# Patient Record
Sex: MALE | Race: BLACK OR AFRICAN AMERICAN | NOT HISPANIC OR LATINO | Employment: OTHER | URBAN - METROPOLITAN AREA
[De-identification: names, ages, dates, MRNs, and addresses within clinical notes are randomized per-mention and may not be internally consistent; named-entity substitution may affect disease eponyms.]

---

## 2017-01-31 ENCOUNTER — OFFICE VISIT (OUTPATIENT)
Dept: PODIATRY | Facility: CLINIC | Age: 82
End: 2017-01-31
Payer: COMMERCIAL

## 2017-01-31 VITALS — SYSTOLIC BLOOD PRESSURE: 138 MMHG | HEART RATE: 56 BPM | DIASTOLIC BLOOD PRESSURE: 62 MMHG | HEIGHT: 73 IN

## 2017-01-31 DIAGNOSIS — B35.3 TINEA PEDIS OF BOTH FEET: ICD-10-CM

## 2017-01-31 DIAGNOSIS — L84 CORN OR CALLUS: ICD-10-CM

## 2017-01-31 DIAGNOSIS — N18.6 TYPE 2 DIABETES MELLITUS WITH END-STAGE RENAL DISEASE: Primary | ICD-10-CM

## 2017-01-31 DIAGNOSIS — I87.1 VEIN STENOSIS: ICD-10-CM

## 2017-01-31 DIAGNOSIS — E11.22 TYPE 2 DIABETES MELLITUS WITH END-STAGE RENAL DISEASE: Primary | ICD-10-CM

## 2017-01-31 DIAGNOSIS — B35.1 DERMATOPHYTOSIS, NAIL: ICD-10-CM

## 2017-01-31 PROCEDURE — 99499 UNLISTED E&M SERVICE: CPT | Mod: S$GLB,,, | Performed by: PODIATRIST

## 2017-01-31 PROCEDURE — 99999 PR PBB SHADOW E&M-EST. PATIENT-LVL IV: CPT | Mod: PBBFAC,,, | Performed by: PODIATRIST

## 2017-01-31 PROCEDURE — 99214 OFFICE O/P EST MOD 30 MIN: CPT | Mod: S$GLB,,, | Performed by: PODIATRIST

## 2017-01-31 RX ORDER — KETOCONAZOLE 20 MG/G
CREAM TOPICAL DAILY
Qty: 30 G | Refills: 3 | Status: ON HOLD | OUTPATIENT
Start: 2017-01-31 | End: 2019-01-01

## 2017-01-31 NOTE — PROGRESS NOTES
Subjective:      Patient ID: Ronald Campbell is a 87 y.o. male.    Chief Complaint: Diabetes Mellitus (PCP Dr. Sanches); Diabetic Foot Exam; and Routine Foot Care    Ronald is a 87 y.o. male who presents to the clinic for evaluation and treatment of high risk feet. Ronald has a past medical history of Anemia; Blindness of right eye; CHF (congestive heart failure); Chronic kidney disease; Diabetes mellitus type II; General anesthetics causing adverse effect in therapeutic use; Glaucoma (increased eye pressure); Hypertension; and Seizures. The patient's chief complaint is long, thick toenails. He had an infected left hallux ingrown nail that improved with local care a few weeks ago. His daughter is providing excellent foot care BID.  This patient has documented high risk feet requiring routine maintenance secondary to diabetes mellitis and those secondary complications of diabetes, as mentioned.     PCP: Angelika     Date Last Seen by PCP:   Chief Complaint   Patient presents with    Diabetes Mellitus     PCP Dr. Sanches    Diabetic Foot Exam    Routine Foot Care       Current shoe gear: DM shoes.    Hemoglobin A1C   Date Value Ref Range Status   04/30/2014 7.7 (H) 4.5 - 6.2 % Final   09/02/2013 7.9 (H) 4.5 - 6.2 % Final   05/07/2013 7.4 (H) 4.0 - 6.2 % Final       Review of Systems   Constitution: Negative for chills, decreased appetite and fever.   Eyes: Positive for vision loss in left eye and vision loss in right eye.   Cardiovascular: Negative for leg swelling.   Skin: Positive for dry skin and nail changes.   Musculoskeletal: Positive for back pain and falls. Negative for arthritis, joint pain, joint swelling and myalgias.   Gastrointestinal: Negative for nausea and vomiting.   Neurological: Negative for loss of balance, numbness and paresthesias.           Objective:      Physical Exam   Constitutional: He is oriented to person, place, and time. He appears well-developed and well-nourished.   Cardiovascular:    Dorsalis pedis and posterior tibial pulses are diminished Bilaterally. Toes are cool to touch. Feet are warm proximally.There is decreased digital hair. Skin is atrophic, slightly hyperpigmented, and mildly edematous       Musculoskeletal: Normal range of motion. He exhibits no edema or tenderness.   Adequate joint range of motion without pain, limitation, nor crepitation Bilateral feet and ankle joints. Muscle strength is 5/5 in all groups bilaterally.    Bilateral hallux abducto valgus deformities R>L with right hallux abutting the second toe and forcing it into a dorsiflexed position.       Feet:   Right Foot:   Protective Sensation: 10 sites tested. 8 sites sensed.   Left Foot:   Protective Sensation: 3 sites tested. 10 sites sensed.   Neurological: He is alert and oriented to person, place, and time.   [place]-Kevin 5.07 monofilamant testing is diminished Shiv feet. Sharp/dull sensation diminished Bilaterally. Light touch absent Bilaterally.       Skin: Skin is warm and dry. No ecchymosis and no lesion noted. No erythema.   Nails x10 are elongated by  2-4mm's, thickened by 2-9 mm's, dystrophic, and are darkened in  coloration . Xerosis Bilaterally. No open lesions noted.    Hyperkeratotic tissue noted to sub 5th MPJ b/l      Psychiatric: He has a normal mood and affect. His behavior is normal.   Nursing note and vitals reviewed.            Assessment:       Encounter Diagnoses   Name Primary?    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 Yes    Dermatophytosis, nail     Corn or callus     Tinea pedis of both feet     Vein stenosis          Plan:       Ronald was seen today for diabetes mellitus, diabetic foot exam and routine foot care.    Diagnoses and all orders for this visit:    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    Dermatophytosis, nail    Corn or callus    Tinea pedis of both feet  -     ketoconazole (NIZORAL) 2 % cream; Apply topically once daily. feet    Vein  stenosis      I counseled the patient on his conditions, their implications and medical management.    - Shoe inspection. Diabetic Foot Education. Patient reminded of the importance of good nutrition and blood sugar control to help prevent podiatric complications of diabetes. Patient instructed on proper foot hygeine. We discussed wearing proper shoe gear, daily foot inspections, never walking without protective shoe gear, never putting sharp instruments to feet, routine podiatric nail visits every 2-3 months.      - With patient's permission, nails were aggressively reduced and debrided x 10 to their soft tissue attachment mechanically and with electric , removing all offending nail and debris. Patient relates relief following the procedure. He will continue to monitor the areas daily, inspect his feet, wear protective shoe gear when ambulatory, moisturizer to maintain skin integrity and follow in this office in approximately 2-3 months, sooner p.r.n.    - After cleansing the  area w/ alcohol prep pad the above mentioned hyperkeratosis was trimmed utilizing No 15 scapel, to a smooth base with out incident. Patient tolerated this  well and reported comfort to the area of plantar 5th MPJ b/l and the second toe on the right.    - Econazole 1% topical cream prescribed for treatment of aforementioned tinea pedis. Patient will use this medication as directed in addition to thourougly drying between toes daily, and applying powder as needed

## 2017-01-31 NOTE — LETTER
January 31, 2017      Js Sanches MD  [address] [facility] [address]           [facility] - Podiatry  [address]  [place] [address]  Phone: [phone]          Patient: Ronald Campbell   MR Number: [id number]   YOB: 1929   Date of Visit: 1/31/2017       Dear Dr. Js Sanches:    Thank you for referring Ronald Campbell to me for evaluation. Attached you will find relevant portions of my assessment and plan of care.    If you have questions, please do not hesitate to call me. I look forward to following Ronald Campbell along with you.    Sincerely,    Joaquin Juarez, DPSUMEET    Enclosure  CC:  No Recipients    If you would like to receive this communication electronically, please contact [email or web address] or [phone] to request more information on [facility] access.    For providers and/or their staff who would like to refer a patient to [facility], please contact us through our one-stop-shop provider referral line, [facility], at [phone].    If you feel you have received this communication in error or would no longer like to receive these types of communications, please e-mail [email or web address]

## 2017-01-31 NOTE — PATIENT INSTRUCTIONS
Gel toe sleeve ([employer].com or [facility])    How to Check Your Feet    Below are tips to help you look for foot problems. Try to check your feet at the same time each day, such as when you get out of bed in the morning.    · Check the top of each foot. The tops of toes, back of the heel, and outer edge of the foot can get a lot of rubbing from poor-fitting shoes.    · Check the bottom of each foot. Daily wear and tear often leads to problems at pressure spots.    · Check the toes and nails. Fungal infections often occur between toes. Toenail problems can also be a sign of fungal infections or lead to breaks in the skin.    · Check your shoes, too. Loose objects inside a shoe can injure the foot. Use your hand to feel inside your shoes for things like bruna, loose stitching, or rough areas that could irritate your skin.        Diabetic Foot Care    Diabetes can lead to a number of different foot complications. Fortunately, most of these complications can be prevented with a little extra foot care. If diabetes is not well controlled, the high blood sugar can cause damage to blood vessels and result in poor circulation to the foot. When the skin does not get enough blood flow, it becomes prone to pressure sores and ulcers, which heal slowly.  High blood sugar can also damage nerves, interfering with the ability to feel pain and pressure. When you cant feel your foot normally, it is easy to injure your skin, bones and joints without knowing it. For these reasons diabetes increases the risk of fungal infections, bunions and ulcers. Deep ulcers can lead to bone infection. Gangrene is the most serious foot complication of diabetes. It usually occurs on the tips of the toes as blacked areas of skin. The black area is dead tissue. In severe cases, gangrene spreads to involve the entire toe, other toes and the entire foot. Foot or toe amputation may be required. Good foot care and blood sugar control can  prevent this.    Home Care  1. Wear comfortable, proper fitting shoes.  2. Wash your feet daily with warm water and mild soap.  3. After drying, apply a moisturizing cream or lotion.  4. Check your feet daily for skin breaks, blisters, swelling, or redness. Look between your toes also.  5. Wear cotton socks and change them every day.  6. Trim toe nails carefully and do not cut your cuticles.  7. Strive to keep your blood sugar under control with a combination of medicines, diet and activity.  8. If you smoke and have diabetes, it is very important that you stop. Smoking reduces blood flow to your foot.  9. Avoid activities that increase your risk of foot injury:  · Do not walk barefoot.  · Do not use heating pads or hot water bottles on your feet.  · Do not put your foot in a hot tub without first checking the temperature with your hand.  10) Schedule yearly foot exams.    Follow Up  with your doctor or as advised by our staff. Report any cut, puncture, scrape, other injury, blister, ingrown toenail or ulcer on your foot.    Get Prompt Medical Attention  if any of the following occur:  -- Open ulcer with pus draining from the wound  -- Increasing foot or leg pain  -- New areas of redness or swelling or tender areas of the foot    © 9963-3932 [employer]. [address] [place] [address], [place] [address]. All rights reserved. This information is not intended as a substitute for professional medical care. Always follow your healthcare professional's instructions.

## 2017-02-09 ENCOUNTER — TELEPHONE (OUTPATIENT)
Dept: NEPHROLOGY | Facility: CLINIC | Age: 82
End: 2017-02-09

## 2017-02-09 NOTE — TELEPHONE ENCOUNTER
----- Message from Manuela Ogden MA sent at 2/8/2017  4:56 PM CST -----  Contact: pt 's daughter      ----- Message -----     From: Elenita Adler     Sent: 2/8/2017   4:41 PM       To: Taj Shelley Staff    Gabriela   Daughter calling    ANANDA Bowser pt  -      Please call  Gabriela ph [phone]     Concerned   He seems to be returning home from  Dialysis  More and more tired   -   Weak  -   Voice is weak         Doesn't really get his strength back till the next day     What like to discuss with you     Thanks

## 2017-04-25 ENCOUNTER — OFFICE VISIT (OUTPATIENT)
Dept: UROLOGY | Facility: CLINIC | Age: 82
End: 2017-04-25
Payer: COMMERCIAL

## 2017-04-25 VITALS — HEART RATE: 60 BPM | DIASTOLIC BLOOD PRESSURE: 50 MMHG | SYSTOLIC BLOOD PRESSURE: 134 MMHG

## 2017-04-25 DIAGNOSIS — N43.3 HYDROCELE, BILATERAL: Primary | ICD-10-CM

## 2017-04-25 DIAGNOSIS — R30.0 DYSURIA: ICD-10-CM

## 2017-04-25 DIAGNOSIS — N13.8 BPH (BENIGN PROSTATIC HYPERTROPHY) WITH URINARY OBSTRUCTION: ICD-10-CM

## 2017-04-25 DIAGNOSIS — N48.89 PRESENCE OF SMEGMA IN MALE PATIENT: ICD-10-CM

## 2017-04-25 DIAGNOSIS — N40.1 BPH (BENIGN PROSTATIC HYPERTROPHY) WITH URINARY OBSTRUCTION: ICD-10-CM

## 2017-04-25 PROCEDURE — 99999 PR PBB SHADOW E&M-EST. PATIENT-LVL IV: CPT | Mod: PBBFAC,,, | Performed by: PHYSICIAN ASSISTANT

## 2017-04-25 PROCEDURE — 81002 URINALYSIS NONAUTO W/O SCOPE: CPT | Mod: S$GLB,,, | Performed by: PHYSICIAN ASSISTANT

## 2017-04-25 PROCEDURE — 99213 OFFICE O/P EST LOW 20 MIN: CPT | Mod: 25,S$GLB,, | Performed by: PHYSICIAN ASSISTANT

## 2017-04-25 PROCEDURE — 87086 URINE CULTURE/COLONY COUNT: CPT

## 2017-04-25 NOTE — PROGRESS NOTES
"CHIEF COMPLAINT:    Mr. Campbell is a 88 y.o. male presenting for left testicular swelling.    PRESENTING ILLNESS:    Ronald Campbell is a 88 y.o. male who presents for left testicular swelling x couple of years.  Its gotten larger over the last few months.  It is not painful.  "It gets in the way."  It causes discomfort with walking and sitting.  He denies trauma to his testicles.   He denies frequency, urgency, nocturia, incontinence.  He has hesitancy.  Also reports dysuria at the beginning of his stream x 1-2 months.  Sometimes he has nocturnal enuresis according to his daughter.   He has been on hemodialysis x 3 years.  He does hemodialysis 3 times a week.  He voids 1-2 times a day.    Last seen by Dr. Nagel in 2014.  Has history of UTI.     REVIEW OF SYSTEMS:    Constitutional: Negative for fever and chills.   HENT: Negative for hearing loss.   Eyes: Positive for visual distrubance.  (he's blind)   Respiratory: Negative for shortness of breath.   Cardiovascular: Negative for chest pain.   Gastrointestinal: Negative for nausea, vomiting, and constipation.   Genitourinary: Positive for hesitancy and dysuria. Negative for urgency, frequency, nocturia, incontinence, hematuria, intermittency, difficulty urinating, incomplete bladder emptying, and decreased urine volume.   Neurological: Negative for dizziness.   Hematological: Does not bruise/bleed easily.   Psychiatric/Behavioral: Negative for confusion.       PATIENT HISTORY:    Past Medical History:   Diagnosis Date    Anemia     Blindness of right eye     CHF (congestive heart failure)     Chronic kidney disease     Diabetes mellitus type II     General anesthetics causing adverse effect in therapeutic use     april / may 2014     Glaucoma (increased eye pressure)     Hypertension     Seizures        Past Surgical History:   Procedure Laterality Date    AV FISTULA PLACEMENT Left 4/2014    AV fistula to Left upper arm    EYE SURGERY         Family History "   Problem Relation Age of Onset    Heart disease Sister      mi    Heart disease Brother      mi    Cancer Daughter      BREAST X 2       Social History     Social History    Marital status:      Spouse name: N/A    Number of children: N/A    Years of education: N/A     Occupational History    Not on file.     Social History Main Topics    Smoking status: Never Smoker    Smokeless tobacco: Never Used    Alcohol use No    Drug use: No    Sexual activity: No     Other Topics Concern    Not on file     Social History Narrative       Allergies:  Lisinopril    Medications:    Current Outpatient Prescriptions:     amlodipine (NORVASC) 10 MG tablet, Take 1 tablet (10 mg total) by mouth once daily. Hold on Monday, Wednesday and Friday before dialysis. Hold for SBP < 110 (Patient taking differently: Take 10 mg by mouth every morning. ), Disp: , Rfl:     aspirin 81 MG Chew, Take 1 tablet (81 mg total) by mouth once daily. (Patient taking differently: Take 81 mg by mouth every morning. ), Disp: 30 tablet, Rfl: 3    atorvastatin (LIPITOR) 40 MG tablet, Take 1 tablet (40 mg total) by mouth once daily. (Patient taking differently: Take 80 mg by mouth every evening. ), Disp: 30 tablet, Rfl: 9    B complex-vitamin C-folic acid (NEPHRO-JESSICA) 0.8 mg Tab, Take 1 tablet by mouth every morning. , Disp: , Rfl:     blood sugar diagnostic (BLOOD GLUCOSE TEST) Strp, 1 strip by Misc.(Non-Drug; Combo Route) route 2 (two) times daily., Disp: , Rfl:     BRIMONIDINE TARTRATE/TIMOLOL (COMBIGAN OPHT), Place 1 drop into the left eye 2 (two) times daily. Left eye, Disp: , Rfl:     brinzolamide (AZOPT) 1 % ophthalmic suspension, Place 1 drop into the left eye 3 (three) times daily. , Disp: , Rfl:     ergocalciferol (ERGOCALCIFEROL) 50,000 unit Cap, Take 1 capsule (50,000 Units total) by mouth every 7 days., Disp: 10 capsule, Rfl: 1    HEPARIN SODIUM,PORCINE (HEPARIN, PORCINE,) 1,000 unit/mL injection, 1 mL (1,000 Units  total) by Intracatheter route as needed (Heparin 1000 units/ml instilled to fill volume of each lumen of dialysis catheter after each dialysis.)., Disp: , Rfl:     insulin aspart (NOVOLOG FLEXPEN) 100 unit/mL InPn,  Insulin Pen Subcutaneous As directed.  -200 take 1 unitBS 201-250 take 2 unitsBS 251-300 take 3 unitsBS 301-350 take 4 unitsBS > 351 take 5 units and call MD, Disp: , Rfl:     ketoconazole (NIZORAL) 2 % cream, Apply topically once daily. feet, Disp: 30 g, Rfl: 3    levetiracetam (KEPPRA) 500 MG Tab, Take 1 tablet (500 mg total) by mouth 2 (two) times daily., Disp: 60 tablet, Rfl: 11    TRAVOPROST (TRAVATAN Z OPHT), Place 1 drop into the left eye every evening. 1 Drops Left eye Every evening, Disp: , Rfl:     calcium acetate (PHOSLO) 667 mg capsule, Take 1 capsule (667 mg total) by mouth 3 (three) times daily with meals., Disp: 90 capsule, Rfl: 11    carvedilol (COREG) 25 MG tablet, Take 1 tablet (25 mg total) by mouth 2 (two) times daily. Hold on Monday, Wednesday and Friday before dialysis. Hold for SBP < 110 or HR < 55 (Patient taking differently: Take 25 mg by mouth 2 (two) times daily. Hold for SBP < 110 or HR < 55), Disp: 60 tablet, Rfl: 11    doxazosin (CARDURA) 4 MG tablet, Take 1 tablet (4 mg total) by mouth once daily. (Patient taking differently: Take 4 mg by mouth every morning. ), Disp: 30 tablet, Rfl: 1    finasteride (PROSCAR) 5 mg tablet, Take 1 tablet (5 mg total) by mouth once daily. (Patient taking differently: Take 5 mg by mouth every morning. ), Disp: 30 tablet, Rfl: 1    PHYSICAL EXAMINATION:    Constitutional: He appears well-developed and well-nourished.  He is in no apparent distress.    Eyes: No scleral icterus noted bilaterally.  No discharge noted bilaterally.      Cardiovascular: Normal rate.  No pitting edema noted in lower extremities bilaterally    Pulmonary/Chest: Effort normal. No respiratory distress.     Abdominal:  He exhibits no distension.  There is no  CVA tenderness.     Lymphadenopathy:        Right: No supraclavicular adenopathy present.        Left: No supraclavicular adenopathy present.     Neurological: He is alert and oriented to person, place, and time.     Skin: Skin is warm and dry.     Psych: Cooperative with normal affect.    Genitourinary: The penis is uncircumcised with no evidence of phimosis and paraphimosis.  Smegma noted.   The urethral meatus is normal.  Left scrotal swelling noted.  There is a large left hydrocele noted (larger than a soft ball).  It transilluminates.  There is also a smaller right hydrocele noted that also transilluminated.  No tenderness noted on exam.  No testicular masses palpated.    Normal anal sphincter tone. No rectal mass. The prostate is enlarged.  Prostate is smooth, non tender with no nodules noted.    Physical Exam      LABS:    U/a: 1.015, pH 5, + leuks, nitrite negative   Lab Results   Component Value Date    PSA 28.3 (H) 05/01/2014    PSA 9.4 (H) 10/18/2010    PSA 8.9 (H) 04/27/2004       IMPRESSION:    Encounter Diagnoses   Name Primary?    Hydrocele, bilateral Yes    Dysuria     Presence of smegma in male patient     BPH (benign prostatic hypertrophy) with urinary obstruction          PLAN:    Urine culture  Discussed treatment options conservative vs surgical treatment.  He will like to manage conservatively.    Encouraged patient to wear good scrotal supportive underwear.  Counseled patient and daughter on proper hygiene.  Showed daughter how to properly clean behind foreskin. Instructed daughter to thoroughly dry glans and pull foreskin back over head of penis.   Follow up in 1 year for DONAL.      aDra Germain PA-C

## 2017-04-26 LAB
BACTERIA UR CULT: NORMAL
BACTERIA UR CULT: NORMAL

## 2017-07-03 ENCOUNTER — TELEPHONE (OUTPATIENT)
Dept: VASCULAR SURGERY | Facility: CLINIC | Age: 82
End: 2017-07-03

## 2017-08-15 ENCOUNTER — TELEPHONE (OUTPATIENT)
Dept: ENDOCRINOLOGY | Facility: CLINIC | Age: 82
End: 2017-08-15

## 2017-08-15 ENCOUNTER — OFFICE VISIT (OUTPATIENT)
Dept: ENDOCRINOLOGY | Facility: CLINIC | Age: 82
End: 2017-08-15
Payer: COMMERCIAL

## 2017-08-15 VITALS
BODY MASS INDEX: 25.3 KG/M2 | HEART RATE: 62 BPM | RESPIRATION RATE: 18 BRPM | HEIGHT: 73 IN | SYSTOLIC BLOOD PRESSURE: 118 MMHG | WEIGHT: 190.88 LBS | DIASTOLIC BLOOD PRESSURE: 60 MMHG

## 2017-08-15 DIAGNOSIS — D63.1 ANEMIA IN CHRONIC KIDNEY DISEASE(285.21): ICD-10-CM

## 2017-08-15 DIAGNOSIS — E78.5 DYSLIPIDEMIA: ICD-10-CM

## 2017-08-15 DIAGNOSIS — N18.6 ESRD (END STAGE RENAL DISEASE) ON DIALYSIS: ICD-10-CM

## 2017-08-15 DIAGNOSIS — I10 ESSENTIAL HYPERTENSION: ICD-10-CM

## 2017-08-15 DIAGNOSIS — I50.42 CHRONIC COMBINED SYSTOLIC AND DIASTOLIC HEART FAILURE: Chronic | ICD-10-CM

## 2017-08-15 DIAGNOSIS — Z99.2 ESRD (END STAGE RENAL DISEASE) ON DIALYSIS: ICD-10-CM

## 2017-08-15 DIAGNOSIS — N18.6 TYPE 2 DIABETES MELLITUS WITH END-STAGE RENAL DISEASE: Primary | ICD-10-CM

## 2017-08-15 DIAGNOSIS — N18.9 ANEMIA IN CHRONIC KIDNEY DISEASE(285.21): ICD-10-CM

## 2017-08-15 DIAGNOSIS — E11.22 TYPE 2 DIABETES MELLITUS WITH END-STAGE RENAL DISEASE: Primary | ICD-10-CM

## 2017-08-15 DIAGNOSIS — H40.9 GLAUCOMA, UNSPECIFIED GLAUCOMA TYPE, UNSPECIFIED LATERALITY: ICD-10-CM

## 2017-08-15 PROCEDURE — 99999 PR PBB SHADOW E&M-EST. PATIENT-LVL III: CPT | Mod: PBBFAC,,, | Performed by: NURSE PRACTITIONER

## 2017-08-15 PROCEDURE — 1126F AMNT PAIN NOTED NONE PRSNT: CPT | Mod: S$GLB,,, | Performed by: NURSE PRACTITIONER

## 2017-08-15 PROCEDURE — 1159F MED LIST DOCD IN RCRD: CPT | Mod: S$GLB,,, | Performed by: NURSE PRACTITIONER

## 2017-08-15 PROCEDURE — 99214 OFFICE O/P EST MOD 30 MIN: CPT | Mod: S$GLB,,, | Performed by: NURSE PRACTITIONER

## 2017-08-15 PROCEDURE — 3008F BODY MASS INDEX DOCD: CPT | Mod: S$GLB,,, | Performed by: NURSE PRACTITIONER

## 2017-08-15 PROCEDURE — 99499 UNLISTED E&M SERVICE: CPT | Mod: S$GLB,,, | Performed by: NURSE PRACTITIONER

## 2017-08-15 NOTE — TELEPHONE ENCOUNTER
Called [employer] [facility] [phone], no answer- left specific message as per Tonya Dunham Np.  Left my call back number that they can return call if specific message left on recorder is not understood.  I left patient contact number and needs per Tonya Dunham NP.

## 2017-08-15 NOTE — TELEPHONE ENCOUNTER
Please call [facility] and notify them that patient was seen in DM clinic today.   He needs labs now for review. Unable to schedule appointment for labs. No labs found in media tab.   Need A1c, Fructosamine, Lipid, CMP, CBC, and TSH now.   Follow up in 6 months with A1c and fructosamine prior.   He needs to schedule and eye exam with Dr. Bolanos- 6 month follow up.     - the number to the [facility] program is [phone]    Thanks

## 2017-08-15 NOTE — PROGRESS NOTES
CC: Ronald Campbell arrives today for management of Type 2 DM and review of chronic medical conditions.      HPI: Mr. Ronald Campbell is a 88 y.o. Black or  male who was diagnosed with Type 2 DM ~ 26-27 years ago after attending a health fair. He was initially started on insulin therapy.     He is a patient of Dr. Fagn. Seen last in clinic in 8/2016. He is being seen by me for the first time today. He presents with his daughter in a wheelchair. His daughter does most of the talking during the visit.     He is part of [facility]- they manage his overall care. His daughter explained that providers can orders labs when needed. They provide transportation and sitters. We are not able to schedule appointments for follow up or labs.   He has caretaker daily during the week until 5 pm on weekdays. Lives with his daughter who cares for him the rest of the time. .     Chronic conditions include: HTN, seizures, anemia, glaucoma- blindness, CHF- EF ~ 30%, ESRD on dialysis     ESRD- on dialysis MWF- followed by Dr. Vang- last visit was in 2/2017.     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 Followed by Dr. Bolanos. Last seen in October 2016. Suppose to follow up q6 months. Needs appointment. No DR.      DIABETES COMPLICATIONS: nephropathy and cardiovascular disease- blind- r/t glaucoma, on dialysis.      HOSPITALIZATIONS FOR DIABETES OR RELATED COMPLICATIONS:  No    CURRENT A1C:    Hemoglobin A1C   Date Value Ref Range Status   04/30/2014 7.7 (H) 4.5 - 6.2 % Final   09/02/2013 7.9 (H) 4.5 - 6.2 % Final   05/07/2013 7.4 (H) 4.0 - 6.2 % Final       GOAL A1C: 7.5%-8% - CKD stage 4 and age.     DM MEDICATIONS USED IN THE PAST: Novolog     CURRENT DIABETIC MEDS: Novolog sliding scale only-  Not giving everyday. Checks BG 2x/day and will give as needed.   150-200- 1 unit  201-250- 2 units.   251-300- 3 units  301-350- 4 units.   >351- 5 units.     All insulin is administered by patient's daughter or sisters as patient is  "blind.     Uses Vials or Pens: Pens       STANDARDS of CARE:  Statin:  Yes - Lipitor 40 mg   ACEI or ARB:  No- allergy to Lisinopril. Defer to nephrology.   ASA:  Yes - 81 mg daily   Last eye exam  10/2016  Last Podiatry Exam: 01/2017    BG readings are checked ~ 2x/day. He did not bring his meter or logs today for review.   Reports BG readings are 160-185- per daughter.     Hypoglycemia: No    Skipped/missed glycemic medications: N/A    Prandial injections taken with meals: N/a    Physical Activity: No    Skipping meals and/or snacking: Eats 3 meals per day. + snacking between meals on nuts, cheese and crackers, fruit.       MEDICATIONS, ALLERGIES, LABS, VS's, MEDICAL, SURGICAL, SOCIAL, AND FAMILY HISTORY reviewed and updated in the appropriate sections during this encounter    ROS  Constitutional: Negative for weight change.  Endocrine:  Denies polyuria, polydipsia, nocturia.  HENT: Denies neck swelling, lumps, hoarseness or trouble swallowing. Denies any personal or family history of thyroid cancer.    Eyes: + blindness.   Respiratory: Negative for cough or shortness of breath.  Cardiovascular: Negative for chest pain or claudication.   Gastrointestinal: Negative for nausea, diarrhea, vomiting, bloating, abdominal pain.  No history of pancreatitis or gastroparesis.  Genitourinary: + BPH- frequent urination. + testicular swelling- + hydrocele- seen by urology.   Skin: Denies prolonged wound healing.  Neurological: Negative for syncope, numbness/burning of extremities. + forgetfulness and memory problems- daughter is concerned- sometimes patient "will get lost in the house"   Psychiatric/Behavioral: Negative for depression or anxiety      Vital Signs  /60   Pulse 62   Resp 18   Ht 6' 1" (1.854 m)   Wt 86.6 kg (190 lb 14.4 oz)   BMI 25.19 kg/m²         Chemistry        Component Value Date/Time     11/17/2016 1254    K 4.1 11/17/2016 1254     11/17/2016 1254    CO2 24 11/17/2016 1254    BUN 54 " (H) 11/17/2016 1254    CREATININE 6.0 (H) 11/17/2016 1254     (H) 11/17/2016 1254        Component Value Date/Time    CALCIUM 8.2 (L) 11/17/2016 1254    ALKPHOS 96 12/09/2015 1827    AST 15 12/09/2015 1827    ALT 25 12/09/2015 1827    BILITOT 0.6 12/09/2015 1827    ESTGFRAFRICA 8.9 (A) 11/17/2016 1254    EGFRNONAA 7.7 (A) 11/17/2016 1254          Lab Results   Component Value Date    CHOL 95 (L) 05/26/2016    CHOL 102 (L) 05/07/2013    CHOL 86 (L) 05/20/2011     Lab Results   Component Value Date    HDL 30 (L) 05/26/2016    HDL 34 (L) 05/07/2013    HDL 28 (L) 05/20/2011     Lab Results   Component Value Date    LDLCALC 52.8 (L) 05/26/2016    LDLCALC 50.0 (L) 05/07/2013    LDLCALC 42.6 (L) 05/20/2011     Lab Results   Component Value Date    TRIG 61 05/26/2016    TRIG 88 05/07/2013    TRIG 77 05/20/2011     Lab Results   Component Value Date    CHOLHDL 31.6 05/26/2016    CHOLHDL 33.3 05/07/2013    CHOLHDL 32.6 05/20/2011       Lab Results   Component Value Date    TSH 1.759 05/08/2014       No results found for: MICALBCREAT    Vit D, 25-Hydroxy   Date Value Ref Range Status   10/09/2013 23 (L) 30 - 96 ng/mL Final     Comment:     Vitamin D deficiency.........<10 ng/mL                              Vitamin D insufficiency......10-29 ng/mL       Vitamin D sufficiency........> or equal to 30 ng/mL  Vitamin D toxicity............>100 ng/mL         PHYSICAL EXAMINATION  Constitutional: Well developed, well nourished, NAD.   Psych: AAOx3,  well groomed. Flat affect- withdrawn.   Eyes: Conjunctiva and corneas clear.  Neck: Supple, trachea midline, FROM, no thyromegaly or nodules, carotid pulses strong.   Respiratory: Resp even and unlabored, CTA bilateral.  Cardiac: RRR, radial pulses +2 bilaterally.   Abdomen: Soft, non-tender, non-distended; +BSs x  4.  Vascular: Same temperature peripherally to centrally,  trace  BLE edema. + strong thrill and bruit to left upper arm.   Neuro: unsteady gait. Walks with assistance.  Wheelchair in clinic.   Skin: Normal skin turgor. Skin warm and dry. No acanthosis nigracans observed. Injection sites with no complications.   Feet: . Footwear appropriate.     Assessment/Plan    1. 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   Need A1c- no labs found in media tab.   A1c goal ~7.5- 8%- due to age and CKD   Order A1c, Fructosamine, CBC, CMP, TSH, lipids now to assess DM management. NO logs or meter for review.   Reviewed POCT glucoses in EPIC from November at goal.   Discussed diagnosis of DM, progression of disease, long term complications   Reviewed A1c and BG goals.      For now, continue with current regimen sliding scale Novolog. If prandial needs are identified, may consider DPP4- renal dose.      Bg monitoring at least BID for SS Novolog administration. Bring logs or meter to all clinic visits for review.     Notify clinic of any hypoglycemia episodes with BG readings < 70, if BG readings consistently run <100 or > 200, or for any BG concerns.         2. ESRD (end stage renal disease) on dialysis - Avoid hypoglycemia and insulin stacking. Continue to follow with nephrology. Dialysis MWF.        3. Essential hypertension - BP goal < 140/90. BP at goal, continue medications.          4. Dyslipidemia - continue statins. Lipids now. LDL goal < 100.        5. Chronic combined systolic and diastolic heart failure - EF 30%. Avoid hypoglycemia.          6. Anemia in chronic kidney disease(285.21) - May skew A1c results. CBC and fructosamine now.   Fructosamine and A1c prior to RTC in 6 months.        7. Glaucoma, unspecified glaucoma type, unspecified laterality - needs follow up eye appointment with Dr. Bolanos.               His daughter is concerned about his memory problems. Will have staff call [facility] to discuss and set up appointments for labs       FOLLOW UP  Return in about 6 months (around 2/15/2018).     Orders Placed This Encounter   Procedures    Hemoglobin A1c     Standing Status:    Future     Standing Expiration Date:   10/14/2018    Fructosamine     Standing Status:   Future     Standing Expiration Date:   10/14/2018    Comprehensive metabolic panel     Standing Status:   Future     Standing Expiration Date:   10/14/2018    Lipid panel     Standing Status:   Future     Standing Expiration Date:   10/14/2018    TSH     Standing Status:   Future     Standing Expiration Date:   10/14/2018    CBC auto differential     Standing Status:   Future     Standing Expiration Date:   10/14/2018    Hemoglobin A1c     Standing Status:   Future     Standing Expiration Date:   10/14/2018    Fructosamine     Standing Status:   Future     Standing Expiration Date:   10/14/2018

## 2017-08-16 ENCOUNTER — TELEPHONE (OUTPATIENT)
Dept: ENDOCRINOLOGY | Facility: CLINIC | Age: 82
End: 2017-08-16

## 2017-08-16 NOTE — TELEPHONE ENCOUNTER
Patient seen in clinic yesterday and needs follow up with Tonya Dunham NP in 6 months with lab work to be scheduled before.  Placed information in recall section.

## 2017-11-07 ENCOUNTER — HOSPITAL ENCOUNTER (OUTPATIENT)
Dept: RADIOLOGY | Facility: OTHER | Age: 82
Discharge: HOME | End: 2017-11-07
Attending: UROLOGY
Payer: COMMERCIAL

## 2017-11-07 ENCOUNTER — TELEPHONE (OUTPATIENT)
Dept: UROLOGY | Facility: CLINIC | Age: 82
End: 2017-11-07

## 2017-11-07 ENCOUNTER — OFFICE VISIT (OUTPATIENT)
Dept: UROLOGY | Facility: CLINIC | Age: 82
End: 2017-11-07
Payer: COMMERCIAL

## 2017-11-07 ENCOUNTER — HOSPITAL ENCOUNTER (OUTPATIENT)
Dept: CARDIOLOGY | Facility: CLINIC | Age: 82
Discharge: HOME | End: 2017-11-07
Payer: COMMERCIAL

## 2017-11-07 VITALS — RESPIRATION RATE: 18 BRPM | BODY MASS INDEX: 25.18 KG/M2 | HEIGHT: 73 IN | WEIGHT: 190 LBS

## 2017-11-07 DIAGNOSIS — N43.3 HYDROCELE, BILATERAL: ICD-10-CM

## 2017-11-07 DIAGNOSIS — N43.3 HYDROCELE, BILATERAL: Primary | ICD-10-CM

## 2017-11-07 DIAGNOSIS — Z99.2 ESRD (END STAGE RENAL DISEASE) ON DIALYSIS: Primary | ICD-10-CM

## 2017-11-07 DIAGNOSIS — N50.89 SCROTAL MASS: ICD-10-CM

## 2017-11-07 DIAGNOSIS — Z79.4 INSULIN LONG-TERM USE: ICD-10-CM

## 2017-11-07 DIAGNOSIS — N18.6 ESRD (END STAGE RENAL DISEASE) ON DIALYSIS: ICD-10-CM

## 2017-11-07 DIAGNOSIS — N18.6 ESRD (END STAGE RENAL DISEASE) ON DIALYSIS: Primary | ICD-10-CM

## 2017-11-07 DIAGNOSIS — Z99.2 HEMODIALYSIS PATIENT: ICD-10-CM

## 2017-11-07 DIAGNOSIS — K40.90 LEFT INGUINAL HERNIA: Primary | ICD-10-CM

## 2017-11-07 DIAGNOSIS — H54.40 BLINDNESS OF RIGHT EYE: ICD-10-CM

## 2017-11-07 DIAGNOSIS — Z99.2 ESRD (END STAGE RENAL DISEASE) ON DIALYSIS: ICD-10-CM

## 2017-11-07 PROCEDURE — 76870 US EXAM SCROTUM: CPT | Mod: TC

## 2017-11-07 PROCEDURE — 99214 OFFICE O/P EST MOD 30 MIN: CPT | Mod: S$GLB,,, | Performed by: UROLOGY

## 2017-11-07 PROCEDURE — 93000 ELECTROCARDIOGRAM COMPLETE: CPT | Mod: S$GLB,,, | Performed by: INTERNAL MEDICINE

## 2017-11-07 PROCEDURE — 99999 PR PBB SHADOW E&M-EST. PATIENT-LVL III: CPT | Mod: PBBFAC,,, | Performed by: UROLOGY

## 2017-11-07 PROCEDURE — 76870 US EXAM SCROTUM: CPT | Mod: 26,,, | Performed by: RADIOLOGY

## 2017-11-07 NOTE — PROGRESS NOTES
"CC: left scrotal swelling, pain    Ovalee Shannan is a 88 y.o. man who is here for the evaluation of hydrocele (left testicular swelling -worse than it was in april 2017)  a new pt to me.  He is blind and is a patient of [facility]. He is here with his daughter today.  presents for left testicular swelling x couple of years.  Its gotten larger over the last few months.  It is not painful.  "It gets in the way."  It causes discomfort with walking and sitting.  He denies trauma to his testicles.   He denies frequency, urgency, nocturia, incontinence.  He has hesitancy.  Also reports dysuria at the beginning of his stream x 1-2 months.  Sometimes he has nocturnal enuresis according to his daughter.   He has been on hemodialysis x 3 years.  He does hemodialysis 3 times a week (M_W_F).  He voids 1-2 times a day.    Last seen by Dr. Nagel in 2014.  Has history of UTI.     Denies flank pain, dysuira, hematuria .      Past Medical History:   Diagnosis Date    Anemia     Blindness of right eye     CHF (congestive heart failure)     Chronic kidney disease     Dementia 01/2017    Diabetes mellitus type II     General anesthetics causing adverse effect in therapeutic use     april / may 2014     Glaucoma (increased eye pressure)     Hypertension     Seizures      Past Surgical History:   Procedure Laterality Date    AV FISTULA PLACEMENT Left 4/2014    AV fistula to Left upper arm    EYE SURGERY       Social History   Substance Use Topics    Smoking status: Never Smoker    Smokeless tobacco: Never Used    Alcohol use No     Family History   Problem Relation Age of Onset    Heart disease Sister      mi    Heart disease Brother      mi    Cancer Daughter      BREAST X 2     Allergy:  Review of patient's allergies indicates:   Allergen Reactions    Lisinopril Swelling     Outpatient Encounter Prescriptions as of 11/7/2017   Medication Sig Dispense Refill    amlodipine (NORVASC) 10 MG tablet Take 1 tablet (10 mg total) " by mouth once daily. Hold on Monday, Wednesday and Friday before dialysis. Hold for SBP < 110 (Patient taking differently: Take 10 mg by mouth every morning. )      aspirin 81 MG Chew Take 1 tablet (81 mg total) by mouth once daily. (Patient taking differently: Take 81 mg by mouth every morning. ) 30 tablet 3    atorvastatin (LIPITOR) 40 MG tablet Take 1 tablet (40 mg total) by mouth once daily. (Patient taking differently: Take 80 mg by mouth every evening. ) 30 tablet 9    B complex-vitamin C-folic acid (NEPHRO-JESSICA) 0.8 mg Tab Take 1 tablet by mouth every morning.       blood sugar diagnostic (BLOOD GLUCOSE TEST) Strp 1 strip by Misc.(Non-Drug; Combo Route) route 2 (two) times daily.      brinzolamide (AZOPT) 1 % ophthalmic suspension Place 1 drop into the left eye 3 (three) times daily.       ergocalciferol (ERGOCALCIFEROL) 50,000 unit Cap Take 1 capsule (50,000 Units total) by mouth every 7 days. 10 capsule 1    HEPARIN SODIUM,PORCINE (HEPARIN, PORCINE,) 1,000 unit/mL injection 1 mL (1,000 Units total) by Intracatheter route as needed (Heparin 1000 units/ml instilled to fill volume of each lumen of dialysis catheter after each dialysis.).      insulin aspart (NOVOLOG FLEXPEN) 100 unit/mL InPn  Insulin Pen Subcutaneous As directed.  -200 take 1 unitBS 201-250 take 2 unitsBS 251-300 take 3 unitsBS 301-350 take 4 unitsBS > 351 take 5 units and call MD      ketoconazole (NIZORAL) 2 % cream Apply topically once daily. feet 30 g 3    levetiracetam (KEPPRA) 500 MG Tab Take 1 tablet (500 mg total) by mouth 2 (two) times daily. 60 tablet 11    TRAVOPROST (TRAVATAN Z OPHT) Place 1 drop into the left eye every evening. 1 Drops Left eye Every evening      calcium acetate (PHOSLO) 667 mg capsule Take 1 capsule (667 mg total) by mouth 3 (three) times daily with meals. 90 capsule 11    carvedilol (COREG) 25 MG tablet Take 1 tablet (25 mg total) by mouth 2 (two) times daily. Hold on Monday, Wednesday and  Friday before dialysis. Hold for SBP < 110 or HR < 55 (Patient taking differently: Take 25 mg by mouth 2 (two) times daily. Hold for SBP < 110 or HR < 55) 60 tablet 11    doxazosin (CARDURA) 4 MG tablet Take 1 tablet (4 mg total) by mouth once daily. (Patient taking differently: Take 4 mg by mouth every morning. ) 30 tablet 1    finasteride (PROSCAR) 5 mg tablet Take 1 tablet (5 mg total) by mouth once daily. (Patient taking differently: Take 5 mg by mouth every morning. ) 30 tablet 1     No facility-administered encounter medications on file as of 11/7/2017.      Review of Systems   General ROS: GENERAL:  No weight gain or loss  SKIN:  No rashes or lacerations  HEAD:  No headaches  EYES:  No exophthalmos, jaundice or blindness  EARS:  No dizziness, tinnitus or hearing loss  NOSE:  No changes in smell  MOUTH & THROAT:  No dyskinetic movements or obvious goiter  CHEST:  No shortness of breath, hyperventilation or cough  CARDIOVASCULAR:  No tachycardia or chest pain  ABDOMEN:  No nausea, vomiting, pain, constipation or diarrhea  URINARY:  No frequency, dysuria or sexual dysfunction  ENDOCRINE:  No polydipsia, polyuria  MUSCULOSKELETAL:  No pain or stiffness of the joints  NEUROLOGIC:  No weakness, sensory changes, seizures, confusion, memory loss, tremor or other abnormal movements  Physical Exam     Vitals:    11/07/17 1056   Resp: 18     Physical Exam   Constitutional: He is oriented to person, place, and time. He appears well-developed and well-nourished.   On a wheal chair   HENT:   Head: Normocephalic and atraumatic.   Left Ear: External ear normal.   Nose: Nose normal.        Eyes:   Blind bilateral   Neck: Neck supple.   Cardiovascular: Normal rate, regular rhythm and normal heart sounds.    Pulmonary/Chest: Effort normal and breath sounds normal.   Abdominal: Soft. Bowel sounds are normal.   Genitourinary: Penis normal.   Musculoskeletal: Normal range of motion.   Neurological: He is alert and oriented to  person, place, and time. He has normal reflexes.   Skin: Skin is warm and dry.     Psychiatric: He has a normal mood and affect.     Genitalia:  Scrotum: left side is HUGE; 20 x 30 x 15 cm in size.  Testicle not palpable.  Right side: mild hydrocele.  Normal symmetric epididymis without masses  Normal vas palpated  Normal size, right testicle with no masses   Normal urethral meatus with no discharge  Normal circumcised penis with no lesion   Rectal:  Normal perineum and anus upon inspection.  Normal tone, no masses or tenderness;     LABS:  Lab Results   Component Value Date    PSA 28.3 (H) 05/01/2014    PSA 9.4 (H) 10/18/2010    PSA 8.9 (H) 04/27/2004     No results found for this or any previous visit.  Lab Results   Component Value Date    CREATININE 6.0 (H) 11/17/2016    CREATININE 5.9 (H) 04/14/2016    CREATININE 5.7 (H) 01/29/2016     No results found for this or any previous visit.  Urine Culture, Routine   Date Value Ref Range Status   04/25/2017   Final    Multiple organisms isolated. None in predominance.  Repeat if   04/25/2017 clinically necessary.  Final     Assessment and Plan:  Ronald was seen today for hydrocele.    Diagnoses and all orders for this visit:    ESRD (end stage renal disease) on dialysis    Hydrocele, bilateral  -     US Scrotum And Testicles; Future  -     CBC auto differential; Future  -     Basic metabolic panel; Future  -     EKG 12-lead; Future    Insulin long-term use    Blindness of right eye    Hemodialysis patient    nature and risks of operation explained to pt and his daughter today.  Will do scrotal US to confirm bilateral hydrocele left side >>> the right side.  Stop ASA 1 wk before.  Antibiotic soap shower.  Anesthesia and medical clearance.  He get hemodialysis M, W and F  He may be able to get his dialysis after his surgery on Weds before the Thanksgiving day.  Will contact our dialysis unit to arrange in-house dialysis after his surgery. ( ext [phone])  In order to make  this happen, he has to be an inpatient status.  Will plan to admit him to Medicine team so that he can get his hemodialysis following his surgery.  Pt is a [facility] pt.    Follow-up:  Return in about 2 weeks (around 11/22/2017) for bilateral hydrocelectomy.

## 2017-11-07 NOTE — TELEPHONE ENCOUNTER
Diagnoses and all orders for this visit:    Hydrocele, bilateral  -     Case Request Operating Room: HYDROCELECTOMY    ESRD (end stage renal disease) on dialysis  -     Case Request Operating Room: HYDROCELECTOMY

## 2017-11-07 NOTE — TELEPHONE ENCOUNTER
Scrotal US on 11/7/19    Large complex cystic lesion with internal solid components protruding into the scrotum. This is of uncertain origin and may represent a large inguinal hernia although complex mass must also be considered. Further evaluation with cross-sectional imaging likely indicated due to large size an incomplete evaluation.    2 normal appearing testicles are identified, but incompletely evaluated due to the large collection.      Diagnoses and all orders for this visit:    Left inguinal hernia  -     CT Pelvis Without Contrast; Future    Scrotal mass  -     CT Pelvis Without Contrast; Future    please call Eliza at [facility] [phone] for approval of the CT above.  Do it Tue or thurs ( he gets dialysis M, W, F)

## 2017-11-13 ENCOUNTER — ANESTHESIA EVENT (OUTPATIENT)
Dept: SURGERY | Facility: HOSPITAL | Age: 82
End: 2017-11-13
Payer: COMMERCIAL

## 2017-11-13 RX ORDER — AMLODIPINE BESYLATE 5 MG/1
10 TABLET ORAL DAILY
COMMUNITY
End: 2018-01-01

## 2017-11-13 NOTE — PRE-PROCEDURE INSTRUCTIONS
PreOp anesthetic phone evaluation done.  Instructions given:  - Medications ( What to take and what to hold)  - NPO directions  - Use an antibacterial soap ( Dial or Hibiclens- as instructed by surgeon) the night before surgery and AM of surgery  - Wear comfortable clothes  - No perfume/cologne/ powder or lotions  - Do not wear jewelry or bring any valuables  - Directions given on where to report on AM of surgery; time to be given the day before surgery by the surgeon's office    Pt.'s daughter (Gabriela) verbalized understanding.

## 2017-11-13 NOTE — ANESTHESIA PREPROCEDURE EVALUATION
[facility]  Anesthesia Pre-Operative Evaluation         Patient Name: Ronald Campbell  YOB: 1929  MRN: [id number]    SUBJECTIVE:     Pre-operative evaluation for Procedure(s) (LRB):  HYDROCELECTOMY (Bilateral)     11/21/2017    Ronald Campbell is a 88 y.o. 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     LDA: Peripheral IV.      Prev airway: None documented.    Drips: Normal Saline.       Patient Active Problem List   Diagnosis    Seizure disorder    Chronic combined systolic and diastolic heart failure    Cerebrovascular disease    Anemia in chronic kidney disease(285.21)    Thrombocytopenia    Blindness of right eye    BPH (benign prostatic hypertrophy) with urinary retention    ESRD (end stage renal disease) on dialysis    Shortness of breath    Hyperkalemia    Hypernatremia    HTN (hypertension)    Dyslipidemia    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    Edema of both legs    Effusion of left knee    History of prostatitis    Glaucoma    Syncope and collapse    Renovascular hypertension    AV fistula stenosis    Vein stenosis    Insulin long-term use    Other complications due to renal dialysis device, implant, and graft    Tinea pedis of both feet    Dermatophytosis, nail    Hydrocele, bilateral    Hemodialysis patient    Scrotal mass       Review of patient's allergies indicates:   Allergen Reactions    Lisinopril Swelling       Current Inpatient Medications:      No current facility-administered medications on file prior to encounter.      Current Outpatient Prescriptions on File Prior to Encounter   Medication Sig Dispense Refill    aspirin 81 MG Chew Take 1 tablet (81 mg  total) by mouth once daily. (Patient taking differently: Take 81 mg by mouth every morning. ) 30 tablet 3    atorvastatin (LIPITOR) 40 MG tablet Take 1 tablet (40 mg total) by mouth once daily. (Patient taking differently: Take 80 mg by mouth every evening. ) 30 tablet 9    B complex-vitamin C-folic acid (NEPHRO-JESSICA) 0.8 mg Tab Take 1 tablet by mouth every morning.       blood sugar diagnostic (BLOOD GLUCOSE TEST) Strp 1 strip by Misc.(Non-Drug; Combo Route) route 2 (two) times daily.      brinzolamide (AZOPT) 1 % ophthalmic suspension Place 1 drop into the left eye 3 (three) times daily.       calcium acetate (PHOSLO) 667 mg capsule Take 1 capsule (667 mg total) by mouth 3 (three) times daily with meals. 90 capsule 11    carvedilol (COREG) 25 MG tablet Take 1 tablet (25 mg total) by mouth 2 (two) times daily. Hold on Monday, Wednesday and Friday before dialysis. Hold for SBP < 110 or HR < 55 (Patient taking differently: Take 25 mg by mouth 2 (two) times daily. Hold for SBP < 110 or HR < 55) 60 tablet 11    doxazosin (CARDURA) 4 MG tablet Take 1 tablet (4 mg total) by mouth once daily. (Patient taking differently: Take 4 mg by mouth every morning. ) 30 tablet 1    ergocalciferol (ERGOCALCIFEROL) 50,000 unit Cap Take 1 capsule (50,000 Units total) by mouth every 7 days. 10 capsule 1    finasteride (PROSCAR) 5 mg tablet Take 1 tablet (5 mg total) by mouth once daily. (Patient taking differently: Take 5 mg by mouth every morning. ) 30 tablet 1    HEPARIN SODIUM,PORCINE (HEPARIN, PORCINE,) 1,000 unit/mL injection 1 mL (1,000 Units total) by Intracatheter route as needed (Heparin 1000 units/ml instilled to fill volume of each lumen of dialysis catheter after each dialysis.).      insulin aspart (NOVOLOG FLEXPEN) 100 unit/mL InPn  Insulin Pen Subcutaneous As directed.  -200 take 1 unitBS 201-250 take 2 unitsBS 251-300 take 3 unitsBS 301-350 take 4 unitsBS > 351 take 5 units and call MD      ketoconazole  (NIZORAL) 2 % cream Apply topically once daily. feet 30 g 3    levetiracetam (KEPPRA) 500 MG Tab Take 1 tablet (500 mg total) by mouth 2 (two) times daily. 60 tablet 11    TRAVOPROST (TRAVATAN Z OPHT) Place 1 drop into the left eye every evening. 1 Drops Left eye Every evening         Past Surgical History:   Procedure Laterality Date    AV FISTULA PLACEMENT Left 2014    AV fistula to Left upper arm    EYE SURGERY         Social History     Social History    Marital status:      Spouse name: N/A    Number of children: N/A    Years of education: N/A     Occupational History    Not on file.     Social History Main Topics    Smoking status: Never Smoker    Smokeless tobacco: Never Used    Alcohol use No    Drug use: No    Sexual activity: No     Other Topics Concern    Not on file     Social History Narrative    No narrative on file       OBJECTIVE:     Vital Signs Range (Last 24H):         CBC:   No results for input(s): WBC, RBC, HGB, HCT, PLT, MCV, MCH, MCHC in the last 72 hours.    CMP: No results for input(s): NA, K, CL, CO2, BUN, CREATININE, GLU, MG, PHOS, CALCIUM, ALBUMIN, PROT, ALKPHOS, ALT, AST, BILITOT in the last 72 hours.    INR:  No results for input(s): INR, PROTIME, APTT in the last 72 hours.    Invalid input(s): PT    Diagnostic Studies:    EK/7/17    Test Reason : N43.3  Vent. Rate : 063 BPM     Atrial Rate : 063 BPM     P-R Int : 184 ms          QRS Dur : 094 ms      QT Int : 476 ms       P-R-T Axes : 059 -11 037 degrees     QTc Int : 487 ms    Normal sinus rhythm  Prolonged QT  Abnormal ECG  When compared with ECG of 09-DEC-2015 17:04,  Limb lead placement has been corrected  Confirmed by Priscilla Hinkle MD ([id number]) on 2017 3:27:39 PM    2D ECHO:  Results for orders placed or performed during the hospital encounter of 06/04/15   2D echo with color flow doppler   Result Value Ref Range    EF 40 (A) 55 - 65    Mitral Valve Regurgitation TRIVIAL     Diastolic Dysfunction  "Yes (A)     Est. PA Systolic Pressure 23.04     Mitral Valve Mobility NORMAL     Tricuspid Valve Regurgitation TRIVIAL          ASSESSMENT/PLAN:       Anesthesia Evaluation         Review of Systems  Anesthesia Hx:  History of prior surgery of interest to airway management or planning: Previous anesthesia: MAC 11/2016: Fistulagram with MAC.  Denies Family Hx of Anesthesia complications.  Personal Hx of Anesthesia complications (Versed: confusion)   Social:  Denies Tobacco Use. Denies Alcohol Use.   Hematology/Oncology:     Oncology Normal    -- Anemia:  -- Thrombocytopenia (plt. count 11/7/17: 116):   -- Transfusion: -- Transfusion Hx (no complications) (before 2013):  Hematology Comments: thrombocytopenia   Anemia 2/2 renal disease    EENT/Dental:  EENT/Dental Normal Eyes: Visual Impairment , Is Legally Blind Eye Disease: Glaucoma:    Denies Throat Symptoms  Denies Jaw Problems   Cardiovascular:   Hypertension CHF  Functional Capacity PT twice weekly: + SOB/denies CP  Congestive Heart Failure (CHF)  Denies Deep Venous Thrombosis (DVT)  Hypertension    Pulmonary:  Denies Asthma.  Denies Chronic Obstructive Pulmonary Disease (COPD).  Possible Obstructive Sleep Apnea , (STOP/BANG) Symptoms P - Pressure being treated for high BP, A - Age > 50 and G - Gender (Male > Female)    Renal/:   Chronic Renal Disease, ESRD  Kidney Function/Disease, Chronic Kidney Disease (CKD) , CKD Stage V (ESRD) (GFR <15 or on Dialysis)  Dialysis Dependent, M-W-F, chronic hemodialysis , dry weight of 175 lb. [facility]   Hepatic/GI:  Denies Esophageal / Stomach Disorders  Denies Liver Disease    Musculoskeletal:  Joint Disease:  Arthritis bilateral knee   Neurological:   Seizures  Neuro Symptoms of numbness Bilateral handDementia  Seizure Disorder , Most recent seizure occurred >1 year ago Has not had a seizure since starting on Keppra- "years" Denies CVA - Cerebrovasular Accident  TIA - Transient Ischemic Attack , " transient deficits are no residual deficit.   Endocrine:   Diabetes, using insulin  Diabetes, Type 2 Diabetes for 20 years , controlled by insulin. Typical AM glucose range: 180-185  Denies Thyroid Disease  Metabolic Disorders  Psych:   Psychiatric History          Physical Exam  General:  Well nourished    Airway/Jaw/Neck:  Airway Findings: Mouth Opening: Normal Tongue: Normal  General Airway Assessment: Adult  Mallampati: II  Improves to II with phonation.  TM Distance: Normal, at least 6 cm      Dental:  Dental Findings: Lower Dentures, Upper Dentures   Chest/Lungs:  Chest/Lungs Findings: Clear to auscultation     Heart/Vascular:  Heart Findings: Rate: Normal  Rhythm: Regular Rhythm  Sounds: Normal        Mental Status:  Mental Status Findings:  Cooperative, Alert and Oriented         Anesthesia Plan  Type of Anesthesia, risks & benefits discussed:  Anesthesia Type:  MAC, general  Patient's Preference:   Intra-op Monitoring Plan: standard ASA monitors  Intra-op Monitoring Plan Comments:   Post Op Pain Control Plan: multimodal analgesia and per primary service following discharge from PACU  Post Op Pain Control Plan Comments:   Induction:   IV  Beta Blocker:  Patient is on a Beta-Blocker and has received one dose within the past 24 hours (No further documentation required).       Informed Consent: Patient representative understands risks and agrees with Anesthesia plan.  Questions answered. Anesthesia consent signed with patient representative.  ASA Score: 4     Day of Surgery Review of History & Physical: I have interviewed and examined the patient. I have reviewed the patient's H&P dated:  There are no significant changes.  H&P update referred to the surgeon.         Ready For Surgery From Anesthesia Perspective.

## 2017-11-13 NOTE — PRE ADMISSION SCREENING
Anesthesia Assessment: Preoperative EQUATION    Planned Procedure: Procedure(s) (LRB):  HYDROCELECTOMY (Bilateral)  Requested Anesthesia Type:General  Surgeon: Temo Banerjee MD  Service: Urology  Known or anticipated Date of Surgery:11/22/2017    Surgeon notes: reviewed; Banerjee 1/7/17      Triage considerations:     The patient has no apparent active cardiac condition (No unstable coronary Syndrome such as severe unstable angina or recent [<1 month] myocardial infarction, decompensated CHF, severe valvular   disease or significant arrhythmia)    Previous anesthesia records:MAC 11/18/16    Other important co-morbidities: CHF/ESRD- Dialysis dependent/Thrombocytopenia/legally blind/HTN/DM2/hydrocele     Tests already available:  Available tests,  within 1 month , within [facility] .            Instructions given. (See in Nurse's note)    Optimization:    Medical Opinion Indicated         Plan:    Testing:  None      Patient's daughter (Gabriela)   will schedule P[facility].      No POC visit needed per Dr. Fulton

## 2017-11-14 ENCOUNTER — HOSPITAL ENCOUNTER (OUTPATIENT)
Dept: RADIOLOGY | Facility: HOSPITAL | Age: 82
Discharge: HOME | End: 2017-11-14
Attending: UROLOGY
Payer: COMMERCIAL

## 2017-11-14 DIAGNOSIS — K40.90 LEFT INGUINAL HERNIA: ICD-10-CM

## 2017-11-14 DIAGNOSIS — N50.89 SCROTAL MASS: ICD-10-CM

## 2017-11-14 PROCEDURE — 72192 CT PELVIS W/O DYE: CPT | Mod: TC

## 2017-11-14 PROCEDURE — 72192 CT PELVIS W/O DYE: CPT | Mod: 26,,, | Performed by: RADIOLOGY

## 2017-11-21 ENCOUNTER — TELEPHONE (OUTPATIENT)
Dept: UROLOGY | Facility: CLINIC | Age: 82
End: 2017-11-21

## 2017-11-21 NOTE — TELEPHONE ENCOUNTER
Called pt's daughter to confirm 8am arrival time for procedure. Gave pt's daughter NPO instructions and gave pt's daughter opportunity to ask questions. Pt's daughter verbalized understanding.

## 2017-11-22 ENCOUNTER — ANESTHESIA (OUTPATIENT)
Dept: SURGERY | Facility: HOSPITAL | Age: 82
End: 2017-11-22
Payer: COMMERCIAL

## 2017-11-22 ENCOUNTER — HOSPITAL ENCOUNTER (OUTPATIENT)
Facility: HOSPITAL | Age: 82
Discharge: HOME | End: 2017-11-23
Attending: UROLOGY | Admitting: UROLOGY
Payer: COMMERCIAL

## 2017-11-22 ENCOUNTER — SURGERY (OUTPATIENT)
Age: 82
End: 2017-11-22

## 2017-11-22 DIAGNOSIS — Z99.2 ESRD (END STAGE RENAL DISEASE) ON DIALYSIS: ICD-10-CM

## 2017-11-22 DIAGNOSIS — N18.6 ESRD (END STAGE RENAL DISEASE) ON DIALYSIS: ICD-10-CM

## 2017-11-22 DIAGNOSIS — N43.3 HYDROCELE: ICD-10-CM

## 2017-11-22 LAB
ANION GAP SERPL CALC-SCNC: 11 MMOL/L
ANISOCYTOSIS BLD QL SMEAR: ABNORMAL
BASOPHILS # BLD AUTO: 0.03 K/UL
BASOPHILS NFR BLD: 0.6 %
BUN SERPL-MCNC: 58 MG/DL
CALCIUM SERPL-MCNC: 8.4 MG/DL
CHLORIDE SERPL-SCNC: 107 MMOL/L
CO2 SERPL-SCNC: 15 MMOL/L
CREAT SERPL-MCNC: 8.4 MG/DL
DIFFERENTIAL METHOD: ABNORMAL
EOSINOPHIL # BLD AUTO: 0.1 K/UL
EOSINOPHIL NFR BLD: 1 %
ERYTHROCYTE [DISTWIDTH] IN BLOOD BY AUTOMATED COUNT: 21.2 %
EST. GFR  (AFRICAN AMERICAN): 5.9 ML/MIN/1.73 M^2
EST. GFR  (NON AFRICAN AMERICAN): 5.1 ML/MIN/1.73 M^2
ESTIMATED AVG GLUCOSE: 108 MG/DL
GLUCOSE SERPL-MCNC: 124 MG/DL
HBA1C MFR BLD HPLC: 5.4 %
HCT VFR BLD AUTO: 28.8 %
HGB BLD-MCNC: 8.7 G/DL
IMM GRANULOCYTES # BLD AUTO: 0.03 K/UL
IMM GRANULOCYTES NFR BLD AUTO: 0.6 %
LYMPHOCYTES # BLD AUTO: 1.5 K/UL
LYMPHOCYTES NFR BLD: 29.3 %
MAGNESIUM SERPL-MCNC: 2.5 MG/DL
MCH RBC QN AUTO: 24.5 PG
MCHC RBC AUTO-ENTMCNC: 30.2 G/DL
MCV RBC AUTO: 81 FL
MONOCYTES # BLD AUTO: 0.4 K/UL
MONOCYTES NFR BLD: 7 %
NEUTROPHILS # BLD AUTO: 3.1 K/UL
NEUTROPHILS NFR BLD: 61.5 %
NRBC BLD-RTO: 0 /100 WBC
PHOSPHATE SERPL-MCNC: 2.3 MG/DL
PLATELET # BLD AUTO: 141 K/UL
PLATELET BLD QL SMEAR: ABNORMAL
PMV BLD AUTO: ABNORMAL FL
POCT GLUCOSE: 109 MG/DL (ref 70–110)
POCT GLUCOSE: 112 MG/DL (ref 70–110)
POCT GLUCOSE: 116 MG/DL (ref 70–110)
POCT GLUCOSE: 129 MG/DL (ref 70–110)
POIKILOCYTOSIS BLD QL SMEAR: SLIGHT
POLYCHROMASIA BLD QL SMEAR: ABNORMAL
POTASSIUM SERPL-SCNC: 5.6 MMOL/L
RBC # BLD AUTO: 3.55 M/UL
SCHISTOCYTES BLD QL SMEAR: PRESENT
SODIUM SERPL-SCNC: 133 MMOL/L
TARGETS BLD QL SMEAR: ABNORMAL
WBC # BLD AUTO: 5.01 K/UL

## 2017-11-22 PROCEDURE — 80100016 HC MAINTENANCE HEMODIALYSIS

## 2017-11-22 PROCEDURE — 63600175 PHARM REV CODE 636 W HCPCS: Performed by: STUDENT IN AN ORGANIZED HEALTH CARE EDUCATION/TRAINING PROGRAM

## 2017-11-22 PROCEDURE — 82962 GLUCOSE BLOOD TEST: CPT | Performed by: UROLOGY

## 2017-11-22 PROCEDURE — G0257 UNSCHED DIALYSIS ESRD PT HOS: HCPCS

## 2017-11-22 PROCEDURE — 37000009 HC ANESTHESIA EA ADD 15 MINS: Performed by: UROLOGY

## 2017-11-22 PROCEDURE — 36000706: Performed by: UROLOGY

## 2017-11-22 PROCEDURE — D9220A PRA ANESTHESIA: Mod: ,,, | Performed by: ANESTHESIOLOGY

## 2017-11-22 PROCEDURE — 80048 BASIC METABOLIC PNL TOTAL CA: CPT

## 2017-11-22 PROCEDURE — 54520 REMOVAL OF TESTIS: CPT | Mod: 51,LT,, | Performed by: UROLOGY

## 2017-11-22 PROCEDURE — 88305 TISSUE EXAM BY PATHOLOGIST: CPT | Performed by: PATHOLOGY

## 2017-11-22 PROCEDURE — 88305 TISSUE EXAM BY PATHOLOGIST: CPT | Mod: 26,,, | Performed by: PATHOLOGY

## 2017-11-22 PROCEDURE — 83036 HEMOGLOBIN GLYCOSYLATED A1C: CPT

## 2017-11-22 PROCEDURE — 88302 TISSUE EXAM BY PATHOLOGIST: CPT | Mod: 26,,, | Performed by: PATHOLOGY

## 2017-11-22 PROCEDURE — 84100 ASSAY OF PHOSPHORUS: CPT

## 2017-11-22 PROCEDURE — 71000033 HC RECOVERY, INTIAL HOUR: Performed by: UROLOGY

## 2017-11-22 PROCEDURE — 36000707: Performed by: UROLOGY

## 2017-11-22 PROCEDURE — 55060 REPAIR OF HYDROCELE: CPT | Mod: RT,,, | Performed by: UROLOGY

## 2017-11-22 PROCEDURE — 85025 COMPLETE CBC W/AUTO DIFF WBC: CPT

## 2017-11-22 PROCEDURE — 83735 ASSAY OF MAGNESIUM: CPT

## 2017-11-22 PROCEDURE — 37000008 HC ANESTHESIA 1ST 15 MINUTES: Performed by: UROLOGY

## 2017-11-22 PROCEDURE — 27000221 HC OXYGEN, UP TO 24 HOURS

## 2017-11-22 PROCEDURE — 25000003 PHARM REV CODE 250: Performed by: STUDENT IN AN ORGANIZED HEALTH CARE EDUCATION/TRAINING PROGRAM

## 2017-11-22 PROCEDURE — 25000003 PHARM REV CODE 250: Performed by: NURSE PRACTITIONER

## 2017-11-22 PROCEDURE — 25000003 PHARM REV CODE 250: Performed by: UROLOGY

## 2017-11-22 PROCEDURE — 71000039 HC RECOVERY, EACH ADD'L HOUR: Performed by: UROLOGY

## 2017-11-22 RX ORDER — CEFAZOLIN SODIUM 1 G/3ML
INJECTION, POWDER, FOR SOLUTION INTRAMUSCULAR; INTRAVENOUS
Status: DISCONTINUED | OUTPATIENT
Start: 2017-11-22 | End: 2017-11-22

## 2017-11-22 RX ORDER — ONDANSETRON 2 MG/ML
4 INJECTION INTRAMUSCULAR; INTRAVENOUS DAILY PRN
Status: DISCONTINUED | OUTPATIENT
Start: 2017-11-22 | End: 2017-11-22 | Stop reason: HOSPADM

## 2017-11-22 RX ORDER — AMLODIPINE BESYLATE 5 MG/1
5 TABLET ORAL DAILY
Status: DISCONTINUED | OUTPATIENT
Start: 2017-11-23 | End: 2017-11-23

## 2017-11-22 RX ORDER — SODIUM CHLORIDE 0.9 % (FLUSH) 0.9 %
3 SYRINGE (ML) INJECTION
Status: DISCONTINUED | OUTPATIENT
Start: 2017-11-22 | End: 2017-11-22 | Stop reason: HOSPADM

## 2017-11-22 RX ORDER — ONDANSETRON 2 MG/ML
4 INJECTION INTRAMUSCULAR; INTRAVENOUS EVERY 8 HOURS PRN
Status: DISCONTINUED | OUTPATIENT
Start: 2017-11-22 | End: 2017-11-23 | Stop reason: HOSPADM

## 2017-11-22 RX ORDER — GLUCAGON 1 MG
1 KIT INJECTION
Status: DISCONTINUED | OUTPATIENT
Start: 2017-11-22 | End: 2017-11-23 | Stop reason: HOSPADM

## 2017-11-22 RX ORDER — IBUPROFEN 200 MG
16 TABLET ORAL
Status: DISCONTINUED | OUTPATIENT
Start: 2017-11-22 | End: 2017-11-23 | Stop reason: HOSPADM

## 2017-11-22 RX ORDER — SODIUM CHLORIDE 9 MG/ML
INJECTION, SOLUTION INTRAVENOUS ONCE
Status: COMPLETED | OUTPATIENT
Start: 2017-11-22 | End: 2017-11-22

## 2017-11-22 RX ORDER — FINASTERIDE 5 MG/1
5 TABLET, FILM COATED ORAL EVERY MORNING
Status: DISCONTINUED | OUTPATIENT
Start: 2017-11-23 | End: 2017-11-23 | Stop reason: HOSPADM

## 2017-11-22 RX ORDER — ONDANSETRON 2 MG/ML
INJECTION INTRAMUSCULAR; INTRAVENOUS
Status: DISCONTINUED | OUTPATIENT
Start: 2017-11-22 | End: 2017-11-22

## 2017-11-22 RX ORDER — ACETAMINOPHEN 10 MG/ML
INJECTION, SOLUTION INTRAVENOUS
Status: DISCONTINUED | OUTPATIENT
Start: 2017-11-22 | End: 2017-11-22

## 2017-11-22 RX ORDER — LIDOCAINE HYDROCHLORIDE 10 MG/ML
1 INJECTION, SOLUTION EPIDURAL; INFILTRATION; INTRACAUDAL; PERINEURAL ONCE
Status: DISCONTINUED | OUTPATIENT
Start: 2017-11-22 | End: 2017-11-22

## 2017-11-22 RX ORDER — IBUPROFEN 200 MG
24 TABLET ORAL
Status: DISCONTINUED | OUTPATIENT
Start: 2017-11-22 | End: 2017-11-23 | Stop reason: HOSPADM

## 2017-11-22 RX ORDER — FENTANYL CITRATE 50 UG/ML
INJECTION, SOLUTION INTRAMUSCULAR; INTRAVENOUS
Status: DISCONTINUED | OUTPATIENT
Start: 2017-11-22 | End: 2017-11-22

## 2017-11-22 RX ORDER — FENTANYL CITRATE 50 UG/ML
25 INJECTION, SOLUTION INTRAMUSCULAR; INTRAVENOUS EVERY 5 MIN PRN
Status: DISCONTINUED | OUTPATIENT
Start: 2017-11-22 | End: 2017-11-22 | Stop reason: HOSPADM

## 2017-11-22 RX ORDER — INSULIN ASPART 100 [IU]/ML
0-5 INJECTION, SOLUTION INTRAVENOUS; SUBCUTANEOUS
Status: DISCONTINUED | OUTPATIENT
Start: 2017-11-22 | End: 2017-11-23 | Stop reason: HOSPADM

## 2017-11-22 RX ORDER — OXYCODONE AND ACETAMINOPHEN 5; 325 MG/1; MG/1
1 TABLET ORAL EVERY 4 HOURS PRN
Status: DISCONTINUED | OUTPATIENT
Start: 2017-11-22 | End: 2017-11-23 | Stop reason: HOSPADM

## 2017-11-22 RX ORDER — SUCCINYLCHOLINE CHLORIDE 20 MG/ML
INJECTION INTRAMUSCULAR; INTRAVENOUS
Status: DISCONTINUED | OUTPATIENT
Start: 2017-11-22 | End: 2017-11-22

## 2017-11-22 RX ORDER — OXYCODONE AND ACETAMINOPHEN 5; 325 MG/1; MG/1
2 TABLET ORAL EVERY 4 HOURS PRN
Status: DISCONTINUED | OUTPATIENT
Start: 2017-11-22 | End: 2017-11-23

## 2017-11-22 RX ORDER — PROPOFOL 10 MG/ML
VIAL (ML) INTRAVENOUS CONTINUOUS PRN
Status: DISCONTINUED | OUTPATIENT
Start: 2017-11-22 | End: 2017-11-22

## 2017-11-22 RX ORDER — LIDOCAINE HCL/PF 100 MG/5ML
SYRINGE (ML) INTRAVENOUS
Status: DISCONTINUED | OUTPATIENT
Start: 2017-11-22 | End: 2017-11-22

## 2017-11-22 RX ORDER — DOXAZOSIN 1 MG/1
4 TABLET ORAL EVERY MORNING
Status: DISCONTINUED | OUTPATIENT
Start: 2017-11-23 | End: 2017-11-23

## 2017-11-22 RX ORDER — ACETAMINOPHEN 325 MG/1
650 TABLET ORAL EVERY 6 HOURS PRN
Status: DISCONTINUED | OUTPATIENT
Start: 2017-11-22 | End: 2017-11-23 | Stop reason: HOSPADM

## 2017-11-22 RX ORDER — CALCIUM ACETATE 667 MG/1
667 CAPSULE ORAL
Status: DISCONTINUED | OUTPATIENT
Start: 2017-11-22 | End: 2017-11-22

## 2017-11-22 RX ORDER — ROCURONIUM BROMIDE 10 MG/ML
INJECTION, SOLUTION INTRAVENOUS
Status: DISCONTINUED | OUTPATIENT
Start: 2017-11-22 | End: 2017-11-22

## 2017-11-22 RX ORDER — TRAVOPROST OPHTHALMIC SOLUTION 0.04 MG/ML
1 SOLUTION OPHTHALMIC NIGHTLY
Status: DISCONTINUED | OUTPATIENT
Start: 2017-11-22 | End: 2017-11-23 | Stop reason: HOSPADM

## 2017-11-22 RX ORDER — CARVEDILOL 25 MG/1
25 TABLET ORAL 2 TIMES DAILY
Status: DISCONTINUED | OUTPATIENT
Start: 2017-11-22 | End: 2017-11-23 | Stop reason: HOSPADM

## 2017-11-22 RX ORDER — PROPOFOL 10 MG/ML
VIAL (ML) INTRAVENOUS
Status: DISCONTINUED | OUTPATIENT
Start: 2017-11-22 | End: 2017-11-22

## 2017-11-22 RX ORDER — ATORVASTATIN CALCIUM 20 MG/1
80 TABLET, FILM COATED ORAL NIGHTLY
Status: DISCONTINUED | OUTPATIENT
Start: 2017-11-22 | End: 2017-11-23 | Stop reason: HOSPADM

## 2017-11-22 RX ORDER — SODIUM CHLORIDE 9 MG/ML
INJECTION, SOLUTION INTRAVENOUS CONTINUOUS PRN
Status: DISCONTINUED | OUTPATIENT
Start: 2017-11-22 | End: 2017-11-22

## 2017-11-22 RX ORDER — LEVETIRACETAM 500 MG/1
500 TABLET ORAL 2 TIMES DAILY
Status: DISCONTINUED | OUTPATIENT
Start: 2017-11-22 | End: 2017-11-23 | Stop reason: HOSPADM

## 2017-11-22 RX ADMIN — CARVEDILOL 25 MG: 25 TABLET, FILM COATED ORAL at 09:11

## 2017-11-22 RX ADMIN — TRAVOPROST 1 DROP: 0.04 SOLUTION/ DROPS OPHTHALMIC at 11:11

## 2017-11-22 RX ADMIN — ONDANSETRON 4 MG: 2 INJECTION INTRAMUSCULAR; INTRAVENOUS at 10:11

## 2017-11-22 RX ADMIN — FENTANYL CITRATE 50 MCG: 50 INJECTION, SOLUTION INTRAMUSCULAR; INTRAVENOUS at 09:11

## 2017-11-22 RX ADMIN — SODIUM CHLORIDE 300 ML: 0.9 INJECTION, SOLUTION INTRAVENOUS at 05:11

## 2017-11-22 RX ADMIN — LEVETIRACETAM 500 MG: 500 TABLET, FILM COATED ORAL at 09:11

## 2017-11-22 RX ADMIN — ATORVASTATIN CALCIUM 80 MG: 20 TABLET, FILM COATED ORAL at 09:11

## 2017-11-22 RX ADMIN — SODIUM CHLORIDE: 0.9 INJECTION, SOLUTION INTRAVENOUS at 09:11

## 2017-11-22 RX ADMIN — CEFAZOLIN 2 G: 1 INJECTION, POWDER, FOR SOLUTION INTRAVENOUS at 10:11

## 2017-11-22 RX ADMIN — ACETAMINOPHEN 1000 MG: 10 INJECTION, SOLUTION INTRAVENOUS at 10:11

## 2017-11-22 RX ADMIN — PROPOFOL 140 MG: 10 INJECTION, EMULSION INTRAVENOUS at 09:11

## 2017-11-22 RX ADMIN — LIDOCAINE HYDROCHLORIDE 80 MG: 20 INJECTION, SOLUTION INTRAVENOUS at 09:11

## 2017-11-22 RX ADMIN — PROPOFOL 125 MCG/KG/MIN: 10 INJECTION, EMULSION INTRAVENOUS at 09:11

## 2017-11-22 RX ADMIN — SUCCINYLCHOLINE CHLORIDE 80 MG: 20 INJECTION, SOLUTION INTRAMUSCULAR; INTRAVENOUS at 09:11

## 2017-11-22 RX ADMIN — ROCURONIUM BROMIDE 5 MG: 10 INJECTION, SOLUTION INTRAVENOUS at 09:11

## 2017-11-22 NOTE — CONSULTS
[facility]  Nephrology  Consult Note    Patient Name: Ronald Campbell  MRN: [id number]  Admission Date: 11/22/2017  Hospital Length of Stay: 0 days  Attending Provider: Temo Banerjee MD   Primary Care Physician: Js Sanches MD (Inactive)  Principal Problem:Hydrocele    Inpatient consult to Nephrology  Consult performed by: ONOFRE CASANOVA  Consult ordered by: HOLLY DESHPANDE  Reason for consult: ESRD on HD        Subjective:     HPI: Mr. Campbell is a 89 yo AAM with PMHx of HTN, DMII, seizures, anemia, glaucoma, CHF (EF 30%), blindness, and ESRD on HD who presented to the hospital today for bilateral hydrocelectomy for bilateral hydrocele.  He tolerated procedure well and plan by urology is to keep overnight and discharge in the morning if no acute events.  Nephrology was consulted for backup dialysis management.  His last treatment was on the 19th.    He dialyzes at [facility] under the care of Dr. Vang on MWF schedule using [facility] AVF.  Consent obtained from daughter via phone.    Past Medical History:   Diagnosis Date    Anemia     Blindness of right eye     CHF (congestive heart failure)     Chronic kidney disease     Dementia 01/2017    Diabetes mellitus type II     General anesthetics causing adverse effect in therapeutic use     april / may 2014     Glaucoma (increased eye pressure)     Hypertension     Seizures        Past Surgical History:   Procedure Laterality Date    AV FISTULA PLACEMENT Left 4/2014    AV fistula to Left upper arm    EYE SURGERY         Review of patient's allergies indicates:   Allergen Reactions    Lisinopril Swelling     Current Facility-Administered Medications   Medication Frequency    0.9%  NaCl infusion Once    acetaminophen tablet 650 mg Q6H PRN    [START ON 11/23/2017] amLODIPine tablet 5 mg Daily    atorvastatin tablet 80 mg QHS    calcium acetate capsule 667 mg TID WM    carvedilol tablet 25 mg BID    dextrose 50% injection 12.5 g  PRN    dextrose 50% injection 25 g PRN    [START ON 11/23/2017] doxazosin tablet 4 mg QAM    fentaNYL injection 25 mcg Q5 Min PRN    [START ON 11/23/2017] finasteride tablet 5 mg QAM    glucagon (human recombinant) injection 1 mg PRN    glucose chewable tablet 16 g PRN    glucose chewable tablet 24 g PRN    insulin aspart pen 0-5 Units QID (AC + HS) PRN    levETIRAcetam tablet 500 mg BID    ondansetron injection 4 mg Daily PRN    ondansetron injection 4 mg Q8H PRN    oxyCODONE-acetaminophen 5-325 mg per tablet 1 tablet Q4H PRN    oxyCODONE-acetaminophen 5-325 mg per tablet 2 tablet Q4H PRN    promethazine (PHENERGAN) 6.25 mg in dextrose 5 % 50 mL IVPB Q10 Min PRN    sodium chloride 0.9% flush 3 mL PRN    travoprost 0.004 % ophthalmic solution 1 drop QHS     Family History     Problem Relation (Age of Onset)    Cancer Daughter    Heart disease Sister, Brother        Social History Main Topics    Smoking status: Never Smoker    Smokeless tobacco: Never Used    Alcohol use No    Drug use: No    Sexual activity: No     Review of Systems   Unable to perform ROS: Other (anesthesia)     Objective:     Vital Signs (Most Recent):  Temp: 97.9 °F (36.6 °C) (11/22/17 1550)  Pulse: 64 (11/22/17 1600)  Resp: 16 (11/22/17 1600)  BP: (!) 179/84 (11/22/17 1600)  SpO2: 100 % (11/22/17 1550)  O2 Device (Oxygen Therapy): room air (11/22/17 1550) Vital Signs (24h Range):  Temp:  [97.3 °F (36.3 °C)-98.2 °F (36.8 °C)] 97.9 °F (36.6 °C)  Pulse:  [47-64] 64  Resp:  [13-24] 16  SpO2:  [96 %-100 %] 100 %  BP: (136-180)/(63-84) 179/84     Weight: 79.4 kg (175 lb) (11/22/17 0917)  Body mass index is 23.09 kg/m².  Body surface area is 2.02 meters squared.    No intake/output data recorded.    Physical Exam   Constitutional: He appears well-developed and well-nourished. No distress.   HENT:   Head: Normocephalic and atraumatic.   Right Ear: External ear normal.   Left Ear: External ear normal.   Eyes: Conjunctivae are normal.  Right eye exhibits no discharge. Left eye exhibits no discharge.   Neck: Normal range of motion. Neck supple.   Cardiovascular: Normal rate and regular rhythm.  Exam reveals no gallop and no friction rub.    No murmur heard.  Pulmonary/Chest: Effort normal and breath sounds normal. No respiratory distress. He has no wheezes. He has no rales.   Abdominal: Soft. Bowel sounds are normal. He exhibits no distension. There is no tenderness.   Musculoskeletal: He exhibits no edema or deformity.   Neurological: He is alert.   Able to follow commands.  Disoriented to place and time.   Skin: Skin is warm and dry. No rash noted. He is not diaphoretic.   Psychiatric: He has a normal mood and affect. His behavior is normal.       Significant Labs:  CBC:   Recent Labs  Lab 11/22/17  1154   WBC 5.01   RBC 3.55*   HGB 8.7*   HCT 28.8*   *   MCV 81*   MCH 24.5*   MCHC 30.2*     CMP:   Recent Labs  Lab 11/22/17  1154   *   CALCIUM 8.4*   *   K 5.6*   CO2 15*      BUN 58*   CREATININE 8.4*         Assessment/Plan:     ESRD (end stage renal disease) on dialysis    ESRD on HD MWF  -[facility]:  [facility]  -Dr. Vang  -AVF to [facility].    Hemodialysis today for metabolic clearance/volume management.  UF goal of 2L.  Dialysate adjusted to current labs.    -Phosphorus low, so will discontinue phos binders.    -low potassium diet            Rashawn Call NP  Nephrology  [facility]  Pager:  [phone]

## 2017-11-22 NOTE — NURSING TRANSFER
Nursing Transfer Note      11/22/2017     Transfer To: dialysis-->529B    Transfer via stretcher    Transfer with none    Transported by pct    Medicines sent: none    Chart send with patient: Yes    Notified: daughter    Patient reassessed at: 11/22/17 see flowsheets

## 2017-11-22 NOTE — ANESTHESIA RELEASE NOTE
"Anesthesia Release from PACU Note    Patient: Ovalsamuel Shannan    Procedure(s) Performed: Procedure(s) (LRB):  HYDROCELECTOMY (Bilateral)    Anesthesia type: general    Post pain: Adequate analgesia    Post assessment: no apparent anesthetic complications, tolerated procedure well and no evidence of recall    Last Vitals:   Visit Vitals  BP (!) 159/74   Pulse (!) 57   Temp 36.8 °C (98.2 °F) (Oral)   Resp (!) 24   Ht 6' 1" (1.854 m)   Wt 79.4 kg (175 lb)   SpO2 100%   BMI 23.09 kg/m²       Post vital signs: stable    Level of consciousness: awake, alert  and oriented    Nausea/Vomiting: no nausea/no vomiting    Complications: none    Airway Patency: patent    Respiratory: unassisted    Cardiovascular: stable and blood pressure at baseline    Hydration: euvolemic  "

## 2017-11-22 NOTE — HPI
Mr. Campbell is a 89 yo AAM with PMHx of HTN, DMII, seizures, anemia, glaucoma, CHF (EF 30%), blindness, and ESRD on HD who presented to the hospital today for bilateral hydrocelectomy for bilateral hydrocele.  He tolerated procedure well and plan by urology is to keep overnight and discharge in the morning if no acute events.  Nephrology was consulted for backup dialysis management.  His last treatment was on the 19th.    He dialyzes at [facility] under the care of Dr. Vang on MWF schedule using ANGLE AVF.  Consent obtained from daughter via phone.

## 2017-11-22 NOTE — PROGRESS NOTES
Dialysis nurse Dacia notified that patient will be brought to dialysis for treatment then sent to [facility]. pts daughter notified as well.

## 2017-11-22 NOTE — PROGRESS NOTES
Patient received from recovery with report from OLIVIA Gao.  Maintenance dialysis began per orders via 15 gauge fistula needles to left upper arm fistula.

## 2017-11-22 NOTE — INTERVAL H&P NOTE
The patient has been examined and the H&P has been reviewed:    I concur with the findings and no changes have occurred since H&P was written.    Anesthesia/Surgery risks, benefits and alternative options discussed and understood by patient/family.          Active Hospital Problems    Diagnosis  POA    Hydrocele [N43.3]  Yes      Resolved Hospital Problems    Diagnosis Date Resolved POA   No resolved problems to display.

## 2017-11-22 NOTE — OP NOTE
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    Syncope and collapse 12/13/2015    Renovascular hypertension 12/13/2015    Shortness of breath     Hyperkalemia     Hypernatremia     HTN (hypertension)     Dyslipidemia     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     Edema of both legs     Effusion of left knee     History of prostatitis     Glaucoma     ESRD (end stage renal disease) on dialysis     BPH (benign prostatic hypertrophy) with urinary retention 05/06/2014    Thrombocytopenia 04/30/2014    Blindness of right eye     Anemia in chronic kidney disease(285.21) 11/28/2012    Seizure disorder 11/04/2012    Chronic combined systolic and diastolic heart failure 11/04/2012    Cerebrovascular disease 11/04/2012         Post-Op Diagnosis: same    Procedure(s) Performed:   Left orchiectomy   Right hydrocelectomy    Specimen(s):    Left testicle and hydrocele sac   Right hydrocele sac    Staff Surgeon: Temo Banerjee MD    Assistant Surgeon: Jose Reeves MD    Anesthesia: General endotracheal anesthesia    Indications: Ronald Campbell is a 88 y.o. male with a bilateral hydrocele.  He desires surgical correction.      Findings:     - 1000 cc of brown old blood fluid with old blood and clot drained from left hydrocele sac  - left testis atrophic, scrotal orchectomy performed   - small right hydrocele, ~70 cc drained, sac excised, Jaboulay performed     Estimated Blood Loss: min    Drains:  none    Procedure in detail:  After risks benefits and possible complications of hydrocelectomy were explained, the patient elected to undergo the procedure and consent was obtained. All questions were answered in the pre-operative area. The patient was transferred to the operative suite and placed in supine position on the operative table.  SCDs were applied and working.  Anesthesia was administered and the patient was prepped and draped in the usual sterile fashion. Time out was performed, ta-procedural antibiotics were confirmed.     The patient's left hydrocele was palpated and brought to the anterior scrotal skin. A marking pen was used to naren a 7 cm incision along the midline raphe.  This was sharply incised with a 15 blade.  The underlying dartos and spermatic fascia was dissected with electrocautery until the hydrocele sac could be delivered through the scrotal incision. The tunica vaginalis was bluntly dissected free with a moist ray juan and opened sharply with Metzenbaum scissors. Care was taken to preserve the spermatic cord and testicle. 1000 mL of brown fluid was suctioned from the hydrocele sac. There was old blood clot in the sac as well.  The testis was very atrophic, so the decision was made to perform orchiectomy.  The spermatic cord was then cross-clamped with a Roberta.  The part of the cord closer to the testis was  into three packets, and these were all suture ligated with 3-0 vicryl.  The whole cord was ligated above the roberta clamp with 2-0 silk.  The cord was then transected on the testicular side of the ties, and the edges cauterized.  The testis and sac was passed off the field for pathologic analysis.        The patient's right hydrocele was palpated and brought to the anterior scrotal skin. The dartos and spermatic fascia was dissected with electrocautery until the hydrocele sac could be delivered through the scrotal incision. The tunica vaginalis was bluntly dissected free  with a moist ray juan and opened sharply with Metzenbaum scissors. Care was taken to preserve the spermatic cord and testicle. 70 mL of fluid was suctioned from the hydrocele sac.  It was then opened and excised with electrocautery leaving a 1 cm remnant circumferentially around the testis. The hydrocele sac was passed off the field for pathologic analysis.  The edges of the sac were then sewn behind the cord in a Jaboulay manner using 3-0 vicryl, leaving the edges open at the inferior pole.  The cord structures were inspected and found to be in tact.     The scrotum was irrigated with NS. Hemostasis was obtained with electrocautery. The right testicle was returned to its orthotopic position.  A There was no evidence of bleeding from the left spermatic cord stump.  A penrose drain was placed through the right dependent hemiscrotum, through the scrotal septum, then out the left dependent hemiscrotum.  The dartos fascia was closed with a 3-0 chromic in a running fashion. The skin was re approximated with a 3-0 chromic suture in a horizontal matress fashion. Dermabond, fluffs and scrotal support was applied.     The patient tolerated the procedure well and was transferred to the PACU in stable condition    Disposition:  The patient will be admitted overnight and will be taken to dialysis today per nephrology.  He will be discharged tomorrow after removal of the scrotal penrose drain, and he will follow up with Dr. Banerjee in 4-6 weeks .  He will be instructed to avoid heavy lifting x 2 weeks, ice his scrotum x 48 hours and wear scrotal support x 2 weeks.      Jose Reeves MD

## 2017-11-22 NOTE — H&P (VIEW-ONLY)
"CC: left scrotal swelling, pain    Ovalee Shannan is a 88 y.o. man who is here for the evaluation of hydrocele (left testicular swelling -worse than it was in april 2017)  a new pt to me.  He is blind and is a patient of [facility]. He is here with his daughter today.  presents for left testicular swelling x couple of years.  Its gotten larger over the last few months.  It is not painful.  "It gets in the way."  It causes discomfort with walking and sitting.  He denies trauma to his testicles.   He denies frequency, urgency, nocturia, incontinence.  He has hesitancy.  Also reports dysuria at the beginning of his stream x 1-2 months.  Sometimes he has nocturnal enuresis according to his daughter.   He has been on hemodialysis x 3 years.  He does hemodialysis 3 times a week (M_W_F).  He voids 1-2 times a day.    Last seen by Dr. Nagel in 2014.  Has history of UTI.     Denies flank pain, dysuira, hematuria .      Past Medical History:   Diagnosis Date    Anemia     Blindness of right eye     CHF (congestive heart failure)     Chronic kidney disease     Dementia 01/2017    Diabetes mellitus type II     General anesthetics causing adverse effect in therapeutic use     april / may 2014     Glaucoma (increased eye pressure)     Hypertension     Seizures      Past Surgical History:   Procedure Laterality Date    AV FISTULA PLACEMENT Left 4/2014    AV fistula to Left upper arm    EYE SURGERY       Social History   Substance Use Topics    Smoking status: Never Smoker    Smokeless tobacco: Never Used    Alcohol use No     Family History   Problem Relation Age of Onset    Heart disease Sister      mi    Heart disease Brother      mi    Cancer Daughter      BREAST X 2     Allergy:  Review of patient's allergies indicates:   Allergen Reactions    Lisinopril Swelling     Outpatient Encounter Prescriptions as of 11/7/2017   Medication Sig Dispense Refill    amlodipine (NORVASC) 10 MG tablet Take 1 tablet (10 mg total) " by mouth once daily. Hold on Monday, Wednesday and Friday before dialysis. Hold for SBP < 110 (Patient taking differently: Take 10 mg by mouth every morning. )      aspirin 81 MG Chew Take 1 tablet (81 mg total) by mouth once daily. (Patient taking differently: Take 81 mg by mouth every morning. ) 30 tablet 3    atorvastatin (LIPITOR) 40 MG tablet Take 1 tablet (40 mg total) by mouth once daily. (Patient taking differently: Take 80 mg by mouth every evening. ) 30 tablet 9    B complex-vitamin C-folic acid (NEPHRO-JESSICA) 0.8 mg Tab Take 1 tablet by mouth every morning.       blood sugar diagnostic (BLOOD GLUCOSE TEST) Strp 1 strip by Misc.(Non-Drug; Combo Route) route 2 (two) times daily.      brinzolamide (AZOPT) 1 % ophthalmic suspension Place 1 drop into the left eye 3 (three) times daily.       ergocalciferol (ERGOCALCIFEROL) 50,000 unit Cap Take 1 capsule (50,000 Units total) by mouth every 7 days. 10 capsule 1    HEPARIN SODIUM,PORCINE (HEPARIN, PORCINE,) 1,000 unit/mL injection 1 mL (1,000 Units total) by Intracatheter route as needed (Heparin 1000 units/ml instilled to fill volume of each lumen of dialysis catheter after each dialysis.).      insulin aspart (NOVOLOG FLEXPEN) 100 unit/mL InPn  Insulin Pen Subcutaneous As directed.  -200 take 1 unitBS 201-250 take 2 unitsBS 251-300 take 3 unitsBS 301-350 take 4 unitsBS > 351 take 5 units and call MD      ketoconazole (NIZORAL) 2 % cream Apply topically once daily. feet 30 g 3    levetiracetam (KEPPRA) 500 MG Tab Take 1 tablet (500 mg total) by mouth 2 (two) times daily. 60 tablet 11    TRAVOPROST (TRAVATAN Z OPHT) Place 1 drop into the left eye every evening. 1 Drops Left eye Every evening      calcium acetate (PHOSLO) 667 mg capsule Take 1 capsule (667 mg total) by mouth 3 (three) times daily with meals. 90 capsule 11    carvedilol (COREG) 25 MG tablet Take 1 tablet (25 mg total) by mouth 2 (two) times daily. Hold on Monday, Wednesday and  Friday before dialysis. Hold for SBP < 110 or HR < 55 (Patient taking differently: Take 25 mg by mouth 2 (two) times daily. Hold for SBP < 110 or HR < 55) 60 tablet 11    doxazosin (CARDURA) 4 MG tablet Take 1 tablet (4 mg total) by mouth once daily. (Patient taking differently: Take 4 mg by mouth every morning. ) 30 tablet 1    finasteride (PROSCAR) 5 mg tablet Take 1 tablet (5 mg total) by mouth once daily. (Patient taking differently: Take 5 mg by mouth every morning. ) 30 tablet 1     No facility-administered encounter medications on file as of 11/7/2017.      Review of Systems   General ROS: GENERAL:  No weight gain or loss  SKIN:  No rashes or lacerations  HEAD:  No headaches  EYES:  No exophthalmos, jaundice or blindness  EARS:  No dizziness, tinnitus or hearing loss  NOSE:  No changes in smell  MOUTH & THROAT:  No dyskinetic movements or obvious goiter  CHEST:  No shortness of breath, hyperventilation or cough  CARDIOVASCULAR:  No tachycardia or chest pain  ABDOMEN:  No nausea, vomiting, pain, constipation or diarrhea  URINARY:  No frequency, dysuria or sexual dysfunction  ENDOCRINE:  No polydipsia, polyuria  MUSCULOSKELETAL:  No pain or stiffness of the joints  NEUROLOGIC:  No weakness, sensory changes, seizures, confusion, memory loss, tremor or other abnormal movements  Physical Exam     Vitals:    11/07/17 1056   Resp: 18     Physical Exam   Constitutional: He is oriented to person, place, and time. He appears well-developed and well-nourished.   On a wheal chair   HENT:   Head: Normocephalic and atraumatic.   Left Ear: External ear normal.   Nose: Nose normal.        Eyes:   Blind bilateral   Neck: Neck supple.   Cardiovascular: Normal rate, regular rhythm and normal heart sounds.    Pulmonary/Chest: Effort normal and breath sounds normal.   Abdominal: Soft. Bowel sounds are normal.   Genitourinary: Penis normal.   Musculoskeletal: Normal range of motion.   Neurological: He is alert and oriented to  person, place, and time. He has normal reflexes.   Skin: Skin is warm and dry.     Psychiatric: He has a normal mood and affect.     Genitalia:  Scrotum: left side is HUGE; 20 x 30 x 15 cm in size.  Testicle not palpable.  Right side: mild hydrocele.  Normal symmetric epididymis without masses  Normal vas palpated  Normal size, right testicle with no masses   Normal urethral meatus with no discharge  Normal circumcised penis with no lesion   Rectal:  Normal perineum and anus upon inspection.  Normal tone, no masses or tenderness;     LABS:  Lab Results   Component Value Date    PSA 28.3 (H) 05/01/2014    PSA 9.4 (H) 10/18/2010    PSA 8.9 (H) 04/27/2004     No results found for this or any previous visit.  Lab Results   Component Value Date    CREATININE 6.0 (H) 11/17/2016    CREATININE 5.9 (H) 04/14/2016    CREATININE 5.7 (H) 01/29/2016     No results found for this or any previous visit.  Urine Culture, Routine   Date Value Ref Range Status   04/25/2017   Final    Multiple organisms isolated. None in predominance.  Repeat if   04/25/2017 clinically necessary.  Final     Assessment and Plan:  Ronald was seen today for hydrocele.    Diagnoses and all orders for this visit:    ESRD (end stage renal disease) on dialysis    Hydrocele, bilateral  -     US Scrotum And Testicles; Future  -     CBC auto differential; Future  -     Basic metabolic panel; Future  -     EKG 12-lead; Future    Insulin long-term use    Blindness of right eye    Hemodialysis patient    nature and risks of operation explained to pt and his daughter today.  Will do scrotal US to confirm bilateral hydrocele left side >>> the right side.  Stop ASA 1 wk before.  Antibiotic soap shower.  Anesthesia and medical clearance.  He get hemodialysis M, W and F  He may be able to get his dialysis after his surgery on Weds before the Thanksgiving day.  Will contact our dialysis unit to arrange in-house dialysis after his surgery. ( ext [phone])  In order to make  this happen, he has to be an inpatient status.  Will plan to admit him to Medicine team so that he can get his hemodialysis following his surgery.  Pt is a [facility] pt.    Follow-up:  Return in about 2 weeks (around 11/22/2017) for bilateral hydrocelectomy.

## 2017-11-22 NOTE — ASSESSMENT & PLAN NOTE
ESRD on HD F  -C:  ZORAN Bowser  -Dr. Vang  -AVF to [facility].    Hemodialysis today for metabolic clearance/volume management.  UF goal of 2L.  Dialysate adjusted to current labs.    -Phosphorus low, so will discontinue phos binders.    -low potassium diet

## 2017-11-22 NOTE — SUBJECTIVE & OBJECTIVE
Past Medical History:   Diagnosis Date    Anemia     Blindness of right eye     CHF (congestive heart failure)     Chronic kidney disease     Dementia 01/2017    Diabetes mellitus type II     General anesthetics causing adverse effect in therapeutic use     april / may 2014     Glaucoma (increased eye pressure)     Hypertension     Seizures        Past Surgical History:   Procedure Laterality Date    AV FISTULA PLACEMENT Left 4/2014    AV fistula to Left upper arm    EYE SURGERY         Review of patient's allergies indicates:   Allergen Reactions    Lisinopril Swelling     Current Facility-Administered Medications   Medication Frequency    0.9%  NaCl infusion Once    acetaminophen tablet 650 mg Q6H PRN    [START ON 11/23/2017] amLODIPine tablet 5 mg Daily    atorvastatin tablet 80 mg QHS    calcium acetate capsule 667 mg TID WM    carvedilol tablet 25 mg BID    dextrose 50% injection 12.5 g PRN    dextrose 50% injection 25 g PRN    [START ON 11/23/2017] doxazosin tablet 4 mg QAM    fentaNYL injection 25 mcg Q5 Min PRN    [START ON 11/23/2017] finasteride tablet 5 mg QAM    glucagon (human recombinant) injection 1 mg PRN    glucose chewable tablet 16 g PRN    glucose chewable tablet 24 g PRN    insulin aspart pen 0-5 Units QID (AC + HS) PRN    levETIRAcetam tablet 500 mg BID    ondansetron injection 4 mg Daily PRN    ondansetron injection 4 mg Q8H PRN    oxyCODONE-acetaminophen 5-325 mg per tablet 1 tablet Q4H PRN    oxyCODONE-acetaminophen 5-325 mg per tablet 2 tablet Q4H PRN    promethazine (PHENERGAN) 6.25 mg in dextrose 5 % 50 mL IVPB Q10 Min PRN    sodium chloride 0.9% flush 3 mL PRN    travoprost 0.004 % ophthalmic solution 1 drop QHS     Family History     Problem Relation (Age of Onset)    Cancer Daughter    Heart disease Sister, Brother        Social History Main Topics    Smoking status: Never Smoker    Smokeless tobacco: Never Used    Alcohol use No    Drug use: No     Sexual activity: No     Review of Systems   Unable to perform ROS: Other (anesthesia)     Objective:     Vital Signs (Most Recent):  Temp: 97.9 °F (36.6 °C) (11/22/17 1550)  Pulse: 64 (11/22/17 1600)  Resp: 16 (11/22/17 1600)  BP: (!) 179/84 (11/22/17 1600)  SpO2: 100 % (11/22/17 1550)  O2 Device (Oxygen Therapy): room air (11/22/17 1550) Vital Signs (24h Range):  Temp:  [97.3 °F (36.3 °C)-98.2 °F (36.8 °C)] 97.9 °F (36.6 °C)  Pulse:  [47-64] 64  Resp:  [13-24] 16  SpO2:  [96 %-100 %] 100 %  BP: (136-180)/(63-84) 179/84     Weight: 79.4 kg (175 lb) (11/22/17 0917)  Body mass index is 23.09 kg/m².  Body surface area is 2.02 meters squared.    No intake/output data recorded.    Physical Exam   Constitutional: He appears well-developed and well-nourished. No distress.   HENT:   Head: Normocephalic and atraumatic.   Right Ear: External ear normal.   Left Ear: External ear normal.   Eyes: Conjunctivae are normal. Right eye exhibits no discharge. Left eye exhibits no discharge.   Neck: Normal range of motion. Neck supple.   Cardiovascular: Normal rate and regular rhythm.  Exam reveals no gallop and no friction rub.    No murmur heard.  Pulmonary/Chest: Effort normal and breath sounds normal. No respiratory distress. He has no wheezes. He has no rales.   Abdominal: Soft. Bowel sounds are normal. He exhibits no distension. There is no tenderness.   Musculoskeletal: He exhibits no edema or deformity.   Neurological: He is alert.   Able to follow commands.  Disoriented to place and time.   Skin: Skin is warm and dry. No rash noted. He is not diaphoretic.   Psychiatric: He has a normal mood and affect. His behavior is normal.       Significant Labs:  CBC:   Recent Labs  Lab 11/22/17  1154   WBC 5.01   RBC 3.55*   HGB 8.7*   HCT 28.8*   *   MCV 81*   MCH 24.5*   MCHC 30.2*     CMP:   Recent Labs  Lab 11/22/17  1154   *   CALCIUM 8.4*   *   K 5.6*   CO2 15*      BUN 58*   CREATININE 8.4*

## 2017-11-22 NOTE — ANESTHESIA POSTPROCEDURE EVALUATION
"Anesthesia Post Evaluation    Patient: Ronald Campbell    Procedure(s) Performed: Procedure(s) (LRB):  HYDROCELECTOMY (Bilateral)    Final Anesthesia Type: general  Patient location during evaluation: PACU  Patient participation: Yes- Able to Participate  Level of consciousness: awake and alert and oriented  Post-procedure vital signs: reviewed and stable  Pain management: adequate  Airway patency: patent  PONV status at discharge: No PONV  Anesthetic complications: no      Cardiovascular status: blood pressure returned to baseline  Respiratory status: unassisted  Hydration status: euvolemic  Follow-up not needed.        Visit Vitals  BP (!) 159/74   Pulse (!) 57   Temp 36.8 °C (98.2 °F) (Oral)   Resp (!) 24   Ht 6' 1" (1.854 m)   Wt 79.4 kg (175 lb)   SpO2 100%   BMI 23.09 kg/m²       Pain/Annabel Score: Pain Assessment Performed: Yes (11/22/2017  3:00 PM)  Presence of Pain: denies (11/22/2017  3:00 PM)  Pain Rating Prior to Med Admin: 0 (11/22/2017 12:15 PM)  Annabel Score: 9 (11/22/2017  3:00 PM)      "

## 2017-11-22 NOTE — TRANSFER OF CARE
"Anesthesia Transfer of Care Note    Patient: Ovalsamuel Munozant    Procedure(s) Performed: Procedure(s) (LRB):  HYDROCELECTOMY (Bilateral)    Patient location: PACU    Anesthesia Type: general    Transport from OR: Transported from OR on 6-10 L/min O2 by face mask with adequate spontaneous ventilation    Post pain: adequate analgesia    Post assessment: no apparent anesthetic complications    Post vital signs: stable    Level of consciousness: responds to stimulation    Nausea/Vomiting: no nausea/vomiting    Complications: none    Transfer of care protocol was followed      Last vitals:   Visit Vitals  BP (!) 170/76   Pulse (!) 50   Temp 36.6 °C (97.8 °F) (Oral)   Resp 18   Ht 6' 1" (1.854 m)   Wt 79.4 kg (175 lb)   SpO2 100%   BMI 23.09 kg/m²     "

## 2017-11-23 VITALS
SYSTOLIC BLOOD PRESSURE: 174 MMHG | DIASTOLIC BLOOD PRESSURE: 80 MMHG | WEIGHT: 195 LBS | HEIGHT: 73 IN | HEART RATE: 65 BPM | RESPIRATION RATE: 16 BRPM | TEMPERATURE: 96 F | BODY MASS INDEX: 25.84 KG/M2 | OXYGEN SATURATION: 98 %

## 2017-11-23 LAB — POCT GLUCOSE: 140 MG/DL (ref 70–110)

## 2017-11-23 PROCEDURE — 25000003 PHARM REV CODE 250: Performed by: UROLOGY

## 2017-11-23 PROCEDURE — 25000003 PHARM REV CODE 250: Performed by: HOSPITALIST

## 2017-11-23 RX ORDER — LABETALOL HYDROCHLORIDE 5 MG/ML
20 INJECTION, SOLUTION INTRAVENOUS EVERY 4 HOURS PRN
Status: DISCONTINUED | OUTPATIENT
Start: 2017-11-23 | End: 2017-11-23 | Stop reason: HOSPADM

## 2017-11-23 RX ORDER — POLYETHYLENE GLYCOL 3350 17 G/17G
17 POWDER, FOR SOLUTION ORAL DAILY
Qty: 30 PACKET | Refills: 0 | Status: ON HOLD | OUTPATIENT
Start: 2017-11-23 | End: 2018-01-01

## 2017-11-23 RX ORDER — BRINZOLAMIDE 10 MG/ML
1 SUSPENSION/ DROPS OPHTHALMIC 3 TIMES DAILY
Status: DISCONTINUED | OUTPATIENT
Start: 2017-11-23 | End: 2017-11-23 | Stop reason: HOSPADM

## 2017-11-23 RX ORDER — HYDROCODONE BITARTRATE AND ACETAMINOPHEN 5; 325 MG/1; MG/1
1 TABLET ORAL EVERY 6 HOURS PRN
Qty: 21 TABLET | Refills: 0 | Status: SHIPPED | OUTPATIENT
Start: 2017-11-23 | End: 2017-12-03

## 2017-11-23 RX ORDER — DOXAZOSIN 1 MG/1
4 TABLET ORAL EVERY MORNING
Status: DISCONTINUED | OUTPATIENT
Start: 2017-11-24 | End: 2017-11-23 | Stop reason: HOSPADM

## 2017-11-23 RX ORDER — AMLODIPINE BESYLATE 5 MG/1
5 TABLET ORAL DAILY
Status: DISCONTINUED | OUTPATIENT
Start: 2017-11-24 | End: 2017-11-23 | Stop reason: HOSPADM

## 2017-11-23 RX ORDER — AMLODIPINE BESYLATE 5 MG/1
5 TABLET ORAL ONCE
Status: COMPLETED | OUTPATIENT
Start: 2017-11-23 | End: 2017-11-23

## 2017-11-23 RX ORDER — ERGOCALCIFEROL 1.25 MG/1
50000 CAPSULE ORAL
Status: DISCONTINUED | OUTPATIENT
Start: 2017-11-23 | End: 2017-11-23 | Stop reason: HOSPADM

## 2017-11-23 RX ORDER — DOXAZOSIN 1 MG/1
4 TABLET ORAL ONCE
Status: COMPLETED | OUTPATIENT
Start: 2017-11-23 | End: 2017-11-23

## 2017-11-23 RX ADMIN — LEVETIRACETAM 500 MG: 500 TABLET, FILM COATED ORAL at 08:11

## 2017-11-23 RX ADMIN — AMLODIPINE BESYLATE 5 MG: 5 TABLET ORAL at 01:11

## 2017-11-23 RX ADMIN — DOXAZOSIN 4 MG: 1 TABLET ORAL at 01:11

## 2017-11-23 RX ADMIN — LABETALOL HYDROCHLORIDE 20 MG: 5 INJECTION, SOLUTION INTRAVENOUS at 12:11

## 2017-11-23 RX ADMIN — FINASTERIDE 5 MG: 5 TABLET, FILM COATED ORAL at 06:11

## 2017-11-23 RX ADMIN — CARVEDILOL 25 MG: 25 TABLET, FILM COATED ORAL at 08:11

## 2017-11-23 NOTE — DISCHARGE SUMMARY
[facility]  Urology  Discharge Summary      Patient Name: Ronald Campbell  MRN: [id number]  Admission Date: 11/22/2017  Hospital Length of Stay: 0 days  Discharge Date and Time:  11/23/2017 1:25 PM  Attending Physician: No att. providers found   Discharging Provider: Jose Reeves MD  Primary Care Physician: Js Sanches MD (Inactive)    HPI: 88 YOM POD 1 s/p left orchiectomy and right hydrocelectomy     Procedure(s) (LRB):  HYDROCELECTOMY (Bilateral)     Indwelling Lines/Drains at time of discharge:   Lines/Drains/Airways     Drain                 Hemodialysis AV Fistula -- days         Hemodialysis AV Fistula Left upper arm -- days         Hemodialysis AV Fistula 08/08/14 0800 Left forearm 1203 days                Hospital Course (synopsis of major diagnoses, care, treatment, and services provided during the course of the hospital stay):     Patient was admitted on 11/22/2017 for above procedure.  Patient tolerated the procedure well, hospital course was uncomplicated, patient receive hemodialysis POD 0, and patient was discharged home in good condition with pain well controlled on 11/22/2017 after tejeda catheter was removed, and patient ambulated, voided, and tolerated regular diet.      Consults:   Consults         Status Ordering Provider     Inpatient consult to Nephrology  Once     Provider:  (Not yet assigned)    Completed JOSE REEVES          Significant Diagnostic Studies:     Pending Diagnostic Studies:     Procedure Component Value Units Date/Time    Basic metabolic panel [[id number]] Collected:  11/22/17 0923    Order Status:  Sent Lab Status:  In process Updated:  11/22/17 0923    Specimen:  Blood from Blood     CBC auto differential [[id number]] Collected:  11/22/17 0923    Order Status:  Sent Lab Status:  In process Updated:  11/22/17 0923    Specimen:  Blood from Blood           Final Active Diagnoses:    Diagnosis Date Noted POA    PRINCIPAL PROBLEM:  Hydrocele [N43.3]  11/22/2017 Yes    Hemodialysis patient [Z99.2] 11/07/2017 Not Applicable    Dermatophytosis, nail [B35.1] 01/31/2017 Yes    Insulin long-term use [Z79.4] 08/24/2016 Not Applicable    Renovascular hypertension [I15.0] 12/13/2015 Yes    HTN (hypertension) [I10]  Yes    Dyslipidemia [E78.5]  Yes    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 [E11.22, N18.6]  Yes    Glaucoma [H40.9]  Yes    ESRD (end stage renal disease) on dialysis [N18.6, Z99.2]  Not Applicable    Benign prostatic hyperplasia with urinary retention [N40.1, R33.8] 05/06/2014 Yes    Thrombocytopenia [D69.6] 04/30/2014 Yes    Blindness of right eye [H54.40]  Yes    Anemia in chronic kidney disease [N18.9, D63.1] 11/28/2012 Yes    Seizure disorder [G40.909] 11/04/2012 Yes     Chronic    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    Specialty:  Urology  Why:  post op left orchiectomy, right hydrocelectomy   Contact information:  [address] [address]  [phone]                 Patient Instructions:     Diet general     Call MD for:  persistent nausea and vomiting or diarrhea     Call MD for:  severe uncontrolled pain     Call MD for:  redness, tenderness, or signs of infection (pain, swelling, redness, odor or green/yellow discharge around incision site)     Call MD for:  difficulty breathing or increased cough     Call MD for:  severe persistent headache     Call MD for:  worsening rash     Call MD for:  persistent dizziness, light-headedness, or visual disturbances     Call MD for:  increased confusion or weakness     Other restrictions (specify):   Order Comments: No strenuous activity x 2  weeks.  Do not submerge incisions.  Okay to shower/ sponge bath.  Keep scrotum supported while in bed.     Change dressing (specify)   Order Comments: Dressing change:   As needed       Medications:  Reconciled Home Medications:   Discharge Medication List as of 11/23/2017 11:36 AM      START taking these medications    Details   hydrocodone-acetaminophen 5-325mg (NORCO) 5-325 mg per tablet Take 1 tablet by mouth every 6 (six) hours as needed for Pain., Starting Thu 11/23/2017, Until Sun 12/3/2017, Print      polyethylene glycol (GLYCOLAX) 17 gram PwPk Take 17 g by mouth once daily., Starting Thu 11/23/2017, Print         CONTINUE these medications which have NOT CHANGED    Details   amLODIPine (NORVASC) 5 MG tablet Take 5 mg by mouth once daily., Historical Med      aspirin 81 MG Chew Take 1 tablet (81 mg total) by mouth once daily., Starting 11/4/2012, Until Discontinued, Normal      atorvastatin (LIPITOR) 40 MG tablet Take 1 tablet (40 mg total) by mouth once daily., Starting 8/14/2014, Until Discontinued, Normal      B complex-vitamin C-folic acid (NEPHRO-JESSICA) 0.8 mg Tab Take 1 tablet by mouth every morning. , Until Discontinued, Historical Med      blood sugar diagnostic (BLOOD GLUCOSE TEST) Strp 1 strip by Misc.(Non-Drug; Combo Route) route 2 (two) times daily., Until Discontinued, Historical Med      brinzolamide (AZOPT) 1 % ophthalmic suspension Place 1 drop into the left eye 3 (three) times daily. , Until Discontinued, Historical Med      calcium acetate (PHOSLO) 667 mg capsule Take 1 capsule (667 mg total) by mouth 3 (three) times daily with meals., Starting Fri 7/18/2014, Until Wed 11/22/2017, No Print      carvedilol (COREG) 25 MG tablet Take 1 tablet (25 mg total) by mouth 2 (two) times daily. Hold on Monday, Wednesday and Friday before dialysis. Hold for SBP < 110 or HR < 55, Starting Thu 12/10/2015, Until Wed 11/22/2017, No Print      doxazosin (CARDURA) 4 MG tablet Take 1 tablet (4 mg total) by  mouth once daily., Starting Tue 8/5/2014, Until Wed 11/22/2017, Normal      ergocalciferol (ERGOCALCIFEROL) 50,000 unit Cap Take 1 capsule (50,000 Units total) by mouth every 7 days., Starting 8/5/2014, Until Discontinued, Normal      finasteride (PROSCAR) 5 mg tablet Take 1 tablet (5 mg total) by mouth once daily., Starting Tue 8/5/2014, Until Wed 11/22/2017, Normal      HEPARIN SODIUM,PORCINE (HEPARIN, PORCINE,) 1,000 unit/mL injection 1 mL (1,000 Units total) by Intracatheter route as needed (Heparin 1000 units/ml instilled to fill volume of each lumen of dialysis catheter after each dialysis.)., Starting 5/21/2014, Until Discontinued, No Print      insulin aspart (NOVOLOG FLEXPEN) 100 unit/mL InPn  Insulin Pen Subcutaneous As directed.  -200 take 1 unitBS 201-250 take 2 unitsBS 251-300 take 3 unitsBS 301-350 take 4 unitsBS > 351 take 5 units and call MD, Until Discontinued, Historical Med      ketoconazole (NIZORAL) 2 % cream Apply topically once daily. feet, Starting 1/31/2017, Until Discontinued, Print      levetiracetam (KEPPRA) 500 MG Tab Take 1 tablet (500 mg total) by mouth 2 (two) times daily., Starting 5/21/2014, Until Tue 6/30/20, No Print      TRAVOPROST (TRAVATAN Z OPHT) Place 1 drop into the left eye every evening. 1 Drops Left eye Every evening, Until Discontinued, Historical Med             Time spent on the discharge of patient: 30 minutes    Jose Reeves MD  Urology  [facility]

## 2017-11-23 NOTE — ASSESSMENT & PLAN NOTE
- renal diet diet   - hliv  - ambulated  - pain control  - TEDs/SCDs    Dispo:  Home today after ambulating

## 2017-11-23 NOTE — SUBJECTIVE & OBJECTIVE
Interval History:     BRIAN  Received hemodialysis yesterday   Pain controlled  Tolerating diet   Has not ambulated     Review of Systems  Objective:     Temp:  [96.7 °F (35.9 °C)-98.2 °F (36.8 °C)] 96.7 °F (35.9 °C)  Pulse:  [47-83] 70  Resp:  [13-24] 18  SpO2:  [95 %-100 %] 99 %  BP: (136-205)/(63-99) 179/81     Body mass index is 25.73 kg/m².            Drains     Drain                 Hemodialysis AV Fistula -- days         Hemodialysis AV Fistula Left upper arm -- days         Hemodialysis AV Fistula 08/08/14 0800 Left forearm 1203 days                Physical Exam   Constitutional: He appears well-developed and well-nourished. No distress.   HENT:   Head: Normocephalic and atraumatic.   Cardiovascular: Normal rate.    Pulmonary/Chest: Effort normal. No respiratory distress.   Abdominal: Soft. He exhibits no distension. There is no tenderness. There is no rebound.   Genitourinary: Penis normal.   Genitourinary Comments: Scrotal incision c/d/i, no swelling, erythema, or hematoma, penrose drain in place with minimal drainage-- drain removed    Skin: He is not diaphoretic.         Significant Labs:    BMP:    Recent Labs  Lab 11/22/17  1154   *   K 5.6*      CO2 15*   BUN 58*   CREATININE 8.4*   CALCIUM 8.4*       CBC:     Recent Labs  Lab 11/22/17  1154   WBC 5.01   HGB 8.7*   HCT 28.8*   *       All pertinent labs results from the past 24 hours have been reviewed.    Significant Imaging:  All pertinent imaging results/findings from the past 24 hours have been reviewed.

## 2017-11-23 NOTE — PROGRESS NOTES
Dialysis completed. Needles removed from left upper arm fistula with pressure held to sites for 10 minutes each with hemostasis achieved. Gauze and tape to sites. Patient dialyzed for 3 hours with fluid removal of 2 liters. Tolerated well with stable vital signs. Report called to floor to Deonna Oshea.

## 2017-11-23 NOTE — PROGRESS NOTES
Discharge note: Pt discharged home alert and oriented x4, skin warm to touch, lower testicles with incision, intact edges well approximated.Pt and daughter verbalized understanding of discharge teaching.

## 2017-11-23 NOTE — NURSING
1 hr post labetalol admin, pt's HR is 60 and BP is 186/88. Nephrology notified, will administer home BP meds now and monitor.

## 2017-11-23 NOTE — PROGRESS NOTES
[facility]  Urology  Progress Note    Patient Name: Ronald Campbell  MRN: [id number]  Admission Date: 11/22/2017  Hospital Length of Stay: 0 days  Code Status: Prior   Attending Provider: Temo Banerjee MD   Primary Care Physician: Js Sanches MD (Inactive)    Subjective:     HPI:  88 YOM POD 1 s/p left orchiectomy and right hydrocelectomy for bilateral hydrocele     Interval History:     BRIAN  Received hemodialysis yesterday   Pain controlled  Tolerating diet   Has not ambulated     Review of Systems  Objective:     Temp:  [96.7 °F (35.9 °C)-98.2 °F (36.8 °C)] 96.7 °F (35.9 °C)  Pulse:  [47-83] 70  Resp:  [13-24] 18  SpO2:  [95 %-100 %] 99 %  BP: (136-205)/(63-99) 179/81     Body mass index is 25.73 kg/m².            Drains     Drain                 Hemodialysis AV Fistula -- days         Hemodialysis AV Fistula Left upper arm -- days         Hemodialysis AV Fistula 08/08/14 0800 Left forearm 1203 days                Physical Exam   Constitutional: He appears well-developed and well-nourished. No distress.   HENT:   Head: Normocephalic and atraumatic.   Cardiovascular: Normal rate.    Pulmonary/Chest: Effort normal. No respiratory distress.   Abdominal: Soft. He exhibits no distension. There is no tenderness. There is no rebound.   Genitourinary: Penis normal.   Genitourinary Comments: Scrotal incision c/d/i, no swelling, erythema, or hematoma, penrose drain in place with minimal drainage-- drain removed    Skin: He is not diaphoretic.         Significant Labs:    BMP:    Recent Labs  Lab 11/22/17  1154   *   K 5.6*      CO2 15*   BUN 58*   CREATININE 8.4*   CALCIUM 8.4*       CBC:     Recent Labs  Lab 11/22/17  1154   WBC 5.01   HGB 8.7*   HCT 28.8*   *       All pertinent labs results from the past 24 hours have been reviewed.    Significant Imaging:  All pertinent imaging results/findings from the past 24 hours have been reviewed.                  Assessment/Plan:     *  Hydrocele    - renal diet diet   - penrose drain removed   - replace dressing as needed  - hliv  - ambulated  - pain control  - TEDs/SCDs    Dispo:  Home today after ambulating             VTE Risk Mitigation         Ordered     Medium Risk of VTE  Once      11/23/17 0730          Jose Reeves MD  Urology  [facility]

## 2017-11-23 NOTE — PLAN OF CARE
Problem: Patient Care Overview  Goal: Plan of Care Review  Outcome: Ongoing (interventions implemented as appropriate)  Pt AAOX4, VSS. Surgical site CDI, ice applied. Pt is resting comfortably. Bed alarm set. No s/s infection, skin breakdown/pressure ulcers - pt moves independently well with assistance. Denies pain. Blood glucose monitoring ordered and carried out. Deep breathing encouraged. SCD's on and functioning. Fall precautions maintained. Will monitor.

## 2017-11-23 NOTE — NURSING
Urology paged to notify of pt's /92. Per request, notified nephrology. Will administer labetalol now and monitor.

## 2017-11-23 NOTE — PROGRESS NOTES
Call per nursing staff regarding uncontrol BP. Will start labetalo PRN for SBP > 180 or DBP > 105. We will also restart his home PO BP meds. Cardura and Oxana.   Lg Saravia MD  Nephrology Fellow  [phone]

## 2017-11-23 NOTE — NURSING
Pt ambulated in saucedo x1 with assist x2  He was only able to go short distance s/t fatigue  He is back in bed now, ice reapplied

## 2017-11-24 LAB
GLUCOSE SERPL-MCNC: 120 MG/DL (ref 70–110)
HCO3 UR-SCNC: 19.3 MMOL/L (ref 24–28)
HCT VFR BLD CALC: 31 %PCV (ref 36–54)
PCO2 BLDA: 30.8 MMHG (ref 35–45)
PH SMN: 7.41 [PH] (ref 7.35–7.45)
PO2 BLDA: 83 MMHG (ref 40–60)
POC BE: -5 MMOL/L
POC IONIZED CALCIUM: 1.22 MMOL/L (ref 1.06–1.42)
POC SATURATED O2: 96 % (ref 95–100)
POC TCO2: 20 MMOL/L (ref 24–29)
POTASSIUM BLD-SCNC: 5.2 MMOL/L (ref 3.5–5.1)
SAMPLE: ABNORMAL
SODIUM BLD-SCNC: 138 MMOL/L (ref 136–145)

## 2017-11-27 ENCOUNTER — TELEPHONE (OUTPATIENT)
Dept: UROLOGY | Facility: CLINIC | Age: 82
End: 2017-11-27

## 2017-11-27 NOTE — TELEPHONE ENCOUNTER
----- Message from Katie Grace sent at 11/27/2017  2:20 PM CST -----  Contact: Eliza with [facility]#[phone]  Pt needs a two post op appt. Please call

## 2017-12-04 ENCOUNTER — HOSPITAL ENCOUNTER (OUTPATIENT)
Facility: HOSPITAL | Age: 82
Discharge: NURSING, REHAB OR LONG-TERM CARE | End: 2017-12-12
Attending: EMERGENCY MEDICINE | Admitting: EMERGENCY MEDICINE
Payer: COMMERCIAL

## 2017-12-04 DIAGNOSIS — N30.01 ACUTE CYSTITIS WITH HEMATURIA: Primary | ICD-10-CM

## 2017-12-04 DIAGNOSIS — T82.898A OTHER COMPLICATIONS DUE TO RENAL DIALYSIS DEVICE, IMPLANT, AND GRAFT: ICD-10-CM

## 2017-12-04 DIAGNOSIS — N18.6 ESRD (END STAGE RENAL DISEASE) ON DIALYSIS: ICD-10-CM

## 2017-12-04 DIAGNOSIS — R07.89 CHEST DISCOMFORT: ICD-10-CM

## 2017-12-04 DIAGNOSIS — Z99.2 ESRD (END STAGE RENAL DISEASE) ON DIALYSIS: ICD-10-CM

## 2017-12-04 DIAGNOSIS — R41.82 ALTERED MENTAL STATUS: ICD-10-CM

## 2017-12-04 DIAGNOSIS — T68.XXXA HYPOTHERMIA: ICD-10-CM

## 2017-12-04 DIAGNOSIS — N39.0 UTI (URINARY TRACT INFECTION): ICD-10-CM

## 2017-12-04 LAB
ALBUMIN SERPL BCP-MCNC: 3.1 G/DL
ALP SERPL-CCNC: 83 U/L
ALT SERPL W/O P-5'-P-CCNC: 11 U/L
ANION GAP SERPL CALC-SCNC: 9 MMOL/L
AST SERPL-CCNC: 26 U/L
BACTERIA #/AREA URNS AUTO: ABNORMAL /HPF
BASOPHILS # BLD AUTO: 0.01 K/UL
BASOPHILS NFR BLD: 0.1 %
BILIRUB SERPL-MCNC: 0.4 MG/DL
BILIRUB UR QL STRIP: NEGATIVE
BUN SERPL-MCNC: 26 MG/DL
CALCIUM SERPL-MCNC: 8.7 MG/DL
CHLORIDE SERPL-SCNC: 106 MMOL/L
CLARITY UR REFRACT.AUTO: ABNORMAL
CO2 SERPL-SCNC: 20 MMOL/L
COLOR UR AUTO: YELLOW
CREAT SERPL-MCNC: 4.4 MG/DL
DIFFERENTIAL METHOD: ABNORMAL
EOSINOPHIL # BLD AUTO: 0.1 K/UL
EOSINOPHIL NFR BLD: 0.9 %
ERYTHROCYTE [DISTWIDTH] IN BLOOD BY AUTOMATED COUNT: 19.5 %
EST. GFR  (AFRICAN AMERICAN): 12.9 ML/MIN/1.73 M^2
EST. GFR  (NON AFRICAN AMERICAN): 11.2 ML/MIN/1.73 M^2
GLUCOSE SERPL-MCNC: 150 MG/DL
GLUCOSE UR QL STRIP: NEGATIVE
HCT VFR BLD AUTO: 27.1 %
HGB BLD-MCNC: 9 G/DL
HGB UR QL STRIP: ABNORMAL
HYALINE CASTS UR QL AUTO: 0 /LPF
IMM GRANULOCYTES # BLD AUTO: 0.04 K/UL
IMM GRANULOCYTES NFR BLD AUTO: 0.6 %
KETONES UR QL STRIP: NEGATIVE
LEUKOCYTE ESTERASE UR QL STRIP: ABNORMAL
LYMPHOCYTES # BLD AUTO: 1 K/UL
LYMPHOCYTES NFR BLD: 15.3 %
MCH RBC QN AUTO: 24.3 PG
MCHC RBC AUTO-ENTMCNC: 33.2 G/DL
MCV RBC AUTO: 73 FL
MICROSCOPIC COMMENT: ABNORMAL
MONOCYTES # BLD AUTO: 0.4 K/UL
MONOCYTES NFR BLD: 6.5 %
NEUTROPHILS # BLD AUTO: 5.2 K/UL
NEUTROPHILS NFR BLD: 76.6 %
NITRITE UR QL STRIP: NEGATIVE
NRBC BLD-RTO: 0 /100 WBC
PH UR STRIP: 5 [PH] (ref 5–8)
PLATELET # BLD AUTO: 161 K/UL
PMV BLD AUTO: ABNORMAL FL
POCT GLUCOSE: 146 MG/DL (ref 70–110)
POTASSIUM SERPL-SCNC: 5.1 MMOL/L
PROT SERPL-MCNC: 8.5 G/DL
PROT UR QL STRIP: ABNORMAL
RBC # BLD AUTO: 3.71 M/UL
RBC #/AREA URNS AUTO: >100 /HPF (ref 0–4)
SODIUM SERPL-SCNC: 135 MMOL/L
SP GR UR STRIP: 1.02 (ref 1–1.03)
SQUAMOUS #/AREA URNS AUTO: 13 /HPF
URN SPEC COLLECT METH UR: ABNORMAL
UROBILINOGEN UR STRIP-ACNC: NEGATIVE EU/DL
WBC # BLD AUTO: 6.81 K/UL
WBC #/AREA URNS AUTO: >100 /HPF (ref 0–5)
WBC CLUMPS UR QL AUTO: ABNORMAL
YEAST UR QL AUTO: ABNORMAL

## 2017-12-04 PROCEDURE — G0378 HOSPITAL OBSERVATION PER HR: HCPCS

## 2017-12-04 PROCEDURE — 93005 ELECTROCARDIOGRAM TRACING: CPT

## 2017-12-04 PROCEDURE — 87106 FUNGI IDENTIFICATION YEAST: CPT

## 2017-12-04 PROCEDURE — 82962 GLUCOSE BLOOD TEST: CPT

## 2017-12-04 PROCEDURE — 87088 URINE BACTERIA CULTURE: CPT

## 2017-12-04 PROCEDURE — 93010 ELECTROCARDIOGRAM REPORT: CPT | Mod: ,,, | Performed by: INTERNAL MEDICINE

## 2017-12-04 PROCEDURE — 80053 COMPREHEN METABOLIC PANEL: CPT

## 2017-12-04 PROCEDURE — 99220 PR INITIAL OBSERVATION CARE,LEVL III: CPT | Mod: ,,, | Performed by: NURSE PRACTITIONER

## 2017-12-04 PROCEDURE — 96365 THER/PROPH/DIAG IV INF INIT: CPT

## 2017-12-04 PROCEDURE — 99285 EMERGENCY DEPT VISIT HI MDM: CPT | Mod: ,,, | Performed by: EMERGENCY MEDICINE

## 2017-12-04 PROCEDURE — 85025 COMPLETE CBC W/AUTO DIFF WBC: CPT

## 2017-12-04 PROCEDURE — 99285 EMERGENCY DEPT VISIT HI MDM: CPT | Mod: 25

## 2017-12-04 PROCEDURE — 63600175 PHARM REV CODE 636 W HCPCS: Performed by: STUDENT IN AN ORGANIZED HEALTH CARE EDUCATION/TRAINING PROGRAM

## 2017-12-04 PROCEDURE — 81001 URINALYSIS AUTO W/SCOPE: CPT

## 2017-12-04 PROCEDURE — 87086 URINE CULTURE/COLONY COUNT: CPT

## 2017-12-04 PROCEDURE — 25000003 PHARM REV CODE 250: Performed by: STUDENT IN AN ORGANIZED HEALTH CARE EDUCATION/TRAINING PROGRAM

## 2017-12-04 RX ORDER — BRINZOLAMIDE 10 MG/ML
1 SUSPENSION/ DROPS OPHTHALMIC 3 TIMES DAILY
Status: DISCONTINUED | OUTPATIENT
Start: 2017-12-05 | End: 2017-12-12 | Stop reason: HOSPADM

## 2017-12-04 RX ORDER — ONDANSETRON 2 MG/ML
4 INJECTION INTRAMUSCULAR; INTRAVENOUS EVERY 8 HOURS PRN
Status: DISCONTINUED | OUTPATIENT
Start: 2017-12-05 | End: 2017-12-12 | Stop reason: HOSPADM

## 2017-12-04 RX ORDER — AMOXICILLIN 250 MG
1 CAPSULE ORAL 2 TIMES DAILY PRN
Status: DISCONTINUED | OUTPATIENT
Start: 2017-12-05 | End: 2017-12-12 | Stop reason: HOSPADM

## 2017-12-04 RX ORDER — NAPROXEN SODIUM 220 MG/1
81 TABLET, FILM COATED ORAL DAILY
Status: DISCONTINUED | OUTPATIENT
Start: 2017-12-05 | End: 2017-12-12 | Stop reason: HOSPADM

## 2017-12-04 RX ORDER — CALCIUM ACETATE 667 MG/1
667 CAPSULE ORAL
Status: DISCONTINUED | OUTPATIENT
Start: 2017-12-05 | End: 2017-12-07

## 2017-12-04 RX ORDER — IBUPROFEN 200 MG
16 TABLET ORAL
Status: DISCONTINUED | OUTPATIENT
Start: 2017-12-05 | End: 2017-12-12 | Stop reason: HOSPADM

## 2017-12-04 RX ORDER — CARVEDILOL 25 MG/1
25 TABLET ORAL 2 TIMES DAILY
Status: DISCONTINUED | OUTPATIENT
Start: 2017-12-05 | End: 2017-12-12 | Stop reason: HOSPADM

## 2017-12-04 RX ORDER — HYDRALAZINE HYDROCHLORIDE 25 MG/1
25 TABLET, FILM COATED ORAL EVERY 8 HOURS PRN
Status: DISCONTINUED | OUTPATIENT
Start: 2017-12-05 | End: 2017-12-12 | Stop reason: HOSPADM

## 2017-12-04 RX ORDER — TRAVOPROST OPHTHALMIC SOLUTION 0.04 MG/ML
1 SOLUTION OPHTHALMIC NIGHTLY
Status: DISCONTINUED | OUTPATIENT
Start: 2017-12-05 | End: 2017-12-12 | Stop reason: HOSPADM

## 2017-12-04 RX ORDER — CARVEDILOL 12.5 MG/1
25 TABLET ORAL 2 TIMES DAILY
Status: DISCONTINUED | OUTPATIENT
Start: 2017-12-05 | End: 2017-12-04

## 2017-12-04 RX ORDER — LEVETIRACETAM 500 MG/1
500 TABLET ORAL 2 TIMES DAILY
Status: DISCONTINUED | OUTPATIENT
Start: 2017-12-05 | End: 2017-12-12 | Stop reason: HOSPADM

## 2017-12-04 RX ORDER — GLUCAGON 1 MG
1 KIT INJECTION
Status: DISCONTINUED | OUTPATIENT
Start: 2017-12-05 | End: 2017-12-12 | Stop reason: HOSPADM

## 2017-12-04 RX ORDER — SODIUM CHLORIDE 0.9 % (FLUSH) 0.9 %
5 SYRINGE (ML) INJECTION
Status: DISCONTINUED | OUTPATIENT
Start: 2017-12-05 | End: 2017-12-12 | Stop reason: HOSPADM

## 2017-12-04 RX ORDER — IBUPROFEN 200 MG
24 TABLET ORAL
Status: DISCONTINUED | OUTPATIENT
Start: 2017-12-05 | End: 2017-12-12 | Stop reason: HOSPADM

## 2017-12-04 RX ORDER — ACETAMINOPHEN 325 MG/1
650 TABLET ORAL EVERY 6 HOURS PRN
Status: DISCONTINUED | OUTPATIENT
Start: 2017-12-05 | End: 2017-12-12 | Stop reason: HOSPADM

## 2017-12-04 RX ORDER — FINASTERIDE 5 MG/1
5 TABLET, FILM COATED ORAL DAILY
Status: DISCONTINUED | OUTPATIENT
Start: 2017-12-05 | End: 2017-12-12 | Stop reason: HOSPADM

## 2017-12-04 RX ORDER — ATORVASTATIN CALCIUM 20 MG/1
40 TABLET, FILM COATED ORAL DAILY
Status: DISCONTINUED | OUTPATIENT
Start: 2017-12-05 | End: 2017-12-05

## 2017-12-04 RX ORDER — DOXAZOSIN 1 MG/1
4 TABLET ORAL DAILY
Status: DISCONTINUED | OUTPATIENT
Start: 2017-12-05 | End: 2017-12-12 | Stop reason: HOSPADM

## 2017-12-04 RX ORDER — AMLODIPINE BESYLATE 10 MG/1
10 TABLET ORAL DAILY
Status: DISCONTINUED | OUTPATIENT
Start: 2017-12-05 | End: 2017-12-12 | Stop reason: HOSPADM

## 2017-12-04 RX ORDER — INSULIN ASPART 100 [IU]/ML
0-5 INJECTION, SOLUTION INTRAVENOUS; SUBCUTANEOUS
Status: DISCONTINUED | OUTPATIENT
Start: 2017-12-05 | End: 2017-12-12 | Stop reason: HOSPADM

## 2017-12-04 RX ORDER — HEPARIN SODIUM 5000 [USP'U]/ML
5000 INJECTION, SOLUTION INTRAVENOUS; SUBCUTANEOUS EVERY 8 HOURS
Status: DISCONTINUED | OUTPATIENT
Start: 2017-12-05 | End: 2017-12-12 | Stop reason: HOSPADM

## 2017-12-04 RX ORDER — IPRATROPIUM BROMIDE AND ALBUTEROL SULFATE 2.5; .5 MG/3ML; MG/3ML
3 SOLUTION RESPIRATORY (INHALATION) EVERY 4 HOURS PRN
Status: DISCONTINUED | OUTPATIENT
Start: 2017-12-05 | End: 2017-12-12 | Stop reason: HOSPADM

## 2017-12-04 RX ORDER — ONDANSETRON 8 MG/1
8 TABLET, ORALLY DISINTEGRATING ORAL EVERY 8 HOURS PRN
Status: DISCONTINUED | OUTPATIENT
Start: 2017-12-05 | End: 2017-12-12 | Stop reason: HOSPADM

## 2017-12-04 RX ADMIN — CEFTRIAXONE SODIUM 2 G: 2 INJECTION, POWDER, FOR SOLUTION INTRAMUSCULAR; INTRAVENOUS at 10:12

## 2017-12-04 NOTE — PROVIDER PROGRESS NOTES - EMERGENCY DEPT.
Encounter Date: 12/4/2017    ED Physician Progress Notes       SCRIBE NOTE: I, Pat German, am scribing for, and in the presence of, .  Physician Statement: I, personally performed the services described in this documentation as scribed by Pat German in my presence, and it is both accurate and complete.      EKG - STEMI Decision  Initial Reading: No STEMI present.    I, Pat German, am scribing for, and in the presence of, Dr. Valle. I performed the above scribed service and the documentation accurately describes the services I performed. I attest to the accuracy of the note.

## 2017-12-05 PROBLEM — F03.918 DEMENTIA WITH BEHAVIORAL DISTURBANCE: Status: ACTIVE | Noted: 2017-12-05

## 2017-12-05 PROBLEM — R53.81 DEBILITY: Status: ACTIVE | Noted: 2017-12-05

## 2017-12-05 PROBLEM — H54.3 BLIND IN BOTH EYES: Status: ACTIVE | Noted: 2017-12-05

## 2017-12-05 LAB
ALBUMIN SERPL BCP-MCNC: 2.7 G/DL
ALP SERPL-CCNC: 79 U/L
ALT SERPL W/O P-5'-P-CCNC: 10 U/L
ANION GAP SERPL CALC-SCNC: 7 MMOL/L
AST SERPL-CCNC: 15 U/L
BASOPHILS # BLD AUTO: 0.02 K/UL
BASOPHILS NFR BLD: 0.3 %
BILIRUB SERPL-MCNC: 0.3 MG/DL
BUN SERPL-MCNC: 34 MG/DL
CALCIUM SERPL-MCNC: 8.5 MG/DL
CHLORIDE SERPL-SCNC: 108 MMOL/L
CO2 SERPL-SCNC: 22 MMOL/L
CREAT SERPL-MCNC: 5.3 MG/DL
DIFFERENTIAL METHOD: ABNORMAL
EOSINOPHIL # BLD AUTO: 0.1 K/UL
EOSINOPHIL NFR BLD: 0.8 %
ERYTHROCYTE [DISTWIDTH] IN BLOOD BY AUTOMATED COUNT: 19.1 %
EST. GFR  (AFRICAN AMERICAN): 10.3 ML/MIN/1.73 M^2
EST. GFR  (NON AFRICAN AMERICAN): 8.9 ML/MIN/1.73 M^2
ESTIMATED AVG GLUCOSE: 117 MG/DL
GLUCOSE SERPL-MCNC: 132 MG/DL
HBA1C MFR BLD HPLC: 5.7 %
HCT VFR BLD AUTO: 26 %
HGB BLD-MCNC: 8.5 G/DL
IMM GRANULOCYTES # BLD AUTO: 0.03 K/UL
IMM GRANULOCYTES NFR BLD AUTO: 0.4 %
LYMPHOCYTES # BLD AUTO: 1.5 K/UL
LYMPHOCYTES NFR BLD: 20.9 %
MAGNESIUM SERPL-MCNC: 2 MG/DL
MCH RBC QN AUTO: 23.7 PG
MCHC RBC AUTO-ENTMCNC: 32.7 G/DL
MCV RBC AUTO: 73 FL
MONOCYTES # BLD AUTO: 0.6 K/UL
MONOCYTES NFR BLD: 8.4 %
NEUTROPHILS # BLD AUTO: 4.9 K/UL
NEUTROPHILS NFR BLD: 69.2 %
NRBC BLD-RTO: 0 /100 WBC
PHOSPHATE SERPL-MCNC: 2.5 MG/DL
PLATELET # BLD AUTO: 152 K/UL
PMV BLD AUTO: ABNORMAL FL
POCT GLUCOSE: 109 MG/DL (ref 70–110)
POCT GLUCOSE: 131 MG/DL (ref 70–110)
POCT GLUCOSE: 152 MG/DL (ref 70–110)
POCT GLUCOSE: 168 MG/DL (ref 70–110)
POCT GLUCOSE: 170 MG/DL (ref 70–110)
POCT GLUCOSE: 208 MG/DL (ref 70–110)
POTASSIUM SERPL-SCNC: 4.6 MMOL/L
PROT SERPL-MCNC: 7.7 G/DL
RBC # BLD AUTO: 3.58 M/UL
SODIUM SERPL-SCNC: 137 MMOL/L
TSH SERPL DL<=0.005 MIU/L-ACNC: 2.36 UIU/ML
WBC # BLD AUTO: 7.14 K/UL

## 2017-12-05 PROCEDURE — 99226 PR SUBSEQUENT OBSERVATION CARE,LEVEL III: CPT | Mod: ,,, | Performed by: PHYSICIAN ASSISTANT

## 2017-12-05 PROCEDURE — 25000003 PHARM REV CODE 250: Performed by: STUDENT IN AN ORGANIZED HEALTH CARE EDUCATION/TRAINING PROGRAM

## 2017-12-05 PROCEDURE — 83735 ASSAY OF MAGNESIUM: CPT

## 2017-12-05 PROCEDURE — 63600175 PHARM REV CODE 636 W HCPCS: Performed by: HOSPITALIST

## 2017-12-05 PROCEDURE — G8979 MOBILITY GOAL STATUS: HCPCS | Mod: CK

## 2017-12-05 PROCEDURE — 84443 ASSAY THYROID STIM HORMONE: CPT

## 2017-12-05 PROCEDURE — 84100 ASSAY OF PHOSPHORUS: CPT

## 2017-12-05 PROCEDURE — G0378 HOSPITAL OBSERVATION PER HR: HCPCS

## 2017-12-05 PROCEDURE — 25000003 PHARM REV CODE 250: Performed by: PHYSICIAN ASSISTANT

## 2017-12-05 PROCEDURE — 99214 OFFICE O/P EST MOD 30 MIN: CPT | Mod: ,,, | Performed by: INTERNAL MEDICINE

## 2017-12-05 PROCEDURE — G8978 MOBILITY CURRENT STATUS: HCPCS | Mod: CL

## 2017-12-05 PROCEDURE — 63600175 PHARM REV CODE 636 W HCPCS: Performed by: NURSE PRACTITIONER

## 2017-12-05 PROCEDURE — 85025 COMPLETE CBC W/AUTO DIFF WBC: CPT

## 2017-12-05 PROCEDURE — 86580 TB INTRADERMAL TEST: CPT | Performed by: HOSPITALIST

## 2017-12-05 PROCEDURE — A4216 STERILE WATER/SALINE, 10 ML: HCPCS | Performed by: NURSE PRACTITIONER

## 2017-12-05 PROCEDURE — 36415 COLL VENOUS BLD VENIPUNCTURE: CPT

## 2017-12-05 PROCEDURE — 97162 PT EVAL MOD COMPLEX 30 MIN: CPT

## 2017-12-05 PROCEDURE — 25000003 PHARM REV CODE 250: Performed by: NURSE PRACTITIONER

## 2017-12-05 PROCEDURE — 80053 COMPREHEN METABOLIC PANEL: CPT

## 2017-12-05 PROCEDURE — 97530 THERAPEUTIC ACTIVITIES: CPT

## 2017-12-05 PROCEDURE — 83036 HEMOGLOBIN GLYCOSYLATED A1C: CPT

## 2017-12-05 RX ORDER — ATORVASTATIN CALCIUM 20 MG/1
40 TABLET, FILM COATED ORAL NIGHTLY
Status: DISCONTINUED | OUTPATIENT
Start: 2017-12-05 | End: 2017-12-12 | Stop reason: HOSPADM

## 2017-12-05 RX ORDER — FLUCONAZOLE 100 MG/1
100 TABLET ORAL DAILY
Status: DISCONTINUED | OUTPATIENT
Start: 2017-12-05 | End: 2017-12-06

## 2017-12-05 RX ORDER — SODIUM CHLORIDE 9 MG/ML
INJECTION, SOLUTION INTRAVENOUS
Status: DISCONTINUED | OUTPATIENT
Start: 2017-12-05 | End: 2017-12-12 | Stop reason: HOSPADM

## 2017-12-05 RX ORDER — SODIUM CHLORIDE 9 MG/ML
INJECTION, SOLUTION INTRAVENOUS ONCE
Status: DISCONTINUED | OUTPATIENT
Start: 2017-12-05 | End: 2017-12-12 | Stop reason: HOSPADM

## 2017-12-05 RX ADMIN — CARVEDILOL 25 MG: 25 TABLET, FILM COATED ORAL at 08:12

## 2017-12-05 RX ADMIN — ASPIRIN 81 MG CHEWABLE TABLET 81 MG: 81 TABLET CHEWABLE at 08:12

## 2017-12-05 RX ADMIN — HYDRALAZINE HYDROCHLORIDE 25 MG: 25 TABLET, FILM COATED ORAL at 09:12

## 2017-12-05 RX ADMIN — SODIUM CHLORIDE, PRESERVATIVE FREE 5 ML: 5 INJECTION INTRAVENOUS at 10:12

## 2017-12-05 RX ADMIN — CALCIUM ACETATE 667 MG: 667 CAPSULE ORAL at 08:12

## 2017-12-05 RX ADMIN — HEPARIN SODIUM 5000 UNITS: 5000 INJECTION, SOLUTION INTRAVENOUS; SUBCUTANEOUS at 06:12

## 2017-12-05 RX ADMIN — SODIUM CHLORIDE, PRESERVATIVE FREE 5 ML: 5 INJECTION INTRAVENOUS at 02:12

## 2017-12-05 RX ADMIN — CARVEDILOL 25 MG: 25 TABLET, FILM COATED ORAL at 09:12

## 2017-12-05 RX ADMIN — HYDRALAZINE HYDROCHLORIDE 25 MG: 25 TABLET, FILM COATED ORAL at 02:12

## 2017-12-05 RX ADMIN — LEVETIRACETAM 500 MG: 500 TABLET ORAL at 12:12

## 2017-12-05 RX ADMIN — ATORVASTATIN CALCIUM 40 MG: 20 TABLET, FILM COATED ORAL at 02:12

## 2017-12-05 RX ADMIN — FLUCONAZOLE 100 MG: 100 TABLET ORAL at 02:12

## 2017-12-05 RX ADMIN — TRAVOPROST 1 DROP: 0.04 SOLUTION/ DROPS OPHTHALMIC at 09:12

## 2017-12-05 RX ADMIN — HEPARIN SODIUM 5000 UNITS: 5000 INJECTION, SOLUTION INTRAVENOUS; SUBCUTANEOUS at 09:12

## 2017-12-05 RX ADMIN — LEVETIRACETAM 500 MG: 500 TABLET ORAL at 08:12

## 2017-12-05 RX ADMIN — FINASTERIDE 5 MG: 5 TABLET, FILM COATED ORAL at 08:12

## 2017-12-05 RX ADMIN — BRINZOLAMIDE 1 DROP: 10 SUSPENSION/ DROPS OPHTHALMIC at 09:12

## 2017-12-05 RX ADMIN — HEPARIN SODIUM 5000 UNITS: 5000 INJECTION, SOLUTION INTRAVENOUS; SUBCUTANEOUS at 02:12

## 2017-12-05 RX ADMIN — CEFTRIAXONE 1 G: 1 INJECTION, SOLUTION INTRAVENOUS at 09:12

## 2017-12-05 RX ADMIN — AMLODIPINE BESYLATE 10 MG: 10 TABLET ORAL at 08:12

## 2017-12-05 RX ADMIN — DOXAZOSIN MESYLATE 4 MG: 2 TABLET ORAL at 08:12

## 2017-12-05 RX ADMIN — INSULIN ASPART 1 UNITS: 100 INJECTION, SOLUTION INTRAVENOUS; SUBCUTANEOUS at 09:12

## 2017-12-05 RX ADMIN — ATORVASTATIN CALCIUM 40 MG: 20 TABLET, FILM COATED ORAL at 09:12

## 2017-12-05 RX ADMIN — LEVETIRACETAM 500 MG: 500 TABLET ORAL at 09:12

## 2017-12-05 RX ADMIN — CARVEDILOL 25 MG: 25 TABLET, FILM COATED ORAL at 12:12

## 2017-12-05 RX ADMIN — Medication 5 UNITS: at 03:12

## 2017-12-05 NOTE — PLAN OF CARE
CM informed the patient's daughter, Gabriela Campbell ([phone]), of PT/OT recommendations for SNF placement following discharge. Gabriela stated that she would like the patient to be admitted to [facility] SNF or [facility] SNF. Voicemail message left for , Graciela ([phone]), at [facility] informing of above. CM informed MAIRA Correa ([id number]) of above. PPD ordered. Will continue to follow.

## 2017-12-05 NOTE — ASSESSMENT & PLAN NOTE
Hydrocele bilateral   -  s/p L hydrocelectomy and R orchiectomy on 11/22/17   - urology consulted while in ED - low probability of wound infection on examination  - continue to monitor

## 2017-12-05 NOTE — ED NOTES
Pt identifiers checked and correct.    LOC: The patient is awake, alert and aware of environment with an appropriate affect, the patient is oriented x 3   APPEARANCE: Patient resting comfortably and in no acute distress, patient is clean and well groomed, patient's clothing is properly fastened.   SKIN: The skin is warm and dry, color consistent with ethnicity, patient has normal skin turgor and moist mucus membranes, skin intact, no breakdown or bruising noted.   MUSCULOSKELETAL: Patient moving all extremities spontaneously, no obvious swelling or deformities noted.   RESPIRATORY: Airway is open and patent, respirations are spontaneous, patient has a normal effort and rate, no accessory muscle use noted.   CARDIAC: Patient has a normal rate and regular rhythm, no periphreal edema noted, capillary refill < 3 seconds.   ABDOMEN: Soft and non tender to palpation, no distention noted, active bowel sounds present in all four quadrants.   EYES: blind bilat.    VASCULAR:  Shunt LUE +thrill.

## 2017-12-05 NOTE — HPI
87yo M with history of DM, HTN, ESRD on HD (m, w, f) who was brought to the ED today by his daughter because of confusion during his HD session. He was taken to the OR by urology for L hydrocelectomy and R orchiectomy on 11/22/17. He was afebrile with a WBC of 6 on arrival to the ED. There was concern for wound infection and urology was consulted. UA shows >100RBCs, >100WBCs and many bacteria concerning for UTI.

## 2017-12-05 NOTE — NURSING
"3/4 of patient's medicines have come up but the patient is leery of taking them. Pt's daughter called at home to try and get daughter to talk to father to get him to take him medicines. This was accomplished but pt had complaints that "the nurses were waking him up every hour and he wasn't getting any sleep."  "

## 2017-12-05 NOTE — SUBJECTIVE & OBJECTIVE
Past Medical History:   Diagnosis Date    Anemia     Blindness of right eye     CHF (congestive heart failure)     Chronic kidney disease     Dementia 01/2017    Diabetes mellitus type II     General anesthetics causing adverse effect in therapeutic use     april / may 2014     Glaucoma (increased eye pressure)     Hypertension     Seizures        Past Surgical History:   Procedure Laterality Date    AV FISTULA PLACEMENT Left 4/2014    AV fistula to Left upper arm    EYE SURGERY         Review of patient's allergies indicates:   Allergen Reactions    Lisinopril Swelling     Current Facility-Administered Medications   Medication Frequency    acetaminophen tablet 650 mg Q6H PRN    albuterol-ipratropium 2.5mg-0.5mg/3mL nebulizer solution 3 mL Q4H PRN    amLODIPine tablet 10 mg Daily    aspirin chewable tablet 81 mg Daily    atorvastatin tablet 40 mg QHS    brinzolamide 1 % ophthalmic suspension 1 drop TID    calcium acetate capsule 667 mg TID WM    carvedilol tablet 25 mg BID    cefTRIAXone (ROCEPHIN) 1 g in dextrose 5 % 50 mL IVPB Q24H    dextrose 50% injection 12.5 g PRN    dextrose 50% injection 25 g PRN    doxazosin tablet 4 mg Daily    finasteride tablet 5 mg Daily    fluconazole tablet 100 mg Daily    glucagon (human recombinant) injection 1 mg PRN    glucose chewable tablet 16 g PRN    glucose chewable tablet 24 g PRN    heparin (porcine) injection 5,000 Units Q8H    hydrALAZINE tablet 25 mg Q8H PRN    insulin aspart pen 0-5 Units QID (AC + HS) PRN    levETIRAcetam tablet 500 mg BID    ondansetron disintegrating tablet 8 mg Q8H PRN    ondansetron injection 4 mg Q8H PRN    senna-docusate 8.6-50 mg per tablet 1 tablet BID PRN    sodium chloride 0.9% flush 5 mL PRN    travoprost 0.004 % ophthalmic solution 1 drop QHS     Family History     Problem Relation (Age of Onset)    Cancer Daughter    Heart disease Sister, Brother        Social History Main Topics    Smoking status:  Never Smoker    Smokeless tobacco: Never Used    Alcohol use No    Drug use: No    Sexual activity: No     Review of Systems   Constitutional: Negative for fever and unexpected weight change.   Respiratory: Negative for cough, chest tightness, shortness of breath and wheezing.    Cardiovascular: Negative for chest pain and leg swelling.   Gastrointestinal: Negative for abdominal distention, abdominal pain, diarrhea and nausea.   Endocrine: Negative for polydipsia.   Genitourinary: Negative for difficulty urinating, dysuria, flank pain, frequency and hematuria.   Musculoskeletal: Negative for arthralgias and myalgias.   Skin: Negative for rash.   Allergic/Immunologic: Negative for immunocompromised state.   Neurological: Negative for dizziness and light-headedness.   Hematological: Negative for adenopathy.   Psychiatric/Behavioral: Negative for confusion.     Objective:     Vital Signs (Most Recent):  Temp: 96 °F (35.6 °C) (12/05/17 0749)  Pulse: 65 (12/05/17 0749)  Resp: 17 (12/05/17 0749)  BP: (!) 187/86 (12/05/17 0749)  SpO2: 100 % (12/05/17 0749)  O2 Device (Oxygen Therapy): room air (12/05/17 0049) Vital Signs (24h Range):  Temp:  [95.9 °F (35.5 °C)-98.8 °F (37.1 °C)] 96 °F (35.6 °C)  Pulse:  [65-76] 65  Resp:  [17-20] 17  SpO2:  [98 %-100 %] 100 %  BP: (151-197)/(81-89) 187/86     Weight: 81.2 kg (179 lb 0.2 oz) (12/05/17 0049)  Body mass index is 23.62 kg/m².  Body surface area is 2.05 meters squared.    I/O last 3 completed shifts:  In: 50 [IV Piggyback:50]  Out: -     Physical Exam   Constitutional: He is oriented to person, place, and time. He appears well-developed.   HENT:   Head: Normocephalic and atraumatic.   Neck: No JVD present.   Cardiovascular: Normal rate and regular rhythm.  Exam reveals no friction rub.    Pulmonary/Chest: Effort normal and breath sounds normal.   Abdominal: Soft. He exhibits no distension.   Musculoskeletal: He exhibits edema.   Neurological: He is alert and oriented to  person, place, and time.   Skin: Skin is warm.   Psychiatric: He has a normal mood and affect.

## 2017-12-05 NOTE — ED PROVIDER NOTES
Encounter Date: 12/4/2017       History     Chief Complaint   Patient presents with    Wound Check     had  fluid drained from scrotum 11/22.  now pt  is confused x several days,  tried to get up alone ( Pt is blind and requires assistance to ambulate). Pt denies pain , pt has dementia and is blind bilaterally. Completed dialysis today.      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 Per the patient's daughter, he was reportedly confused and tried to leave during his dialysis sessions multiple times, which has never happened before. She states he does have some baseline dementia, but has never had this kind of confusion or behavior before. He denies recent illnesses, chest pain, shortness of breath, cough, abdominal pain, changes in bowel movements, dysuria, arthralgias, or myalgias. He has not had any complications since his surgery on 11/22.          Review of patient's allergies indicates:   Allergen Reactions    Lisinopril Swelling     Past Medical History:   Diagnosis Date    Anemia     Blindness of right eye     CHF (congestive heart failure)     Chronic kidney disease     Dementia 01/2017    Diabetes mellitus type II     General anesthetics causing adverse effect in therapeutic use     april / may 2014     Glaucoma (increased eye pressure)     Hypertension     Hypothermia 12/6/2017    Seizures      Past Surgical History:   Procedure Laterality Date    AV FISTULA PLACEMENT Left 4/2014    AV fistula to Left upper arm    EYE SURGERY       Family History   Problem Relation Age of Onset    Heart disease Sister      mi    Heart disease Brother      mi    Cancer Daughter      BREAST X 2     Social History   Substance Use  Topics    Smoking status: Never Smoker    Smokeless tobacco: Never Used    Alcohol use No     Review of Systems   Constitutional: Negative for chills, fatigue and fever.   HENT: Negative for sore throat and trouble swallowing.    Eyes: Positive for visual disturbance (blindness 2/2 glaucoma). Negative for pain.   Respiratory: Negative for cough, shortness of breath and wheezing.    Cardiovascular: Negative for chest pain and palpitations.   Gastrointestinal: Negative for abdominal pain, constipation, diarrhea, nausea and vomiting.   Endocrine: Negative for cold intolerance and heat intolerance.   Genitourinary: Negative for dysuria and hematuria.   Musculoskeletal: Negative for joint swelling and myalgias.   Skin: Positive for wound (scrotum with surgical scar). Negative for rash.   Neurological: Negative for weakness and headaches.   Psychiatric/Behavioral: Positive for confusion. Negative for hallucinations.       Physical Exam     Initial Vitals [12/04/17 1627]   BP Pulse Resp Temp SpO2   (!) 176/82 75 18 98.8 °F (37.1 °C) 99 %      MAP       113.33         Physical Exam    Constitutional: He appears well-developed and well-nourished. No distress.   HENT:   Head: Normocephalic and atraumatic.   Eyes:   Bilateral blindness 2/2 glaucoma   Neck: Normal range of motion. Neck supple. No JVD present.   Cardiovascular: Normal rate and regular rhythm.   Pulmonary/Chest: Breath sounds normal. No respiratory distress. He has no wheezes. He has no rales.   Abdominal: Soft. Bowel sounds are normal. There is no tenderness.   Musculoskeletal: Normal range of motion. He exhibits no edema.   Neurological: He is alert. He has normal strength. No cranial nerve deficit.   Oriented to person, place, time (year only)   Skin: Skin is warm and dry.   Scrotum surgical site clean, dry, intact, no erythema, not tender to palpation         ED Course   Procedures  Labs Reviewed   CBC W/ AUTO DIFFERENTIAL - Abnormal; Notable for the  following:        Result Value    RBC 3.71 (*)     Hemoglobin 9.0 (*)     Hematocrit 27.1 (*)     MCV 73 (*)     MCH 24.3 (*)     RDW 19.5 (*)     Immature Granulocytes 0.6 (*)     Gran% 76.6 (*)     Lymph% 15.3 (*)     All other components within normal limits   COMPREHENSIVE METABOLIC PANEL - Abnormal; Notable for the following:     Sodium 135 (*)     CO2 20 (*)     Glucose 150 (*)     BUN, Bld 26 (*)     Creatinine 4.4 (*)     Total Protein 8.5 (*)     Albumin 3.1 (*)     eGFR if  12.9 (*)     eGFR if non  11.2 (*)     All other components within normal limits   URINALYSIS, REFLEX TO URINE CULTURE - Abnormal; Notable for the following:     Appearance, UA Cloudy (*)     Protein, UA 2+ (*)     Occult Blood UA 3+ (*)     Leukocytes, UA 3+ (*)     All other components within normal limits    Narrative:     Preferred Collection Type->Urine, Clean Catch   URINALYSIS MICROSCOPIC - Abnormal; Notable for the following:     RBC, UA >100 (*)     WBC, UA >100 (*)     WBC Clumps, UA Many (*)     Yeast, UA Many (*)     All other components within normal limits    Narrative:     Preferred Collection Type->Urine, Clean Catch   POCT GLUCOSE - Abnormal; Notable for the following:     POCT Glucose 146 (*)     All other components within normal limits   POCT GLUCOSE MONITORING CONTINUOUS             Medical Decision Making:   History:   I obtained history from: someone other than patient.  Old Medical Records: I decided to obtain old medical records.  Old Records Summarized: records from clinic visits and records from previous admission(s).  Initial Assessment:   88 year old male with HFrEF, ESRD on Hd, HTN, seizure d/o, blindness, recent urological surgery presents after an episode of AMS at dialysis today.  Differential Diagnosis:   UTI, pneumonia, surgical site infection, electrolyte abnormality  Clinical Tests:   Lab Tests: Ordered  Radiological Study: Ordered  ED Management:  UA shows 3+  leukocytes. Admit to hospital medicine.       APC / Resident Notes:   12/04/2017 7:11 PM   CBC, CMP, U/A, CXR, POCT glucose ordered.    12/04/2017 7:26 PM   CBC wnl    12/04/2017 9:51 PM   UA showing 3+ leukocytes. Will admit to hospital medicine.         Attending Attestation:   Physician Attestation Statement for Resident:  As the supervising MD   Physician Attestation Statement: I have personally seen and examined this patient.   I agree with the above history. -:   As the supervising MD I agree with the above PE.    As the supervising MD I agree with the above treatment, course, plan, and disposition.   -: UTI present on labs, likely the source of AMS.  Will admit for IV abx.                    ED Course      Clinical Impression:   UTI                         Jayson Valle MD  12/06/17 2047

## 2017-12-05 NOTE — HPI
89 y/o gentleman who presents to the ED with his daughter d/t AMS and confusion.  He has a PMH of dementia, DMII, CHF, glaucoma, seizures, HTN, ESRD on HD (MWF) He recently underwent L hydrocelectomy and R orchiectomy on 11/22/17 and daughter states that he has been slightly off since.  She reports that his less active and his concentration is decreased.  Reportedly during dialysis today he attempted to leave the clinic three times while undergoing his treatment.  His daughter states that this is very unusual for him.  He was afebrile with no leukocytosis with initial workup. While in the ED urology was consulted d/t recent surgical procedure and concern for possible wound infection. He was evaluated by urology in the ED revealing no evidence of wound infection.  Urinalysis was performed and displays>100RBCs, >100WBCs, 3+ leuks, many yeast, and occasional bacteria.  He denies CP, SOB, N/V/D, fever, chills, or discomfort.

## 2017-12-05 NOTE — SUBJECTIVE & OBJECTIVE
Past Medical History:   Diagnosis Date    Anemia     Blindness of right eye     CHF (congestive heart failure)     Chronic kidney disease     Dementia 01/2017    Diabetes mellitus type II     General anesthetics causing adverse effect in therapeutic use     april / may 2014     Glaucoma (increased eye pressure)     Hypertension     Seizures        Past Surgical History:   Procedure Laterality Date    AV FISTULA PLACEMENT Left 4/2014    AV fistula to Left upper arm    EYE SURGERY         Review of patient's allergies indicates:   Allergen Reactions    Lisinopril Swelling       Family History     Problem Relation (Age of Onset)    Cancer Daughter    Heart disease Sister, Brother          Social History Main Topics    Smoking status: Never Smoker    Smokeless tobacco: Never Used    Alcohol use No    Drug use: No    Sexual activity: No       Review of Systems   Constitutional: Negative for activity change, appetite change, chills, fever and unexpected weight change.   HENT: Negative.    Eyes: Negative.    Respiratory: Negative for chest tightness and shortness of breath.    Cardiovascular: Negative for chest pain.   Gastrointestinal: Negative for abdominal distention, abdominal pain, nausea and vomiting.   Musculoskeletal: Negative.    Skin: Negative.    Neurological: Negative.    Psychiatric/Behavioral: Negative.        Objective:     Temp:  [98.8 °F (37.1 °C)] 98.8 °F (37.1 °C)  Pulse:  [71-75] 71  Resp:  [18] 18  SpO2:  [99 %] 99 %  BP: (172-176)/(81-84) 172/84     Body mass index is 23.09 kg/m².            Drains     Drain                 Hemodialysis AV Fistula -- days         Hemodialysis AV Fistula Left upper arm -- days         Hemodialysis AV Fistula 08/08/14 0800 Left forearm 1214 days                Physical Exam   Constitutional: No distress.   HENT:   Head: Normocephalic and atraumatic.   Eyes:   blind   Cardiovascular: Normal rate and regular rhythm.    Pulmonary/Chest: Effort normal. No  respiratory distress.   Abdominal: Soft. He exhibits no distension. There is no tenderness.   Genitourinary:   Genitourinary Comments: Uncircumcised phallus with blood tinged urine at meatus.   Scrotal incision clean, dry, and intact. No evidence of necrosis, wound dehiscence or drainage. Scrotum non erythematous, non edematous.    Neurological: He is alert.   Skin: Skin is warm and dry.         Significant Labs:    BMP:    Recent Labs  Lab 12/04/17  1852   *   K 5.1      CO2 20*   BUN 26*   CREATININE 4.4*   CALCIUM 8.7       CBC:    Recent Labs  Lab 12/04/17 1852   WBC 6.81   HGB 9.0*   HCT 27.1*          Urine Studies:   Recent Labs  Lab 12/04/17 2112   COLORU Yellow   APPEARANCEUA Cloudy*   PHUR 5.0   SPECGRAV 1.020   PROTEINUA 2+*   GLUCUA Negative   KETONESU Negative   BILIRUBINUA Negative   OCCULTUA 3+*   NITRITE Negative   UROBILINOGEN Negative   LEUKOCYTESUR 3+*   RBCUA >100*   WBCUA >100*   BACTERIA Occasional   SQUAMEPITHEL 13   HYALINECASTS 0       Significant Imaging:  All pertinent imaging results/findings from the past 24 hours have been reviewed.

## 2017-12-05 NOTE — H&P
[facility] Hospital Medicine  History & Physical    Patient Name: Ronald Campbell  MRN: [id number]  Admission Date: 12/4/2017  Attending Physician: Aura Giron MD   Primary Care Provider: Js Sanches MD (Inactive)    [facility] Medicine Team: [facility]OSP MED F Murphy Cloud NP     Patient information was obtained from patient, relative(s) and ER records.     Subjective:     Principal Problem:Acute cystitis with hematuria    Chief Complaint:   Chief Complaint   Patient presents with    Wound Check     had  fluid drained from scrotum 11/22.  now pt  is confused x several days,  tried to get up alone ( Pt is blind and requires assistance to ambulate). Pt denies pain , pt has dementia and is blind bilaterally. Completed dialysis today.         HPI: 89 y/o gentleman who presents to the ED with his daughter d/t AMS and confusion.  He has a PMH of dementia, DMII, CHF, glaucoma, seizures, HTN, ESRD on HD (MWF) He recently underwent L hydrocelectomy and R orchiectomy on 11/22/17 and daughter states that he has been slightly off since.  She reports that his less active and his concentration is decreased.  Reportedly during dialysis today he attempted to leave the clinic three times while undergoing his treatment.  His daughter states that this is very unusual for him.  He was afebrile with no leukocytosis with initial workup. While in the ED urology was consulted d/t recent surgical procedure and concern for possible wound infection. He was evaluated by urology in the ED revealing no evidence of wound infection.  Urinalysis was performed and displays>100RBCs, >100WBCs, 3+ leuks, many yeast, and occasional bacteria.  He denies CP, SOB, N/V/D, fever, chills, or discomfort.     Past Medical History:   Diagnosis Date    Anemia     Blindness of right eye     CHF (congestive heart failure)     Chronic kidney disease     Dementia 01/2017    Diabetes mellitus type II     General anesthetics causing adverse  effect in therapeutic use     april / may 2014     Glaucoma (increased eye pressure)     Hypertension     Seizures        Past Surgical History:   Procedure Laterality Date    AV FISTULA PLACEMENT Left 4/2014    AV fistula to Left upper arm    EYE SURGERY         Review of patient's allergies indicates:   Allergen Reactions    Lisinopril Swelling       No current facility-administered medications on file prior to encounter.      Current Outpatient Prescriptions on File Prior to Encounter   Medication Sig    amLODIPine (NORVASC) 5 MG tablet Take 10 mg by mouth once daily.     aspirin 81 MG Chew Take 1 tablet (81 mg total) by mouth once daily. (Patient taking differently: Take 81 mg by mouth every morning. )    atorvastatin (LIPITOR) 40 MG tablet Take 1 tablet (40 mg total) by mouth once daily. (Patient taking differently: Take 80 mg by mouth every evening. )    ergocalciferol (ERGOCALCIFEROL) 50,000 unit Cap Take 1 capsule (50,000 Units total) by mouth every 7 days.    levetiracetam (KEPPRA) 500 MG Tab Take 1 tablet (500 mg total) by mouth 2 (two) times daily.    polyethylene glycol (GLYCOLAX) 17 gram PwPk Take 17 g by mouth once daily.    TRAVOPROST (TRAVATAN Z OPHT) Place 1 drop into the left eye every evening. 1 Drops Left eye Every evening    B complex-vitamin C-folic acid (NEPHRO-JESSICA) 0.8 mg Tab Take 1 tablet by mouth every morning.     blood sugar diagnostic (BLOOD GLUCOSE TEST) Strp 1 strip by Misc.(Non-Drug; Combo Route) route 2 (two) times daily.    brinzolamide (AZOPT) 1 % ophthalmic suspension Place 1 drop into the left eye 3 (three) times daily.     calcium acetate (PHOSLO) 667 mg capsule Take 1 capsule (667 mg total) by mouth 3 (three) times daily with meals.    carvedilol (COREG) 25 MG tablet Take 1 tablet (25 mg total) by mouth 2 (two) times daily. Hold on Monday, Wednesday and Friday before dialysis. Hold for SBP < 110 or HR < 55 (Patient taking differently: Take 25 mg by mouth 2  (two) times daily. Hold for SBP < 110 or HR < 55)    doxazosin (CARDURA) 4 MG tablet Take 1 tablet (4 mg total) by mouth once daily. (Patient taking differently: Take 4 mg by mouth every morning. )    finasteride (PROSCAR) 5 mg tablet Take 1 tablet (5 mg total) by mouth once daily. (Patient taking differently: Take 5 mg by mouth every morning. )    HEPARIN SODIUM,PORCINE (HEPARIN, PORCINE,) 1,000 unit/mL injection 1 mL (1,000 Units total) by Intracatheter route as needed (Heparin 1000 units/ml instilled to fill volume of each lumen of dialysis catheter after each dialysis.).    insulin aspart (NOVOLOG FLEXPEN) 100 unit/mL InPn  Insulin Pen Subcutaneous As directed.  -200 take 1 unitBS 201-250 take 2 unitsBS 251-300 take 3 unitsBS 301-350 take 4 unitsBS > 351 take 5 units and call MD    ketoconazole (NIZORAL) 2 % cream Apply topically once daily. feet     Family History     Problem Relation (Age of Onset)    Cancer Daughter    Heart disease Sister, Brother        Social History Main Topics    Smoking status: Never Smoker    Smokeless tobacco: Never Used    Alcohol use No    Drug use: No    Sexual activity: No     Review of Systems   Constitutional: Negative for chills and fever.   HENT: Negative for congestion.    Eyes: Positive for visual disturbance.        Blind   Respiratory: Negative for cough, chest tightness and shortness of breath.    Cardiovascular: Negative for chest pain, palpitations and leg swelling.   Gastrointestinal: Negative for abdominal distention, abdominal pain, diarrhea, nausea and vomiting.   Genitourinary: Positive for decreased urine volume.        MWF HD patient - s/p R hydrocelectomy and L orchiectomy on 11/22/17   Musculoskeletal: Negative for arthralgias, back pain and myalgias.   Skin: Positive for wound. Negative for rash.        s/p R hydrocelectomy and L orchiectomy on 11/22/17   Neurological: Positive for seizures. Negative for dizziness, syncope and light-headedness.    Psychiatric/Behavioral: Positive for confusion.     Objective:     Vital Signs (Most Recent):  Temp: 98.8 °F (37.1 °C) (12/04/17 1627)  Pulse: 72 (12/04/17 2300)  Resp: 18 (12/04/17 2300)  BP: (!) 174/85 (12/04/17 2300)  SpO2: 98 % (12/04/17 2300) Vital Signs (24h Range):  Temp:  [98.8 °F (37.1 °C)] 98.8 °F (37.1 °C)  Pulse:  [71-75] 72  Resp:  [18] 18  SpO2:  [98 %-99 %] 98 %  BP: (172-176)/(81-85) 174/85     Weight: 79.4 kg (175 lb)  Body mass index is 23.09 kg/m².    Physical Exam   Constitutional: He is oriented to person, place, and time. He appears well-developed and well-nourished. No distress.   HENT:   Head: Normocephalic and atraumatic.   Eyes:   Blind    Neck: Normal range of motion. Neck supple.   Cardiovascular: Normal rate, regular rhythm, normal heart sounds and intact distal pulses.  Exam reveals no gallop and no friction rub.    No murmur heard.  Pulmonary/Chest: Effort normal and breath sounds normal. No respiratory distress. He has no wheezes. He has no rales.   Abdominal: Soft. Bowel sounds are normal. He exhibits no distension. There is no tenderness.   Genitourinary:   Genitourinary Comments: s/p R hydrocelectomy and L orchiectomy on 11/22/17   Musculoskeletal: Normal range of motion. He exhibits edema.   LUE AV fistula with + thrill and bruit    Neurological: He is alert and oriented to person, place, and time.   Skin: Skin is warm and dry. He is not diaphoretic.   Psychiatric: He has a normal mood and affect. His behavior is normal.   Nursing note and vitals reviewed.        Significant Labs:   CBC:   Recent Labs  Lab 12/04/17 1852   WBC 6.81   HGB 9.0*   HCT 27.1*        CMP:   Recent Labs  Lab 12/04/17 1852   *   K 5.1      CO2 20*   *   BUN 26*   CREATININE 4.4*   CALCIUM 8.7   PROT 8.5*   ALBUMIN 3.1*   BILITOT 0.4   ALKPHOS 83   AST 26   ALT 11   ANIONGAP 9   EGFRNONAA 11.2*     Urine Studies:   Recent Labs  Lab 12/04/17 2112   COLORU Yellow   APPEARANCEUA  Cloudy*   PHUR 5.0   SPECGRAV 1.020   PROTEINUA 2+*   GLUCUA Negative   KETONESU Negative   BILIRUBINUA Negative   OCCULTUA 3+*   NITRITE Negative   UROBILINOGEN Negative   LEUKOCYTESUR 3+*   RBCUA >100*   WBCUA >100*   BACTERIA Occasional   SQUAMEPITHEL 13   HYALINECASTS 0       Significant Imaging: I have reviewed all pertinent imaging results/findings within the past 24 hours.    Assessment/Plan:     * Acute cystitis with hematuria    - UA with >100 RBC, >100 WBC, occasional bacteria, 3+ leuks, and many yeast  - afebrile with no leukocytosis - nontoxic appearing - presents with increased confusion   - started on ceftriaxone in ED - continue for now  - start diflucan now  - Urine culture in process - f/u on results and adjust therapy as indicated        ESRD (end stage renal disease) on dialysis    - LUE AV fistula  - MWF HD schedule  - nephrology consulted  - K 5.1 on arrival  - repeat CMP in AM        HTN (hypertension)    - SBP ranging between 172-197  - reports missing PM medications  - restart home medication regimen  - add PRN hydralazine as need for SBP > 180 or SBP >110        Scrotal mass    Hydrocele bilateral   -  s/p  L hydrocelectomy and R orchiectomy on 11/22/17   - urology consulted while in ED - low probability of wound infection on examination  - continue to monitor         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    - HgA1C pending  - ADA diet  - ISS AC/HS        Dementia with behavioral disturbance    - presents with worsening confusion    - utilize all safety precautions  - if family unable to stay may want to consider a sitter        Debility    - PT/OT consulted  - ambulate with assistance only        Seizure disorder    - continue home dose of levetiracetam as directed   - seizure precautions        Blind in both eyes    Glaucoma  - continue ocular drops as directed  - high risk of fall / injury - utilize all safety measures and fall precautions        Anemia in chronic kidney  disease    - Hgb 9.0 (baseline 8.7-11.4)  - trend daily        Dyslipidemia    - continue atorvastatin as directed           VTE Risk Mitigation         Ordered     heparin (porcine) injection 5,000 Units  Every 8 hours     Route:  Subcutaneous        12/04/17 2358     Medium Risk of VTE  Once      12/04/17 2358     Medium Risk of VTE  Once      12/04/17 2358     Place MORGAN hose  Until discontinued      12/04/17 2358     Place sequential compression device  Until discontinued      12/04/17 2358             Murphy Cloud NP  Department of Hospital Medicine   [facility]

## 2017-12-05 NOTE — SUBJECTIVE & OBJECTIVE
Past Medical History:   Diagnosis Date    Anemia     Blindness of right eye     CHF (congestive heart failure)     Chronic kidney disease     Dementia 01/2017    Diabetes mellitus type II     General anesthetics causing adverse effect in therapeutic use     april / may 2014     Glaucoma (increased eye pressure)     Hypertension     Seizures        Past Surgical History:   Procedure Laterality Date    AV FISTULA PLACEMENT Left 4/2014    AV fistula to Left upper arm    EYE SURGERY         Review of patient's allergies indicates:   Allergen Reactions    Lisinopril Swelling       No current facility-administered medications on file prior to encounter.      Current Outpatient Prescriptions on File Prior to Encounter   Medication Sig    amLODIPine (NORVASC) 5 MG tablet Take 10 mg by mouth once daily.     aspirin 81 MG Chew Take 1 tablet (81 mg total) by mouth once daily. (Patient taking differently: Take 81 mg by mouth every morning. )    atorvastatin (LIPITOR) 40 MG tablet Take 1 tablet (40 mg total) by mouth once daily. (Patient taking differently: Take 80 mg by mouth every evening. )    ergocalciferol (ERGOCALCIFEROL) 50,000 unit Cap Take 1 capsule (50,000 Units total) by mouth every 7 days.    levetiracetam (KEPPRA) 500 MG Tab Take 1 tablet (500 mg total) by mouth 2 (two) times daily.    polyethylene glycol (GLYCOLAX) 17 gram PwPk Take 17 g by mouth once daily.    TRAVOPROST (TRAVATAN Z OPHT) Place 1 drop into the left eye every evening. 1 Drops Left eye Every evening    B complex-vitamin C-folic acid (NEPHRO-JESSICA) 0.8 mg Tab Take 1 tablet by mouth every morning.     blood sugar diagnostic (BLOOD GLUCOSE TEST) Strp 1 strip by Misc.(Non-Drug; Combo Route) route 2 (two) times daily.    brinzolamide (AZOPT) 1 % ophthalmic suspension Place 1 drop into the left eye 3 (three) times daily.     calcium acetate (PHOSLO) 667 mg capsule Take 1 capsule (667 mg total) by mouth 3 (three) times daily with  meals.    carvedilol (COREG) 25 MG tablet Take 1 tablet (25 mg total) by mouth 2 (two) times daily. Hold on Monday, Wednesday and Friday before dialysis. Hold for SBP < 110 or HR < 55 (Patient taking differently: Take 25 mg by mouth 2 (two) times daily. Hold for SBP < 110 or HR < 55)    doxazosin (CARDURA) 4 MG tablet Take 1 tablet (4 mg total) by mouth once daily. (Patient taking differently: Take 4 mg by mouth every morning. )    finasteride (PROSCAR) 5 mg tablet Take 1 tablet (5 mg total) by mouth once daily. (Patient taking differently: Take 5 mg by mouth every morning. )    HEPARIN SODIUM,PORCINE (HEPARIN, PORCINE,) 1,000 unit/mL injection 1 mL (1,000 Units total) by Intracatheter route as needed (Heparin 1000 units/ml instilled to fill volume of each lumen of dialysis catheter after each dialysis.).    insulin aspart (NOVOLOG FLEXPEN) 100 unit/mL InPn  Insulin Pen Subcutaneous As directed.  -200 take 1 unitBS 201-250 take 2 unitsBS 251-300 take 3 unitsBS 301-350 take 4 unitsBS > 351 take 5 units and call MD    ketoconazole (NIZORAL) 2 % cream Apply topically once daily. feet     Family History     Problem Relation (Age of Onset)    Cancer Daughter    Heart disease Sister, Brother        Social History Main Topics    Smoking status: Never Smoker    Smokeless tobacco: Never Used    Alcohol use No    Drug use: No    Sexual activity: No     Review of Systems   Constitutional: Negative for chills and fever.   HENT: Negative for congestion.    Eyes: Positive for visual disturbance.        Blind   Respiratory: Negative for cough, chest tightness and shortness of breath.    Cardiovascular: Negative for chest pain, palpitations and leg swelling.   Gastrointestinal: Negative for abdominal distention, abdominal pain, diarrhea, nausea and vomiting.   Genitourinary: Positive for decreased urine volume.        MWF HD patient - s/p R hydrocelectomy and L orchiectomy on 11/22/17   Musculoskeletal: Negative  for arthralgias, back pain and myalgias.   Skin: Positive for wound. Negative for rash.        s/p R hydrocelectomy and L orchiectomy on 11/22/17   Neurological: Positive for seizures. Negative for dizziness, syncope and light-headedness.   Psychiatric/Behavioral: Positive for confusion.     Objective:     Vital Signs (Most Recent):  Temp: 98.8 °F (37.1 °C) (12/04/17 1627)  Pulse: 72 (12/04/17 2300)  Resp: 18 (12/04/17 2300)  BP: (!) 174/85 (12/04/17 2300)  SpO2: 98 % (12/04/17 2300) Vital Signs (24h Range):  Temp:  [98.8 °F (37.1 °C)] 98.8 °F (37.1 °C)  Pulse:  [71-75] 72  Resp:  [18] 18  SpO2:  [98 %-99 %] 98 %  BP: (172-176)/(81-85) 174/85     Weight: 79.4 kg (175 lb)  Body mass index is 23.09 kg/m².    Physical Exam   Constitutional: He is oriented to person, place, and time. He appears well-developed and well-nourished. No distress.   HENT:   Head: Normocephalic and atraumatic.   Eyes:   Blind    Neck: Normal range of motion. Neck supple.   Cardiovascular: Normal rate, regular rhythm, normal heart sounds and intact distal pulses.  Exam reveals no gallop and no friction rub.    No murmur heard.  Pulmonary/Chest: Effort normal and breath sounds normal. No respiratory distress. He has no wheezes. He has no rales.   Abdominal: Soft. Bowel sounds are normal. He exhibits no distension. There is no tenderness.   Genitourinary:   Genitourinary Comments: s/p R hydrocelectomy and L orchiectomy on 11/22/17   Musculoskeletal: Normal range of motion. He exhibits edema.   LUE AV fistula with + thrill and bruit    Neurological: He is alert and oriented to person, place, and time.   Skin: Skin is warm and dry. He is not diaphoretic.   Psychiatric: He has a normal mood and affect. His behavior is normal.   Nursing note and vitals reviewed.        Significant Labs:   CBC:   Recent Labs  Lab 12/04/17  1852   WBC 6.81   HGB 9.0*   HCT 27.1*        CMP:   Recent Labs  Lab 12/04/17 1852   *   K 5.1      CO2 20*    *   BUN 26*   CREATININE 4.4*   CALCIUM 8.7   PROT 8.5*   ALBUMIN 3.1*   BILITOT 0.4   ALKPHOS 83   AST 26   ALT 11   ANIONGAP 9   EGFRNONAA 11.2*     Urine Studies:   Recent Labs  Lab 12/04/17 2112   COLORU Yellow   APPEARANCEUA Cloudy*   PHUR 5.0   SPECGRAV 1.020   PROTEINUA 2+*   GLUCUA Negative   KETONESU Negative   BILIRUBINUA Negative   OCCULTUA 3+*   NITRITE Negative   UROBILINOGEN Negative   LEUKOCYTESUR 3+*   RBCUA >100*   WBCUA >100*   BACTERIA Occasional   SQUAMEPITHEL 13   HYALINECASTS 0       Significant Imaging: I have reviewed all pertinent imaging results/findings within the past 24 hours.

## 2017-12-05 NOTE — ASSESSMENT & PLAN NOTE
- SBP ranging between 172-197  - reports missing PM medications  - restart home medication regimen  - add PRN hydralazine as need for SBP > 180 or SBP >110

## 2017-12-05 NOTE — ED TRIAGE NOTES
Daughter states his is confused and was trying to climb out chair during dialysis 3 times today.  States this is very abnormal for him.  H/o dementia but she feels he has increase in confusion.  Complete dialysis.  AAOx3.  Denies co at present

## 2017-12-05 NOTE — CONSULTS
[facility]  Nephrology  Consult Note    Patient Name: Ronald Campbell  MRN: [id number]  Admission Date: 12/4/2017  Hospital Length of Stay: 0 days  Attending Provider: Saroj Hernández MD   Primary Care Physician: Js Sanches MD (Inactive)  Principal Problem:Acute cystitis with hematuria    Inpatient consult to Nephrology  Consult performed by: ELGIN STEINER  Consult ordered by: ELGIN DELVALLE  Reason for consult: esrd        Subjective:     HPI: Patient is a 88 year old man with HFrEF (EF 40%, last echo 2015), ESRD on HD (MWF), HTN, T2DM (a1c 5.4%, on SSI), HTN, seizure disorder, 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 Per the patient's daughter, he was reportedly confused and tried to leave during his dialysis sessions multiple times, which has never happened before. She states he does have some baseline dementia, but has never had this kind of confusion or behavior before. He denies recent illnesses, chest pain, shortness of breath, cough, abdominal pain, changes in bowel movements, dysuria, arthralgias, or myalgias. He has not had any complications since his surgery on 11/22.    Nephrology is consulted to manage her ESRD    Past Medical History:   Diagnosis Date    Anemia     Blindness of right eye     CHF (congestive heart failure)     Chronic kidney disease     Dementia 01/2017    Diabetes mellitus type II     General anesthetics causing adverse effect in therapeutic use     april / may 2014     Glaucoma (increased eye pressure)     Hypertension     Seizures        Past Surgical History:   Procedure Laterality Date    AV FISTULA PLACEMENT Left 4/2014    AV fistula to Left upper arm    EYE SURGERY         Review of patient's allergies indicates:   Allergen Reactions    Lisinopril Swelling     Current Facility-Administered Medications    Medication Frequency    acetaminophen tablet 650 mg Q6H PRN    albuterol-ipratropium 2.5mg-0.5mg/3mL nebulizer solution 3 mL Q4H PRN    amLODIPine tablet 10 mg Daily    aspirin chewable tablet 81 mg Daily    atorvastatin tablet 40 mg QHS    brinzolamide 1 % ophthalmic suspension 1 drop TID    calcium acetate capsule 667 mg TID WM    carvedilol tablet 25 mg BID    cefTRIAXone (ROCEPHIN) 1 g in dextrose 5 % 50 mL IVPB Q24H    dextrose 50% injection 12.5 g PRN    dextrose 50% injection 25 g PRN    doxazosin tablet 4 mg Daily    finasteride tablet 5 mg Daily    fluconazole tablet 100 mg Daily    glucagon (human recombinant) injection 1 mg PRN    glucose chewable tablet 16 g PRN    glucose chewable tablet 24 g PRN    heparin (porcine) injection 5,000 Units Q8H    hydrALAZINE tablet 25 mg Q8H PRN    insulin aspart pen 0-5 Units QID (AC + HS) PRN    levETIRAcetam tablet 500 mg BID    ondansetron disintegrating tablet 8 mg Q8H PRN    ondansetron injection 4 mg Q8H PRN    senna-docusate 8.6-50 mg per tablet 1 tablet BID PRN    sodium chloride 0.9% flush 5 mL PRN    travoprost 0.004 % ophthalmic solution 1 drop QHS     Family History     Problem Relation (Age of Onset)    Cancer Daughter    Heart disease Sister, Brother        Social History Main Topics    Smoking status: Never Smoker    Smokeless tobacco: Never Used    Alcohol use No    Drug use: No    Sexual activity: No     Review of Systems   Constitutional: Negative for fever and unexpected weight change.   Respiratory: Negative for cough, chest tightness, shortness of breath and wheezing.    Cardiovascular: Negative for chest pain and leg swelling.   Gastrointestinal: Negative for abdominal distention, abdominal pain, diarrhea and nausea.   Endocrine: Negative for polydipsia.   Genitourinary: Negative for difficulty urinating, dysuria, flank pain, frequency and hematuria.   Musculoskeletal: Negative for arthralgias and myalgias.   Skin:  Negative for rash.   Allergic/Immunologic: Negative for immunocompromised state.   Neurological: Negative for dizziness and light-headedness.   Hematological: Negative for adenopathy.   Psychiatric/Behavioral: Negative for confusion.     Objective:     Vital Signs (Most Recent):  Temp: 96 °F (35.6 °C) (12/05/17 0749)  Pulse: 65 (12/05/17 0749)  Resp: 17 (12/05/17 0749)  BP: (!) 187/86 (12/05/17 0749)  SpO2: 100 % (12/05/17 0749)  O2 Device (Oxygen Therapy): room air (12/05/17 0049) Vital Signs (24h Range):  Temp:  [95.9 °F (35.5 °C)-98.8 °F (37.1 °C)] 96 °F (35.6 °C)  Pulse:  [65-76] 65  Resp:  [17-20] 17  SpO2:  [98 %-100 %] 100 %  BP: (151-197)/(81-89) 187/86     Weight: 81.2 kg (179 lb 0.2 oz) (12/05/17 0049)  Body mass index is 23.62 kg/m².  Body surface area is 2.05 meters squared.    I/O last 3 completed shifts:  In: 50 [IV Piggyback:50]  Out: -     Physical Exam   Constitutional: He is oriented to person, place, and time. He appears well-developed.   HENT:   Head: Normocephalic and atraumatic.   Neck: No JVD present.   Cardiovascular: Normal rate and regular rhythm.  Exam reveals no friction rub.    Pulmonary/Chest: Effort normal and breath sounds normal.   Abdominal: Soft. He exhibits no distension.   Musculoskeletal: He exhibits edema.   Neurological: He is alert and oriented to person, place, and time.   Skin: Skin is warm.   Psychiatric: He has a normal mood and affect.       Assessment/Plan:     ESRD (end stage renal disease) on dialysis    Etiology: diabetes    Modality:iHD x 3 years  Days: MWF(last received yesterday and completed the full session)  Access: L arm fistula (good thrill and pulsation, no reported issues)  Unit: [facility]  Nephrologist: Dr Taj Lara Duration: 3.5 hrs  EDW:178-179lbs  UF : ?2L  Residual fxn: 1-2 times per day, exact amount uncertain     HTN:  elevated this morning, but hadn't received his meds yet  Metabolic acidosis:  ok, bicarb 20  Electrolytes: ok, K 5.1  Anemia:   Hgb 8.4, no recent iron studies will check, not reported to be on epo  Metabolic Bone disease: on ero and phoslo, don't have recent labs, will check PTH, vitamin D and phosphorus  Nuitrition:  albumin 2.7, add nepro    Plan for Dialysis: no emergent need today, would anticipate tomorrow (wednesday) per his usual schedule              Thank you for your consult. I will follow-up with patient. Please contact us if you have any additional questions.    Murphy Gusman MD  Nephrology  [facility] [address]      I have reviewed and concur with the fellow's history, physical, assessment, and plan. I have personally interviewed and examined the patient at bedside.

## 2017-12-05 NOTE — PT/OT/SLP EVAL
Physical Therapy Evaluation    Patient Name:  Ronald Campbell   MRN:  [id number]    Recommendations:     Discharge Recommendations:  nursing facility, skilled (SS)   Discharge Equipment Recommendations: none   Barriers to discharge: Decreased caregiver support; home alone during the day with pt having 1 flight of steps to enter room    Assessment:     Ronald Campbell is a 88 y.o. male admitted with a medical diagnosis of Acute cystitis with hematuria.  He presents with the following impairments/functional limitations:  weakness, impaired endurance, impaired functional mobilty, impaired balance, decreased lower extremity function, decreased safety awareness, impaired joint extensibility, impaired cardiopulmonary response to activity . Prior to admit, pt living with dgt in one story home c/ flight of stairs to enter; attends dialysis M, W, F and has aides come to home on Tues and Thurs. Left home at times by self with pt having falls recently due to instability and recent blindness. During today's session, pt able to follow commands appropriately and req incr assistance c/ transfers and limited gait. Appropriate for discharge to short stay SNF to address current deficits and improve (I) with mobility in home environment. Pt remains appropriate for continued skilled services and discharge to postacute location to address the below deficits and improve pt return to PLOF.        Rehab Prognosis:  Fair; patient would benefit from acute skilled PT services to address these deficits and reach maximum level of function.      Recent Surgery: * No surgery found *      Plan:     During this hospitalization, patient to be seen 4 x/week to address the above listed problems via gait training, therapeutic activities, therapeutic exercises  · Plan of Care Expires:  01/04/18   Plan of Care Reviewed with: patient, daughter    Subjective     Communicated with nsg prior to session.  Patient found supine in bed c/ dgt at bedside upon PT entry  to room, agreeable to evaluation.      Chief Complaint: no complaints during session  Patient comments/goals: to return to home environment  Pain/Comfort:  · Pain Rating 1: 0/10    Patients cultural, spiritual, [employer]ian conflicts given the current situation:      Patient History:  · Home environment: lives with dgt in one story home c/ 1 flight of stairs to enter; dgt reports PACE plans to place lift chair to enter home in the future  · Assistance provided:  Aides T, Th for bathing. Dgt at night and on weekends  · Bathroom set up:  Tub/shower    Previous Level of Mobilty  · Transfers: No AD but utilized assistance from additional person  · Gait: No AD but required assistance from additional person for safety    Additional Roles of Patient  · Working: no  · Driving: no  · Hobbies:did not state    DME: no  Pt does attend dialysis M, W, F      Objective:     Patient found with: telemetry     General Precautions: Standard, fall   Orthopedic Precautions:N/A   Braces: N/A     Exams:  · Cognitive Exam:  Patient is oriented to Person and Place and follows 75% of verbal commands   · Postural Exam:  Patient presented with the following abnormalities:    · -       Rounded shoulders  · -       Forward head  · -       Posterior pelvic tilt  · Sensation:    · -       Intact  light/touch (B) LE  · Skin Integrity/Edema:      · -       Skin integrity: Visible skin intact, Thin and Dry  · -       Edema: None noted in (B) LE  · RLE ROM: WFL  · RLE Strength: 4/5 hip flexion, knee ext/flex, and ankle DF  · LLE ROM: WFL  · LLE Strength: 4/5 hip flexion, knee ext/flex, and ankle DF    Functional Mobility:  · Bed Mobility:     · Supine to Sit: minimum assistance  · Sit to Supine: minimum assistance  · Transfers:     · Sit to Stand:  minimum assistance with no AD  · Gait: 5 ft fwd/bwd, slowed mike with shortened step and stride length. Req MIN A with pt having incr posterior trunk lean at times.     Balance  - Static Sitting: CGA with  (B) UE support  - Dynamic Sitting: MIN A with (B) UE support; demonstrated incr posterior trunk lean  - Static Standing: MIN A with HHA   - Dynamic Standing: MIN A with HHA       AM-PAC 6 CLICK MOBILITY  Total Score:13       Therapeutic Activities and Exercises:   PT educated pt and pt dgt on role of PT, PT POC including frequency and discharge recommendations, as well as current concerns to return to home environment. Educated to continue mobility in hospital and appropriate for transfer to bedside chair with nsg assistance. Pt and pt dgt verbalized agreement and understanding.     Patient left supine with all lines intact, call button in reach and dgt present.    GOALS:    Physical Therapy Goals        Problem: Physical Therapy Goal    Goal Priority Disciplines Outcome Goal Variances Interventions   Physical Therapy Goal     PT/OT, PT Ongoing (interventions implemented as appropriate)     Description:  Goals to be met by: 7 days ()    Patient will increase functional independence with mobility by performin. Supine to sit with Stand-by Assistance  2. Sit to supine with Stand-by Assistance  3. Sit to stand transfer with Contact Guard Assistance  4. Gait  x 50 feet with Contact Guard Assistance using Rolling Walker.   5. Lower extremity exercise program x30 reps per handout, with independence to improve muscular strength and endurance.  6. Pt will ambulate 1 flight of stairs with MIN A and unilateral HR placement.                         History:     Past Medical History:   Diagnosis Date    Anemia     Blindness of right eye     CHF (congestive heart failure)     Chronic kidney disease     Dementia 2017    Diabetes mellitus type II     General anesthetics causing adverse effect in therapeutic use     april / may 2014     Glaucoma (increased eye pressure)     Hypertension     Seizures        Past Surgical History:   Procedure Laterality Date    AV FISTULA PLACEMENT Left 2014    AV fistula  to Left upper arm    EYE SURGERY         Clinical Decision Making:     History  Co-morbidities and personal factors that may impact the plan of care Examination  Body Structures and Functions, activity limitations and participation restrictions that may impact the plan of care Clinical Presentation   Decision Making/ Complexity Score   Co-morbidities:   [] Time since onset of injury / illness / exacerbation  [x] Status of current condition  [x]Patient's cognitive status and safety concerns    [x] Multiple Medical Problems (see med hx)  Personal Factors:   [x] Patient's age  [x] Prior Level of function   [] Patient's home situation (environment and family support)  [] Patient's level of motivation  [x] Expected progression of patient      HISTORY:(criteria)    [] 21086 - no personal factors/history    [] 41014 - has 1-2 personal factor/comorbidity     [x] 99608 - has >3 personal factor/comorbidity     Body Regions:  [] Objective examination findings  [] Head     []  Neck  [x] Trunk   [x] Upper Extremity  [x] Lower Extremity    Body Systems:  [x] For communication ability, affect, cognition, language, and learning style: the assessment of the ability to make needs known, consciousness, orientation (person, place, and time), expected emotional /behavioral responses, and learning preferences (eg, learning barriers, education  needs)  [x] For the neuromuscular system: a general assessment of gross coordinated movement (eg, balance, gait, locomotion, transfers, and transitions) and motor function  (motor control and motor learning)  [x] For the musculoskeletal system: the assessment of gross symmetry, gross range of motion, gross strength, height, and weight  [] For the integumentary system: the assessment of pliability(texture), presence of scar formation, skin color, and skin integrity  [x] For cardiovascular/pulmonary system: the assessment of heart rate, respiratory rate, blood pressure, and edema     Activity  limitations:    [x] Patient's cognitive status and saf ety concerns          [x] Status of current condition      [] Weight bearing restriction  [] Cardiopulmunary Restriction    Participation Restrictions:   [x] Goals and goal agreement with the patient     [x] Rehab potential (prognosis) and probable outcome      Examination of Body System: (criteria)    [] 31460 - addressing 1-2 elements    [] 60908 - addressing a total of 3 or more elements     [x] 41752 -  Addressing a total of 4 or more elements         Clinical Presentation: (criteria)  Evolving - 77180     On examination of body system using standardized tests and measures patient presents with (CHOOSE ONE) elements from any of the following: body structures and functions, activity limitations, and/or participation restrictions.  Leading to a clinical presentation that is considered evolving with changing characteristics                              Clinical Decision Making  (Eval Complexity):  Moderate - 41927     Time Tracking:     PT Received On: 12/05/17  PT Start Time: 0845     PT Stop Time: 0913  PT Total Time (min): 28 min     Billable Minutes: Evaluation 15 and Therapeutic Activity 13      Beckie Verdugo, PT , DPT  12/05/2017

## 2017-12-05 NOTE — ASSESSMENT & PLAN NOTE
87yo M with history of DM, HTN, ESRD on HD (m, w, f) who was brought to the ED today by his daughter because of confusion during his HD session.    - No evidence of wound infection on examination.   - Would follow up urine culture and treat empirically for UTI.   - No need for admission from urologic perspective. Would consider admission to medicine service for treatment of confusion and UTI.

## 2017-12-05 NOTE — ASSESSMENT & PLAN NOTE
- presents with worsening confusion    - utilize all safety precautions  - if family unable to stay may want to consider a sitter

## 2017-12-05 NOTE — NURSING
Pt admitted from the Ed. Patient is blind with extensive history, here for cystitis. Pt goes to dialysis MWF and apparently acted up in dialysis though patient has diagnosis of dementia. Daughter takes care of the patient and he lives with her. Fistula to left upper arm with good thrill/bruit. Right forearm 22 jelco intact. Incontinent and has a diaper on. Pt's b/p elevated and this was called in to  on call. Will give pt extra prn dose of b/p medicine to attempt to bring it down some more.

## 2017-12-05 NOTE — PLAN OF CARE
Problem: Physical Therapy Goal  Goal: Physical Therapy Goal  Goals to be met by: 7 days ()    Patient will increase functional independence with mobility by performin. Supine to sit with Stand-by Assistance  2. Sit to supine with Stand-by Assistance  3. Sit to stand transfer with Contact Guard Assistance  4. Gait  x 50 feet with Contact Guard Assistance using Rolling Walker.   5. Lower extremity exercise program x30 reps per handout, with independence to improve muscular strength and endurance.  6. Pt will ambulate 1 flight of stairs with MIN A and unilateral HR placement.       Outcome: Ongoing (interventions implemented as appropriate)  PT evaluation completed. POC initiated.    Beckie Verdugo, PT, DPT  2017

## 2017-12-05 NOTE — PLAN OF CARE
12/05/17 0950   Discharge Assessment   Assessment Type Discharge Planning Assessment   Confirmed/corrected address and phone number on facesheet? Yes   Assessment information obtained from? Caregiver  (daughter, Gabriela Campbell ([phone]))   Expected Length of Stay (days) 2   Communicated expected length of stay with patient/caregiver yes   Prior to hospitilization cognitive status: Not Oriented to Time;Not Oriented to Place   Prior to hospitalization functional status: Partially Dependent   Current cognitive status: Not Oriented to Time;Not Oriented to Place   Current Functional Status: Partially Dependent   Lives With child(erin), adult  (daughter, Gabriela Campbell ([phone], [phone]))   Able to Return to Prior Arrangements unable to determine at this time (comments)   Is patient able to care for self after discharge? No   Patient's perception of discharge disposition home health   Readmission Within The Last 30 Days current reason for admission unrelated to previous admission   If yes, [place] [facility]   Patient currently being followed by outpatient case management? No   Patient currently receives any other outside agency services? Yes   Name and contact number of agency or person providing outside services aide provided thru [facility]   Is it the patient/care giver preference to resume care with the current outside agency? Yes   Equipment Currently Used at Home cane, straight;walker, rolling;raised toilet;shower chair;other (see comments)  (transport chair)   Do you have any problems affording any of your prescribed medications? No   Is the patient taking medications as prescribed? yes   Does the patient have transportation home? No  (daughter cannot get patient into house with 10-12 steps & 2 landings)   Does the patient receive services at the [facility]? No   Discharge Plan A Home Health   Discharge Plan B Skilled Nursing Facility   Patient/Family In Agreement With Plan yes    Readmission Questionnaire   At the time of your discharge, did someone talk to you about what your health problems were? Yes   At the time of discharge, did someone talk to you about what to watch out for regarding worsening of your health problem? Yes   At the time of discharge, did someone talk to you about what to do if you experienced worsening of your health problem? Yes   At the time of discharge, did someone talk to you about which medication to take when you left the hospital and which ones to stop taking? Yes   At the time of discharge, did someone talk to you about when and where to follow up with a doctor after you left the hospital? Yes   What do you believe caused you to be sick enough to be re-admitted? AMS   How often do you need to have someone help you when you read instructions, pamphlets, or other written material from your doctor or pharmacy? Always   Do you have problems taking your medications as prescribed? No   Do you have any problems affording any of  your prescribed medications? No   Do you have problems obtaining/receiving your medications? No   Does the patient have transportation to healthcare appointments? Yes   Living Arrangements house   Does the patient have family/friends to help with healtcare needs after discharge? yes   Does your caregiver provide all the help you need? Yes     Patient being fed breakfast in bed by the patient's daughter, Gabriela Campbell ([phone]), when CM rounded. All discharge planning assessment informations was obtained from Gabriela due to the patient's history of dementia. Patient was admitted with AMS& actue cystitis with hematuria. Patient had a left hydrocelectomy & right orchiectomy done 11/22/17. Orders noted for IV ceftriaxone & consults for PT/OT,  urol, & neph. Patient lives with Gabriela, has equipment to assist with ambulation, receives HD treatments via a LUE fistula at [facility] ([facility]), & has a aide provided from JANENE GUERRIER. Plan to  discharge patient home with home health or SNF placement when medically stable. Gabriela stated that the patient will need ambulance transport at time of discharge because he has 10-12 steps & 2 landings to get into the house. CM informed MAIRA Correa ([phone]) of above. Will continue to follow.

## 2017-12-05 NOTE — ASSESSMENT & PLAN NOTE
Glaucoma  - continue ocular drops as directed  - high risk of fall / injury - utilize all safety measures and fall precautions

## 2017-12-05 NOTE — ASSESSMENT & PLAN NOTE
Etiology: diabetes    Modality:iHD x 3 years  Days: MWF(last received yesterday and completed the full session)  Access: L arm fistula (good thrill and pulsation, no reported issues)  Unit: [facility]  Nephrologist: Dr Taj Lara Duration: 3.5 hrs  EDW:178-179lbs  UF : ?2L  Residual fxn: 1-2 times per day, exact amount uncertain     HTN: elevated this morning, but hadn't received his meds yet  Metabolic acidosis: ok, bicarb 20  Electrolytes: ok, K 5.1  Anemia: Hgb 8.4, no recent iron studies will check, not reported to be on epo  Metabolic Bone disease: on ero and phoslo, don't have recent labs, will check PTH, vitamin D and phosphorus  Nuitrition: albumin 2.7, add nepro    Plan for Dialysis: no emergent need today, would anticipate tomorrow (wednesday) per his usual schedule

## 2017-12-05 NOTE — ASSESSMENT & PLAN NOTE
- UA with >100 RBC, >100 WBC, occasional bacteria, 3+ leuks, and many yeast  - afebrile with no leukocytosis - nontoxic appearing - presents with increased confusion   - started on ceftriaxone in ED - continue for now  - start diflucan now  - Urine culture in process - f/u on results and adjust therapy as indicated

## 2017-12-05 NOTE — CONSULTS
[facility]  Urology  Consult Note    Patient Name: Ronald Campbell  MRN: [id number]  Admission Date: 12/4/2017  Hospital Length of Stay: 0   Code Status: Prior   Attending Provider: Jayson Valle MD   Consulting Provider: Brandon Oswald MD  Primary Care Physician: Js Sanches MD (Inactive)  Principal Problem:<principal problem not specified>    Inpatient consult to Urology  Consult performed by: BRANDON OSWALD  Consult ordered by: DENISE SALAS          Subjective:     HPI:  87yo M with history of DM, HTN, ESRD on HD (m, w, f) who was brought to the ED today by his daughter because of confusion during his HD session. He was taken to the OR by urology for L hydrocelectomy and R orchiectomy on 11/22/17. He was afebrile with a WBC of 6 on arrival to the ED. There was concern for wound infection and urology was consulted. UA shows >100RBCs, >100WBCs and many bacteria concerning for UTI.     Past Medical History:   Diagnosis Date    Anemia     Blindness of right eye     CHF (congestive heart failure)     Chronic kidney disease     Dementia 01/2017    Diabetes mellitus type II     General anesthetics causing adverse effect in therapeutic use     april / may 2014     Glaucoma (increased eye pressure)     Hypertension     Seizures      Past Surgical History:   Procedure Laterality Date    AV FISTULA PLACEMENT Left 4/2014    AV fistula to Left upper arm    EYE SURGERY       Review of patient's allergies indicates:   Allergen Reactions    Lisinopril Swelling     Family History     Problem Relation (Age of Onset)    Cancer Daughter    Heart disease Sister, Brother        Social History Main Topics    Smoking status: Never Smoker    Smokeless tobacco: Never Used    Alcohol use No    Drug use: No    Sexual activity: No     Review of Systems   Constitutional: Negative for activity change, appetite change, chills, fever and unexpected weight change.   HENT: Negative.    Eyes: Negative.     Respiratory: Negative for chest tightness and shortness of breath.    Cardiovascular: Negative for chest pain.   Gastrointestinal: Negative for abdominal distention, abdominal pain, nausea and vomiting.   Musculoskeletal: Negative.    Skin: Negative.    Neurological: Negative.    Psychiatric/Behavioral: Negative.        Objective:     Temp:  [98.8 °F (37.1 °C)] 98.8 °F (37.1 °C)  Pulse:  [71-75] 71  Resp:  [18] 18  SpO2:  [99 %] 99 %  BP: (172-176)/(81-84) 172/84     Body mass index is 23.09 kg/m².    Drains     Drain                 Hemodialysis AV Fistula -- days         Hemodialysis AV Fistula Left upper arm -- days         Hemodialysis AV Fistula 08/08/14 0800 Left forearm 1214 days                Physical Exam   Constitutional: No distress.   HENT:   Head: Normocephalic and atraumatic.   Eyes:   blind   Cardiovascular: Normal rate and regular rhythm.    Pulmonary/Chest: Effort normal. No respiratory distress.   Abdominal: Soft. He exhibits no distension. There is no tenderness.   Genitourinary:   Genitourinary Comments: Uncircumcised phallus with blood tinged urine at meatus.   Scrotal incision clean, dry, and intact. No evidence of necrosis, wound dehiscence or drainage. Scrotum non erythematous, non edematous.    Neurological: He is alert.   Skin: Skin is warm and dry.     Recent Labs  Lab 12/04/17  1852   *   K 5.1      CO2 20*   BUN 26*   CREATININE 4.4*   CALCIUM 8.7     Recent Labs  Lab 12/04/17  1852   WBC 6.81   HGB 9.0*   HCT 27.1*          Urine Studies:   Recent Labs  Lab 12/04/17  2112   COLORU Yellow   APPEARANCEUA Cloudy*   PHUR 5.0   SPECGRAV 1.020   PROTEINUA 2+*   GLUCUA Negative   KETONESU Negative   BILIRUBINUA Negative   OCCULTUA 3+*   NITRITE Negative   UROBILINOGEN Negative   LEUKOCYTESUR 3+*   RBCUA >100*   WBCUA >100*   BACTERIA Occasional   SQUAMEPITHEL 13   HYALINECASTS 0       Significant Imaging:  All pertinent imaging results/findings from the past 24 hours have  been reviewed.    Assessment and Plan:     Acute cystitis with hematuria    89yo M with history of DM, HTN, ESRD on HD (m, w, f) who was brought to the ED today by his daughter because of confusion during his HD session.    - No evidence of wound infection on examination.   - Would follow up urine culture and treat empirically for UTI.   - No need for admission from urologic perspective. Would consider admission to medicine service for treatment of confusion and UTI.             VTE Risk Mitigation     None          Thank you for your consult. I will sign off. Please contact us if you have any additional questions.    Jae Oswald MD  Urology  [facility]    Agree with the above.

## 2017-12-05 NOTE — ASSESSMENT & PLAN NOTE
- LUE AV fistula  - MWF HD schedule  - nephrology consulted  - K 5.1 on arrival  - repeat CMP in AM

## 2017-12-05 NOTE — ED NOTES
Spoke to hospital medicine regarding changing Atorvastatin dosage for tonight. Hospital medicine stated that they will change time.

## 2017-12-06 PROBLEM — T68.XXXA HYPOTHERMIA: Status: ACTIVE | Noted: 2017-12-06

## 2017-12-06 LAB
ALBUMIN SERPL BCP-MCNC: 2.7 G/DL
ALBUMIN SERPL BCP-MCNC: 2.7 G/DL
ALP SERPL-CCNC: 86 U/L
ALT SERPL W/O P-5'-P-CCNC: 11 U/L
ANION GAP SERPL CALC-SCNC: 11 MMOL/L
ANION GAP SERPL CALC-SCNC: 7 MMOL/L
ANISOCYTOSIS BLD QL SMEAR: SLIGHT
AST SERPL-CCNC: 15 U/L
BACTERIA UR CULT: NORMAL
BASOPHILS # BLD AUTO: 0.03 K/UL
BASOPHILS NFR BLD: 0.5 %
BILIRUB SERPL-MCNC: 0.3 MG/DL
BUN SERPL-MCNC: 49 MG/DL
BUN SERPL-MCNC: 53 MG/DL
CALCIUM SERPL-MCNC: 8.2 MG/DL
CALCIUM SERPL-MCNC: 8.5 MG/DL
CHLORIDE SERPL-SCNC: 105 MMOL/L
CHLORIDE SERPL-SCNC: 106 MMOL/L
CK SERPL-CCNC: 104 U/L
CO2 SERPL-SCNC: 17 MMOL/L
CO2 SERPL-SCNC: 22 MMOL/L
CORTIS SERPL-MCNC: 9.1 UG/DL
CREAT SERPL-MCNC: 6.9 MG/DL
CREAT SERPL-MCNC: 7.4 MG/DL
DIFFERENTIAL METHOD: ABNORMAL
EOSINOPHIL # BLD AUTO: 0.1 K/UL
EOSINOPHIL NFR BLD: 1.4 %
ERYTHROCYTE [DISTWIDTH] IN BLOOD BY AUTOMATED COUNT: 19.3 %
EST. GFR  (AFRICAN AMERICAN): 6.9 ML/MIN/1.73 M^2
EST. GFR  (AFRICAN AMERICAN): 7.5 ML/MIN/1.73 M^2
EST. GFR  (NON AFRICAN AMERICAN): 6 ML/MIN/1.73 M^2
EST. GFR  (NON AFRICAN AMERICAN): 6.5 ML/MIN/1.73 M^2
FIBRINOGEN PPP-MCNC: 431 MG/DL
GLUCOSE SERPL-MCNC: 126 MG/DL
GLUCOSE SERPL-MCNC: 225 MG/DL
HCT VFR BLD AUTO: 26.2 %
HGB BLD-MCNC: 8.5 G/DL
IMM GRANULOCYTES # BLD AUTO: 0.02 K/UL
IMM GRANULOCYTES NFR BLD AUTO: 0.4 %
LACTATE SERPL-SCNC: 0.8 MMOL/L
LYMPHOCYTES # BLD AUTO: 1.3 K/UL
LYMPHOCYTES NFR BLD: 23.7 %
MAGNESIUM SERPL-MCNC: 2.2 MG/DL
MCH RBC QN AUTO: 23.9 PG
MCHC RBC AUTO-ENTMCNC: 32.4 G/DL
MCV RBC AUTO: 74 FL
MONOCYTES # BLD AUTO: 0.5 K/UL
MONOCYTES NFR BLD: 8.5 %
NEUTROPHILS # BLD AUTO: 3.7 K/UL
NEUTROPHILS NFR BLD: 65.5 %
NRBC BLD-RTO: 0 /100 WBC
PHOSPHATE SERPL-MCNC: 3.4 MG/DL
PHOSPHATE SERPL-MCNC: 3.8 MG/DL
PLATELET # BLD AUTO: 157 K/UL
PLATELET BLD QL SMEAR: ABNORMAL
PMV BLD AUTO: ABNORMAL FL
POCT GLUCOSE: 123 MG/DL (ref 70–110)
POCT GLUCOSE: 157 MG/DL (ref 70–110)
POCT GLUCOSE: 175 MG/DL (ref 70–110)
POCT GLUCOSE: 196 MG/DL (ref 70–110)
POIKILOCYTOSIS BLD QL SMEAR: SLIGHT
POTASSIUM SERPL-SCNC: 4.9 MMOL/L
POTASSIUM SERPL-SCNC: 5.1 MMOL/L
PROCALCITONIN SERPL IA-MCNC: 0.23 NG/ML
PROT SERPL-MCNC: 7.6 G/DL
RBC # BLD AUTO: 3.56 M/UL
SCHISTOCYTES BLD QL SMEAR: ABNORMAL
SCHISTOCYTES BLD QL SMEAR: PRESENT
SODIUM SERPL-SCNC: 133 MMOL/L
SODIUM SERPL-SCNC: 135 MMOL/L
TARGETS BLD QL SMEAR: ABNORMAL
WBC # BLD AUTO: 5.66 K/UL

## 2017-12-06 PROCEDURE — 36415 COLL VENOUS BLD VENIPUNCTURE: CPT

## 2017-12-06 PROCEDURE — 93005 ELECTROCARDIOGRAM TRACING: CPT

## 2017-12-06 PROCEDURE — G0378 HOSPITAL OBSERVATION PER HR: HCPCS

## 2017-12-06 PROCEDURE — 25000003 PHARM REV CODE 250: Performed by: PHYSICIAN ASSISTANT

## 2017-12-06 PROCEDURE — 85384 FIBRINOGEN ACTIVITY: CPT

## 2017-12-06 PROCEDURE — 99214 OFFICE O/P EST MOD 30 MIN: CPT | Mod: ,,, | Performed by: INTERNAL MEDICINE

## 2017-12-06 PROCEDURE — 87040 BLOOD CULTURE FOR BACTERIA: CPT | Mod: 59

## 2017-12-06 PROCEDURE — 82550 ASSAY OF CK (CPK): CPT

## 2017-12-06 PROCEDURE — 80053 COMPREHEN METABOLIC PANEL: CPT

## 2017-12-06 PROCEDURE — 63600175 PHARM REV CODE 636 W HCPCS: Performed by: PHYSICIAN ASSISTANT

## 2017-12-06 PROCEDURE — 85025 COMPLETE CBC W/AUTO DIFF WBC: CPT

## 2017-12-06 PROCEDURE — 25000003 PHARM REV CODE 250: Performed by: NURSE PRACTITIONER

## 2017-12-06 PROCEDURE — 63600175 PHARM REV CODE 636 W HCPCS: Performed by: NURSE PRACTITIONER

## 2017-12-06 PROCEDURE — 82533 TOTAL CORTISOL: CPT

## 2017-12-06 PROCEDURE — 99226 PR SUBSEQUENT OBSERVATION CARE,LEVEL III: CPT | Mod: ,,, | Performed by: PHYSICIAN ASSISTANT

## 2017-12-06 PROCEDURE — 84100 ASSAY OF PHOSPHORUS: CPT | Mod: 91

## 2017-12-06 PROCEDURE — 80100014 HC HEMODIALYSIS 1:1

## 2017-12-06 PROCEDURE — 84145 PROCALCITONIN (PCT): CPT

## 2017-12-06 PROCEDURE — 25000003 PHARM REV CODE 250: Performed by: STUDENT IN AN ORGANIZED HEALTH CARE EDUCATION/TRAINING PROGRAM

## 2017-12-06 PROCEDURE — 93010 ELECTROCARDIOGRAM REPORT: CPT | Mod: ,,, | Performed by: INTERNAL MEDICINE

## 2017-12-06 PROCEDURE — 83735 ASSAY OF MAGNESIUM: CPT

## 2017-12-06 PROCEDURE — 27201247 HC HEMODIALYSIS, SET-UP & CANCEL

## 2017-12-06 PROCEDURE — 83605 ASSAY OF LACTIC ACID: CPT

## 2017-12-06 PROCEDURE — G0257 UNSCHED DIALYSIS ESRD PT HOS: HCPCS

## 2017-12-06 PROCEDURE — 84100 ASSAY OF PHOSPHORUS: CPT

## 2017-12-06 RX ORDER — FLUCONAZOLE 100 MG/1
300 TABLET ORAL ONCE
Status: DISCONTINUED | OUTPATIENT
Start: 2017-12-06 | End: 2017-12-06

## 2017-12-06 RX ORDER — FLUCONAZOLE 2 MG/ML
200 INJECTION, SOLUTION INTRAVENOUS ONCE
Status: DISCONTINUED | OUTPATIENT
Start: 2017-12-06 | End: 2017-12-07

## 2017-12-06 RX ORDER — FLUCONAZOLE 100 MG/1
400 TABLET ORAL DAILY
Status: DISCONTINUED | OUTPATIENT
Start: 2017-12-07 | End: 2017-12-06

## 2017-12-06 RX ADMIN — BRINZOLAMIDE 1 DROP: 10 SUSPENSION/ DROPS OPHTHALMIC at 05:12

## 2017-12-06 RX ADMIN — HYDRALAZINE HYDROCHLORIDE 25 MG: 25 TABLET, FILM COATED ORAL at 01:12

## 2017-12-06 RX ADMIN — LEVETIRACETAM 500 MG: 500 TABLET ORAL at 10:12

## 2017-12-06 RX ADMIN — ASPIRIN 81 MG CHEWABLE TABLET 81 MG: 81 TABLET CHEWABLE at 10:12

## 2017-12-06 RX ADMIN — DOXAZOSIN MESYLATE 4 MG: 2 TABLET ORAL at 10:12

## 2017-12-06 RX ADMIN — HEPARIN SODIUM 5000 UNITS: 5000 INJECTION, SOLUTION INTRAVENOUS; SUBCUTANEOUS at 01:12

## 2017-12-06 RX ADMIN — BRINZOLAMIDE 1 DROP: 10 SUSPENSION/ DROPS OPHTHALMIC at 09:12

## 2017-12-06 RX ADMIN — HEPARIN SODIUM 5000 UNITS: 5000 INJECTION, SOLUTION INTRAVENOUS; SUBCUTANEOUS at 09:12

## 2017-12-06 RX ADMIN — HEPARIN SODIUM 5000 UNITS: 5000 INJECTION, SOLUTION INTRAVENOUS; SUBCUTANEOUS at 05:12

## 2017-12-06 RX ADMIN — FINASTERIDE 5 MG: 5 TABLET, FILM COATED ORAL at 10:12

## 2017-12-06 RX ADMIN — ATORVASTATIN CALCIUM 40 MG: 20 TABLET, FILM COATED ORAL at 09:12

## 2017-12-06 RX ADMIN — CARVEDILOL 25 MG: 25 TABLET, FILM COATED ORAL at 10:12

## 2017-12-06 RX ADMIN — FLUCONAZOLE 100 MG: 100 TABLET ORAL at 10:12

## 2017-12-06 RX ADMIN — CALCIUM ACETATE 667 MG: 667 CAPSULE ORAL at 12:12

## 2017-12-06 RX ADMIN — CALCIUM ACETATE 667 MG: 667 CAPSULE ORAL at 10:12

## 2017-12-06 RX ADMIN — AMLODIPINE BESYLATE 10 MG: 10 TABLET ORAL at 10:12

## 2017-12-06 RX ADMIN — TRAVOPROST 1 DROP: 0.04 SOLUTION/ DROPS OPHTHALMIC at 09:12

## 2017-12-06 RX ADMIN — CARVEDILOL 25 MG: 25 TABLET, FILM COATED ORAL at 09:12

## 2017-12-06 RX ADMIN — CEFTRIAXONE SODIUM 1 G: 1 INJECTION, POWDER, FOR SOLUTION INTRAMUSCULAR; INTRAVENOUS at 09:12

## 2017-12-06 RX ADMIN — BRINZOLAMIDE 1 DROP: 10 SUSPENSION/ DROPS OPHTHALMIC at 01:12

## 2017-12-06 RX ADMIN — LEVETIRACETAM 500 MG: 500 TABLET ORAL at 09:12

## 2017-12-06 NOTE — PLAN OF CARE
MAIRA sent a referral request to the [facility] for [facility] and [facility].  The CM has a call into [facility] to determine if [facility] would cover these SNFs.  MAIRA called in the LOCET and faxed the PASRR. MAIRA is waiting on the 142.    Maedline Carrillo, [facility] x [phone]

## 2017-12-06 NOTE — PLAN OF CARE
Additional voicemail message left for patient's , Graciela ([phone]), at [facility] informing of PT/OT recommendations for SNF placement following discharge & the daughter's request to have the patient admitted to [facility] SNF or [facility] SNF. CM questioned if above SNFs are in network with [facility]. Awaiting return call. Will continue to follow.

## 2017-12-06 NOTE — SUBJECTIVE & OBJECTIVE
Interval History: no acute events overnight, no new complaints    Review of Systems   Constitutional: Negative for chills and fever.   HENT: Negative for congestion.    Eyes: Positive for visual disturbance.        Blind   Respiratory: Negative for cough, chest tightness and shortness of breath.    Cardiovascular: Negative for chest pain, palpitations and leg swelling.   Gastrointestinal: Negative for abdominal distention, abdominal pain, diarrhea, nausea and vomiting.   Genitourinary: Positive for decreased urine volume.        MWF HD patient - s/p R hydrocelectomy and L orchiectomy on 11/22/17   Musculoskeletal: Negative for arthralgias, back pain and myalgias.   Skin: Positive for wound. Negative for rash.        s/p R hydrocelectomy and L orchiectomy on 11/22/17   Neurological: Positive for seizures. Negative for dizziness, syncope and light-headedness.   Psychiatric/Behavioral: Positive for confusion.     Objective:     Vital Signs (Most Recent):  Temp: 96 °F (35.6 °C) (12/05/17 0749)  Pulse: (!) 54 (12/05/17 1218)  Resp: 18 (12/05/17 1218)  BP: (!) 155/83 (12/05/17 1218)  SpO2: 98 % (12/05/17 1218) Vital Signs (24h Range):  Temp:  [95.9 °F (35.5 °C)-97.9 °F (36.6 °C)] 96 °F (35.6 °C)  Pulse:  [54-76] 54  Resp:  [17-20] 18  SpO2:  [98 %-100 %] 98 %  BP: (151-197)/(81-89) 155/83     Weight: 81.2 kg (179 lb 0.2 oz)  Body mass index is 23.62 kg/m².    Intake/Output Summary (Last 24 hours) at 12/05/17 1814  Last data filed at 12/05/17 1113   Gross per 24 hour   Intake               50 ml   Output               50 ml   Net                0 ml      Physical Exam   Constitutional: He is oriented to person, place, and time. He appears well-developed and well-nourished. No distress.   HENT:   Head: Normocephalic and atraumatic.   Eyes:   Blind    Neck: Normal range of motion. Neck supple.   Cardiovascular: Normal rate, regular rhythm, normal heart sounds and intact distal pulses.    Pulmonary/Chest: Effort normal and  breath sounds normal.   Abdominal: Soft. Bowel sounds are normal.   Genitourinary:   Genitourinary Comments: s/p R hydrocelectomy and L orchiectomy on 11/22/17   Musculoskeletal: Normal range of motion. He exhibits edema.   LUE AV fistula with + thrill and bruit    Neurological: He is alert and oriented to person, place, and time.   Skin: Skin is warm and dry. He is not diaphoretic.   Psychiatric: He has a normal mood and affect. His behavior is normal.   Nursing note and vitals reviewed.      Significant Labs: All pertinent labs within the past 24 hours have been reviewed.    Significant Imaging: I have reviewed all pertinent imaging results/findings within the past 24 hours.

## 2017-12-06 NOTE — ASSESSMENT & PLAN NOTE
- LUE AV fistula  - MWF HD schedule  - nephrology consulted.  12/6 pt refused HD in the morning, nephrology arranging for HD in room this afternoon  - K 5.1 on arrival, repeat 4.6

## 2017-12-06 NOTE — CARE UPDATE
Patient not cooperating in the unit this morning, he has a baseline dementia and I am guessing this is related to his agitation and inability to cooperate, would recommend primary team to evaluate.    From a renal perspective, his BP is on the high side, some of this likely from agitation, he is in no respiratory distress, mild crackles at bases, he was laying flat and in no distress, there is LE edema, but much of this is baseline    His acid/base and electrolyte status is fine.    His confusion is not related to uremia.    In short, dialysis can be skipped today and we will reassess tomorrow if still in hospital, again would recommend further evaluation by primary team regarding his confusion.

## 2017-12-06 NOTE — PLAN OF CARE
Problem: Patient Care Overview  Goal: Plan of Care Review  Outcome: Ongoing (interventions implemented as appropriate)  Pt free from any injury or falls. Able to turn self every to prevent any skin breakdown. Warming blanket placed on pt for low temp. IV antibiotics started for UTI. PT restless and combative this morning, however pt progressively became more calm and cooperative throughout the day. This morning disoriented X4, currently oriented to self and place. Complains of no pain or discomfort. Will continue delirium precautions and continue to monitor.

## 2017-12-06 NOTE — ASSESSMENT & PLAN NOTE
Etiology: diabetes    Modality:iHD x 3 years  Days: MWF(last received yesterday and completed the full session)  Access: L arm fistula (good thrill and pulsation, no reported issues)  Unit: [facility]  Nephrologist: Dr Taj Lara Duration: 3.5 hrs  EDW:178-179lbs  UF : ?2L  Residual fxn: 1-2 times per day, exact amount uncertain     HTN: elevated this morning, but hadn't received his meds yet  Metabolic acidosis: ok, bicarb 20  Electrolytes: ok, K 5.1  Anemia: Hgb 8.4, no recent iron studies will check, not reported to be on epo  Metabolic Bone disease: on ero and phoslo, phos 3.4, PTH still pending  Nuitrition: albumin 2.7, add nepro    Plan for Dialysis: initially was going to hold off today secondary to patient not being cooperative in the PA as there is no emergent need, however tomorrow will be a very busy day to get him done, so will discuss with primary team and get him done this afternoon in a private room, which is likely where he would need to be dialyzed from now on regardless

## 2017-12-06 NOTE — SUBJECTIVE & OBJECTIVE
Interval History:  no acute events overnight.  This morning pt taken down to HD and refused.  Pt more agitated and lethargic today.  When pt back in room with daughter at bedside, pt able to state his name, his wife's name.  Pt following commands and no neuro deficits.    Review of Systems   Constitutional: Negative for chills, diaphoresis, fever and unexpected weight change.   Eyes: Positive for visual disturbance.        Blind   Respiratory: Negative for cough, chest tightness, shortness of breath and wheezing.    Cardiovascular: Negative for chest pain, palpitations and leg swelling.   Gastrointestinal: Negative for abdominal distention, abdominal pain, diarrhea, nausea and vomiting.   Genitourinary: Positive for decreased urine volume. Negative for discharge, flank pain and testicular pain.        [facility] HD patient - s/p R hydrocelectomy and L orchiectomy on 11/22/17   Musculoskeletal: Negative for arthralgias, back pain, myalgias and neck pain.   Skin: Positive for wound. Negative for color change, pallor and rash.        s/p R hydrocelectomy and L orchiectomy on 11/22/17   Neurological: Positive for seizures. Negative for dizziness, syncope and light-headedness.   Psychiatric/Behavioral: Positive for agitation and confusion. Negative for decreased concentration and hallucinations.     Objective:     Vital Signs (Most Recent):  Temp: (!) 93.6 °F (34.2 °C) (12/06/17 1303)  Pulse: 66 (12/06/17 1214)  Resp: 18 (12/06/17 1214)  BP: (!) 183/86 (12/06/17 1214)  SpO2: 95 % (12/06/17 1214) Vital Signs (24h Range):  Temp:  [93.6 °F (34.2 °C)-98.6 °F (37 °C)] 93.6 °F (34.2 °C)  Pulse:  [58-69] 66  Resp:  [15-20] 18  SpO2:  [95 %-100 %] 95 %  BP: (162-184)/(80-91) 183/86     Weight: 81.2 kg (179 lb 0.2 oz)  Body mass index is 23.62 kg/m².    Intake/Output Summary (Last 24 hours) at 12/06/17 1347  Last data filed at 12/05/17 2152   Gross per 24 hour   Intake               50 ml   Output                0 ml   Net                50 ml      Physical Exam   Constitutional: He appears well-developed and well-nourished. No distress.   HENT:   Head: Normocephalic and atraumatic.   Eyes:   Blind    Neck: Normal range of motion. Neck supple.   Cardiovascular: Normal rate, regular rhythm, normal heart sounds and intact distal pulses.    Pulmonary/Chest: Effort normal and breath sounds normal.   Abdominal: Soft. Bowel sounds are normal.   Genitourinary:   Genitourinary Comments: s/p R hydrocelectomy and L orchiectomy on 11/22/17   Musculoskeletal: Normal range of motion. He exhibits edema.   LUE AV fistula with + thrill and bruit    Neurological: He is alert. No cranial nerve deficit. GCS eye subscore is 3. GCS verbal subscore is 4. GCS motor subscore is 6.   Pt more lethargic today.  Pt following commands.  Pt stating his name and his wife's name.   Skin: Skin is warm and dry. He is not diaphoretic.   Psychiatric: He has a normal mood and affect. His behavior is normal.   Nursing note and vitals reviewed.      Significant Labs: All pertinent labs within the past 24 hours have been reviewed.    Significant Imaging: I have reviewed all pertinent imaging results/findings within the past 24 hours.

## 2017-12-06 NOTE — PROGRESS NOTES
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 Per the patient's daughter, he was reportedly confused and tried to leave during his dialysis sessions multiple times, which has never happened before. She states he does have some baseline dementia, but has never had this kind of confusion or behavior before. He denies recent illnesses, chest pain, shortness of breath, cough, abdominal pain, changes in bowel movements, dysuria, arthralgias, or myalgias. He has not had any complications since his surgery on 11/22.    Nephrology is consulted to manage her ESRD    Interval History: seen in PA this morning, patient was no cooperative and dialysis could not be done    Review of patient's allergies indicates:   Allergen Reactions    Lisinopril Swelling     Current Facility-Administered Medications   Medication Frequency    0.9%  NaCl infusion PRN    0.9%  NaCl infusion Once    acetaminophen tablet 650 mg Q6H PRN    albuterol-ipratropium 2.5mg-0.5mg/3mL nebulizer solution 3 mL Q4H PRN    amLODIPine tablet 10 mg Daily    aspirin chewable tablet 81 mg Daily    atorvastatin tablet 40 mg QHS    brinzolamide 1 % ophthalmic suspension 1 drop TID    calcium acetate capsule 667 mg TID WM    carvedilol tablet 25 mg BID    dextrose 50% injection 12.5 g PRN    dextrose 50% injection 25 g PRN    doxazosin tablet 4 mg Daily     finasteride tablet 5 mg Daily    fluconazole tablet 100 mg Daily    glucagon (human recombinant) injection 1 mg PRN    glucose chewable tablet 16 g PRN    glucose chewable tablet 24 g PRN    heparin (porcine) injection 5,000 Units Q8H    hydrALAZINE tablet 25 mg Q8H PRN    insulin aspart pen 0-5 Units QID (AC + HS) PRN    levETIRAcetam tablet 500 mg BID    ondansetron disintegrating tablet 8 mg Q8H PRN    ondansetron injection 4 mg Q8H PRN    senna-docusate 8.6-50 mg per tablet 1 tablet BID PRN    sodium chloride 0.9% flush 5 mL PRN    travoprost 0.004 % ophthalmic solution 1 drop QHS       Objective:     Vital Signs (Most Recent):  Temp: 98.6 °F (37 °C) (12/06/17 0759)  Pulse: 69 (12/06/17 1100)  Resp: 15 (12/06/17 0759)  BP: (!) 184/91 (12/06/17 0845)  SpO2: 97 % (12/06/17 0759)  O2 Device (Oxygen Therapy): room air (12/06/17 0759) Vital Signs (24h Range):  Temp:  [97.3 °F (36.3 °C)-98.6 °F (37 °C)] 98.6 °F (37 °C)  Pulse:  [54-69] 69  Resp:  [15-20] 15  SpO2:  [97 %-100 %] 97 %  BP: (155-184)/(80-91) 184/91     Weight: 81.2 kg (179 lb 0.2 oz) (12/05/17 0049)  Body mass index is 23.62 kg/m².  Body surface area is 2.05 meters squared.    I/O last 3 completed shifts:  In: 100 [IV Piggyback:100]  Out: 50 [Urine:50]    Physical Exam   Constitutional: He appears well-developed.   HENT:   Head: Normocephalic and atraumatic.   Eyes: EOM are normal.   Neck: No JVD present.   Cardiovascular: Normal rate and regular rhythm.  Exam reveals no friction rub.    Pulmonary/Chest: Effort normal and breath sounds normal.   Abdominal: Soft. He exhibits no distension.   Musculoskeletal: He exhibits no edema.   Neurological: He is alert.   Skin: Skin is warm.           Assessment/Plan:     ESRD (end stage renal disease) on dialysis    Etiology: diabetes    Modality:iHD x 3 years  Days: MWF(last received yesterday and completed the full session)  Access: L arm fistula (good thrill and pulsation, no reported  issues)  Unit: [facility]  Nephrologist: Dr Vang  Tx Duration: 3.5 hrs  EDW:178-179lbs  UF : ?2L  Residual fxn: 1-2 times per day, exact amount uncertain     HTN: elevated this morning, but hadn't received his meds yet  Metabolic acidosis: ok, bicarb 20  Electrolytes: ok, K 5.1  Anemia: Hgb 8.4, no recent iron studies will check, not reported to be on epo  Metabolic Bone disease: on ero and phoslo, phos 3.4, PTH still pending  Nuitrition: albumin 2.7, add nepro    Plan for Dialysis: initially was going to hold off today secondary to patient not being cooperative in the PA as there is no emergent need, however tomorrow will be a very busy day to get him done, so will discuss with primary team and get him done this afternoon in a private room, which is likely where he would need to be dialyzed from now on regardless              Thank you for your consult.    Murphy Gusman MD  Nephrology  [facility]        I have reviewed and concur with the fellow's history, physical, assessment, and plan. I have personally interviewed and examined the patient at bedside.

## 2017-12-06 NOTE — PT/OT/SLP PROGRESS
Occupational Therapy      Patient Name:  Ronald Campbell   MRN:  [id number]    Attempted to see pt twice this date.  On first attempt in morning pt off floor.  On second attempt in afternoon pt about to bed fed with assistance from tech  . Will follow-up tomorrow.    SHIRA Juarez  12/6/2017

## 2017-12-06 NOTE — PROGRESS NOTES
[facility] Hospital Medicine  Progress Note    Patient Name: Ronald Campbell  MRN: [id number]  Patient Class: OP- Observation   Admission Date: 12/4/2017  Length of Stay: 0 days  Attending Physician: Saroj Hernández MD  Primary Care Provider: Js Sanches MD (Inactive)    [facility] Medicine Team: [facility]OSP MED F Chaparrita Dumont PA-C    Subjective:     Principal Problem:Acute cystitis with hematuria    HPI:  89 y/o gentleman who presents to the ED with his daughter d/t AMS and confusion.  He has a PMH of dementia, DMII, CHF, glaucoma, seizures, HTN, ESRD on HD (MWF) He recently underwent L hydrocelectomy and R orchiectomy on 11/22/17 and daughter states that he has been slightly off since.  She reports that his less active and his concentration is decreased.  Reportedly during dialysis today he attempted to leave the clinic three times while undergoing his treatment.  His daughter states that this is very unusual for him.  He was afebrile with no leukocytosis with initial workup. While in the ED urology was consulted d/t recent surgical procedure and concern for possible wound infection. He was evaluated by urology in the ED revealing no evidence of wound infection.  Urinalysis was performed and displays>100RBCs, >100WBCs, 3+ leuks, many yeast, and occasional bacteria.  He denies CP, SOB, N/V/D, fever, chills, or discomfort.     Hospital Course:  Pt admitted to observation for complicated UTI.  Rocephin and fluconazole initiated, UC in process.  UC revealed candida albicans.  12/6 changed fluconazole from po to IV, starting with 200 mg IV x 1 followed by 100 mg IV daily.  Will continue rocephin (give after HD).    PT/OT consulted and recommending SNF.    Interval History:  no acute events overnight.  This morning pt taken down to HD and refused.  Pt more agitated and lethargic today.  When pt back in room with daughter at bedside, pt able to state his name, his wife's name.  Pt following commands  and no neuro deficits.    Review of Systems   Constitutional: Negative for chills, diaphoresis, fever and unexpected weight change.   Eyes: Positive for visual disturbance.        Blind   Respiratory: Negative for cough, chest tightness, shortness of breath and wheezing.    Cardiovascular: Negative for chest pain, palpitations and leg swelling.   Gastrointestinal: Negative for abdominal distention, abdominal pain, diarrhea, nausea and vomiting.   Genitourinary: Positive for decreased urine volume. Negative for discharge, flank pain and testicular pain.        MWF HD patient - s/p R hydrocelectomy and L orchiectomy on 11/22/17   Musculoskeletal: Negative for arthralgias, back pain, myalgias and neck pain.   Skin: Positive for wound. Negative for color change, pallor and rash.        s/p R hydrocelectomy and L orchiectomy on 11/22/17   Neurological: Positive for seizures. Negative for dizziness, syncope and light-headedness.   Psychiatric/Behavioral: Positive for agitation and confusion. Negative for decreased concentration and hallucinations.     Objective:     Vital Signs (Most Recent):  Temp: (!) 93.6 °F (34.2 °C) (12/06/17 1303)  Pulse: 66 (12/06/17 1214)  Resp: 18 (12/06/17 1214)  BP: (!) 183/86 (12/06/17 1214)  SpO2: 95 % (12/06/17 1214) Vital Signs (24h Range):  Temp:  [93.6 °F (34.2 °C)-98.6 °F (37 °C)] 93.6 °F (34.2 °C)  Pulse:  [58-69] 66  Resp:  [15-20] 18  SpO2:  [95 %-100 %] 95 %  BP: (162-184)/(80-91) 183/86     Weight: 81.2 kg (179 lb 0.2 oz)  Body mass index is 23.62 kg/m².    Intake/Output Summary (Last 24 hours) at 12/06/17 1347  Last data filed at 12/05/17 2152   Gross per 24 hour   Intake               50 ml   Output                0 ml   Net               50 ml      Physical Exam   Constitutional: He appears well-developed and well-nourished. No distress.   HENT:   Head: Normocephalic and atraumatic.   Eyes:   Blind    Neck: Normal range of motion. Neck supple.   Cardiovascular: Normal rate,  regular rhythm, normal heart sounds and intact distal pulses.    Pulmonary/Chest: Effort normal and breath sounds normal.   Abdominal: Soft. Bowel sounds are normal.   Genitourinary:   Genitourinary Comments: s/p R hydrocelectomy and L orchiectomy on 11/22/17   Musculoskeletal: Normal range of motion. He exhibits edema.   LUE AV fistula with + thrill and bruit    Neurological: He is alert. No cranial nerve deficit. GCS eye subscore is 3. GCS verbal subscore is 4. GCS motor subscore is 6.   Pt more lethargic today.  Pt following commands.  Pt stating his name and his wife's name.   Skin: Skin is warm and dry. He is not diaphoretic.   Psychiatric: He has a normal mood and affect. His behavior is normal.   Nursing note and vitals reviewed.      Significant Labs: All pertinent labs within the past 24 hours have been reviewed.    Significant Imaging: I have reviewed all pertinent imaging results/findings within the past 24 hours.    Assessment/Plan:      * Acute cystitis with hematuria    - UA with >100 RBC, >100 WBC, occasional bacteria, 3+ leuks, and many yeast  - afebrile with no leukocytosis - nontoxic appearing on admission- presents with increased confusion   - started on ceftriaxone in ED - continue for now; pt started on oral fluconazole 100 mg  - 12/6 UC candida albicans.  In setting of ESRD, T2DM, recent surgery will continue treatment with ceftriaxone (after HD) 1 gm q 24 hrs and fluconazole 200 mg IV x 1 today followed by 100 mg IV daily.  Lactic acid, cortisol, repeat renal function panel, fibrinogen, CK, EKG ordered as rectal temp 93.6.        Hypothermia    12/6 warming blanket applied, sepsis workup in progress as above          HTN (hypertension)    - SBP ranging between 172-197  - reports missing PM medications  - restart home medication regimen  - add PRN hydralazine as need for SBP > 180 or SBP >110        ESRD (end stage renal disease) on dialysis    - LUE AV fistula  - MWF HD schedule  - nephrology  consulted.  12/6 pt refused HD in the morning, nephrology arranging for HD in room this afternoon  - K 5.1 on arrival, repeat 4.6          Scrotal mass    Hydrocele bilateral   -  s/p L hydrocelectomy and R orchiectomy on 11/22/17   - urology consulted while in ED - low probability of wound infection on examination  - continue to monitor         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    - HgA1C 5.7%  - ADA diet  - ISS AC/HS        Dementia with behavioral disturbance    DELIRIUM BEHAVIOR MANAGEMENT  - Minimize use of restraints; if physical restraints necessary, please utilize medical/chemical prns for agitation.  - Keep shades open and room lit during day and room dim at night in order to promote healthy circadian rhythms.  - Encourage family at bedside  - Keep whiteboard in patient's room current with the date and name of the members of patient's team for easy patient self re-orientation.  - Avoid benzodiazepines, antihistamines, anticholinergics, hypnotics, and minimize opiates while controlling for pain as these medications may exacerbate delirium.        Debility    - PT/OT consulted: SNF  - ambulate with assistance only        Seizure disorder    - continue home dose of levetiracetam as directed   - seizure precautions        Blind in both eyes    Glaucoma  - continue ocular drops as directed  - high risk of fall / injury - utilize all safety measures and fall precautions        Anemia in chronic kidney disease    - Hgb 9.0 (baseline 8.7-11.4)  - trend daily        Dyslipidemia    - continue atorvastatin as directed           VTE Risk Mitigation         Ordered     heparin (porcine) injection 5,000 Units  Every 8 hours     Route:  Subcutaneous        12/04/17 2358     Medium Risk of VTE  Once      12/04/17 2358     Medium Risk of VTE  Once      12/04/17 2358     Place MORGAN hose  Until discontinued      12/04/17 2358     Place sequential compression device  Until discontinued      12/04/17 2358               Chaparrita Dumont PA-C  Department of Hospital Medicine   [facility]

## 2017-12-06 NOTE — PLAN OF CARE
Problem: Patient Care Overview  Goal: Plan of Care Review  Outcome: Ongoing (interventions implemented as appropriate)  Pt improving from yesterday. Iv was maintained to his right forearm. Pt showing NSR on tele. Accuchecks were covered at 1 unit Sq. B/P was elevated at beginning of shift but came down below 180. Pt was able to get better rest today. He took his medicines with the minimum of complaints after explaining what each one was for. Due to his dementia, he needs to be reminded of things he was already explained earlier. Repetition important for this patient.

## 2017-12-06 NOTE — PROGRESS NOTES
[facility] Hospital Medicine  Progress Note    Patient Name: Ronald Campbell  MRN: [id number]  Patient Class: OP- Observation   Admission Date: 12/4/2017  Length of Stay: 0 days  Attending Physician: Saroj Hernández MD  Primary Care Provider: Js Sanches MD (Inactive)    [facility] Medicine Team: [facility] MED F Chaparrita Dumont PA-C    Subjective:     Principal Problem:Acute cystitis with hematuria    HPI:  89 y/o gentleman who presents to the ED with his daughter d/t AMS and confusion.  He has a PMH of dementia, DMII, CHF, glaucoma, seizures, HTN, ESRD on HD (MWF) He recently underwent L hydrocelectomy and R orchiectomy on 11/22/17 and daughter states that he has been slightly off since.  She reports that his less active and his concentration is decreased.  Reportedly during dialysis today he attempted to leave the clinic three times while undergoing his treatment.  His daughter states that this is very unusual for him.  He was afebrile with no leukocytosis with initial workup. While in the ED urology was consulted d/t recent surgical procedure and concern for possible wound infection. He was evaluated by urology in the ED revealing no evidence of wound infection.  Urinalysis was performed and displays>100RBCs, >100WBCs, 3+ leuks, many yeast, and occasional bacteria.  He denies CP, SOB, N/V/D, fever, chills, or discomfort.     Hospital Course:  Pt admitted to observation for complicated UTI.  Rocephin initiated, UC in process.    PT/OT consulted and recommending SNF.    Interval History: no acute events overnight, no new complaints    Review of Systems   Constitutional: Negative for chills and fever.   HENT: Negative for congestion.    Eyes: Positive for visual disturbance.        Blind   Respiratory: Negative for cough, chest tightness and shortness of breath.    Cardiovascular: Negative for chest pain, palpitations and leg swelling.   Gastrointestinal: Negative for abdominal distention,  abdominal pain, diarrhea, nausea and vomiting.   Genitourinary: Positive for decreased urine volume.        MWF HD patient - s/p R hydrocelectomy and L orchiectomy on 11/22/17   Musculoskeletal: Negative for arthralgias, back pain and myalgias.   Skin: Positive for wound. Negative for rash.        s/p R hydrocelectomy and L orchiectomy on 11/22/17   Neurological: Positive for seizures. Negative for dizziness, syncope and light-headedness.   Psychiatric/Behavioral: Positive for confusion.     Objective:     Vital Signs (Most Recent):  Temp: 96 °F (35.6 °C) (12/05/17 0749)  Pulse: (!) 54 (12/05/17 1218)  Resp: 18 (12/05/17 1218)  BP: (!) 155/83 (12/05/17 1218)  SpO2: 98 % (12/05/17 1218) Vital Signs (24h Range):  Temp:  [95.9 °F (35.5 °C)-97.9 °F (36.6 °C)] 96 °F (35.6 °C)  Pulse:  [54-76] 54  Resp:  [17-20] 18  SpO2:  [98 %-100 %] 98 %  BP: (151-197)/(81-89) 155/83     Weight: 81.2 kg (179 lb 0.2 oz)  Body mass index is 23.62 kg/m².    Intake/Output Summary (Last 24 hours) at 12/05/17 1814  Last data filed at 12/05/17 1113   Gross per 24 hour   Intake               50 ml   Output               50 ml   Net                0 ml      Physical Exam   Constitutional: He is oriented to person, place, and time. He appears well-developed and well-nourished. No distress.   HENT:   Head: Normocephalic and atraumatic.   Eyes:   Blind    Neck: Normal range of motion. Neck supple.   Cardiovascular: Normal rate, regular rhythm, normal heart sounds and intact distal pulses.    Pulmonary/Chest: Effort normal and breath sounds normal.   Abdominal: Soft. Bowel sounds are normal.   Genitourinary:   Genitourinary Comments: s/p R hydrocelectomy and L orchiectomy on 11/22/17   Musculoskeletal: Normal range of motion. He exhibits edema.   LUE AV fistula with + thrill and bruit    Neurological: He is alert and oriented to person, place, and time.   Skin: Skin is warm and dry. He is not diaphoretic.   Psychiatric: He has a normal mood and  affect. His behavior is normal.   Nursing note and vitals reviewed.      Significant Labs: All pertinent labs within the past 24 hours have been reviewed.    Significant Imaging: I have reviewed all pertinent imaging results/findings within the past 24 hours.    Assessment/Plan:      * Acute cystitis with hematuria    - UA with >100 RBC, >100 WBC, occasional bacteria, 3+ leuks, and many yeast  - afebrile with no leukocytosis - nontoxic appearing - presents with increased confusion   - started on ceftriaxone in ED - continue for now  - start diflucan now  - Urine culture in process - f/u on results and adjust therapy as indicated        ESRD (end stage renal disease) on dialysis    - LUE AV fistula  - MWF HD schedule  - nephrology consulted  - K 5.1 on arrival, repeat 4.6          HTN (hypertension)    - SBP ranging between 172-197  - reports missing PM medications  - restart home medication regimen  - add PRN hydralazine as need for SBP > 180 or SBP >110        Scrotal mass    Hydrocele bilateral   -  s/p L hydrocelectomy and R orchiectomy on 11/22/17   - urology consulted while in ED - low probability of wound infection on examination  - continue to monitor         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    - HgA1C 5.7%  - ADA diet  - ISS AC/HS        Dementia with behavioral disturbance    - presents with worsening confusion    - utilize all safety precautions  - if family unable to stay may want to consider a sitter        Debility    - PT/OT consulted: SNF  - ambulate with assistance only        Seizure disorder    - continue home dose of levetiracetam as directed   - seizure precautions        Blind in both eyes    Glaucoma  - continue ocular drops as directed  - high risk of fall / injury - utilize all safety measures and fall precautions        Anemia in chronic kidney disease    - Hgb 9.0 (baseline 8.7-11.4)  - trend daily        Dyslipidemia    - continue atorvastatin as directed           VTE  Risk Mitigation         Ordered     heparin (porcine) injection 5,000 Units  Every 8 hours     Route:  Subcutaneous        12/04/17 2358     Medium Risk of VTE  Once      12/04/17 2358     Medium Risk of VTE  Once      12/04/17 2358     Place MORGAN hose  Until discontinued      12/04/17 2358     Place sequential compression device  Until discontinued      12/04/17 2358              Chaparrita Dumont PA-C  Department of Hospital Medicine   [facility]

## 2017-12-06 NOTE — SUBJECTIVE & OBJECTIVE
Interval History: seen in PA this morning, patient was no cooperative and dialysis could not be done    Review of patient's allergies indicates:   Allergen Reactions    Lisinopril Swelling     Current Facility-Administered Medications   Medication Frequency    0.9%  NaCl infusion PRN    0.9%  NaCl infusion Once    acetaminophen tablet 650 mg Q6H PRN    albuterol-ipratropium 2.5mg-0.5mg/3mL nebulizer solution 3 mL Q4H PRN    amLODIPine tablet 10 mg Daily    aspirin chewable tablet 81 mg Daily    atorvastatin tablet 40 mg QHS    brinzolamide 1 % ophthalmic suspension 1 drop TID    calcium acetate capsule 667 mg TID WM    carvedilol tablet 25 mg BID    dextrose 50% injection 12.5 g PRN    dextrose 50% injection 25 g PRN    doxazosin tablet 4 mg Daily    finasteride tablet 5 mg Daily    fluconazole tablet 100 mg Daily    glucagon (human recombinant) injection 1 mg PRN    glucose chewable tablet 16 g PRN    glucose chewable tablet 24 g PRN    heparin (porcine) injection 5,000 Units Q8H    hydrALAZINE tablet 25 mg Q8H PRN    insulin aspart pen 0-5 Units QID (AC + HS) PRN    levETIRAcetam tablet 500 mg BID    ondansetron disintegrating tablet 8 mg Q8H PRN    ondansetron injection 4 mg Q8H PRN    senna-docusate 8.6-50 mg per tablet 1 tablet BID PRN    sodium chloride 0.9% flush 5 mL PRN    travoprost 0.004 % ophthalmic solution 1 drop QHS       Objective:     Vital Signs (Most Recent):  Temp: 98.6 °F (37 °C) (12/06/17 0759)  Pulse: 69 (12/06/17 1100)  Resp: 15 (12/06/17 0759)  BP: (!) 184/91 (12/06/17 0845)  SpO2: 97 % (12/06/17 0759)  O2 Device (Oxygen Therapy): room air (12/06/17 0759) Vital Signs (24h Range):  Temp:  [97.3 °F (36.3 °C)-98.6 °F (37 °C)] 98.6 °F (37 °C)  Pulse:  [54-69] 69  Resp:  [15-20] 15  SpO2:  [97 %-100 %] 97 %  BP: (155-184)/(80-91) 184/91     Weight: 81.2 kg (179 lb 0.2 oz) (12/05/17 0049)  Body mass index is 23.62 kg/m².  Body surface area is 2.05 meters  squared.    I/O last 3 completed shifts:  In: 100 [IV Piggyback:100]  Out: 50 [Urine:50]    Physical Exam   Constitutional: He appears well-developed.   HENT:   Head: Normocephalic and atraumatic.   Eyes: EOM are normal.   Neck: No JVD present.   Cardiovascular: Normal rate and regular rhythm.  Exam reveals no friction rub.    Pulmonary/Chest: Effort normal and breath sounds normal.   Abdominal: Soft. He exhibits no distension.   Musculoskeletal: He exhibits no edema.   Neurological: He is alert.   Skin: Skin is warm.

## 2017-12-06 NOTE — ASSESSMENT & PLAN NOTE
DELIRIUM BEHAVIOR MANAGEMENT  - Minimize use of restraints; if physical restraints necessary, please utilize medical/chemical prns for agitation.  - Keep shades open and room lit during day and room dim at night in order to promote healthy circadian rhythms.  - Encourage family at bedside  - Keep whiteboard in patient's room current with the date and name of the members of patient's team for easy patient self re-orientation.  - Avoid benzodiazepines, antihistamines, anticholinergics, hypnotics, and minimize opiates while controlling for pain as these medications may exacerbate delirium.

## 2017-12-06 NOTE — PLAN OF CARE
AMISHA received a return call from the patient's , Graciela ([phone]), at [facility] stating that [facility] & [facility] are both in network with [facility]. AMISHA informed Graciela that referrals have been sent to both facilities. Graciela stated that SNF acceptance will be handled by MAIRA Kendrick ([phone]) at [facility]. Graciela also stated that the patient's hospital follow up appointment with his PCP, Dr. Bart Shaw, will be scheduled by [facility] following his discharge. AMISHA informed MAIRA Correa ([phone]) of above. Awaiting acceptance. Will continue to follow.

## 2017-12-06 NOTE — PLAN OF CARE
Pt was denied from [facility] because they dont take [facility].  [facility] is reviewing it.  SW called  MAIRA Mireille ([phone]) at [facility].  She stated that they are meeting as a team tomorrow from 8:30am to about 10am.  She wanted the SW to call her after this. They have not approved SNF yet.  SW asked if she could give the SW additional SNF choices to send information to.  She stated that because they have not approved it yet she can not give me additional choices.  SW will f/u with her tomorrow.    Madeline Carrillo, [facility] x [phone]

## 2017-12-06 NOTE — ASSESSMENT & PLAN NOTE
- UA with >100 RBC, >100 WBC, occasional bacteria, 3+ leuks, and many yeast  - afebrile with no leukocytosis - nontoxic appearing on admission- presents with increased confusion   - started on ceftriaxone in ED - continue for now; pt started on oral fluconazole 100 mg  - 12/6 UC candida albicans.  In setting of ESRD, T2DM, recent surgery will continue treatment with ceftriaxone (after HD) 1 gm q 24 hrs and fluconazole 200 mg IV x 1 today followed by 100 mg IV daily.  Lactic acid, cortisol, repeat renal function panel, fibrinogen, CK, EKG ordered as rectal temp 93.6.

## 2017-12-06 NOTE — PROGRESS NOTES
Due to agitation and confusion, refusing dialysis.  Daughter, Gabriela, at the bedside and unable to change pt's mind.  Dr. Gusman at the bedside.  Dialysis canceled for today.  LALITA Charles notified.  Transported from dialysis unit to room 1160 B via stretcher by transporter accompanied by daughter.

## 2017-12-06 NOTE — HOSPITAL COURSE
Pt admitted to observation for complicated UTI. Rocephin (received 3 doses) and fluconazole initiated. UC revealed candida albicans. 12/6 changed fluconazole from po to IV, starting with 200 mg IV x 1 followed by 100 mg po daily for 14 days. 12/6 pt with hypothermia, septic workup negative and resolved same day with warming blanket.  HD complete on 12/6. 12/8 pt with increased scrotal swelling. Discussed with nursing scrotal elevation. 12/9-12/12 scrotal swelling improved with elevation. Confusion/agitation waxes and wanes but generally improves throughout the day and with reorientation. Discharged to [facility] to complete 14 day course of fluconazole.

## 2017-12-07 LAB
ALBUMIN SERPL BCP-MCNC: 2.6 G/DL
ALP SERPL-CCNC: 73 U/L
ALT SERPL W/O P-5'-P-CCNC: 11 U/L
ANION GAP SERPL CALC-SCNC: 6 MMOL/L
AST SERPL-CCNC: 14 U/L
BASOPHILS # BLD AUTO: 0.01 K/UL
BASOPHILS NFR BLD: 0.2 %
BILIRUB SERPL-MCNC: 0.3 MG/DL
BUN SERPL-MCNC: 35 MG/DL
CALCIUM SERPL-MCNC: 8.2 MG/DL
CHLORIDE SERPL-SCNC: 105 MMOL/L
CO2 SERPL-SCNC: 24 MMOL/L
CREAT SERPL-MCNC: 5.4 MG/DL
DIFFERENTIAL METHOD: ABNORMAL
EOSINOPHIL # BLD AUTO: 0.1 K/UL
EOSINOPHIL NFR BLD: 0.9 %
ERYTHROCYTE [DISTWIDTH] IN BLOOD BY AUTOMATED COUNT: 19.2 %
EST. GFR  (AFRICAN AMERICAN): 10.1 ML/MIN/1.73 M^2
EST. GFR  (NON AFRICAN AMERICAN): 8.7 ML/MIN/1.73 M^2
GLUCOSE SERPL-MCNC: 77 MG/DL
HCT VFR BLD AUTO: 23.6 %
HGB BLD-MCNC: 7.9 G/DL
IMM GRANULOCYTES # BLD AUTO: 0.02 K/UL
IMM GRANULOCYTES NFR BLD AUTO: 0.3 %
LYMPHOCYTES # BLD AUTO: 1.4 K/UL
LYMPHOCYTES NFR BLD: 24.7 %
MAGNESIUM SERPL-MCNC: 2 MG/DL
MCH RBC QN AUTO: 23.9 PG
MCHC RBC AUTO-ENTMCNC: 33.5 G/DL
MCV RBC AUTO: 72 FL
MONOCYTES # BLD AUTO: 0.5 K/UL
MONOCYTES NFR BLD: 9.3 %
NEUTROPHILS # BLD AUTO: 3.7 K/UL
NEUTROPHILS NFR BLD: 64.6 %
NRBC BLD-RTO: 0 /100 WBC
PHOSPHATE SERPL-MCNC: 3.5 MG/DL
PLATELET # BLD AUTO: 133 K/UL
PMV BLD AUTO: ABNORMAL FL
POCT GLUCOSE: 132 MG/DL (ref 70–110)
POCT GLUCOSE: 136 MG/DL (ref 70–110)
POCT GLUCOSE: 150 MG/DL (ref 70–110)
POTASSIUM SERPL-SCNC: 4.4 MMOL/L
PROT SERPL-MCNC: 7 G/DL
RBC # BLD AUTO: 3.3 M/UL
SODIUM SERPL-SCNC: 135 MMOL/L
WBC # BLD AUTO: 5.72 K/UL

## 2017-12-07 PROCEDURE — 85025 COMPLETE CBC W/AUTO DIFF WBC: CPT

## 2017-12-07 PROCEDURE — G8987 SELF CARE CURRENT STATUS: HCPCS | Mod: CK

## 2017-12-07 PROCEDURE — G0378 HOSPITAL OBSERVATION PER HR: HCPCS

## 2017-12-07 PROCEDURE — 25000003 PHARM REV CODE 250: Performed by: PHYSICIAN ASSISTANT

## 2017-12-07 PROCEDURE — A4216 STERILE WATER/SALINE, 10 ML: HCPCS | Performed by: NURSE PRACTITIONER

## 2017-12-07 PROCEDURE — 25000003 PHARM REV CODE 250: Performed by: STUDENT IN AN ORGANIZED HEALTH CARE EDUCATION/TRAINING PROGRAM

## 2017-12-07 PROCEDURE — 99226 PR SUBSEQUENT OBSERVATION CARE,LEVEL III: CPT | Mod: ,,, | Performed by: PHYSICIAN ASSISTANT

## 2017-12-07 PROCEDURE — 25000003 PHARM REV CODE 250: Performed by: NURSE PRACTITIONER

## 2017-12-07 PROCEDURE — 80053 COMPREHEN METABOLIC PANEL: CPT

## 2017-12-07 PROCEDURE — 84100 ASSAY OF PHOSPHORUS: CPT

## 2017-12-07 PROCEDURE — 97530 THERAPEUTIC ACTIVITIES: CPT

## 2017-12-07 PROCEDURE — 63600175 PHARM REV CODE 636 W HCPCS: Performed by: NURSE PRACTITIONER

## 2017-12-07 PROCEDURE — G8988 SELF CARE GOAL STATUS: HCPCS | Mod: CJ

## 2017-12-07 PROCEDURE — 36415 COLL VENOUS BLD VENIPUNCTURE: CPT

## 2017-12-07 PROCEDURE — 63600175 PHARM REV CODE 636 W HCPCS: Performed by: PHYSICIAN ASSISTANT

## 2017-12-07 PROCEDURE — 83735 ASSAY OF MAGNESIUM: CPT

## 2017-12-07 RX ORDER — SODIUM CHLORIDE 9 MG/ML
INJECTION, SOLUTION INTRAVENOUS ONCE
Status: COMPLETED | OUTPATIENT
Start: 2017-12-07 | End: 2017-12-08

## 2017-12-07 RX ORDER — FLUCONAZOLE 2 MG/ML
200 INJECTION, SOLUTION INTRAVENOUS ONCE
Status: COMPLETED | OUTPATIENT
Start: 2017-12-07 | End: 2017-12-07

## 2017-12-07 RX ORDER — SODIUM CHLORIDE 9 MG/ML
INJECTION, SOLUTION INTRAVENOUS
Status: DISCONTINUED | OUTPATIENT
Start: 2017-12-07 | End: 2017-12-12 | Stop reason: HOSPADM

## 2017-12-07 RX ORDER — CALCIUM ACETATE 667 MG/1
1334 CAPSULE ORAL
Status: DISCONTINUED | OUTPATIENT
Start: 2017-12-07 | End: 2017-12-12 | Stop reason: HOSPADM

## 2017-12-07 RX ADMIN — LEVETIRACETAM 500 MG: 500 TABLET ORAL at 09:12

## 2017-12-07 RX ADMIN — CEFTRIAXONE SODIUM 1 G: 1 INJECTION, POWDER, FOR SOLUTION INTRAMUSCULAR; INTRAVENOUS at 09:12

## 2017-12-07 RX ADMIN — CARVEDILOL 25 MG: 25 TABLET, FILM COATED ORAL at 09:12

## 2017-12-07 RX ADMIN — SODIUM CHLORIDE, PRESERVATIVE FREE 5 ML: 5 INJECTION INTRAVENOUS at 04:12

## 2017-12-07 RX ADMIN — SODIUM CHLORIDE, PRESERVATIVE FREE 5 ML: 5 INJECTION INTRAVENOUS at 05:12

## 2017-12-07 RX ADMIN — CALCIUM ACETATE 667 MG: 667 CAPSULE ORAL at 08:12

## 2017-12-07 RX ADMIN — TRAVOPROST 1 DROP: 0.04 SOLUTION/ DROPS OPHTHALMIC at 09:12

## 2017-12-07 RX ADMIN — BRINZOLAMIDE 1 DROP: 10 SUSPENSION/ DROPS OPHTHALMIC at 03:12

## 2017-12-07 RX ADMIN — HEPARIN SODIUM 5000 UNITS: 5000 INJECTION, SOLUTION INTRAVENOUS; SUBCUTANEOUS at 09:12

## 2017-12-07 RX ADMIN — HEPARIN SODIUM 5000 UNITS: 5000 INJECTION, SOLUTION INTRAVENOUS; SUBCUTANEOUS at 06:12

## 2017-12-07 RX ADMIN — BRINZOLAMIDE 1 DROP: 10 SUSPENSION/ DROPS OPHTHALMIC at 09:12

## 2017-12-07 RX ADMIN — CALCIUM ACETATE 667 MG: 667 CAPSULE ORAL at 01:12

## 2017-12-07 RX ADMIN — ASPIRIN 81 MG CHEWABLE TABLET 81 MG: 81 TABLET CHEWABLE at 09:12

## 2017-12-07 RX ADMIN — HEPARIN SODIUM 5000 UNITS: 5000 INJECTION, SOLUTION INTRAVENOUS; SUBCUTANEOUS at 01:12

## 2017-12-07 RX ADMIN — DOXAZOSIN MESYLATE 4 MG: 2 TABLET ORAL at 09:12

## 2017-12-07 RX ADMIN — CALCIUM ACETATE 667 MG: 667 CAPSULE ORAL at 04:12

## 2017-12-07 RX ADMIN — BRINZOLAMIDE 1 DROP: 10 SUSPENSION/ DROPS OPHTHALMIC at 06:12

## 2017-12-07 RX ADMIN — AMLODIPINE BESYLATE 10 MG: 10 TABLET ORAL at 09:12

## 2017-12-07 RX ADMIN — ATORVASTATIN CALCIUM 40 MG: 20 TABLET, FILM COATED ORAL at 09:12

## 2017-12-07 RX ADMIN — FLUCONAZOLE 200 MG: 2 INJECTION INTRAVENOUS at 01:12

## 2017-12-07 RX ADMIN — CALCIUM ACETATE 1334 MG: 667 CAPSULE ORAL at 05:12

## 2017-12-07 RX ADMIN — FINASTERIDE 5 MG: 5 TABLET, FILM COATED ORAL at 09:12

## 2017-12-07 NOTE — PROGRESS NOTES
[facility] Hospital Medicine  Progress Note    Patient Name: Ronald Campbell  MRN: [id number]  Patient Class: OP- Observation   Admission Date: 12/4/2017  Length of Stay: 0 days  Attending Physician: Saroj Hernández MD  Primary Care Provider: Js Sanches MD (Inactive)    [facility] Medicine Team: [facility]OSP MED F Chaparrita Dumont PA-C    Subjective:     Principal Problem:Acute cystitis with hematuria    HPI:  89 y/o gentleman who presents to the ED with his daughter d/t AMS and confusion.  He has a PMH of dementia, DMII, CHF, glaucoma, seizures, HTN, ESRD on HD (MWF) He recently underwent L hydrocelectomy and R orchiectomy on 11/22/17 and daughter states that he has been slightly off since.  She reports that his less active and his concentration is decreased.  Reportedly during dialysis today he attempted to leave the clinic three times while undergoing his treatment.  His daughter states that this is very unusual for him.  He was afebrile with no leukocytosis with initial workup. While in the ED urology was consulted d/t recent surgical procedure and concern for possible wound infection. He was evaluated by urology in the ED revealing no evidence of wound infection.  Urinalysis was performed and displays>100RBCs, >100WBCs, 3+ leuks, many yeast, and occasional bacteria.  He denies CP, SOB, N/V/D, fever, chills, or discomfort.     Hospital Course:  Pt admitted to observation for complicated UTI.  Rocephin and fluconazole initiated, UC in process.  UC revealed candida albicans.  12/6 changed fluconazole from po to IV, starting with 200 mg IV x 1 followed by 100 mg IV daily.  Will continue rocephin (give after HD).  HD complete on 12/6.    PT/OT consulted and recommending SNF.    Interval History: no acute events overnight.  Pt at baseline this morning.  Rewarming blanket removed.    Review of Systems   Constitutional: Negative for chills, diaphoresis, fever and unexpected weight change.   Eyes:  Positive for visual disturbance.        Blind   Respiratory: Negative for cough, chest tightness, shortness of breath and wheezing.    Cardiovascular: Negative for chest pain, palpitations and leg swelling.   Gastrointestinal: Negative for abdominal distention, abdominal pain, diarrhea, nausea and vomiting.   Genitourinary: Positive for decreased urine volume. Negative for discharge, flank pain and testicular pain.        MWF HD patient - s/p R hydrocelectomy and L orchiectomy on 11/22/17   Musculoskeletal: Negative for arthralgias, back pain, myalgias and neck pain.   Skin: Positive for wound. Negative for color change, pallor and rash.        s/p R hydrocelectomy and L orchiectomy on 11/22/17   Neurological: Positive for seizures. Negative for dizziness, syncope and light-headedness.   Psychiatric/Behavioral: Positive for confusion. Negative for agitation, decreased concentration and hallucinations.     Objective:     Vital Signs (Most Recent):  Temp: 97.9 °F (36.6 °C) (12/07/17 0522)  Pulse: 65 (12/07/17 0700)  Resp: 20 (12/07/17 0522)  BP: (!) 144/89 (12/07/17 0933)  SpO2: 99 % (12/07/17 0522) Vital Signs (24h Range):  Temp:  [93.9 °F (34.4 °C)-98.6 °F (37 °C)] 97.9 °F (36.6 °C)  Pulse:  [56-80] 65  Resp:  [16-20] 20  SpO2:  [98 %-100 %] 99 %  BP: (133-180)/(54-89) 144/89     Weight: 81.2 kg (179 lb 0.2 oz)  Body mass index is 23.62 kg/m².    Intake/Output Summary (Last 24 hours) at 12/07/17 1309  Last data filed at 12/06/17 1952   Gross per 24 hour   Intake              500 ml   Output             3500 ml   Net            -3000 ml      Physical Exam   Constitutional: He appears well-developed and well-nourished. No distress.   HENT:   Head: Normocephalic and atraumatic.   Eyes:   Blind    Neck: Normal range of motion. Neck supple.   Cardiovascular: Normal rate, regular rhythm, normal heart sounds and intact distal pulses.    Pulmonary/Chest: Effort normal and breath sounds normal.   Abdominal: Soft. Bowel  sounds are normal.   Genitourinary:   Genitourinary Comments: s/p R hydrocelectomy and L orchiectomy on 11/22/17   Musculoskeletal: Normal range of motion. He exhibits edema.   LUE AV fistula with + thrill and bruit    Neurological: He is alert. No cranial nerve deficit. GCS eye subscore is 3. GCS verbal subscore is 4. GCS motor subscore is 6.   Pt back to baseline.  Pt following commands.  Pt stating his name and his wife's name.   Skin: Skin is warm and dry. He is not diaphoretic.   Psychiatric: He has a normal mood and affect. His behavior is normal.   Nursing note and vitals reviewed.      Significant Labs: All pertinent labs within the past 24 hours have been reviewed.    Significant Imaging: I have reviewed all pertinent imaging results/findings within the past 24 hours.    Assessment/Plan:      * Acute cystitis with hematuria    - UA with >100 RBC, >100 WBC, occasional bacteria, 3+ leuks, and many yeast  - afebrile with no leukocytosis - nontoxic appearing on admission- presents with increased confusion   - started on ceftriaxone in ED - continue for now; pt started on oral fluconazole 100 mg  - 12/6 UC candida albicans.  In setting of ESRD, T2DM, recent surgery will continue treatment with ceftriaxone (after HD) 1 gm q 24 hrs (end 12/7, 3 doses) and fluconazole 200 mg IV x 1 today followed by 100 mg IV daily.  Lactic acid, cortisol, CK (all WNL), EKG NSR ordered as rectal temp 93.6.  - 12/7 pt back to baseline.  Unclear why fluconazole was not given yesterday after HD.  Called RN today to give 200 mg x 1.        Hypothermia    12/6 warming blanket applied, sepsis workup in progress as above and negative  12/7 warming blanket removed. T 97.9 F.          HTN (hypertension)    - SBP ranging between 172-197  - reports missing PM medications  - restart home medication regimen  - add PRN hydralazine as need for SBP > 180 or SBP >110        ESRD (end stage renal disease) on dialysis    - LUE AV fistula  - MWF HD  schedule  - Nephrology consulted.  12/6 pt refused HD in the morning, nephrology arranging for HD in room this afternoon, completed successfully.  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  - Keep shades open and room lit during day and room dim at night in order to promote healthy circadian rhythms.  - Encourage family at bedside  - Keep whiteboard in patient's room current with the date and name of the members of patient's team for easy patient self re-orientation.  - Avoid benzodiazepines, antihistamines, anticholinergics, hypnotics, and minimize opiates while controlling for pain as these medications may exacerbate delirium.  12/7 at baseline        Debility    - PT/OT consulted: SNF  - ambulate with assistance only        Seizure disorder    - continue home dose of levetiracetam as directed   - seizure precautions        Blind in both eyes    Glaucoma  - continue ocular drops as directed  - high risk of fall / injury - utilize all safety measures and fall precautions        Anemia in chronic kidney disease    - Hgb 9.0 (baseline 8.7-11.4)  - trend daily        Dyslipidemia    - continue atorvastatin as directed           VTE Risk Mitigation         Ordered     heparin (porcine) injection 5,000 Units  Every 8 hours     Route:  Subcutaneous        12/04/17 2358     Medium Risk of VTE  Once      12/04/17 2358     Medium Risk of VTE  Once      12/04/17 2358     Place MORGAN hose  Until discontinued      12/04/17 2358     Place  sequential compression device  Until discontinued      12/04/17 6587              Chaparrita Dumont PA-C  Department of Hospital Medicine   [facility]

## 2017-12-07 NOTE — PLAN OF CARE
MAIRA spoke with the CM.  SHe was informed by the [facility] CM that the other SNF options are [facility], [facility], [facility], and [facility].  MAIRA spoke with the pt's dtr, Gabriela ([phone]).  MAIRA informed her that the pt was denied from [facility] and [facility]. SHe stated that she lives in the [facility].  She will not place him in [facility] or the [facility].  She was agreeable to the SW contacting [facility].  MAIRA sent a referral request to the [facility].  MAIRA will f/u this afternoon.    Madeline Carrillo, [facility] [phone]

## 2017-12-07 NOTE — ASSESSMENT & PLAN NOTE
- LUE AV fistula  - MWF HD schedule  - Nephrology consulted.  12/6 pt refused HD in the morning, nephrology arranging for HD in room this afternoon, completed successfully.  - K 5.1 on arrival, repeat 4.6

## 2017-12-07 NOTE — PLAN OF CARE
SW was informed by [facility] that they can not accept the pt.  They can not accept any HD pts at this time.  MAIRA left another message for the MAIRA Kendrick at [facility].    Madeline Carrillo, [facility] x [phone]

## 2017-12-07 NOTE — PROGRESS NOTES
Hemodialysis x 3 hours complete. 3 liter net fluid removal. Blood returned without difficulty. Needles removed and pressure held x 10 minutes until hemostasis achieved. Gauze pressure dressing applied and secured with tape. Positive bruit/thrill noted.

## 2017-12-07 NOTE — ASSESSMENT & PLAN NOTE
- UA with >100 RBC, >100 WBC, occasional bacteria, 3+ leuks, and many yeast  - afebrile with no leukocytosis - nontoxic appearing on admission- presents with increased confusion   - started on ceftriaxone in ED - continue for now; pt started on oral fluconazole 100 mg  - 12/6 UC candida albicans.  In setting of ESRD, T2DM, recent surgery will continue treatment with ceftriaxone (after HD) 1 gm q 24 hrs (end 12/7, 3 doses) and fluconazole 200 mg IV x 1 today followed by 100 mg IV daily.  Lactic acid, cortisol, CK (all WNL), EKG NSR ordered as rectal temp 93.6.  - 12/7 pt back to baseline.  Unclear why fluconazole was not given yesterday after HD.  Called RN today to give 200 mg x 1.

## 2017-12-07 NOTE — SUBJECTIVE & OBJECTIVE
Interval History: no acute events overnight.  Pt at baseline this morning.  Rewarming blanket removed.    Review of Systems   Constitutional: Negative for chills, diaphoresis, fever and unexpected weight change.   Eyes: Positive for visual disturbance.        Blind   Respiratory: Negative for cough, chest tightness, shortness of breath and wheezing.    Cardiovascular: Negative for chest pain, palpitations and leg swelling.   Gastrointestinal: Negative for abdominal distention, abdominal pain, diarrhea, nausea and vomiting.   Genitourinary: Positive for decreased urine volume. Negative for discharge, flank pain and testicular pain.        MWF HD patient - s/p R hydrocelectomy and L orchiectomy on 11/22/17   Musculoskeletal: Negative for arthralgias, back pain, myalgias and neck pain.   Skin: Positive for wound. Negative for color change, pallor and rash.        s/p R hydrocelectomy and L orchiectomy on 11/22/17   Neurological: Positive for seizures. Negative for dizziness, syncope and light-headedness.   Psychiatric/Behavioral: Positive for confusion. Negative for agitation, decreased concentration and hallucinations.     Objective:     Vital Signs (Most Recent):  Temp: 97.9 °F (36.6 °C) (12/07/17 0522)  Pulse: 65 (12/07/17 0700)  Resp: 20 (12/07/17 0522)  BP: (!) 144/89 (12/07/17 0933)  SpO2: 99 % (12/07/17 0522) Vital Signs (24h Range):  Temp:  [93.9 °F (34.4 °C)-98.6 °F (37 °C)] 97.9 °F (36.6 °C)  Pulse:  [56-80] 65  Resp:  [16-20] 20  SpO2:  [98 %-100 %] 99 %  BP: (133-180)/(54-89) 144/89     Weight: 81.2 kg (179 lb 0.2 oz)  Body mass index is 23.62 kg/m².    Intake/Output Summary (Last 24 hours) at 12/07/17 1309  Last data filed at 12/06/17 1952   Gross per 24 hour   Intake              500 ml   Output             3500 ml   Net            -3000 ml      Physical Exam   Constitutional: He appears well-developed and well-nourished. No distress.   HENT:   Head: Normocephalic and atraumatic.   Eyes:   Blind    Neck:  Normal range of motion. Neck supple.   Cardiovascular: Normal rate, regular rhythm, normal heart sounds and intact distal pulses.    Pulmonary/Chest: Effort normal and breath sounds normal.   Abdominal: Soft. Bowel sounds are normal.   Genitourinary:   Genitourinary Comments: s/p R hydrocelectomy and L orchiectomy on 11/22/17   Musculoskeletal: Normal range of motion. He exhibits edema.   LUE AV fistula with + thrill and bruit    Neurological: He is alert. No cranial nerve deficit. GCS eye subscore is 3. GCS verbal subscore is 4. GCS motor subscore is 6.   Pt back to baseline.  Pt following commands.  Pt stating his name and his wife's name.   Skin: Skin is warm and dry. He is not diaphoretic.   Psychiatric: He has a normal mood and affect. His behavior is normal.   Nursing note and vitals reviewed.      Significant Labs: All pertinent labs within the past 24 hours have been reviewed.    Significant Imaging: I have reviewed all pertinent imaging results/findings within the past 24 hours.

## 2017-12-07 NOTE — PLAN OF CARE
MAIRA left a message for MAIRA Mireille ([phone]) at [facility] to find out what their decision is about SNF.  MAIRA called Moraima at [facility] ([phone]).  SHe stated that no one has looked at it yet but she will have someone review it.  She also stated that their  wants all [facility] pts to go to [facility], [facility] facility.  However, she will talk with her .  She will call the SW back.  SW will f/u as needed.    Madeline Carrillo, [facility] x [phone]

## 2017-12-07 NOTE — PLAN OF CARE
Problem: Patient Care Overview  Goal: Plan of Care Review  Outcome: Ongoing (interventions implemented as appropriate)  SR up x2, call bell in reach, bed in low locked position, skid proof socks on.  Patient is calm and cooperative.  Patient remained free of fall and injuries during the shift.  Care plan explained to patient. No concerns, will continue to monitor.

## 2017-12-07 NOTE — PT/OT/SLP PROGRESS
Physical Therapy Treatment    Patient Name:  Ronald Campbell   MRN:  [id number]    Recommendations:     Discharge Recommendations:  nursing facility, skilled   Discharge Equipment Recommendations: none   Barriers to discharge: Inaccessible home and Decreased caregiver support    Assessment:     oRnald Campbell is a 88 y.o. male admitted with a medical diagnosis of Acute cystitis with hematuria.  He presents with the following impairments/functional limitations:  visual deficits, impaired functional mobilty, gait instability, impaired balance. Pt required increased assistance from previous PT session. He required Max A for Sit<>Stand from EOB with bed elevated. Pt was unable to sit in room chair due to low height of chair rendering pt unable to rise when needed to transfer back to bed. He would benefit from continued skilled physical therapy to address impairments. He would benefit from therapy in a skilled nursing setting upon discharge.    Rehab Prognosis:  Fair; patient would benefit from acute skilled PT services to address these deficits and reach maximum level of function.      Recent Surgery: * No surgery found *      Plan:     During this hospitalization, patient to be seen 4 x/week to address the above listed problems via gait training, therapeutic activities, therapeutic exercises  · Plan of Care Expires:  01/04/18   Plan of Care Reviewed with: patient, spouse    Subjective     Communicated with RN prior to session.  Patient found with HOB elevated and wife present upon PT entry to room, agreeable to treatment.      Note pt is blind.    Chief Complaint: Pt has no complaints today  Patient comments/goals: Pt reports no new goals today  Pain/Comfort:  · Pain Rating 1: 0/10  · Pain Rating Post-Intervention 1: 0/10    Patients cultural, spiritual, Adventist conflicts given the current situation:      Objective:     Patient found with: peripheral IV bed warmer    General Precautions: Standard, fall   Orthopedic  Precautions:N/A   Braces: N/A     Functional Mobility:  · Bed Mobility:     · Supine to Sit: minimum assistance with assist at legs  · Sit to Supine: moderate assistance with trunk assist and leg lift  · Transfers:     · Sit to Stand:  maximal assistance with no AD from EOB with bed elevated  · Gait: took 4 side steps toward HOB with Min A and no AD      AM-PAC 6 CLICK MOBILITY  Turning over in bed (including adjusting bedclothes, sheets and blankets)?: 3  Sitting down on and standing up from a chair with arms (e.g., wheelchair, bedside commode, etc.): 2  Moving from lying on back to sitting on the side of the bed?: 3  Moving to and from a bed to a chair (including a wheelchair)?: 2  Need to walk in hospital room?: 1  Climbing 3-5 steps with a railing?: 1  Total Score: 12       Therapeutic Activities and Exercises:   In addition to above:  Pt sat EOB ~5 min with Frances for scooting to get feet on floor and TC for hand placement for balance  Pt stood 1 min x 2 trials with Frances to maintain balance    Pt and wife educated on:  Role of PT  POC  Use of 'bulb' call button      Patient left with bed in chair position with all lines intact, call button in reach, RN notified and wife present..    GOALS:    Physical Therapy Goals        Problem: Physical Therapy Goal    Goal Priority Disciplines Outcome Goal Variances Interventions   Physical Therapy Goal     PT/OT, PT Ongoing (interventions implemented as appropriate)     Description:  Goals to be met by: 7 days ()    Patient will increase functional independence with mobility by performin. Supine to sit with Stand-by Assistance  2. Sit to supine with Stand-by Assistance  3. Sit to stand transfer with Contact Guard Assistance  4. Gait  x 50 feet with Contact Guard Assistance using Rolling Walker.   5. Lower extremity exercise program x30 reps per handout, with independence to improve muscular strength and endurance.  6. Pt will ambulate 1 flight of stairs with MIN  A and unilateral HR placement.                         Time Tracking:     PT Received On: 12/07/17  PT Start Time: 1100     PT Stop Time: 1125  PT Total Time (min): 25 min     Billable Minutes: Therapeutic Activity 25    Treatment Type: Treatment  PT/PTA: PT     PTA Visit Number: 0     Tacos Ayers, [facility]  12/07/2017

## 2017-12-07 NOTE — PT/OT/SLP EVAL
Occupational Therapy   Evaluation    Name: Ronald Campbell  MRN: [id number]  Admitting Diagnosis:  Acute cystitis with hematuria      Recommendations:     Discharge Recommendations: nursing facility, skilled  Discharge Equipment Recommendations:  none  Barriers to discharge:  None    History:     Occupational Profile:  Living Environment: Pt lives with daughter in raised home; 12 [address] with 2 landings.  HR present in middle, until last two stairs where HR is present on both sides.  Bathroom contains walk-in tub.  Toilet contains BSC over it.  Previous level of function: Daughter states pt required assist for ADLs and was utilizing HHA to ambulate around home.  Family owns RW; however, pt is not able to use it without assist.  Roles and Routines: Pt states he does not have activities or hobbies that he enjoys.  Equipment Owned:  cane, straight, walker, rolling, raised toilet, shower chair  Assistance upon Discharge: Daughter able to provide assist.  CNA present everyday for part of the day; assist with ADLs.     Past Medical History:   Diagnosis Date    Anemia     Blindness of right eye     CHF (congestive heart failure)     Chronic kidney disease     Dementia 01/2017    Diabetes mellitus type II     General anesthetics causing adverse effect in therapeutic use     april / may 2014     Glaucoma (increased eye pressure)     Hypertension     Hypothermia 12/6/2017    Seizures        Past Surgical History:   Procedure Laterality Date    AV FISTULA PLACEMENT Left 4/2014    AV fistula to Left upper arm    EYE SURGERY         Subjective     Chief Complaint: None stated  Patient/Family stated goals: Return home  Communicated with: RN prior to session.  Pain/Comfort:  · Pain Rating 1: 0/10  · Pain Rating Post-Intervention 1: 0/10    Objective:     Patient found with: peripheral IV    General Precautions: Standard, fall   Orthopedic Precautions:N/A   Braces: N/A     Occupational Performance:    Bed Mobility:    · Patient  completed Rolling/Turning to Right with moderate assistance  · Patient completed Scooting/Bridging with moderate assistance  · Patient completed Supine to Sit with moderate assistance for LE management.  OT moved LE to side of bed, then assisted pt with hand placement onto bed rails so pt could help push himself up  · Patient completed Sit to Supine with maximal assistance for trunk and LE management    Functional Mobility/Transfers:  · Patient completed Sit <> Stand Transfer with minimum assistance  with  no assistive device (HHA) x 1 trial from EOB; cues given to extend hips and elevate head to achieve full upright position  · Pt took 2 steps forward and 2 steps back with HHA and Min A; mild postural instability noted.     Activities of Daily Living:  · Grooming: contact guard assistance for simulating washing face while seated EOB    Cognitive/Visual Perceptual:  Cognitive/Psychosocial Skills:     -       Oriented to: Person and Place   -       Follows Commands/attention:Follows one-step commands  -       Communication: clear/fluent  -       Memory: Possible deficits  -       Safety awareness/insight to disability: impaired   -       Mood/Affect/Coping skills/emotional control: Appropriate to situation  Visual/Perceptual:      -Pt blind    Physical Exam:  Balance: -       Pt able to maintain upright seated position with SBA-CGA; standing balance required Min A  Postural examination/scapula alignment:    -       Rounded shoulders  Skin integrity: Visible skin intact  Edema:  None noted  Sensation:    -       Intact  Motor Planning:  Deficits noted  Dominant hand: -       Right  Upper Extremity Range of Motion:  -       Right Upper Extremity: WFL  -       Left Upper Extremity: WFL  Upper Extremity Strength: -       Right Upper Extremity: WFL  -       Left Upper Extremity: WFL   Strength:5/5 both hands  -       Left Upper Extremity: WFL  Fine Motor Coordination: -       Intact    Patient left supine with call  "button in reach and daughter present    [facility] 6 Click:  [facility] Total Score: 14    Treatment & Education:  *Pt and daughter educated on role of OT  *POC discussed  Education:    Assessment:     Ronald Campbell is a 88 y.o. male with a medical diagnosis of Acute cystitis with hematuria.  He presents with good participation in evaluation.  Performance deficits affecting function are weakness, impaired endurance, impaired functional mobilty, gait instability, impaired self care skills, impaired balance, decreased upper extremity function, decreased lower extremity function, decreased coordination, decreased safety awareness, visual deficits.  Pt required increased assist for LE during bed mobility, but once seated was able to maintain upright position with SBA.  Pt demonstrates strength and ROM in (B) UE needed for ADLs that is WFL.  Min A required to perform sit to stand transfer with mild gait instability noted when taking steps.  PTA daughter states pt required assist with all ADLs and was ambulating around home with HHA.  Pt would benefit from skilled OT services to address problems listed below and increase independence with ADLs.  SS SNF is recommended upon d/c from acute care to further address deficits and help pt improve overall functional independence.       Rehab Prognosis:  Good; patient would benefit from acute skilled OT services to address these deficits and reach maximum level of function.         Clinical Decision Makin.  OT Low:  "Pt evaluation falls under low complexity for evaluation coding due to performance deficits noted in 1-3 areas as stated above and 0 co-morbities affecting current functional status. Data obtained from problem focused assessments. No modifications or assistance was required for completion of evaluation. Only brief occupational profile and history review completed."     Plan:     Patient to be seen 4 x/week to address the above listed problems via self-care/home " management, therapeutic exercises, therapeutic activities  · Plan of Care Expires:  1/7/2017  · Plan of Care Reviewed with: patient, spouse    This Plan of care has been discussed with the patient who was involved in its development and understands and is in agreement with the identified goals and treatment plan    GOALS:    Occupational Therapy Goals        Problem: Occupational Therapy Goal    Goal Priority Disciplines Outcome Interventions   Occupational Therapy Goal     OT, PT/OT     Description:  Goals to be met by: 12/14/2017     Patient will increase functional independence with ADLs by performing:    UE Dressing with Minimal Assistance.  LE Dressing with Moderate Assistance.  Grooming while seated with Contact Guard Assistance.  Toileting from [facility] with Moderate Assistance for hygiene and clothing management.   Toilet transfer to toilet with Minimal Assistance.                      Time Tracking:     OT Date of Treatment: 12/07/17  OT Start Time: 1355  OT Stop Time: 1413  OT Total Time (min): 18 min    Billable Minutes:Evaluation 18    SHIRA Juarez  12/7/2017

## 2017-12-07 NOTE — ASSESSMENT & PLAN NOTE
12/6 warming blanket applied, sepsis workup in progress as above and negative  12/7 warming blanket removed. T 97.9 F.

## 2017-12-07 NOTE — PLAN OF CARE
Problem: Occupational Therapy Goal  Goal: Occupational Therapy Goal  Goals to be met by: 12/14/2017     Patient will increase functional independence with ADLs by performing:    UE Dressing with Minimal Assistance.  LE Dressing with Moderate Assistance.  Grooming while seated with Contact Guard Assistance.  Toileting from BSC with Moderate Assistance for hygiene and clothing management.   Toilet transfer to toilet with Minimal Assistance.      OT evaluation complete and POC established.  SS SNF is recommended upon d/c from acute care to further address deficits and help pt improve overall functional independence.     SHIRA Juarez  12/7/2017

## 2017-12-07 NOTE — PLAN OF CARE
Problem: Physical Therapy Goal  Goal: Physical Therapy Goal  Goals to be met by: 7 days ()    Patient will increase functional independence with mobility by performin. Supine to sit with Stand-by Assistance  2. Sit to supine with Stand-by Assistance  3. Sit to stand transfer with Contact Guard Assistance  4. Gait  x 50 feet with Contact Guard Assistance using Rolling Walker.   5. Lower extremity exercise program x30 reps per handout, with independence to improve muscular strength and endurance.  6. Pt will ambulate 1 flight of stairs with MIN A and unilateral HR placement.        Outcome: Ongoing (interventions implemented as appropriate)  PT goals remain appropriate at this time.  Tacos Ayers, SPT  2017

## 2017-12-07 NOTE — ASSESSMENT & PLAN NOTE
DELIRIUM BEHAVIOR MANAGEMENT  - Minimize use of restraints; if physical restraints necessary, please utilize medical/chemical prns for agitation.  - Keep shades open and room lit during day and room dim at night in order to promote healthy circadian rhythms.  - Encourage family at bedside  - Keep whiteboard in patient's room current with the date and name of the members of patient's team for easy patient self re-orientation.  - Avoid benzodiazepines, antihistamines, anticholinergics, hypnotics, and minimize opiates while controlling for pain as these medications may exacerbate delirium.  12/7 at baseline

## 2017-12-07 NOTE — PLAN OF CARE
AMISHA informed  Graciela ([phone]) with [facility] that the patient has been denied acceptance to [facility] and [facility]. Graciela stated that [facility], [facility], & [facility] are also contracted with [place]. AMISHA informed MAIRA Correa ([phone]) of above. Signed & completed PASRR obtained from Dr. Hernández & given to Madeline. Will continue to follow.

## 2017-12-08 PROBLEM — T68.XXXA HYPOTHERMIA: Status: RESOLVED | Noted: 2017-12-06 | Resolved: 2017-12-08

## 2017-12-08 LAB
ALBUMIN SERPL BCP-MCNC: 2.7 G/DL
ANION GAP SERPL CALC-SCNC: 8 MMOL/L
BASOPHILS # BLD AUTO: 0.03 K/UL
BASOPHILS NFR BLD: 0.6 %
BUN SERPL-MCNC: 50 MG/DL
CALCIUM SERPL-MCNC: 8.3 MG/DL
CHLORIDE SERPL-SCNC: 103 MMOL/L
CO2 SERPL-SCNC: 22 MMOL/L
CREAT SERPL-MCNC: 7 MG/DL
DIFFERENTIAL METHOD: ABNORMAL
EOSINOPHIL # BLD AUTO: 0.1 K/UL
EOSINOPHIL NFR BLD: 1.1 %
ERYTHROCYTE [DISTWIDTH] IN BLOOD BY AUTOMATED COUNT: 19.5 %
EST. GFR  (AFRICAN AMERICAN): 7.4 ML/MIN/1.73 M^2
EST. GFR  (NON AFRICAN AMERICAN): 6.4 ML/MIN/1.73 M^2
GLUCOSE SERPL-MCNC: 95 MG/DL
HCT VFR BLD AUTO: 25 %
HGB BLD-MCNC: 8.2 G/DL
IMM GRANULOCYTES # BLD AUTO: 0.02 K/UL
IMM GRANULOCYTES NFR BLD AUTO: 0.4 %
LYMPHOCYTES # BLD AUTO: 1.9 K/UL
LYMPHOCYTES NFR BLD: 36.7 %
MAGNESIUM SERPL-MCNC: 2.3 MG/DL
MCH RBC QN AUTO: 23.7 PG
MCHC RBC AUTO-ENTMCNC: 32.8 G/DL
MCV RBC AUTO: 72 FL
MONOCYTES # BLD AUTO: 0.5 K/UL
MONOCYTES NFR BLD: 9.7 %
NEUTROPHILS # BLD AUTO: 2.7 K/UL
NEUTROPHILS NFR BLD: 51.5 %
NRBC BLD-RTO: 0 /100 WBC
PHOSPHATE SERPL-MCNC: 4.4 MG/DL
PLATELET # BLD AUTO: 124 K/UL
PMV BLD AUTO: ABNORMAL FL
POCT GLUCOSE: 103 MG/DL (ref 70–110)
POCT GLUCOSE: 106 MG/DL (ref 70–110)
POCT GLUCOSE: 135 MG/DL (ref 70–110)
POTASSIUM SERPL-SCNC: 4.9 MMOL/L
RBC # BLD AUTO: 3.46 M/UL
SODIUM SERPL-SCNC: 133 MMOL/L
WBC # BLD AUTO: 5.28 K/UL

## 2017-12-08 PROCEDURE — 80069 RENAL FUNCTION PANEL: CPT

## 2017-12-08 PROCEDURE — 25000003 PHARM REV CODE 250: Performed by: PHYSICIAN ASSISTANT

## 2017-12-08 PROCEDURE — G0378 HOSPITAL OBSERVATION PER HR: HCPCS

## 2017-12-08 PROCEDURE — 83735 ASSAY OF MAGNESIUM: CPT

## 2017-12-08 PROCEDURE — 63600175 PHARM REV CODE 636 W HCPCS: Performed by: PHYSICIAN ASSISTANT

## 2017-12-08 PROCEDURE — 85025 COMPLETE CBC W/AUTO DIFF WBC: CPT

## 2017-12-08 PROCEDURE — 99226 PR SUBSEQUENT OBSERVATION CARE,LEVEL III: CPT | Mod: ,,, | Performed by: PHYSICIAN ASSISTANT

## 2017-12-08 PROCEDURE — 25000003 PHARM REV CODE 250: Performed by: NURSE PRACTITIONER

## 2017-12-08 PROCEDURE — 25000003 PHARM REV CODE 250: Performed by: INTERNAL MEDICINE

## 2017-12-08 PROCEDURE — G0257 UNSCHED DIALYSIS ESRD PT HOS: HCPCS

## 2017-12-08 PROCEDURE — 36415 COLL VENOUS BLD VENIPUNCTURE: CPT

## 2017-12-08 PROCEDURE — 25000003 PHARM REV CODE 250: Performed by: STUDENT IN AN ORGANIZED HEALTH CARE EDUCATION/TRAINING PROGRAM

## 2017-12-08 PROCEDURE — 63600175 PHARM REV CODE 636 W HCPCS: Performed by: NURSE PRACTITIONER

## 2017-12-08 RX ORDER — FLUCONAZOLE 100 MG/1
100 TABLET ORAL DAILY
Status: DISCONTINUED | OUTPATIENT
Start: 2017-12-08 | End: 2017-12-12 | Stop reason: HOSPADM

## 2017-12-08 RX ADMIN — ATORVASTATIN CALCIUM 40 MG: 20 TABLET, FILM COATED ORAL at 11:12

## 2017-12-08 RX ADMIN — TRAVOPROST 1 DROP: 0.04 SOLUTION/ DROPS OPHTHALMIC at 06:12

## 2017-12-08 RX ADMIN — FINASTERIDE 5 MG: 5 TABLET, FILM COATED ORAL at 08:12

## 2017-12-08 RX ADMIN — DOXAZOSIN MESYLATE 4 MG: 2 TABLET ORAL at 08:12

## 2017-12-08 RX ADMIN — ASPIRIN 81 MG CHEWABLE TABLET 81 MG: 81 TABLET CHEWABLE at 08:12

## 2017-12-08 RX ADMIN — CEFTRIAXONE SODIUM 1 G: 1 INJECTION, POWDER, FOR SOLUTION INTRAMUSCULAR; INTRAVENOUS at 11:12

## 2017-12-08 RX ADMIN — HEPARIN SODIUM 5000 UNITS: 5000 INJECTION, SOLUTION INTRAVENOUS; SUBCUTANEOUS at 06:12

## 2017-12-08 RX ADMIN — CARVEDILOL 25 MG: 25 TABLET, FILM COATED ORAL at 08:12

## 2017-12-08 RX ADMIN — ACETAMINOPHEN 650 MG: 325 TABLET ORAL at 04:12

## 2017-12-08 RX ADMIN — CALCIUM ACETATE 1334 MG: 667 CAPSULE ORAL at 06:12

## 2017-12-08 RX ADMIN — CALCIUM ACETATE 1334 MG: 667 CAPSULE ORAL at 07:12

## 2017-12-08 RX ADMIN — FLUCONAZOLE 100 MG: 100 TABLET ORAL at 06:12

## 2017-12-08 RX ADMIN — AMLODIPINE BESYLATE 10 MG: 10 TABLET ORAL at 08:12

## 2017-12-08 RX ADMIN — SODIUM CHLORIDE 300 ML: 0.9 INJECTION, SOLUTION INTRAVENOUS at 03:12

## 2017-12-08 RX ADMIN — LEVETIRACETAM 500 MG: 500 TABLET ORAL at 08:12

## 2017-12-08 RX ADMIN — FLUCONAZOLE 100 MG: 100 TABLET ORAL at 11:12

## 2017-12-08 RX ADMIN — BRINZOLAMIDE 1 DROP: 10 SUSPENSION/ DROPS OPHTHALMIC at 06:12

## 2017-12-08 NOTE — PLAN OF CARE
Problem: Patient Care Overview  Goal: Plan of Care Review  Outcome: Ongoing (interventions implemented as appropriate)  3 hr hd completed, net fluid [id number], target goal achieved, pt tolerated well.  AVF deaccessed, , pressure held to the site 12 mins and 8  mins, A/V respectively, hemostasis achieved, bruit/thrill present post tx.  Report given to Moriah BAKER.

## 2017-12-08 NOTE — PLAN OF CARE
12/08/17 1001   Discharge Reassessment   Assessment Type Discharge Planning Reassessment   Provided patient/caregiver education on the expected discharge date and the discharge plan Yes   Do you have any problems affording any of your prescribed medications? No   Discharge Plan A Skilled Nursing Facility   Discharge Plan B Home Health   Can the patient answer the patient profile reliably? No, cognitively impaired   How does the patient rate their overall health at the present time? Fair   Describe the patient's ability to walk at the present time. Major restrictions/daily assistance from another person   How often would a person be available to care for the patient? Often   Number of comorbid conditions (as recorded on the chart) Five or more   During the past month, has the patient often been bothered by feeling down, depressed or hopeless? No   During the past month, has the patient often been bothered by little interest or pleasure in doing things? No     Awaiting acceptance to SNF. Will continue to follow.

## 2017-12-08 NOTE — ASSESSMENT & PLAN NOTE
DELIRIUM BEHAVIOR MANAGEMENT  - Minimize use of restraints; if physical restraints necessary, please utilize medical/chemical prns for agitation.  - Keep shades open and room lit during day and room dim at night in order to promote healthy circadian rhythms.  - Encourage family at bedside  - Keep whiteboard in patient's room current with the date and name of the members of patient's team for easy patient self re-orientation.  - Avoid benzodiazepines, antihistamines, anticholinergics, hypnotics, and minimize opiates while controlling for pain as these medications may exacerbate delirium.  12/7-12/8 at baseline

## 2017-12-08 NOTE — PLAN OF CARE
Patient resting quietly in bed with daughter, Gabriela Campbell ([phone]), at the bedside when CM rounded. CM informed Gabriela that the patient has been denied admission to [facility] & requested additional choices. Gabriela stated that she would like the patient to be admitted to [facility] or [facility]. CM informed Gabriela that [facility] does not have a contract with [facility]. Gabriela stated that she is aware of some [facility] patients that have been admitted to [facility]. CM informed MAIRA Correa ([id number]) of above & requested that she verify with [facility] if [facility] is an option. SNF consult ordered. Will continue to follow.

## 2017-12-08 NOTE — ASSESSMENT & PLAN NOTE
- LUE AV fistula  - MWF HD schedule  - Nephrology consulted.  12/6 pt refused HD in the morning, nephrology arranging for HD in room this afternoon, completed successfully.  - 12/8 HD to be completed.

## 2017-12-08 NOTE — ASSESSMENT & PLAN NOTE
- SBP ranging between 172-197  - reports missing PM medications  - restarted home medication regimen on admission  - added PRN hydralazine as need for SBP > 180 or SBP >110

## 2017-12-08 NOTE — ASSESSMENT & PLAN NOTE
Etiology: diabetes    Modality:iHD x 3 years  Days: MWF(last received yesterday and completed the full session)  Access: L arm fistula (good thrill and pulsation, no reported issues)  Unit: [facility]  Nephrologist: Dr Taj Lara Duration: 3.5 hrs  EDW:178-179lbs  UF : ?2L  Residual fxn: 1-2 times per day, exact amount uncertain     HTN: elevated this morning, but hadn't received his meds yet  Metabolic acidosis: ok, bicarb 20  Electrolytes: ok, K 5.1  Anemia: Hgb 8.4, no recent iron studies will check, not reported to be on epo  Metabolic Bone disease: on ero and phoslo, phos 3.4, PTH still pending  Nuitrition: albumin 2.7, nepro added    Plan for dialysis: will be providing HD in the room today, behavioral/confusion issues would seem to make it less likely we could successfully complete a session in the [place]

## 2017-12-08 NOTE — SUBJECTIVE & OBJECTIVE
Interval History: seen in the room this morning, he has been moved to a private room, he is calm, composed, answers basic questions and follows commands    Review of patient's allergies indicates:   Allergen Reactions    Lisinopril Swelling     Current Facility-Administered Medications   Medication Frequency    0.9%  NaCl infusion PRN    0.9%  NaCl infusion Once    0.9%  NaCl infusion PRN    0.9%  NaCl infusion Once    acetaminophen tablet 650 mg Q6H PRN    albuterol-ipratropium 2.5mg-0.5mg/3mL nebulizer solution 3 mL Q4H PRN    amLODIPine tablet 10 mg Daily    aspirin chewable tablet 81 mg Daily    atorvastatin tablet 40 mg QHS    brinzolamide 1 % ophthalmic suspension 1 drop TID    calcium acetate capsule 1,334 mg TID WM    carvedilol tablet 25 mg BID    cefTRIAXone (ROCEPHIN) 1 g in dextrose 5 % 50 mL IVPB Q24H    dextrose 50% injection 12.5 g PRN    dextrose 50% injection 25 g PRN    doxazosin tablet 4 mg Daily    finasteride tablet 5 mg Daily    glucagon (human recombinant) injection 1 mg PRN    glucose chewable tablet 16 g PRN    glucose chewable tablet 24 g PRN    heparin (porcine) injection 5,000 Units Q8H    hydrALAZINE tablet 25 mg Q8H PRN    insulin aspart pen 0-5 Units QID (AC + HS) PRN    levETIRAcetam tablet 500 mg BID    ondansetron disintegrating tablet 8 mg Q8H PRN    ondansetron injection 4 mg Q8H PRN    senna-docusate 8.6-50 mg per tablet 1 tablet BID PRN    sodium chloride 0.9% flush 5 mL PRN    travoprost 0.004 % ophthalmic solution 1 drop QHS       Objective:     Vital Signs (Most Recent):  Temp: 97.6 °F (36.4 °C) (12/08/17 0857)  Pulse: 61 (12/08/17 0857)  Resp: 16 (12/08/17 0857)  BP: (!) 163/74 (12/08/17 0857)  SpO2: 98 % (12/08/17 0857)  O2 Device (Oxygen Therapy): room air (12/08/17 0857) Vital Signs (24h Range):  Temp:  [96.2 °F (35.7 °C)-98 °F (36.7 °C)] 97.6 °F (36.4 °C)  Pulse:  [56-65] 61  Resp:  [16-20] 16  SpO2:  [95 %-100 %] 98 %  BP: (142-163)/(68-74)  163/74     Weight: 81.2 kg (179 lb 0.2 oz) (12/05/17 0049)  Body mass index is 23.62 kg/m².  Body surface area is 2.05 meters squared.    I/O last 3 completed shifts:  In: 500 [Other:500]  Out: 3500 [Other:3500]    Physical Exam   HENT:   Head: Atraumatic.   Cardiovascular: Normal rate and regular rhythm.  Exam reveals no friction rub.    Pulmonary/Chest: Effort normal and breath sounds normal.   Abdominal: Soft. He exhibits no distension.   Musculoskeletal: He exhibits edema.   Neurological: He is alert.   Skin: Skin is warm.

## 2017-12-08 NOTE — PLAN OF CARE
CM requested that the patient's nurse, De ([phone]), document results from PPD placed to the patient's right arm on 12/5/17. De verbalized understanding. Will continue to follow.

## 2017-12-08 NOTE — PLAN OF CARE
Problem: Fall Risk (Adult)  Goal: Absence of Falls  Patient will demonstrate the desired outcomes by discharge/transition of care.   Outcome: Ongoing (interventions implemented as appropriate)  Pt remained free from falls throughout shift, SR up x 2, bed in low and locked position, call bell within reach, No complaints or evidence of distress at this time.

## 2017-12-08 NOTE — PROGRESS NOTES
Bladder scan performed at bedside; reading 51 observed on monitor. Pt's daughter  Reports pt was incontinent of urine 1 hour prior to bladder scan. Pt denies abdominal discomfort. Staff nurse informed of results.

## 2017-12-08 NOTE — PLAN OF CARE
Sw was just informed that the pt was denied by [facility]. The pt is stable to dc today. SW informed CM of the denial.  She will talk to the pt's dtr.  MAIRA will f/u as needed.    Madeline Carrillo, \[facility] [place] [facility]\""[facility] x [phone]

## 2017-12-08 NOTE — PLAN OF CARE
MAIRA was informed by the CM that the pt's dtr was agreeable to a referral being sent to [facility].  However, she really wants the pt now to go to [facility].  However, this was not one that was given as one that [facility] covers.  MAIRA sent a referral request to the [facility] for [facility] and [facility].  MAIRA left a message for Graciela ([phone]) at [facility]. MAIRA called the [facility] MAIRA Kendrick ([phone]) and informed her that the pt has been denied by [facility],  [facility], and [facility].  SHe stated that a referral could be sent to [facility].  She will discuss it with her team Monday morning.    Madeline Carrillo, [facility] x [phone]

## 2017-12-08 NOTE — ASSESSMENT & PLAN NOTE
Hydrocele bilateral   -  s/p L hydrocelectomy and R orchiectomy on 11/22/17   - urology consulted while in ED - low probability of wound infection on examination  - continue to monitor (see above 12/8 exam and intervention)

## 2017-12-08 NOTE — SUBJECTIVE & OBJECTIVE
"Interval History:  no acute events overnight, no new complaints.  Pt difficult historian stating today "I'm not doing good today doc."  Pt denies pain.  Daughter Thomas at the bedside and pt is at his baseline.    Review of Systems   Constitutional: Negative for chills, diaphoresis, fever and unexpected weight change.   Eyes: Positive for visual disturbance.        Blind   Respiratory: Negative for cough, chest tightness, shortness of breath and wheezing.    Cardiovascular: Negative for chest pain, palpitations and leg swelling.   Gastrointestinal: Negative for abdominal distention, abdominal pain, diarrhea, nausea and vomiting.   Genitourinary: Positive for decreased urine volume. Negative for discharge, flank pain and testicular pain.        MWF HD patient - s/p R hydrocelectomy and L orchiectomy on 11/22/17   Musculoskeletal: Negative for arthralgias, back pain, myalgias and neck pain.   Skin: Positive for wound. Negative for color change, pallor and rash.        s/p R hydrocelectomy and L orchiectomy on 11/22/17   Neurological: Positive for seizures. Negative for dizziness, syncope and light-headedness.   Psychiatric/Behavioral: Positive for confusion. Negative for agitation, decreased concentration and hallucinations.     Objective:     Vital Signs (Most Recent):  Temp: 97.6 °F (36.4 °C) (12/08/17 0857)  Pulse: 61 (12/08/17 0857)  Resp: 16 (12/08/17 0857)  BP: (!) 163/74 (12/08/17 0857)  SpO2: 98 % (12/08/17 0857) Vital Signs (24h Range):  Temp:  [96.2 °F (35.7 °C)-98 °F (36.7 °C)] 97.6 °F (36.4 °C)  Pulse:  [56-65] 61  Resp:  [16-20] 16  SpO2:  [95 %-100 %] 98 %  BP: (142-163)/(68-74) 163/74     Weight: 81.2 kg (179 lb 0.2 oz)  Body mass index is 23.62 kg/m².  No intake or output data in the 24 hours ending 12/08/17 1124   Physical Exam   Constitutional: He appears well-developed and well-nourished. No distress.   HENT:   Head: Normocephalic and atraumatic.   Eyes:   Blind    Neck: Normal range of motion. Neck " supple.   Cardiovascular: Normal rate, regular rhythm, normal heart sounds and intact distal pulses.    Pulmonary/Chest: Effort normal and breath sounds normal.   Abdominal: Soft. Bowel sounds are normal.   Genitourinary: No penile tenderness.   Genitourinary Comments: s/p R hydrocelectomy and L orchiectomy on 11/22/17  +scrotal swelling   Musculoskeletal: Normal range of motion. He exhibits edema.   LUE AV fistula with + thrill and bruit    Neurological: He is alert. No cranial nerve deficit. GCS eye subscore is 3. GCS verbal subscore is 4. GCS motor subscore is 6.   Pt back to baseline.  Pt following commands.  Pt stating his name and his wife's name.   Skin: Skin is warm and dry. He is not diaphoretic.   Psychiatric: He has a normal mood and affect. His behavior is normal.   Nursing note and vitals reviewed.      Significant Labs: All pertinent labs within the past 24 hours have been reviewed.    Significant Imaging: I have reviewed all pertinent imaging results/findings within the past 24 hours.

## 2017-12-08 NOTE — PLAN OF CARE
Problem: Patient Care Overview  Goal: Plan of Care Review  Outcome: Ongoing (interventions implemented as appropriate)  SR up x2, call bell in reach, bed in low and locked position, skid proof socks on. Care plan explained to patient, no additional complaints at this time.

## 2017-12-08 NOTE — PLAN OF CARE
MAIRA called Mesha at [facility] ([phone], [phone]). She stated that she has not had a chance to review the referral.  SHe will review it and call the SW back later.  SW will f/u in a few hours.    Madeline Carrillo, [facility] x [phone]

## 2017-12-08 NOTE — PROGRESS NOTES
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 Per the patient's daughter, he was reportedly confused and tried to leave during his dialysis sessions multiple times, which has never happened before. She states he does have some baseline dementia, but has never had this kind of confusion or behavior before. He denies recent illnesses, chest pain, shortness of breath, cough, abdominal pain, changes in bowel movements, dysuria, arthralgias, or myalgias. He has not had any complications since his surgery on 11/22.    Nephrology is consulted to manage her ESRD    Interval History: seen in the room this morning, he has been moved to a private room, he is calm, composed, answers basic questions and follows commands    Review of patient's allergies indicates:   Allergen Reactions    Lisinopril Swelling     Current Facility-Administered Medications   Medication Frequency    0.9%  NaCl infusion PRN    0.9%  NaCl infusion Once    0.9%  NaCl infusion PRN    0.9%  NaCl infusion Once    acetaminophen tablet 650 mg Q6H PRN    albuterol-ipratropium 2.5mg-0.5mg/3mL nebulizer solution 3 mL Q4H PRN    amLODIPine tablet 10 mg Daily    aspirin chewable tablet 81 mg Daily    atorvastatin tablet 40 mg QHS    brinzolamide 1 % ophthalmic suspension 1 drop TID    calcium acetate capsule 1,334 mg TID WM    carvedilol tablet 25 mg BID     cefTRIAXone (ROCEPHIN) 1 g in dextrose 5 % 50 mL IVPB Q24H    dextrose 50% injection 12.5 g PRN    dextrose 50% injection 25 g PRN    doxazosin tablet 4 mg Daily    finasteride tablet 5 mg Daily    glucagon (human recombinant) injection 1 mg PRN    glucose chewable tablet 16 g PRN    glucose chewable tablet 24 g PRN    heparin (porcine) injection 5,000 Units Q8H    hydrALAZINE tablet 25 mg Q8H PRN    insulin aspart pen 0-5 Units QID (AC + HS) PRN    levETIRAcetam tablet 500 mg BID    ondansetron disintegrating tablet 8 mg Q8H PRN    ondansetron injection 4 mg Q8H PRN    senna-docusate 8.6-50 mg per tablet 1 tablet BID PRN    sodium chloride 0.9% flush 5 mL PRN    travoprost 0.004 % ophthalmic solution 1 drop QHS       Objective:     Vital Signs (Most Recent):  Temp: 97.6 °F (36.4 °C) (12/08/17 0857)  Pulse: 61 (12/08/17 0857)  Resp: 16 (12/08/17 0857)  BP: (!) 163/74 (12/08/17 0857)  SpO2: 98 % (12/08/17 0857)  O2 Device (Oxygen Therapy): room air (12/08/17 0857) Vital Signs (24h Range):  Temp:  [96.2 °F (35.7 °C)-98 °F (36.7 °C)] 97.6 °F (36.4 °C)  Pulse:  [56-65] 61  Resp:  [16-20] 16  SpO2:  [95 %-100 %] 98 %  BP: (142-163)/(68-74) 163/74     Weight: 81.2 kg (179 lb 0.2 oz) (12/05/17 0049)  Body mass index is 23.62 kg/m².  Body surface area is 2.05 meters squared.    I/O last 3 completed shifts:  In: 500 [Other:500]  Out: 3500 [Other:3500]    Physical Exam   HENT:   Head: Atraumatic.   Cardiovascular: Normal rate and regular rhythm.  Exam reveals no friction rub.    Pulmonary/Chest: Effort normal and breath sounds normal.   Abdominal: Soft. He exhibits no distension.   Musculoskeletal: He exhibits edema.   Neurological: He is alert.   Skin: Skin is warm.         Assessment/Plan:     ESRD (end stage renal disease) on dialysis    Etiology: diabetes    Modality:iHD x 3 years  Days: MWF(last received yesterday and completed the full session)  Access: L arm fistula (good thrill and pulsation, no  reported issues)  Unit: [facility]  Nephrologist: Dr Vang  Tx Duration: 3.5 hrs  EDW:178-179lbs  UF : ?2L  Residual fxn: 1-2 times per day, exact amount uncertain     HTN: elevated this morning, but hadn't received his meds yet  Metabolic acidosis: ok, bicarb 20  Electrolytes: ok, K 5.1  Anemia: Hgb 8.4, no recent iron studies will check, not reported to be on epo  Metabolic Bone disease: on ero and phoslo, phos 3.4, PTH still pending  Nuitrition: albumin 2.7, nepro added    Plan for dialysis: will be providing HD in the room today, behavioral/confusion issues would seem to make it less likely we could successfully complete a session in the [place]              Thank you for your consult. I will follow-up with patient. Please contact us if you have any additional questions.    Murphy Gusman MD  Nephrology  [facility]        I have reviewed and concur with the fellow's history, physical, assessment, and plan. I have personally interviewed and examined the patient at bedside.

## 2017-12-08 NOTE — NURSING
Received Pt from dialysis via stretcher. Pt transferred from stretcher to bed. Pt tolerated well. Family member at bedside.Will continue to monitor.

## 2017-12-08 NOTE — NURSING
Pt transported via stretcher with transport and family member to dialysis. Pt tolerated transfer from bed to stretcher without difficulty with assist.

## 2017-12-08 NOTE — ASSESSMENT & PLAN NOTE
- continue home dose of levetiracetam as directed   - no episodes 12/4-12/8  - seizure precautions

## 2017-12-08 NOTE — ASSESSMENT & PLAN NOTE
- UA with >100 RBC, >100 WBC, occasional bacteria, 3+ leuks, and many yeast  - afebrile with no leukocytosis - nontoxic appearing on admission- presents with increased confusion   - started on ceftriaxone in ED - continue for now; pt started on oral fluconazole 100 mg  - 12/6 UC candida albicans.  In setting of ESRD, T2DM, recent surgery will continue treatment with ceftriaxone (after HD) 1 gm q 24 hrs (end 12/7, 3 doses) and fluconazole 200 mg IV x 1 today followed by 100 mg IV daily.  Lactic acid, cortisol, CK (all WNL), EKG NSR ordered as rectal temp 93.6.  - 12/7 pt back to baseline.  Unclear why fluconazole was not given yesterday after HD.  Called RN today to give 200 mg x 1.  - 12/8 Dr. Hernández assessed pt.  Discussed with RN scrotal elevation.  Bladder scan 51 cc.  Fluconazole changed to po 100 mg daily x 14 days.  BC NGTD.

## 2017-12-08 NOTE — PROGRESS NOTES
[facility] Hospital Medicine  Progress Note    Patient Name: Ronald Campbell  MRN: [id number]  Patient Class: OP- Observation   Admission Date: 12/4/2017  Length of Stay: 0 days  Attending Physician: Saroj Hernández MD  Primary Care Provider: Bart Shaw MD    [facility] Medicine Team: [facility] MED F Chaparrita Dumont PA-C    Subjective:     Principal Problem:Acute cystitis with hematuria    HPI:  87 y/o gentleman who presents to the ED with his daughter d/t AMS and confusion.  He has a PMH of dementia, DMII, CHF, glaucoma, seizures, HTN, ESRD on HD (MWF) He recently underwent L hydrocelectomy and R orchiectomy on 11/22/17 and daughter states that he has been slightly off since.  She reports that his less active and his concentration is decreased.  Reportedly during dialysis today he attempted to leave the clinic three times while undergoing his treatment.  His daughter states that this is very unusual for him.  He was afebrile with no leukocytosis with initial workup. While in the ED urology was consulted d/t recent surgical procedure and concern for possible wound infection. He was evaluated by urology in the ED revealing no evidence of wound infection.  Urinalysis was performed and displays>100RBCs, >100WBCs, 3+ leuks, many yeast, and occasional bacteria.  He denies CP, SOB, N/V/D, fever, chills, or discomfort.     Hospital Course:  Pt admitted to observation for complicated UTI.  Rocephin (received 3 doses) and fluconazole initiated, UC in process.  UC revealed candida albicans.  12/6 changed fluconazole from po to IV, starting with 200 mg IV x 1 followed by 100 mg po daily for 14 days.  12/6 pt with hypothermia, septic workup negative and resolved same day with warming blanket.  HD complete on 12/6.  12/8 pt with increased scrotal swelling.  Bladder scan 51 cc.  Discussed with nursing scrotal elevation.    PT/OT consulted and recommending SNF.    Interval History:  no acute events overnight, no  "new complaints.  Pt difficult historian stating today "I'm not doing good today doc."  Pt denies pain.  Daughter Thomas at the bedside and pt is at his baseline.    Review of Systems   Constitutional: Negative for chills, diaphoresis, fever and unexpected weight change.   Eyes: Positive for visual disturbance.        Blind   Respiratory: Negative for cough, chest tightness, shortness of breath and wheezing.    Cardiovascular: Negative for chest pain, palpitations and leg swelling.   Gastrointestinal: Negative for abdominal distention, abdominal pain, diarrhea, nausea and vomiting.   Genitourinary: Positive for decreased urine volume. Negative for discharge, flank pain and testicular pain.        MWF HD patient - s/p R hydrocelectomy and L orchiectomy on 11/22/17   Musculoskeletal: Negative for arthralgias, back pain, myalgias and neck pain.   Skin: Positive for wound. Negative for color change, pallor and rash.        s/p R hydrocelectomy and L orchiectomy on 11/22/17   Neurological: Positive for seizures. Negative for dizziness, syncope and light-headedness.   Psychiatric/Behavioral: Positive for confusion. Negative for agitation, decreased concentration and hallucinations.     Objective:     Vital Signs (Most Recent):  Temp: 97.6 °F (36.4 °C) (12/08/17 0857)  Pulse: 61 (12/08/17 0857)  Resp: 16 (12/08/17 0857)  BP: (!) 163/74 (12/08/17 0857)  SpO2: 98 % (12/08/17 0857) Vital Signs (24h Range):  Temp:  [96.2 °F (35.7 °C)-98 °F (36.7 °C)] 97.6 °F (36.4 °C)  Pulse:  [56-65] 61  Resp:  [16-20] 16  SpO2:  [95 %-100 %] 98 %  BP: (142-163)/(68-74) 163/74     Weight: 81.2 kg (179 lb 0.2 oz)  Body mass index is 23.62 kg/m².  No intake or output data in the 24 hours ending 12/08/17 1124   Physical Exam   Constitutional: He appears well-developed and well-nourished. No distress.   HENT:   Head: Normocephalic and atraumatic.   Eyes:   Blind    Neck: Normal range of motion. Neck supple.   Cardiovascular: Normal rate, regular " rhythm, normal heart sounds and intact distal pulses.    Pulmonary/Chest: Effort normal and breath sounds normal.   Abdominal: Soft. Bowel sounds are normal.   Genitourinary: No penile tenderness.   Genitourinary Comments: s/p R hydrocelectomy and L orchiectomy on 11/22/17  +scrotal swelling   Musculoskeletal: Normal range of motion. He exhibits edema.   LUE AV fistula with + thrill and bruit    Neurological: He is alert. No cranial nerve deficit. GCS eye subscore is 3. GCS verbal subscore is 4. GCS motor subscore is 6.   Pt back to baseline.  Pt following commands.  Pt stating his name and his wife's name.   Skin: Skin is warm and dry. He is not diaphoretic.   Psychiatric: He has a normal mood and affect. His behavior is normal.   Nursing note and vitals reviewed.      Significant Labs: All pertinent labs within the past 24 hours have been reviewed.    Significant Imaging: I have reviewed all pertinent imaging results/findings within the past 24 hours.    Assessment/Plan:      * Acute cystitis with hematuria    - UA with >100 RBC, >100 WBC, occasional bacteria, 3+ leuks, and many yeast  - afebrile with no leukocytosis - nontoxic appearing on admission- presents with increased confusion   - started on ceftriaxone in ED - continue for now; pt started on oral fluconazole 100 mg  - 12/6 UC candida albicans.  In setting of ESRD, T2DM, recent surgery will continue treatment with ceftriaxone (after HD) 1 gm q 24 hrs (end 12/7, 3 doses) and fluconazole 200 mg IV x 1 today followed by 100 mg IV daily.  Lactic acid, cortisol, CK (all WNL), EKG NSR ordered as rectal temp 93.6.  - 12/7 pt back to baseline.  Unclear why fluconazole was not given yesterday after HD.  Called RN today to give 200 mg x 1.  - 12/8 Dr. Hernández assessed pt.  Discussed with RN scrotal elevation.  Bladder scan 51 cc.  Fluconazole changed to po 100 mg daily x 14 days.  BC NGTD.        HTN (hypertension)    - SBP ranging between 172-197  - reports missing  PM medications  - restarted home medication regimen on admission  - added PRN hydralazine as need for SBP > 180 or SBP >110        ESRD (end stage renal disease) on dialysis    - LUE AV fistula  - MWF HD schedule  - Nephrology consulted.  12/6 pt refused HD in the morning, nephrology arranging for HD in room this afternoon, completed successfully.  - 12/8 HD to be completed.          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  - Keep shades open and room lit during day and room dim at night in order to promote healthy circadian rhythms.  - Encourage family at bedside  - Keep whiteboard in patient's room current with the date and name of the members of patient's team for easy patient self re-orientation.  - Avoid benzodiazepines, antihistamines, anticholinergics, hypnotics, and minimize opiates while controlling for pain as these medications may exacerbate delirium.  12/7-12/8 at baseline        Debility    - PT/OT consulted: SNF  - ambulate with assistance only        Seizure disorder    - continue home dose of levetiracetam as directed   - no episodes 12/4-12/8  - seizure precautions        Blind in both eyes    Glaucoma  - continue ocular drops as directed  - high risk of fall / injury - utilize all safety measures and fall precautions        Anemia in chronic kidney disease    - Hgb 9.0 (baseline 8.7-11.4)  - trend daily        Dyslipidemia    - continue atorvastatin as directed           VTE Risk Mitigation         Ordered      heparin (porcine) injection 5,000 Units  Every 8 hours     Route:  Subcutaneous        12/04/17 2358     Medium Risk of VTE  Once      12/04/17 2358     Medium Risk of VTE  Once      12/04/17 2358     Place MORGAN hose  Until discontinued      12/04/17 2358     Place sequential compression device  Until discontinued      12/04/17 2358              Chaparrita Dumont PA-C  Department of Hospital Medicine   [facility]

## 2017-12-09 LAB
ALBUMIN SERPL BCP-MCNC: 2.7 G/DL
ANION GAP SERPL CALC-SCNC: 8 MMOL/L
BASOPHILS # BLD AUTO: 0.02 K/UL
BASOPHILS NFR BLD: 0.4 %
BUN SERPL-MCNC: 32 MG/DL
CALCIUM SERPL-MCNC: 8.3 MG/DL
CHLORIDE SERPL-SCNC: 102 MMOL/L
CO2 SERPL-SCNC: 22 MMOL/L
CREAT SERPL-MCNC: 5.3 MG/DL
DIFFERENTIAL METHOD: ABNORMAL
EOSINOPHIL # BLD AUTO: 0 K/UL
EOSINOPHIL NFR BLD: 0.8 %
ERYTHROCYTE [DISTWIDTH] IN BLOOD BY AUTOMATED COUNT: 19.7 %
EST. GFR  (AFRICAN AMERICAN): 10.3 ML/MIN/1.73 M^2
EST. GFR  (NON AFRICAN AMERICAN): 8.9 ML/MIN/1.73 M^2
GLUCOSE SERPL-MCNC: 114 MG/DL
HCT VFR BLD AUTO: 25.5 %
HGB BLD-MCNC: 8.2 G/DL
IMM GRANULOCYTES # BLD AUTO: 0.01 K/UL
IMM GRANULOCYTES NFR BLD AUTO: 0.2 %
LYMPHOCYTES # BLD AUTO: 1.2 K/UL
LYMPHOCYTES NFR BLD: 24 %
MAGNESIUM SERPL-MCNC: 1.9 MG/DL
MCH RBC QN AUTO: 23.4 PG
MCHC RBC AUTO-ENTMCNC: 32.2 G/DL
MCV RBC AUTO: 73 FL
MONOCYTES # BLD AUTO: 0.5 K/UL
MONOCYTES NFR BLD: 10.4 %
NEUTROPHILS # BLD AUTO: 3.2 K/UL
NEUTROPHILS NFR BLD: 64.2 %
NRBC BLD-RTO: 0 /100 WBC
PHOSPHATE SERPL-MCNC: 3.6 MG/DL
PLATELET # BLD AUTO: 122 K/UL
PMV BLD AUTO: ABNORMAL FL
POCT GLUCOSE: 103 MG/DL (ref 70–110)
POCT GLUCOSE: 149 MG/DL (ref 70–110)
POCT GLUCOSE: 151 MG/DL (ref 70–110)
POCT GLUCOSE: 167 MG/DL (ref 70–110)
POTASSIUM SERPL-SCNC: 4.3 MMOL/L
RBC # BLD AUTO: 3.5 M/UL
SODIUM SERPL-SCNC: 132 MMOL/L
WBC # BLD AUTO: 5.01 K/UL

## 2017-12-09 PROCEDURE — G0378 HOSPITAL OBSERVATION PER HR: HCPCS

## 2017-12-09 PROCEDURE — 25000003 PHARM REV CODE 250: Performed by: NURSE PRACTITIONER

## 2017-12-09 PROCEDURE — 63600175 PHARM REV CODE 636 W HCPCS: Performed by: PHYSICIAN ASSISTANT

## 2017-12-09 PROCEDURE — 80069 RENAL FUNCTION PANEL: CPT

## 2017-12-09 PROCEDURE — 25000003 PHARM REV CODE 250: Performed by: PHYSICIAN ASSISTANT

## 2017-12-09 PROCEDURE — 83735 ASSAY OF MAGNESIUM: CPT

## 2017-12-09 PROCEDURE — 99226 PR SUBSEQUENT OBSERVATION CARE,LEVEL III: CPT | Mod: ,,, | Performed by: PHYSICIAN ASSISTANT

## 2017-12-09 PROCEDURE — 25000003 PHARM REV CODE 250: Performed by: STUDENT IN AN ORGANIZED HEALTH CARE EDUCATION/TRAINING PROGRAM

## 2017-12-09 PROCEDURE — 85025 COMPLETE CBC W/AUTO DIFF WBC: CPT

## 2017-12-09 PROCEDURE — 63600175 PHARM REV CODE 636 W HCPCS: Performed by: NURSE PRACTITIONER

## 2017-12-09 PROCEDURE — 36415 COLL VENOUS BLD VENIPUNCTURE: CPT

## 2017-12-09 RX ADMIN — CARVEDILOL 25 MG: 25 TABLET, FILM COATED ORAL at 09:12

## 2017-12-09 RX ADMIN — HEPARIN SODIUM 5000 UNITS: 5000 INJECTION, SOLUTION INTRAVENOUS; SUBCUTANEOUS at 12:12

## 2017-12-09 RX ADMIN — AMLODIPINE BESYLATE 10 MG: 10 TABLET ORAL at 08:12

## 2017-12-09 RX ADMIN — ASPIRIN 81 MG CHEWABLE TABLET 81 MG: 81 TABLET CHEWABLE at 08:12

## 2017-12-09 RX ADMIN — LEVETIRACETAM 500 MG: 500 TABLET ORAL at 09:12

## 2017-12-09 RX ADMIN — CARVEDILOL 25 MG: 25 TABLET, FILM COATED ORAL at 08:12

## 2017-12-09 RX ADMIN — CEFTRIAXONE SODIUM 1 G: 1 INJECTION, POWDER, FOR SOLUTION INTRAMUSCULAR; INTRAVENOUS at 09:12

## 2017-12-09 RX ADMIN — CALCIUM ACETATE 1334 MG: 667 CAPSULE ORAL at 08:12

## 2017-12-09 RX ADMIN — FLUCONAZOLE 100 MG: 100 TABLET ORAL at 08:12

## 2017-12-09 RX ADMIN — DOXAZOSIN MESYLATE 4 MG: 2 TABLET ORAL at 08:12

## 2017-12-09 RX ADMIN — HEPARIN SODIUM 5000 UNITS: 5000 INJECTION, SOLUTION INTRAVENOUS; SUBCUTANEOUS at 09:12

## 2017-12-09 RX ADMIN — HYDRALAZINE HYDROCHLORIDE 25 MG: 25 TABLET, FILM COATED ORAL at 11:12

## 2017-12-09 RX ADMIN — CALCIUM ACETATE 1334 MG: 667 CAPSULE ORAL at 06:12

## 2017-12-09 RX ADMIN — LEVETIRACETAM 500 MG: 500 TABLET ORAL at 12:12

## 2017-12-09 RX ADMIN — BRINZOLAMIDE 1 DROP: 10 SUSPENSION/ DROPS OPHTHALMIC at 12:12

## 2017-12-09 RX ADMIN — TRAVOPROST 1 DROP: 0.04 SOLUTION/ DROPS OPHTHALMIC at 09:12

## 2017-12-09 RX ADMIN — TRAVOPROST 1 DROP: 0.04 SOLUTION/ DROPS OPHTHALMIC at 12:12

## 2017-12-09 RX ADMIN — BRINZOLAMIDE 1 DROP: 10 SUSPENSION/ DROPS OPHTHALMIC at 09:12

## 2017-12-09 RX ADMIN — ATORVASTATIN CALCIUM 40 MG: 20 TABLET, FILM COATED ORAL at 09:12

## 2017-12-09 RX ADMIN — BRINZOLAMIDE 1 DROP: 10 SUSPENSION/ DROPS OPHTHALMIC at 02:12

## 2017-12-09 RX ADMIN — HEPARIN SODIUM 5000 UNITS: 5000 INJECTION, SOLUTION INTRAVENOUS; SUBCUTANEOUS at 02:12

## 2017-12-09 RX ADMIN — CALCIUM ACETATE 1334 MG: 667 CAPSULE ORAL at 12:12

## 2017-12-09 RX ADMIN — FINASTERIDE 5 MG: 5 TABLET, FILM COATED ORAL at 08:12

## 2017-12-09 RX ADMIN — CARVEDILOL 25 MG: 25 TABLET, FILM COATED ORAL at 12:12

## 2017-12-09 NOTE — ASSESSMENT & PLAN NOTE
- LUE AV fistula  - MWF HD schedule  - Nephrology consulted.  12/6 pt refused HD in the morning, nephrology arranging for HD in room this afternoon, completed successfully.  - 12/8 HD completed.

## 2017-12-09 NOTE — PLAN OF CARE
Problem: Thought Process Alteration (Adult)  Goal: Improved Thought Process  Patient will demonstrate the desired outcomes by discharge/transition of care.   Outcome: Ongoing (interventions implemented as appropriate)  Used verbal communication for redirection techniques with pt during times of confusion and disorientation to time and place. Successful reorientation occurred x 4  Throughout shift. WCTM

## 2017-12-09 NOTE — ASSESSMENT & PLAN NOTE
- continue home dose of levetiracetam as directed   - no episodes 12/4-12/9  - seizure precautions

## 2017-12-09 NOTE — SUBJECTIVE & OBJECTIVE
Interval History: not documented overnight but pt's daughter states she was notified pt trying to take his clothes off last night.  Pt slightly more agitated this morning but oriented.  Pt's daughter Thomas is concerned about the frequency of agitation/confusion.    Review of Systems   Constitutional: Negative for chills, diaphoresis, fever and unexpected weight change.   Eyes: Positive for visual disturbance.        Blind   Respiratory: Negative for cough, chest tightness, shortness of breath and wheezing.    Cardiovascular: Negative for chest pain, palpitations and leg swelling.   Gastrointestinal: Negative for abdominal distention, abdominal pain, diarrhea, nausea and vomiting.   Genitourinary: Positive for decreased urine volume. Negative for discharge, flank pain and testicular pain.        MWF HD patient - s/p R hydrocelectomy and L orchiectomy on 11/22/17   Musculoskeletal: Negative for arthralgias, back pain, myalgias and neck pain.   Skin: Positive for wound. Negative for color change, pallor and rash.        s/p R hydrocelectomy and L orchiectomy on 11/22/17   Neurological: Positive for seizures. Negative for dizziness, syncope and light-headedness.   Psychiatric/Behavioral: Positive for confusion. Negative for agitation, decreased concentration and hallucinations.     Objective:     Vital Signs (Most Recent):  Temp: 98.1 °F (36.7 °C) (12/09/17 0320)  Pulse: (!) 58 (12/09/17 1157)  Resp: 18 (12/09/17 1157)  BP: (!) 156/74 (12/09/17 1157)  SpO2: 100 % (12/09/17 1157) Vital Signs (24h Range):  Temp:  [97.6 °F (36.4 °C)-98.1 °F (36.7 °C)] 98.1 °F (36.7 °C)  Pulse:  [53-66] 58  Resp:  [16-18] 18  SpO2:  [97 %-100 %] 100 %  BP: (117-172)/(56-86) 156/74     Weight: 81.2 kg (179 lb 0.2 oz)  Body mass index is 23.62 kg/m².    Intake/Output Summary (Last 24 hours) at 12/09/17 1359  Last data filed at 12/08/17 1622   Gross per 24 hour   Intake              600 ml   Output             2600 ml   Net            -2000 ml       Physical Exam   Constitutional: He appears well-developed and well-nourished. No distress.   HENT:   Head: Normocephalic and atraumatic.   Eyes:   Blind    Neck: Normal range of motion. Neck supple.   Cardiovascular: Normal rate, regular rhythm, normal heart sounds and intact distal pulses.    Pulmonary/Chest: Effort normal and breath sounds normal.   Abdominal: Soft. Bowel sounds are normal.   Genitourinary: No penile tenderness.   Genitourinary Comments: s/p R hydrocelectomy and L orchiectomy on 11/22/17  +scrotal swelling, improved from 12/8 with elevation, no tenderness.   Musculoskeletal: Normal range of motion. He exhibits no edema.   LUE AV fistula with + thrill and bruit    Neurological: He is alert. No cranial nerve deficit. GCS eye subscore is 3. GCS verbal subscore is 4. GCS motor subscore is 6.   Pt following commands. Oriented to person and place.  More agitated this morning.   Skin: Skin is warm and dry. He is not diaphoretic.   Psychiatric: He has a normal mood and affect. His behavior is normal.   Nursing note and vitals reviewed.      Significant Labs: All pertinent labs within the past 24 hours have been reviewed.    Significant Imaging: I have reviewed all pertinent imaging results/findings within the past 24 hours.

## 2017-12-09 NOTE — ASSESSMENT & PLAN NOTE
DELIRIUM BEHAVIOR MANAGEMENT  - Minimize use of restraints; if physical restraints necessary, please utilize medical/chemical prns for agitation.  - Keep shades open and room lit during day and room dim at night in order to promote healthy circadian rhythms.  - Encourage family at bedside  - Keep whiteboard in patient's room current with the date and name of the members of patient's team for easy patient self re-orientation.  - Avoid benzodiazepines, antihistamines, anticholinergics, hypnotics, and minimize opiates while controlling for pain as these medications may exacerbate delirium.  12/7-12/8 at baseline; 12/9 agitated in the morning.  Discussed reorientation with pt's daughter.

## 2017-12-09 NOTE — PROGRESS NOTES
[facility] Hospital Medicine  Progress Note    Patient Name: Ronald Campbell  MRN: [id number]  Patient Class: OP- Observation   Admission Date: 12/4/2017  Length of Stay: 0 days  Attending Physician: Saroj Hernández MD  Primary Care Provider: Bart Shaw MD    [facility] Medicine Team: [facility] MED F Chaparrita Dumont PA-C    Subjective:     Principal Problem:Acute cystitis with hematuria    HPI:  87 y/o gentleman who presents to the ED with his daughter d/t AMS and confusion.  He has a PMH of dementia, DMII, CHF, glaucoma, seizures, HTN, ESRD on HD (MWF) He recently underwent L hydrocelectomy and R orchiectomy on 11/22/17 and daughter states that he has been slightly off since.  She reports that his less active and his concentration is decreased.  Reportedly during dialysis today he attempted to leave the clinic three times while undergoing his treatment.  His daughter states that this is very unusual for him.  He was afebrile with no leukocytosis with initial workup. While in the ED urology was consulted d/t recent surgical procedure and concern for possible wound infection. He was evaluated by urology in the ED revealing no evidence of wound infection.  Urinalysis was performed and displays>100RBCs, >100WBCs, 3+ leuks, many yeast, and occasional bacteria.  He denies CP, SOB, N/V/D, fever, chills, or discomfort.     Hospital Course:  Pt admitted to observation for complicated UTI.  Rocephin (received 3 doses) and fluconazole initiated, UC in process.  UC revealed candida albicans.  12/6 changed fluconazole from po to IV, starting with 200 mg IV x 1 followed by 100 mg po daily for 14 days.  12/6 pt with hypothermia, septic workup negative and resolved same day with warming blanket.  HD complete on 12/6.  12/8 pt with increased scrotal swelling.  Bladder scan 51 cc.  Discussed with nursing scrotal elevation.  Overnight 12/9 scrotal swelling improved with elevation.  Fluconazole continued.   Confusion/agitation waxes and wanes but generally improves throughout the day and with reorientation.    PT/OT consulted and recommending SNF.    Interval History: not documented overnight but pt's daughter states she was notified pt trying to take his clothes off last night.  Pt slightly more agitated this morning but oriented.  Pt's daughter Thomas is concerned about the frequency of agitation/confusion.    Review of Systems   Constitutional: Negative for chills, diaphoresis, fever and unexpected weight change.   Eyes: Positive for visual disturbance.        Blind   Respiratory: Negative for cough, chest tightness, shortness of breath and wheezing.    Cardiovascular: Negative for chest pain, palpitations and leg swelling.   Gastrointestinal: Negative for abdominal distention, abdominal pain, diarrhea, nausea and vomiting.   Genitourinary: Positive for decreased urine volume. Negative for discharge, flank pain and testicular pain.        MWF HD patient - s/p R hydrocelectomy and L orchiectomy on 11/22/17   Musculoskeletal: Negative for arthralgias, back pain, myalgias and neck pain.   Skin: Positive for wound. Negative for color change, pallor and rash.        s/p R hydrocelectomy and L orchiectomy on 11/22/17   Neurological: Positive for seizures. Negative for dizziness, syncope and light-headedness.   Psychiatric/Behavioral: Positive for confusion. Negative for agitation, decreased concentration and hallucinations.     Objective:     Vital Signs (Most Recent):  Temp: 98.1 °F (36.7 °C) (12/09/17 0320)  Pulse: (!) 58 (12/09/17 1157)  Resp: 18 (12/09/17 1157)  BP: (!) 156/74 (12/09/17 1157)  SpO2: 100 % (12/09/17 1157) Vital Signs (24h Range):  Temp:  [97.6 °F (36.4 °C)-98.1 °F (36.7 °C)] 98.1 °F (36.7 °C)  Pulse:  [53-66] 58  Resp:  [16-18] 18  SpO2:  [97 %-100 %] 100 %  BP: (117-172)/(56-86) 156/74     Weight: 81.2 kg (179 lb 0.2 oz)  Body mass index is 23.62 kg/m².    Intake/Output Summary (Last 24 hours) at  12/09/17 1359  Last data filed at 12/08/17 1622   Gross per 24 hour   Intake              600 ml   Output             2600 ml   Net            -2000 ml      Physical Exam   Constitutional: He appears well-developed and well-nourished. No distress.   HENT:   Head: Normocephalic and atraumatic.   Eyes:   Blind    Neck: Normal range of motion. Neck supple.   Cardiovascular: Normal rate, regular rhythm, normal heart sounds and intact distal pulses.    Pulmonary/Chest: Effort normal and breath sounds normal.   Abdominal: Soft. Bowel sounds are normal.   Genitourinary: No penile tenderness.   Genitourinary Comments: s/p R hydrocelectomy and L orchiectomy on 11/22/17  +scrotal swelling, improved from 12/8 with elevation, no tenderness.   Musculoskeletal: Normal range of motion. He exhibits no edema.   LUE AV fistula with + thrill and bruit    Neurological: He is alert. No cranial nerve deficit. GCS eye subscore is 3. GCS verbal subscore is 4. GCS motor subscore is 6.   Pt following commands. Oriented to person and place.  More agitated this morning.   Skin: Skin is warm and dry. He is not diaphoretic.   Psychiatric: He has a normal mood and affect. His behavior is normal.   Nursing note and vitals reviewed.      Significant Labs: All pertinent labs within the past 24 hours have been reviewed.    Significant Imaging: I have reviewed all pertinent imaging results/findings within the past 24 hours.    Assessment/Plan:      * Acute cystitis with hematuria    - UA with >100 RBC, >100 WBC, occasional bacteria, 3+ leuks, and many yeast  - afebrile with no leukocytosis - nontoxic appearing on admission- presents with increased confusion   - started on ceftriaxone in ED - continue for now; pt started on oral fluconazole 100 mg  - 12/6 UC candida albicans.  In setting of ESRD, T2DM, recent surgery will continue treatment with ceftriaxone (after HD) 1 gm q 24 hrs (end 12/7, 3 doses) and fluconazole 200 mg IV x 1 today followed by 100  mg IV daily.  Lactic acid, cortisol, CK (all WNL), EKG NSR ordered as rectal temp 93.6.  - 12/7 pt back to baseline.  Unclear why fluconazole was not given yesterday after HD.  Called RN today to give 200 mg x 1.  - 12/8 Dr. Hernández assessed pt.  Discussed with RN scrotal elevation.  Bladder scan 51 cc.  Fluconazole changed to po 100 mg daily x 14 days.  BC NGTD.  - 12/9 continue fluconazole and scrotal elevation.  Discussed with RN.        HTN (hypertension)    - SBP ranging between 172-197  - reports missing PM medications  - restarted home medication regimen on admission  - added PRN hydralazine as need for SBP > 180 or SBP >110        ESRD (end stage renal disease) on dialysis    - LUE AV fistula  - MWF HD schedule  - Nephrology consulted.  12/6 pt refused HD in the morning, nephrology arranging for HD in room this afternoon, completed successfully.  - 12/8 HD completed.          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  - Keep shades open and room lit during day and room dim at night in order to promote healthy circadian rhythms.  - Encourage family at bedside  - Keep whiteboard in patient's room current with the date and name of the members of patient's team for easy patient self re-orientation.  - Avoid benzodiazepines, antihistamines, anticholinergics, hypnotics, and minimize opiates while controlling for pain as these medications may exacerbate delirium.  12/7-12/8 at baseline; 12/9 agitated in the morning.  Discussed  reorientation with pt's daughter.        Debility    - PT/OT consulted: SNF  - ambulate with assistance only        Seizure disorder    - continue home dose of levetiracetam as directed   - no episodes 12/4-12/9  - seizure precautions        Blind in both eyes    Glaucoma  - continue ocular drops as directed  - high risk of fall / injury - utilize all safety measures and fall precautions        Anemia in chronic kidney disease    - Hgb 9.0 (baseline 8.7-11.4)  - trend daily        Dyslipidemia    - continue atorvastatin as directed           VTE Risk Mitigation         Ordered     heparin (porcine) injection 5,000 Units  Every 8 hours     Route:  Subcutaneous        12/04/17 2358     Medium Risk of VTE  Once      12/04/17 2358     Medium Risk of VTE  Once      12/04/17 2358     Place MORGAN hose  Until discontinued      12/04/17 2358     Place sequential compression device  Until discontinued      12/04/17 2358              Chaparrita Dumont PA-C  Department of Hospital Medicine   [facility]

## 2017-12-09 NOTE — PT/OT/SLP PROGRESS
Occupational Therapy      Patient Name:  Ronald Campbell   MRN:  [id number]    Patient not seen today secondary to Other (Comment) (caregiver refused therapy stating she needed to speak to RN before therapy). Writing therapist attempted pt in AM, but caregiver was feeding pt and refused OT stating she needed to speak w/ RN first. Caregiver asked OT to attempt later, but writing therapist unable to return.  Will follow-up as scheduled.    Klaus Schilling, OT  12/9/2017

## 2017-12-09 NOTE — ASSESSMENT & PLAN NOTE
- UA with >100 RBC, >100 WBC, occasional bacteria, 3+ leuks, and many yeast  - afebrile with no leukocytosis - nontoxic appearing on admission- presents with increased confusion   - started on ceftriaxone in ED - continue for now; pt started on oral fluconazole 100 mg  - 12/6 UC candida albicans.  In setting of ESRD, T2DM, recent surgery will continue treatment with ceftriaxone (after HD) 1 gm q 24 hrs (end 12/7, 3 doses) and fluconazole 200 mg IV x 1 today followed by 100 mg IV daily.  Lactic acid, cortisol, CK (all WNL), EKG NSR ordered as rectal temp 93.6.  - 12/7 pt back to baseline.  Unclear why fluconazole was not given yesterday after HD.  Called RN today to give 200 mg x 1.  - 12/8 Dr. Hernández assessed pt.  Discussed with RN scrotal elevation.  Bladder scan 51 cc.  Fluconazole changed to po 100 mg daily x 14 days.  BC NGTD.  - 12/9 continue fluconazole and scrotal elevation.  Discussed with RN.

## 2017-12-10 LAB
ALBUMIN SERPL BCP-MCNC: 2.7 G/DL
ANION GAP SERPL CALC-SCNC: 9 MMOL/L
BASOPHILS # BLD AUTO: 0.02 K/UL
BASOPHILS NFR BLD: 0.4 %
BUN SERPL-MCNC: 50 MG/DL
CALCIUM SERPL-MCNC: 8.2 MG/DL
CHLORIDE SERPL-SCNC: 100 MMOL/L
CO2 SERPL-SCNC: 23 MMOL/L
CREAT SERPL-MCNC: 6.7 MG/DL
DIFFERENTIAL METHOD: ABNORMAL
EOSINOPHIL # BLD AUTO: 0.1 K/UL
EOSINOPHIL NFR BLD: 1.5 %
ERYTHROCYTE [DISTWIDTH] IN BLOOD BY AUTOMATED COUNT: 19.2 %
EST. GFR  (AFRICAN AMERICAN): 7.8 ML/MIN/1.73 M^2
EST. GFR  (NON AFRICAN AMERICAN): 6.7 ML/MIN/1.73 M^2
GLUCOSE SERPL-MCNC: 135 MG/DL
HCT VFR BLD AUTO: 24.9 %
HGB BLD-MCNC: 8.1 G/DL
IMM GRANULOCYTES # BLD AUTO: 0.02 K/UL
IMM GRANULOCYTES NFR BLD AUTO: 0.4 %
LYMPHOCYTES # BLD AUTO: 1.5 K/UL
LYMPHOCYTES NFR BLD: 31.5 %
MAGNESIUM SERPL-MCNC: 2.3 MG/DL
MCH RBC QN AUTO: 23.8 PG
MCHC RBC AUTO-ENTMCNC: 32.5 G/DL
MCV RBC AUTO: 73 FL
MONOCYTES # BLD AUTO: 0.5 K/UL
MONOCYTES NFR BLD: 10.6 %
NEUTROPHILS # BLD AUTO: 2.6 K/UL
NEUTROPHILS NFR BLD: 55.6 %
NRBC BLD-RTO: 0 /100 WBC
PHOSPHATE SERPL-MCNC: 4.7 MG/DL
PLATELET # BLD AUTO: 123 K/UL
PMV BLD AUTO: ABNORMAL FL
POCT GLUCOSE: 133 MG/DL (ref 70–110)
POCT GLUCOSE: 144 MG/DL (ref 70–110)
POCT GLUCOSE: 154 MG/DL (ref 70–110)
POTASSIUM SERPL-SCNC: 4.6 MMOL/L
RBC # BLD AUTO: 3.4 M/UL
SODIUM SERPL-SCNC: 132 MMOL/L
TB INDURATION 48 - 72 HR READ: 0 MM
WBC # BLD AUTO: 4.61 K/UL

## 2017-12-10 PROCEDURE — 99225 PR SUBSEQUENT OBSERVATION CARE,LEVEL II: CPT | Mod: ,,, | Performed by: PHYSICIAN ASSISTANT

## 2017-12-10 PROCEDURE — 63600175 PHARM REV CODE 636 W HCPCS: Performed by: NURSE PRACTITIONER

## 2017-12-10 PROCEDURE — G8979 MOBILITY GOAL STATUS: HCPCS | Mod: CK | Performed by: PHYSICAL THERAPIST

## 2017-12-10 PROCEDURE — 25000003 PHARM REV CODE 250: Performed by: PHYSICIAN ASSISTANT

## 2017-12-10 PROCEDURE — 97530 THERAPEUTIC ACTIVITIES: CPT

## 2017-12-10 PROCEDURE — 97110 THERAPEUTIC EXERCISES: CPT

## 2017-12-10 PROCEDURE — 25000003 PHARM REV CODE 250: Performed by: NURSE PRACTITIONER

## 2017-12-10 PROCEDURE — 97110 THERAPEUTIC EXERCISES: CPT | Performed by: PHYSICAL THERAPIST

## 2017-12-10 PROCEDURE — 85025 COMPLETE CBC W/AUTO DIFF WBC: CPT

## 2017-12-10 PROCEDURE — 97116 GAIT TRAINING THERAPY: CPT | Mod: 59 | Performed by: PHYSICAL THERAPIST

## 2017-12-10 PROCEDURE — 83735 ASSAY OF MAGNESIUM: CPT

## 2017-12-10 PROCEDURE — 25000003 PHARM REV CODE 250: Performed by: STUDENT IN AN ORGANIZED HEALTH CARE EDUCATION/TRAINING PROGRAM

## 2017-12-10 PROCEDURE — G0378 HOSPITAL OBSERVATION PER HR: HCPCS

## 2017-12-10 PROCEDURE — 80069 RENAL FUNCTION PANEL: CPT

## 2017-12-10 PROCEDURE — G8978 MOBILITY CURRENT STATUS: HCPCS | Mod: CK | Performed by: PHYSICAL THERAPIST

## 2017-12-10 PROCEDURE — G8980 MOBILITY D/C STATUS: HCPCS | Mod: CK | Performed by: PHYSICAL THERAPIST

## 2017-12-10 PROCEDURE — 36415 COLL VENOUS BLD VENIPUNCTURE: CPT

## 2017-12-10 RX ADMIN — LEVETIRACETAM 500 MG: 500 TABLET ORAL at 09:12

## 2017-12-10 RX ADMIN — CALCIUM ACETATE 1334 MG: 667 CAPSULE ORAL at 12:12

## 2017-12-10 RX ADMIN — FINASTERIDE 5 MG: 5 TABLET, FILM COATED ORAL at 08:12

## 2017-12-10 RX ADMIN — DOXAZOSIN MESYLATE 4 MG: 2 TABLET ORAL at 08:12

## 2017-12-10 RX ADMIN — BRINZOLAMIDE 1 DROP: 10 SUSPENSION/ DROPS OPHTHALMIC at 01:12

## 2017-12-10 RX ADMIN — CARVEDILOL 25 MG: 25 TABLET, FILM COATED ORAL at 09:12

## 2017-12-10 RX ADMIN — HEPARIN SODIUM 5000 UNITS: 5000 INJECTION, SOLUTION INTRAVENOUS; SUBCUTANEOUS at 01:12

## 2017-12-10 RX ADMIN — TRAVOPROST 1 DROP: 0.04 SOLUTION/ DROPS OPHTHALMIC at 09:12

## 2017-12-10 RX ADMIN — CALCIUM ACETATE 1334 MG: 667 CAPSULE ORAL at 05:12

## 2017-12-10 RX ADMIN — HEPARIN SODIUM 5000 UNITS: 5000 INJECTION, SOLUTION INTRAVENOUS; SUBCUTANEOUS at 09:12

## 2017-12-10 RX ADMIN — CALCIUM ACETATE 1334 MG: 667 CAPSULE ORAL at 08:12

## 2017-12-10 RX ADMIN — ASPIRIN 81 MG CHEWABLE TABLET 81 MG: 81 TABLET CHEWABLE at 08:12

## 2017-12-10 RX ADMIN — HEPARIN SODIUM 5000 UNITS: 5000 INJECTION, SOLUTION INTRAVENOUS; SUBCUTANEOUS at 06:12

## 2017-12-10 RX ADMIN — BRINZOLAMIDE 1 DROP: 10 SUSPENSION/ DROPS OPHTHALMIC at 09:12

## 2017-12-10 RX ADMIN — CARVEDILOL 25 MG: 25 TABLET, FILM COATED ORAL at 08:12

## 2017-12-10 RX ADMIN — LEVETIRACETAM 500 MG: 500 TABLET ORAL at 08:12

## 2017-12-10 RX ADMIN — FLUCONAZOLE 100 MG: 100 TABLET ORAL at 08:12

## 2017-12-10 RX ADMIN — ATORVASTATIN CALCIUM 40 MG: 20 TABLET, FILM COATED ORAL at 09:12

## 2017-12-10 RX ADMIN — BRINZOLAMIDE 1 DROP: 10 SUSPENSION/ DROPS OPHTHALMIC at 06:12

## 2017-12-10 RX ADMIN — AMLODIPINE BESYLATE 10 MG: 10 TABLET ORAL at 08:12

## 2017-12-10 NOTE — ASSESSMENT & PLAN NOTE
Hydrocele bilateral   - s/p L hydrocelectomy and R orchiectomy on 11/22/17   - urology consulted while in ED - low probability of wound infection on examination  - continue to monitor and scrotal elevation (discussed with RN daily)

## 2017-12-10 NOTE — PLAN OF CARE
Problem: Physical Therapy Goal  Goal: Physical Therapy Goal  Goals to be met by: 7 days ()    Patient will increase functional independence with mobility by performin. Supine to sit with Stand-by Assistance  2. Sit to supine with Stand-by Assistance  3. Sit to stand transfer with Contact Guard Assistance- met  4. Gait  x 50 feet with Contact Guard Assistance using Rolling Walker.   5. Lower extremity exercise program x30 reps per handout, with independence to improve muscular strength and endurance.  6. Pt will ambulate 1 flight of stairs with MIN A and unilateral HR placement.        Outcome: Ongoing (interventions implemented as appropriate)  Progressing towards goals.  Needs continued PT to maximize recovery    Sean Mckeon, PT  12/10/2017

## 2017-12-10 NOTE — PLAN OF CARE
Problem: Patient Care Overview  Goal: Plan of Care Review  Outcome: Ongoing (interventions implemented as appropriate)  Went over plan of care with patient after introducing self. Plan was to prepare him as to expectation for shift, frequent v/s, frequent  checks, IV meds, po meds, cbg check plus snacks, tele monitoring and quiet and rest. Pt also reminded to stay in bed and to use his call bell for assistance.  Bed in low position, locked with side rails up X3. Will continue to monitor.

## 2017-12-10 NOTE — PLAN OF CARE
Problem: Occupational Therapy Goal  Goal: Occupational Therapy Goal  Goals to be met by: 12/14/2017     Patient will increase functional independence with ADLs by performing:    UE Dressing with Minimal Assistance.  LE Dressing with Moderate Assistance.  Grooming while seated with Contact Guard Assistance.  Toileting from [facility] with Moderate Assistance for hygiene and clothing management.   Toilet transfer to toilet with Minimal Assistance.     Outcome: Ongoing (interventions implemented as appropriate)  Goals remain appropriate. Cont POC.    SHIRA Rodrigues  12/10/2017  Pager: [phone]

## 2017-12-10 NOTE — PT/OT/SLP PROGRESS
Occupational Therapy   Treatment    Name: Ronald Campbell  MRN: [id number]  Admitting Diagnosis:  Acute cystitis with hematuria    Recommendations:     Discharge Recommendations: nursing facility, skilled  Discharge Equipment Recommendations:  none  Barriers to discharge:  None    Subjective     Communicated with: RN prior to session.  Pain/Comfort:  · Pain Rating 1: 0/10  · Pain Rating Post-Intervention 1: 0/10    Objective:     Patient found with:  (none)    General Precautions: Standard, blind, fall     Occupational Performance:    Bed Mobility:    · Patient completed Rolling/Turning to Right with stand by assistance, with side rail and and verbal cues for log roll technique  · Patient completed Supine to Sit with stand by assistance , with verbal cues to complete log roll/ supine to sit technique    Functional Mobility/Transfers:  · Patient completed Sit <> Stand Transfer with minimum assistance  with  no assistive device   · Patient completed Bed <> Chair Transfer using Stand Pivot technique with contact guard assistance with no assistive device    Activities of Daily Living:  · Feeding:  maximal assistance depending on the type of food; pt can eat simple finger foods like a sandwich, but requires A with more complex foods that require use of utensils and additional preparation once the food is plated    Patient left up in chair with daughter present    [facility] 6 Click:  [facility] Total Score: 16    Treatment & Education:  Pt ed re OT role and POC. Pt performed R roll and supine to sit with SBA, side rail, and verbal cues for technique. Pt performed sit to stand t/f with Min A and no AD and stood in place for 5 minutes with CGA for 2 minutes, then range of SBA/CGA for last 3 minutes. While in stance, pt performed scaption arm raises x20 reps with CGA for balance, and occasional assist for ROM. Pt sat EOB to rest. Pt stood again with Min A and no AD, and ambulated 2 feet to bedside chair with CGA and verbal cues for  position in space. Pt sat with CGA. Pt rested. Pt performed sit to stand t/f as exercise, x5 reps with improved skills using arm rests of chair (CGA throughout). Pt sat and performed leg extensions x10 reps bilaterally. Pt performed seated hamstring stretch with verbal cues for technique, x 15 seconds each leg to tolerance.  See above for feeding.  Education:    Assessment:     Ronald Campbell is a 88 y.o. male with a medical diagnosis of Acute cystitis with hematuria.  He presents with improved transfer skills and is motivated to regain PLOF.  Performance deficits affecting function are weakness, impaired endurance, impaired self care skills, impaired functional mobilty, gait instability, impaired balance, visual deficits, decreased lower extremity function, impaired joint extensibility, impaired muscle length.  Pt demo increased tightness of knee flexors, but is able to stand with good posture (v/c's) and CGA. Pt requiring increased assistance for self care tasks 2/2 blindness, but takes direction well and is able to perform tasks with less physical assistance if detailed instructions are provided re task, location of person, and location of items. Pt tolerated therex well today.    Rehab Prognosis:  Good; patient would benefit from acute skilled OT services to address these deficits and reach maximum level of function.       Plan:     Patient to be seen 4 x/week to address the above listed problems via self-care/home management, therapeutic activities, therapeutic exercises  · Plan of Care Expires:    · Plan of Care Reviewed with: patient, caregiver, daughter    This Plan of care has been discussed with the patient who was involved in its development and understands and is in agreement with the identified goals and treatment plan    GOALS:    Occupational Therapy Goals        Problem: Occupational Therapy Goal    Goal Priority Disciplines Outcome Interventions   Occupational Therapy Goal     OT, PT/OT Ongoing  (interventions implemented as appropriate)    Description:  Goals to be met by: 12/14/2017     Patient will increase functional independence with ADLs by performing:    UE Dressing with Minimal Assistance.  LE Dressing with Moderate Assistance.  Grooming while seated with Contact Guard Assistance.  [place] [facility] [place] Moderate Assistance for hygiene and clothing management.   Toilet transfer to toilet with Minimal Assistance.                      Time Tracking:     OT Date of Treatment: 12/10/17  OT Start Time: 1007  OT Stop Time: 1032  OT Total Time (min): 25 min    Billable Minutes:Therapeutic Activity 15 minutes  Therapeutic Exercise 10 minutes    SHIRA Rodrigues  12/10/2017  Pager: [phone]

## 2017-12-10 NOTE — PROGRESS NOTES
[facility] Hospital Medicine  Progress Note    Patient Name: Ronald Campbell  MRN: [id number]  Patient Class: OP- Observation   Admission Date: 12/4/2017  Length of Stay: 0 days  Attending Physician: Saroj Hernández MD  Primary Care Provider: Bart Shaw MD    [facility]: [facility] Mesha Joyce PA-C    Subjective:     Principal Problem:Acute cystitis with hematuria    HPI:  89 y/o gentleman who presents to the ED with his daughter d/t AMS and confusion.  He has a PMH of dementia, DMII, CHF, glaucoma, seizures, HTN, ESRD on HD (MWF) He recently underwent L hydrocelectomy and R orchiectomy on 11/22/17 and daughter states that he has been slightly off since.  She reports that his less active and his concentration is decreased.  Reportedly during dialysis today he attempted to leave the clinic three times while undergoing his treatment.  His daughter states that this is very unusual for him.  He was afebrile with no leukocytosis with initial workup. While in the ED urology was consulted d/t recent surgical procedure and concern for possible wound infection. He was evaluated by urology in the ED revealing no evidence of wound infection.  Urinalysis was performed and displays>100RBCs, >100WBCs, 3+ leuks, many yeast, and occasional bacteria.  He denies CP, SOB, N/V/D, fever, chills, or discomfort.     Hospital Course:  Pt admitted to observation for complicated UTI. Rocephin (received 3 doses) and fluconazole initiated, UC in process.  UC revealed candida albicans. 12/6 changed fluconazole from po to IV, starting with 200 mg IV x 1 followed by 100 mg po daily for 14 days. 12/6 pt with hypothermia, septic workup negative and resolved same day with warming blanket.  HD complete on 12/6. 12/8 pt with increased scrotal swelling.  Bladder scan 51 cc. Discussed with nursing scrotal elevation. 12/9-12/10 scrotal swelling improved with elevation. Fluconazole continued. Confusion/agitation  waxes and wanes but generally improves throughout the day and with reorientation. PT/OT consulted and recommending SNF. Discharge pending SNF placement.     Interval History: No acute events overnight. This AM, pt lying in bed with daughter at bedside. Discussed scrotal elevation with RN. Discussed plan of care with patient and daughter.    Review of Systems   Constitutional: Negative for chills, diaphoresis, fever and unexpected weight change.   Eyes: Positive for visual disturbance.        Blind   Respiratory: Negative for cough, chest tightness, shortness of breath and wheezing.    Cardiovascular: Negative for chest pain, palpitations and leg swelling.   Gastrointestinal: Negative for abdominal distention, abdominal pain, diarrhea, nausea and vomiting.   Genitourinary: Positive for decreased urine volume. Negative for discharge, flank pain and testicular pain.        MWF HD patient - s/p R hydrocelectomy and L orchiectomy on 11/22/17   Musculoskeletal: Negative for arthralgias, back pain, myalgias and neck pain.   Skin: Positive for wound. Negative for color change, pallor and rash.        s/p R hydrocelectomy and L orchiectomy on 11/22/17   Neurological: Positive for seizures. Negative for dizziness, syncope and light-headedness.   Psychiatric/Behavioral: Positive for confusion. Negative for agitation, decreased concentration and hallucinations.     Objective:     Vital Signs (Most Recent):  Temp: 97.4 °F (36.3 °C) (12/10/17 0832)  Pulse: (!) 57 (12/10/17 1207)  Resp: 18 (12/10/17 1207)  BP: (!) 141/67 (12/10/17 1207)  SpO2: 99 % (12/10/17 1207) Vital Signs (24h Range):  Temp:  [97 °F (36.1 °C)-97.4 °F (36.3 °C)] 97.4 °F (36.3 °C)  Pulse:  [54-63] 57  Resp:  [18-20] 18  SpO2:  [97 %-100 %] 99 %  BP: (141-181)/(67-81) 141/67     Weight: 81.2 kg (179 lb 0.2 oz)  Body mass index is 23.62 kg/m².  No intake or output data in the 24 hours ending 12/10/17 1307   Physical Exam   Constitutional: He appears well-developed  and well-nourished. No distress.   Eyes:   Blind    Cardiovascular: Normal rate, regular rhythm, normal heart sounds and intact distal pulses.    Pulmonary/Chest: Effort normal and breath sounds normal. No respiratory distress. He has no wheezes.   Abdominal: Soft. Bowel sounds are normal. He exhibits no distension. There is no tenderness.   Genitourinary: No penile tenderness.   Genitourinary Comments: s/p R hydrocelectomy and L orchiectomy on 11/22/17  +scrotal swelling, improved from 12/8 with elevation, no tenderness.   Musculoskeletal: Normal range of motion. He exhibits no edema.   LUE AV fistula with + thrill and bruit    Neurological: He is alert. No cranial nerve deficit. GCS eye subscore is 3. GCS verbal subscore is 4. GCS motor subscore is 6.   Pt following commands. Oriented to person and place. No agitation   Skin: Skin is warm and dry. He is not diaphoretic.   Psychiatric: He has a normal mood and affect. His behavior is normal.   Nursing note and vitals reviewed.      Significant Labs: All pertinent labs within the past 24 hours have been reviewed.    Significant Imaging: I have reviewed all pertinent imaging results/findings within the past 24 hours.    Assessment/Plan:      * Acute cystitis with hematuria    - UA with >100 RBC, >100 WBC, occasional bacteria, 3+ leuks, and many yeast  - afebrile with no leukocytosis - nontoxic appearing on admission- presents with increased confusion   - started on ceftriaxone in ED - continue for now; pt started on oral fluconazole 100 mg  12/6: UC candida albicans. In setting of ESRD, T2DM, recent surgery will continue treatment with ceftriaxone (after HD) 1 gm q 24 hrs (end 12/7, 3 doses) and fluconazole 200 mg IV x 1 today followed by 100 mg IV daily.  Lactic acid, cortisol, CK (all WNL), EKG NSR ordered as rectal temp 93.6.  12/7: pt back to baseline. Unclear why fluconazole was not given yesterday after HD.  Called RN today to give 200 mg x 1.  12/8: Dr. Hernández  assessed pt.  Discussed with RN scrotal elevation.  Bladder scan 51 cc.  Fluconazole changed to po 100 mg daily x 14 days.  BC NGTD.  12/9-12/10: continue fluconazole and scrotal elevation.  Discussed with RN. Afebrile without leukocytosis. Blood cx NGTD.        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 Will continue to monitor. PRN hydralazine         Dyslipidemia    - Continue atorvastatin as directed         ESRD (end stage renal disease) on dialysis    - LUE AV fistula  - MWF HD schedule  - Nephrology consulted. 12/6 pt refused HD in the morning, nephrology arranging for HD in room this afternoon, completed successfully.  - 12/8 HD completed  - Renal diet; renal function panel daily        Anemia in chronic kidney disease    - Hgb 9.0 (baseline 8.7-11.4)  - trend daily  - No s/s overt bleeding        Debility    - PT/OT consulted and rec SNF  - ambulate with assistance only  - Discharge pending SNF placement        Scrotal mass    Hydrocele bilateral   - s/p L hydrocelectomy and R orchiectomy on 11/22/17   - urology consulted while in ED - low probability of wound infection on examination  - continue to monitor and scrotal elevation (discussed with RN daily)        Dementia with behavioral disturbance    DELIRIUM BEHAVIOR MANAGEMENT  - Minimize use of restraints; if physical restraints necessary, please utilize medical/chemical prns for agitation.  - Keep shades open and room lit during day and room dim at night in order to promote healthy circadian rhythms.  - Encourage family at bedside  - Keep whiteboard in patient's room current with the date and name of the members of patient's team for easy patient self re-orientation.  - Avoid benzodiazepines, antihistamines, anticholinergics,  hypnotics, and minimize opiates while controlling for pain as these medications may exacerbate delirium.  12/7-12/8 at baseline; 12/9 agitated in the morning.  Discussed reorientation with pt's daughter.  12/10: No s/s agitation        Seizure disorder    - continue home dose of levetiracetam as directed   - no episodes 12/4-12/10  - seizure precautions        Blind in both eyes    Glaucoma  - continue ocular drops as directed  - high risk of fall / injury - utilize all safety measures and fall precautions          VTE Risk Mitigation         Ordered     heparin (porcine) injection 5,000 Units  Every 8 hours     Route:  Subcutaneous        12/04/17 2358     Medium Risk of VTE  Once      12/04/17 2358     Place MORGAN hose  Until discontinued      12/04/17 2358     Place sequential compression device  Until discontinued      12/04/17 2358              Mesha Joyce PA-C  Department of Hospital Medicine   [facility]  Discussed with staff: Dr. Hernández

## 2017-12-10 NOTE — PT/OT/SLP PROGRESS
Physical Therapy Treatment    Patient Name:  Ronald Campbell   MRN:  [id number]    Recommendations:     Discharge Recommendations:  nursing facility, skilled   Discharge Equipment Recommendations: none   Barriers to discharge: Inaccessible home and Decreased caregiver support    Assessment:     Ronald Campbell is a 88 y.o. male admitted with a medical diagnosis of Acute cystitis with hematuria.  He presents with the following impairments/functional limitations:  weakness, impaired balance, impaired endurance, impaired functional mobilty, gait instability, decreased lower extremity function, decreased safety awareness .  He demonstrated improved balance during gait and improved strength for transfers.  He is still a little unsteady at times and still needs continued therapy to strengthen LE and improve balance and function in order to prepare for d/c.  Gait was much improved when using RW as compared to when ambulating with no device, but with RW, he has more difficulty making turns due to his vision impairement.    Rehab Prognosis:  good; patient would benefit from acute skilled PT services to address these deficits and reach maximum level of function.      Recent Surgery: * No surgery found *      Plan:     During this hospitalization, patient to be seen 4 x/week to address the above listed problems via gait training, therapeutic activities, therapeutic exercises  · Plan of Care Expires:  01/04/18   Plan of Care Reviewed with: patient, daughter    Subjective     Communicated with RN prior to session.  Patient found up in chair upon PT entry to room, agreeable to treatment.      Chief Complaint: fatigue   Patient comments/goals: desire to improve mobiltiy.  Pt's family member present and encouraging participation.  reportign she still feels patient is weak and having more difficulty than at baseline  Pain/Comfort:  · Pain Rating 1: 0/10  · Pain Rating Post-Intervention 1: 0/10    Patients cultural, spiritual, Methodist  conflicts given the current situation:      Objective:     Patient found with:       General Precautions: Standard, blind, fall   Orthopedic Precautions:N/A   Braces: N/A     Functional Mobility:  · Transfers:     · Sit to Stand:  contact guard assistance and minimum assistance with no AD and rolling walker  · Gait: 2 trials.  first trial with no device requiring mod assist.  required constant cues and at times hand held cues for direction but also required min A for balance.  2nd trial with RW was improved as patient only required cues for direction and CGA for balance.  First trial about 15ft within the room.  Second trial also approximately 15ft.    · Balance: static standign with CGA with no device.        AM-PAC 6 CLICK MOBILITY  Turning over in bed (including adjusting bedclothes, sheets and blankets)?: 3  Sitting down on and standing up from a chair with arms (e.g., wheelchair, bedside commode, etc.): 3  Moving from lying on back to sitting on the side of the bed?: 3  Moving to and from a bed to a chair (including a wheelchair)?: 3  Need to walk in hospital room?: 3  Climbing 3-5 steps with a railing?: 2  Total Score: 17       Therapeutic Activities and Exercises:   seated LAQ, hip flexion, AP, and abd/add x20 reps each.  Educated family and patient on need for continued PT and safety with mobility    Patient left up in chair with all lines intact and call button in reach..    GOALS:    Physical Therapy Goals        Problem: Physical Therapy Goal    Goal Priority Disciplines Outcome Goal Variances Interventions   Physical Therapy Goal     PT/OT, PT Ongoing (interventions implemented as appropriate)     Description:  Goals to be met by: 7 days ()    Patient will increase functional independence with mobility by performin. Supine to sit with Stand-by Assistance  2. Sit to supine with Stand-by Assistance  3. Sit to stand transfer with Contact Guard Assistance- met  4. Gait  x 50 feet with Contact  Guard Assistance using Rolling Walker.   5. Lower extremity exercise program x30 reps per handout, with independence to improve muscular strength and endurance.  6. Pt will ambulate 1 flight of stairs with MIN A and unilateral HR placement.                          Time Tracking:     PT Received On: 12/10/17  PT Start Time: 1153     PT Stop Time: 1217  PT Total Time (min): 24 min     Billable Minutes: Gait Training 12 and Therapeutic Exercise 12    Treatment Type: Treatment  PT/PTA: PT     PTA Visit Number: 0     Sean Mckeon PT  12/10/2017

## 2017-12-10 NOTE — NURSING
Pt transferred from chair back to bed with minimum assist.Bed alarm turned on. Side rails up X3. Will continue to monitor.

## 2017-12-10 NOTE — ASSESSMENT & PLAN NOTE
- UA with >100 RBC, >100 WBC, occasional bacteria, 3+ leuks, and many yeast  - afebrile with no leukocytosis - nontoxic appearing on admission- presents with increased confusion   - started on ceftriaxone in ED - continue for now; pt started on oral fluconazole 100 mg  12/6: UC candida albicans. In setting of ESRD, T2DM, recent surgery will continue treatment with ceftriaxone (after HD) 1 gm q 24 hrs (end 12/7, 3 doses) and fluconazole 200 mg IV x 1 today followed by 100 mg IV daily.  Lactic acid, cortisol, CK (all WNL), EKG NSR ordered as rectal temp 93.6.  12/7: pt back to baseline. Unclear why fluconazole was not given yesterday after HD.  Called RN today to give 200 mg x 1.  12/8: Dr. Hernández assessed pt.  Discussed with RN scrotal elevation.  Bladder scan 51 cc.  Fluconazole changed to po 100 mg daily x 14 days.  BC NGTD.  12/9-12/10: continue fluconazole and scrotal elevation.  Discussed with RN. Afebrile without leukocytosis. Blood cx NGTD.

## 2017-12-10 NOTE — ASSESSMENT & PLAN NOTE
- continue home dose of levetiracetam as directed   - no episodes 12/4-12/10  - seizure precautions

## 2017-12-10 NOTE — ASSESSMENT & PLAN NOTE
DELIRIUM BEHAVIOR MANAGEMENT  - Minimize use of restraints; if physical restraints necessary, please utilize medical/chemical prns for agitation.  - Keep shades open and room lit during day and room dim at night in order to promote healthy circadian rhythms.  - Encourage family at bedside  - Keep whiteboard in patient's room current with the date and name of the members of patient's team for easy patient self re-orientation.  - Avoid benzodiazepines, antihistamines, anticholinergics, hypnotics, and minimize opiates while controlling for pain as these medications may exacerbate delirium.  12/7-12/8 at baseline; 12/9 agitated in the morning.  Discussed reorientation with pt's daughter.  12/10: No s/s agitation

## 2017-12-10 NOTE — ASSESSMENT & PLAN NOTE
- LUE AV fistula  - MWF HD schedule  - Nephrology consulted. 12/6 pt refused HD in the morning, nephrology arranging for HD in room this afternoon, completed successfully.  - 12/8 HD completed  - Renal diet; renal function panel daily

## 2017-12-10 NOTE — ASSESSMENT & PLAN NOTE
- SBP ranging between 172-197; reports missing PM medications  - restarted home medication regimen on admission  - BP stable. Will continue to monitor. PRN hydralazine

## 2017-12-10 NOTE — SUBJECTIVE & OBJECTIVE
Interval History: No acute events overnight. This AM, pt lying in bed with daughter at bedside. Discussed scrotal elevation with RN. Discussed plan of care with patient and daughter.    Review of Systems   Constitutional: Negative for chills, diaphoresis, fever and unexpected weight change.   Eyes: Positive for visual disturbance.        Blind   Respiratory: Negative for cough, chest tightness, shortness of breath and wheezing.    Cardiovascular: Negative for chest pain, palpitations and leg swelling.   Gastrointestinal: Negative for abdominal distention, abdominal pain, diarrhea, nausea and vomiting.   Genitourinary: Positive for decreased urine volume. Negative for discharge, flank pain and testicular pain.        MWF HD patient - s/p R hydrocelectomy and L orchiectomy on 11/22/17   Musculoskeletal: Negative for arthralgias, back pain, myalgias and neck pain.   Skin: Positive for wound. Negative for color change, pallor and rash.        s/p R hydrocelectomy and L orchiectomy on 11/22/17   Neurological: Positive for seizures. Negative for dizziness, syncope and light-headedness.   Psychiatric/Behavioral: Positive for confusion. Negative for agitation, decreased concentration and hallucinations.     Objective:     Vital Signs (Most Recent):  Temp: 97.4 °F (36.3 °C) (12/10/17 0832)  Pulse: (!) 57 (12/10/17 1207)  Resp: 18 (12/10/17 1207)  BP: (!) 141/67 (12/10/17 1207)  SpO2: 99 % (12/10/17 1207) Vital Signs (24h Range):  Temp:  [97 °F (36.1 °C)-97.4 °F (36.3 °C)] 97.4 °F (36.3 °C)  Pulse:  [54-63] 57  Resp:  [18-20] 18  SpO2:  [97 %-100 %] 99 %  BP: (141-181)/(67-81) 141/67     Weight: 81.2 kg (179 lb 0.2 oz)  Body mass index is 23.62 kg/m².  No intake or output data in the 24 hours ending 12/10/17 1307   Physical Exam   Constitutional: He appears well-developed and well-nourished. No distress.   Eyes:   Blind    Cardiovascular: Normal rate, regular rhythm, normal heart sounds and intact distal pulses.     Pulmonary/Chest: Effort normal and breath sounds normal. No respiratory distress. He has no wheezes.   Abdominal: Soft. Bowel sounds are normal. He exhibits no distension. There is no tenderness.   Genitourinary: No penile tenderness.   Genitourinary Comments: s/p R hydrocelectomy and L orchiectomy on 11/22/17  +scrotal swelling, improved from 12/8 with elevation, no tenderness.   Musculoskeletal: Normal range of motion. He exhibits no edema.   LUE AV fistula with + thrill and bruit    Neurological: He is alert. No cranial nerve deficit. GCS eye subscore is 3. GCS verbal subscore is 4. GCS motor subscore is 6.   Pt following commands. Oriented to person and place. No agitation   Skin: Skin is warm and dry. He is not diaphoretic.   Psychiatric: He has a normal mood and affect. His behavior is normal.   Nursing note and vitals reviewed.      Significant Labs: All pertinent labs within the past 24 hours have been reviewed.    Significant Imaging: I have reviewed all pertinent imaging results/findings within the past 24 hours.

## 2017-12-11 LAB
ALBUMIN SERPL BCP-MCNC: 2.8 G/DL
ANION GAP SERPL CALC-SCNC: 11 MMOL/L
BACTERIA BLD CULT: NORMAL
BACTERIA BLD CULT: NORMAL
BASOPHILS # BLD AUTO: 0.01 K/UL
BASOPHILS NFR BLD: 0.2 %
BUN SERPL-MCNC: 66 MG/DL
CALCIUM SERPL-MCNC: 8.2 MG/DL
CHLORIDE SERPL-SCNC: 100 MMOL/L
CO2 SERPL-SCNC: 20 MMOL/L
CREAT SERPL-MCNC: 8 MG/DL
DIFFERENTIAL METHOD: ABNORMAL
EOSINOPHIL # BLD AUTO: 0.1 K/UL
EOSINOPHIL NFR BLD: 1.3 %
ERYTHROCYTE [DISTWIDTH] IN BLOOD BY AUTOMATED COUNT: 19.1 %
EST. GFR  (AFRICAN AMERICAN): 6.3 ML/MIN/1.73 M^2
EST. GFR  (NON AFRICAN AMERICAN): 5.4 ML/MIN/1.73 M^2
FERRITIN SERPL-MCNC: 1873 NG/ML
GLUCOSE SERPL-MCNC: 118 MG/DL
HCT VFR BLD AUTO: 24 %
HGB BLD-MCNC: 8 G/DL
IMM GRANULOCYTES # BLD AUTO: 0.01 K/UL
IMM GRANULOCYTES NFR BLD AUTO: 0.2 %
IRON SERPL-MCNC: 138 UG/DL
LYMPHOCYTES # BLD AUTO: 1.3 K/UL
LYMPHOCYTES NFR BLD: 28.8 %
MAGNESIUM SERPL-MCNC: 2.3 MG/DL
MCH RBC QN AUTO: 24 PG
MCHC RBC AUTO-ENTMCNC: 33.3 G/DL
MCV RBC AUTO: 72 FL
MONOCYTES # BLD AUTO: 0.5 K/UL
MONOCYTES NFR BLD: 9.7 %
NEUTROPHILS # BLD AUTO: 2.8 K/UL
NEUTROPHILS NFR BLD: 59.8 %
NRBC BLD-RTO: 0 /100 WBC
PHOSPHATE SERPL-MCNC: 5.8 MG/DL
PLATELET # BLD AUTO: 121 K/UL
PMV BLD AUTO: ABNORMAL FL
POCT GLUCOSE: 110 MG/DL (ref 70–110)
POCT GLUCOSE: 126 MG/DL (ref 70–110)
POCT GLUCOSE: 177 MG/DL (ref 70–110)
POTASSIUM SERPL-SCNC: 4.9 MMOL/L
RBC # BLD AUTO: 3.33 M/UL
SATURATED IRON: 73 %
SODIUM SERPL-SCNC: 131 MMOL/L
TOTAL IRON BINDING CAPACITY: 189 UG/DL
TRANSFERRIN SERPL-MCNC: 128 MG/DL
WBC # BLD AUTO: 4.62 K/UL

## 2017-12-11 PROCEDURE — G0378 HOSPITAL OBSERVATION PER HR: HCPCS

## 2017-12-11 PROCEDURE — 83540 ASSAY OF IRON: CPT

## 2017-12-11 PROCEDURE — 36415 COLL VENOUS BLD VENIPUNCTURE: CPT

## 2017-12-11 PROCEDURE — 80069 RENAL FUNCTION PANEL: CPT

## 2017-12-11 PROCEDURE — 25000003 PHARM REV CODE 250: Performed by: PHYSICIAN ASSISTANT

## 2017-12-11 PROCEDURE — 25000003 PHARM REV CODE 250: Performed by: STUDENT IN AN ORGANIZED HEALTH CARE EDUCATION/TRAINING PROGRAM

## 2017-12-11 PROCEDURE — 83735 ASSAY OF MAGNESIUM: CPT

## 2017-12-11 PROCEDURE — 82728 ASSAY OF FERRITIN: CPT

## 2017-12-11 PROCEDURE — 85025 COMPLETE CBC W/AUTO DIFF WBC: CPT

## 2017-12-11 PROCEDURE — 25000003 PHARM REV CODE 250: Performed by: INTERNAL MEDICINE

## 2017-12-11 PROCEDURE — 63600175 PHARM REV CODE 636 W HCPCS: Performed by: NURSE PRACTITIONER

## 2017-12-11 PROCEDURE — 99225 PR SUBSEQUENT OBSERVATION CARE,LEVEL II: CPT | Mod: ,,, | Performed by: PHYSICIAN ASSISTANT

## 2017-12-11 PROCEDURE — 25000003 PHARM REV CODE 250: Performed by: NURSE PRACTITIONER

## 2017-12-11 PROCEDURE — G0257 UNSCHED DIALYSIS ESRD PT HOS: HCPCS

## 2017-12-11 RX ORDER — SODIUM CHLORIDE 9 MG/ML
INJECTION, SOLUTION INTRAVENOUS
Status: DISCONTINUED | OUTPATIENT
Start: 2017-12-11 | End: 2017-12-12 | Stop reason: HOSPADM

## 2017-12-11 RX ORDER — SODIUM CHLORIDE 9 MG/ML
INJECTION, SOLUTION INTRAVENOUS ONCE
Status: COMPLETED | OUTPATIENT
Start: 2017-12-11 | End: 2017-12-11

## 2017-12-11 RX ADMIN — BRINZOLAMIDE 1 DROP: 10 SUSPENSION/ DROPS OPHTHALMIC at 05:12

## 2017-12-11 RX ADMIN — AMLODIPINE BESYLATE 10 MG: 10 TABLET ORAL at 06:12

## 2017-12-11 RX ADMIN — LEVETIRACETAM 500 MG: 500 TABLET ORAL at 09:12

## 2017-12-11 RX ADMIN — TRAVOPROST 1 DROP: 0.04 SOLUTION/ DROPS OPHTHALMIC at 11:12

## 2017-12-11 RX ADMIN — HEPARIN SODIUM 5000 UNITS: 5000 INJECTION, SOLUTION INTRAVENOUS; SUBCUTANEOUS at 11:12

## 2017-12-11 RX ADMIN — CARVEDILOL 25 MG: 25 TABLET, FILM COATED ORAL at 11:12

## 2017-12-11 RX ADMIN — CALCIUM ACETATE 1334 MG: 667 CAPSULE ORAL at 05:12

## 2017-12-11 RX ADMIN — ATORVASTATIN CALCIUM 40 MG: 20 TABLET, FILM COATED ORAL at 11:12

## 2017-12-11 RX ADMIN — CALCIUM ACETATE 1334 MG: 667 CAPSULE ORAL at 09:12

## 2017-12-11 RX ADMIN — BRINZOLAMIDE 1 DROP: 10 SUSPENSION/ DROPS OPHTHALMIC at 11:12

## 2017-12-11 RX ADMIN — HEPARIN SODIUM 5000 UNITS: 5000 INJECTION, SOLUTION INTRAVENOUS; SUBCUTANEOUS at 05:12

## 2017-12-11 RX ADMIN — LEVETIRACETAM 500 MG: 500 TABLET ORAL at 11:12

## 2017-12-11 RX ADMIN — SODIUM CHLORIDE: 0.9 INJECTION, SOLUTION INTRAVENOUS at 12:12

## 2017-12-11 NOTE — PLAN OF CARE
MAIRA spoke with rachel at [facility]. They are still reviewing the pt.  MAIRA received a message from [facility] that they contacted the pt's family ans was told they are not the 1st choice.  MAIRA will ask rachel at [facility] to review in the morning and decide on acceptance vs denial.  MAIRA will f/u tomorrow.    Madeline Carrillo, [facility] x [phone]

## 2017-12-11 NOTE — PLAN OF CARE
Problem: Patient Care Overview  Goal: Plan of Care Review  Outcome: Ongoing (interventions implemented as appropriate)  Went over plan of care with patient and daughter: frequent v/s, frequent checks, keeping call bell close by, meds, tele monitoring, cbg checks and snacks, maintaining IV line and rest and sleep.    Patient was able to accomplish all of this except maintaining IV line. IV line was found to be 1/2 out this am at 0600. The jelco was still in the arm but the saline lock tubing was no where to be found. Will attempt to start new IV this am.

## 2017-12-11 NOTE — PT/OT/SLP PROGRESS
Physical Therapy      Patient Name:  Ronald Campbell   MRN:  [id number]    Patient not seen today secondary to  patient away at dialysis. Will follow-up next scheduled visit.    Abdirahman Bonilla II, PTA

## 2017-12-11 NOTE — PROGRESS NOTES
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 Per the patient's daughter, he was reportedly confused and tried to leave during his dialysis sessions multiple times, which has never happened before. She states he does have some baseline dementia, but has never had this kind of confusion or behavior before. He denies recent illnesses, chest pain, shortness of breath, cough, abdominal pain, changes in bowel movements, dysuria, arthralgias, or myalgias. He has not had any complications since his surgery on 11/22.    Nephrology is consulted to manage her ESRD    Interval History: seen in [place] on dialysis, no issues, tolerating BFR of 400    Review of patient's allergies indicates:   Allergen Reactions    Lisinopril Swelling     Current Facility-Administered Medications   Medication Frequency    0.9%  NaCl infusion PRN    0.9%  NaCl infusion Once    0.9%  NaCl infusion PRN    0.9%  NaCl infusion PRN    0.9%  NaCl infusion Once    acetaminophen tablet 650 mg Q6H PRN    albuterol-ipratropium 2.5mg-0.5mg/3mL nebulizer solution 3 mL Q4H PRN    amLODIPine tablet 10 mg Daily    aspirin chewable tablet 81 mg Daily    atorvastatin tablet 40 mg QHS    brinzolamide 1 % ophthalmic suspension 1 drop TID    calcium acetate capsule 1,334 mg TID WM    carvedilol tablet 25 mg BID    dextrose 50% injection 12.5 g PRN    dextrose 50%  injection 25 g PRN    doxazosin tablet 4 mg Daily    finasteride tablet 5 mg Daily    fluconazole tablet 100 mg Daily    glucagon (human recombinant) injection 1 mg PRN    glucose chewable tablet 16 g PRN    glucose chewable tablet 24 g PRN    heparin (porcine) injection 5,000 Units Q8H    hydrALAZINE tablet 25 mg Q8H PRN    insulin aspart pen 0-5 Units QID (AC + HS) PRN    levETIRAcetam tablet 500 mg BID    ondansetron disintegrating tablet 8 mg Q8H PRN    ondansetron injection 4 mg Q8H PRN    senna-docusate 8.6-50 mg per tablet 1 tablet BID PRN    sodium chloride 0.9% flush 5 mL PRN    travoprost 0.004 % ophthalmic solution 1 drop QHS       Objective:     Vital Signs (Most Recent):  Temp: 97 °F (36.1 °C) (12/11/17 0331)  Pulse: (!) 58 (12/11/17 1330)  Resp: 16 (12/11/17 1234)  BP: 139/73 (12/11/17 1330)  SpO2: 98 % (12/11/17 0819)  O2 Device (Oxygen Therapy): room air (12/11/17 1234) Vital Signs (24h Range):  Temp:  [97 °F (36.1 °C)-97.2 °F (36.2 °C)] 97 °F (36.1 °C)  Pulse:  [54-64] 58  Resp:  [16-20] 16  SpO2:  [98 %-100 %] 98 %  BP: (124-168)/(65-81) 139/73     Weight: 81.2 kg (179 lb 0.2 oz) (12/05/17 0049)  Body mass index is 23.62 kg/m².  Body surface area is 2.05 meters squared.    No intake/output data recorded.    Physical Exam   Constitutional: He appears well-developed.   HENT:   Head: Normocephalic and atraumatic.   Eyes: EOM are normal.   Neck: No JVD present.   Cardiovascular: Normal rate and regular rhythm.  Exam reveals no friction rub.    Pulmonary/Chest: Effort normal and breath sounds normal.   Abdominal: Soft. He exhibits no distension.   Musculoskeletal: He exhibits edema.   Neurological: He is alert.   Calm and cooperative   Skin: Skin is warm.       Assessment/Plan:     ESRD (end stage renal disease) on dialysis    Etiology: diabetes    Modality:iHD x 3 years  Days: MWF  Access: L arm fistula (good thrill and pulsation, no reported issues)  Unit: [facility]  Nephrologist:   Taj  Tx Duration: 3.5 hrs  EDW:178-179lbs  UF : ?2L  Residual fxn: 1-2 times per day, exact amount uncertain     HTN: elevated this morning, but hadn't received his meds yet  Metabolic acidosis: ok  Electrolytes: ok  Anemia: Hgb 8.0, check iron studies    Plan for dialysis: will be providing HD in the room today, behavioral/confusion issues would seem to make it less likely we could successfully complete a session in the [place]              Thank you for your consult. I will follow-up with patient. Please contact us if you have any additional questions.    Murphy Gusman MD  Nephrology  [facility]        I have reviewed and concur with the fellow's history, physical, assessment, and plan. I have personally interviewed and examined the patient at bedside.

## 2017-12-11 NOTE — SUBJECTIVE & OBJECTIVE
Interval History: seen in [place] on dialysis, no issues, tolerating BFR of 400    Review of patient's allergies indicates:   Allergen Reactions    Lisinopril Swelling     Current Facility-Administered Medications   Medication Frequency    0.9%  NaCl infusion PRN    0.9%  NaCl infusion Once    0.9%  NaCl infusion PRN    0.9%  NaCl infusion PRN    0.9%  NaCl infusion Once    acetaminophen tablet 650 mg Q6H PRN    albuterol-ipratropium 2.5mg-0.5mg/3mL nebulizer solution 3 mL Q4H PRN    amLODIPine tablet 10 mg Daily    aspirin chewable tablet 81 mg Daily    atorvastatin tablet 40 mg QHS    brinzolamide 1 % ophthalmic suspension 1 drop TID    calcium acetate capsule 1,334 mg TID WM    carvedilol tablet 25 mg BID    dextrose 50% injection 12.5 g PRN    dextrose 50% injection 25 g PRN    doxazosin tablet 4 mg Daily    finasteride tablet 5 mg Daily    fluconazole tablet 100 mg Daily    glucagon (human recombinant) injection 1 mg PRN    glucose chewable tablet 16 g PRN    glucose chewable tablet 24 g PRN    heparin (porcine) injection 5,000 Units Q8H    hydrALAZINE tablet 25 mg Q8H PRN    insulin aspart pen 0-5 Units QID (AC + HS) PRN    levETIRAcetam tablet 500 mg BID    ondansetron disintegrating tablet 8 mg Q8H PRN    ondansetron injection 4 mg Q8H PRN    senna-docusate 8.6-50 mg per tablet 1 tablet BID PRN    sodium chloride 0.9% flush 5 mL PRN    travoprost 0.004 % ophthalmic solution 1 drop QHS       Objective:     Vital Signs (Most Recent):  Temp: 97 °F (36.1 °C) (12/11/17 0331)  Pulse: (!) 58 (12/11/17 1330)  Resp: 16 (12/11/17 1234)  BP: 139/73 (12/11/17 1330)  SpO2: 98 % (12/11/17 0819)  O2 Device (Oxygen Therapy): room air (12/11/17 1234) Vital Signs (24h Range):  Temp:  [97 °F (36.1 °C)-97.2 °F (36.2 °C)] 97 °F (36.1 °C)  Pulse:  [54-64] 58  Resp:  [16-20] 16  SpO2:  [98 %-100 %] 98 %  BP: (124-168)/(65-81) 139/73     Weight: 81.2 kg (179 lb 0.2 oz) (12/05/17 0049)  Body mass index is  23.62 kg/m².  Body surface area is 2.05 meters squared.    No intake/output data recorded.    Physical Exam   Constitutional: He appears well-developed.   HENT:   Head: Normocephalic and atraumatic.   Eyes: EOM are normal.   Neck: No JVD present.   Cardiovascular: Normal rate and regular rhythm.  Exam reveals no friction rub.    Pulmonary/Chest: Effort normal and breath sounds normal.   Abdominal: Soft. He exhibits no distension.   Musculoskeletal: He exhibits edema.   Neurological: He is alert.   Calm and cooperative   Skin: Skin is warm.

## 2017-12-11 NOTE — ASSESSMENT & PLAN NOTE
Etiology: diabetes    Modality:iHD x 3 years  Days: MWF  Access: L arm fistula (good thrill and pulsation, no reported issues)  Unit: [facility]  Nephrologist: Dr Vang  Tx Duration: 3.5 hrs  EDW:178-179lbs  UF : ?2L  Residual fxn: 1-2 times per day, exact amount uncertain     HTN: elevated this morning, but hadn't received his meds yet  Metabolic acidosis: ok  Electrolytes: ok  Anemia: Hgb 8.0, check iron studies    Plan for dialysis: will be providing HD in the room today, behavioral/confusion issues would seem to make it less likely we could successfully complete a session in the [place]

## 2017-12-11 NOTE — PLAN OF CARE
MAIRA was contacted by Noreen RAO ([phone]).  She asked if the pt has been accepted to [facility].  MAIRA informed her that she will have to look into it and call her back.  MAIRA spoke with Shannan at [facility].  She stated that she wants to see a therapy note from today but they will likely recommend [facility] SNF.  MAIRA reviewed the note and it did not have the distance walked.  PT will try to add this.  MAIRA updated Shannan and will f/u in a few hours.  MAIRA called [facility].  They are still reviewing his record.  MAIRA left a message for Mireille with [facility] to update her.    Madeline Carrillo, HAMILTON  [phone]

## 2017-12-12 ENCOUNTER — HOSPITAL ENCOUNTER (INPATIENT)
Facility: HOSPITAL | Age: 82
LOS: 7 days | Discharge: HOME HEALTH | DRG: 689 | End: 2017-12-19
Attending: HOSPITALIST | Admitting: HOSPITALIST
Payer: COMMERCIAL

## 2017-12-12 VITALS
WEIGHT: 179 LBS | HEART RATE: 61 BPM | BODY MASS INDEX: 23.72 KG/M2 | DIASTOLIC BLOOD PRESSURE: 64 MMHG | RESPIRATION RATE: 18 BRPM | SYSTOLIC BLOOD PRESSURE: 143 MMHG | HEIGHT: 73 IN | TEMPERATURE: 98 F | OXYGEN SATURATION: 97 %

## 2017-12-12 DIAGNOSIS — Z99.2 ANEMIA IN CHRONIC KIDNEY DISEASE, ON CHRONIC DIALYSIS: ICD-10-CM

## 2017-12-12 DIAGNOSIS — I15.0 RENOVASCULAR HYPERTENSION: ICD-10-CM

## 2017-12-12 DIAGNOSIS — N18.6 ANEMIA IN CHRONIC KIDNEY DISEASE, ON CHRONIC DIALYSIS: ICD-10-CM

## 2017-12-12 DIAGNOSIS — E78.5 DYSLIPIDEMIA: ICD-10-CM

## 2017-12-12 DIAGNOSIS — H40.9 GLAUCOMA, UNSPECIFIED GLAUCOMA TYPE, UNSPECIFIED LATERALITY: ICD-10-CM

## 2017-12-12 DIAGNOSIS — N18.6 TYPE 2 DIABETES MELLITUS WITH END-STAGE RENAL DISEASE: ICD-10-CM

## 2017-12-12 DIAGNOSIS — D69.6 THROMBOCYTOPENIA: ICD-10-CM

## 2017-12-12 DIAGNOSIS — Z99.2 HEMODIALYSIS PATIENT: ICD-10-CM

## 2017-12-12 DIAGNOSIS — N18.6 ESRD (END STAGE RENAL DISEASE) ON DIALYSIS: ICD-10-CM

## 2017-12-12 DIAGNOSIS — R53.81 DEBILITY: ICD-10-CM

## 2017-12-12 DIAGNOSIS — R33.8 BENIGN PROSTATIC HYPERPLASIA WITH URINARY RETENTION: ICD-10-CM

## 2017-12-12 DIAGNOSIS — E11.22 TYPE 2 DIABETES MELLITUS WITH END-STAGE RENAL DISEASE: ICD-10-CM

## 2017-12-12 DIAGNOSIS — N40.1 BENIGN PROSTATIC HYPERPLASIA WITH URINARY RETENTION: ICD-10-CM

## 2017-12-12 DIAGNOSIS — G40.909 SEIZURE DISORDER: Primary | Chronic | ICD-10-CM

## 2017-12-12 DIAGNOSIS — D63.1 ANEMIA IN CHRONIC KIDNEY DISEASE, ON CHRONIC DIALYSIS: ICD-10-CM

## 2017-12-12 DIAGNOSIS — F03.91 DEMENTIA WITH BEHAVIORAL DISTURBANCE, UNSPECIFIED DEMENTIA TYPE: ICD-10-CM

## 2017-12-12 DIAGNOSIS — N30.01 ACUTE CYSTITIS WITH HEMATURIA: ICD-10-CM

## 2017-12-12 DIAGNOSIS — Z99.2 ESRD (END STAGE RENAL DISEASE) ON DIALYSIS: ICD-10-CM

## 2017-12-12 LAB
ALBUMIN SERPL BCP-MCNC: 2.6 G/DL
ANION GAP SERPL CALC-SCNC: 9 MMOL/L
BASOPHILS # BLD AUTO: 0.02 K/UL
BASOPHILS NFR BLD: 0.4 %
BUN SERPL-MCNC: 46 MG/DL
CALCIUM SERPL-MCNC: 8.1 MG/DL
CHLORIDE SERPL-SCNC: 99 MMOL/L
CO2 SERPL-SCNC: 23 MMOL/L
CREAT SERPL-MCNC: 5.8 MG/DL
DIFFERENTIAL METHOD: ABNORMAL
EOSINOPHIL # BLD AUTO: 0.1 K/UL
EOSINOPHIL NFR BLD: 1.4 %
ERYTHROCYTE [DISTWIDTH] IN BLOOD BY AUTOMATED COUNT: 19.1 %
EST. GFR  (AFRICAN AMERICAN): 9.2 ML/MIN/1.73 M^2
EST. GFR  (NON AFRICAN AMERICAN): 8 ML/MIN/1.73 M^2
GLUCOSE SERPL-MCNC: 75 MG/DL
HCT VFR BLD AUTO: 24 %
HGB BLD-MCNC: 7.9 G/DL
IMM GRANULOCYTES # BLD AUTO: 0.03 K/UL
IMM GRANULOCYTES NFR BLD AUTO: 0.6 %
LYMPHOCYTES # BLD AUTO: 1.5 K/UL
LYMPHOCYTES NFR BLD: 30.3 %
MAGNESIUM SERPL-MCNC: 2.2 MG/DL
MCH RBC QN AUTO: 23.7 PG
MCHC RBC AUTO-ENTMCNC: 32.9 G/DL
MCV RBC AUTO: 72 FL
MONOCYTES # BLD AUTO: 0.6 K/UL
MONOCYTES NFR BLD: 10.8 %
NEUTROPHILS # BLD AUTO: 2.9 K/UL
NEUTROPHILS NFR BLD: 56.5 %
NRBC BLD-RTO: 0 /100 WBC
PHOSPHATE SERPL-MCNC: 4 MG/DL
PLATELET # BLD AUTO: 117 K/UL
PMV BLD AUTO: ABNORMAL FL
POCT GLUCOSE: 185 MG/DL (ref 70–110)
POCT GLUCOSE: 82 MG/DL (ref 70–110)
POTASSIUM SERPL-SCNC: 4.7 MMOL/L
RBC # BLD AUTO: 3.34 M/UL
SODIUM SERPL-SCNC: 131 MMOL/L
WBC # BLD AUTO: 5.09 K/UL

## 2017-12-12 PROCEDURE — 25000003 PHARM REV CODE 250: Performed by: PHYSICIAN ASSISTANT

## 2017-12-12 PROCEDURE — G8978 MOBILITY CURRENT STATUS: HCPCS | Mod: CK

## 2017-12-12 PROCEDURE — G0378 HOSPITAL OBSERVATION PER HR: HCPCS

## 2017-12-12 PROCEDURE — 97535 SELF CARE MNGMENT TRAINING: CPT

## 2017-12-12 PROCEDURE — 99217 PR OBSERVATION CARE DISCHARGE: CPT | Mod: ,,, | Performed by: PHYSICIAN ASSISTANT

## 2017-12-12 PROCEDURE — 97112 NEUROMUSCULAR REEDUCATION: CPT

## 2017-12-12 PROCEDURE — G8979 MOBILITY GOAL STATUS: HCPCS | Mod: CK

## 2017-12-12 PROCEDURE — 36415 COLL VENOUS BLD VENIPUNCTURE: CPT

## 2017-12-12 PROCEDURE — 63600175 PHARM REV CODE 636 W HCPCS: Performed by: PHYSICIAN ASSISTANT

## 2017-12-12 PROCEDURE — G8980 MOBILITY D/C STATUS: HCPCS | Mod: CK

## 2017-12-12 PROCEDURE — 80069 RENAL FUNCTION PANEL: CPT

## 2017-12-12 PROCEDURE — 25000003 PHARM REV CODE 250: Performed by: NURSE PRACTITIONER

## 2017-12-12 PROCEDURE — 25000003 PHARM REV CODE 250: Performed by: STUDENT IN AN ORGANIZED HEALTH CARE EDUCATION/TRAINING PROGRAM

## 2017-12-12 PROCEDURE — 97530 THERAPEUTIC ACTIVITIES: CPT

## 2017-12-12 PROCEDURE — 85025 COMPLETE CBC W/AUTO DIFF WBC: CPT

## 2017-12-12 PROCEDURE — 63600175 PHARM REV CODE 636 W HCPCS: Performed by: NURSE PRACTITIONER

## 2017-12-12 PROCEDURE — 12000000 HC SNF SEMI-PRIVATE ROOM

## 2017-12-12 PROCEDURE — 83735 ASSAY OF MAGNESIUM: CPT

## 2017-12-12 RX ORDER — FINASTERIDE 5 MG/1
5 TABLET, FILM COATED ORAL DAILY
Status: DISCONTINUED | OUTPATIENT
Start: 2017-12-13 | End: 2017-12-19 | Stop reason: HOSPADM

## 2017-12-12 RX ORDER — CARVEDILOL 25 MG/1
25 TABLET ORAL 2 TIMES DAILY
Status: CANCELLED | OUTPATIENT
Start: 2017-12-12

## 2017-12-12 RX ORDER — SODIUM CHLORIDE 9 MG/ML
INJECTION, SOLUTION INTRAVENOUS ONCE
Status: DISCONTINUED | OUTPATIENT
Start: 2017-12-12 | End: 2017-12-12 | Stop reason: HOSPADM

## 2017-12-12 RX ORDER — SODIUM CHLORIDE 9 MG/ML
INJECTION, SOLUTION INTRAVENOUS
Status: DISCONTINUED | OUTPATIENT
Start: 2017-12-12 | End: 2017-12-13

## 2017-12-12 RX ORDER — IBUPROFEN 200 MG
24 TABLET ORAL
Status: DISCONTINUED | OUTPATIENT
Start: 2017-12-12 | End: 2017-12-19 | Stop reason: HOSPADM

## 2017-12-12 RX ORDER — TRAVOPROST OPHTHALMIC SOLUTION 0.04 MG/ML
1 SOLUTION OPHTHALMIC NIGHTLY
Status: DISCONTINUED | OUTPATIENT
Start: 2017-12-12 | End: 2017-12-19 | Stop reason: HOSPADM

## 2017-12-12 RX ORDER — ACETAMINOPHEN 325 MG/1
650 TABLET ORAL EVERY 6 HOURS PRN
Status: CANCELLED | OUTPATIENT
Start: 2017-12-12

## 2017-12-12 RX ORDER — HYDRALAZINE HYDROCHLORIDE 25 MG/1
25 TABLET, FILM COATED ORAL EVERY 8 HOURS PRN
Status: CANCELLED | OUTPATIENT
Start: 2017-12-12

## 2017-12-12 RX ORDER — FLUCONAZOLE 100 MG/1
100 TABLET ORAL DAILY
Status: DISCONTINUED | OUTPATIENT
Start: 2017-12-13 | End: 2017-12-19 | Stop reason: HOSPADM

## 2017-12-12 RX ORDER — AMOXICILLIN 250 MG
1 CAPSULE ORAL 2 TIMES DAILY PRN
Status: DISCONTINUED | OUTPATIENT
Start: 2017-12-12 | End: 2017-12-13

## 2017-12-12 RX ORDER — BRINZOLAMIDE 10 MG/ML
1 SUSPENSION/ DROPS OPHTHALMIC 3 TIMES DAILY
Status: CANCELLED | OUTPATIENT
Start: 2017-12-12

## 2017-12-12 RX ORDER — TRAVOPROST OPHTHALMIC SOLUTION 0.04 MG/ML
1 SOLUTION OPHTHALMIC NIGHTLY
Status: CANCELLED | OUTPATIENT
Start: 2017-12-12

## 2017-12-12 RX ORDER — CALCIUM ACETATE 667 MG/1
1334 CAPSULE ORAL
Status: CANCELLED | OUTPATIENT
Start: 2017-12-12

## 2017-12-12 RX ORDER — RAMELTEON 8 MG/1
8 TABLET ORAL NIGHTLY PRN
Status: CANCELLED | OUTPATIENT
Start: 2017-12-12

## 2017-12-12 RX ORDER — HEPARIN SODIUM 5000 [USP'U]/ML
5000 INJECTION, SOLUTION INTRAVENOUS; SUBCUTANEOUS EVERY 8 HOURS
Status: DISCONTINUED | OUTPATIENT
Start: 2017-12-12 | End: 2017-12-19 | Stop reason: HOSPADM

## 2017-12-12 RX ORDER — GLUCAGON 1 MG
1 KIT INJECTION
Status: DISCONTINUED | OUTPATIENT
Start: 2017-12-12 | End: 2017-12-19 | Stop reason: HOSPADM

## 2017-12-12 RX ORDER — AMLODIPINE BESYLATE 10 MG/1
10 TABLET ORAL DAILY
Status: DISCONTINUED | OUTPATIENT
Start: 2017-12-13 | End: 2017-12-19 | Stop reason: HOSPADM

## 2017-12-12 RX ORDER — SODIUM CHLORIDE 0.9 % (FLUSH) 0.9 %
5 SYRINGE (ML) INJECTION
Status: CANCELLED | OUTPATIENT
Start: 2017-12-12

## 2017-12-12 RX ORDER — ACETAMINOPHEN 325 MG/1
650 TABLET ORAL EVERY 6 HOURS PRN
Status: DISCONTINUED | OUTPATIENT
Start: 2017-12-12 | End: 2017-12-19 | Stop reason: HOSPADM

## 2017-12-12 RX ORDER — IPRATROPIUM BROMIDE AND ALBUTEROL SULFATE 2.5; .5 MG/3ML; MG/3ML
3 SOLUTION RESPIRATORY (INHALATION) EVERY 4 HOURS PRN
Status: CANCELLED | OUTPATIENT
Start: 2017-12-12

## 2017-12-12 RX ORDER — BRINZOLAMIDE 10 MG/ML
1 SUSPENSION/ DROPS OPHTHALMIC 3 TIMES DAILY
Status: DISCONTINUED | OUTPATIENT
Start: 2017-12-12 | End: 2017-12-19 | Stop reason: HOSPADM

## 2017-12-12 RX ORDER — INSULIN ASPART 100 [IU]/ML
0-5 INJECTION, SOLUTION INTRAVENOUS; SUBCUTANEOUS
Status: DISCONTINUED | OUTPATIENT
Start: 2017-12-12 | End: 2017-12-19 | Stop reason: HOSPADM

## 2017-12-12 RX ORDER — DOXAZOSIN 4 MG/1
4 TABLET ORAL DAILY
Status: DISCONTINUED | OUTPATIENT
Start: 2017-12-13 | End: 2017-12-19 | Stop reason: HOSPADM

## 2017-12-12 RX ORDER — ONDANSETRON 2 MG/ML
4 INJECTION INTRAMUSCULAR; INTRAVENOUS EVERY 8 HOURS PRN
Status: CANCELLED | OUTPATIENT
Start: 2017-12-12

## 2017-12-12 RX ORDER — ATORVASTATIN CALCIUM 20 MG/1
40 TABLET, FILM COATED ORAL NIGHTLY
Status: DISCONTINUED | OUTPATIENT
Start: 2017-12-12 | End: 2017-12-19 | Stop reason: HOSPADM

## 2017-12-12 RX ORDER — AMOXICILLIN 250 MG
1 CAPSULE ORAL 2 TIMES DAILY
Status: CANCELLED | OUTPATIENT
Start: 2017-12-12

## 2017-12-12 RX ORDER — AMOXICILLIN 250 MG
1 CAPSULE ORAL 2 TIMES DAILY
Status: DISCONTINUED | OUTPATIENT
Start: 2017-12-12 | End: 2017-12-19 | Stop reason: HOSPADM

## 2017-12-12 RX ORDER — FINASTERIDE 5 MG/1
5 TABLET, FILM COATED ORAL DAILY
Status: CANCELLED | OUTPATIENT
Start: 2017-12-13

## 2017-12-12 RX ORDER — SODIUM CHLORIDE 0.9 % (FLUSH) 0.9 %
5 SYRINGE (ML) INJECTION
Status: DISCONTINUED | OUTPATIENT
Start: 2017-12-12 | End: 2017-12-13

## 2017-12-12 RX ORDER — GLUCAGON 1 MG
1 KIT INJECTION
Status: CANCELLED | OUTPATIENT
Start: 2017-12-12

## 2017-12-12 RX ORDER — DOXAZOSIN 1 MG/1
4 TABLET ORAL DAILY
Status: CANCELLED | OUTPATIENT
Start: 2017-12-13

## 2017-12-12 RX ORDER — ONDANSETRON 8 MG/1
8 TABLET, ORALLY DISINTEGRATING ORAL EVERY 8 HOURS PRN
Status: CANCELLED | OUTPATIENT
Start: 2017-12-12

## 2017-12-12 RX ORDER — IPRATROPIUM BROMIDE AND ALBUTEROL SULFATE 2.5; .5 MG/3ML; MG/3ML
3 SOLUTION RESPIRATORY (INHALATION) EVERY 4 HOURS PRN
Status: DISCONTINUED | OUTPATIENT
Start: 2017-12-12 | End: 2017-12-19 | Stop reason: HOSPADM

## 2017-12-12 RX ORDER — HEPARIN SODIUM 5000 [USP'U]/ML
5000 INJECTION, SOLUTION INTRAVENOUS; SUBCUTANEOUS EVERY 8 HOURS
Status: CANCELLED | OUTPATIENT
Start: 2017-12-12

## 2017-12-12 RX ORDER — CARVEDILOL 25 MG/1
25 TABLET ORAL 2 TIMES DAILY
Status: DISCONTINUED | OUTPATIENT
Start: 2017-12-12 | End: 2017-12-19 | Stop reason: HOSPADM

## 2017-12-12 RX ORDER — IBUPROFEN 200 MG
16 TABLET ORAL
Status: DISCONTINUED | OUTPATIENT
Start: 2017-12-12 | End: 2017-12-19 | Stop reason: HOSPADM

## 2017-12-12 RX ORDER — SODIUM CHLORIDE 9 MG/ML
INJECTION, SOLUTION INTRAVENOUS
Status: CANCELLED | OUTPATIENT
Start: 2017-12-12

## 2017-12-12 RX ORDER — CALCIUM CARBONATE 200(500)MG
500 TABLET,CHEWABLE ORAL 2 TIMES DAILY PRN
Status: CANCELLED | OUTPATIENT
Start: 2017-12-12

## 2017-12-12 RX ORDER — FLUCONAZOLE 100 MG/1
100 TABLET ORAL DAILY
Status: CANCELLED | OUTPATIENT
Start: 2017-12-13 | End: 2017-12-22

## 2017-12-12 RX ORDER — IBUPROFEN 200 MG
16 TABLET ORAL
Status: CANCELLED | OUTPATIENT
Start: 2017-12-12

## 2017-12-12 RX ORDER — SODIUM CHLORIDE 9 MG/ML
INJECTION, SOLUTION INTRAVENOUS
Status: DISCONTINUED | OUTPATIENT
Start: 2017-12-12 | End: 2017-12-12 | Stop reason: HOSPADM

## 2017-12-12 RX ORDER — LEVETIRACETAM 500 MG/1
500 TABLET ORAL 2 TIMES DAILY
Status: CANCELLED | OUTPATIENT
Start: 2017-12-12

## 2017-12-12 RX ORDER — ONDANSETRON 8 MG/1
8 TABLET, ORALLY DISINTEGRATING ORAL EVERY 8 HOURS PRN
Status: DISCONTINUED | OUTPATIENT
Start: 2017-12-12 | End: 2017-12-19 | Stop reason: HOSPADM

## 2017-12-12 RX ORDER — IBUPROFEN 200 MG
24 TABLET ORAL
Status: CANCELLED | OUTPATIENT
Start: 2017-12-12

## 2017-12-12 RX ORDER — AMLODIPINE BESYLATE 10 MG/1
10 TABLET ORAL DAILY
Status: CANCELLED | OUTPATIENT
Start: 2017-12-13

## 2017-12-12 RX ORDER — HYDRALAZINE HYDROCHLORIDE 25 MG/1
25 TABLET, FILM COATED ORAL EVERY 8 HOURS PRN
Status: DISCONTINUED | OUTPATIENT
Start: 2017-12-12 | End: 2017-12-19 | Stop reason: HOSPADM

## 2017-12-12 RX ORDER — RAMELTEON 8 MG/1
8 TABLET ORAL NIGHTLY PRN
Status: DISCONTINUED | OUTPATIENT
Start: 2017-12-12 | End: 2017-12-19 | Stop reason: HOSPADM

## 2017-12-12 RX ORDER — ATORVASTATIN CALCIUM 20 MG/1
40 TABLET, FILM COATED ORAL NIGHTLY
Status: CANCELLED | OUTPATIENT
Start: 2017-12-12

## 2017-12-12 RX ORDER — CALCIUM CARBONATE 200(500)MG
500 TABLET,CHEWABLE ORAL 2 TIMES DAILY PRN
Status: DISCONTINUED | OUTPATIENT
Start: 2017-12-12 | End: 2017-12-19 | Stop reason: HOSPADM

## 2017-12-12 RX ORDER — ONDANSETRON 2 MG/ML
4 INJECTION INTRAMUSCULAR; INTRAVENOUS EVERY 8 HOURS PRN
Status: DISCONTINUED | OUTPATIENT
Start: 2017-12-12 | End: 2017-12-13

## 2017-12-12 RX ORDER — LEVETIRACETAM 500 MG/1
500 TABLET ORAL 2 TIMES DAILY
Status: DISCONTINUED | OUTPATIENT
Start: 2017-12-12 | End: 2017-12-19 | Stop reason: HOSPADM

## 2017-12-12 RX ORDER — NAPROXEN SODIUM 220 MG/1
81 TABLET, FILM COATED ORAL DAILY
Status: DISCONTINUED | OUTPATIENT
Start: 2017-12-13 | End: 2017-12-19 | Stop reason: HOSPADM

## 2017-12-12 RX ORDER — NAPROXEN SODIUM 220 MG/1
81 TABLET, FILM COATED ORAL DAILY
Status: CANCELLED | OUTPATIENT
Start: 2017-12-13

## 2017-12-12 RX ORDER — INSULIN ASPART 100 [IU]/ML
0-5 INJECTION, SOLUTION INTRAVENOUS; SUBCUTANEOUS
Status: CANCELLED | OUTPATIENT
Start: 2017-12-12

## 2017-12-12 RX ORDER — AMOXICILLIN 250 MG
1 CAPSULE ORAL 2 TIMES DAILY PRN
Status: CANCELLED | OUTPATIENT
Start: 2017-12-12

## 2017-12-12 RX ORDER — CALCIUM ACETATE 667 MG/1
1334 CAPSULE ORAL
Status: DISCONTINUED | OUTPATIENT
Start: 2017-12-13 | End: 2017-12-19 | Stop reason: HOSPADM

## 2017-12-12 RX ADMIN — CALCIUM ACETATE 1334 MG: 667 CAPSULE ORAL at 08:12

## 2017-12-12 RX ADMIN — FLUCONAZOLE 100 MG: 100 TABLET ORAL at 09:12

## 2017-12-12 RX ADMIN — HEPARIN SODIUM 5000 UNITS: 5000 INJECTION, SOLUTION INTRAVENOUS; SUBCUTANEOUS at 02:12

## 2017-12-12 RX ADMIN — ASPIRIN 81 MG CHEWABLE TABLET 81 MG: 81 TABLET CHEWABLE at 09:12

## 2017-12-12 RX ADMIN — BRINZOLAMIDE 1 DROP: 10 SUSPENSION/ DROPS OPHTHALMIC at 05:12

## 2017-12-12 RX ADMIN — LEVETIRACETAM 500 MG: 500 TABLET ORAL at 09:12

## 2017-12-12 RX ADMIN — FINASTERIDE 5 MG: 5 TABLET, FILM COATED ORAL at 09:12

## 2017-12-12 RX ADMIN — LEVETIRACETAM 500 MG: 500 TABLET, FILM COATED ORAL at 10:12

## 2017-12-12 RX ADMIN — STANDARDIZED SENNA CONCENTRATE AND DOCUSATE SODIUM 1 TABLET: 8.6; 5 TABLET, FILM COATED ORAL at 10:12

## 2017-12-12 RX ADMIN — HEPARIN SODIUM 5000 UNITS: 5000 INJECTION, SOLUTION INTRAVENOUS; SUBCUTANEOUS at 10:12

## 2017-12-12 RX ADMIN — DOXAZOSIN MESYLATE 4 MG: 2 TABLET ORAL at 09:12

## 2017-12-12 RX ADMIN — ATORVASTATIN CALCIUM 40 MG: 20 TABLET, FILM COATED ORAL at 10:12

## 2017-12-12 RX ADMIN — CARVEDILOL 25 MG: 25 TABLET, FILM COATED ORAL at 10:12

## 2017-12-12 RX ADMIN — AMLODIPINE BESYLATE 10 MG: 10 TABLET ORAL at 09:12

## 2017-12-12 RX ADMIN — BRINZOLAMIDE 1 DROP: 10 SUSPENSION/ DROPS OPHTHALMIC at 02:12

## 2017-12-12 RX ADMIN — HEPARIN SODIUM 5000 UNITS: 5000 INJECTION, SOLUTION INTRAVENOUS; SUBCUTANEOUS at 05:12

## 2017-12-12 RX ADMIN — CALCIUM ACETATE 1334 MG: 667 CAPSULE ORAL at 12:12

## 2017-12-12 RX ADMIN — CARVEDILOL 25 MG: 25 TABLET, FILM COATED ORAL at 09:12

## 2017-12-12 NOTE — PLAN OF CARE
Problem: Occupational Therapy Goal  Goal: Occupational Therapy Goal  Goals to be met by: 12/14/2017     Patient will increase functional independence with ADLs by performing:    UE Dressing with Minimal Assistance.  LE Dressing with Moderate Assistance. MET 12/12  Revised: LE dressing with minimum assistance  Grooming while seated with Contact Guard Assistance. MET 12/12  Revised: Grooming while seated with SBA with set up  Toileting from BSC with Moderate Assistance for hygiene and clothing management.   Toilet transfer to toilet with Minimal Assistance.     Outcome: Ongoing (interventions implemented as appropriate)  Continue with POC.   Heather mtathews OT  12/12/2017

## 2017-12-12 NOTE — ASSESSMENT & PLAN NOTE
- PT/OT consulted and rec SNF  - Ambulate with assistance only  Discharged to [facility] SNF 12/12

## 2017-12-12 NOTE — SUBJECTIVE & OBJECTIVE
Interval History: No acute events overnight. This AM, pt sitting upright in bed eating breakfast with daughter at bedside. Discussed scrotal elevation with RN. Discussed plan of care with patient and daughter.    Review of Systems   Constitutional: Negative for chills, diaphoresis, fever and unexpected weight change.   Eyes: Positive for visual disturbance.        Blind   Respiratory: Negative for cough, chest tightness, shortness of breath and wheezing.    Cardiovascular: Negative for chest pain, palpitations and leg swelling.   Gastrointestinal: Negative for abdominal distention, abdominal pain, diarrhea, nausea and vomiting.   Genitourinary: Positive for decreased urine volume. Negative for discharge, flank pain and testicular pain.        MWF HD patient - s/p R hydrocelectomy and L orchiectomy on 11/22/17   Musculoskeletal: Negative for arthralgias, back pain, myalgias and neck pain.   Skin: Positive for wound. Negative for color change, pallor and rash.        s/p R hydrocelectomy and L orchiectomy on 11/22/17   Neurological: Positive for seizures. Negative for dizziness, syncope and light-headedness.   Psychiatric/Behavioral: Positive for confusion. Negative for agitation, decreased concentration and hallucinations.     Objective:     Vital Signs (Most Recent):  Temp: 96.4 °F (35.8 °C) (12/11/17 1545)  Pulse: 62 (12/11/17 1545)  Resp: 16 (12/11/17 1234)  BP: (!) 154/76 (12/11/17 1545)  SpO2: 98 % (12/11/17 0819) Vital Signs (24h Range):  Temp:  [96.4 °F (35.8 °C)-97.2 °F (36.2 °C)] 96.4 °F (35.8 °C)  Pulse:  [54-64] 62  Resp:  [16-20] 16  SpO2:  [98 %-100 %] 98 %  BP: (120-168)/(64-81) 154/76     Weight: 81.2 kg (179 lb 0.2 oz)  Body mass index is 23.62 kg/m².    Intake/Output Summary (Last 24 hours) at 12/11/17 1818  Last data filed at 12/11/17 1545   Gross per 24 hour   Intake              840 ml   Output             2600 ml   Net            -1760 ml      Physical Exam   Constitutional: He appears  well-developed and well-nourished. No distress.   Eyes:   Blind    Cardiovascular: Normal rate, regular rhythm, normal heart sounds and intact distal pulses.    Pulmonary/Chest: Effort normal and breath sounds normal. No respiratory distress. He has no wheezes.   Abdominal: Soft. Bowel sounds are normal. He exhibits no distension. There is no tenderness.   Genitourinary: No penile tenderness.   Genitourinary Comments: s/p R hydrocelectomy and L orchiectomy on 11/22/17  +scrotal swelling, improved from 12/8 with elevation, no tenderness.   Musculoskeletal: Normal range of motion. He exhibits no edema.   LUE AV fistula with + thrill and bruit    Neurological: He is alert. No cranial nerve deficit. GCS eye subscore is 3. GCS verbal subscore is 4. GCS motor subscore is 6.   Pt following commands. Oriented to person and place. No agitation   Skin: Skin is warm and dry. He is not diaphoretic.   Psychiatric: He has a normal mood and affect. His behavior is normal.   Nursing note and vitals reviewed.      Significant Labs: All pertinent labs within the past 24 hours have been reviewed.    Significant Imaging: I have reviewed all pertinent imaging results/findings within the past 24 hours.

## 2017-12-12 NOTE — PT/OT/SLP PROGRESS
Physical Therapy Treatment    Patient Name:  Ronald Campbell   MRN:  [id number]    Recommendations:     Discharge Recommendations:   (SS- SNF)   Discharge Equipment Recommendations:  (TBD at next level of care)   Barriers to discharge: None    Assessment:     Ronald Campbell is a 88 y.o. male admitted with a medical diagnosis of Acute cystitis with hematuria.  He presents with the following impairments/functional limitations:  weakness, impaired functional mobilty, gait instability, impaired balance, visual deficits, impaired cognition, decreased lower extremity function, decreased safety awareness. Patient most limited by blindness requiring consistent verbal cues to orient patient to surroundings. CGA for sit<>stand transfer. Gait 50ftx2 with Mod Assist using rolling walker. Good tolerance to stair gait training. PTA patient with varying levels of assist per daughter. Secondary to functional decline, blindness, and 24-hr care not currently available; patient to benefit from continued skilled acute intervention to address deficits prior to transition to SS-SNF to improve safety and overall functional mobility to decrease fall risk.    Rehab Prognosis:  Good; patient would benefit from acute skilled PT services to address these deficits and reach maximum level of function.      Recent Surgery: * No surgery found *      Plan:     During this hospitalization, patient to be seen 4 x/week to address the above listed problems via gait training, therapeutic activities, therapeutic exercises, neuromuscular re-education  · Plan of Care Expires:  01/04/18   Plan of Care Reviewed with: patient, daughter    Subjective     Communicated with RN prior to session.  Patient found seated on sofa with family present upon PT entry to room, agreeable to treatment.      Chief Complaint: neck discomfort after ambulation  Patient comments/goals: none stated  Pain/Comfort:  · Pain Rating 1: 0/10  · Pain Rating Post-Intervention 1:  0/10    Patients cultural, spiritual, Sabianism conflicts given the current situation: none stated    Objective:     Patient found with: telemetry     General Precautions: Standard, blind, fall   Orthopedic Precautions:N/A   Braces: N/A     Functional Mobility:  · Transfers:     · Sit to Stand:  contact guard assistance with no AD  · Bed to Chair: minimum assistance with  bed rail  using  Stand Pivot  · Gait: 50ftx2 Mod Assist using rolling walker; unable to maintain straight path secondary to blindness. Forward lean, short, shuffling steps, and flat foot contact noted bilaterally  · Balance: Poor; requiring Mod Assist for ambulation using rolling walker to maintain straight path  · Stairs:  Pt ascended/descended 10 stair(s) with No Assistive Device with bilateral handrails with Minimal Assistance.  for hand placement on handrails      AM-PAC 6 CLICK MOBILITY  Turning over in bed (including adjusting bedclothes, sheets and blankets)?: 3  Sitting down on and standing up from a chair with arms (e.g., wheelchair, bedside commode, etc.): 3  Moving from lying on back to sitting on the side of the bed?: 3  Moving to and from a bed to a chair (including a wheelchair)?: 3  Need to walk in hospital room?: 2  Climbing 3-5 steps with a railing?: 3  Total Score: 17       Therapeutic Activities and Exercises:   Patient educated on:   - role of PT/POC    - safety with all functional mobility   - bed mobility training   - transfer training   - gait training with rolling walker   - stair gait training with bilateral handrails with step-to pattern   - importance of participation with therapy services   - safes to ambulate with Mod Assist using rolling walker and 1-2 persons assist at this time.    Verbalized understanding of all education provided.      Patient left up in chair with all lines intact, call button in reach, RN notified and family present..    GOALS:    Physical Therapy Goals        Problem: Physical Therapy Goal     Goal Priority Disciplines Outcome Goal Variances Interventions   Physical Therapy Goal     PT/OT, PT Ongoing (interventions implemented as appropriate)     Description:  Goals to be met by:  ()    Patient will increase functional independence with mobility by performin. Supine to sit with Stand-by Assistance  2. Sit to supine with Stand-by Assistance  3. Sit to stand transfer with Contact Guard Assistance- met  4. Gait  x 50 feet with Mod Assistance using Rolling Walker. - Met   Updated: Gait x 50 feet with Min Assist using rolling walker  5. Lower extremity exercise program x30 reps per handout, with independence to improve muscular strength and endurance.  6. Pt will ambulate 1 flight of stairs with MIN A and bilateral HR placement. - Met                          Time Tracking:     PT Received On: 17  PT Start Time: 1050     PT Stop Time: 1113  PT Total Time (min): 23 min     Billable Minutes: Therapeutic Activity 23    Treatment Type: Treatment  PT/PTA: PT     PTA Visit Number: 0     Jac Morillo III, PT  2017

## 2017-12-12 NOTE — ASSESSMENT & PLAN NOTE
- SBP ranging between 172-197; reports missing PM medications  - restarted home medication regimen on admission  - BP stable during admission

## 2017-12-12 NOTE — PROGRESS NOTES
[facility] Hospital Medicine  Progress Note    Patient Name: Ronald Campbell  MRN: [id number]  Patient Class: OP- Observation   Admission Date: 12/4/2017  Length of Stay: 0 days  Attending Physician: Saroj Hernández MD  Primary Care Provider: Bart Shaw MD    [facility] Medicine Team: [facility] MED F Mesha Joyce PA-C    Subjective:     Principal Problem:Acute cystitis with hematuria    HPI:  87 y/o gentleman who presents to the ED with his daughter d/t AMS and confusion.  He has a PMH of dementia, DMII, CHF, glaucoma, seizures, HTN, ESRD on HD (MWF) He recently underwent L hydrocelectomy and R orchiectomy on 11/22/17 and daughter states that he has been slightly off since.  She reports that his less active and his concentration is decreased.  Reportedly during dialysis today he attempted to leave the clinic three times while undergoing his treatment.  His daughter states that this is very unusual for him.  He was afebrile with no leukocytosis with initial workup. While in the ED urology was consulted d/t recent surgical procedure and concern for possible wound infection. He was evaluated by urology in the ED revealing no evidence of wound infection.  Urinalysis was performed and displays>100RBCs, >100WBCs, 3+ leuks, many yeast, and occasional bacteria.  He denies CP, SOB, N/V/D, fever, chills, or discomfort.     Hospital Course:  Pt admitted to observation for complicated UTI. Rocephin (received 3 doses) and fluconazole initiated. UC revealed candida albicans. 12/6 changed fluconazole from po to IV, starting with 200 mg IV x 1 followed by 100 mg po daily for 14 days. 12/6 pt with hypothermia, septic workup negative and resolved same day with warming blanket.  HD complete on 12/6. 12/8 pt with increased scrotal swelling.  Bladder scan 51 cc. Discussed with nursing scrotal elevation. 12/9-12/11 scrotal swelling improved with elevation. Fluconazole continued. Confusion/agitation waxes and wanes but  generally improves throughout the day and with reorientation. PT/OT consulted and recommending SNF. Discharge pending SNF placement.     Interval History: No acute events overnight. This AM, pt sitting upright in bed eating breakfast with daughter at bedside. Discussed scrotal elevation with RN. Discussed plan of care with patient and daughter.    Review of Systems   Constitutional: Negative for chills, diaphoresis, fever and unexpected weight change.   Eyes: Positive for visual disturbance.        Blind   Respiratory: Negative for cough, chest tightness, shortness of breath and wheezing.    Cardiovascular: Negative for chest pain, palpitations and leg swelling.   Gastrointestinal: Negative for abdominal distention, abdominal pain, diarrhea, nausea and vomiting.   Genitourinary: Positive for decreased urine volume. Negative for discharge, flank pain and testicular pain.        MWF HD patient - s/p R hydrocelectomy and L orchiectomy on 11/22/17   Musculoskeletal: Negative for arthralgias, back pain, myalgias and neck pain.   Skin: Positive for wound. Negative for color change, pallor and rash.        s/p R hydrocelectomy and L orchiectomy on 11/22/17   Neurological: Positive for seizures. Negative for dizziness, syncope and light-headedness.   Psychiatric/Behavioral: Positive for confusion. Negative for agitation, decreased concentration and hallucinations.     Objective:     Vital Signs (Most Recent):  Temp: 96.4 °F (35.8 °C) (12/11/17 1545)  Pulse: 62 (12/11/17 1545)  Resp: 16 (12/11/17 1234)  BP: (!) 154/76 (12/11/17 1545)  SpO2: 98 % (12/11/17 0819) Vital Signs (24h Range):  Temp:  [96.4 °F (35.8 °C)-97.2 °F (36.2 °C)] 96.4 °F (35.8 °C)  Pulse:  [54-64] 62  Resp:  [16-20] 16  SpO2:  [98 %-100 %] 98 %  BP: (120-168)/(64-81) 154/76     Weight: 81.2 kg (179 lb 0.2 oz)  Body mass index is 23.62 kg/m².    Intake/Output Summary (Last 24 hours) at 12/11/17 1818  Last data filed at 12/11/17 1545   Gross per 24 hour    Intake              840 ml   Output             2600 ml   Net            -1760 ml      Physical Exam   Constitutional: He appears well-developed and well-nourished. No distress.   Eyes:   Blind    Cardiovascular: Normal rate, regular rhythm, normal heart sounds and intact distal pulses.    Pulmonary/Chest: Effort normal and breath sounds normal. No respiratory distress. He has no wheezes.   Abdominal: Soft. Bowel sounds are normal. He exhibits no distension. There is no tenderness.   Genitourinary: No penile tenderness.   Genitourinary Comments: s/p R hydrocelectomy and L orchiectomy on 11/22/17  +scrotal swelling, improved from 12/8 with elevation, no tenderness.   Musculoskeletal: Normal range of motion. He exhibits no edema.   LUE AV fistula with + thrill and bruit    Neurological: He is alert. No cranial nerve deficit. GCS eye subscore is 3. GCS verbal subscore is 4. GCS motor subscore is 6.   Pt following commands. Oriented to person and place. No agitation   Skin: Skin is warm and dry. He is not diaphoretic.   Psychiatric: He has a normal mood and affect. His behavior is normal.   Nursing note and vitals reviewed.      Significant Labs: All pertinent labs within the past 24 hours have been reviewed.    Significant Imaging: I have reviewed all pertinent imaging results/findings within the past 24 hours.    Assessment/Plan:      * Acute cystitis with hematuria    - UA with >100 RBC, >100 WBC, occasional bacteria, 3+ leuks, and many yeast  - afebrile with no leukocytosis - nontoxic appearing on admission- presents with increased confusion   - started on ceftriaxone in ED - continue for now; pt started on oral fluconazole 100 mg  12/6: UC candida albicans. In setting of ESRD, T2DM, recent surgery will continue treatment with ceftriaxone (after HD) 1 gm q 24 hrs (end 12/7, 3 doses) and fluconazole 200 mg IV x 1 today followed by 100 mg IV daily.  Lactic acid, cortisol, CK (all WNL), EKG NSR ordered as rectal temp  93.6.  12/7: pt back to baseline. Unclear why fluconazole was not given yesterday after HD.  Called RN today to give 200 mg x 1.  12/8: Dr. Hernández assessed pt.  Discussed with RN scrotal elevation.  Bladder scan 51 cc.  Fluconazole changed to po 100 mg daily x 14 days.  BC NGTD.  12/9-12/11: Continue fluconazole and scrotal elevation.  Discussed with RN. Afebrile without leukocytosis. Blood cx NGTD.        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 Will continue to monitor. PRN hydralazine         Dyslipidemia    - Continue atorvastatin as directed         ESRD (end stage renal disease) on dialysis    - LUE AV fistula  - MWF HD schedule  - Nephrology consulted. 12/6 pt refused HD in the morning, nephrology arranging for HD in room this afternoon, completed successfully.  - 12/8 and 12/11 HD completed  - Renal diet; renal function panel daily        Anemia in chronic kidney disease    - Hgb 9.0 (baseline 8.7-11.4)  - Trend daily  - No s/s overt bleeding  - Epo per HD        Debility    - PT/OT consulted and rec SNF  - Ambulate with assistance only  - Discharge pending SNF placement        Scrotal mass    Hydrocele bilateral   - s/p L hydrocelectomy and R orchiectomy on 11/22/17   - urology consulted while in ED - low probability of wound infection on examination  - continue to monitor and scrotal elevation (discussed with RN daily)        Dementia with behavioral disturbance    DELIRIUM BEHAVIOR MANAGEMENT  - Minimize use of restraints; if physical restraints necessary, please utilize medical/chemical prns for agitation.  - Keep shades open and room lit during day and room dim at night in order to promote healthy circadian rhythms.  - Encourage family at bedside  - Keep whiteboard in patient's  room current with the date and name of the members of patient's team for easy patient self re-orientation.  - Avoid benzodiazepines, antihistamines, anticholinergics, hypnotics, and minimize opiates while controlling for pain as these medications may exacerbate delirium.  12/7-12/8 at baseline; 12/9 agitated in the morning.  Discussed reorientation with pt's daughter.  12/10-12/11: No s/s agitation        Seizure disorder    - continue home dose of levetiracetam as directed   - no episodes 12/4-12/11  - seizure precautions        Blind in both eyes    Glaucoma  - continue ocular drops as directed  - high risk of fall / injury - utilize all safety measures and fall precautions          VTE Risk Mitigation         Ordered     heparin (porcine) injection 5,000 Units  Every 8 hours     Route:  Subcutaneous        12/04/17 2358     Medium Risk of VTE  Once      12/04/17 2358     Place MORGAN hose  Until discontinued      12/04/17 2358     Place sequential compression device  Until discontinued      12/04/17 2358              Mesha Joyce PA-C  Department of Hospital Medicine   [facility]  Discussed with staff: Dr. Hernández

## 2017-12-12 NOTE — ASSESSMENT & PLAN NOTE
- UA with >100 RBC, >100 WBC, occasional bacteria, 3+ leuks, and many yeast  - afebrile with no leukocytosis - nontoxic appearing on admission- presents with increased confusion   - started on ceftriaxone in ED - continue for now; pt started on oral fluconazole 100 mg  12/6: UC candida albicans. In setting of ESRD, T2DM, recent surgery will continue treatment with ceftriaxone (after HD) 1 gm q 24 hrs (end 12/7, 3 doses) and fluconazole 200 mg IV x 1 today followed by 100 mg IV daily.  Lactic acid, cortisol, CK (all WNL), EKG NSR ordered as rectal temp 93.6.  12/7: pt back to baseline. Unclear why fluconazole was not given yesterday after HD.  Called RN today to give 200 mg x 1.  12/8: Dr. Hernández assessed pt.  Discussed with RN scrotal elevation.  Bladder scan 51 cc.  Fluconazole changed to po 100 mg daily x 14 days.  BC NGTD.  12/9-12/12: Continue fluconazole and scrotal elevation.  Discussed with RN. Afebrile without leukocytosis. Blood cx NGTD.  12/12: Discharged to [facility] to complete 14 day course of fluconazole.

## 2017-12-12 NOTE — PLAN OF CARE
MAIRA updated the pt's dtr.  MAIRA spoke with the nurse and gave her the number to call report.  MAIRA called [facility]'s transport and set up a wc van for 3pm.      Madeline Carrillo, [facility] x [phone]

## 2017-12-12 NOTE — ASSESSMENT & PLAN NOTE
- UA with >100 RBC, >100 WBC, occasional bacteria, 3+ leuks, and many yeast  - afebrile with no leukocytosis - nontoxic appearing on admission- presents with increased confusion   - started on ceftriaxone in ED - continue for now; pt started on oral fluconazole 100 mg  12/6: UC candida albicans. In setting of ESRD, T2DM, recent surgery will continue treatment with ceftriaxone (after HD) 1 gm q 24 hrs (end 12/7, 3 doses) and fluconazole 200 mg IV x 1 today followed by 100 mg IV daily.  Lactic acid, cortisol, CK (all WNL), EKG NSR ordered as rectal temp 93.6.  12/7: pt back to baseline. Unclear why fluconazole was not given yesterday after HD.  Called RN today to give 200 mg x 1.  12/8: Dr. Hernández assessed pt.  Discussed with RN scrotal elevation.  Bladder scan 51 cc.  Fluconazole changed to po 100 mg daily x 14 days.  BC NGTD.  12/9-12/11: Continue fluconazole and scrotal elevation.  Discussed with RN. Afebrile without leukocytosis. Blood cx NGTD.

## 2017-12-12 NOTE — ASSESSMENT & PLAN NOTE
- LUE AV fistula  - MWF HD schedule  - Nephrology consulted. 12/6 pt refused HD in the morning, nephrology arranging for HD in room this afternoon, completed successfully.  - 12/8 and 12/11 HD completed  - Renal diet; renal function panel daily

## 2017-12-12 NOTE — ASSESSMENT & PLAN NOTE
DELIRIUM BEHAVIOR MANAGEMENT  - Minimize use of restraints; if physical restraints necessary, please utilize medical/chemical prns for agitation.  - Keep shades open and room lit during day and room dim at night in order to promote healthy circadian rhythms.  - Encourage family at bedside  - Keep whiteboard in patient's room current with the date and name of the members of patient's team for easy patient self re-orientation.  - Avoid benzodiazepines, antihistamines, anticholinergics, hypnotics, and minimize opiates while controlling for pain as these medications may exacerbate delirium.  12/7-12/8 at baseline; 12/9 agitated in the morning.  Discussed reorientation with pt's daughter.  12/10-12/11: No s/s agitation

## 2017-12-12 NOTE — PLAN OF CARE
Problem: Patient Care Overview  Goal: Plan of Care Review  Outcome: Ongoing (interventions implemented as appropriate)  POC reviewed with pt and his daughter, both verbalized understanding. Pt VSS, disoriented to time and situation. No complaints of pain. BG monitored overnight. Pt remains free from falls and injuries. Resting with call light within reach, will continue to monitor.

## 2017-12-12 NOTE — ASSESSMENT & PLAN NOTE
- continue home dose of levetiracetam as directed   - no episodes 12/4-12/11  - seizure precautions

## 2017-12-12 NOTE — ASSESSMENT & PLAN NOTE
Hydrocele bilateral   - s/p L hydrocelectomy and R orchiectomy on 11/22/17   - urology consulted while in ED - low probability of wound infection on examination  - continue to monitor and scrotal elevation (discussed with RN daily)  - Improved with scrotal elevation

## 2017-12-12 NOTE — ASSESSMENT & PLAN NOTE
- continue home dose of levetiracetam as directed   - no episodes 12/4-12/12  - seizure precautions

## 2017-12-12 NOTE — PT/OT/SLP PROGRESS
Occupational Therapy   Treatment    Name: Ronald Campbell  MRN: [id number]  Admitting Diagnosis:  Acute cystitis with hematuria       Recommendations:     Discharge Recommendations: nursing facility, skilled  Discharge Equipment Recommendations:  none  Barriers to discharge:  None    Subjective     Communicated with: RN prior to session.  Pt agreeable to participate with therapy. Family present throughout session.   Pain/Comfort:  · Pain Rating 1: 0/10  · Pain Rating Post-Intervention 1: 0/10    Objective:     Patient found with: telemetry; supine with HOB raised   General Precautions: Standard, blind, fall   Orthopedic Precautions:N/A   Braces: N/A     Occupational Performance:    Bed Mobility:    · Patient completed Rolling/Turning to Right with stand by assistance and with side rail  · Patient completed Scooting forward while seated EOB with contact guard assistance  · Patient completed Supine to Sit with contact guard assistance   * Pt left seated on couch with wife present     Functional Mobility/Transfers:  · Patient completed Sit <> Stand Transfer with minimum assistance  with  no assistive device and HHA   · Patient completed Bed <> Chair Transfer using Stand Pivot technique with minimum assistance and moderate assistance with no assistive device and HHA    Activities of Daily Living:  · Grooming: SBA to wash face while seated in on couch  · UB Dressing: moderate assistance to rufina gown like jacket while seated EOB (required assistance for tie completion)   · LB Dressing: minimum assistance to rufina/doff B sock while seated on couch       Patient left sitting on couch with all lines intact, call button in reach and family  present    AMPA 6 Click:  [facility] Total Score: 17    Treatment & Education:  -Pt edu on OT role/POC  -Importance of OOB activity with staff assistance  -Safety during functional t/f and mobility  - Communication board updated  - Discussed d/c recommendations with family and agreed upon [facility] SNF to  improve overall safety and level of assistance required during functional tasks and mobility.     - Multiple self care tasks performed in sitting-- assistance level noted above   - Pt completed x2 trials sit <> stand from EOB and couch ( varying heights) requiring min-mod A.   - He stood ~3 minutes while weight shifting to L/R BLE; requiring skilled facilitation of upright posture  Education:    Assessment:     Ronald Campbell is a 88 y.o. male with a medical diagnosis of Acute cystitis with hematuria.  He is progressing well with goals and motivated to participate with therapy.  Performance deficits affecting function are weakness, impaired endurance, impaired self care skills, impaired sensation, impaired functional mobilty, visual deficits, decreased safety awareness.     Rehab Prognosis:  Good; patient would benefit from acute skilled OT services to address these deficits and reach maximum level of function. Per family, he has care from family and nursing aid upon return home, however is home for 2hr of the day alone. Pt would benefit from  SNF to improve overall level of independence and safety with functional tasks and functional mobility.     Plan:     Patient to be seen 4 x/week to address the above listed problems via self-care/home management, therapeutic activities, therapeutic exercises, neuromuscular re-education  · Plan of Care Expires:    · Plan of Care Reviewed with: patient    This Plan of care has been discussed with the patient who was involved in its development and understands and is in agreement with the identified goals and treatment plan    GOALS:    Occupational Therapy Goals        Problem: Occupational Therapy Goal    Goal Priority Disciplines Outcome Interventions   Occupational Therapy Goal     OT, PT/OT Ongoing (interventions implemented as appropriate)    Description:  Goals to be met by: 12/14/2017     Patient will increase functional independence with ADLs by performing:    UE  Dressing with Minimal Assistance.  LE Dressing with Moderate Assistance. MET 12/12  Revised: LE dressing with minimum assistance  Grooming while seated with Contact Guard Assistance. MET 12/12  Revised: Grooming while seated with SBA with set up  Toileting from [facility] with Moderate Assistance for hygiene and clothing management.   Toilet transfer to toilet with Minimal Assistance.                       Time Tracking:     OT Date of Treatment: 12/12/17  OT Start Time: 1006  OT Stop Time: 1035  OT Total Time (min): 29 min    Billable Minutes:Self Care/Home Management 15  Neuromuscular Re-education 14    Heather matthews, OT  12/12/2017

## 2017-12-12 NOTE — PROGRESS NOTES
Dialysis tx completed. 3 hours. 2 liters removed. Needles pulled from left arm fistula. Manual pressure held. Hemostasis achieved. Gauze and tape to sites. Tolerated tx well.

## 2017-12-12 NOTE — PLAN OF CARE
MAIRA received a voicemail from Mireille WATTERS for an update.  MAIRA called Shannan and she stated that she will look at today's therapy notes and make a final decision.  Pt was seen 2 days ago and they recommended [facility] SN[facility].  They did not see him yesterday due to the pt being at HD.  SW paged therapy.  MAIRA spoke with the OT and requested that they see him and document this morning so that Shannan can review at [facility].  MAIRA updated the pt's dtr.  She was upset that the pt might not be able to go to [facility].  MAIRA explained the referrals that were sent out and why they were denied (insurance and HD pt). MAIRA explained that [facility] is short term and therapy needs to assess today to decide if they are recommending [facility] SNF still or short term SNF.  SW will f/u in a few hours.    Madeline Carrillo, [facility] x [phone]

## 2017-12-12 NOTE — NURSING
Attempted to call report to the nurse at [facility].  I was informed that the nurse will call me back.

## 2017-12-12 NOTE — PLAN OF CARE
Problem: Physical Therapy Goal  Goal: Physical Therapy Goal  Goals to be met by:  ()    Patient will increase functional independence with mobility by performin. Supine to sit with Stand-by Assistance  2. Sit to supine with Stand-by Assistance  3. Sit to stand transfer with Contact Guard Assistance- met  4. Gait  x 50 feet with Mod Assistance using Rolling Walker. - Met   Updated: Gait x 50 feet with Min Assist using rolling walker  5. Lower extremity exercise program x30 reps per handout, with independence to improve muscular strength and endurance.  6. Pt will ambulate 1 flight of stairs with MIN A and bilateral HR placement. - Met        Outcome: Ongoing (interventions implemented as appropriate)  Goals assessed and updated to reflect progress.    Jac Morillo III, DPT, PT  2017

## 2017-12-12 NOTE — DISCHARGE SUMMARY
[facility] Hospital Medicine  Discharge Summary      Patient Name: Ronald Campbell  MRN: [id number]  Admission Date: 12/4/2017  Hospital Length of Stay: 0 days  Discharge Date and Time: No discharge date for patient encounter.  Attending Physician: Aura Giron MD   Discharging Provider: Mesha Joyce PA-C  Primary Care Provider: Bart Shaw MD  [facility] Medicine Team: [facility] Mesha Joyce PA-C    HPI:   87 y/o gentleman who presents to the ED with his daughter d/t AMS and confusion.  He has a PMH of dementia, DMII, CHF, glaucoma, seizures, HTN, ESRD on HD (MWF) He recently underwent L hydrocelectomy and R orchiectomy on 11/22/17 and daughter states that he has been slightly off since.  She reports that his less active and his concentration is decreased.  Reportedly during dialysis today he attempted to leave the clinic three times while undergoing his treatment.  His daughter states that this is very unusual for him.  He was afebrile with no leukocytosis with initial workup. While in the ED urology was consulted d/t recent surgical procedure and concern for possible wound infection. He was evaluated by urology in the ED revealing no evidence of wound infection.  Urinalysis was performed and displays>100RBCs, >100WBCs, 3+ leuks, many yeast, and occasional bacteria.  He denies CP, SOB, N/V/D, fever, chills, or discomfort.     * No surgery found *      Hospital Course:   Pt admitted to observation for complicated UTI. Rocephin (received 3 doses) and fluconazole initiated. UC revealed candida albicans. 12/6 changed fluconazole from po to IV, starting with 200 mg IV x 1 followed by 100 mg po daily for 14 days. 12/6 pt with hypothermia, septic workup negative and resolved same day with warming blanket.  HD complete on 12/6. 12/8 pt with increased scrotal swelling. Discussed with nursing scrotal elevation. 12/9-12/12 scrotal swelling improved with elevation. Confusion/agitation waxes and  wanes but generally improves throughout the day and with reorientation. Discharged to [facility] to complete 14 day course of fluconazole.     Consults:   Consults         Status Ordering Provider     Case Management/  Once     Provider:  (Not yet assigned)    Completed ELGIN DELVALLE     Inpatient consult to Nephrology  Once     Provider:  (Not yet assigned)    Completed ELGIN DELVALLE     Inpatient consult to [facility]  Once     Provider:  (Not yet assigned)    VY Caal     Inpatient consult to Urology  Once     Provider:  (Not yet assigned)    Completed DENISE SALAS          * Acute cystitis with hematuria    - UA with >100 RBC, >100 WBC, occasional bacteria, 3+ leuks, and many yeast  - afebrile with no leukocytosis - nontoxic appearing on admission- presents with increased confusion   - started on ceftriaxone in ED - continue for now; pt started on oral fluconazole 100 mg  12/6: UC candida albicans. In setting of ESRD, T2DM, recent surgery will continue treatment with ceftriaxone (after HD) 1 gm q 24 hrs (end 12/7, 3 doses) and fluconazole 200 mg IV x 1 today followed by 100 mg IV daily.  Lactic acid, cortisol, CK (all WNL), EKG NSR ordered as rectal temp 93.6.  12/7: pt back to baseline. Unclear why fluconazole was not given yesterday after HD.  Called RN today to give 200 mg x 1.  12/8: Dr. Heránndez assessed pt.  Discussed with RN scrotal elevation.  Bladder scan 51 cc.  Fluconazole changed to po 100 mg daily x 14 days.  BC NGTD.  12/9-12/12: Continue fluconazole and scrotal elevation.  Discussed with RN. Afebrile without leukocytosis. Blood cx NGTD.  12/12: Discharged to [facility] to complete 14 day course of fluconazole.        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 12/6 pt refused HD in the morning, nephrology arranging for HD in room this afternoon, completed successfully.  - 12/8 and 12/11 HD completed  - Renal diet; renal function panel daily        Anemia in chronic kidney disease    - Hgb 9.0 (baseline 8.7-11.4)  - Trend daily  - No s/s overt bleeding  - Epo per HD        Debility    - PT/OT consulted and rec SNF  - Ambulate with assistance only  Discharged to [facility] 12/12        Scrotal mass    Hydrocele bilateral   - s/p L hydrocelectomy and R orchiectomy on 11/22/17   - urology consulted while in ED - low probability of wound infection on examination  - continue to monitor and scrotal elevation (discussed with RN daily)  - Improved with scrotal elevation        Dementia with behavioral disturbance    DEL[facility]IUM [facility]NAGEMENT  - Minimize use of restraints; if physical restraints necessary, please utilize medical/chemical prns for agitation.  - Keep shades open and room lit during day and room dim at night in order to promote healthy circadian rhythms.  - Encourage family at bedside  - Keep whiteboard in patient's room current with the date and name of the members of patient's team for easy patient self re-orientation.  - Avoid benzodiazepines, antihistamines, anticholinergics, hypnotics, and minimize opiates while controlling for pain as these medications may exacerbate delirium.  12/7-12/8 at baseline; 12/9 agitated in the morning.  Discussed reorientation with pt's daughter.  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 [E11.22, N18.6]  Yes    HTN (hypertension) [I10]  Yes    Dyslipidemia [E78.5]  Yes    ESRD (end stage renal disease) on dialysis [N18.6, Z99.2]  Not Applicable    Anemia in chronic kidney disease [N18.9, D63.1] 11/28/2012 Yes    Debility [R53.81] 12/05/2017 Yes    Scrotal mass [N50.9] 11/07/2017 Yes    Dementia with behavioral disturbance [F03.91] 12/05/2017 Yes    Seizure disorder [G40.909] 11/04/2012 Yes     Chronic    Blind in both eyes [H54.3] 12/05/2017 Yes    Hydrocele, bilateral [N43.3] 11/07/2017 Yes    Glaucoma [H40.9]  Yes      Problems Resolved During this Admission:    Diagnosis Date Noted Date Resolved POA       Discharged Condition: stable    Disposition: Skilled Nursing Facility    Follow Up:  Follow-up Information     Bart Shaw MD On 12/11/2017.    Specialties:  Internal Medicine, Geriatric Medicine  Why:  Patient's daughter, Gabriela Campbell ([phone]), to be informed of hospital follow up appointment to be scheduled by [facility].   Contact information:  [address] [address]  [phone]             [facility].    Specialty:  SNF Agency  Why:  SNF  Contact information:  [address], [address]LO[address]  [place] [address]  [phone]                 Patient Instructions:   No discharge procedures on file.    Significant Diagnostic Studies: Microbiology:   Blood Culture   Lab Results   Component Value Date    LABBLOO No growth after 5 days. 12/06/2017    and Urine Culture    Lab Results   Component Value Date    LABURIN  12/04/2017     PALMER ALBICANS  > 100,000 cfu/ml  Treatment of asymptomatic candiduria is not recommended (except for   specific populations). Candida isolated in the urine typically   represents  colonization. If an indwelling urinary catheter is present  it should be removed or replaced.       Radiology: X-Ray: CXR: X-Ray Chest 1 View (CXR):   Results for orders placed or performed during the hospital encounter of 12/04/17   X-Ray Chest 1 View    Narrative    CLINICAL INFORMATION: Altered metal status, unspecified.    COMPARISON: 04/22/2015.    FINDINGS: One view of the chest was obtained.  Stents noted in the left subclavian and left axillary region. Relatively low lung volumes. Cardiac silhouette is not significantly enlarged and appears stable. No focal consolidation. No detrimental change in lung aeration when compared with 04/22/2015.    Impression    No acute finding or detrimental change.      Electronically signed by: Zack Sethi  Date:     12/04/17  Time:    19:25        Pending Diagnostic Studies:     None         Medications:  Reconciled Home Medications:   Current Discharge Medication List      CONTINUE these medications which have NOT CHANGED    Details   amLODIPine (NORVASC) 5 MG tablet Take 10 mg by mouth once daily.       aspirin 81 MG Chew Take 1 tablet (81 mg total) by mouth once daily.  Qty: 30 tablet, Refills: 3      atorvastatin (LIPITOR) 40 MG tablet Take 1 tablet (40 mg total) by mouth once daily.  Qty: 30 tablet, Refills: 9      ergocalciferol (ERGOCALCIFEROL) 50,000 unit Cap Take 1 capsule (50,000 Units total) by mouth every 7 days.  Qty: 10 capsule, Refills: 1      levetiracetam (KEPPRA) 500 MG Tab Take 1 tablet (500 mg total) by mouth 2 (two) times daily.  Qty: 60 tablet, Refills: 11      polyethylene glycol (GLYCOLAX) 17 gram PwPk Take 17 g by mouth once daily.  Qty: 30 packet, Refills: 0      TRAVOPROST (TRAVATAN Z OPHT) Place 1 drop into the left eye every evening. 1 Drops Left eye Every evening      B complex-vitamin C-folic acid (NEPHRO-JESSICA) 0.8 mg Tab Take 1 tablet by mouth every morning.       blood sugar diagnostic (BLOOD GLUCOSE TEST) Strp 1 strip by Misc.(Non-Drug;  Combo Route) route 2 (two) times daily.      brinzolamide (AZOPT) 1 % ophthalmic suspension Place 1 drop into the left eye 3 (three) times daily.       calcium acetate (PHOSLO) 667 mg capsule Take 1 capsule (667 mg total) by mouth 3 (three) times daily with meals.  Qty: 90 capsule, Refills: 11      carvedilol (COREG) 25 MG tablet Take 1 tablet (25 mg total) by mouth 2 (two) times daily. Hold on Monday, Wednesday and Friday before dialysis. Hold for SBP < 110 or HR < 55  Qty: 60 tablet, Refills: 11      doxazosin (CARDURA) 4 MG tablet Take 1 tablet (4 mg total) by mouth once daily.  Qty: 30 tablet, Refills: 1      finasteride (PROSCAR) 5 mg tablet Take 1 tablet (5 mg total) by mouth once daily.  Qty: 30 tablet, Refills: 1      HEPARIN SODIUM,PORCINE (HEPARIN, PORCINE,) 1,000 unit/mL injection 1 mL (1,000 Units total) by Intracatheter route as needed (Heparin 1000 units/ml instilled to fill volume of each lumen of dialysis catheter after each dialysis.).      insulin aspart (NOVOLOG FLEXPEN) 100 unit/mL InPn  Insulin Pen Subcutaneous As directed.  -200 take 1 unitBS 201-250 take 2 unitsBS 251-300 take 3 unitsBS 301-350 take 4 unitsBS > 351 take 5 units and call MD      ketoconazole (NIZORAL) 2 % cream Apply topically once daily. feet  Qty: 30 g, Refills: 3    Comments: Any alternate antifungal cream OK  Associated Diagnoses: Tinea pedis of both feet         STOP taking these medications       hydrocodone-acetaminophen 5-325mg (NORCO) 5-325 mg per tablet Comments:   Reason for Stopping:               Indwelling Lines/Drains at time of discharge:   Lines/Drains/Airways     Drain                 Hemodialysis AV Fistula -- days         Hemodialysis AV Fistula Left upper arm -- days         Hemodialysis AV Fistula 08/08/14 0800 Left upper arm 1222 days                Time spent on the discharge of patient: 30 minutes  Patient was seen and examined on the date of discharge and determined to be suitable for  discharge.         Mesha Joyce PA-C  Department of Hospital Medicine  [facility]  Discussed with staff: Dr. Giron

## 2017-12-12 NOTE — ASSESSMENT & PLAN NOTE
DEL[facility]IUM [facility]HAV[facility] MANAGEMENT  - Minimize use of restraints; if physical restraints necessary, please utilize medical/chemical prns for agitation.  - Keep shades open and room lit during day and room dim at night in order to promote healthy circadian rhythms.  - Encourage family at bedside  - Keep whiteboard in patient's room current with the date and name of the members of patient's team for easy patient self re-orientation.  - Avoid benzodiazepines, antihistamines, anticholinergics, hypnotics, and minimize opiates while controlling for pain as these medications may exacerbate delirium.  12/7-12/8 at baseline; 12/9 agitated in the morning.  Discussed reorientation with pt's daughter.  12/10-12/12: No s/s agitation

## 2017-12-12 NOTE — PLAN OF CARE
MAIRA was informed by Shannan at [facility] that the pt will be accepted to transfer today.  MAIRA informed Mireille at [place].  She obtain the auth and is trying to get it to Shannan.  MAIRA informed the PA.  SHe wrote orders.  MAIRA will set up transport when Shannan informs the SW that the pt can transfer.    Madeline Carrillo, [place]  [phone]

## 2017-12-13 ENCOUNTER — PATIENT OUTREACH (OUTPATIENT)
Dept: ADMINISTRATIVE | Facility: CLINIC | Age: 82
End: 2017-12-13

## 2017-12-13 LAB
BASOPHILS # BLD AUTO: 0.01 K/UL
BASOPHILS NFR BLD: 0.2 %
DIFFERENTIAL METHOD: ABNORMAL
EOSINOPHIL # BLD AUTO: 0.1 K/UL
EOSINOPHIL NFR BLD: 1.2 %
ERYTHROCYTE [DISTWIDTH] IN BLOOD BY AUTOMATED COUNT: 18.9 %
HCT VFR BLD AUTO: 24.6 %
HGB BLD-MCNC: 8 G/DL
LYMPHOCYTES # BLD AUTO: 1.4 K/UL
LYMPHOCYTES NFR BLD: 35.1 %
MAGNESIUM SERPL-MCNC: 2.6 MG/DL
MCH RBC QN AUTO: 23.5 PG
MCHC RBC AUTO-ENTMCNC: 32.5 G/DL
MCV RBC AUTO: 72 FL
MONOCYTES # BLD AUTO: 0.5 K/UL
MONOCYTES NFR BLD: 13.2 %
NEUTROPHILS # BLD AUTO: 2.1 K/UL
NEUTROPHILS NFR BLD: 50.3 %
PLATELET # BLD AUTO: 113 K/UL
PMV BLD AUTO: ABNORMAL FL
POCT GLUCOSE: 103 MG/DL (ref 70–110)
POCT GLUCOSE: 155 MG/DL (ref 70–110)
POCT GLUCOSE: 200 MG/DL (ref 70–110)
RBC # BLD AUTO: 3.41 M/UL
WBC # BLD AUTO: 4.1 K/UL

## 2017-12-13 PROCEDURE — 85025 COMPLETE CBC W/AUTO DIFF WBC: CPT

## 2017-12-13 PROCEDURE — 36415 COLL VENOUS BLD VENIPUNCTURE: CPT

## 2017-12-13 PROCEDURE — 97161 PT EVAL LOW COMPLEX 20 MIN: CPT

## 2017-12-13 PROCEDURE — 97530 THERAPEUTIC ACTIVITIES: CPT

## 2017-12-13 PROCEDURE — 83735 ASSAY OF MAGNESIUM: CPT

## 2017-12-13 PROCEDURE — 63600175 PHARM REV CODE 636 W HCPCS: Performed by: PHYSICIAN ASSISTANT

## 2017-12-13 PROCEDURE — 99223 1ST HOSP IP/OBS HIGH 75: CPT | Mod: ,,, | Performed by: HOSPITALIST

## 2017-12-13 PROCEDURE — 97165 OT EVAL LOW COMPLEX 30 MIN: CPT

## 2017-12-13 PROCEDURE — 25000003 PHARM REV CODE 250: Performed by: PHYSICIAN ASSISTANT

## 2017-12-13 PROCEDURE — 97535 SELF CARE MNGMENT TRAINING: CPT

## 2017-12-13 PROCEDURE — 12000000 HC SNF SEMI-PRIVATE ROOM

## 2017-12-13 RX ADMIN — CALCIUM ACETATE 1334 MG: 667 CAPSULE ORAL at 08:12

## 2017-12-13 RX ADMIN — LEVETIRACETAM 500 MG: 500 TABLET, FILM COATED ORAL at 08:12

## 2017-12-13 RX ADMIN — FLUCONAZOLE 100 MG: 100 TABLET ORAL at 08:12

## 2017-12-13 RX ADMIN — CARVEDILOL 25 MG: 25 TABLET, FILM COATED ORAL at 08:12

## 2017-12-13 RX ADMIN — AMLODIPINE BESYLATE 10 MG: 10 TABLET ORAL at 08:12

## 2017-12-13 RX ADMIN — HEPARIN SODIUM 5000 UNITS: 5000 INJECTION, SOLUTION INTRAVENOUS; SUBCUTANEOUS at 09:12

## 2017-12-13 RX ADMIN — CALCIUM ACETATE 1334 MG: 667 CAPSULE ORAL at 05:12

## 2017-12-13 RX ADMIN — FINASTERIDE 5 MG: 5 TABLET, FILM COATED ORAL at 08:12

## 2017-12-13 RX ADMIN — HEPARIN SODIUM 5000 UNITS: 5000 INJECTION, SOLUTION INTRAVENOUS; SUBCUTANEOUS at 05:12

## 2017-12-13 RX ADMIN — ASPIRIN 81 MG CHEWABLE TABLET 81 MG: 81 TABLET CHEWABLE at 08:12

## 2017-12-13 RX ADMIN — ATORVASTATIN CALCIUM 40 MG: 20 TABLET, FILM COATED ORAL at 08:12

## 2017-12-13 RX ADMIN — DOXAZOSIN 4 MG: 4 TABLET ORAL at 08:12

## 2017-12-13 RX ADMIN — TRAVOPROST 1 DROP: 0.04 SOLUTION/ DROPS OPHTHALMIC at 09:12

## 2017-12-13 RX ADMIN — BRINZOLAMIDE 1 DROP: 10 SUSPENSION/ DROPS OPHTHALMIC at 09:12

## 2017-12-13 RX ADMIN — STANDARDIZED SENNA CONCENTRATE AND DOCUSATE SODIUM 1 TABLET: 8.6; 5 TABLET, FILM COATED ORAL at 08:12

## 2017-12-13 NOTE — PLAN OF CARE
Problem: Physical Therapy Goal  Goal: Physical Therapy Goal  Goals to be met by: 2 weeks     Patient will increase functional independence with mobility by performin. Supine to sit with supervision  2. Sit to supine with supervision  3. Sit to stand transfer with Contact Guard Assistance with/ without RW, as appropriate and cues as needed secondary to blindness  4. Bed to chair transfer with Contact Guard Assistance using Rolling Walker or least restrictive device or without AD and with cues as needed secondary to blindness  5. Gait  x 150 feet with minimal assistance using Rolling Walker or appropriate assistive device with cues as needed secondary to blindness  6. Ascend/descend 12 stair with bilateral Handrails Minimal Assistance using cues as needed secondary to blindness.   7. Ascend/Descend 4 inch curb step with Minimal Assistance using Rolling Walker or appropriate assistive device and cues as needed secondary to blindness  8. Stand for 3 minutes with UE support on walker or table and perform an activity w/ CGA and cues for blindness   9. Lower extremity exercise program x20 reps per handout, with assistance as needed and gym therex  10. Gait x75 feet w/ Hand held / minimal assistance and without an AD, with cues secondary to blindness    PT jazmín performed. Patient left for hemo dialysis prior to complete gait assessment. Shayy Gonzalez, PT 2017

## 2017-12-13 NOTE — PROGRESS NOTES
Pt. transported to dialysis per jah's transportation via w/c.pt. awake and alert prior to transfer.

## 2017-12-13 NOTE — HPI
"Chief Complaint/Reason for Admission: Debility    History of Present Illness:  Patient is a 88 y.o. male who has a past medical history of Anemia; Blindness of right eye; CHF (congestive heart failure);  ESRD on HD; Dementia; Diabetes mellitus type II; Glaucoma; Hypertension; and Seizures presented with AMS and confusion due to UTI. Pt started on IV Rocephin and urine culture revealed gayle albicans. IV antibiotics were discontinued and started don oral fluconazole for 14 days. Per acute care notes "throughout hospital stay confusion/agitation waxes and wanes but generally improves throughout the day and with reorientation." He recently underwent L hydrocelectomy and R orchiectomy on 11/22/17 and was evaluated by Urology revealing no evidence of wound infection. Patient has been working with PT/OT who recommend SNF for further balance/mobility training.   "

## 2017-12-13 NOTE — CLINICAL REVIEW
Clinical Pharmacy Chart Review Note      Admit Date: 12/12/2017   LOS: 1 day       Ronald Campbell is a 88 y.o. male admitted to SNF for PT/OT after hospitalization for acute cystitis with hematuria.    Active Hospital Problems    Diagnosis  POA    *Acute cystitis with hematuria [N30.01]  Yes    Debility [R53.81]  Yes    Dementia with behavioral disturbance [F03.91]  Yes    Hydrocele [N43.3]  Yes    HTN (hypertension) [I10]  Yes    Dyslipidemia [E78.5]  Yes    Glaucoma [H40.9]  Yes    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 [E11.22, N18.6]  Yes    ESRD (end stage renal disease) on dialysis [N18.6, Z99.2]  Not Applicable    Benign prostatic hyperplasia with urinary retention [N40.1, R33.8]  Yes    Thrombocytopenia [D69.6]  Yes    Anemia in chronic kidney disease [N18.9, D63.1]  Yes    Seizure disorder [G40.909]  Yes     Chronic      Resolved Hospital Problems    Diagnosis Date Resolved POA   No resolved problems to display.     Review of patient's allergies indicates:   Allergen Reactions    Lisinopril Swelling     Patient Active Problem List    Diagnosis Date Noted    Debility 12/05/2017    Blind in both eyes 12/05/2017    Dementia with behavioral disturbance 12/05/2017    Acute cystitis with hematuria 12/04/2017    Hydrocele 11/22/2017    Hydrocele, bilateral 11/07/2017    Hemodialysis patient 11/07/2017    Scrotal mass 11/07/2017    Tinea pedis of both feet 01/31/2017    Dermatophytosis, nail 01/31/2017    Other complications due to renal dialysis device, implant, and graft     Insulin long-term use 08/24/2016    Vein stenosis 04/15/2016    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    Syncope and collapse 12/13/2015    Renovascular hypertension 12/13/2015    Shortness of breath     Hyperkalemia     Hypernatremia     HTN (hypertension)     Dyslipidemia     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     Edema of both legs     Effusion of left knee     History of  prostatitis     Glaucoma     ESRD (end stage renal disease) on dialysis     Benign prostatic hyperplasia with urinary retention 05/06/2014    Thrombocytopenia 04/30/2014    Blindness of right eye     Anemia in chronic kidney disease 11/28/2012    Seizure disorder 11/04/2012    Chronic combined systolic and diastolic heart failure 11/04/2012    Cerebrovascular disease 11/04/2012       Scheduled Meds:    amLODIPine  10 mg Oral Daily    aspirin  81 mg Oral Daily    atorvastatin  40 mg Oral QHS    brinzolamide  1 drop Left Eye TID    calcium acetate  1,334 mg Oral TID WM    carvedilol  25 mg Oral BID    doxazosin  4 mg Oral Daily    finasteride  5 mg Oral Daily    fluconazole  100 mg Oral Daily    heparin (porcine)  5,000 Units Subcutaneous Q8H    levETIRAcetam  500 mg Oral BID    senna-docusate 8.6-50 mg  1 tablet Oral BID    travoprost  1 drop Left Eye QHS     Continuous Infusions:    PRN Meds: acetaminophen, acetaminophen, albuterol-ipratropium 2.5mg-0.5mg/3mL, calcium carbonate, dextrose 50%, dextrose 50%, glucagon (human recombinant), glucose, glucose, INV hydrALAZINE, insulin aspart, ondansetron, ramelteon    OBJECTIVE:     Vital Signs (Last 24H)  Temp:  [97 °F (36.1 °C)-97.4 °F (36.3 °C)]   Pulse:  [55-57]   Resp:  [16-18]   BP: (143-189)/(68-79)   SpO2:  [100 %]     Laboratory:  CBC:   Recent Labs  Lab 12/11/17  0515 12/12/17  0354 12/13/17  0508   WBC 4.62 5.09 4.10   RBC 3.33* 3.34* 3.41*   HGB 8.0* 7.9* 8.0*   HCT 24.0* 24.0* 24.6*   * 117* 113*   MCV 72* 72* 72*   MCH 24.0* 23.7* 23.5*   MCHC 33.3 32.9 32.5     BMP:   Recent Labs  Lab 12/10/17  0437 12/11/17  0515 12/12/17  0354 12/13/17  0508   * 118* 75  --    * 131* 131*  --    K 4.6 4.9 4.7  --     100 99  --    CO2 23 20* 23  --    BUN 50* 66* 46*  --    CREATININE 6.7* 8.0* 5.8*  --    CALCIUM 8.2* 8.2* 8.1*  --    MG 2.3 2.3 2.2 2.6     CMP:   Recent Labs  Lab 12/07/17  0457  12/10/17  0437  12/11/17  0515 12/12/17  0354   GLU 77  < > 135* 118* 75   CALCIUM 8.2*  < > 8.2* 8.2* 8.1*   ALBUMIN 2.6*  < > 2.7* 2.8* 2.6*   PROT 7.0  --   --   --   --    *  < > 132* 131* 131*   K 4.4  < > 4.6 4.9 4.7   CO2 24  < > 23 20* 23     < > 100 100 99   BUN 35*  < > 50* 66* 46*   CREATININE 5.4*  < > 6.7* 8.0* 5.8*   ALKPHOS 73  --   --   --   --    ALT 11  --   --   --   --    AST 14  --   --   --   --    BILITOT 0.3  --   --   --   --    < > = values in this interval not displayed.  LFTs:   Recent Labs  Lab 12/07/17  0457  12/10/17  0437 12/11/17  0515 12/12/17  0354   ALT 11  --   --   --   --    AST 14  --   --   --   --    ALKPHOS 73  --   --   --   --    BILITOT 0.3  --   --   --   --    PROT 7.0  --   --   --   --    ALBUMIN 2.6*  < > 2.7* 2.8* 2.6*   < > = values in this interval not displayed.  Coagulation: No results for input(s): PT, INR, APTT in the last 168 hours.  Cardiac markers: No results for input(s): CKMB, TROPONINT, MYOGLOBIN in the last 168 hours.  ABGs: No results for input(s): PH, PCO2, PO2, HCO3, POCSATURATED, BE in the last 168 hours.  Microbiology Results (last 7 days)     ** No results found for the last 168 hours. **        Specimen (12h ago through future)    None        No results for input(s): COLORU, CLARITYU, SPECGRAV, PHUR, PROTEINUA, GLUCOSEU, BILIRUBINCON, BLOODU, WBCU, RBCU, BACTERIA, MUCUS, NITRITE, LEUKOCYTESUR, UROBILINOGEN, HYALINECASTS in the last 168 hours.  Others: No results for input(s): [id number], TSH, T4FREE, LABLIPI in the last 168 hours.    Invalid input(s): A1C      ASSESSMENT/PLAN:     Active Hospital Problems    Diagnosis    *Acute cystitis with hematuria   --Fluconazole 100 mg daily for 9 more doses    Debility   --PT/OT: APAP 650 mg q6h prn mild pain;   --bowel regimen for constipation; hold for loose or frequent stools: Senna-docusate twice daily  --DVT prophylaxis: Heparin 5000u q8hrs      Dementia with behavioral disturbance   --No medications  ordered at this time  Delirium precautions    Hydrocele    HTN (hypertension)   **Monitor BP and pulse  --Amlodipine 10 mg daily  --Carvedilol 25 mg twice daily  --Hydralazine 25mg q8h prn SBP>180, DBP>110  BP Readings from Last 3 Encounters:   12/13/17 (!) 156/67   12/12/17 (!) 143/64   11/23/17 (!) 174/80     Pulse Readings from Last 3 Encounters:   12/13/17 60   12/12/17 61   11/23/17 65         Dyslipidemia   --Atorvastatin 40 mg nighty  Lab Results   Component Value Date    CHOL 95 (L) 05/26/2016    CHOL 102 (L) 05/07/2013    CHOL 86 (L) 05/20/2011     Lab Results   Component Value Date    HDL 30 (L) 05/26/2016    HDL 34 (L) 05/07/2013    HDL 28 (L) 05/20/2011     Lab Results   Component Value Date    LDLCALC 52.8 (L) 05/26/2016    LDLCALC 50.0 (L) 05/07/2013    LDLCALC 42.6 (L) 05/20/2011     Lab Results   Component Value Date    TRIG 61 05/26/2016    TRIG 88 05/07/2013    TRIG 77 05/20/2011     Lab Results   Component Value Date    CHOLHDL 31.6 05/26/2016    CHOLHDL 33.3 05/07/2013    CHOLHDL 32.6 05/20/2011     Lab Results   Component Value Date    ALT 11 12/07/2017    AST 14 12/07/2017    ALKPHOS 73 12/07/2017    BILITOT 0.3 12/07/2017         Glaucoma   --Brinzolamide 1% 1 gtt oS three times daily  --Travoprost 0.004% 1 gtt OS nightly    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   **Monitor blood glucose  --SSI    ESRD (end stage renal disease) on dialysis   **Monitor renal function and adjust medications accordingly  --Calcium Acetate  1334 mg three times daily with meals  Lab Results   Component Value Date    CALCIUM 8.1 (L) 12/12/2017    PHOS 4.0 12/12/2017         Benign prostatic hyperplasia with urinary retention   --Doxazosin 4 mg daily  --Finasteride 5 mg daily    Cerebrovascular disease  --ASA 81 mg daily    Anemia in chronic kidney disease/Thrombocytopenia  **Monitor CBC  Lab Results   Component Value Date    WBC 4.10 12/13/2017    HGB 8.0 (L) 12/13/2017    HCT 24.6 (L)  12/13/2017    MCV 72 (L) 12/13/2017     (L) 12/13/2017         Seizure disorder   --Levetiracetam 500 mg twice daily           I have reviewed the medications in compliance with CMS Regulation F329 of the DRISS Appendix PP.      Danuta Tejeda, Pharm. D.  Clinical Pharmacist  [facility]

## 2017-12-13 NOTE — PLAN OF CARE
Problem: Occupational Therapy Goal  Goal: Occupational Therapy Goal  Goals to be met by: 14 days     Patient will increase functional independence with ADLs by performing:    UE Dressing with Supervision.  LE Dressing with Minimal Assistance.  Grooming while seated with Supervision.  Toileting from bedside commode with Minimal Assistance for hygiene and clothing management.   Bathing from  shower chair/bench with Minimal Assistance.  Toilet transfer to bedside commode with Contact Guard Assistance.  Upper extremity exercise program x12 reps per handout, with supervision.    Outcome: Ongoing (interventions implemented as appropriate)    Pt was agreeable to OTevaluation.  Goals established to assist pt with returning to OF regarding ADLs and func mobility.  Pt will benefit from skilled OT services in order to increase his level of safety and independence with ADLs and mobility.      Mireille Hopper, OT  12/13/2017

## 2017-12-13 NOTE — ASSESSMENT & PLAN NOTE
Cont with PT/OT for gait training and strengthening and restoration of ADL's   Fall precautions   Cont DVT prophylaxis with heparin   Anticoagulants   Medication Route Frequency    heparin (porcine) injection 5,000 Units Subcutaneous Q8H

## 2017-12-13 NOTE — PLAN OF CARE
12/13/17 0808   Final Note   Assessment Type Final Discharge Note     Patient discharged to [facility] 12/12/17.

## 2017-12-13 NOTE — ASSESSMENT & PLAN NOTE
Poorly controlled   Cont Norvasc and coreg to treat  Cont to monitor   Volume removal with HD should help.   Cont PRN hydralazine

## 2017-12-13 NOTE — PT/OT/SLP EVAL
PhysicalTherapy   Evaluation    Ronald Campbell   MRN: [id number]     PT Received On: 12/13/17  PT Start Time: 0924     PT Stop Time: 0947    PT Total Time (min): 23 min       Billable Minutes:  Evaluation 15 and Therapeutic Activity 8    Diagnosis: Acute cystitis with hematuria  Past Medical History:   Diagnosis Date    Anemia     Blindness of right eye     CHF (congestive heart failure)     Chronic kidney disease     Dementia 01/2017    Diabetes mellitus type II     General anesthetics causing adverse effect in therapeutic use     april / may 2014     Glaucoma (increased eye pressure)     Hypertension     Hypothermia 12/6/2017    Seizures       Past Surgical History:   Procedure Laterality Date    AV FISTULA PLACEMENT Left 4/2014    AV fistula to Left upper arm    EYE SURGERY           General Precautions: Standard, blind, fall  Orthopedic Precautions: N/A   Braces: N/A         Patient History:  Lives With: child(erin), adult (per acute:Gabriela Campbell ([phone], [phone])))  Living Arrangements: house  Home Accessibility: stairs to enter home  Home Layout:  (chair lift in back, interior, unusable due to narrowness)  Number of Stairs to Enter Home: 10  Stair Railings at Home: outside, present on right side  Transportation Available: family or friend will provide ([facility] provides transportation to )  Living Environment Comment: dtr works parttime during the day. Has services from [employer] at home, T/ TH and transportation. [facility] to install chair lift on exterior(the one they installed interior is not usable due to space constraints)  Equipment Currently Used at Home: walker, rolling, cane, straight, shower chair, raised toilet (from acute notes, need to verify, w/c??)  DME owned (not currently used): single point cane Need to verify DME owned/ used/ needed    Previous Level of Function:  Ambulation Skills: needs assist  Transfer Skills: needs assist  Due to blindness  Subjective:  Communicated with  patient/daughter prior to session.  Patient leaving soon for hemo dialysis. Agreeable to PT prior to leaving. Dtr agreeable and leaves. She reports she came to assist patient with breakfast prior to going to HD  Chief Complaint: knee pain  Patient goals: go home soon    Pain/Comfort  Pain Rating 1: 0/10 (none presently, reports 'bone on bone' B knee,pain w/ walkin)  Pain Rating Post-Intervention 1: 0/10    Objective:  Patient found seated in chair w/ OT and daughter   with      Cognitive Exam:  Oriented to: Person, Place and Situation  Follows Commands/attention: Follows one-step commands  Communication: clear/fluent  Safety awareness/insight to disability: intact    Physical Exam:  Postural examination/scapula alignment:    -       Rounded shoulders  -       Posterior pelvic tilt    Skin integrity: Visible skin intact  Edema: Moderate BLEs    Sensation:      -       Intact BLE light touch intact    Upper Extremity Range of Motion:  Right Upper Extremity: WFL  Left Upper Extremity: WFL    Upper Extremity Strength:  Right Upper Extremity: WFL  Left Upper Extremity: WFL    Lower Extremity Range of Motion:  Right Lower Extremity: WFL except knee extension ~-15*  Left Lower Extremity: WFL except knee extension ~-20*    Lower Extremity Strength:  Right Lower Extremity: grossly 4-/5 within range limitations  Left Lower Extremity: grossly 4-/5 within range limitations       Gross motor coordination: WFL    Functional Status:  MDS G  Bed Mobility Functional Status:  (not tested)  Transfer Functional Status: CGA-Min (A)  Walk in Room Functional Status: CGA-Min (A)  Eval Only: Number of L/E limb <4/5 MMT: 2  Moving from seated to standing position: Not steady, only able to stabilize with staff assistance  Walking (with assistive device if used): Not steady, only able to stabilize with staff assistance  Turning around and facing the opposite direction while walking: Not steady, only able to stabilize with staff  assistance  Surface-to-surface transfer (transfer between bed and chair or wheelchair): Not steady, only able to stabilize with staff assistance         Bed Mobility:  Not tested. Patient up in chair and then assisted to w/c to leave w/ Latonya's transport to go to HD    Transfers:  Sit<>Stand: from chair w/ CGA  Stand Pivot Transfer: to w/c w/ min assist and cues for surroundings. Patient turned 3/4 turn to his left to sit in w/c rather than 1/4 turn to his right to sit in w/c to his right. Patient holds both w/c arms for support when initiating turning.  Cues/ assist for technique, orientation to w/c location    Gait:  Patient did not ambulate due to leaving for Hemodialysis. He was able to take steps to pivot to w/c. B flexed hips and knees in turning w/ UE support on armrests of w/c and min assist from PT       Advanced Gait:  Stairs: Not tested  Curb Step: not tested    Wheelchair Mobility:  Patient propels w/c not tested       Additional Treatment:  AM-PAC 6 CLICK MOBILITY  How much help from another person does this patient currently need?   1 = Unable, Total/Dependent Assistance  2 = A lot, Maximum/Moderate Assistance  3 = A little, Minimum/Contact Guard/Supervision  4 = None, Modified [place]/Independent     Turning over in bed (including adjusting bedclothes, sheets and blankets)?: 3  Sitting down on and standing up from a chair with arms (e.g., wheelchair, bedside commode, etc.): 3  Moving from lying on back to sitting on the side of the bed?: 3  Moving to and from a bed to a chair (including a wheelchair)?: 3  Need to walk in hospital room?: 3  Climbing 3-5 steps with a railing?: 2  Total Score: 17     AM-PAC Raw Score CMS G-Code Modifier Level of Impairment Assistance   6 % Total / Unable   7 - 9 CM 80 - 100% Maximal Assist   10 - 14 CL 60 - 80% Moderate Assist   15 - 19 CK 40 - 60% Moderate Assist   20 - 22 CJ 20 - 40% Minimal Assist   23 CI 1-20% SBA / CGA   24 CH 0% Independent/ Mod I          Patient assisted w/ donning jacket and  Cap and given bag to go to HD.     Patient left up in chair with transportation .    Assessment:  Ronald Campbell is a 88 y.o. male with a medical diagnosis of Acute cystitis with hematuria. Patient is blind for approximately one year and PTA required assist for mobility. Patient was leaving for hemo-dialysis and PT eval performed as able prior to patient leaving. His daughter was present for part of session and provided information.  Pt presents w/ previous pertinent co-morbidities and personal factors including blindness, reported 'bone on bone' B knees, dementia and HD 3x/week. He receives services from [employer] and per daughter, [employer] has installed an unusable chair lift in interior steps and plans to provide and exterior chair lift. Patient has 10 DAVID, PTA performed w/ assist. Daughter works part time and patient may need to be home alone part of some days. Pt currently presents w/ deficits noted including blindness. Pt's clinical presentation is stable. . According to the [facility] pt requires moderate assistance. These factors classify pt as a LOW complexity evaluation. Pt is appropriate for skilled PT and will begin PT POC.  .    Rehab identified problem list/impairments: weakness, impaired endurance, impaired functional mobilty, gait instability, impaired balance, decreased lower extremity function, pain, visual deficits, edema    Rehab potential is good.    Activity tolerance: Fair    Discharge recommendations: home with home health (and [facility]/ family assist as prior)     Barriers to discharge: Inaccessible home environment, Decreased caregiver support    Equipment recommendations:  (need to verify DME)     GOALS:    Physical Therapy Goals        Problem: Physical Therapy Goal    Goal Priority Disciplines Outcome Goal Variances Interventions   Physical Therapy Goal     PT/OT, PT      Description:  Goals to be met by: 2 weeks     Patient will increase functional  independence with mobility by performin. Supine to sit with supervision  2. Sit to supine with supervision  3. Sit to stand transfer with Contact Guard Assistance with/ without RW, as appropriate and cues as needed secondary to blindness  4. Bed to chair transfer with Contact Guard Assistance using Rolling Walker or least restrictive device or without AD and with cues as needed secondary to blindness  5. Gait  x 150 feet with minimal assistance using Rolling Walker or appropriate assistive device with cues as needed secondary to blindness  6. Ascend/descend 12 stair with bilateral Handrails Minimal Assistance using cues as needed secondary to blindness.   7. Ascend/Descend 4 inch curb step with Minimal Assistance using Rolling Walker or appropriate assistive device and cues as needed secondary to blindness  8. Stand for 3 minutes with UE support on walker or table and perform an activity w/ CGA and cues for blindness   9. Lower extremity exercise program x20 reps per handout, with assistance as needed and gym therex  10. Gait x75 feet w/ Hand held / minimal assistance and without an AD, with cues secondary to blindness                      PLAN:    Patient to be seen 5 x/week  to address the above listed problems via gait training, therapeutic activities, therapeutic exercises, wheelchair management/training  Plan of Care Expires: 18    Shayy Gonzalez, PT 2017

## 2017-12-13 NOTE — SUBJECTIVE & OBJECTIVE
Past Medical History:   Diagnosis Date    Anemia     Blindness of right eye     CHF (congestive heart failure)     Chronic kidney disease     Dementia 01/2017    Diabetes mellitus type II     General anesthetics causing adverse effect in therapeutic use     april / may 2014     Glaucoma (increased eye pressure)     Hypertension     Hypothermia 12/6/2017    Seizures        Past Surgical History:   Procedure Laterality Date    AV FISTULA PLACEMENT Left 4/2014    AV fistula to Left upper arm    EYE SURGERY         Review of patient's allergies indicates:   Allergen Reactions    Lisinopril Swelling       Current Facility-Administered Medications on File Prior to Encounter   Medication    [DISCONTINUED] 0.9%  NaCl infusion    [DISCONTINUED] 0.9%  NaCl infusion    [DISCONTINUED] 0.9%  NaCl infusion    [DISCONTINUED] 0.9%  NaCl infusion    [DISCONTINUED] 0.9%  NaCl infusion    [DISCONTINUED] 0.9%  NaCl infusion    [DISCONTINUED] acetaminophen tablet 650 mg    [DISCONTINUED] albuterol-ipratropium 2.5mg-0.5mg/3mL nebulizer solution 3 mL    [DISCONTINUED] amLODIPine tablet 10 mg    [DISCONTINUED] aspirin chewable tablet 81 mg    [DISCONTINUED] atorvastatin tablet 40 mg    [DISCONTINUED] brinzolamide 1 % ophthalmic suspension 1 drop    [DISCONTINUED] calcium acetate capsule 1,334 mg    [DISCONTINUED] carvedilol tablet 25 mg    [DISCONTINUED] dextrose 50% injection 12.5 g    [DISCONTINUED] dextrose 50% injection 25 g    [DISCONTINUED] doxazosin tablet 4 mg    [DISCONTINUED] finasteride tablet 5 mg    [DISCONTINUED] fluconazole tablet 100 mg    [DISCONTINUED] glucagon (human recombinant) injection 1 mg    [DISCONTINUED] glucose chewable tablet 16 g    [DISCONTINUED] glucose chewable tablet 24 g    [DISCONTINUED] heparin (porcine) injection 5,000 Units    [DISCONTINUED] hydrALAZINE tablet 25 mg    [DISCONTINUED] insulin aspart pen 0-5 Units    [DISCONTINUED] levETIRAcetam tablet 500 mg     [DISCONTINUED] ondansetron disintegrating tablet 8 mg    [DISCONTINUED] ondansetron injection 4 mg    [DISCONTINUED] senna-docusate 8.6-50 mg per tablet 1 tablet    [DISCONTINUED] sodium chloride 0.9% flush 5 mL    [DISCONTINUED] travoprost 0.004 % ophthalmic solution 1 drop     Current Outpatient Prescriptions on File Prior to Encounter   Medication Sig    amLODIPine (NORVASC) 5 MG tablet Take 10 mg by mouth once daily.     aspirin 81 MG Chew Take 1 tablet (81 mg total) by mouth once daily. (Patient taking differently: Take 81 mg by mouth every morning. )    atorvastatin (LIPITOR) 40 MG tablet Take 1 tablet (40 mg total) by mouth once daily. (Patient taking differently: Take 80 mg by mouth every evening. )    B complex-vitamin C-folic acid (NEPHRO-JESSICA) 0.8 mg Tab Take 1 tablet by mouth every morning.     blood sugar diagnostic (BLOOD GLUCOSE TEST) Strp 1 strip by Misc.(Non-Drug; Combo Route) route 2 (two) times daily.    brinzolamide (AZOPT) 1 % ophthalmic suspension Place 1 drop into the left eye 3 (three) times daily.     calcium acetate (PHOSLO) 667 mg capsule Take 1 capsule (667 mg total) by mouth 3 (three) times daily with meals.    carvedilol (COREG) 25 MG tablet Take 1 tablet (25 mg total) by mouth 2 (two) times daily. Hold on Monday, Wednesday and Friday before dialysis. Hold for SBP < 110 or HR < 55 (Patient taking differently: Take 25 mg by mouth 2 (two) times daily. Hold for SBP < 110 or HR < 55)    doxazosin (CARDURA) 4 MG tablet Take 1 tablet (4 mg total) by mouth once daily. (Patient taking differently: Take 4 mg by mouth every morning. )    ergocalciferol (ERGOCALCIFEROL) 50,000 unit Cap Take 1 capsule (50,000 Units total) by mouth every 7 days.    finasteride (PROSCAR) 5 mg tablet Take 1 tablet (5 mg total) by mouth once daily. (Patient taking differently: Take 5 mg by mouth every morning. )    HEPARIN SODIUM,PORCINE (HEPARIN, PORCINE,) 1,000 unit/mL injection 1 mL (1,000 Units  total) by Intracatheter route as needed (Heparin 1000 units/ml instilled to fill volume of each lumen of dialysis catheter after each dialysis.).    insulin aspart (NOVOLOG FLEXPEN) 100 unit/mL InPn  Insulin Pen Subcutaneous As directed.  -200 take 1 unitBS 201-250 take 2 unitsBS 251-300 take 3 unitsBS 301-350 take 4 unitsBS > 351 take 5 units and call MD    ketoconazole (NIZORAL) 2 % cream Apply topically once daily. feet    levetiracetam (KEPPRA) 500 MG Tab Take 1 tablet (500 mg total) by mouth 2 (two) times daily.    polyethylene glycol (GLYCOLAX) 17 gram PwPk Take 17 g by mouth once daily.    TRAVOPROST (TRAVATAN Z OPHT) Place 1 drop into the left eye every evening. 1 Drops Left eye Every evening     Family History     Problem Relation (Age of Onset)    Cancer Daughter    Heart disease Sister, Brother        Social History Main Topics    Smoking status: Never Smoker    Smokeless tobacco: Never Used    Alcohol use No    Drug use: No    Sexual activity: No     Review of Systems   Constitutional: Negative for chills, fatigue and fever.   HENT: Negative for congestion, facial swelling, hearing loss and trouble swallowing.    Eyes: Negative for photophobia and visual disturbance.   Respiratory: Negative for chest tightness, shortness of breath and wheezing.    Cardiovascular: Negative for chest pain, palpitations and leg swelling.   Gastrointestinal: Negative for abdominal pain, blood in stool, constipation, diarrhea, nausea and vomiting.   Endocrine: Negative.    Genitourinary: Negative.    Musculoskeletal: Negative for back pain, joint swelling and myalgias.   Skin: Negative.    Allergic/Immunologic: Negative.    Neurological: Negative for dizziness, facial asymmetry, speech difficulty, weakness and numbness.   Hematological: Negative.    Psychiatric/Behavioral: Negative for agitation, confusion and dysphoric mood. The patient is not nervous/anxious.      Objective:     Vital Signs (Most  Recent):  Temp: 97.4 °F (36.3 °C) (12/13/17 0612)  Pulse: (!) 55 (12/12/17 1945)  Resp: 16 (12/12/17 1945)  BP: (!) 189/79 (12/13/17 0849)  SpO2: 100 % (12/12/17 1945) Vital Signs (24h Range):  Temp:  [97 °F (36.1 °C)-97.5 °F (36.4 °C)] 97.4 °F (36.3 °C)  Pulse:  [55-76] 55  Resp:  [16-18] 16  SpO2:  [97 %-100 %] 100 %  BP: (111-189)/(58-79) 189/79     Weight: 84.8 kg (186 lb 15.2 oz)  Body mass index is 24.67 kg/m².    Physical Exam   Constitutional: He appears well-developed and well-nourished. No distress.   Eyes:   Blind    Cardiovascular: Normal rate, regular rhythm, normal heart sounds and intact distal pulses.    +1 LE edema B/L   Pulmonary/Chest: Effort normal and breath sounds normal. No respiratory distress. He has no wheezes.   Abdominal: Soft. Bowel sounds are normal. He exhibits no distension. There is no tenderness.   Genitourinary: No penile tenderness.   Genitourinary Comments: s/p R hydrocelectomy and L orchiectomy   + scrotal swelling   Musculoskeletal: Normal range of motion. He exhibits edema.   LUE AV fistula with + thrill and bruit    Neurological: He is alert. No cranial nerve deficit. GCS eye subscore is 3. GCS verbal subscore is 4. GCS motor subscore is 6.   Follows simple commands. Oriented to person and place.    Skin: Skin is warm and dry. He is not diaphoretic.   Psychiatric: He has a normal mood and affect. His behavior is normal.   Nursing note and vitals reviewed.    Significant Labs: All pertinent labs within the past 24 hours have been reviewed.    Significant Imaging: I have reviewed all pertinent imaging results/findings within the past 24 hours.

## 2017-12-13 NOTE — PT/OT/SLP EVAL
Occupational Therapy  Evaluation & Treat    Ronald Campbell   MRN: [id number]   Admitting Diagnosis: Acute cystitis with hematuria     OT Date of Treatment: 12/13/17   OT Start Time: 0858  OT Stop Time: 0928  OT Total Time (min): 30 min (shortened session as pt was waiting on transport to take him to dialysis)    Billable Minutes:  Evaluation 15  Self Care/Home Management 15    Diagnosis: Acute cystitis with hematuria    Past Medical History:   Diagnosis Date    Anemia     Blindness of right eye     CHF (congestive heart failure)     Chronic kidney disease     Dementia 01/2017    Diabetes mellitus type II     General anesthetics causing adverse effect in therapeutic use     april / may 2014     Glaucoma (increased eye pressure)     Hypertension     Hypothermia 12/6/2017    Seizures       Past Surgical History:   Procedure Laterality Date    AV FISTULA PLACEMENT Left 4/2014    AV fistula to Left upper arm    EYE SURGERY           General Precautions: Standard, aspiration, blind, fall  Orthopedic Precautions: N/A  Braces: N/A    Do you have any cultural, spiritual, Yazidi conflicts, given your current situation?: none stated     Patient History:  Lives With: child(erin), adult  Living Arrangements: house  Home Accessibility: stairs to enter home (step in tub)  Home Layout:  (raised home - step in tub - attempted stair lift but unsuccessful installation and unable to use)  Number of Stairs to Enter Home: 12  Stair Railings at Home:  (center rail up first 10 steps then B rails)  Transportation Available: family or friend will provide  Living Environment Comment:  (Pt lives with adult daughter in raised [facility] - PLOF pt needed assist with all mobility and self care skills - pt became completely blind ~1 year ago and has dementia - pt's daughter works during the day - he has a CNA come daily but there are periods of ~2 hours a day when he is alone at home - pt has hx of falls )  Equipment Currently Used at Home:  "raised toilet, shower chair, walker, rolling, wheelchair    Prior level of function:   Bed Mobility/Transfers: needs assist  Grooming: needs assist  Bathing: needs assist  Upper Body Dressing: needs assist  Lower Body Dressing: needs assist  Toileting: needs assist  Home Management Skills: needs assist  Homemaking Responsibilities: No  Driving License: No  Mode of Transportation: Family  Occupation: Retired  Type of Occupation:   Leisure and Hobbies: listening to court shows;     Dominant hand: right    Subjective:  Communicated with nurse prior to session.  "His knees are like bone on bone."  Pt's daughter explaining to OT his decrease in mobility  Chief Complaint: decreased mobility  Patient/Family stated goals: return to baseline    Pain/Comfort  Pain Rating 1: 0/10  Pain Rating Post-Intervention 1: 0/10    Objective:   Patient found with:  (sitting in bedside chair - no lines - daughter at his side)    Cognitive Exam:  Oriented to: Person, Place and Time  Follows Commands/attention: Follows one-step commands  Communication: clear/fluent and low voice  Memory:  Impaired LTM  Safety awareness/insight to disability: impaired - has dementia  Coping skills/emotional control: Appropriate to situation    Visual/perceptual:  Impaired - blind    Physical Exam:  Postural examination/scapula alignment:    -       Rounded shoulders  -       Forward head  Skin integrity: Visible skin intact  Edema: None noted     Sensation:    -       Impaired  light/touch in UEs and LEs - worse in UEs    Upper Extremity Range of Motion:  Right Upper Extremity: WFL   Left Upper Extremity: WFL   ** Pt's B shoulder flexion is just passed 90 degrees  - not full ROM but functional ROM for ADLs    Upper Extremity Strength:  Right Upper Extremity: WFL  Left Upper Extremity: WFL   Strength: good    Fine motor coordination:    -       Impaired  difficulty with FM task of fastening buttons on shirt - related to blindness and decreased " sensation in fingers    Gross motor coordination: WFL    Functional Status:  MDS G  Bed Mobility Functional Status:  (pt in chair upon OT arrival)    Transfer Functional Status: CGA-Min (A)  Transfer Level of (A):  (Min A for sit to stand)    Dressing Functional Status: 3:mod(A)-Max(A)  Dressing Level of (A) :  (Min A with UE dressing - pt needed increased time to doff shirt - pt needed assist orienting shirt correctly before pt donned shirt - needed assist with initiating donning shirt by assisting with inserting L UE into sleeve and pulling up to head; LE with mod A to rufina shoes and insert feet into pants - pt able to put on/take off belt with increased time and cues - pt needed CGA to Min A in stand when pullng pants up to waist)    Eating Functional Status: CGA-Min (A)  Eating Level of (A) :  (Min A for set up and with scooping due to difficulty locating food)    Toilet Use Functional Status: mod(A)-Max(A)  Toilet Use Level of (A) :  (Mod A for hygiene)    Personal Hygiene Functional Status: CGA-Min (A)  Personal Hygiene Level of (A) :  (Min A - Set up A to wash face and hands but needs A with hair and shaving)    Bathing Functional Status: mod(A)-Max(A)  Bathing Level of (A) :  (Not observed - per daughter report pt needs mod A to bathe thoroughly and safely)    Eval Only: Number of U/E limb <4/5 MMT: 0  Moving from seated to standing position: Not steady, only able to stabilize with staff assistance  Walking (with assistive device if used): Not steady, only able to stabilize with staff assistance  Turning around and facing the opposite direction while walking: Not steady, only able to stabilize with staff assistance  Moving on and off the toilet: Not steady, only able to stabilize with staff assistance  Surface-to-surface transfer (transfer between bed and chair or wheelchair): Not steady, only able to stabilize with staff assistance       Additional Treatment:  · Pt completed ADLs and func mobility  activities for tx session as noted above  · Educated pt and daughter on safety during ADLs  · Pt noted with declining standing balance during dynamic activity of donning pants/applying belt - pt began with CGA and progressed to needing Min A due to progressive posterior lean  · Pt educated on role of OT and POC      Patient left up in chair with call button in reach and daughter and PT present    Assessment:  Ronald Campbell is a 88 y.o. male with a medical diagnosis of Acute cystitis with hematuria and deficits as noted below.  Pt was agreeable to OT evaluation.  Goals established to assist pt with returning to OF regarding ADLs and func mobility.  Pt's main barriers are his lack of vision, decreased sensation in hands (difficult to use sense of touch to accommodate for loss of vision during ADLs), dementia, and decreased endurance.  Pt will benefit from skilled OT services in order to increase his level of safety and independence with ADLs and mobility.  At this time, OT recommends HHOT in order to continue to work towards improving pt's level of function once he is discharged from SNF.  There are not current DME recommendations at this time.     Pt evaluation falls under low complexity for evaluation coding due to performance deficits noted in 1-3 areas as stated above and 0 co-morbities affecting current functional status. Data obtained from problem focused assessments. No modifications or assistance was required for completion of evaluation. Only brief occupational profile and history review completed.       Rehab identified problem list/impairments: weakness, impaired endurance, impaired sensation, impaired self care skills, impaired functional mobilty, gait instability, impaired balance, visual deficits, impaired cognition, decreased coordination, decreased lower extremity function, decreased safety awareness, abnormal tone, decreased ROM, impaired coordination, impaired fine motor    Rehab potential is  fair    Activity tolerance: Fair    Discharge recommendations: home health OT     Barriers to discharge:       Equipment recommendations: none     GOALS:    Occupational Therapy Goals        Problem: Occupational Therapy Goal    Goal Priority Disciplines Outcome Interventions   Occupational Therapy Goal     OT, PT/OT Ongoing (interventions implemented as appropriate)    Description:  Goals to be met by: 14 days     Patient will increase functional independence with ADLs by performing:    UE Dressing with Supervision.  LE Dressing with Minimal Assistance.  Grooming while seated with Supervision.  Toileting from bedside commode with Minimal Assistance for hygiene and clothing management.   Bathing from  shower chair/bench with Minimal Assistance.  Toilet transfer to bedside commode with Contact Guard Assistance.  Upper extremity exercise program x12 reps per handout, with supervision.                      PLAN: Patient to be seen  (5-6x/week) to address the above listed problems via self-care/home management, therapeutic activities, therapeutic exercises  Plan of Care expires:    Plan of Care reviewed with: patient    Mireille FAY Ruchi, OT  12/13/2017

## 2017-12-14 ENCOUNTER — OFFICE VISIT (OUTPATIENT)
Dept: UROLOGY | Facility: CLINIC | Age: 82
End: 2017-12-14
Payer: COMMERCIAL

## 2017-12-14 VITALS — HEIGHT: 73 IN | BODY MASS INDEX: 24.65 KG/M2 | WEIGHT: 186 LBS

## 2017-12-14 DIAGNOSIS — Z90.79 HISTORY OF UNILATERAL ORCHIECTOMY: ICD-10-CM

## 2017-12-14 DIAGNOSIS — N47.1 PHIMOSIS: ICD-10-CM

## 2017-12-14 DIAGNOSIS — Z98.890 HISTORY OF HYDROCELECTOMY: ICD-10-CM

## 2017-12-14 DIAGNOSIS — N43.3 HYDROCELE, UNSPECIFIED HYDROCELE TYPE: Primary | ICD-10-CM

## 2017-12-14 LAB
POCT GLUCOSE: 136 MG/DL (ref 70–110)
POCT GLUCOSE: 153 MG/DL (ref 70–110)
POCT GLUCOSE: 156 MG/DL (ref 70–110)
POCT GLUCOSE: 96 MG/DL (ref 70–110)

## 2017-12-14 PROCEDURE — 97110 THERAPEUTIC EXERCISES: CPT

## 2017-12-14 PROCEDURE — 25000003 PHARM REV CODE 250: Performed by: PHYSICIAN ASSISTANT

## 2017-12-14 PROCEDURE — 97535 SELF CARE MNGMENT TRAINING: CPT

## 2017-12-14 PROCEDURE — 99024 POSTOP FOLLOW-UP VISIT: CPT | Mod: S$GLB,,, | Performed by: UROLOGY

## 2017-12-14 PROCEDURE — 63600175 PHARM REV CODE 636 W HCPCS: Performed by: PHYSICIAN ASSISTANT

## 2017-12-14 PROCEDURE — 97530 THERAPEUTIC ACTIVITIES: CPT

## 2017-12-14 PROCEDURE — 12000000 HC SNF SEMI-PRIVATE ROOM

## 2017-12-14 PROCEDURE — 97116 GAIT TRAINING THERAPY: CPT

## 2017-12-14 PROCEDURE — 99999 PR PBB SHADOW E&M-EST. PATIENT-LVL II: CPT | Mod: PBBFAC,,, | Performed by: UROLOGY

## 2017-12-14 PROCEDURE — 99499 UNLISTED E&M SERVICE: CPT | Mod: S$GLB,,, | Performed by: UROLOGY

## 2017-12-14 RX ADMIN — CALCIUM ACETATE 1334 MG: 667 CAPSULE ORAL at 08:12

## 2017-12-14 RX ADMIN — CALCIUM ACETATE 1334 MG: 667 CAPSULE ORAL at 12:12

## 2017-12-14 RX ADMIN — STANDARDIZED SENNA CONCENTRATE AND DOCUSATE SODIUM 1 TABLET: 8.6; 5 TABLET, FILM COATED ORAL at 08:12

## 2017-12-14 RX ADMIN — FINASTERIDE 5 MG: 5 TABLET, FILM COATED ORAL at 08:12

## 2017-12-14 RX ADMIN — LEVETIRACETAM 500 MG: 500 TABLET, FILM COATED ORAL at 08:12

## 2017-12-14 RX ADMIN — CARVEDILOL 25 MG: 25 TABLET, FILM COATED ORAL at 09:12

## 2017-12-14 RX ADMIN — CARVEDILOL 25 MG: 25 TABLET, FILM COATED ORAL at 08:12

## 2017-12-14 RX ADMIN — TRAVOPROST 1 DROP: 0.04 SOLUTION/ DROPS OPHTHALMIC at 09:12

## 2017-12-14 RX ADMIN — LEVETIRACETAM 500 MG: 500 TABLET, FILM COATED ORAL at 09:12

## 2017-12-14 RX ADMIN — HEPARIN SODIUM 5000 UNITS: 5000 INJECTION, SOLUTION INTRAVENOUS; SUBCUTANEOUS at 09:12

## 2017-12-14 RX ADMIN — ASPIRIN 81 MG CHEWABLE TABLET 81 MG: 81 TABLET CHEWABLE at 08:12

## 2017-12-14 RX ADMIN — BRINZOLAMIDE 1 DROP: 10 SUSPENSION/ DROPS OPHTHALMIC at 06:12

## 2017-12-14 RX ADMIN — BRINZOLAMIDE 1 DROP: 10 SUSPENSION/ DROPS OPHTHALMIC at 09:12

## 2017-12-14 RX ADMIN — ATORVASTATIN CALCIUM 40 MG: 20 TABLET, FILM COATED ORAL at 09:12

## 2017-12-14 RX ADMIN — DOXAZOSIN 4 MG: 4 TABLET ORAL at 08:12

## 2017-12-14 RX ADMIN — HEPARIN SODIUM 5000 UNITS: 5000 INJECTION, SOLUTION INTRAVENOUS; SUBCUTANEOUS at 06:12

## 2017-12-14 RX ADMIN — CALCIUM ACETATE 1334 MG: 667 CAPSULE ORAL at 06:12

## 2017-12-14 RX ADMIN — FLUCONAZOLE 100 MG: 100 TABLET ORAL at 08:12

## 2017-12-14 RX ADMIN — AMLODIPINE BESYLATE 10 MG: 10 TABLET ORAL at 08:12

## 2017-12-14 RX ADMIN — STANDARDIZED SENNA CONCENTRATE AND DOCUSATE SODIUM 1 TABLET: 8.6; 5 TABLET, FILM COATED ORAL at 09:12

## 2017-12-14 NOTE — PLAN OF CARE
Problem: Fall Risk (Adult)  Goal: Absence of Falls  Patient will demonstrate the desired outcomes by discharge/transition of care.   Outcome: Ongoing (interventions implemented as appropriate)   12/13/17 2000   Fall Risk (Adult)   Absence of Falls making progress toward outcome   Pt remain free from falls/injury, trauma, call light w/i reach. Will monitor

## 2017-12-14 NOTE — PT/OT/SLP DISCHARGE
Occupational Therapy Discharge Summary    Ronald Campbell  MRN: [id number]   Principal Problem: Acute cystitis with hematuria      Patient Discharged from acute Occupational Therapy on 12/12/17.  Please refer to prior OT note dated 12/12/17for functional status.    Assessment:      Patient appropriate for care in another setting.    Objective:     GOALS:    Occupational Therapy Goals        Problem: Occupational Therapy Goal    Goal Priority Disciplines Outcome Interventions   Occupational Therapy Goal     OT, PT/OT Ongoing (interventions implemented as appropriate)    Description:  Goals to be met by: 12/14/2017     Patient will increase functional independence with ADLs by performing:    UE Dressing with Minimal Assistance.  LE Dressing with Moderate Assistance. MET 12/12  Revised: LE dressing with minimum assistance  Grooming while seated with Contact Guard Assistance. MET 12/12  Revised: Grooming while seated with SBA with set up  Toileting from [facility] with Moderate Assistance for hygiene and clothing management.   Toilet transfer to toilet with Minimal Assistance.                       Reasons for Discontinuation of Therapy Services  Transfer to alternate level of care.      Plan:     Patient Discharged to: Skilled Nursing Facility    Heather matthews OT  12/14/2017

## 2017-12-14 NOTE — H&P
"[facility]  Department of Hospital Medicine  History & Physical    Patient Name: Ronald Campbell  MRN: [id number]  Code Status: Full Code  Admission Date: 12/12/2017  Attending Physician: Ellie Caceres MD   Primary Care Provider: Bart Shaw MD    Subjective:     Principal Problem:Acute cystitis with hematuria    No chief complaint on file.       HPI: Chief Complaint/Reason for Admission: Debility    History of Present Illness:  Patient is a 88 y.o. male who has a past medical history of Anemia; Blindness of right eye; CHF (congestive heart failure);  ESRD on HD; Dementia; Diabetes mellitus type II; Glaucoma; Hypertension; and Seizures presented with AMS and confusion due to UTI. Pt started on IV Rocephin and urine culture revealed gayle albicans. IV antibiotics were discontinued and started don oral fluconazole for 14 days. Per acute care notes "throughout hospital stay confusion/agitation waxes and wanes but generally improves throughout the day and with reorientation." He recently underwent L hydrocelectomy and R orchiectomy on 11/22/17 and was evaluated by Urology revealing no evidence of wound infection. Patient has been working with PT/OT who recommend SNF for further balance/mobility training.     Past Medical History:   Diagnosis Date    Anemia     Blindness of right eye     CHF (congestive heart failure)     Chronic kidney disease     Dementia 01/2017    Diabetes mellitus type II     General anesthetics causing adverse effect in therapeutic use     april / may 2014     Glaucoma (increased eye pressure)     Hypertension     Hypothermia 12/6/2017    Seizures        Past Surgical History:   Procedure Laterality Date    AV FISTULA PLACEMENT Left 4/2014    AV fistula to Left upper arm    EYE SURGERY         Review of patient's allergies indicates:   Allergen Reactions    Lisinopril Swelling       Current Facility-Administered Medications on File Prior to Encounter   Medication    " [DISCONTINUED] 0.9%  NaCl infusion    [DISCONTINUED] 0.9%  NaCl infusion    [DISCONTINUED] 0.9%  NaCl infusion    [DISCONTINUED] 0.9%  NaCl infusion    [DISCONTINUED] 0.9%  NaCl infusion    [DISCONTINUED] 0.9%  NaCl infusion    [DISCONTINUED] acetaminophen tablet 650 mg    [DISCONTINUED] albuterol-ipratropium 2.5mg-0.5mg/3mL nebulizer solution 3 mL    [DISCONTINUED] amLODIPine tablet 10 mg    [DISCONTINUED] aspirin chewable tablet 81 mg    [DISCONTINUED] atorvastatin tablet 40 mg    [DISCONTINUED] brinzolamide 1 % ophthalmic suspension 1 drop    [DISCONTINUED] calcium acetate capsule 1,334 mg    [DISCONTINUED] carvedilol tablet 25 mg    [DISCONTINUED] dextrose 50% injection 12.5 g    [DISCONTINUED] dextrose 50% injection 25 g    [DISCONTINUED] doxazosin tablet 4 mg    [DISCONTINUED] finasteride tablet 5 mg    [DISCONTINUED] fluconazole tablet 100 mg    [DISCONTINUED] glucagon (human recombinant) injection 1 mg    [DISCONTINUED] glucose chewable tablet 16 g    [DISCONTINUED] glucose chewable tablet 24 g    [DISCONTINUED] heparin (porcine) injection 5,000 Units    [DISCONTINUED] hydrALAZINE tablet 25 mg    [DISCONTINUED] insulin aspart pen 0-5 Units    [DISCONTINUED] levETIRAcetam tablet 500 mg    [DISCONTINUED] ondansetron disintegrating tablet 8 mg    [DISCONTINUED] ondansetron injection 4 mg    [DISCONTINUED] senna-docusate 8.6-50 mg per tablet 1 tablet    [DISCONTINUED] sodium chloride 0.9% flush 5 mL    [DISCONTINUED] travoprost 0.004 % ophthalmic solution 1 drop     Current Outpatient Prescriptions on File Prior to Encounter   Medication Sig    amLODIPine (NORVASC) 5 MG tablet Take 10 mg by mouth once daily.     aspirin 81 MG Chew Take 1 tablet (81 mg total) by mouth once daily. (Patient taking differently: Take 81 mg by mouth every morning. )    atorvastatin (LIPITOR) 40 MG tablet Take 1 tablet (40 mg total) by mouth once daily. (Patient taking differently: Take 80 mg by mouth  every evening. )    B complex-vitamin C-folic acid (NEPHRO-JESSICA) 0.8 mg Tab Take 1 tablet by mouth every morning.     blood sugar diagnostic (BLOOD GLUCOSE TEST) Strp 1 strip by Misc.(Non-Drug; Combo Route) route 2 (two) times daily.    brinzolamide (AZOPT) 1 % ophthalmic suspension Place 1 drop into the left eye 3 (three) times daily.     calcium acetate (PHOSLO) 667 mg capsule Take 1 capsule (667 mg total) by mouth 3 (three) times daily with meals.    carvedilol (COREG) 25 MG tablet Take 1 tablet (25 mg total) by mouth 2 (two) times daily. Hold on Monday, Wednesday and Friday before dialysis. Hold for SBP < 110 or HR < 55 (Patient taking differently: Take 25 mg by mouth 2 (two) times daily. Hold for SBP < 110 or HR < 55)    doxazosin (CARDURA) 4 MG tablet Take 1 tablet (4 mg total) by mouth once daily. (Patient taking differently: Take 4 mg by mouth every morning. )    ergocalciferol (ERGOCALCIFEROL) 50,000 unit Cap Take 1 capsule (50,000 Units total) by mouth every 7 days.    finasteride (PROSCAR) 5 mg tablet Take 1 tablet (5 mg total) by mouth once daily. (Patient taking differently: Take 5 mg by mouth every morning. )    HEPARIN SODIUM,PORCINE (HEPARIN, PORCINE,) 1,000 unit/mL injection 1 mL (1,000 Units total) by Intracatheter route as needed (Heparin 1000 units/ml instilled to fill volume of each lumen of dialysis catheter after each dialysis.).    insulin aspart (NOVOLOG FLEXPEN) 100 unit/mL InPn  Insulin Pen Subcutaneous As directed.  -200 take 1 unitBS 201-250 take 2 unitsBS 251-300 take 3 unitsBS 301-350 take 4 unitsBS > 351 take 5 units and call MD    ketoconazole (NIZORAL) 2 % cream Apply topically once daily. feet    levetiracetam (KEPPRA) 500 MG Tab Take 1 tablet (500 mg total) by mouth 2 (two) times daily.    polyethylene glycol (GLYCOLAX) 17 gram PwPk Take 17 g by mouth once daily.    TRAVOPROST (TRAVATAN Z OPHT) Place 1 drop into the left eye every evening. 1 Drops Left eye  Every evening     Family History     Problem Relation (Age of Onset)    Cancer Daughter    Heart disease Sister, Brother        Social History Main Topics    Smoking status: Never Smoker    Smokeless tobacco: Never Used    Alcohol use No    Drug use: No    Sexual activity: No     Review of Systems   Constitutional: Negative for chills, fatigue and fever.   HENT: Negative for congestion, facial swelling, hearing loss and trouble swallowing.    Eyes: Negative for photophobia and visual disturbance.   Respiratory: Negative for chest tightness, shortness of breath and wheezing.    Cardiovascular: Negative for chest pain, palpitations and leg swelling.   Gastrointestinal: Negative for abdominal pain, blood in stool, constipation, diarrhea, nausea and vomiting.   Endocrine: Negative.    Genitourinary: Negative.    Musculoskeletal: Negative for back pain, joint swelling and myalgias.   Skin: Negative.    Allergic/Immunologic: Negative.    Neurological: Negative for dizziness, facial asymmetry, speech difficulty, weakness and numbness.   Hematological: Negative.    Psychiatric/Behavioral: Negative for agitation, confusion and dysphoric mood. The patient is not nervous/anxious.      Objective:     Vital Signs (Most Recent):  Temp: 97.4 °F (36.3 °C) (12/13/17 0612)  Pulse: (!) 55 (12/12/17 1945)  Resp: 16 (12/12/17 1945)  BP: (!) 189/79 (12/13/17 0849)  SpO2: 100 % (12/12/17 1945) Vital Signs (24h Range):  Temp:  [97 °F (36.1 °C)-97.5 °F (36.4 °C)] 97.4 °F (36.3 °C)  Pulse:  [55-76] 55  Resp:  [16-18] 16  SpO2:  [97 %-100 %] 100 %  BP: (111-189)/(58-79) 189/79     Weight: 84.8 kg (186 lb 15.2 oz)  Body mass index is 24.67 kg/m².    Physical Exam   Constitutional: He appears well-developed and well-nourished. No distress.   Eyes:   Blind    Cardiovascular: Normal rate, regular rhythm, normal heart sounds and intact distal pulses.    +1 LE edema B/L   Pulmonary/Chest: Effort normal and breath sounds normal. No respiratory  distress. He has no wheezes.   Abdominal: Soft. Bowel sounds are normal. He exhibits no distension. There is no tenderness.   Genitourinary: No penile tenderness.   Genitourinary Comments: s/p R hydrocelectomy and L orchiectomy   + scrotal swelling   Musculoskeletal: Normal range of motion. He exhibits edema.   LUE AV fistula with + thrill and bruit    Neurological: He is alert. No cranial nerve deficit. GCS eye subscore is 3. GCS verbal subscore is 4. GCS motor subscore is 6.   Follows simple commands. Oriented to person and place.    Skin: Skin is warm and dry. He is not diaphoretic.   Psychiatric: He has a normal mood and affect. His behavior is normal.   Nursing note and vitals reviewed.    Significant Labs: All pertinent labs within the past 24 hours have been reviewed.    Significant Imaging: I have reviewed all pertinent imaging results/findings within the past 24 hours.    Assessment/Plan:     * Acute cystitis with hematuria    Cont fluconazole to treat x 14 days for complicated UTI         Dementia with behavioral disturbance    Delirium precautions         Debility    Cont with PT/OT for gait training and strengthening and restoration of ADL's   Fall precautions   Cont DVT prophylaxis with heparin   Anticoagulants   Medication Route Frequency    heparin (porcine) injection 5,000 Units Subcutaneous Q8H             Hydrocele    s/p L hydrocelectomy and R orchiectomy on 11/22  Follow up with urology as outpatient         Glaucoma    Cont home eye drops to treat         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    Cont ADA diet  Cont BG checks  SSI  Avoid insulin stacking         Dyslipidemia    Continue atorvastatin to treat        HTN (hypertension)    Poorly controlled   Cont Norvasc and coreg to treat  Cont to monitor   Volume removal with HD should help.   Cont PRN hydralazine         ESRD (end stage renal disease) on dialysis    Cont with HD MWF  Cont calcium acetate to treat            Benign prostatic hyperplasia with urinary retention    Cont doxazosin and finasteride to treat          Thrombocytopenia    Trending downward  Cont to monitor and discontinue heparin if worsens         Anemia in chronic kidney disease    H/H stable  Cont to monitor         Seizure disorder    continue home dose of levetiracetam to treat   seizure precautions            Ellie Clemons MD  Department of Hospital Medicine  [facility]

## 2017-12-14 NOTE — PLAN OF CARE
Problem: Physical Therapy Goal  Goal: Physical Therapy Goal  Goals to be met by: 2 weeks     Patient will increase functional independence with mobility by performin. Supine to sit with supervision  2. Sit to supine with supervision  3. Sit to stand transfer with Contact Guard Assistance with/ without RW, as appropriate and cues as needed secondary to blindness  4. Bed to chair transfer with Contact Guard Assistance using Rolling Walker or least restrictive device or without AD and with cues as needed secondary to blindness  5. Gait  x 150 feet with minimal assistance using Rolling Walker or appropriate assistive device with cues as needed secondary to blindness  6. Ascend/descend 12 stair with bilateral Handrails Minimal Assistance using cues as needed secondary to blindness.   7. Ascend/Descend 4 inch curb step with Minimal Assistance using Rolling Walker or appropriate assistive device and cues as needed secondary to blindness  8. Stand for 3 minutes with UE support on walker or table and perform an activity w/ CGA and cues for blindness   9. Lower extremity exercise program x20 reps per handout, with assistance as needed and gym therex  10. Gait x75 feet w/ Hand held / minimal assistance and without an AD, with cues secondary to blindness     Goals remain appropriate. Continue with Physical therapy Plan of Care. Shayy Gonzalez, PT 2017

## 2017-12-14 NOTE — PROGRESS NOTES
12/14/2017  3:20 PM    Patient not in room. Discharge date of 12/27/2017 written on dry erase board in room.  Elizabeth Liu RN, CM Skilled  [id number]

## 2017-12-14 NOTE — PT/OT/SLP PROGRESS
Physical Therapy  Treatment    Ronald Campbell   MRN: [id number]   Admitting Diagnosis: Acute cystitis with hematuria    PT Received On: 12/14/17          Billable Minutes:  Gait Training 15, Therapeutic Activity 25 and Therapeutic Exercise 15=55    Treatment Type: Treatment  PT/PTA: PT     PTA Visit Number: 0       General Precautions: Standard, blind, aspiration, fall  Orthopedic Precautions: N/A   Braces: N/A         Subjective:  Communicated with patient prior to session.  Patient asking to use toilet for BM. Agreeable to therapy.    Pain/Comfort  Pain Rating 1: 0/10  Pain Rating Post-Intervention 1: 0/10    Objective:  Patient found supine w/ HOB elevated, dressed   with         Functional Status:  MDS G  Bed Mobility Functional Status: S-SBA  Transfer Functional Status: CGA-Min (A)  Walk in Room Functional Status: mod(A)-Max(A)  Walk in Corridor Functional Status: mod(A)-Max(A)  Locomotion on Unit Functional Status: total(A)  Locomotion Off Unit Functional Status: total(A)  Toilet Use Functional Status: mod(A)-Max(A)  Moving from seated to standing position: Not steady, only able to stabilize with staff assistance  Walking (with assistive device if used): Not steady, only able to stabilize with staff assistance  Turning around and facing the opposite direction while walking: Not steady, only able to stabilize with staff assistance  Moving on and off the toilet: Not steady, only able to stabilize with staff assistance  Surface-to-surface transfer (transfer between bed and chair or wheelchair): Not steady, only able to stabilize with staff assistance          Bed Mobility:  Sit>Supine:Not performed  Supine>Sit: SBA to EOB w/ cues for direction. HOB elevated and use of side rails    Transfers:  Sit<>Stand: from EOB w min assist; to/from w/c w/ min assist   Stand Pivot Transfer: bed > w/c w/ min assist w/ use of bed side rail and siderail on BSC; max cues for direction and orientation to environment. (second person for  safety and to assist w/ pants management    Gait:  Amb 111 feet w/ RW and min to  Mod assist. And second person for safety and to follow w/ w/c. Mod assist for turns.Assist for RW management  Intermittently. Patient performed 2 turns. Initially w/ mild posterior lean. Forward flexed posture and flexed hips and knees.     Advanced Gait:  Stairs: up/down 4 steps w/ B HR up and left HR down w/ patient sidestepping. Mod assist (secondary blindness, increased assist )    Wheelchair Mobility:  Unable (blind)     Therex:  Patient pedaled LBE (lower body ergometer) x15 minutes to increase strength and endurance    Additional Treatment:  Patient uses BSC next to bed w/ assist. Transfer as noted above. Max assist to mange LB clothes. Patient performs posterior hygiene w/ SBA and set-up.    Patient left up in chair with OT present.    Assessment:  Ronald Campbell is a 88 y.o. male with a medical diagnosis of Acute cystitis with hematuria.  Patient participated well in PT. He is blind and requires max verbal and tactile cues for direction and orientation to environment. He amb w/ rW and min/mod assist and performed 4 steps w/ Mod assist. Patient will benefit from continued physical therapy to address deficits and improve safety and functional mobility. Continue with physical therapy plan of care. .    Rehab identified problem list/impairments: weakness, impaired endurance, impaired functional mobilty, gait instability, impaired balance, decreased lower extremity function, pain, visual deficits, edema    Rehab potential is good.    Activity tolerance: Good    Discharge recommendations: home with home health (and PACE/ family assist as prior)     Barriers to discharge: Inaccessible home environment, Decreased caregiver support    Equipment recommendations:  (need to verify DME)     GOALS:    Physical Therapy Goals        Problem: Physical Therapy Goal    Goal Priority Disciplines Outcome Goal Variances Interventions   Physical  Therapy Goal     PT/OT, PT      Description:  Goals to be met by: 2 weeks     Patient will increase functional independence with mobility by performin. Supine to sit with supervision  2. Sit to supine with supervision  3. Sit to stand transfer with Contact Guard Assistance with/ without RW, as appropriate and cues as needed secondary to blindness  4. Bed to chair transfer with Contact Guard Assistance using Rolling Walker or least restrictive device or without AD and with cues as needed secondary to blindness  5. Gait  x 150 feet with minimal assistance using Rolling Walker or appropriate assistive device with cues as needed secondary to blindness  6. Ascend/descend 12 stair with bilateral Handrails Minimal Assistance using cues as needed secondary to blindness.   7. Ascend/Descend 4 inch curb step with Minimal Assistance using Rolling Walker or appropriate assistive device and cues as needed secondary to blindness  8. Stand for 3 minutes with UE support on walker or table and perform an activity w/ CGA and cues for blindness   9. Lower extremity exercise program x20 reps per handout, with assistance as needed and gym therex  10. Gait x75 feet w/ Hand held / minimal assistance and without an AD, with cues secondary to blindness                      PLAN:    Patient to be seen 5 x/week  to address the above listed problems via gait training, therapeutic activities, therapeutic exercises, wheelchair management/training  Plan of Care expires: 18  Plan of Care reviewed with: patient    Shayy Gonzalez, PT  2017

## 2017-12-14 NOTE — PROGRESS NOTES
Pt. returned to facility from urology appt. per jah's transportation via w/c.pt. Voices no complaints at present time.will continue to monitor.

## 2017-12-14 NOTE — PLAN OF CARE
Problem: Occupational Therapy Goal  Goal: Occupational Therapy Goal  Goals to be met by: 14 days     Patient will increase functional independence with ADLs by performing:    UE Dressing with Supervision.  LE Dressing with Minimal Assistance.  Grooming while seated with Supervision.  Toileting from bedside commode with Minimal Assistance for hygiene and clothing management.   Bathing from  shower chair/bench with Minimal Assistance.  Toilet transfer to bedside commode with Contact Guard Assistance.  Upper extremity exercise program x12 reps per handout, with supervision.     Outcome: Ongoing (interventions implemented as appropriate)    Pt was agreeable to OT and was noted to make progress towards his goals in therapy.  Pt's goals remain appropriate at this time.  He will continue to benefit from skilled OT services in order to assist him with increasing his safety and level of independence with self care and mobility tasks.     Mireille Hopper, OT  12/14/2017

## 2017-12-14 NOTE — PROGRESS NOTES
CC: s/p left orchiectomy, phimosis    Ovalee Shannan is a 88 y.o. man who is here for the evaluation of Post-op Evaluation  s/p left orchiectomy for severe hydrocele, suspected infected, possibly rupture testicle on 11/22/17 for post -op check.  Hx of blindness, chronic hemodialysis for ESRD.  He is blind and is a patient of [facility]. He is here with his daughter today.  He has been on hemodialysis x 3 years.  He does hemodialysis 3 times a week (M_W_F).  He voids 1-2 times a day.    He was just discharged from hospital today after being treated for UTI.  I asked pt how his urine was collected.  He informed me that he just gave a voided urine sample without having his foreskin retracted.  Has history of UTI.     Denies flank pain, dysuira, hematuria .      Past Medical History:   Diagnosis Date    Anemia     Blind     bilaterally    Blindness of right eye     CHF (congestive heart failure)     Chronic kidney disease     Dementia 01/2017    Diabetes mellitus type II     General anesthetics causing adverse effect in therapeutic use     april / may 2014     Glaucoma (increased eye pressure)     Hypertension     Hypothermia 12/6/2017    Seizures      Past Surgical History:   Procedure Laterality Date    AV FISTULA PLACEMENT Left 4/2014    AV fistula to Left upper arm    EYE SURGERY       Social History   Substance Use Topics    Smoking status: Never Smoker    Smokeless tobacco: Never Used    Alcohol use No     Family History   Problem Relation Age of Onset    Heart disease Sister      mi    Heart disease Brother      mi    Cancer Daughter      BREAST X 2     Allergy:  Review of patient's allergies indicates:   Allergen Reactions    Lisinopril Swelling     Facility-Administered Encounter Medications as of 12/14/2017   Medication Dose Route Frequency Provider Last Rate Last Dose    acetaminophen tablet 650 mg  650 mg Oral Q6H PRN Mesha Joyce PA-C        acetaminophen tablet 650 mg  650 mg Oral Q6H PRN  Mesha Joyce, PA-C        albuterol-ipratropium 2.5mg-0.5mg/3mL nebulizer solution 3 mL  3 mL Nebulization Q4H PRN Mesha Joyce, PA-C        amLODIPine tablet 10 mg  10 mg Oral Daily Mesha Joyce, PA-C   10 mg at 12/14/17 0806    aspirin chewable tablet 81 mg  81 mg Oral Daily Mesha Joyce, PA-C   81 mg at 12/14/17 0806    atorvastatin tablet 40 mg  40 mg Oral QHS Mesha Joyce, PA-C   40 mg at 12/14/17 2139    brinzolamide 1 % ophthalmic suspension 1 drop  1 drop Left Eye TID Mesha Joyce, PA-C   1 drop at 12/14/17 2140    calcium acetate capsule 1,334 mg  1,334 mg Oral TID WM Mesha Joyce, PA-C   1,334 mg at 12/14/17 1820    calcium carbonate 200 mg calcium (500 mg) chewable tablet 500 mg  500 mg Oral BID PRN Mesha Joyce, PA-C        carvedilol tablet 25 mg  25 mg Oral BID Mesha Joyce, PA-C   25 mg at 12/14/17 2139    dextrose 50% injection 12.5 g  12.5 g Intravenous PRN Mesha Joyce, PA-C        dextrose 50% injection 25 g  25 g Intravenous PRN Mesha Joyce, PA-C        doxazosin tablet 4 mg  4 mg Oral Daily Mesha Joyce, PA-C   4 mg at 12/14/17 0806    finasteride tablet 5 mg  5 mg Oral Daily Mesha Joyce, PA-C   5 mg at 12/14/17 0805    fluconazole tablet 100 mg  100 mg Oral Daily Mesha Joyce, PA-C   100 mg at 12/14/17 0806    glucagon (human recombinant) injection 1 mg  1 mg Intramuscular PRN Mesha Joyce, PA-C        glucose chewable tablet 16 g  16 g Oral PRN Mesha Joyce, PA-C        glucose chewable tablet 24 g  24 g Oral PRN Mesha Joyce, PA-C        heparin (porcine) injection 5,000 Units  5,000 Units Subcutaneous Q8H Mesha Joyce, PA-C   5,000 Units at 12/14/17 2146    hydrALAZINE tablet 25 mg  25 mg Oral Q8H PRN Mesha Joyce, PA-C        insulin aspart pen 0-5 Units  0-5 Units Subcutaneous QID (AC + HS) PRN Mesha Joyce PA-C        levETIRAcetam tablet 500 mg  500 mg Oral BID Mesha Joyce PA-C   500 mg at 12/14/17 2140    ondansetron disintegrating tablet 8  mg  8 mg Oral Q8H PRN Meshajerry Joyce PA-C        ramelteon tablet 8 mg  8 mg Oral Nightly PRN Mesha BlayneAYO velasquez        senna-docusate 8.6-50 mg per tablet 1 tablet  1 tablet Oral BID Mesha BlayneAYO velasquez   1 tablet at 12/14/17 2140    travoprost 0.004 % ophthalmic solution 1 drop  1 drop Left Eye QHS Mesha BlayneAYO velasquez   1 drop at 12/14/17 2140     Outpatient Encounter Prescriptions as of 12/14/2017   Medication Sig Dispense Refill    amLODIPine (NORVASC) 5 MG tablet Take 10 mg by mouth once daily.       aspirin 81 MG Chew Take 1 tablet (81 mg total) by mouth once daily. (Patient taking differently: Take 81 mg by mouth every morning. ) 30 tablet 3    atorvastatin (LIPITOR) 40 MG tablet Take 1 tablet (40 mg total) by mouth once daily. (Patient taking differently: Take 80 mg by mouth every evening. ) 30 tablet 9    B complex-vitamin C-folic acid (NEPHRO-JESSICA) 0.8 mg Tab Take 1 tablet by mouth every morning.       blood sugar diagnostic (BLOOD GLUCOSE TEST) Strp 1 strip by Misc.(Non-Drug; Combo Route) route 2 (two) times daily.      brinzolamide (AZOPT) 1 % ophthalmic suspension Place 1 drop into the left eye 3 (three) times daily.       ergocalciferol (ERGOCALCIFEROL) 50,000 unit Cap Take 1 capsule (50,000 Units total) by mouth every 7 days. 10 capsule 1    HEPARIN SODIUM,PORCINE (HEPARIN, PORCINE,) 1,000 unit/mL injection 1 mL (1,000 Units total) by Intracatheter route as needed (Heparin 1000 units/ml instilled to fill volume of each lumen of dialysis catheter after each dialysis.).      insulin aspart (NOVOLOG FLEXPEN) 100 unit/mL InPn  Insulin Pen Subcutaneous As directed.  -200 take 1 unitBS 201-250 take 2 unitsBS 251-300 take 3 unitsBS 301-350 take 4 unitsBS > 351 take 5 units and call MD      ketoconazole (NIZORAL) 2 % cream Apply topically once daily. feet 30 g 3    levetiracetam (KEPPRA) 500 MG Tab Take 1 tablet (500 mg total) by mouth 2 (two) times daily. 60 tablet 11    polyethylene  glycol (GLYCOLAX) 17 gram PwPk Take 17 g by mouth once daily. 30 packet 0    TRAVOPROST (TRAVATAN Z OPHT) Place 1 drop into the left eye every evening. 1 Drops Left eye Every evening      calcium acetate (PHOSLO) 667 mg capsule Take 1 capsule (667 mg total) by mouth 3 (three) times daily with meals. 90 capsule 11    carvedilol (COREG) 25 MG tablet Take 1 tablet (25 mg total) by mouth 2 (two) times daily. Hold on Monday, Wednesday and Friday before dialysis. Hold for SBP < 110 or HR < 55 (Patient taking differently: Take 25 mg by mouth 2 (two) times daily. Hold for SBP < 110 or HR < 55) 60 tablet 11    doxazosin (CARDURA) 4 MG tablet Take 1 tablet (4 mg total) by mouth once daily. (Patient taking differently: Take 4 mg by mouth every morning. ) 30 tablet 1    finasteride (PROSCAR) 5 mg tablet Take 1 tablet (5 mg total) by mouth once daily. (Patient taking differently: Take 5 mg by mouth every morning. ) 30 tablet 1     Review of Systems   General ROS: GENERAL:  No weight gain or loss  SKIN:  No rashes or lacerations  HEAD:  No headaches  EYES:  No exophthalmos, jaundice or blindness  EARS:  No dizziness, tinnitus or hearing loss  NOSE:  No changes in smell  MOUTH & THROAT:  No dyskinetic movements or obvious goiter  CHEST:  No shortness of breath, hyperventilation or cough  CARDIOVASCULAR:  No tachycardia or chest pain  ABDOMEN:  No nausea, vomiting, pain, constipation or diarrhea  URINARY:  No frequency, dysuria or sexual dysfunction  ENDOCRINE:  No polydipsia, polyuria  MUSCULOSKELETAL:  No pain or stiffness of the joints  NEUROLOGIC:  No weakness, sensory changes, seizures, confusion, memory loss, tremor or other abnormal movements  Physical Exam     There were no vitals filed for this visit.  Physical Exam   Constitutional: He is oriented to person, place, and time. He appears well-developed and well-nourished.   On a wheal chair   HENT:   Head: Normocephalic and atraumatic.   Left Ear: External ear normal.    Nose: Nose normal.        Eyes:   Blind bilateral   Neck: Neck supple.   Cardiovascular: Normal rate, regular rhythm and normal heart sounds.    Pulmonary/Chest: Effort normal and breath sounds normal.   Abdominal: Soft. Bowel sounds are normal.   Genitourinary: Penis normal.   Musculoskeletal: Normal range of motion.   Neurological: He is alert and oriented to person, place, and time. He has normal reflexes.   Skin: Skin is warm and dry.     Psychiatric: He has a normal mood and affect.     Genitalia:  Scrotum: wound well healed following left orchiectomy  Right side: mild hydrocele.  Normal symmetric epididymis without masses  Normal vas palpated  Normal size, right testicle with no masses   Normal urethral meatus with no discharge  Normal uncircumcised penis with severely collected dirt and smegma  The foreskin was retracted and the gland of the penis cleaned with many wipes   Rectal:  Normal perineum and anus upon inspection.  Normal tone, no masses or tenderness;     LABS:  Lab Results   Component Value Date    PSA 28.3 (H) 05/01/2014    PSA 9.4 (H) 10/18/2010    PSA 8.9 (H) 04/27/2004     No results found for this or any previous visit.  Lab Results   Component Value Date    CREATININE 5.8 (H) 12/12/2017    CREATININE 8.0 (H) 12/11/2017    CREATININE 6.7 (H) 12/10/2017     No results found for this or any previous visit.  Urine Culture, Routine   Date Value Ref Range Status   12/04/2017   Final    PALMER ALBICANS  > 100,000 cfu/ml  Treatment of asymptomatic candiduria is not recommended (except for   specific populations). Candida isolated in the urine typically   represents colonization. If an indwelling urinary catheter is present  it should be removed or replaced.       Assessment and Plan:  Ronald was seen today for post-op evaluation.    Diagnoses and all orders for this visit:    Hydrocele, unspecified hydrocele type    Phimosis    History of hydrocelectomy    History of unilateral orchiectomy    pt  was recommended to retract the foreskin and clean himsel  Urine sample has to be collected after the foreskin is retracted and collect urine in the mid stream.  S/p orchiectomy  Doing well    Follow-up:  Return if symptoms worsen or fail to improve.

## 2017-12-14 NOTE — PLAN OF CARE
Problem: Fall Risk (Adult)  Goal: Absence of Falls  Patient will demonstrate the desired outcomes by discharge/transition of care.   Outcome: Ongoing (interventions implemented as appropriate)  Pt. Had no falls at this time. Will continue to monitor pt.

## 2017-12-14 NOTE — PT/OT/SLP PROGRESS
"Occupational Therapy  Treatment    Ronald Campbell   MRN: [id number]   Admitting Diagnosis: Acute cystitis with hematuria    OT Date of Treatment: 12/14/17  Total Time (min): 45 min    Billable Minutes:  Self Care/Home Management 10, Therapeutic Activity 15 and Therapeutic Exercise 20    General Precautions: Standard, aspiration, blind, fall  Orthopedic Precautions: N/A  Braces: N/A    Do you have any cultural, spiritual, Sikh conflicts, given your current situation?: none stated    Subjective:  Communicated with nurse prior to session.  "Do I have to stand up?"    Pain/Comfort  Pain Rating 1: 0/10  Pain Rating Post-Intervention 1: 0/10    Objective:  Patient found with:  (sitting in w/c - completed PT session)    Functional Status:  MDS G  Transfer Functional Status: CGA-Min (A)  Transfer Level of (A):  (Min A for sit to stand)    Locomotion on Unit Functional Status: total(A)  Locomotion on Unit Level of (A) :  (total assist with w/c)    Dressing Functional Status: 3:mod(A)-Max(A)  Dressing Level of (A) :  (Mod A with socks - able to doff with increased time but need)    Eating Functional Status: CGA-Min (A)  Eating Level of (A) :  (Min A to place cup in hand to drink - pt able to bring to mouth)    Toilet Use Functional Status: mod(A)-Max(A)  Toilet Use Level of (A) :  (CGA to stand and assist with hygiene)    Moving from seated to standing position: Not steady, only able to stabilize with staff assistance  Turning around and facing the opposite direction while walking: Not steady, only able to stabilize with staff assistance  Moving on and off the toilet: Not steady, only able to stabilize with staff assistance  Surface-to-surface transfer (transfer between bed and chair or wheelchair): Not steady, only able to stabilize with staff assistance          OT Exercises: Pt worked on UE strengthening and endurance exercises to improve his safety and independence during functional activities, such as, w/c mobility, " "transfers, walking with RW, bed mobility and ADLs  · AROM using 2# dowel, pt completed 2 sets of 10 reps of shoulder flex/ext, chest press, and biceps curls  · UE Ergometer pt completed 15 minutes on low resistance with 2 rest breaks during duration of time.    Additional Treatment:  · Pt completed ADLs and func mobility activities for tx session as noted above  · Pt worked on standing balance and endurance while working on bimanual tasks - pt stood for 2'45" and 2'16" - pt needed min to mod assist with standing due to progressive posterior lean when standing for prolonged time - pt assisted with locating table top and items he was working with with [place] assist for him to know what he was reaching for in order to compensate for his visual deficits  · Pt educated on role of OT and POC      Patient left up in chair with call button in reach    ASSESSMENT:  Ronald Campbell is a 88 y.o. male with a medical diagnosis of Acute cystitis with hematuria and deficits listed below.  Pt was agreeable to OT and was noted to make progress towards his goals in therapy.  Pt's goals remain appropriate at this time.  He will continue to benefit from skilled OT services in order to assist him with increasing his safety and level of independence with self care and mobility tasks.       Rehab identified problem list/impairments: weakness, impaired endurance, impaired sensation, impaired self care skills, impaired functional mobilty, gait instability, impaired balance, visual deficits, impaired cognition, decreased coordination, decreased lower extremity function, decreased safety awareness, abnormal tone, decreased ROM, impaired coordination, impaired fine motor    Rehab potential is fair    Activity tolerance: Fair    Discharge recommendations: home health OT     Barriers to discharge:       Equipment recommendations: none     GOALS:    Occupational Therapy Goals        Problem: Occupational Therapy Goal    Goal Priority Disciplines " Outcome Interventions   Occupational Therapy Goal     OT, PT/OT Ongoing (interventions implemented as appropriate)    Description:  Goals to be met by: 14 days     Patient will increase functional independence with ADLs by performing:    UE Dressing with Supervision.  LE Dressing with Minimal Assistance.  Grooming while seated with Supervision.  Toileting from bedside commode with Minimal Assistance for hygiene and clothing management.   Bathing from  shower chair/bench with Minimal Assistance.  Toilet transfer to bedside commode with Contact Guard Assistance.  Upper extremity exercise program x12 reps per handout, with supervision.                      Plan:  Patient to be seen  (5-6x/week) to address the above listed problems via self-care/home management, therapeutic activities, therapeutic exercises  Plan of Care expires:    Plan of Care reviewed with: patient    Mireille Hopper OT  12/14/2017

## 2017-12-14 NOTE — PLAN OF CARE
Problem: Patient Care Overview  Goal: Plan of Care Review  12/14/2017    Recommendations    Recommendation/Intervention: 1) Continue Renal, 1800 calorie Diabetic diet 2) Continue Novasource Renal TID 3) IF blood sugars elevate, recommend Boost Glucose control (strawberry or vanilla) TID 3) Continue with total feeding assistance 4) Monitor po intake/tolerance 5) Monitor weight  Goals: 1) Patient to meet >=90% of EEN & EPN for adequate nutrition on dialysis  Nutrition Goal Status: new  Communication of RD Recs: reviewed with OLIVIA Zamora, MPH, RD, LDN

## 2017-12-14 NOTE — PROGRESS NOTES
Pt. Transported to [facility] for urology appt.per jah's transportation via w/c.pt. awake and alert prior to transfer.

## 2017-12-14 NOTE — CONSULTS
[facility]  Adult Nutrition  Consult Note    SUMMARY     Recommendations    Recommendation/Intervention: 1) Continue Renal, 1800 calorie Diabetic diet 2) Continue Novasource Renal TID 3) IF blood sugars elevate, recommend Boost Glucose control (strawberry or vanilla) TID 3) Continue with total feeding assistance 4) Monitor po intake/tolerance 5) Monitor weight  Goals: 1) Patient to meet >=90% of EEN & EPN for adequate nutrition on dialysis  Nutrition Goal Status: new  Communication of RD Recs: reviewed with RN    Reason for Assessment    Reason for Assessment: nurse/nurse practitioner consult  Diagnosis: other (see comments) (Acute cystitis with hematuria)  Relevent Medical History: ESRD on HD, DM   Interdisciplinary Rounds: did not attend     General Information Comments: Patient out of room. + HD. RN reports patient is consuming 100% of meals and 100% of his oral nutritional supplements with total feeding assistance. Per records, patient weight is stable.     Nutrition Discharge Planning: Patient to discharge on a renal, 1800 calorie diabetic diet with ONS TID to meet nutritional needs on dialysis.    Nutrition Prescription Ordered    Current Diet Order: Renal, Diabetic 1800 calories    Oral Nutrition Supplement: Novasource renal TID     Evaluation of Received Nutrients/Fluid Intake    Energy Calories Required: meeting needs  Protein Required: meeting needs  I/O: output not recorded  Fluid Required: meeting needs  Comments: LBM: 12/12  Tolerance: tolerating  % Intake of Estimated Energy Needs: 75 - 100 %  % Meal Intake: 100%     Nutrition Risk Screen     Nutrition Risk Screen: no indicators present    Nutrition/Diet History    Food Preferences: No cultural, Yazdanism or ethnic food preferences noted.       Labs/Tests/Procedures/Meds    Diagnostic Test/Procedure Review: reviewed, pertinent  Pertinent Labs Reviewed: reviewed  BMP  Lab Results   Component Value Date     (L) 12/12/2017     "K 4.7 12/12/2017    CL 99 12/12/2017    CO2 23 12/12/2017    BUN 46 (H) 12/12/2017    CREATININE 5.8 (H) 12/12/2017    CALCIUM 8.1 (L) 12/12/2017    ANIONGAP 9 12/12/2017    ESTGFRAFRICA 9.2 (A) 12/12/2017    EGFRNONAA 8.0 (A) 12/12/2017     Lab Results   Component Value Date    CALCIUM 8.1 (L) 12/12/2017    PHOS 4.0 12/12/2017     Lab Results   Component Value Date    ALBUMIN 2.6 (L) 12/12/2017       Recent Labs  Lab 12/14/17  1121   POCTGLUCOSE 136*        Pertinent Medications Reviewed: reviewed  Scheduled Meds:   amLODIPine  10 mg Oral Daily    aspirin  81 mg Oral Daily    atorvastatin  40 mg Oral QHS    brinzolamide  1 drop Left Eye TID    calcium acetate  1,334 mg Oral TID WM    carvedilol  25 mg Oral BID    doxazosin  4 mg Oral Daily    finasteride  5 mg Oral Daily    fluconazole  100 mg Oral Daily    heparin (porcine)  5,000 Units Subcutaneous Q8H    levETIRAcetam  500 mg Oral BID    senna-docusate 8.6-50 mg  1 tablet Oral BID    travoprost  1 drop Left Eye QHS     Continuous Infusions:  PRN Meds:.acetaminophen, acetaminophen, albuterol-ipratropium 2.5mg-0.5mg/3mL, calcium carbonate, dextrose 50%, dextrose 50%, glucagon (human recombinant), glucose, glucose, INV hydrALAZINE, insulin aspart, ondansetron, ramelteon         Physical Findings    Overall Physical Appearance:  (vanessa)  Skin: intact (Refugio Score 18)    Anthropometrics    Temp: 96.2 °F (35.7 °C)     Height: 6' 1" (185.4 cm)  Weight Method: Standard Scale  Weight: 84.8 kg (186 lb 15.2 oz)  Ideal Body Weight (IBW), Male: 184 lb     % Ideal Body Weight, Male (lb): 101.6 lb     BMI (Calculated): 24.7  BMI Grade: 18.5-24.9 - normal    Estimated/Assessed Needs    Weight Used For Calorie Calculations: 84.8 kg (186 lb 15.2 oz)      Energy Need Method: Kcal/kg (8081 -6003 (ESRD on HD > 60 years old))  RMR (Stoddard-St. Jeor Equation): 1571.88  Weight Used For Protein Calculations: 84.8 kg (186 lb 15.2 oz)  Protein Requirements: 101 - 110 " g/day  Fluid Need Method: other (see comments) (per MD)      CHO Requirement: 50% of total calories     Assessment and Plan    Nutrition Diagnosis  Problem:  Increased protein needs  Etiology: ESRD  Signs/Symptoms:  Patient on dialysis  Status:  new    Monitor and Evaluation    Food and Nutrient Intake: energy intake, food and beverage intake  Food and Nutrient Adminstration: diet order, other (specify) (supplement order)     Physical Activity and Function: nutrition-related ADLs and IADLs  Anthropometric Measurements: weight, weight change, body mass index  Biochemical Data, Medical Tests and Procedures: electrolyte and renal panel, gastrointestinal profile, glucose/endocrine profile  Nutrition-Focused Physical Findings: overall appearance, skin    Nutrition Risk    Level of Risk: other (see comments) (F/U 1x weekly)    Nutrition Follow-Up    RD Follow-up?: Yes

## 2017-12-15 LAB
POCT GLUCOSE: 134 MG/DL (ref 70–110)
POCT GLUCOSE: 216 MG/DL (ref 70–110)
POCT GLUCOSE: 81 MG/DL (ref 70–110)

## 2017-12-15 PROCEDURE — 63600175 PHARM REV CODE 636 W HCPCS: Performed by: PHYSICIAN ASSISTANT

## 2017-12-15 PROCEDURE — 25000003 PHARM REV CODE 250: Performed by: PHYSICIAN ASSISTANT

## 2017-12-15 PROCEDURE — 99309 SBSQ NF CARE MODERATE MDM 30: CPT | Mod: ,,, | Performed by: NURSE PRACTITIONER

## 2017-12-15 PROCEDURE — 97530 THERAPEUTIC ACTIVITIES: CPT

## 2017-12-15 PROCEDURE — 12000000 HC SNF SEMI-PRIVATE ROOM

## 2017-12-15 PROCEDURE — 97535 SELF CARE MNGMENT TRAINING: CPT

## 2017-12-15 RX ADMIN — FINASTERIDE 5 MG: 5 TABLET, FILM COATED ORAL at 08:12

## 2017-12-15 RX ADMIN — BRINZOLAMIDE 1 DROP: 10 SUSPENSION/ DROPS OPHTHALMIC at 05:12

## 2017-12-15 RX ADMIN — CALCIUM ACETATE 1334 MG: 667 CAPSULE ORAL at 05:12

## 2017-12-15 RX ADMIN — HEPARIN SODIUM 5000 UNITS: 5000 INJECTION, SOLUTION INTRAVENOUS; SUBCUTANEOUS at 05:12

## 2017-12-15 RX ADMIN — FLUCONAZOLE 100 MG: 100 TABLET ORAL at 08:12

## 2017-12-15 RX ADMIN — CALCIUM ACETATE 1334 MG: 667 CAPSULE ORAL at 08:12

## 2017-12-15 RX ADMIN — AMLODIPINE BESYLATE 10 MG: 10 TABLET ORAL at 05:12

## 2017-12-15 RX ADMIN — LEVETIRACETAM 500 MG: 500 TABLET, FILM COATED ORAL at 08:12

## 2017-12-15 NOTE — ASSESSMENT & PLAN NOTE
Evaluated on 12/15/17  -Continue with PT/OT for gait training and strengthening and restoration of ADL's   -Fall precautions   -Continue DVT prophylaxis with heparin

## 2017-12-15 NOTE — PT/OT/SLP PROGRESS
Physical Therapy      Patient Name:  Ronald Campbell   MRN:  [id number]    Patient not seen today secondary to away at dialysis all day 8: inc fatigue  . Will follow-up next PT session    Namrata Valencia PTA

## 2017-12-15 NOTE — PLAN OF CARE
Problem: Patient Care Overview  Goal: Plan of Care Review  Outcome: Ongoing (interventions implemented as appropriate)   12/14/17 2346   Coping/Psychosocial   Plan Of Care Reviewed With patient       Problem: Diabetes, Type 2 (Adult)  Goal: Signs and Symptoms of Listed Potential Problems Will be Absent, Minimized or Managed (Diabetes, Type 2)  Signs and symptoms of listed potential problems will be absent, minimized or managed by discharge/transition of care (reference Diabetes, Type 2 (Adult) CPG).   Outcome: Ongoing (interventions implemented as appropriate)   12/14/17 2346   Diabetes, Type 2   Problems Assessed (Type 2 Diabetes) all   Problems Present (Type 2 Diabetes) none       Problem: Fall Risk (Adult)  Goal: Absence of Falls  Patient will demonstrate the desired outcomes by discharge/transition of care.   Outcome: Ongoing (interventions implemented as appropriate)   12/14/17 2346   Fall Risk (Adult)   Absence of Falls making progress toward outcome

## 2017-12-15 NOTE — ASSESSMENT & PLAN NOTE
Evaluated on 12/15/17  -Trending downward  -Continue to monitor and discontinue heparin if worsens

## 2017-12-15 NOTE — ASSESSMENT & PLAN NOTE
Evaluated on 12/15/17  -Poorly controlled   -Continue Norvasc and coreg to treat  -Continue to monitor   -Volume removal with HD should help.   -Continue PRN hydralazine, if frequently using may consider scheduling medication

## 2017-12-15 NOTE — PROGRESS NOTES
Patient back from dialysis,ndn, vitals taken,afebrile. Lt upper arm dialysis fistula with good bruitt and covered with 2x2 gauze, clean,dry and intact. Pt verbalize he wants to stay in the chair. Pt's daughter at bedside. Call light given to patient.

## 2017-12-15 NOTE — SUBJECTIVE & OBJECTIVE
Hospital Course:  12/12/17: Patient admitted to SNF for ongoing PT/OT following a hospitalization for AMS and UTI.  12/15: Patient seen at bedside, denies pain, doing well, no complaints.    Interval History: Patient seen at bedside, denies pains, no complaints, no acute events overnight.    Review of Systems   Constitutional: Negative for appetite change, chills, fatigue and fever.   HENT: Negative for trouble swallowing.    Eyes: Positive for visual disturbance.   Respiratory: Negative for cough, chest tightness, shortness of breath and wheezing.    Cardiovascular: Negative for chest pain, palpitations and leg swelling.   Gastrointestinal: Negative for abdominal pain, constipation, diarrhea and nausea.   Genitourinary: Negative for difficulty urinating, frequency and urgency.   Musculoskeletal: Negative for arthralgias and myalgias.   Skin: Negative for rash.   Neurological: Negative for dizziness, weakness, light-headedness and headaches.   Psychiatric/Behavioral: Negative for sleep disturbance.     Scheduled Meds:   amLODIPine  10 mg Oral Daily    aspirin  81 mg Oral Daily    atorvastatin  40 mg Oral QHS    brinzolamide  1 drop Left Eye TID    calcium acetate  1,334 mg Oral TID WM    carvedilol  25 mg Oral BID    doxazosin  4 mg Oral Daily    finasteride  5 mg Oral Daily    fluconazole  100 mg Oral Daily    heparin (porcine)  5,000 Units Subcutaneous Q8H    levETIRAcetam  500 mg Oral BID    senna-docusate 8.6-50 mg  1 tablet Oral BID    travoprost  1 drop Left Eye QHS     Continuous Infusions:  PRN Meds:.acetaminophen, acetaminophen, albuterol-ipratropium 2.5mg-0.5mg/3mL, calcium carbonate, dextrose 50%, dextrose 50%, glucagon (human recombinant), glucose, glucose, INV hydrALAZINE, insulin aspart, ondansetron, ramelteon    Objective:     Vital Signs (Most Recent):  Temp: 97.3 °F (36.3 °C) (12/15/17 0810)  Pulse: 70 (12/15/17 0810)  Resp: 18 (12/15/17 0810)  BP: (!) 162/74 (12/15/17 0810)  SpO2: 98 %  (12/15/17 0810) Vital Signs (24h Range):  Temp:  [97.3 °F (36.3 °C)-98.2 °F (36.8 °C)] 97.3 °F (36.3 °C)  Pulse:  [62-70] 70  Resp:  [18-20] 18  SpO2:  [98 %-100 %] 98 %  BP: (154-162)/(74) 162/74     Weight: 84.2 kg (185 lb 10 oz)  Body mass index is 24.49 kg/m².    Intake/Output Summary (Last 24 hours) at 12/15/17 1531  Last data filed at 12/15/17 0830   Gross per 24 hour   Intake              880 ml   Output                0 ml   Net              880 ml      Physical Exam   Constitutional: He is oriented to person, place, and time. He appears well-developed and well-nourished. No distress.   Eyes:   Blind   Cardiovascular: Normal rate, regular rhythm and normal heart sounds.  Exam reveals no gallop and no friction rub.    No murmur heard.  Pulmonary/Chest: Effort normal and breath sounds normal. No respiratory distress. He has no wheezes. He has no rales.   Abdominal: Soft. Bowel sounds are normal. He exhibits no distension. There is no tenderness.   Genitourinary:   Genitourinary Comments: s/p R hydrocelectomy and L orchiectomy   + scrotal swelling    Musculoskeletal: Normal range of motion. He exhibits edema. He exhibits no tenderness.   1+ edema noted to bilateral lower extremities, LUE AV fistula with + thrill and bruit     Neurological: He is alert and oriented to person, place, and time.   Skin: Skin is warm and dry. No rash noted. He is not diaphoretic. No cyanosis. Nails show no clubbing.   Psychiatric: He has a normal mood and affect. His behavior is normal.     Significant Labs:     Recent Labs  Lab 12/11/17  0515 12/12/17  0354 12/13/17  0508   WBC 4.62 5.09 4.10   HGB 8.0* 7.9* 8.0*   HCT 24.0* 24.0* 24.6*   * 117* 113*         Recent Labs  Lab 12/10/17  0437 12/11/17  0515 12/12/17  0354   * 131* 131*   K 4.6 4.9 4.7    100 99   CO2 23 20* 23   BUN 50* 66* 46*   CREATININE 6.7* 8.0* 5.8*   CALCIUM 8.2* 8.2* 8.1*     Lab Results   Component Value Date    LABPROT 10.7 04/21/2016     ALBUMIN 2.6 (L) 12/12/2017     Lab Results   Component Value Date    CALCIUM 8.1 (L) 12/12/2017    PHOS 4.0 12/12/2017     POCT Glucose   Date Value Ref Range Status   12/15/2017 81 70 - 110 mg/dL Final   12/14/2017 156 (H) 70 - 110 mg/dL Final   12/14/2017 153 (H) 70 - 110 mg/dL Final   12/14/2017 136 (H) 70 - 110 mg/dL Final   12/14/2017 96 70 - 110 mg/dL Final   12/13/2017 200 (H) 70 - 110 mg/dL Final   12/13/2017 155 (H) 70 - 110 mg/dL Final   12/13/2017 103 70 - 110 mg/dL Final   12/12/2017 185 (H) 70 - 110 mg/dL Final       Results for EDDI COOPER (MRN [id number]) as of 12/15/2017 15:42   Ref. Range 12/11/2017 05:15 12/12/2017 03:54 12/13/2017 05:08   Magnesium Latest Ref Range: 1.6 - 2.6 mg/dL 2.3 2.2 2.6     Significant Imaging: n/a

## 2017-12-15 NOTE — HOSPITAL COURSE
12/12/17: Patient admitted to SNF for ongoing PT/OT following a hospitalization for AMS and UTI.  12/15: Patient seen at bedside, denies pain, doing well, no complaints.  12/19: Patient doing well, discharge home today with home health services.

## 2017-12-15 NOTE — PT/OT/SLP PROGRESS
Occupational Therapy  Treatment    Ronald Campbell   MRN: [id number]   Admitting Diagnosis: Acute cystitis with hematuria    OT Date of Treatment: 12/15/17  Total Time (min): 45 min    Billable Minutes:  Self Care/Home Management 25 and Therapeutic Activity 20    General Precautions: Standard, blind, aspiration, fall, diabetic  Orthopedic Precautions: N/A  Braces: N/A    Do you have any cultural, spiritual, Tenriism conflicts, given your current situation?: none stated    Subjective:  Communicated with pt prior to session.  Pt with increased lethargy--difficult to arouse    Pain/Comfort  Pain Rating 1: 0/10  Pain Rating Post-Intervention 1: 0/10    Objective:  Patient found with:  (supine in bed--dialysis shunt intact)    Functional Status:  MDS G  Bed Mobility Functional Status:-Min (A)--sup to roll and bridge; min A supine to sit  Transfer Functional Status: -Min (A) sit to stand no AD and pivot to w/c--cues verbal and physical for direction due to vision loss  Dressing Functional Status: 3:mod(A)-)--min A to rufina shirts; total A for socks; mod A with depends and pants supine(pt pulled from thighs over hips bridging)  Eating Functional Status: -Min (A) seated in w/c  Personal Hygiene Functional Status: -Min (A)  Bathing Functional Status: -Max(A) supine/sit   Moving from seated to standing position: Not steady, only able to stabilize with staff assistance  Surface-to-surface transfer (transfer between bed and chair or wheelchair): Not steady, only able to stabilize with staff assistance            Additional Treatment:  Pt educated on dressing techniques, bed mobility and transfers  Pt AO to person, age, month and place    Patient left up in chair with call button in reach and nurse present    ASSESSMENT:  Pt tolerated tx and demonstrated increased indep with bed mobility, UBD and t/f's.  Pt  Difficult to arouse and initiate tx;however, once increased alertness pt participated. Pt has good potential to increase indep  toward goals and will cont to benefit from OT to address limitations and increase functional indep and safety to return to PLOF    Rehab identified problem list/impairments: weakness, impaired endurance, impaired sensation, impaired self care skills, impaired functional mobilty, gait instability, impaired balance, visual deficits, impaired cognition, decreased coordination, decreased lower extremity function, decreased safety awareness, abnormal tone, decreased ROM, impaired coordination, impaired fine motor    Rehab potential is good    Activity tolerance: Good    Discharge recommendations: home health OT     Barriers to discharge:   none    Equipment recommendations: none     GOALS:    Occupational Therapy Goals        Problem: Occupational Therapy Goal    Goal Priority Disciplines Outcome Interventions   Occupational Therapy Goal     OT, PT/OT Ongoing (interventions implemented as appropriate)    Description:  Goals to be met by: 14 days     Patient will increase functional independence with ADLs by performing:    UE Dressing with Supervision.  LE Dressing with Minimal Assistance.  Grooming while seated with Supervision.  Toileting from bedside commode with Minimal Assistance for hygiene and clothing management.   Bathing from  shower chair/bench with Minimal Assistance.  Toilet transfer to bedside commode with Contact Guard Assistance.  Upper extremity exercise program x12 reps per handout, with supervision.                      Plan:  Patient to be seen  (5-6x/week) to address the above listed problems via self-care/home management, therapeutic activities, therapeutic exercises  Plan of Care expires: 01/12/18  Plan of Care reviewed with: patient    SHIRA Jacob  12/15/2017

## 2017-12-15 NOTE — PROGRESS NOTES
"[facility]  Department of Hospital Medicine  Progress Note    Patient Name: Ronald Campbell  MRN: [id number]  Code Status: Full Code  Admission Date: 12/12/2017  Length of Stay: 3 days  Attending Physician: Ellie Caceres MD  Primary Care Provider: Bart Shaw MD    Subjective:     Principal Problem:Acute cystitis with hematuria    Chief Complaint/Reason for Admission: Debility    History of Present Illness:  Patient is a 88 y.o. male who has a past medical history of Anemia; Blindness of right eye; CHF (congestive heart failure);  ESRD on HD; Dementia; Diabetes mellitus type II; Glaucoma; Hypertension; and Seizures presented with AMS and confusion due to UTI. Pt started on IV Rocephin and urine culture revealed gayle albicans. IV antibiotics were discontinued and started don oral fluconazole for 14 days. Per acute care notes "throughout hospital stay confusion/agitation waxes and wanes but generally improves throughout the day and with reorientation." He recently underwent L hydrocelectomy and R orchiectomy on 11/22/17 and was evaluated by Urology revealing no evidence of wound infection. Patient has been working with PT/OT who recommend SNF for further balance/mobility training.     Hospital Course:  12/12/17: Patient admitted to SNF for ongoing PT/OT following a hospitalization for AMS and UTI.  12/15: Patient seen at bedside, denies pain, doing well, no complaints.    Interval History: Patient seen at bedside, denies pains, no complaints, no acute events overnight.    Review of Systems   Constitutional: Negative for appetite change, chills, fatigue and fever.   HENT: Negative for trouble swallowing.    Eyes: Positive for visual disturbance.   Respiratory: Negative for cough, chest tightness, shortness of breath and wheezing.    Cardiovascular: Negative for chest pain, palpitations and leg swelling.   Gastrointestinal: Negative for abdominal pain, constipation, diarrhea and nausea. "   Genitourinary: Negative for difficulty urinating, frequency and urgency.   Musculoskeletal: Negative for arthralgias and myalgias.   Skin: Negative for rash.   Neurological: Negative for dizziness, weakness, light-headedness and headaches.   Psychiatric/Behavioral: Negative for sleep disturbance.     Scheduled Meds:   amLODIPine  10 mg Oral Daily    aspirin  81 mg Oral Daily    atorvastatin  40 mg Oral QHS    brinzolamide  1 drop Left Eye TID    calcium acetate  1,334 mg Oral TID WM    carvedilol  25 mg Oral BID    doxazosin  4 mg Oral Daily    finasteride  5 mg Oral Daily    fluconazole  100 mg Oral Daily    heparin (porcine)  5,000 Units Subcutaneous Q8H    levETIRAcetam  500 mg Oral BID    senna-docusate 8.6-50 mg  1 tablet Oral BID    travoprost  1 drop Left Eye QHS     Continuous Infusions:  PRN Meds:.acetaminophen, acetaminophen, albuterol-ipratropium 2.5mg-0.5mg/3mL, calcium carbonate, dextrose 50%, dextrose 50%, glucagon (human recombinant), glucose, glucose, INV hydrALAZINE, insulin aspart, ondansetron, ramelteon    Objective:     Vital Signs (Most Recent):  Temp: 97.3 °F (36.3 °C) (12/15/17 0810)  Pulse: 70 (12/15/17 0810)  Resp: 18 (12/15/17 0810)  BP: (!) 162/74 (12/15/17 0810)  SpO2: 98 % (12/15/17 0810) Vital Signs (24h Range):  Temp:  [97.3 °F (36.3 °C)-98.2 °F (36.8 °C)] 97.3 °F (36.3 °C)  Pulse:  [62-70] 70  Resp:  [18-20] 18  SpO2:  [98 %-100 %] 98 %  BP: (154-162)/(74) 162/74     Weight: 84.2 kg (185 lb 10 oz)  Body mass index is 24.49 kg/m².    Intake/Output Summary (Last 24 hours) at 12/15/17 1531  Last data filed at 12/15/17 0830   Gross per 24 hour   Intake              880 ml   Output                0 ml   Net              880 ml      Physical Exam   Constitutional: He is oriented to person, place, and time. He appears well-developed and well-nourished. No distress.   Eyes:   Blind   Cardiovascular: Normal rate, regular rhythm and normal heart sounds.  Exam reveals no gallop  and no friction rub.    No murmur heard.  Pulmonary/Chest: Effort normal and breath sounds normal. No respiratory distress. He has no wheezes. He has no rales.   Abdominal: Soft. Bowel sounds are normal. He exhibits no distension. There is no tenderness.   Genitourinary:   Genitourinary Comments: s/p R hydrocelectomy and L orchiectomy   + scrotal swelling    Musculoskeletal: Normal range of motion. He exhibits edema. He exhibits no tenderness.   1+ edema noted to bilateral lower extremities, LUE AV fistula with + thrill and bruit     Neurological: He is alert and oriented to person, place, and time.   Skin: Skin is warm and dry. No rash noted. He is not diaphoretic. No cyanosis. Nails show no clubbing.   Psychiatric: He has a normal mood and affect. His behavior is normal.     Significant Labs:     Recent Labs  Lab 12/11/17  0515 12/12/17  0354 12/13/17  0508   WBC 4.62 5.09 4.10   HGB 8.0* 7.9* 8.0*   HCT 24.0* 24.0* 24.6*   * 117* 113*         Recent Labs  Lab 12/10/17  0437 12/11/17  0515 12/12/17  0354   * 131* 131*   K 4.6 4.9 4.7    100 99   CO2 23 20* 23   BUN 50* 66* 46*   CREATININE 6.7* 8.0* 5.8*   CALCIUM 8.2* 8.2* 8.1*     Lab Results   Component Value Date    LABPROT 10.7 04/21/2016    ALBUMIN 2.6 (L) 12/12/2017     Lab Results   Component Value Date    CALCIUM 8.1 (L) 12/12/2017    PHOS 4.0 12/12/2017     POCT Glucose   Date Value Ref Range Status   12/15/2017 81 70 - 110 mg/dL Final   12/14/2017 156 (H) 70 - 110 mg/dL Final   12/14/2017 153 (H) 70 - 110 mg/dL Final   12/14/2017 136 (H) 70 - 110 mg/dL Final   12/14/2017 96 70 - 110 mg/dL Final   12/13/2017 200 (H) 70 - 110 mg/dL Final   12/13/2017 155 (H) 70 - 110 mg/dL Final   12/13/2017 103 70 - 110 mg/dL Final   12/12/2017 185 (H) 70 - 110 mg/dL Final       Results for EDDI COOPER (MRN [id number]) as of 12/15/2017 15:42   Ref. Range 12/11/2017 05:15 12/12/2017 03:54 12/13/2017 05:08   Magnesium Latest Ref Range: 1.6 - 2.6 mg/dL  2.3 2.2 2.6     Significant Imaging: n/a    Assessment/Plan:      * Acute cystitis with hematuria    Evaluated on 12/15/17  -Continue fluconazole to treat x 14 days for complicated UTI         Dementia with behavioral disturbance    Evaluated on 12/15/17  -Delirium precautions         Debility    Evaluated on 12/15/17  -Continue with PT/OT for gait training and strengthening and restoration of ADL's   -Fall precautions   -Continue DVT prophylaxis with heparin         Glaucoma    Evaluated on 12/15/17  -Continue home eye drops to treat         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    Evaluated on 12/15/17  -Continue ADA diet  -Continue BG checks  -SSI  -Avoid insulin stacking         Dyslipidemia    Evaluated on 12/15/17  -Continue atorvastatin to treat        HTN (hypertension)    Evaluated on 12/15/17  -Poorly controlled   -Continue Norvasc and coreg to treat  -Continue to monitor   -Volume removal with HD should help.   -Continue PRN hydralazine, if frequently using may consider scheduling medication         ESRD (end stage renal disease) on dialysis    Evaluated on 12/15/17  -Continue with HD MWF  -Continue calcium acetate to treat         Benign prostatic hyperplasia with urinary retention    Evaluated on 12/15/17  -Continue doxazosin and finasteride to treat        Thrombocytopenia    Evaluated on 12/15/17  -Trending downward  -Continue to monitor and discontinue heparin if worsens         Anemia in chronic kidney disease    Evaluated on 12/15/17  -H/H stable  -Continue to monitor         Seizure disorder    Evaluated on 12/15/17  -continue home dose of levetiracetam to treat   -seizure precautions          Katie Jules NP  Department of Hospital Medicine  [facility]

## 2017-12-15 NOTE — PROGRESS NOTES
Patient taken to dialysis via w/c per [facility] transportation. ndn. Pt's daughter refused snack provided per this hospital this am, verbalize that patient likes the snack she brought from home.

## 2017-12-15 NOTE — PLAN OF CARE
Problem: Occupational Therapy Goal  Goal: Occupational Therapy Goal  Goals to be met by: 14 days     Patient will increase functional independence with ADLs by performing:    UE Dressing with Supervision.  LE Dressing with Minimal Assistance.  Grooming while seated with Supervision.  Toileting from bedside commode with Minimal Assistance for hygiene and clothing management.   Bathing from  shower chair/bench with Minimal Assistance.  Toilet transfer to bedside commode with Contact Guard Assistance.  Upper extremity exercise program x12 reps per handout, with supervision.     Outcome: Ongoing (interventions implemented as appropriate)  Increased lethargy. SHIRA Jacob  12/15/2017

## 2017-12-16 LAB
ANION GAP SERPL CALC-SCNC: 6 MMOL/L
ANISOCYTOSIS BLD QL SMEAR: SLIGHT
BASOPHILS # BLD AUTO: 0.02 K/UL
BASOPHILS NFR BLD: 0.4 %
BUN SERPL-MCNC: 34 MG/DL
CALCIUM SERPL-MCNC: 8.5 MG/DL
CHLORIDE SERPL-SCNC: 103 MMOL/L
CO2 SERPL-SCNC: 25 MMOL/L
CREAT SERPL-MCNC: 4.7 MG/DL
DIFFERENTIAL METHOD: ABNORMAL
EOSINOPHIL # BLD AUTO: 0 K/UL
EOSINOPHIL NFR BLD: 0.6 %
ERYTHROCYTE [DISTWIDTH] IN BLOOD BY AUTOMATED COUNT: 19.4 %
EST. GFR  (AFRICAN AMERICAN): 11.9 ML/MIN/1.73 M^2
EST. GFR  (NON AFRICAN AMERICAN): 10.3 ML/MIN/1.73 M^2
GLUCOSE SERPL-MCNC: 195 MG/DL
HCT VFR BLD AUTO: 25.1 %
HGB BLD-MCNC: 8.2 G/DL
HYPOCHROMIA BLD QL SMEAR: ABNORMAL
IMM GRANULOCYTES # BLD AUTO: 0.01 K/UL
IMM GRANULOCYTES NFR BLD AUTO: 0.2 %
LYMPHOCYTES # BLD AUTO: 1.2 K/UL
LYMPHOCYTES NFR BLD: 23.5 %
MAGNESIUM SERPL-MCNC: 2.2 MG/DL
MCH RBC QN AUTO: 23.8 PG
MCHC RBC AUTO-ENTMCNC: 32.7 G/DL
MCV RBC AUTO: 73 FL
MONOCYTES # BLD AUTO: 0.5 K/UL
MONOCYTES NFR BLD: 10.7 %
NEUTROPHILS # BLD AUTO: 3.3 K/UL
NEUTROPHILS NFR BLD: 64.6 %
NRBC BLD-RTO: 0 /100 WBC
OVALOCYTES BLD QL SMEAR: ABNORMAL
PHOSPHATE SERPL-MCNC: 2.5 MG/DL
PLATELET # BLD AUTO: 115 K/UL
PMV BLD AUTO: ABNORMAL FL
POCT GLUCOSE: 196 MG/DL (ref 70–110)
POCT GLUCOSE: 197 MG/DL (ref 70–110)
POCT GLUCOSE: 240 MG/DL (ref 70–110)
POCT GLUCOSE: 85 MG/DL (ref 70–110)
POIKILOCYTOSIS BLD QL SMEAR: SLIGHT
POLYCHROMASIA BLD QL SMEAR: ABNORMAL
POTASSIUM SERPL-SCNC: 4.9 MMOL/L
RBC # BLD AUTO: 3.45 M/UL
SODIUM SERPL-SCNC: 134 MMOL/L
TARGETS BLD QL SMEAR: ABNORMAL
WBC # BLD AUTO: 5.07 K/UL

## 2017-12-16 PROCEDURE — 25000003 PHARM REV CODE 250: Performed by: PHYSICIAN ASSISTANT

## 2017-12-16 PROCEDURE — 63600175 PHARM REV CODE 636 W HCPCS: Performed by: PHYSICIAN ASSISTANT

## 2017-12-16 PROCEDURE — 97530 THERAPEUTIC ACTIVITIES: CPT

## 2017-12-16 PROCEDURE — 83735 ASSAY OF MAGNESIUM: CPT

## 2017-12-16 PROCEDURE — 12000000 HC SNF SEMI-PRIVATE ROOM

## 2017-12-16 PROCEDURE — 97116 GAIT TRAINING THERAPY: CPT

## 2017-12-16 PROCEDURE — 97535 SELF CARE MNGMENT TRAINING: CPT

## 2017-12-16 PROCEDURE — 85025 COMPLETE CBC W/AUTO DIFF WBC: CPT

## 2017-12-16 PROCEDURE — 84100 ASSAY OF PHOSPHORUS: CPT

## 2017-12-16 PROCEDURE — 97110 THERAPEUTIC EXERCISES: CPT

## 2017-12-16 PROCEDURE — 80048 BASIC METABOLIC PNL TOTAL CA: CPT

## 2017-12-16 RX ADMIN — FLUCONAZOLE 100 MG: 100 TABLET ORAL at 08:12

## 2017-12-16 RX ADMIN — CARVEDILOL 25 MG: 25 TABLET, FILM COATED ORAL at 08:12

## 2017-12-16 RX ADMIN — HEPARIN SODIUM 5000 UNITS: 5000 INJECTION, SOLUTION INTRAVENOUS; SUBCUTANEOUS at 02:12

## 2017-12-16 RX ADMIN — CALCIUM ACETATE 1334 MG: 667 CAPSULE ORAL at 08:12

## 2017-12-16 RX ADMIN — HEPARIN SODIUM 5000 UNITS: 5000 INJECTION, SOLUTION INTRAVENOUS; SUBCUTANEOUS at 09:12

## 2017-12-16 RX ADMIN — BRINZOLAMIDE 1 DROP: 10 SUSPENSION/ DROPS OPHTHALMIC at 02:12

## 2017-12-16 RX ADMIN — DOXAZOSIN 4 MG: 4 TABLET ORAL at 08:12

## 2017-12-16 RX ADMIN — ATORVASTATIN CALCIUM 40 MG: 20 TABLET, FILM COATED ORAL at 08:12

## 2017-12-16 RX ADMIN — LEVETIRACETAM 500 MG: 500 TABLET, FILM COATED ORAL at 08:12

## 2017-12-16 RX ADMIN — CALCIUM ACETATE 1334 MG: 667 CAPSULE ORAL at 12:12

## 2017-12-16 RX ADMIN — ASPIRIN 81 MG CHEWABLE TABLET 81 MG: 81 TABLET CHEWABLE at 08:12

## 2017-12-16 RX ADMIN — FINASTERIDE 5 MG: 5 TABLET, FILM COATED ORAL at 08:12

## 2017-12-16 RX ADMIN — CALCIUM ACETATE 1334 MG: 667 CAPSULE ORAL at 05:12

## 2017-12-16 RX ADMIN — BRINZOLAMIDE 1 DROP: 10 SUSPENSION/ DROPS OPHTHALMIC at 09:12

## 2017-12-16 RX ADMIN — AMLODIPINE BESYLATE 10 MG: 10 TABLET ORAL at 08:12

## 2017-12-16 RX ADMIN — INSULIN ASPART 1 UNITS: 100 INJECTION, SOLUTION INTRAVENOUS; SUBCUTANEOUS at 08:12

## 2017-12-16 RX ADMIN — TRAVOPROST 1 DROP: 0.04 SOLUTION/ DROPS OPHTHALMIC at 08:12

## 2017-12-16 NOTE — PT/OT/SLP DISCHARGE
Physical Therapy Discharge Summary    Name: Ronald Campbell  MRN: [id number]   Principal Problem: Acute cystitis with hematuria     Patient Discharged from acute Physical Therapy on 17.  Please refer to prior PT noted date on 17 for functional status.     Assessment:     Patient has not met goals.    Objective:     GOALS:    Physical Therapy Goals        Problem: Physical Therapy Goal    Goal Priority Disciplines Outcome Goal Variances Interventions   Physical Therapy Goal     PT/OT, PT Ongoing (interventions implemented as appropriate)     Description:  Goals to be met by:  ()    Patient will increase functional independence with mobility by performin. Supine to sit with Stand-by Assistance  2. Sit to supine with Stand-by Assistance  3. Sit to stand transfer with Contact Guard Assistance- met  4. Gait  x 50 feet with Mod Assistance using Rolling Walker. - Met   Updated: Gait x 50 feet with Min Assist using rolling walker  5. Lower extremity exercise program x30 reps per handout, with independence to improve muscular strength and endurance.  6. Pt will ambulate 1 flight of stairs with MIN A and bilateral HR placement. - Met                          Reasons for Discontinuation of Therapy Services  Transfer to alternate level of care.      Plan:     Patient Discharged to: Skilled Nursing Facility.    Jac Morillo III, PT  2017

## 2017-12-16 NOTE — PLAN OF CARE
Problem: Occupational Therapy Goal  Goal: Occupational Therapy Goal  Goals to be met by: 14 days     Patient will increase functional independence with ADLs by performing:    UE Dressing with Supervision.  LE Dressing with Minimal Assistance.  Grooming while seated with Supervision.  Toileting from bedside commode with Minimal Assistance for hygiene and clothing management.   Bathing from  shower chair/bench with Minimal Assistance.  Toilet transfer to bedside commode with Contact Guard Assistance.  Upper extremity exercise program x12 reps per handout, with supervision.     Outcome: Ongoing (interventions implemented as appropriate)  Patient's goals are appropriate.   SHIRA Salas  12/16/2017

## 2017-12-16 NOTE — NURSING
"Summoned to room by daughter stating, "father has a bruise on right buttock and it wasn't there on admission." This nurse checked with assigned nurse on duty for this patient and nurse was not aware of bruise, nor was documentation found on admission. Informed daughter that physician would be notified.  Assessment of rt buttock was performed by this nurse and this nurse informed assigned nurse.  "

## 2017-12-16 NOTE — PT/OT/SLP PROGRESS
Physical Therapy  Treatment    Ronald Campbell   MRN: [id number]   Admitting Diagnosis: Acute cystitis with hematuria    PT Received On: 12/16/17          Billable Minutes:  Gait Training 25, Therapeutic Activity 15 and Therapeutic Exercise 18=58    Treatment Type: Treatment  PT/PTA: PT     PTA Visit Number: 0       General Precautions: Standard, blind, aspiration, fall  Orthopedic Precautions: N/A   Braces: N/A         Subjective:  Communicated with pateint prior to session.  Patient agreeable to therapy    Pain/Comfort  Pain Rating 1: 0/10  Pain Rating Post-Intervention 1: 0/10    Objective:  Patient found sitting EOB w/ PCT assisting   with         Functional Status:  MDS G  Transfer Functional Status: CGA-Min (A)  Walk in Room Functional Status: CGA-Min (A)  Walk in Corridor Functional Status: CGA-Min (A)  Locomotion on Unit Functional Status: CGA-Min (A)  Locomotion Off Unit Functional Status: total(A)  Moving from seated to standing position: Not steady, only able to stabilize with staff assistance  Walking (with assistive device if used): Not steady, only able to stabilize with staff assistance  Turning around and facing the opposite direction while walking: Not steady, only able to stabilize with staff assistance  Surface-to-surface transfer (transfer between bed and chair or wheelchair): Not steady, only able to stabilize with staff assistance        Transfers:  Sit<>Stand: from EOB w/ RW and CGA; to/from w/c w/ RW x2 trials w/ CGA; to/from w/c at steps. Reaching to stairrails, w/ min assist  Stand Pivot Transfer: bed>w/c w/ RW and min assist to manage RW in turn.  Mod to max cues for direction in pivot transfers, assist to manage RW and locate w/c.  W/c<>stand trfs w/ extra time to locate RW, CGA for safety. Cues for controlled sit.    Gait:  Amb w/ RW and CGA to min assist 150 feet. CGA for safety and w/c follow. Min assist for one posterior leaning during trial, and min assist to adjust RW for straight course  and verbal and manual assist for RW in turns/pivot.   Second trial, amb w/ RW and CGA/min assist 103 feet w/ three turns. W/c follow   Patient w/ B genu valgus and amb w/ flexed hips and knees.  Increased time and cues due to patient BLIND  Advanced Gait:  Stairs: up/down 8 steps (training steps twice w/ turn at the bottom of steps). Overall CGA /min assist w/ BHR. Increased assist for safety at top of bottom of steps for turning and w/ patient slow to follow verbal directional cues. Patient used B HR (at home uses right handrail, per notes; patient unable to say if he uses both hands on the HR or has HHA on one side).  Increased time due to BLIND    Wheelchair Mobility:  TA     Additional Treatment:  Patient pedaled LBE (lower body ergometer) x15 minutes to increase strength and endurance.    Patient left up in chair with call button in reach and nurse notified.    Assessment:  Ronald Campbell is a 88 y.o. male with a medical diagnosis of Acute cystitis with hematuria.  Patient is BLIND. He had dialysis yesterday and was not able to come to therapy. Patient performed well today. He was able to maintain his balance well and amb 15o feet w/ RW. He needs some assist for direction and RW management. Did not assess amb w/ handheld assist today. He performed 8 steps w/ BHR, but uses a single HR at home. Patient will benefit from continued physical therapy to address deficits and improve safety and functional mobility. Continue with physical therapy plan of care. .    Rehab identified problem list/impairments: weakness, impaired endurance, impaired functional mobilty, gait instability, impaired balance, decreased lower extremity function, pain, visual deficits, edema    Rehab potential is good.    Activity tolerance: Good    Discharge recommendations: home with home health (and PACE/ family assist as prior)     Barriers to discharge: Inaccessible home environment, Decreased caregiver support    Equipment recommendations:   (need to verify DME)     GOALS:    Physical Therapy Goals        Problem: Physical Therapy Goal    Goal Priority Disciplines Outcome Goal Variances Interventions   Physical Therapy Goal     PT/OT, PT      Description:  Goals to be met by: 2 weeks     Patient will increase functional independence with mobility by performin. Supine to sit with supervision  2. Sit to supine with supervision  3. Sit to stand transfer with Contact Guard Assistance with/ without RW, as appropriate and cues as needed secondary to blindness  4. Bed to chair transfer with Contact Guard Assistance using Rolling Walker or least restrictive device or without AD and with cues as needed secondary to blindness  5. Gait  x 150 feet with minimal assistance using Rolling Walker or appropriate assistive device with cues as needed secondary to blindness  6. Ascend/descend 12 stair with bilateral Handrails Minimal Assistance using cues as needed secondary to blindness.   7. Ascend/Descend 4 inch curb step with Minimal Assistance using Rolling Walker or appropriate assistive device and cues as needed secondary to blindness  8. Stand for 3 minutes with UE support on walker or table and perform an activity w/ CGA and cues for blindness   9. Lower extremity exercise program x20 reps per handout, with assistance as needed and gym therex  10. Gait x75 feet w/ Hand held / minimal assistance and without an AD, with cues secondary to blindness                      PLAN:    Patient to be seen 5 x/week  to address the above listed problems via gait training, therapeutic activities, therapeutic exercises, wheelchair management/training  Plan of Care expires: 18  Plan of Care reviewed with: patient    Shayy Gonzalez, PT  2017

## 2017-12-16 NOTE — PLAN OF CARE
Problem: Fall Risk (Adult)  Goal: Absence of Falls  Patient will demonstrate the desired outcomes by discharge/transition of care.   Outcome: Ongoing (interventions implemented as appropriate)  Pt  Lying in bed, ndn, bed alarm on. Call light in pt's hand. Pt is free of falls or injuries this shift.

## 2017-12-16 NOTE — PT/OT/SLP PROGRESS
Occupational Therapy  Treatment    Ronald Campbell   MRN: [id number]   Admitting Diagnosis: Acute cystitis with hematuria    OT Date of Treatment: 12/16/17  Total Time (min): 15 min    Billable Minutes:  Self Care/Home Management 15    General Precautions: Standard, blind, aspiration, fall, diabetic  Orthopedic Precautions: N/A  Braces: N/A    Do you have any cultural, spiritual, Holiness conflicts, given your current situation?: none stated    Subjective:  Communicated with patient prior to session.    Pain/Comfort  Pain Rating 1: 0/10  Pain Rating Post-Intervention 1: 0/10    Objective:       Functional Status:  MDS G  Transfer Functional Status: Min (A) Patient was found on toilet; toilet>bedside chair HHA  Walk in Room Functional Status: Min (A) HHA  Toilet Use Functional Status: Max(A)  Personal Hygiene Functional Status: Mod (A) to wash/dry hands standing at sink    Patient left up in chair with call button in reach, family present and all needs met.     ASSESSMENT:  Ronald Campbell is a 88 y.o. male with a medical diagnosis of Acute cystitis with hematuria and presents with the deficits listed below. Patient tolerated treatment session, but declined further activity after returning to bedside chair due to fatigue. Patient continues to benefit from skilled OT services to achieve maximal independence.    Rehab identified problem list/impairments: weakness, impaired endurance, impaired self care skills, impaired functional mobilty, gait instability, impaired balance, visual deficits, impaired cognition, decreased coordination, decreased lower extremity function, decreased safety awareness, abnormal tone, decreased ROM, impaired coordination, impaired fine motor    Rehab potential is good    Activity tolerance: Fair    Discharge recommendations: home health OT     Barriers to discharge: Inaccessible home environment, Decreased caregiver support     Equipment recommendations: none     GOALS:    Occupational Therapy  Goals        Problem: Occupational Therapy Goal    Goal Priority Disciplines Outcome Interventions   Occupational Therapy Goal     OT, PT/OT Ongoing (interventions implemented as appropriate)    Description:  Goals to be met by: 14 days     Patient will increase functional independence with ADLs by performing:    UE Dressing with Supervision.  LE Dressing with Minimal Assistance.  Grooming while seated with Supervision.  Toileting from bedside commode with Minimal Assistance for hygiene and clothing management.   Bathing from  shower chair/bench with Minimal Assistance.  Toilet transfer to bedside commode with Contact Guard Assistance.  Upper extremity exercise program x12 reps per handout, with supervision.                      Plan:  Patient to be seen 5 x/week to address the above listed problems via self-care/home management, therapeutic activities, therapeutic exercises  Plan of Care expires: 01/12/18  Plan of Care reviewed with: patient    SHIRA Salas  12/16/2017

## 2017-12-16 NOTE — NURSING
Dr. Caceres on floor and informed of rt buttock bruise and informed that skin is intact. Order received to obtain wound care consult. Daughter in room and aware of order.

## 2017-12-16 NOTE — NURSING
Pt refused all his scheduled night medications writer explained to him the importance of the said medications writer has to offered to him twice and Charge Nurse has to talked him and offered his medications but pt still refuses.

## 2017-12-16 NOTE — PLAN OF CARE
Problem: Patient Care Overview  Goal: Plan of Care Review  Outcome: Ongoing (interventions implemented as appropriate)   12/16/17 0100   Coping/Psychosocial   Plan Of Care Reviewed With patient       Problem: Fall Risk (Adult)  Goal: Absence of Falls  Patient will demonstrate the desired outcomes by discharge/transition of care.   Outcome: Ongoing (interventions implemented as appropriate)   12/16/17 0100   Fall Risk (Adult)   Absence of Falls making progress toward outcome

## 2017-12-17 LAB
POCT GLUCOSE: 199 MG/DL (ref 70–110)
POCT GLUCOSE: 242 MG/DL (ref 70–110)
POCT GLUCOSE: 249 MG/DL (ref 70–110)
POCT GLUCOSE: 91 MG/DL (ref 70–110)

## 2017-12-17 PROCEDURE — 94640 AIRWAY INHALATION TREATMENT: CPT

## 2017-12-17 PROCEDURE — 25000242 PHARM REV CODE 250 ALT 637 W/ HCPCS: Performed by: PHYSICIAN ASSISTANT

## 2017-12-17 PROCEDURE — 97530 THERAPEUTIC ACTIVITIES: CPT

## 2017-12-17 PROCEDURE — 25000003 PHARM REV CODE 250: Performed by: PHYSICIAN ASSISTANT

## 2017-12-17 PROCEDURE — 63600175 PHARM REV CODE 636 W HCPCS: Performed by: PHYSICIAN ASSISTANT

## 2017-12-17 PROCEDURE — 97110 THERAPEUTIC EXERCISES: CPT

## 2017-12-17 PROCEDURE — 12000000 HC SNF SEMI-PRIVATE ROOM

## 2017-12-17 PROCEDURE — 97535 SELF CARE MNGMENT TRAINING: CPT

## 2017-12-17 PROCEDURE — 97116 GAIT TRAINING THERAPY: CPT

## 2017-12-17 RX ADMIN — INSULIN ASPART 2 UNITS: 100 INJECTION, SOLUTION INTRAVENOUS; SUBCUTANEOUS at 05:12

## 2017-12-17 RX ADMIN — TRAVOPROST 1 DROP: 0.04 SOLUTION/ DROPS OPHTHALMIC at 09:12

## 2017-12-17 RX ADMIN — LEVETIRACETAM 500 MG: 500 TABLET, FILM COATED ORAL at 09:12

## 2017-12-17 RX ADMIN — STANDARDIZED SENNA CONCENTRATE AND DOCUSATE SODIUM 1 TABLET: 8.6; 5 TABLET, FILM COATED ORAL at 08:12

## 2017-12-17 RX ADMIN — HEPARIN SODIUM 5000 UNITS: 5000 INJECTION, SOLUTION INTRAVENOUS; SUBCUTANEOUS at 01:12

## 2017-12-17 RX ADMIN — IPRATROPIUM BROMIDE AND ALBUTEROL SULFATE 3 ML: .5; 3 SOLUTION RESPIRATORY (INHALATION) at 10:12

## 2017-12-17 RX ADMIN — BRINZOLAMIDE 1 DROP: 10 SUSPENSION/ DROPS OPHTHALMIC at 05:12

## 2017-12-17 RX ADMIN — FINASTERIDE 5 MG: 5 TABLET, FILM COATED ORAL at 08:12

## 2017-12-17 RX ADMIN — INSULIN ASPART 1 UNITS: 100 INJECTION, SOLUTION INTRAVENOUS; SUBCUTANEOUS at 09:12

## 2017-12-17 RX ADMIN — CALCIUM ACETATE 1334 MG: 667 CAPSULE ORAL at 08:12

## 2017-12-17 RX ADMIN — BRINZOLAMIDE 1 DROP: 10 SUSPENSION/ DROPS OPHTHALMIC at 09:12

## 2017-12-17 RX ADMIN — CARVEDILOL 25 MG: 25 TABLET, FILM COATED ORAL at 08:12

## 2017-12-17 RX ADMIN — ASPIRIN 81 MG CHEWABLE TABLET 81 MG: 81 TABLET CHEWABLE at 08:12

## 2017-12-17 RX ADMIN — CARVEDILOL 25 MG: 25 TABLET, FILM COATED ORAL at 09:12

## 2017-12-17 RX ADMIN — ATORVASTATIN CALCIUM 40 MG: 20 TABLET, FILM COATED ORAL at 09:12

## 2017-12-17 RX ADMIN — HEPARIN SODIUM 5000 UNITS: 5000 INJECTION, SOLUTION INTRAVENOUS; SUBCUTANEOUS at 09:12

## 2017-12-17 RX ADMIN — CALCIUM ACETATE 1334 MG: 667 CAPSULE ORAL at 05:12

## 2017-12-17 RX ADMIN — DOXAZOSIN 4 MG: 4 TABLET ORAL at 10:12

## 2017-12-17 RX ADMIN — HEPARIN SODIUM 5000 UNITS: 5000 INJECTION, SOLUTION INTRAVENOUS; SUBCUTANEOUS at 05:12

## 2017-12-17 RX ADMIN — CALCIUM ACETATE 1334 MG: 667 CAPSULE ORAL at 12:12

## 2017-12-17 RX ADMIN — AMLODIPINE BESYLATE 10 MG: 10 TABLET ORAL at 08:12

## 2017-12-17 RX ADMIN — FLUCONAZOLE 100 MG: 100 TABLET ORAL at 08:12

## 2017-12-17 RX ADMIN — LEVETIRACETAM 500 MG: 500 TABLET, FILM COATED ORAL at 08:12

## 2017-12-17 RX ADMIN — BRINZOLAMIDE 1 DROP: 10 SUSPENSION/ DROPS OPHTHALMIC at 01:12

## 2017-12-17 NOTE — PLAN OF CARE
Problem: Fall Risk (Adult)  Goal: Absence of Falls  Patient will demonstrate the desired outcomes by discharge/transition of care.   Pt lying in bed, ndn. Respirations unlabored, pt denies pain. Pt is free of falls or injuries this shift. Call light in reach.

## 2017-12-17 NOTE — PROGRESS NOTES
Pt's daughter verbalized that patient gets combigan eye drops twice a day at home and wants it ordered by this unit doctor. Charge nurse spoke with pt's daughter and then paged dr Caceres. Will monitor.

## 2017-12-17 NOTE — PLAN OF CARE
Problem: Patient Care Overview  Goal: Plan of Care Review  Outcome: Ongoing (interventions implemented as appropriate)   12/16/17 2157   Coping/Psychosocial   Plan Of Care Reviewed With patient       Problem: Diabetes, Type 2 (Adult)  Goal: Signs and Symptoms of Listed Potential Problems Will be Absent, Minimized or Managed (Diabetes, Type 2)  Signs and symptoms of listed potential problems will be absent, minimized or managed by discharge/transition of care (reference Diabetes, Type 2 (Adult) CPG).   Outcome: Ongoing (interventions implemented as appropriate)   12/16/17 2157   Diabetes, Type 2   Problems Assessed (Type 2 Diabetes) all   Problems Present (Type 2 Diabetes) hyperglycemia       Problem: Fall Risk (Adult)  Goal: Absence of Falls  Patient will demonstrate the desired outcomes by discharge/transition of care.   Outcome: Ongoing (interventions implemented as appropriate)   12/16/17 2157   Fall Risk (Adult)   Absence of Falls making progress toward outcome

## 2017-12-17 NOTE — PROGRESS NOTES
This nurse dropped bottle of brinzolamide eye drops in pt's room needle box by accident.. Patient's daughter who was at bedside  Witness it. Message sent to pharmacy requesting medication. Charge nurse informed per this nurse.

## 2017-12-17 NOTE — PLAN OF CARE
Problem: Occupational Therapy Goal  Goal: Occupational Therapy Goal  Goals to be met by: 14 days     Patient will increase functional independence with ADLs by performing:    UE Dressing with Supervision.  LE Dressing with Minimal Assistance.  Grooming while seated with Supervision.  Toileting from bedside commode with Minimal Assistance for hygiene and clothing management.   Bathing from  shower chair/bench with Minimal Assistance.  Toilet transfer to bedside commode with Contact Guard Assistance.  Upper extremity exercise program x12 reps per handout, with supervision.     Outcome: Ongoing (interventions implemented as appropriate)  Patient's goals are appropriate.   SHIRA Salas  12/17/2017

## 2017-12-17 NOTE — PT/OT/SLP PROGRESS
Physical Therapy  Treatment    Ronald Campbell   MRN: [id number]   Admitting Diagnosis: Acute cystitis with hematuria    PT Received On: 12/17/17          Billable Minutes:  Gait Training 15, Therapeutic Activity 8 and Therapeutic Exercise 23=48    Treatment Type: Treatment  PT/PTA: PT     PTA Visit Number: 0       General Precautions: Standard, blind, aspiration, fall  Orthopedic Precautions: N/A   Braces: N/A         Subjective:  Communicated with patient prior to session.  Patient agreeable to session    Pain/Comfort  Pain Rating 1: 0/10  Pain Rating Post-Intervention 1: 0/10    Objective:  Patient found seated in bedside chair   with         Functional Status:  MDS G  Transfer Functional Status: CGA-Min (A)  Walk in Room Functional Status: CGA-Min (A)  Walk in Corridor Functional Status: CGA-Min (A)  Locomotion on Unit Functional Status: CGA-Min (A)  Locomotion Off Unit Functional Status: total(A)      Transfers:  Sit<>Stand: from bedside chair w/ CGA; to/from w/c w/ RW and CGA for hip elevation x2 trials. Patient able to reach for and find RW w/ cues for technique. To/from w/c w/ CGA at steps, w/ patient reaching for rails on steps w/ cues and guidance.  Stand Pivot Transfer: w/ RW and min assist from chair>w/c w/ patient walking in room ~4 feet and then min assist and max cues verbal and manual to pivot safely to w/c.  At top of steps and bottom of steps,  Patient pivots w/ min assist for safety w/ max cues and assist w/ direction and moving hands to rails  Extra time and cues, manual and verbal, cue to patient is Blind.    Gait:  Amb w/ RW and CGA/min assist 150 feet w/ occ assist to correct RW w/ tendency to drift to the left; and min assist for turns to assist w/ turning RW. W/c follow. No LOB. Hips and knees flexed. Crepitus noted B knees. Sufficient foot clearance in swing, steps w/ foot flat. (pt wearing crocs today)     Advanced Gait:  Stairs: up/down 8 steps ( 4 training steps twice w/o rest break). B HR.  CGA /min assist. Max cues to turn, transition hands and for direction   Therex:  Quad sets,   Glute sets,   Ankle  Pumps,   x20 reps w/ cues    Additional Treatment:  Patient pedaled LBE (lower body ergometer) x15 minutes to increase strength and endurance.    Patient left up in chair with call button in reach.    Assessment:  Ronald Campbell is a 88 y.o. male with a medical diagnosis of Acute cystitis with hematuria.  Patient progressing well. Overall CGA for transfers but often requiring min assist for RW management, direction, turning. Amb w/ CGA to min assist and RW. Patient ascends/descends 8 steps w/ BHR w/ overall min assist. Patient will benefit from continued physical therapy to address deficits and improve safety and functional mobility. Continue with physical therapy plan of care. .    Rehab identified problem list/impairments: weakness, impaired endurance, impaired functional mobilty, gait instability, impaired balance, decreased lower extremity function, pain, visual deficits, edema    Rehab potential is good.    Activity tolerance: Good    Discharge recommendations: home with home health (and PACE/ family assist as prior)     Barriers to discharge: Inaccessible home environment, Decreased caregiver support    Equipment recommendations:  (need to verify DME)     GOALS:    Physical Therapy Goals        Problem: Physical Therapy Goal    Goal Priority Disciplines Outcome Goal Variances Interventions   Physical Therapy Goal     PT/OT, PT      Description:  Goals to be met by: 2 weeks     Patient will increase functional independence with mobility by performin. Supine to sit with supervision  2. Sit to supine with supervision  3. Sit to stand transfer with Contact Guard Assistance with/ without RW, as appropriate and cues as needed secondary to blindness  4. Bed to chair transfer with Contact Guard Assistance using Rolling Walker or least restrictive device or without AD and with cues as needed  secondary to blindness  5. Gait  x 150 feet with minimal assistance using Rolling Walker or appropriate assistive device with cues as needed secondary to blindness  6. Ascend/descend 12 stair with bilateral Handrails Minimal Assistance using cues as needed secondary to blindness.   7. Ascend/Descend 4 inch curb step with Minimal Assistance using Rolling Walker or appropriate assistive device and cues as needed secondary to blindness  8. Stand for 3 minutes with UE support on walker or table and perform an activity w/ CGA and cues for blindness   9. Lower extremity exercise program x20 reps per handout, with assistance as needed and gym therex  10. Gait x75 feet w/ Hand held / minimal assistance and without an AD, with cues secondary to blindness                      PLAN:    Patient to be seen 5 x/week  to address the above listed problems via gait training, therapeutic activities, therapeutic exercises, wheelchair management/training  Plan of Care expires: 01/12/18  Plan of Care reviewed with: patient    Shayy ANANDA Gonzalez, PT  12/17/2017

## 2017-12-17 NOTE — PT/OT/SLP PROGRESS
Occupational Therapy  Treatment    Ronald Campbell   MRN: [id number]   Admitting Diagnosis: Acute cystitis with hematuria    OT Date of Treatment: 12/17/17  Total Time (min): 50 min    Billable Minutes:  Therapeutic Exercise 50    General Precautions: Standard, blind, aspiration, fall, diabetic  Orthopedic Precautions: N/A  Braces: N/A    Do you have any cultural, spiritual, Catholic conflicts, given your current situation?: none stated    Subjective:  Communicated with patient prior to session.    Pain/Comfort  Pain Rating 1: 0/10  Pain Rating Post-Intervention 1: 0/10    Objective:       Functional Status:  MDS G  Transfer Functional Status: Min A w/c>bed using RW  Walk in Room Functional Status: Min (A)  Using RW  Dressing Functional Status: 3:max (A) Donning and doffing shoes.       OT Exercises: UE Ergometer 15 min for improving endurance to increase independence with ADLs.    Patient performed B UE ROM exercises using 2# dowel chele 3 x 10 in all planes and joints focusing to improve strength and endurance to increase independence with ADLs.     Patient left up in chair with call button in reach and all needs met.     ASSESSMENT:  Ronald Campbell is a 88 y.o. male with a medical diagnosis of Acute cystitis with hematuria and presents with the deficits listed below. Patient tolerated treatment session and was motivated to complete tasks. Patient continues to benefit from skilled OT services to achieve maximal independence.    Rehab identified problem list/impairments: weakness, impaired endurance, impaired self care skills, impaired functional mobilty, gait instability, impaired balance, visual deficits, impaired cognition, decreased coordination, decreased lower extremity function, decreased safety awareness, abnormal tone, decreased ROM, impaired coordination, impaired fine motor    Rehab potential is good    Activity tolerance: Good    Discharge recommendations: home health OT     Barriers to discharge: Inaccessible  home environment, Decreased caregiver support     Equipment recommendations: none     GOALS:    Occupational Therapy Goals        Problem: Occupational Therapy Goal    Goal Priority Disciplines Outcome Interventions   Occupational Therapy Goal     OT, PT/OT Ongoing (interventions implemented as appropriate)    Description:  Goals to be met by: 14 days     Patient will increase functional independence with ADLs by performing:    UE Dressing with Supervision.  LE Dressing with Minimal Assistance.  Grooming while seated with Supervision.  Toileting from bedside commode with Minimal Assistance for hygiene and clothing management.   Bathing from  shower chair/bench with Minimal Assistance.  Toilet transfer to bedside commode with Contact Guard Assistance.  Upper extremity exercise program x12 reps per handout, with supervision.                      Plan:  Patient to be seen 5 x/week to address the above listed problems via self-care/home management, therapeutic activities, therapeutic exercises  Plan of Care expires: 01/12/18  Plan of Care reviewed with: patient    SHIRA Salas  12/17/2017

## 2017-12-18 LAB
ANION GAP SERPL CALC-SCNC: 7 MMOL/L
BASOPHILS # BLD AUTO: 0.01 K/UL
BASOPHILS NFR BLD: 0.2 %
BUN SERPL-MCNC: 73 MG/DL
CALCIUM SERPL-MCNC: 8.3 MG/DL
CHLORIDE SERPL-SCNC: 103 MMOL/L
CO2 SERPL-SCNC: 22 MMOL/L
CREAT SERPL-MCNC: 7 MG/DL
DIFFERENTIAL METHOD: ABNORMAL
EOSINOPHIL # BLD AUTO: 0.1 K/UL
EOSINOPHIL NFR BLD: 1.6 %
ERYTHROCYTE [DISTWIDTH] IN BLOOD BY AUTOMATED COUNT: 19.5 %
EST. GFR  (AFRICAN AMERICAN): 7.4 ML/MIN/1.73 M^2
EST. GFR  (NON AFRICAN AMERICAN): 6.4 ML/MIN/1.73 M^2
GLUCOSE SERPL-MCNC: 104 MG/DL
HCT VFR BLD AUTO: 24.6 %
HGB BLD-MCNC: 7.9 G/DL
LYMPHOCYTES # BLD AUTO: 1.2 K/UL
LYMPHOCYTES NFR BLD: 28.7 %
MAGNESIUM SERPL-MCNC: 2.3 MG/DL
MCH RBC QN AUTO: 23.6 PG
MCHC RBC AUTO-ENTMCNC: 32.1 G/DL
MCV RBC AUTO: 73 FL
MONOCYTES # BLD AUTO: 0.5 K/UL
MONOCYTES NFR BLD: 12.6 %
NEUTROPHILS # BLD AUTO: 2.4 K/UL
NEUTROPHILS NFR BLD: 56.9 %
PHOSPHATE SERPL-MCNC: 2.3 MG/DL
PLATELET # BLD AUTO: 108 K/UL
PMV BLD AUTO: ABNORMAL FL
POCT GLUCOSE: 102 MG/DL (ref 70–110)
POCT GLUCOSE: 112 MG/DL (ref 70–110)
POCT GLUCOSE: 152 MG/DL (ref 70–110)
POTASSIUM SERPL-SCNC: 5.2 MMOL/L
RBC # BLD AUTO: 3.35 M/UL
SODIUM SERPL-SCNC: 132 MMOL/L
WBC # BLD AUTO: 4.29 K/UL

## 2017-12-18 PROCEDURE — 97530 THERAPEUTIC ACTIVITIES: CPT

## 2017-12-18 PROCEDURE — 12000000 HC SNF SEMI-PRIVATE ROOM

## 2017-12-18 PROCEDURE — 85025 COMPLETE CBC W/AUTO DIFF WBC: CPT

## 2017-12-18 PROCEDURE — 80048 BASIC METABOLIC PNL TOTAL CA: CPT

## 2017-12-18 PROCEDURE — 84100 ASSAY OF PHOSPHORUS: CPT

## 2017-12-18 PROCEDURE — 63600175 PHARM REV CODE 636 W HCPCS: Performed by: PHYSICIAN ASSISTANT

## 2017-12-18 PROCEDURE — 83735 ASSAY OF MAGNESIUM: CPT

## 2017-12-18 PROCEDURE — 97110 THERAPEUTIC EXERCISES: CPT

## 2017-12-18 PROCEDURE — 36415 COLL VENOUS BLD VENIPUNCTURE: CPT

## 2017-12-18 PROCEDURE — 25000003 PHARM REV CODE 250: Performed by: PHYSICIAN ASSISTANT

## 2017-12-18 RX ORDER — CALCIUM ACETATE 667 MG/1
667 CAPSULE ORAL
Qty: 90 CAPSULE | Refills: 1 | Status: ON HOLD
Start: 2017-12-18 | End: 2018-01-01

## 2017-12-18 RX ORDER — FINASTERIDE 5 MG/1
5 TABLET, FILM COATED ORAL DAILY
Qty: 30 TABLET | Refills: 1 | Status: SHIPPED | OUTPATIENT
Start: 2017-12-18 | End: 2018-01-01 | Stop reason: HOSPADM

## 2017-12-18 RX ORDER — FLUCONAZOLE 100 MG/1
100 TABLET ORAL DAILY
Qty: 4 TABLET | Refills: 0 | Status: SHIPPED | OUTPATIENT
Start: 2017-12-18 | End: 2017-12-22

## 2017-12-18 RX ORDER — CARVEDILOL 25 MG/1
25 TABLET ORAL 2 TIMES DAILY
Qty: 60 TABLET | Refills: 1 | Status: ON HOLD | OUTPATIENT
Start: 2017-12-18 | End: 2019-01-01

## 2017-12-18 RX ORDER — DOXAZOSIN 4 MG/1
4 TABLET ORAL DAILY
Qty: 30 TABLET | Refills: 1 | Status: ON HOLD | OUTPATIENT
Start: 2017-12-18 | End: 2019-01-01 | Stop reason: HOSPADM

## 2017-12-18 RX ADMIN — ASPIRIN 81 MG CHEWABLE TABLET 81 MG: 81 TABLET CHEWABLE at 03:12

## 2017-12-18 RX ADMIN — ATORVASTATIN CALCIUM 40 MG: 20 TABLET, FILM COATED ORAL at 09:12

## 2017-12-18 RX ADMIN — HEPARIN SODIUM 5000 UNITS: 5000 INJECTION, SOLUTION INTRAVENOUS; SUBCUTANEOUS at 03:12

## 2017-12-18 RX ADMIN — AMLODIPINE BESYLATE 10 MG: 10 TABLET ORAL at 03:12

## 2017-12-18 RX ADMIN — DOXAZOSIN 4 MG: 4 TABLET ORAL at 03:12

## 2017-12-18 RX ADMIN — HEPARIN SODIUM 5000 UNITS: 5000 INJECTION, SOLUTION INTRAVENOUS; SUBCUTANEOUS at 10:12

## 2017-12-18 RX ADMIN — BRINZOLAMIDE 1 DROP: 10 SUSPENSION/ DROPS OPHTHALMIC at 05:12

## 2017-12-18 RX ADMIN — CARVEDILOL 25 MG: 25 TABLET, FILM COATED ORAL at 09:12

## 2017-12-18 RX ADMIN — HEPARIN SODIUM 5000 UNITS: 5000 INJECTION, SOLUTION INTRAVENOUS; SUBCUTANEOUS at 05:12

## 2017-12-18 RX ADMIN — FLUCONAZOLE 100 MG: 100 TABLET ORAL at 03:12

## 2017-12-18 RX ADMIN — BRINZOLAMIDE 1 DROP: 10 SUSPENSION/ DROPS OPHTHALMIC at 10:12

## 2017-12-18 RX ADMIN — TRAVOPROST 1 DROP: 0.04 SOLUTION/ DROPS OPHTHALMIC at 09:12

## 2017-12-18 RX ADMIN — CALCIUM ACETATE 1334 MG: 667 CAPSULE ORAL at 03:12

## 2017-12-18 RX ADMIN — FINASTERIDE 5 MG: 5 TABLET, FILM COATED ORAL at 03:12

## 2017-12-18 RX ADMIN — LEVETIRACETAM 500 MG: 500 TABLET, FILM COATED ORAL at 09:12

## 2017-12-18 RX ADMIN — BRINZOLAMIDE 1 DROP: 10 SUSPENSION/ DROPS OPHTHALMIC at 03:12

## 2017-12-18 NOTE — PLAN OF CARE
Problem: Occupational Therapy Goal  Goal: Occupational Therapy Goal  Goals to be met by: 14 days     Patient will increase functional independence with ADLs by performing:    UE Dressing with Supervision.  LE Dressing with Minimal Assistance.  Grooming while seated with Supervision.  Toileting from bedside commode with Minimal Assistance for hygiene and clothing management.   Bathing from  shower chair/bench with Minimal Assistance.  Toilet transfer to bedside commode with Contact Guard Assistance.  Upper extremity exercise program x12 reps per handout, with supervision.     Outcome: Ongoing (interventions implemented as appropriate)  Progressing. SHIRA Jacob  12/18/2017

## 2017-12-18 NOTE — PT/OT/SLP PROGRESS
Occupational Therapy  Treatment    Ronald Campbell   MRN: [id number]   Admitting Diagnosis: Acute cystitis with hematuria    OT Date of Treatment: 12/18/17  Total Time (min): 50 min    Billable Minutes:  Therapeutic Activity 15 and Therapeutic Exercise 35    General Precautions: Standard, blind, aspiration, fall, diabetic  Orthopedic Precautions: N/A  Braces: N/A    Do you have any cultural, spiritual, Episcopalian conflicts, given your current situation?: none stated    Subjective:  Communicated with pt and PCT prior to session.  PCT reported night shift bathed and dressed him    Pain/Comfort  Pain Rating 1: 0/10  Pain Rating Post-Intervention 1: 0/10    Objective:  Patient found with:  (supine in bed with PCT present)    Functional Status:  MDS G  Bed Mobility Functional Status:-Min (A)--supine to sit--cues for inititation, hand placement and technique due to visual loss  Transfer Functional Status: -Min (A) stand pivot no AD from bed to w/c--cues for hand placement and direction  Dressing Functional Status: 3:mod(A)-to rufina crocs EOB  Eating Functional Status:-Min (A) seated in w/c--assist needed due to visual loss          OT Exercises: UE Ergometer 15 min to increase functional endurance for ADLs  Rickshaw 10# 25 x 4 to increase UE strength for mobility  Rowing 10# 15 x 4 to increase UE stength and posture for mobiity      Patient left up in chair with nurse notified and eating breakfast    ASSESSMENT:  Pt tolerated tx and demonstrated increased functional endurance and indep with bed mobiltiy and transfers. Pt cont to have difficulty with self care due to vision loss;however, cont to progress toward goals. Pt cont to benefit from OT to address limitations and increase functional indep and safety to return home with assist    Rehab identified problem list/impairments: weakness, impaired endurance, impaired self care skills, impaired functional mobilty, gait instability, impaired balance, visual deficits, impaired  cognition, decreased coordination, decreased lower extremity function, decreased safety awareness, abnormal tone, decreased ROM, impaired coordination, impaired fine motor    Rehab potential is good    Activity tolerance: Good    Discharge recommendations: home health OT     Barriers to discharge: Inaccessible home environment, Decreased caregiver support     Equipment recommendations: none     GOALS:    Occupational Therapy Goals        Problem: Occupational Therapy Goal    Goal Priority Disciplines Outcome Interventions   Occupational Therapy Goal     OT, PT/OT Ongoing (interventions implemented as appropriate)    Description:  Goals to be met by: 14 days     Patient will increase functional independence with ADLs by performing:    UE Dressing with Supervision.  LE Dressing with Minimal Assistance.  Grooming while seated with Supervision.  Toileting from bedside commode with Minimal Assistance for hygiene and clothing management.   Bathing from  shower chair/bench with Minimal Assistance.  Toilet transfer to bedside commode with Contact Guard Assistance.  Upper extremity exercise program x12 reps per handout, with supervision.                      Plan:  Patient to be seen 5 x/week to address the above listed problems via self-care/home management, therapeutic activities, therapeutic exercises  Plan of Care expires: 01/12/18  Plan of Care reviewed with: patient    SHIRA Jacob  12/18/2017

## 2017-12-18 NOTE — PLAN OF CARE
Problem: Patient Care Overview  Goal: Plan of Care Review  Outcome: Ongoing (interventions implemented as appropriate)   12/17/17 2337   Coping/Psychosocial   Plan Of Care Reviewed With patient       Problem: Diabetes, Type 2 (Adult)  Goal: Signs and Symptoms of Listed Potential Problems Will be Absent, Minimized or Managed (Diabetes, Type 2)  Signs and symptoms of listed potential problems will be absent, minimized or managed by discharge/transition of care (reference Diabetes, Type 2 (Adult) CPG).   Outcome: Ongoing (interventions implemented as appropriate)   12/17/17 2337   Diabetes, Type 2   Problems Assessed (Type 2 Diabetes) all   Problems Present (Type 2 Diabetes) hyperglycemia       Problem: Fall Risk (Adult)  Goal: Absence of Falls  Patient will demonstrate the desired outcomes by discharge/transition of care.   Outcome: Ongoing (interventions implemented as appropriate)   12/17/17 2337   Fall Risk (Adult)   Absence of Falls making progress toward outcome       Problem: Pressure Ulcer Risk (Refugio Scale) (Adult,Obstetrics,Pediatric)  Goal: Skin Integrity  Patient will demonstrate the desired outcomes by discharge/transition of care.   Outcome: Ongoing (interventions implemented as appropriate)   12/17/17 2337   Pressure Ulcer Risk (Refugio Scale) (Adult,Obstetrics,Pediatric)   Skin Integrity making progress toward outcome

## 2017-12-18 NOTE — PLAN OF CARE
Problem: Fall Risk (Adult)  Goal: Absence of Falls  Patient will demonstrate the desired outcomes by discharge/transition of care.   Outcome: Ongoing (interventions implemented as appropriate)  Patient sitting on recliner, ndn, pt verbalize he does not want to go back to bed at this time.pt was cleaned per pct and cleaned pajama on. Incoming nurse received report.pt is free of falls or injuries this shift. Call light in reach.

## 2017-12-18 NOTE — PROGRESS NOTES
12/18/2017  12:47 PM  Discharge Planning Assessment     Payor: [employer] [place] / Plan: [employer]ARMAJET [employer] / Product Type: Medicare Advantage /     Expected length of stay:  [] 7 days   [] 10 days  [x] 14 days   [] 21 days   [] > 30 days    Communicated expected length of stay with patient/caregiver:  [x] Yes   [] No    Anticipated discharge date:  12/27/2017    Assessment information obtained from:  [] Patient   [x] Caregiver     Arrived from:   [x] Home   [] Assisted Living    [] Nursing Home   [] SNF   [] Rehab  [] LTACH   [] Group Home   [] Foster Care   [] Psych   [] Shelter   [] Homeless   [] Transfer  [] Correctional Facility  [] Name of Facility:      Patient currently lives with:    [] Alone   [] Spouse   [x] Daughter   [] Son   [] Grandparents   []  Parents   [] Siblings   [] Friends   [] Domestic Partner   [] Facility Resident      [] Foster Home    [] Other:       Extended Emergency Contact Information  Primary Emergency Contact: Gabriela Campbell  Address: [address] [place] [address]  Home Phone: [phone]  Work Phone: [phone]  Mobile Phone: [phone]  Relation: Daughter  Secondary Emergency Contact: Grace Booth   [facility]  Home Phone: [phone]  Relation: Daughter     Prior to hospitalization cognitive status:   [x] Alert/Oriented  [] No Deficits [] Risk of Harm to Self/Others   [] Not Oriented to Person   [] Not Oriented to Place   [] Not Oriented to Time   [] Coma/Sedated/Intubated  [] Judgement Impaired    []  Unable to Assess   [] Inappropriate Behavior  [] Infant/Toddler    Prior to hospitalization functional status:   [] Independent   [x] Assistive Equipment   [x] Assistive Person    [] Completely Dependent  [] Infant/Toddler/Child Appropriate   [] Infant/Toddler/Child Delayed    []  Adolescent     Current cognitive status:   [x] Alert/Oriented  [] No Deficits [] Risk of Harm to Self/Others   [] Not Oriented  to Person   [] Not Oriented to Place   [] Not Oriented to Time   [] Coma/Sedated/Intubated  [] Judgement Impaired    []  Unable to Assess   [] Inappropriate Behavior  [] Infant/Toddler    Current functional status:     [] Independent   [x] Assistive Equipment   [x] Assistive Person     [] Completely Dependent   [] Infant/Toddler/Child Appropriate   [] Infant/Toddler/Child Delayed     [] Adolescent     Capacity to Care for Self:   Is patient able to return to prior living arrangements after discharge: [x] Yes  [] No     Is patient able to care for self after discharge?   [] Yes   [x] No     [] Pediatric     Does the patient have family/friends to assist after discharge?:  [x] Yes   [] No    [] N/A   Comments: daughter, JANENE       Patient/caregiver perception of discharge disposition:   [] Home   [x] Home with Family  [x] Home Health   [] SNF   [] Rehab   [] LTAC    []  New Nursing Home Placement  [] Return to Nursing Home    [] Shelter     [] Assisted Living  [] Foster Home   [] Other:      Readmit:   Has patient romana in the hospital in the last 30 days? [] Yes   [x] No     If YES, was patient admitted for the same reason?  [] Yes   [] No       Home Health:   Patient currently receives home health services?:   [x] Yes   [] No     Patient previously received home health services and would like to resume services if necessary   [x] Yes   [] No      If YES, name of home health provider: JANENE    DME:   Patient currently uses DME:   [x] Yes   [] No        List of equipment currently used:     [x] Wheelchair   [] Standard Walker  [x] Rolling Walker  []  Rollator    [] Oxygen    [] Portable oxygen   [] Nebulizer    [] Apnea Monitor    [] Crutches  [] Hospital Bed   []  Lift Device   [] Scooter [] Cane     [] Prosthesis   []  BSC   [] Tub Bench   [] Catheter Supplies    [] Ostomy Supplies   [] Trach Supplies     [] Suction Machine        [] Home Vent    [] Bipap   [x] Other: shower chair      Medications:    Can the patient  afford all prescribed medications?  [x] Yes   [] No     If NO, what medication:       Is the patient taking medications as prescribed?    [x] Yes   [] No    Financial Concerns:   Does the patient have any financial concerns?   [] Yes   [x] No      If YES, what are the concerns:      Transportation:   Does the patient have transportation to healthcare appointments?   [x] Yes   [] No     If YES, what means of transportation does the patient have?   [] Car   [] Family/Friend  [] Bicycle   [] Motorcycle   [] Public Transportation [] Ambulance[] Wheelchair van   [x] Name of Provider-[facility]    Dialysis:   Does the patient currently receive dialysis?   [x] Yes   [] No      If YES, what is the name of the provider: [facility] ANANDA Bowser MW 9:30am    Bart Shaw MD   [address]  [place]  [place] [address]  [phone]         APS/CPS involved in the case:  [] Yes   [x] No   If YES, name of :     If YES, phone number of :        Discharge Plan A:  [] Home   [x] Home with Family  [x] Home Health   [] SNF   [] Rehab   [] LTAC   []  New Nursing Home Placement  [] Return to Nursing Home    [] Assisted Living    [] Shelter     [] Private Duty Nursing   [] Foster Home    [] Psych    [] Early Steps  [] WIC       [] Home Hospice   [] Inpatient Hospice   [x] Other-[facility]ACE    Discharge Plan B:  [] Home   [] Home with Family  [] Home Health   [] SNF   [] Rehab   [] LTAC  []  New Nursing Home Placement   [] Return to  Nursing Home    [] Assisted Living   [] Shelter  [] Private Duty Nursing   [] Foster Home     [] Psych     [] Early Steps  [] WIC    [] Home Hospice     [] Inpatient Hospice   [] Other     [x] Patient and family in agreement with discharge plan.    Have not been able to meet with patient due to dialysis times and appts. Received a call from Mireille WATTERS stating patient was only approved for 7 days and will not be given more. Mireille stated notifying patient's daughter of such. Mireille stated JANENE would set up  equipment, home health, and follow up appts. Spoke with patient's daughter via phone at [phone], to discuss discharge plan and date. Daughter stated she was aware of discharge tomorrow 12/19/2017 and satisified with discharge date. Daughter stated she would contact [place] for transportation home. Will inform NP Katie and MAIRA Jade of new discharge date. CM and SW will continue to follow for any additional needs.    Elizabeth Liu RN, CM Skilled  [id number]

## 2017-12-18 NOTE — PLAN OF CARE
Problem: Physical Therapy Goal  Goal: Physical Therapy Goal  Goals to be met by: 2 weeks     Patient will increase functional independence with mobility by performin. Supine to sit with supervision  2. Sit to supine with supervision  3. Sit to stand transfer with Contact Guard Assistance with/ without RW, as appropriate and cues as needed secondary to blindness. Met (2017)  4. Bed to chair transfer with Contact Guard Assistance using Rolling Walker or least restrictive device or without AD and with cues as needed secondary to blindness  5. Gait  x 150 feet with minimal assistance using Rolling Walker or appropriate assistive device with cues as needed secondary to blindness  6. Ascend/descend 12 stair with bilateral Handrails Minimal Assistance using cues as needed secondary to blindness.   7. Ascend/Descend 4 inch curb step with Minimal Assistance using Rolling Walker or appropriate assistive device and cues as needed secondary to blindness  8. Stand for 3 minutes with UE support on walker or table and perform an activity w/ CGA and cues for blindness   9. Lower extremity exercise program x20 reps per handout, with assistance as needed and gym therex  10. Gait x75 feet w/ Hand held / minimal assistance and without an AD, with cues secondary to blindness     Outcome: Ongoing (interventions implemented as appropriate)  Met one goal today.

## 2017-12-18 NOTE — PLAN OF CARE
Problem: Patient Care Overview  Goal: Plan of Care Review  Outcome: Ongoing (interventions implemented as appropriate)   12/18/17 1657   Coping/Psychosocial   Plan Of Care Reviewed With patient       Problem: Diabetes, Type 2 (Adult)  Goal: Signs and Symptoms of Listed Potential Problems Will be Absent, Minimized or Managed (Diabetes, Type 2)  Signs and symptoms of listed potential problems will be absent, minimized or managed by discharge/transition of care (reference Diabetes, Type 2 (Adult) CPG).   Outcome: Ongoing (interventions implemented as appropriate)   12/18/17 1657   Diabetes, Type 2   Problems Assessed (Type 2 Diabetes) all   Problems Present (Type 2 Diabetes) none       Problem: Hemodialysis (Adult)  Goal: Signs and Symptoms of Listed Potential Problems Will be Absent, Minimized or Managed (Hemodialysis)  Signs and symptoms of listed potential problems will be absent, minimized or managed by discharge/transition of care (reference Hemodialysis (Adult) CPG).   Outcome: Ongoing (interventions implemented as appropriate)   12/18/17 1657   Hemodialysis   Problems Assessed (Hemodialysis) all   Problems Present (Hemodialysis) none       Problem: Fall Risk (Adult)  Goal: Absence of Falls  Patient will demonstrate the desired outcomes by discharge/transition of care.   Outcome: Ongoing (interventions implemented as appropriate)   12/18/17 1657   Fall Risk (Adult)   Absence of Falls making progress toward outcome       Problem: Pressure Ulcer Risk (Refugio Scale) (Adult,Obstetrics,Pediatric)  Goal: Skin Integrity  Patient will demonstrate the desired outcomes by discharge/transition of care.   Outcome: Ongoing (interventions implemented as appropriate)   12/18/17 1657   Pressure Ulcer Risk (Refugio Scale) (Adult,Obstetrics,Pediatric)   Skin Integrity making progress toward outcome       Comments: .Monitored for pain and safety q 1-2 hrs this shift. Safety maintained, no s/s of distress or trauma present.. Denies  pain. Instructed on safety to call for assist, call light and personal items within reach. AC/HS accu checks.

## 2017-12-18 NOTE — PROGRESS NOTES
Report received from Nurse and will be taking over care of patient.  Patient at dialysis at present.  .

## 2017-12-18 NOTE — PT/OT/SLP PROGRESS
Physical Therapy  Treatment    Ronald Campbell   MRN: [id number]   Admitting Diagnosis: Acute cystitis with hematuria    PT Received On: 12/18/17  Total Time (min): 30       Billable Minutes:  Therapeutic Activity 15 and Therapeutic Exercise 15    Treatment Type: Treatment  PT/PTA: PT     PTA Visit Number: 0       General Precautions: Standard, blind, aspiration, fall, diabetic  Orthopedic Precautions: N/A   Braces: N/A         Subjective:  Communicated with pt prior to session.  Pt was agreeable to PT services.   Reported he was tired from his Dialysis appointment.     Pain/Comfort  Pain Rating 1: 0/10    Objective:  Patient found seated in wheelchair coming from Dialysis.         Functional Status:  MDS G  Transfer Functional Status: CGA-Min (A)  Walk in Room Functional Status: CGA-Min (A)  Walk in Corridor Functional Status: CGA-Min (A)  Locomotion on Unit Functional Status: CGA-Min (A)  Moving from seated to standing position: Not steady, only able to stabilize with staff assistance  Walking (with assistive device if used): Not steady, only able to stabilize with staff assistance  Turning around and facing the opposite direction while walking: Not steady, only able to stabilize with staff assistance  Surface-to-surface transfer (transfer between bed and chair or wheelchair): Not steady, only able to stabilize with staff assistance    Transfers:  Sit<>Stand: CGA with instructions to lean forward, placing hands for pt on WC and then transitioning hands  WC<>RW as appropriate.    Gait:  Amb 35, 40 feet with a seated rest break in between; used a rolling walker, CGA- guiding pt due to blindness. Short step length, increased knee flexion noted bilaterally, foot flat upon initial contact.     Therex:  Standing in parallel bars: Hip flexion- 10 reps x 2 trials with SBA (noted B knee flexion while standing, increased reliance on parallel bars for balance)      Patient left up in chair with rehab technician  present.    Assessment:  Ronald Campbell is a 88 y.o. male with a medical diagnosis of Acute cystitis with hematuria.  Mr. Campbell was able to meet his transfer goal, transferring CGA sit<>stand with instructions and manual assistance as needed (noted above). He will benefit from further skilled PT services to ensure safety and decrease his fall risk.    Rehab identified problem list/impairments: weakness, impaired endurance, impaired functional mobilty, gait instability, impaired balance, decreased lower extremity function, pain, visual deficits, edema    Rehab potential is fair.    Activity tolerance: Fair    Discharge recommendations: home with home health (and PACE/ family assist as prior)     Barriers to discharge: Inaccessible home environment, Decreased caregiver support    Equipment recommendations:  (need to verify DME)     GOALS:    Physical Therapy Goals        Problem: Physical Therapy Goal    Goal Priority Disciplines Outcome Goal Variances Interventions   Physical Therapy Goal     PT/OT, PT Ongoing (interventions implemented as appropriate)     Description:  Goals to be met by: 2 weeks     Patient will increase functional independence with mobility by performin. Supine to sit with supervision  2. Sit to supine with supervision  3. Sit to stand transfer with Contact Guard Assistance with/ without RW, as appropriate and cues as needed secondary to blindness. Met (2017)  4. Bed to chair transfer with Contact Guard Assistance using Rolling Walker or least restrictive device or without AD and with cues as needed secondary to blindness  5. Gait  x 150 feet with minimal assistance using Rolling Walker or appropriate assistive device with cues as needed secondary to blindness  6. Ascend/descend 12 stair with bilateral Handrails Minimal Assistance using cues as needed secondary to blindness.   7. Ascend/Descend 4 inch curb step with Minimal Assistance using Rolling Walker or appropriate assistive  device and cues as needed secondary to blindness  8. Stand for 3 minutes with UE support on walker or table and perform an activity w/ CGA and cues for blindness   9. Lower extremity exercise program x20 reps per handout, with assistance as needed and gym therex  10. Gait x75 feet w/ Hand held / minimal assistance and without an AD, with cues secondary to blindness                       PLAN:    Patient to be seen 5 x/week  to address the above listed problems via gait training, therapeutic activities, therapeutic exercises, wheelchair management/training  Plan of Care expires: 01/12/18  Plan of Care reviewed with: patient    Gwen MORA Augusto, PT  12/18/2017

## 2017-12-19 ENCOUNTER — PATIENT OUTREACH (OUTPATIENT)
Dept: ADMINISTRATIVE | Facility: CLINIC | Age: 82
End: 2017-12-19

## 2017-12-19 VITALS
HEART RATE: 66 BPM | HEIGHT: 73 IN | DIASTOLIC BLOOD PRESSURE: 78 MMHG | RESPIRATION RATE: 17 BRPM | SYSTOLIC BLOOD PRESSURE: 153 MMHG | BODY MASS INDEX: 26.71 KG/M2 | WEIGHT: 201.5 LBS | OXYGEN SATURATION: 96 % | TEMPERATURE: 97 F

## 2017-12-19 LAB — POCT GLUCOSE: 88 MG/DL (ref 70–110)

## 2017-12-19 PROCEDURE — 99900058 HC 022 PAID UNDER SNF PPS

## 2017-12-19 PROCEDURE — 63600175 PHARM REV CODE 636 W HCPCS: Performed by: PHYSICIAN ASSISTANT

## 2017-12-19 PROCEDURE — 99316 NF DSCHRG MGMT 30 MIN+: CPT | Mod: ,,, | Performed by: HOSPITALIST

## 2017-12-19 PROCEDURE — 97110 THERAPEUTIC EXERCISES: CPT

## 2017-12-19 PROCEDURE — 25000003 PHARM REV CODE 250: Performed by: PHYSICIAN ASSISTANT

## 2017-12-19 PROCEDURE — 97530 THERAPEUTIC ACTIVITIES: CPT

## 2017-12-19 RX ADMIN — BRINZOLAMIDE 1 DROP: 10 SUSPENSION/ DROPS OPHTHALMIC at 05:12

## 2017-12-19 RX ADMIN — CARVEDILOL 25 MG: 25 TABLET, FILM COATED ORAL at 08:12

## 2017-12-19 RX ADMIN — FINASTERIDE 5 MG: 5 TABLET, FILM COATED ORAL at 08:12

## 2017-12-19 RX ADMIN — AMLODIPINE BESYLATE 10 MG: 10 TABLET ORAL at 08:12

## 2017-12-19 RX ADMIN — FLUCONAZOLE 100 MG: 100 TABLET ORAL at 08:12

## 2017-12-19 RX ADMIN — ASPIRIN 81 MG CHEWABLE TABLET 81 MG: 81 TABLET CHEWABLE at 08:12

## 2017-12-19 RX ADMIN — CALCIUM ACETATE 1334 MG: 667 CAPSULE ORAL at 08:12

## 2017-12-19 RX ADMIN — LEVETIRACETAM 500 MG: 500 TABLET, FILM COATED ORAL at 08:12

## 2017-12-19 RX ADMIN — CALCIUM ACETATE 1334 MG: 667 CAPSULE ORAL at 11:12

## 2017-12-19 RX ADMIN — HEPARIN SODIUM 5000 UNITS: 5000 INJECTION, SOLUTION INTRAVENOUS; SUBCUTANEOUS at 05:12

## 2017-12-19 RX ADMIN — STANDARDIZED SENNA CONCENTRATE AND DOCUSATE SODIUM 1 TABLET: 8.6; 5 TABLET, FILM COATED ORAL at 08:12

## 2017-12-19 RX ADMIN — DOXAZOSIN 4 MG: 4 TABLET ORAL at 08:12

## 2017-12-19 NOTE — PT/OT/SLP PROGRESS
"Occupational Therapy  Treatment/Discharge    Ronald Campbell   MRN: [id number]   Admitting Diagnosis: Acute cystitis with hematuria    OT Date of Treatment: 12/19/17  Total Time (min): 35 min    Billable Minutes:  Therapeutic Activity 20 and Therapeutic Exercise 15    General Precautions: Standard, blind, aspiration, fall, diabetic  Orthopedic Precautions: N/A  Braces: N/A    Do you have any cultural, spiritual, Denominational conflicts, given your current situation?: none stated    Subjective:  Communicated with pt prior to session.  "What do you want me to do?"  I am done with this. I just want to go home    Attempted to educate pt re: completing therapy sessions prior to d/c;however, pt unwilling to participate and refused to continue. Pt returned to room    Pain/Comfort  Pain Rating 1: 0/10    Objective:  Patient found with:  (supine in bed)    Functional Status:  MDS G  Bed Mobility Functional Status: S-SBA (supine to sit with cues and handrail)  Transfer Functional Status: CGA- bed to w/c no AD--cues for hand placement  Locomotion Off Unit Functional Status: total(A)--w/c mobility on unit  Dressing Functional Status:  (min A to rufina crocs seated EOB)          OT Exercises: UE Ergometer 15 min to increase functional endurance for ADLs--pt took several breaks and needed max encouragement to complete      Patient left up in chair with tech transport    ASSESSMENT:  Pt with decreased tolerance of therapy this date and refused to complete session. Pt did not meet OT goals as pt unmotivated to perform tasks. Pt will benefit from HHOT to assess functional indep and safety with cargivers in the home    Rehab identified problem list/impairments: weakness, impaired endurance, impaired self care skills, impaired functional mobilty, gait instability, impaired balance, visual deficits, impaired cognition, decreased coordination, decreased lower extremity function, decreased safety awareness, abnormal tone, decreased ROM, impaired " coordination, impaired fine motor    Rehab potential is fair    Activity tolerance: Fair    Discharge recommendations: home health OT     Barriers to discharge: none    Equipment recommendations: none     GOALS:    Occupational Therapy Goals        Problem: Occupational Therapy Goal    Goal Priority Disciplines Outcome Interventions   Occupational Therapy Goal     OT, PT/OT Unable to achieve outcome(s) by discharge    Description:  Goals to be met by: 14 days     Patient will increase functional independence with ADLs by performing:    UE Dressing with Supervision.--not met  LE Dressing with Minimal Assistance. Not met  Grooming while seated with Supervision.-not met  Toileting from bedside commode with Minimal Assistance for hygiene and clothing management. --not met  Bathing from  shower chair/bench with Minimal Assistance.-not met  Toilet transfer to bedside commode with Contact Guard Assistance.--Met  Upper extremity exercise program x12 reps per handout, with supervision.--not met                       Plan:  D/c pt from in SNF  Plan of Care reviewed with: patient    Cristobal SHIRA Mcclellan  12/19/2017

## 2017-12-19 NOTE — PROGRESS NOTES
Patient resting with eyes closed with daughter at side.  Vs retaken 154/76  pulse  59  resp  18  O 2 sat 98 %.  Temp 75.4.  In no apparent distress.  Daughter at side.  Call light in reach.

## 2017-12-19 NOTE — PROGRESS NOTES
Patient received a Percocet 7.5 mg ,  Dr. Caceres notified. No new orders given.  Patient stable.  No s/s of distress.

## 2017-12-19 NOTE — PLAN OF CARE
Problem: Occupational Therapy Goal  Goal: Occupational Therapy Goal  Goals to be met by: 14 days     Patient will increase functional independence with ADLs by performing:    UE Dressing with Supervision.--not met  LE Dressing with Minimal Assistance. Not met  Grooming while seated with Supervision.-not met  Toileting from bedside commode with Minimal Assistance for hygiene and clothing management. --not met  Bathing from  shower chair/bench with Minimal Assistance.-not met  Toilet transfer to bedside commode with Contact Guard Assistance.--Met  Upper extremity exercise program x12 reps per handout, with supervision.--not met     Outcome: Unable to achieve outcome(s) by discharge  Goals not met due to pt unwilling to perform. SHIRA Jacob  12/19/2017

## 2017-12-19 NOTE — PLAN OF CARE
Problem: Patient Care Overview  Goal: Plan of Care Review  Outcome: Ongoing (interventions implemented as appropriate)   12/18/17 2319   Coping/Psychosocial   Plan Of Care Reviewed With patient       Problem: Diabetes, Type 2 (Adult)  Goal: Signs and Symptoms of Listed Potential Problems Will be Absent, Minimized or Managed (Diabetes, Type 2)  Signs and symptoms of listed potential problems will be absent, minimized or managed by discharge/transition of care (reference Diabetes, Type 2 (Adult) CPG).   Outcome: Ongoing (interventions implemented as appropriate)   12/18/17 2319   Diabetes, Type 2   Problems Assessed (Type 2 Diabetes) all   Problems Present (Type 2 Diabetes) none       Problem: Fall Risk (Adult)  Goal: Absence of Falls  Patient will demonstrate the desired outcomes by discharge/transition of care.   Outcome: Ongoing (interventions implemented as appropriate)   12/18/17 2319   Fall Risk (Adult)   Absence of Falls making progress toward outcome

## 2017-12-19 NOTE — PROGRESS NOTES
Physical Therapy Discharge Summary    Name: Ronald Campbell  MRN: [id number]   Principal Problem: Acute cystitis with hematuria     Patient Discharged from acute Physical Therapy on 17.  Please refer to prior PT noted date on 17 for functional status.     Assessment:     Patient appropriate for care in another setting.    Objective:     GOALS:    Physical Therapy Goals     Not on file          Multidisciplinary Problems (Resolved)        Problem: Physical Therapy Goal    Goal Priority Disciplines Outcome Goal Variances Interventions   Physical Therapy Goal   (Resolved)     PT/OT, PT Outcome(s) achieved     Description:  Goals to be met by: 2 weeks     Patient will increase functional independence with mobility by performin. Supine to sit with supervision  2. Sit to supine with supervision  3. Sit to stand transfer with Contact Guard Assistance with/ without RW, as appropriate and cues as needed secondary to blindness. Met (2017)  4. Bed to chair transfer with Contact Guard Assistance using Rolling Walker or least restrictive device or without AD and with cues as needed secondary to blindness  5. Gait  x 150 feet with minimal assistance using Rolling Walker or appropriate assistive device with cues as needed secondary to blindness  6. Ascend/descend 12 stair with bilateral Handrails Minimal Assistance using cues as needed secondary to blindness.   7. Ascend/Descend 4 inch curb step with Minimal Assistance using Rolling Walker or appropriate assistive device and cues as needed secondary to blindness  8. Stand for 3 minutes with UE support on walker or table and perform an activity w/ CGA and cues for blindness   9. Lower extremity exercise program x20 reps per handout, with assistance as needed and gym therex  10. Gait x75 feet w/ Hand held / minimal assistance and without an AD, with cues secondary to blindness                       Reasons for Discontinuation of Therapy Services  Transfer to  alternate level of care.      Plan:     Patient Discharged to: Home with Home Health Service.    Gwen Casas, PT  12/19/2017

## 2017-12-20 ENCOUNTER — PATIENT OUTREACH (OUTPATIENT)
Dept: ADMINISTRATIVE | Facility: CLINIC | Age: 82
End: 2017-12-20

## 2017-12-20 RX ORDER — BRIMONIDINE TARTRATE AND TIMOLOL MALEATE 2; 5 MG/ML; MG/ML
1 SOLUTION OPHTHALMIC 2 TIMES DAILY
COMMUNITY
End: 2019-01-01 | Stop reason: HOSPADM

## 2017-12-20 NOTE — PATIENT INSTRUCTIONS
Bladder Infection, Male (Adult)    You have a bladder infection.  Urine is normally free of bacteria. But bacteria can get into the urinary tract from the skin around the rectum or it may travel in the blood from elsewhere in the body.  This is called a urinary tract infection (UTI). An infection can occur anywhere in the urinary tract. It could be in a kidney (pyelonephritis)or in the bladder (cystitis) and urethra (urethritis). The urethra is the tube that drains the urine from the bladder through the tip of the penis.  The most common place for a UTI is in the bladder. This is called a bladder infection. Most bladder infections are easily treated. They are not serious unless the infection spreads up to the kidney.  The terms bladder infection, UTI, and cystitis are often used to describe the same thing, but they arent always the same. Cystitis is an inflammation of the bladder. The most common cause of cystitis is an infection.   Keep in mind:  · Infections in the urine are called UTIs.  · Cystitis is usually caused by a UTI.  · Not all UTIs and cases of cystitis are bladder infections.  · Bladder infections are the most common type of cystitis.  Symptoms of a bladder infection  The infection causes inflammation in the urethra and bladder. This inflammation causes many of the symptoms. The most common symptoms of a bladder infection are:  · Pain or burning when urinating  · Having to go more often than usual  · Feeling like you need to go right away  · Only a small amount comes out  · Blood in urine  · Discomfort in your belly (abdomen), usually in the lower abdomen, above the pubic bone  · Cloudy, strong, or bad smelling urine  · Unable to urinate (retention)  · Urinary incontinence  · Fever  · Loss of appetite  Older adults may also feel confused.  Causes of a bladder infection  Bladder infections are not contagious. You can't get one from someone else, from a toilet seat, or from sharing a bath.  The most  common cause of bladder infections is bacteria from the bowels. The bacteria get onto the skin around the opening of the urethra. From there they can get into the urine and travel up to the bladder. This causes inflammation and an infection. This usually happens because of:  · An enlarged prostate  · Poor cleaning of the genitals  · Procedures that put a tube in your bladder, like a Barrett catheter  · Bowel incontinence  · Older age  · Not emptying your bladder (The urine stays there, giving the bacteria a chance to grow.)  · Dehydration (This allows urine to stay in the bladder longer.)  · Constipation (This can cause the bowels to push on the bladder or urethra and keep the bladder from emptying.)  Treatment  Bladder infections are treated with antibiotics. They usually clear up quickly without complications. Treatment helps prevent a more serious kidney infection.  Medicines  Medicines can help in the treatment of a bladder infection:  · You may have been given phenazopyridine to ease burning when you urinate. It will cause your urine to be bright orange. It can stain clothing.  · You may have been prescribed antibiotics. Take this medicine until you have finished it, even if you feel better. Taking all of the medicine will make sure the infection has cleared.  You can use acetaminophen or ibuprofen for pain, fever, or discomfort, unless another medicine was prescribed. You can also alternate them, or use both together. They work differently and are a different class of medicines, so taking them together is not an overdose. If you have chronic liver or kidney disease, talk with your healthcare provider before using these medicines. Also talk with your provider if youve had a stomach ulcer or GI bleeding or are taking blood thinner medicines.  Home care  Here are some guidelines to help you care for yourself at home:  · Drink plenty of fluids, unless your healthcare provider told you not to. Fluids will prevent  dehydration and flush out your bladder.  · Use good personal hygiene. Wipe from front to back after using the toilet, and clean your penis regularly. If you arent circumcised, retract the foreskin when cleaning.  · Urinate more frequently, and dont try to hold it in for long periods of time, if possible.  · Wear loose-fitting clothes and cotton underwear. Avoid tight-fitting pants. This helps keep you clean and dry.  · Change your diet to prevent constipation. This means eating more fresh foods and more fiber, and less junk and fatty foods.  · Avoid sex until your symptoms are gone.  · Avoid caffeine, alcohol, and spicy foods. These can irritate the bladder.  Follow-up care  Follow up with your healthcare provider, or as advised if all symptoms have not cleared up within 5 days. It is important to keep your follow-up appointment. You can talk with your provider to see if you need more tests of the urinary tract. This is especially important if you have infections that keep coming back.  If a culture was done, you will be told if your treatment needs to be changed. If directed, you can call to find out the results.  If X-rays were taken, you will be told of any findings that may affect your care.  Call 911  Call 911 if any of these occur:  · Trouble breathing  · Difficulty waking up  · Feeling confused  · Fainting or loss of consciousness  · Rapid heart rate  When to seek medical advice  Call your healthcare provider right away if any of these occur:  · Fever of 100.4ºF (38ºC) or higher, or as directed by your healthcare provider  · Your symptoms dont improve after 2 days of treatment  · Back or abdominal pain that gets worse  · Repeated vomiting, or you arent able to keep medicine down  · Weakness or dizziness  Date Last Reviewed: 10/1/2016  © 5144-2133 [employer]. [address] [place] [address], [place] [address]. All rights reserved. This information is not intended as a substitute for professional  medical care. Always follow your healthcare professional's instructions.

## 2017-12-20 NOTE — PROGRESS NOTES
Please forward this important TCC information to your provider in order to maximize the post discharge care delivery of this patient.    C3 nurse spoke with the daughter of Ronald Campbell  for a TCC post hospital discharge follow up call. The patient does not have a scheduled HOSFU appointment with Bart Shaw MD   schedule HOSFU appointment in [facility].      Respectfully,  Cecy Garcia RN  [facility]    [email or web address]       Please do not reply to this message, as this inbox is not routinely monitored.

## 2017-12-21 NOTE — ASSESSMENT & PLAN NOTE
-Continue with PT/OT for gait training and strengthening and restoration of ADL's   -Fall precautions   -Continue DVT prophylaxis with heparin

## 2017-12-21 NOTE — DISCHARGE SUMMARY
"[facility]  Department of Hospital Medicine  Discharge Summary      Patient Name: Ronald Campbell  MRN: [id number]  Admission Date: 12/12/2017  Hospital Length of Stay: 7 days  Discharge Date and Time: 12/19/2017 12:00 PM  Attending Physician: Ellie Caceres MD  Discharging Provider: Katie Jules NP  Primary Care Provider: Bart Shaw MD    Chief Complaint/Reason for Admission: Debility    History of Present Illness:  Patient is a 88 y.o. male who has a past medical history of Anemia; Blindness of right eye; CHF (congestive heart failure);  ESRD on HD; Dementia; Diabetes mellitus type II; Glaucoma; Hypertension; and Seizures presented with AMS and confusion due to UTI. Pt started on IV Rocephin and urine culture revealed gayle albicans. IV antibiotics were discontinued and started don oral fluconazole for 14 days. Per acute care notes "throughout hospital stay confusion/agitation waxes and wanes but generally improves throughout the day and with reorientation." He recently underwent L hydrocelectomy and R orchiectomy on 11/22/17 and was evaluated by Urology revealing no evidence of wound infection. Patient has been working with PT/OT who recommend SNF for further balance/mobility training.     Hospital Course:   12/12/17: Patient admitted to SNF for ongoing PT/OT following a hospitalization for AMS and UTI.  12/15: Patient seen at bedside, denies pain, doing well, no complaints.  12/19: Patient doing well, discharge home today with home health services.     Significant Diagnostic Studies:     Recent Labs  Lab 12/16/17  1207 12/18/17  0434   WBC 5.07 4.29   HGB 8.2* 7.9*   HCT 25.1* 24.6*   * 108*       Recent Labs  Lab 12/16/17  1207 12/18/17  0434   * 132*   K 4.9 5.2*    103   CO2 25 22*   BUN 34* 73*   CREATININE 4.7* 7.0*   CALCIUM 8.5* 8.3*     Lab Results   Component Value Date    LABPROT 10.7 04/21/2016    ALBUMIN 2.6 (L) 12/12/2017     Lab Results   Component Value " Date    CALCIUM 8.3 (L) 12/18/2017    PHOS 2.3 (L) 12/18/2017     POCT Glucose   Date Value Ref Range Status   12/19/2017 88 70 - 110 mg/dL Final   12/18/2017 152 (H) 70 - 110 mg/dL Final   Results for EDDI COOPER (MRN [id number]) as of 12/21/2017 17:17   Ref. Range 12/13/2017 05:08 12/16/2017 12:07 12/18/2017 04:34   Magnesium Latest Ref Range: 1.6 - 2.6 mg/dL 2.6 2.2 2.3         Final Active Diagnoses:    Diagnosis Date Noted POA    PRINCIPAL PROBLEM:  Acute cystitis with hematuria [N30.01] 12/04/2017 Yes    Debility [R53.81] 12/05/2017 Yes    Dementia with behavioral disturbance [F03.91] 12/05/2017 Yes    HTN (hypertension) [I10]  Yes    Dyslipidemia [E78.5]  Yes    Glaucoma [H40.9]  Yes    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 [E11.22, N18.6]  Yes    ESRD (end stage renal disease) on dialysis [N18.6, Z99.2]  Not Applicable    Benign prostatic hyperplasia with urinary retention [N40.1, R33.8] 05/06/2014 Yes    Thrombocytopenia [D69.6] 04/30/2014 Yes    Anemia in chronic kidney disease [N18.9, D63.1] 11/28/2012 Yes    Seizure disorder [G40.909] 11/04/2012 Yes     Chronic      Problems Resolved During this Admission:    Diagnosis Date Noted Date Resolved POA    Hydrocele [N43.3] 11/22/2017 12/14/2017 Yes      * Acute cystitis with hematuria    -Continue fluconazole to treat x 14 days for complicated UTI         Dementia with behavioral disturbance    -Delirium precautions         Debility    -Continue with PT/OT for gait training and strengthening and restoration of ADL's   -Fall precautions   -Continue DVT prophylaxis with heparin         Glaucoma    -Continue home eye drops to treat         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    -Continue ADA diet  -Continue BG checks  -SSI  -Avoid insulin stacking         Dyslipidemia    -Continue atorvastatin to treat        HTN (hypertension)    -Poorly controlled   -Continue Norvasc and coreg to treat  -Continue to monitor    -Volume removal with HD should help.   -Continue PRN hydralazine, if frequently using may consider scheduling medication         ESRD (end stage renal disease) on dialysis    -Continue with HD MWF  -Continue calcium acetate to treat         Benign prostatic hyperplasia with urinary retention    -Continue doxazosin and finasteride to treat        Thrombocytopenia    -Trending downward  -Continue to monitor and discontinue heparin if worsens         Anemia in chronic kidney disease    -H/H stable  -Continue to monitor         Seizure disorder    -continue home dose of levetiracetam to treat   -seizure precautions        PT note, RUDY Casas, Pt 12/18/17  General Precautions: Standard, blind, aspiration, fall, diabetic  Orthopedic Precautions: N/A   Braces: N/A  Functional Status:  MDS G  Transfer Functional Status: CGA-Min (A)  Walk in Room Functional Status: CGA-Min (A)  Walk in Corridor Functional Status: CGA-Min (A)  Locomotion on Unit Functional Status: CGA-Min (A)  Moving from seated to standing position: Not steady, only able to stabilize with staff assistance  Walking (with assistive device if used): Not steady, only able to stabilize with staff assistance  Turning around and facing the opposite direction while walking: Not steady, only able to stabilize with staff assistance  Surface-to-surface transfer (transfer between bed and chair or wheelchair): Not steady, only able to stabilize with staff assistance  Transfers:  Sit<>Stand: CGA with instructions to lean forward, placing hands for pt on WC and then transitioning hands  WC<>RW as appropriate.  Gait:  Amb 35, 40 feet with a seated rest break in between; used a rolling walker, CGA- guiding pt due to blindness. Short step length, increased knee flexion noted bilaterally, foot flat upon initial contact.   Discharge recommendations: home with home health (and PACE/ family assist as prior)     OT note, SHIRA Kimbrough 12/19/17  Functional Status:  MDS G  Bed  Mobility Functional Status: S-SBA (supine to sit with cues and handrail)  Transfer Functional Status: CGA- bed to w/c no AD--cues for hand placement  Locomotion Off Unit Functional Status: total(A)--w/c mobility on unit  Dressing Functional Status:  (min A to rufina crocs seated EOB)  Discharge recommendations: home health OT       Discharged Condition: stable    Disposition: Home or Self Care    Follow Up:    Patient Instructions:     Diet general   Order Specific Question Answer Comments   Additional restrictions: Diabetic 1800    Additional restrictions: Renal      Activity as tolerated     Call MD for:  temperature >100.4     Call MD for:  persistent nausea and vomiting or diarrhea     Call MD for:  severe uncontrolled pain     Call MD for:  difficulty breathing or increased cough     Call MD for:  severe persistent headache     Call MD for:  persistent dizziness, light-headedness, or visual disturbances     Call MD for:  increased confusion or weakness       Medications:  Reconciled Home Medications:   Discharge Medication List as of 12/19/2017 11:31 AM      START taking these medications    Details   fluconazole (DIFLUCAN) 100 MG tablet Take 1 tablet (100 mg total) by mouth once daily., Starting Mon 12/18/2017, Until Fri 12/22/2017, Normal         CONTINUE these medications which have CHANGED    Details   calcium acetate (PHOSLO) 667 mg capsule Take 1 capsule (667 mg total) by mouth 3 (three) times daily with meals., Starting Mon 12/18/2017, Until Tue 12/18/2018, No Print      carvedilol (COREG) 25 MG tablet Take 1 tablet (25 mg total) by mouth 2 (two) times daily. Do not take on dialysis days. Hold for SBP < 110 or HR < 55, Starting Mon 12/18/2017, Until Tue 12/18/2018, Normal      doxazosin (CARDURA) 4 MG tablet Take 1 tablet (4 mg total) by mouth once daily., Starting Mon 12/18/2017, Until Tue 12/18/2018, Normal      finasteride (PROSCAR) 5 mg tablet Take 1 tablet (5 mg total) by mouth once daily., Starting  Mon 12/18/2017, Until Tue 12/18/2018, Normal         CONTINUE these medications which have NOT CHANGED    Details   amLODIPine (NORVASC) 5 MG tablet Take 10 mg by mouth once daily. , Historical Med      aspirin 81 MG Chew Take 1 tablet (81 mg total) by mouth once daily., Starting 11/4/2012, Until Discontinued, Normal      atorvastatin (LIPITOR) 40 MG tablet Take 1 tablet (40 mg total) by mouth once daily., Starting 8/14/2014, Until Discontinued, Normal      B complex-vitamin C-folic acid (NEPHRO-JESSICA) 0.8 mg Tab Take 1 tablet by mouth every morning. , Until Discontinued, Historical Med      blood sugar diagnostic (BLOOD GLUCOSE TEST) Strp 1 strip by Misc.(Non-Drug; Combo Route) route 2 (two) times daily., Until Discontinued, Historical Med      brinzolamide (AZOPT) 1 % ophthalmic suspension Place 1 drop into the left eye 3 (three) times daily. , Until Discontinued, Historical Med      HEPARIN SODIUM,PORCINE (HEPARIN, PORCINE,) 1,000 unit/mL injection 1 mL (1,000 Units total) by Intracatheter route as needed (Heparin 1000 units/ml instilled to fill volume of each lumen of dialysis catheter after each dialysis.)., Starting 5/21/2014, Until Discontinued, No Print      insulin aspart (NOVOLOG FLEXPEN) 100 unit/mL InPn  Insulin Pen Subcutaneous As directed.  -200 take 1 unitBS 201-250 take 2 unitsBS 251-300 take 3 unitsBS 301-350 take 4 unitsBS > 351 take 5 units and call MD, Until Discontinued, Historical Med      ketoconazole (NIZORAL) 2 % cream Apply topically once daily. feet, Starting 1/31/2017, Until Discontinued, Print      levetiracetam (KEPPRA) 500 MG Tab Take 1 tablet (500 mg total) by mouth 2 (two) times daily., Starting 5/21/2014, Until Tue 6/30/20, No Print      polyethylene glycol (GLYCOLAX) 17 gram PwPk Take 17 g by mouth once daily., Starting u 11/23/2017, Print      TRAVOPROST (TRAVATAN Z OPHT) Place 1 drop into the left eye every evening. 1 Drops Left eye Every evening, Until Discontinued,  Historical Med         STOP taking these medications       ergocalciferol (ERGOCALCIFEROL) 50,000 unit Cap Comments:   Reason for Stopping:             Time spent on the discharge of patient: 30 minutes    Katie Jules NP  Department of Hospital Medicine  [facility]

## 2017-12-21 NOTE — ASSESSMENT & PLAN NOTE
-Poorly controlled   -Continue Norvasc and coreg to treat  -Continue to monitor   -Volume removal with HD should help.   -Continue PRN hydralazine, if frequently using may consider scheduling medication

## 2018-01-01 ENCOUNTER — PATIENT OUTREACH (OUTPATIENT)
Dept: ADMINISTRATIVE | Facility: CLINIC | Age: 83
End: 2018-01-01

## 2018-01-01 ENCOUNTER — HOSPITAL ENCOUNTER (OUTPATIENT)
Dept: RADIOLOGY | Facility: HOSPITAL | Age: 83
Discharge: HOME | End: 2018-11-15
Attending: SURGERY
Payer: COMMERCIAL

## 2018-01-01 ENCOUNTER — ANESTHESIA EVENT (OUTPATIENT)
Dept: SURGERY | Facility: HOSPITAL | Age: 83
End: 2018-01-01

## 2018-01-01 ENCOUNTER — HOSPITAL ENCOUNTER (INPATIENT)
Facility: HOSPITAL | Age: 83
LOS: 8 days | Discharge: HOME HEALTH | DRG: 725 | End: 2018-12-13
Attending: EMERGENCY MEDICINE | Admitting: EMERGENCY MEDICINE
Payer: COMMERCIAL

## 2018-01-01 ENCOUNTER — HOSPITAL ENCOUNTER (OUTPATIENT)
Dept: RADIOLOGY | Facility: HOSPITAL | Age: 83
Discharge: HOME | End: 2018-07-05
Attending: SURGERY
Payer: COMMERCIAL

## 2018-01-01 ENCOUNTER — ANESTHESIA (OUTPATIENT)
Dept: SURGERY | Facility: HOSPITAL | Age: 83
End: 2018-01-01
Payer: COMMERCIAL

## 2018-01-01 ENCOUNTER — HOSPITAL ENCOUNTER (OUTPATIENT)
Facility: HOSPITAL | Age: 83
Discharge: HOME | End: 2018-06-19
Attending: SURGERY | Admitting: SURGERY
Payer: COMMERCIAL

## 2018-01-01 ENCOUNTER — OFFICE VISIT (OUTPATIENT)
Dept: VASCULAR SURGERY | Facility: CLINIC | Age: 83
End: 2018-01-01
Payer: COMMERCIAL

## 2018-01-01 ENCOUNTER — NURSE TRIAGE (OUTPATIENT)
Dept: ADMINISTRATIVE | Facility: CLINIC | Age: 83
End: 2018-01-01

## 2018-01-01 ENCOUNTER — HOSPITAL ENCOUNTER (EMERGENCY)
Facility: HOSPITAL | Age: 83
Discharge: HOME | End: 2018-11-21
Attending: EMERGENCY MEDICINE
Payer: COMMERCIAL

## 2018-01-01 ENCOUNTER — TELEPHONE (OUTPATIENT)
Dept: NEPHROLOGY | Facility: CLINIC | Age: 83
End: 2018-01-01

## 2018-01-01 ENCOUNTER — ANESTHESIA EVENT (OUTPATIENT)
Dept: SURGERY | Facility: HOSPITAL | Age: 83
End: 2018-01-01
Payer: COMMERCIAL

## 2018-01-01 ENCOUNTER — OFFICE VISIT (OUTPATIENT)
Dept: UROLOGY | Facility: CLINIC | Age: 83
End: 2018-01-01
Payer: COMMERCIAL

## 2018-01-01 ENCOUNTER — HOSPITAL ENCOUNTER (OUTPATIENT)
Dept: VASCULAR SURGERY | Facility: CLINIC | Age: 83
Discharge: HOME | End: 2018-06-14
Attending: SURGERY
Payer: COMMERCIAL

## 2018-01-01 ENCOUNTER — TELEPHONE (OUTPATIENT)
Dept: HEPATOLOGY | Facility: HOSPITAL | Age: 83
End: 2018-01-01

## 2018-01-01 ENCOUNTER — OUTPATIENT CASE MANAGEMENT (OUTPATIENT)
Dept: ADMINISTRATIVE | Facility: OTHER | Age: 83
End: 2018-01-01

## 2018-01-01 ENCOUNTER — HOSPITAL ENCOUNTER (INPATIENT)
Facility: HOSPITAL | Age: 83
LOS: 3 days | Discharge: HOME HEALTH | DRG: 640 | End: 2018-08-04
Attending: EMERGENCY MEDICINE | Admitting: HOSPITALIST
Payer: COMMERCIAL

## 2018-01-01 ENCOUNTER — ANESTHESIA (OUTPATIENT)
Dept: SURGERY | Facility: HOSPITAL | Age: 83
End: 2018-01-01

## 2018-01-01 ENCOUNTER — HOSPITAL ENCOUNTER (OUTPATIENT)
Facility: HOSPITAL | Age: 83
Discharge: HOME HEALTH | End: 2018-11-30
Attending: EMERGENCY MEDICINE | Admitting: HOSPITALIST
Payer: COMMERCIAL

## 2018-01-01 ENCOUNTER — OFFICE VISIT (OUTPATIENT)
Dept: PODIATRY | Facility: CLINIC | Age: 83
End: 2018-01-01
Payer: COMMERCIAL

## 2018-01-01 VITALS
SYSTOLIC BLOOD PRESSURE: 143 MMHG | TEMPERATURE: 98 F | BODY MASS INDEX: 23.37 KG/M2 | HEART RATE: 62 BPM | HEIGHT: 73 IN | WEIGHT: 176.38 LBS | DIASTOLIC BLOOD PRESSURE: 69 MMHG

## 2018-01-01 VITALS
HEIGHT: 73 IN | DIASTOLIC BLOOD PRESSURE: 63 MMHG | TEMPERATURE: 97 F | SYSTOLIC BLOOD PRESSURE: 138 MMHG | WEIGHT: 178.13 LBS | HEART RATE: 58 BPM | BODY MASS INDEX: 23.61 KG/M2

## 2018-01-01 VITALS
SYSTOLIC BLOOD PRESSURE: 166 MMHG | BODY MASS INDEX: 23.23 KG/M2 | HEART RATE: 64 BPM | DIASTOLIC BLOOD PRESSURE: 70 MMHG | HEIGHT: 73 IN | TEMPERATURE: 99 F | WEIGHT: 175.25 LBS | RESPIRATION RATE: 18 BRPM | OXYGEN SATURATION: 98 %

## 2018-01-01 VITALS
TEMPERATURE: 98 F | DIASTOLIC BLOOD PRESSURE: 57 MMHG | WEIGHT: 182.56 LBS | RESPIRATION RATE: 16 BRPM | BODY MASS INDEX: 24.2 KG/M2 | HEIGHT: 73 IN | OXYGEN SATURATION: 96 % | SYSTOLIC BLOOD PRESSURE: 120 MMHG | HEART RATE: 80 BPM

## 2018-01-01 VITALS
WEIGHT: 176 LBS | OXYGEN SATURATION: 99 % | HEIGHT: 73 IN | DIASTOLIC BLOOD PRESSURE: 69 MMHG | BODY MASS INDEX: 23.33 KG/M2 | TEMPERATURE: 97 F | SYSTOLIC BLOOD PRESSURE: 151 MMHG | HEART RATE: 69 BPM | RESPIRATION RATE: 18 BRPM

## 2018-01-01 VITALS
WEIGHT: 178 LBS | TEMPERATURE: 98 F | SYSTOLIC BLOOD PRESSURE: 150 MMHG | BODY MASS INDEX: 23.59 KG/M2 | HEART RATE: 65 BPM | HEIGHT: 73 IN | OXYGEN SATURATION: 96 % | RESPIRATION RATE: 18 BRPM | DIASTOLIC BLOOD PRESSURE: 70 MMHG

## 2018-01-01 VITALS
SYSTOLIC BLOOD PRESSURE: 126 MMHG | HEART RATE: 60 BPM | BODY MASS INDEX: 23.48 KG/M2 | HEIGHT: 73 IN | HEART RATE: 63 BPM | TEMPERATURE: 98 F | SYSTOLIC BLOOD PRESSURE: 130 MMHG | DIASTOLIC BLOOD PRESSURE: 63 MMHG | DIASTOLIC BLOOD PRESSURE: 61 MMHG

## 2018-01-01 VITALS
OXYGEN SATURATION: 100 % | HEART RATE: 78 BPM | HEIGHT: 72 IN | WEIGHT: 165 LBS | SYSTOLIC BLOOD PRESSURE: 187 MMHG | DIASTOLIC BLOOD PRESSURE: 90 MMHG | TEMPERATURE: 98 F | BODY MASS INDEX: 22.35 KG/M2 | RESPIRATION RATE: 17 BRPM

## 2018-01-01 VITALS — HEART RATE: 62 BPM | SYSTOLIC BLOOD PRESSURE: 148 MMHG | DIASTOLIC BLOOD PRESSURE: 69 MMHG | HEIGHT: 73 IN

## 2018-01-01 DIAGNOSIS — G40.909 SEIZURE DISORDER: Chronic | ICD-10-CM

## 2018-01-01 DIAGNOSIS — I50.42 CHRONIC COMBINED SYSTOLIC AND DIASTOLIC HEART FAILURE: Chronic | ICD-10-CM

## 2018-01-01 DIAGNOSIS — N18.6 ESRD (END STAGE RENAL DISEASE) ON DIALYSIS: ICD-10-CM

## 2018-01-01 DIAGNOSIS — Z99.2 HEMODIALYSIS PATIENT: ICD-10-CM

## 2018-01-01 DIAGNOSIS — D62 ACUTE POST-HEMORRHAGIC ANEMIA: ICD-10-CM

## 2018-01-01 DIAGNOSIS — Z01.818 PREOP EXAMINATION: Primary | ICD-10-CM

## 2018-01-01 DIAGNOSIS — R33.8 BENIGN PROSTATIC HYPERPLASIA WITH URINARY RETENTION: ICD-10-CM

## 2018-01-01 DIAGNOSIS — R53.1 WEAKNESS: ICD-10-CM

## 2018-01-01 DIAGNOSIS — D63.1 ANEMIA IN CHRONIC KIDNEY DISEASE, ON CHRONIC DIALYSIS: ICD-10-CM

## 2018-01-01 DIAGNOSIS — Z99.2 ESRD (END STAGE RENAL DISEASE) ON DIALYSIS: ICD-10-CM

## 2018-01-01 DIAGNOSIS — Z99.2 ESRD (END STAGE RENAL DISEASE) ON DIALYSIS: Primary | ICD-10-CM

## 2018-01-01 DIAGNOSIS — F03.91 DEMENTIA WITH BEHAVIORAL DISTURBANCE, UNSPECIFIED DEMENTIA TYPE: ICD-10-CM

## 2018-01-01 DIAGNOSIS — N40.0 ENLARGED PROSTATE: ICD-10-CM

## 2018-01-01 DIAGNOSIS — I87.1 VEIN STENOSIS: ICD-10-CM

## 2018-01-01 DIAGNOSIS — N40.1 BENIGN PROSTATIC HYPERPLASIA WITH URINARY RETENTION: ICD-10-CM

## 2018-01-01 DIAGNOSIS — H54.7 BLINDNESS: ICD-10-CM

## 2018-01-01 DIAGNOSIS — R31.9 URINARY TRACT INFECTION WITH HEMATURIA, SITE UNSPECIFIED: ICD-10-CM

## 2018-01-01 DIAGNOSIS — R31.9 HEMATURIA, UNSPECIFIED TYPE: ICD-10-CM

## 2018-01-01 DIAGNOSIS — N18.6 ANEMIA IN CHRONIC KIDNEY DISEASE, ON CHRONIC DIALYSIS: ICD-10-CM

## 2018-01-01 DIAGNOSIS — E87.5 ACUTE HYPERKALEMIA: ICD-10-CM

## 2018-01-01 DIAGNOSIS — Z75.8 DISCHARGE PLANNING ISSUES: ICD-10-CM

## 2018-01-01 DIAGNOSIS — N18.6 ESRD (END STAGE RENAL DISEASE) ON DIALYSIS: Primary | ICD-10-CM

## 2018-01-01 DIAGNOSIS — T82.858D ARTERIOVENOUS FISTULA STENOSIS, SUBSEQUENT ENCOUNTER: Primary | ICD-10-CM

## 2018-01-01 DIAGNOSIS — N18.6 TYPE 2 DIABETES MELLITUS WITH END-STAGE RENAL DISEASE: ICD-10-CM

## 2018-01-01 DIAGNOSIS — I10 ESSENTIAL HYPERTENSION: ICD-10-CM

## 2018-01-01 DIAGNOSIS — E87.5 HYPERKALEMIA: ICD-10-CM

## 2018-01-01 DIAGNOSIS — E83.39 HYPERPHOSPHATEMIA: ICD-10-CM

## 2018-01-01 DIAGNOSIS — Z99.2 ANEMIA IN CHRONIC KIDNEY DISEASE, ON CHRONIC DIALYSIS: ICD-10-CM

## 2018-01-01 DIAGNOSIS — N30.01 ACUTE CYSTITIS WITH HEMATURIA: Primary | ICD-10-CM

## 2018-01-01 DIAGNOSIS — N18.6 TYPE 2 DIABETES MELLITUS WITH END-STAGE RENAL DISEASE: Primary | ICD-10-CM

## 2018-01-01 DIAGNOSIS — R31.9 HEMATURIA, UNSPECIFIED TYPE: Primary | ICD-10-CM

## 2018-01-01 DIAGNOSIS — E87.79 OTHER HYPERVOLEMIA: ICD-10-CM

## 2018-01-01 DIAGNOSIS — R31.0 GROSS HEMATURIA: Primary | ICD-10-CM

## 2018-01-01 DIAGNOSIS — E11.22 TYPE 2 DIABETES MELLITUS WITH END-STAGE RENAL DISEASE: Primary | ICD-10-CM

## 2018-01-01 DIAGNOSIS — E11.22 TYPE 2 DIABETES MELLITUS WITH END-STAGE RENAL DISEASE: ICD-10-CM

## 2018-01-01 DIAGNOSIS — E87.20 METABOLIC ACIDOSIS: ICD-10-CM

## 2018-01-01 DIAGNOSIS — J18.9 HCAP (HEALTHCARE-ASSOCIATED PNEUMONIA): ICD-10-CM

## 2018-01-01 DIAGNOSIS — D64.9 ANEMIA, UNSPECIFIED TYPE: ICD-10-CM

## 2018-01-01 DIAGNOSIS — E87.70 VOLUME OVERLOAD: Primary | ICD-10-CM

## 2018-01-01 DIAGNOSIS — N18.6 ESRD (END STAGE RENAL DISEASE): ICD-10-CM

## 2018-01-01 DIAGNOSIS — E87.79 OTHER HYPERVOLEMIA: Primary | ICD-10-CM

## 2018-01-01 DIAGNOSIS — B35.1 DERMATOPHYTOSIS, NAIL: ICD-10-CM

## 2018-01-01 DIAGNOSIS — J81.0 ACUTE PULMONARY EDEMA: ICD-10-CM

## 2018-01-01 DIAGNOSIS — I87.1 VEIN STENOSIS: Primary | ICD-10-CM

## 2018-01-01 DIAGNOSIS — N39.0 URINARY TRACT INFECTION WITH HEMATURIA, SITE UNSPECIFIED: ICD-10-CM

## 2018-01-01 DIAGNOSIS — D64.9 ANEMIA: ICD-10-CM

## 2018-01-01 DIAGNOSIS — R31.9 HEMATURIA: ICD-10-CM

## 2018-01-01 DIAGNOSIS — G93.41 METABOLIC ENCEPHALOPATHY: ICD-10-CM

## 2018-01-01 DIAGNOSIS — R94.31 LONG QT INTERVAL: ICD-10-CM

## 2018-01-01 DIAGNOSIS — N28.89 RIGHT KIDNEY MASS: ICD-10-CM

## 2018-01-01 DIAGNOSIS — R93.429 ABNORMAL ULTRASOUND OF KIDNEY: ICD-10-CM

## 2018-01-01 DIAGNOSIS — L84 CORN OR CALLUS: ICD-10-CM

## 2018-01-01 DIAGNOSIS — Z99.2 DEPENDENCE ON RENAL DIALYSIS: ICD-10-CM

## 2018-01-01 LAB
ABO + RH BLD: NORMAL
ALBUMIN SERPL BCP-MCNC: 2.3 G/DL
ALBUMIN SERPL BCP-MCNC: 2.4 G/DL
ALBUMIN SERPL BCP-MCNC: 2.5 G/DL
ALBUMIN SERPL BCP-MCNC: 2.5 G/DL
ALBUMIN SERPL BCP-MCNC: 2.6 G/DL
ALBUMIN SERPL BCP-MCNC: 2.7 G/DL
ALBUMIN SERPL BCP-MCNC: 2.7 G/DL
ALBUMIN SERPL BCP-MCNC: 2.8 G/DL
ALBUMIN SERPL BCP-MCNC: 2.8 G/DL
ALBUMIN SERPL BCP-MCNC: 2.9 G/DL
ALBUMIN SERPL BCP-MCNC: 2.9 G/DL
ALBUMIN SERPL BCP-MCNC: 3 G/DL
ALBUMIN SERPL BCP-MCNC: 3.1 G/DL
ALBUMIN SERPL BCP-MCNC: 3.3 G/DL
ALP SERPL-CCNC: 81 U/L
ALP SERPL-CCNC: 87 U/L
ALP SERPL-CCNC: 93 U/L
ALP SERPL-CCNC: 96 U/L
ALP SERPL-CCNC: 96 U/L
ALP SERPL-CCNC: 97 U/L
ALP SERPL-CCNC: 99 U/L
ALT SERPL W/O P-5'-P-CCNC: 18 U/L
ALT SERPL W/O P-5'-P-CCNC: 18 U/L
ALT SERPL W/O P-5'-P-CCNC: 19 U/L
ALT SERPL W/O P-5'-P-CCNC: 22 U/L
ALT SERPL W/O P-5'-P-CCNC: 26 U/L
ALT SERPL W/O P-5'-P-CCNC: 33 U/L
ALT SERPL W/O P-5'-P-CCNC: 42 U/L
ANION GAP SERPL CALC-SCNC: 10 MMOL/L
ANION GAP SERPL CALC-SCNC: 10 MMOL/L
ANION GAP SERPL CALC-SCNC: 11 MMOL/L
ANION GAP SERPL CALC-SCNC: 12 MMOL/L
ANION GAP SERPL CALC-SCNC: 13 MMOL/L
ANION GAP SERPL CALC-SCNC: 14 MMOL/L
ANION GAP SERPL CALC-SCNC: 6 MMOL/L
ANION GAP SERPL CALC-SCNC: 7 MMOL/L
ANION GAP SERPL CALC-SCNC: 7 MMOL/L
ANION GAP SERPL CALC-SCNC: 8 MMOL/L
ANION GAP SERPL CALC-SCNC: 9 MMOL/L
ANISOCYTOSIS BLD QL SMEAR: SLIGHT
ANISOCYTOSIS BLD QL SMEAR: SLIGHT
AST SERPL-CCNC: 10 U/L
AST SERPL-CCNC: 13 U/L
AST SERPL-CCNC: 14 U/L
AST SERPL-CCNC: 17 U/L
AST SERPL-CCNC: 27 U/L
AST SERPL-CCNC: 42 U/L
AST SERPL-CCNC: 46 U/L
BACTERIA #/AREA URNS AUTO: ABNORMAL /HPF
BACTERIA #/AREA URNS AUTO: ABNORMAL /HPF
BACTERIA BLD CULT: NORMAL
BACTERIA UR CULT: NORMAL
BACTERIA UR CULT: NORMAL
BASOPHILS # BLD AUTO: 0.01 K/UL
BASOPHILS # BLD AUTO: 0.02 K/UL
BASOPHILS # BLD AUTO: 0.03 K/UL
BASOPHILS NFR BLD: 0.1 %
BASOPHILS NFR BLD: 0.1 %
BASOPHILS NFR BLD: 0.2 %
BASOPHILS NFR BLD: 0.3 %
BASOPHILS NFR BLD: 0.4 %
BASOPHILS NFR BLD: 0.4 %
BASOPHILS NFR BLD: 0.5 %
BASOPHILS NFR BLD: 0.5 %
BILIRUB SERPL-MCNC: 0.4 MG/DL
BILIRUB SERPL-MCNC: 0.4 MG/DL
BILIRUB SERPL-MCNC: 0.5 MG/DL
BILIRUB SERPL-MCNC: 0.6 MG/DL
BILIRUB SERPL-MCNC: 0.6 MG/DL
BILIRUB UR QL STRIP: NEGATIVE
BILIRUB UR QL STRIP: NEGATIVE
BLD GP AB SCN CELLS X3 SERPL QL: NORMAL
BLD PROD TYP BPU: NORMAL
BLOOD UNIT EXPIRATION DATE: NORMAL
BLOOD UNIT TYPE CODE: 5100
BLOOD UNIT TYPE: NORMAL
BNP SERPL-MCNC: 1446 PG/ML
BNP SERPL-MCNC: 2379 PG/ML
BUN SERPL-MCNC: 18 MG/DL
BUN SERPL-MCNC: 21 MG/DL
BUN SERPL-MCNC: 22 MG/DL
BUN SERPL-MCNC: 27 MG/DL
BUN SERPL-MCNC: 27 MG/DL
BUN SERPL-MCNC: 29 MG/DL
BUN SERPL-MCNC: 33 MG/DL
BUN SERPL-MCNC: 38 MG/DL
BUN SERPL-MCNC: 39 MG/DL
BUN SERPL-MCNC: 40 MG/DL
BUN SERPL-MCNC: 43 MG/DL
BUN SERPL-MCNC: 47 MG/DL
BUN SERPL-MCNC: 48 MG/DL
BUN SERPL-MCNC: 53 MG/DL
BUN SERPL-MCNC: 56 MG/DL
BUN SERPL-MCNC: 59 MG/DL
BUN SERPL-MCNC: 78 MG/DL
BUN SERPL-MCNC: 85 MG/DL
BUN SERPL-MCNC: 88 MG/DL
CALCIUM SERPL-MCNC: 8 MG/DL
CALCIUM SERPL-MCNC: 8 MG/DL
CALCIUM SERPL-MCNC: 8.1 MG/DL
CALCIUM SERPL-MCNC: 8.2 MG/DL
CALCIUM SERPL-MCNC: 8.3 MG/DL
CALCIUM SERPL-MCNC: 8.3 MG/DL
CALCIUM SERPL-MCNC: 8.5 MG/DL
CALCIUM SERPL-MCNC: 8.7 MG/DL
CALCIUM SERPL-MCNC: 8.8 MG/DL
CALCIUM SERPL-MCNC: 9 MG/DL
CALCIUM SERPL-MCNC: 9 MG/DL
CALCIUM SERPL-MCNC: 9.4 MG/DL
CHLORIDE SERPL-SCNC: 100 MMOL/L
CHLORIDE SERPL-SCNC: 101 MMOL/L
CHLORIDE SERPL-SCNC: 102 MMOL/L
CHLORIDE SERPL-SCNC: 103 MMOL/L
CHLORIDE SERPL-SCNC: 104 MMOL/L
CHLORIDE SERPL-SCNC: 104 MMOL/L
CHLORIDE SERPL-SCNC: 108 MMOL/L
CHLORIDE SERPL-SCNC: 96 MMOL/L
CHLORIDE SERPL-SCNC: 97 MMOL/L
CHLORIDE SERPL-SCNC: 97 MMOL/L
CHLORIDE SERPL-SCNC: 99 MMOL/L
CLARITY UR REFRACT.AUTO: ABNORMAL
CLARITY UR REFRACT.AUTO: ABNORMAL
CO2 SERPL-SCNC: 18 MMOL/L
CO2 SERPL-SCNC: 19 MMOL/L
CO2 SERPL-SCNC: 20 MMOL/L
CO2 SERPL-SCNC: 21 MMOL/L
CO2 SERPL-SCNC: 21 MMOL/L
CO2 SERPL-SCNC: 22 MMOL/L
CO2 SERPL-SCNC: 22 MMOL/L
CO2 SERPL-SCNC: 23 MMOL/L
CO2 SERPL-SCNC: 23 MMOL/L
CO2 SERPL-SCNC: 24 MMOL/L
CO2 SERPL-SCNC: 25 MMOL/L
CO2 SERPL-SCNC: 26 MMOL/L
CO2 SERPL-SCNC: 27 MMOL/L
CODING SYSTEM: NORMAL
COLOR UR AUTO: ABNORMAL
COLOR UR AUTO: ABNORMAL
CREAT SERPL-MCNC: 10 MG/DL
CREAT SERPL-MCNC: 3.7 MG/DL
CREAT SERPL-MCNC: 4.1 MG/DL
CREAT SERPL-MCNC: 4.2 MG/DL
CREAT SERPL-MCNC: 4.7 MG/DL
CREAT SERPL-MCNC: 4.9 MG/DL
CREAT SERPL-MCNC: 5 MG/DL
CREAT SERPL-MCNC: 5.5 MG/DL
CREAT SERPL-MCNC: 5.5 MG/DL
CREAT SERPL-MCNC: 6 MG/DL
CREAT SERPL-MCNC: 6.1 MG/DL
CREAT SERPL-MCNC: 6.2 MG/DL
CREAT SERPL-MCNC: 6.3 MG/DL
CREAT SERPL-MCNC: 6.4 MG/DL
CREAT SERPL-MCNC: 7 MG/DL
CREAT SERPL-MCNC: 7.4 MG/DL
CREAT SERPL-MCNC: 7.5 MG/DL
CREAT SERPL-MCNC: 7.7 MG/DL
CREAT SERPL-MCNC: 8.5 MG/DL
CREAT SERPL-MCNC: 9.2 MG/DL
CREAT SERPL-MCNC: 9.6 MG/DL
DIFFERENTIAL METHOD: ABNORMAL
DISPENSE STATUS: NORMAL
EOSINOPHIL # BLD AUTO: 0 K/UL
EOSINOPHIL # BLD AUTO: 0.1 K/UL
EOSINOPHIL NFR BLD: 0.3 %
EOSINOPHIL NFR BLD: 0.5 %
EOSINOPHIL NFR BLD: 0.5 %
EOSINOPHIL NFR BLD: 0.7 %
EOSINOPHIL NFR BLD: 0.9 %
EOSINOPHIL NFR BLD: 1 %
EOSINOPHIL NFR BLD: 1.1 %
EOSINOPHIL NFR BLD: 1.2 %
EOSINOPHIL NFR BLD: 1.2 %
EOSINOPHIL NFR BLD: 1.3 %
EOSINOPHIL NFR BLD: 1.4 %
EOSINOPHIL NFR BLD: 1.9 %
ERYTHROCYTE [DISTWIDTH] IN BLOOD BY AUTOMATED COUNT: 19.4 %
ERYTHROCYTE [DISTWIDTH] IN BLOOD BY AUTOMATED COUNT: 19.9 %
ERYTHROCYTE [DISTWIDTH] IN BLOOD BY AUTOMATED COUNT: 20.2 %
ERYTHROCYTE [DISTWIDTH] IN BLOOD BY AUTOMATED COUNT: 20.7 %
ERYTHROCYTE [DISTWIDTH] IN BLOOD BY AUTOMATED COUNT: 21.1 %
ERYTHROCYTE [DISTWIDTH] IN BLOOD BY AUTOMATED COUNT: 21.2 %
ERYTHROCYTE [DISTWIDTH] IN BLOOD BY AUTOMATED COUNT: 21.2 %
ERYTHROCYTE [DISTWIDTH] IN BLOOD BY AUTOMATED COUNT: 21.4 %
ERYTHROCYTE [DISTWIDTH] IN BLOOD BY AUTOMATED COUNT: 21.5 %
ERYTHROCYTE [DISTWIDTH] IN BLOOD BY AUTOMATED COUNT: 21.6 %
ERYTHROCYTE [DISTWIDTH] IN BLOOD BY AUTOMATED COUNT: 21.6 %
ERYTHROCYTE [DISTWIDTH] IN BLOOD BY AUTOMATED COUNT: 21.8 %
ERYTHROCYTE [DISTWIDTH] IN BLOOD BY AUTOMATED COUNT: 22.1 %
ERYTHROCYTE [DISTWIDTH] IN BLOOD BY AUTOMATED COUNT: 22.2 %
ERYTHROCYTE [DISTWIDTH] IN BLOOD BY AUTOMATED COUNT: 22.4 %
ERYTHROCYTE [DISTWIDTH] IN BLOOD BY AUTOMATED COUNT: 22.5 %
ERYTHROCYTE [DISTWIDTH] IN BLOOD BY AUTOMATED COUNT: 22.7 %
EST. GFR  (AFRICAN AMERICAN): 11 ML/MIN/1.73 M^2
EST. GFR  (AFRICAN AMERICAN): 11.2 ML/MIN/1.73 M^2
EST. GFR  (AFRICAN AMERICAN): 11.8 ML/MIN/1.73 M^2
EST. GFR  (AFRICAN AMERICAN): 13.5 ML/MIN/1.73 M^2
EST. GFR  (AFRICAN AMERICAN): 13.9 ML/MIN/1.73 M^2
EST. GFR  (AFRICAN AMERICAN): 15.8 ML/MIN/1.73 M^2
EST. GFR  (AFRICAN AMERICAN): 4.7 ML/MIN/1.73 M^2
EST. GFR  (AFRICAN AMERICAN): 5 ML/MIN/1.73 M^2
EST. GFR  (AFRICAN AMERICAN): 5.2 ML/MIN/1.73 M^2
EST. GFR  (AFRICAN AMERICAN): 5.8 ML/MIN/1.73 M^2
EST. GFR  (AFRICAN AMERICAN): 6.5 ML/MIN/1.73 M^2
EST. GFR  (AFRICAN AMERICAN): 6.7 ML/MIN/1.73 M^2
EST. GFR  (AFRICAN AMERICAN): 6.8 ML/MIN/1.73 M^2
EST. GFR  (AFRICAN AMERICAN): 7.3 ML/MIN/1.73 M^2
EST. GFR  (AFRICAN AMERICAN): 8.1 ML/MIN/1.73 M^2
EST. GFR  (AFRICAN AMERICAN): 8.3 ML/MIN/1.73 M^2
EST. GFR  (AFRICAN AMERICAN): 8.5 ML/MIN/1.73 M^2
EST. GFR  (AFRICAN AMERICAN): 8.6 ML/MIN/1.73 M^2
EST. GFR  (AFRICAN AMERICAN): 8.8 ML/MIN/1.73 M^2
EST. GFR  (AFRICAN AMERICAN): 9.8 ML/MIN/1.73 M^2
EST. GFR  (AFRICAN AMERICAN): 9.8 ML/MIN/1.73 M^2
EST. GFR  (NON AFRICAN AMERICAN): 10.2 ML/MIN/1.73 M^2
EST. GFR  (NON AFRICAN AMERICAN): 11.7 ML/MIN/1.73 M^2
EST. GFR  (NON AFRICAN AMERICAN): 12.1 ML/MIN/1.73 M^2
EST. GFR  (NON AFRICAN AMERICAN): 13.7 ML/MIN/1.73 M^2
EST. GFR  (NON AFRICAN AMERICAN): 4.1 ML/MIN/1.73 M^2
EST. GFR  (NON AFRICAN AMERICAN): 4.3 ML/MIN/1.73 M^2
EST. GFR  (NON AFRICAN AMERICAN): 4.5 ML/MIN/1.73 M^2
EST. GFR  (NON AFRICAN AMERICAN): 5 ML/MIN/1.73 M^2
EST. GFR  (NON AFRICAN AMERICAN): 5.6 ML/MIN/1.73 M^2
EST. GFR  (NON AFRICAN AMERICAN): 5.8 ML/MIN/1.73 M^2
EST. GFR  (NON AFRICAN AMERICAN): 5.9 ML/MIN/1.73 M^2
EST. GFR  (NON AFRICAN AMERICAN): 6.3 ML/MIN/1.73 M^2
EST. GFR  (NON AFRICAN AMERICAN): 7 ML/MIN/1.73 M^2
EST. GFR  (NON AFRICAN AMERICAN): 7.2 ML/MIN/1.73 M^2
EST. GFR  (NON AFRICAN AMERICAN): 7.3 ML/MIN/1.73 M^2
EST. GFR  (NON AFRICAN AMERICAN): 7.5 ML/MIN/1.73 M^2
EST. GFR  (NON AFRICAN AMERICAN): 7.6 ML/MIN/1.73 M^2
EST. GFR  (NON AFRICAN AMERICAN): 8.5 ML/MIN/1.73 M^2
EST. GFR  (NON AFRICAN AMERICAN): 8.5 ML/MIN/1.73 M^2
EST. GFR  (NON AFRICAN AMERICAN): 9.5 ML/MIN/1.73 M^2
EST. GFR  (NON AFRICAN AMERICAN): 9.7 ML/MIN/1.73 M^2
ESTIMATED AVG GLUCOSE: 108 MG/DL
ESTIMATED AVG GLUCOSE: 134 MG/DL
FERRITIN SERPL-MCNC: 1817 NG/ML
GLUCOSE SERPL-MCNC: 107 MG/DL
GLUCOSE SERPL-MCNC: 112 MG/DL
GLUCOSE SERPL-MCNC: 113 MG/DL
GLUCOSE SERPL-MCNC: 113 MG/DL
GLUCOSE SERPL-MCNC: 121 MG/DL
GLUCOSE SERPL-MCNC: 129 MG/DL
GLUCOSE SERPL-MCNC: 130 MG/DL
GLUCOSE SERPL-MCNC: 133 MG/DL
GLUCOSE SERPL-MCNC: 141 MG/DL
GLUCOSE SERPL-MCNC: 150 MG/DL
GLUCOSE SERPL-MCNC: 150 MG/DL
GLUCOSE SERPL-MCNC: 151 MG/DL
GLUCOSE SERPL-MCNC: 153 MG/DL
GLUCOSE SERPL-MCNC: 187 MG/DL
GLUCOSE SERPL-MCNC: 191 MG/DL
GLUCOSE SERPL-MCNC: 215 MG/DL
GLUCOSE SERPL-MCNC: 224 MG/DL
GLUCOSE SERPL-MCNC: 90 MG/DL
GLUCOSE SERPL-MCNC: 91 MG/DL
GLUCOSE SERPL-MCNC: 92 MG/DL
GLUCOSE SERPL-MCNC: 93 MG/DL
GLUCOSE UR QL STRIP: NEGATIVE
GLUCOSE UR QL STRIP: NEGATIVE
HAV IGM SERPL QL IA: NEGATIVE
HBA1C MFR BLD HPLC: 5.4 %
HBA1C MFR BLD HPLC: 6.3 %
HBV CORE IGM SERPL QL IA: NEGATIVE
HBV E AG SERPL QL IA: NORMAL
HBV SURFACE AG SERPL QL IA: NEGATIVE
HCT VFR BLD AUTO: 18.6 %
HCT VFR BLD AUTO: 18.9 %
HCT VFR BLD AUTO: 22.1 %
HCT VFR BLD AUTO: 22.9 %
HCT VFR BLD AUTO: 22.9 %
HCT VFR BLD AUTO: 23.2 %
HCT VFR BLD AUTO: 23.7 %
HCT VFR BLD AUTO: 24.3 %
HCT VFR BLD AUTO: 24.6 %
HCT VFR BLD AUTO: 24.7 %
HCT VFR BLD AUTO: 25 %
HCT VFR BLD AUTO: 25.3 %
HCT VFR BLD AUTO: 25.5 %
HCT VFR BLD AUTO: 25.6 %
HCT VFR BLD AUTO: 25.9 %
HCT VFR BLD AUTO: 25.9 %
HCT VFR BLD AUTO: 26 %
HCT VFR BLD AUTO: 26.2 %
HCT VFR BLD AUTO: 26.2 %
HCT VFR BLD AUTO: 26.3 %
HCT VFR BLD AUTO: 26.8 %
HCT VFR BLD AUTO: 27.5 %
HCT VFR BLD AUTO: 31.5 %
HCT VFR BLD AUTO: 32.4 %
HCT VFR BLD AUTO: 35.6 %
HCT VFR BLD AUTO: 36.2 %
HCT VFR BLD AUTO: 37.4 %
HCT VFR BLD AUTO: 38.2 %
HCV AB SERPL QL IA: NEGATIVE
HGB BLD-MCNC: 10.2 G/DL
HGB BLD-MCNC: 10.2 G/DL
HGB BLD-MCNC: 11.2 G/DL
HGB BLD-MCNC: 11.6 G/DL
HGB BLD-MCNC: 12.2 G/DL
HGB BLD-MCNC: 12.4 G/DL
HGB BLD-MCNC: 5.9 G/DL
HGB BLD-MCNC: 6.1 G/DL
HGB BLD-MCNC: 7 G/DL
HGB BLD-MCNC: 7.4 G/DL
HGB BLD-MCNC: 7.5 G/DL
HGB BLD-MCNC: 7.5 G/DL
HGB BLD-MCNC: 7.7 G/DL
HGB BLD-MCNC: 7.8 G/DL
HGB BLD-MCNC: 7.8 G/DL
HGB BLD-MCNC: 8 G/DL
HGB BLD-MCNC: 8.2 G/DL
HGB BLD-MCNC: 8.2 G/DL
HGB BLD-MCNC: 8.3 G/DL
HGB BLD-MCNC: 8.4 G/DL
HGB BLD-MCNC: 8.6 G/DL
HGB BLD-MCNC: 8.6 G/DL
HGB BLD-MCNC: 8.7 G/DL
HGB BLD-MCNC: 8.8 G/DL
HGB UR QL STRIP: ABNORMAL
HGB UR QL STRIP: ABNORMAL
HYALINE CASTS UR QL AUTO: 0 /LPF
HYALINE CASTS UR QL AUTO: 2 /LPF
HYPOCHROMIA BLD QL SMEAR: ABNORMAL
HYPOCHROMIA BLD QL SMEAR: ABNORMAL
IMM GRANULOCYTES # BLD AUTO: 0.01 K/UL
IMM GRANULOCYTES # BLD AUTO: 0.02 K/UL
IMM GRANULOCYTES # BLD AUTO: 0.03 K/UL
IMM GRANULOCYTES # BLD AUTO: 0.04 K/UL
IMM GRANULOCYTES # BLD AUTO: 0.04 K/UL
IMM GRANULOCYTES # BLD AUTO: 0.05 K/UL
IMM GRANULOCYTES NFR BLD AUTO: 0.2 %
IMM GRANULOCYTES NFR BLD AUTO: 0.2 %
IMM GRANULOCYTES NFR BLD AUTO: 0.3 %
IMM GRANULOCYTES NFR BLD AUTO: 0.4 %
IMM GRANULOCYTES NFR BLD AUTO: 0.5 %
IMM GRANULOCYTES NFR BLD AUTO: 0.6 %
IMM GRANULOCYTES NFR BLD AUTO: 0.6 %
IMM GRANULOCYTES NFR BLD AUTO: 0.7 %
IMM GRANULOCYTES NFR BLD AUTO: 0.8 %
INR PPP: 1.1
IRON SERPL-MCNC: 62 UG/DL
KETONES UR QL STRIP: NEGATIVE
KETONES UR QL STRIP: NEGATIVE
LACTATE SERPL-SCNC: 0.6 MMOL/L
LEUKOCYTE ESTERASE UR QL STRIP: ABNORMAL
LEUKOCYTE ESTERASE UR QL STRIP: ABNORMAL
LYMPHOCYTES # BLD AUTO: 0.8 K/UL
LYMPHOCYTES # BLD AUTO: 0.8 K/UL
LYMPHOCYTES # BLD AUTO: 0.9 K/UL
LYMPHOCYTES # BLD AUTO: 1 K/UL
LYMPHOCYTES # BLD AUTO: 1.1 K/UL
LYMPHOCYTES # BLD AUTO: 1.2 K/UL
LYMPHOCYTES # BLD AUTO: 1.3 K/UL
LYMPHOCYTES # BLD AUTO: 1.4 K/UL
LYMPHOCYTES # BLD AUTO: 1.6 K/UL
LYMPHOCYTES NFR BLD: 10.8 %
LYMPHOCYTES NFR BLD: 11.5 %
LYMPHOCYTES NFR BLD: 12.5 %
LYMPHOCYTES NFR BLD: 13 %
LYMPHOCYTES NFR BLD: 13.7 %
LYMPHOCYTES NFR BLD: 13.8 %
LYMPHOCYTES NFR BLD: 13.8 %
LYMPHOCYTES NFR BLD: 14.7 %
LYMPHOCYTES NFR BLD: 15.2 %
LYMPHOCYTES NFR BLD: 16 %
LYMPHOCYTES NFR BLD: 16.6 %
LYMPHOCYTES NFR BLD: 16.7 %
LYMPHOCYTES NFR BLD: 17.2 %
LYMPHOCYTES NFR BLD: 17.8 %
LYMPHOCYTES NFR BLD: 18.2 %
LYMPHOCYTES NFR BLD: 19.1 %
LYMPHOCYTES NFR BLD: 19.4 %
LYMPHOCYTES NFR BLD: 20.2 %
LYMPHOCYTES NFR BLD: 20.6 %
LYMPHOCYTES NFR BLD: 21.2 %
LYMPHOCYTES NFR BLD: 23.1 %
LYMPHOCYTES NFR BLD: 23.7 %
MAGNESIUM SERPL-MCNC: 1.9 MG/DL
MAGNESIUM SERPL-MCNC: 2 MG/DL
MAGNESIUM SERPL-MCNC: 2.2 MG/DL
MAGNESIUM SERPL-MCNC: 2.3 MG/DL
MAGNESIUM SERPL-MCNC: 2.7 MG/DL
MCH RBC QN AUTO: 24.6 PG
MCH RBC QN AUTO: 24.8 PG
MCH RBC QN AUTO: 24.8 PG
MCH RBC QN AUTO: 25 PG
MCH RBC QN AUTO: 25 PG
MCH RBC QN AUTO: 25.1 PG
MCH RBC QN AUTO: 25.1 PG
MCH RBC QN AUTO: 25.2 PG
MCH RBC QN AUTO: 25.4 PG
MCH RBC QN AUTO: 25.5 PG
MCH RBC QN AUTO: 25.5 PG
MCH RBC QN AUTO: 25.6 PG
MCH RBC QN AUTO: 25.6 PG
MCH RBC QN AUTO: 25.7 PG
MCH RBC QN AUTO: 25.8 PG
MCH RBC QN AUTO: 26 PG
MCH RBC QN AUTO: 26 PG
MCH RBC QN AUTO: 26.1 PG
MCH RBC QN AUTO: 26.2 PG
MCH RBC QN AUTO: 26.3 PG
MCH RBC QN AUTO: 26.8 PG
MCH RBC QN AUTO: 26.8 PG
MCHC RBC AUTO-ENTMCNC: 30.8 G/DL
MCHC RBC AUTO-ENTMCNC: 31.2 G/DL
MCHC RBC AUTO-ENTMCNC: 31.5 G/DL
MCHC RBC AUTO-ENTMCNC: 31.5 G/DL
MCHC RBC AUTO-ENTMCNC: 31.7 G/DL
MCHC RBC AUTO-ENTMCNC: 31.9 G/DL
MCHC RBC AUTO-ENTMCNC: 31.9 G/DL
MCHC RBC AUTO-ENTMCNC: 32 G/DL
MCHC RBC AUTO-ENTMCNC: 32 G/DL
MCHC RBC AUTO-ENTMCNC: 32.1 G/DL
MCHC RBC AUTO-ENTMCNC: 32.2 G/DL
MCHC RBC AUTO-ENTMCNC: 32.4 G/DL
MCHC RBC AUTO-ENTMCNC: 32.4 G/DL
MCHC RBC AUTO-ENTMCNC: 32.5 G/DL
MCHC RBC AUTO-ENTMCNC: 32.8 G/DL
MCHC RBC AUTO-ENTMCNC: 32.8 G/DL
MCHC RBC AUTO-ENTMCNC: 33.2 G/DL
MCV RBC AUTO: 74 FL
MCV RBC AUTO: 76 FL
MCV RBC AUTO: 77 FL
MCV RBC AUTO: 78 FL
MCV RBC AUTO: 79 FL
MCV RBC AUTO: 80 FL
MCV RBC AUTO: 80 FL
MCV RBC AUTO: 81 FL
MCV RBC AUTO: 82 FL
MCV RBC AUTO: 82 FL
MCV RBC AUTO: 83 FL
MICROSCOPIC COMMENT: ABNORMAL
MICROSCOPIC COMMENT: ABNORMAL
MONOCYTES # BLD AUTO: 0.5 K/UL
MONOCYTES # BLD AUTO: 0.5 K/UL
MONOCYTES # BLD AUTO: 0.6 K/UL
MONOCYTES # BLD AUTO: 0.7 K/UL
MONOCYTES # BLD AUTO: 0.8 K/UL
MONOCYTES # BLD AUTO: 0.9 K/UL
MONOCYTES NFR BLD: 10 %
MONOCYTES NFR BLD: 10.2 %
MONOCYTES NFR BLD: 10.4 %
MONOCYTES NFR BLD: 10.5 %
MONOCYTES NFR BLD: 10.5 %
MONOCYTES NFR BLD: 10.8 %
MONOCYTES NFR BLD: 11.1 %
MONOCYTES NFR BLD: 11.5 %
MONOCYTES NFR BLD: 7.6 %
MONOCYTES NFR BLD: 7.7 %
MONOCYTES NFR BLD: 8.2 %
MONOCYTES NFR BLD: 8.4 %
MONOCYTES NFR BLD: 8.5 %
MONOCYTES NFR BLD: 8.6 %
MONOCYTES NFR BLD: 8.8 %
MONOCYTES NFR BLD: 9.1 %
MONOCYTES NFR BLD: 9.3 %
MONOCYTES NFR BLD: 9.4 %
MONOCYTES NFR BLD: 9.5 %
MONOCYTES NFR BLD: 9.6 %
MONOCYTES NFR BLD: 9.8 %
MONOCYTES NFR BLD: 9.9 %
NEUTROPHILS # BLD AUTO: 3.6 K/UL
NEUTROPHILS # BLD AUTO: 3.9 K/UL
NEUTROPHILS # BLD AUTO: 4.3 K/UL
NEUTROPHILS # BLD AUTO: 4.4 K/UL
NEUTROPHILS # BLD AUTO: 4.5 K/UL
NEUTROPHILS # BLD AUTO: 4.6 K/UL
NEUTROPHILS # BLD AUTO: 4.6 K/UL
NEUTROPHILS # BLD AUTO: 4.8 K/UL
NEUTROPHILS # BLD AUTO: 4.9 K/UL
NEUTROPHILS # BLD AUTO: 5 K/UL
NEUTROPHILS # BLD AUTO: 5.1 K/UL
NEUTROPHILS # BLD AUTO: 5.2 K/UL
NEUTROPHILS # BLD AUTO: 5.6 K/UL
NEUTROPHILS # BLD AUTO: 5.7 K/UL
NEUTROPHILS # BLD AUTO: 5.7 K/UL
NEUTROPHILS # BLD AUTO: 5.8 K/UL
NEUTROPHILS # BLD AUTO: 5.9 K/UL
NEUTROPHILS # BLD AUTO: 6 K/UL
NEUTROPHILS # BLD AUTO: 6.7 K/UL
NEUTROPHILS # BLD AUTO: 7.3 K/UL
NEUTROPHILS NFR BLD: 63.7 %
NEUTROPHILS NFR BLD: 63.8 %
NEUTROPHILS NFR BLD: 65.4 %
NEUTROPHILS NFR BLD: 67.1 %
NEUTROPHILS NFR BLD: 67.7 %
NEUTROPHILS NFR BLD: 68.1 %
NEUTROPHILS NFR BLD: 69.3 %
NEUTROPHILS NFR BLD: 69.6 %
NEUTROPHILS NFR BLD: 70.2 %
NEUTROPHILS NFR BLD: 70.8 %
NEUTROPHILS NFR BLD: 73.1 %
NEUTROPHILS NFR BLD: 73.7 %
NEUTROPHILS NFR BLD: 73.8 %
NEUTROPHILS NFR BLD: 73.9 %
NEUTROPHILS NFR BLD: 74.1 %
NEUTROPHILS NFR BLD: 74.8 %
NEUTROPHILS NFR BLD: 75.2 %
NEUTROPHILS NFR BLD: 76.3 %
NEUTROPHILS NFR BLD: 76.3 %
NEUTROPHILS NFR BLD: 77.2 %
NEUTROPHILS NFR BLD: 78.5 %
NEUTROPHILS NFR BLD: 78.8 %
NITRITE UR QL STRIP: NEGATIVE
NITRITE UR QL STRIP: NEGATIVE
NRBC BLD-RTO: 0 /100 WBC
NRBC BLD-RTO: 1 /100 WBC
NRBC BLD-RTO: 2 /100 WBC
NRBC BLD-RTO: 2 /100 WBC
OVALOCYTES BLD QL SMEAR: ABNORMAL
OVALOCYTES BLD QL SMEAR: ABNORMAL
PERIPHERAL PTA: YES
PH UR STRIP: 7 [PH] (ref 5–8)
PH UR STRIP: 7 [PH] (ref 5–8)
PHOSPHATE SERPL-MCNC: 2.7 MG/DL
PHOSPHATE SERPL-MCNC: 2.9 MG/DL
PHOSPHATE SERPL-MCNC: 3 MG/DL
PHOSPHATE SERPL-MCNC: 3.1 MG/DL
PHOSPHATE SERPL-MCNC: 3.2 MG/DL
PHOSPHATE SERPL-MCNC: 3.4 MG/DL
PHOSPHATE SERPL-MCNC: 3.5 MG/DL
PHOSPHATE SERPL-MCNC: 3.6 MG/DL
PHOSPHATE SERPL-MCNC: 3.8 MG/DL
PHOSPHATE SERPL-MCNC: 3.9 MG/DL
PHOSPHATE SERPL-MCNC: 4 MG/DL
PHOSPHATE SERPL-MCNC: 4 MG/DL
PHOSPHATE SERPL-MCNC: 4.9 MG/DL
PHOSPHATE SERPL-MCNC: 5.1 MG/DL
PHOSPHATE SERPL-MCNC: 5.8 MG/DL
PLATELET # BLD AUTO: 100 K/UL
PLATELET # BLD AUTO: 104 K/UL
PLATELET # BLD AUTO: 111 K/UL
PLATELET # BLD AUTO: 112 K/UL
PLATELET # BLD AUTO: 119 K/UL
PLATELET # BLD AUTO: 121 K/UL
PLATELET # BLD AUTO: 122 K/UL
PLATELET # BLD AUTO: 124 K/UL
PLATELET # BLD AUTO: 127 K/UL
PLATELET # BLD AUTO: 127 K/UL
PLATELET # BLD AUTO: 128 K/UL
PLATELET # BLD AUTO: 131 K/UL
PLATELET # BLD AUTO: 132 K/UL
PLATELET # BLD AUTO: 132 K/UL
PLATELET # BLD AUTO: 134 K/UL
PLATELET # BLD AUTO: 138 K/UL
PLATELET # BLD AUTO: 141 K/UL
PLATELET # BLD AUTO: 164 K/UL
PLATELET # BLD AUTO: 173 K/UL
PLATELET # BLD AUTO: 175 K/UL
PLATELET # BLD AUTO: 195 K/UL
PLATELET # BLD AUTO: 97 K/UL
PLATELET BLD QL SMEAR: ABNORMAL
PLATELET BLD QL SMEAR: ABNORMAL
PMV BLD AUTO: 11.5 FL
PMV BLD AUTO: 11.9 FL
PMV BLD AUTO: 12 FL
PMV BLD AUTO: 12.3 FL
PMV BLD AUTO: 12.4 FL
PMV BLD AUTO: 12.8 FL
PMV BLD AUTO: 12.8 FL
PMV BLD AUTO: 13.1 FL
PMV BLD AUTO: ABNORMAL FL
POCT GLUCOSE: 101 MG/DL (ref 70–110)
POCT GLUCOSE: 102 MG/DL (ref 70–110)
POCT GLUCOSE: 103 MG/DL (ref 70–110)
POCT GLUCOSE: 113 MG/DL (ref 70–110)
POCT GLUCOSE: 114 MG/DL (ref 70–110)
POCT GLUCOSE: 122 MG/DL (ref 70–110)
POCT GLUCOSE: 123 MG/DL (ref 70–110)
POCT GLUCOSE: 124 MG/DL (ref 70–110)
POCT GLUCOSE: 125 MG/DL (ref 70–110)
POCT GLUCOSE: 126 MG/DL (ref 70–110)
POCT GLUCOSE: 127 MG/DL (ref 70–110)
POCT GLUCOSE: 130 MG/DL (ref 70–110)
POCT GLUCOSE: 132 MG/DL (ref 70–110)
POCT GLUCOSE: 132 MG/DL (ref 70–110)
POCT GLUCOSE: 135 MG/DL (ref 70–110)
POCT GLUCOSE: 136 MG/DL (ref 70–110)
POCT GLUCOSE: 136 MG/DL (ref 70–110)
POCT GLUCOSE: 139 MG/DL (ref 70–110)
POCT GLUCOSE: 143 MG/DL (ref 70–110)
POCT GLUCOSE: 143 MG/DL (ref 70–110)
POCT GLUCOSE: 146 MG/DL (ref 70–110)
POCT GLUCOSE: 148 MG/DL (ref 70–110)
POCT GLUCOSE: 151 MG/DL (ref 70–110)
POCT GLUCOSE: 153 MG/DL (ref 70–110)
POCT GLUCOSE: 165 MG/DL (ref 70–110)
POCT GLUCOSE: 166 MG/DL (ref 70–110)
POCT GLUCOSE: 166 MG/DL (ref 70–110)
POCT GLUCOSE: 167 MG/DL (ref 70–110)
POCT GLUCOSE: 170 MG/DL (ref 70–110)
POCT GLUCOSE: 174 MG/DL (ref 70–110)
POCT GLUCOSE: 175 MG/DL (ref 70–110)
POCT GLUCOSE: 178 MG/DL (ref 70–110)
POCT GLUCOSE: 181 MG/DL (ref 70–110)
POCT GLUCOSE: 184 MG/DL (ref 70–110)
POCT GLUCOSE: 187 MG/DL (ref 70–110)
POCT GLUCOSE: 189 MG/DL (ref 70–110)
POCT GLUCOSE: 191 MG/DL (ref 70–110)
POCT GLUCOSE: 193 MG/DL (ref 70–110)
POCT GLUCOSE: 194 MG/DL (ref 70–110)
POCT GLUCOSE: 201 MG/DL (ref 70–110)
POCT GLUCOSE: 202 MG/DL (ref 70–110)
POCT GLUCOSE: 202 MG/DL (ref 70–110)
POCT GLUCOSE: 203 MG/DL (ref 70–110)
POCT GLUCOSE: 207 MG/DL (ref 70–110)
POCT GLUCOSE: 209 MG/DL (ref 70–110)
POCT GLUCOSE: 217 MG/DL (ref 70–110)
POCT GLUCOSE: 218 MG/DL (ref 70–110)
POCT GLUCOSE: 219 MG/DL (ref 70–110)
POCT GLUCOSE: 224 MG/DL (ref 70–110)
POCT GLUCOSE: 225 MG/DL (ref 70–110)
POCT GLUCOSE: 235 MG/DL (ref 70–110)
POCT GLUCOSE: 244 MG/DL (ref 70–110)
POCT GLUCOSE: 79 MG/DL (ref 70–110)
POCT GLUCOSE: 84 MG/DL (ref 70–110)
POCT GLUCOSE: 84 MG/DL (ref 70–110)
POCT GLUCOSE: 87 MG/DL (ref 70–110)
POCT GLUCOSE: 92 MG/DL (ref 70–110)
POCT GLUCOSE: 94 MG/DL (ref 70–110)
POCT GLUCOSE: 96 MG/DL (ref 70–110)
POCT GLUCOSE: 96 MG/DL (ref 70–110)
POCT GLUCOSE: 99 MG/DL (ref 70–110)
POIKILOCYTOSIS BLD QL SMEAR: SLIGHT
POIKILOCYTOSIS BLD QL SMEAR: SLIGHT
POLYCHROMASIA BLD QL SMEAR: ABNORMAL
POTASSIUM SERPL-SCNC: 3.9 MMOL/L
POTASSIUM SERPL-SCNC: 3.9 MMOL/L
POTASSIUM SERPL-SCNC: 4.1 MMOL/L
POTASSIUM SERPL-SCNC: 4.2 MMOL/L
POTASSIUM SERPL-SCNC: 4.3 MMOL/L
POTASSIUM SERPL-SCNC: 4.3 MMOL/L
POTASSIUM SERPL-SCNC: 4.4 MMOL/L
POTASSIUM SERPL-SCNC: 4.5 MMOL/L
POTASSIUM SERPL-SCNC: 4.6 MMOL/L
POTASSIUM SERPL-SCNC: 4.7 MMOL/L
POTASSIUM SERPL-SCNC: 4.9 MMOL/L
POTASSIUM SERPL-SCNC: 5.2 MMOL/L
POTASSIUM SERPL-SCNC: 5.4 MMOL/L
POTASSIUM SERPL-SCNC: 5.5 MMOL/L
PROCALCITONIN SERPL IA-MCNC: 0.4 NG/ML
PROCALCITONIN SERPL IA-MCNC: 0.43 NG/ML
PROCALCITONIN SERPL IA-MCNC: 0.53 NG/ML
PROT SERPL-MCNC: 7.2 G/DL
PROT SERPL-MCNC: 7.3 G/DL
PROT SERPL-MCNC: 7.5 G/DL
PROT SERPL-MCNC: 7.6 G/DL
PROT SERPL-MCNC: 7.9 G/DL
PROT SERPL-MCNC: 7.9 G/DL
PROT SERPL-MCNC: 8 G/DL
PROT UR QL STRIP: ABNORMAL
PROT UR QL STRIP: ABNORMAL
PROTHROMBIN TIME: 11.4 SEC
RBC # BLD AUTO: 2.36 M/UL
RBC # BLD AUTO: 2.76 M/UL
RBC # BLD AUTO: 2.92 M/UL
RBC # BLD AUTO: 2.93 M/UL
RBC # BLD AUTO: 3 M/UL
RBC # BLD AUTO: 3.05 M/UL
RBC # BLD AUTO: 3.06 M/UL
RBC # BLD AUTO: 3.13 M/UL
RBC # BLD AUTO: 3.18 M/UL
RBC # BLD AUTO: 3.2 M/UL
RBC # BLD AUTO: 3.25 M/UL
RBC # BLD AUTO: 3.35 M/UL
RBC # BLD AUTO: 3.37 M/UL
RBC # BLD AUTO: 3.39 M/UL
RBC # BLD AUTO: 3.48 M/UL
RBC # BLD AUTO: 3.49 M/UL
RBC # BLD AUTO: 4.06 M/UL
RBC # BLD AUTO: 4.11 M/UL
RBC # BLD AUTO: 4.39 M/UL
RBC # BLD AUTO: 4.43 M/UL
RBC # BLD AUTO: 4.73 M/UL
RBC # BLD AUTO: 4.77 M/UL
RBC #/AREA URNS AUTO: >100 /HPF (ref 0–4)
RBC #/AREA URNS AUTO: >100 /HPF (ref 0–4)
SATURATED IRON: 38 %
SODIUM SERPL-SCNC: 128 MMOL/L
SODIUM SERPL-SCNC: 131 MMOL/L
SODIUM SERPL-SCNC: 132 MMOL/L
SODIUM SERPL-SCNC: 133 MMOL/L
SODIUM SERPL-SCNC: 133 MMOL/L
SODIUM SERPL-SCNC: 134 MMOL/L
SODIUM SERPL-SCNC: 134 MMOL/L
SODIUM SERPL-SCNC: 135 MMOL/L
SODIUM SERPL-SCNC: 136 MMOL/L
SODIUM SERPL-SCNC: 136 MMOL/L
SODIUM SERPL-SCNC: 137 MMOL/L
SODIUM SERPL-SCNC: 137 MMOL/L
SODIUM SERPL-SCNC: 138 MMOL/L
SP GR UR STRIP: 1.02 (ref 1–1.03)
SP GR UR STRIP: >=1.03 (ref 1–1.03)
SQUAMOUS #/AREA URNS AUTO: 0 /HPF
SQUAMOUS #/AREA URNS AUTO: 1 /HPF
TARGETS BLD QL SMEAR: ABNORMAL
TARGETS BLD QL SMEAR: ABNORMAL
TOTAL IRON BINDING CAPACITY: 164 UG/DL
TRANS ERYTHROCYTES VOL PATIENT: NORMAL ML
TRANSFERRIN SERPL-MCNC: 111 MG/DL
TROPONIN I SERPL DL<=0.01 NG/ML-MCNC: 0.01 NG/ML
TSH SERPL DL<=0.005 MIU/L-ACNC: 1.26 UIU/ML
URN SPEC COLLECT METH UR: ABNORMAL
URN SPEC COLLECT METH UR: ABNORMAL
WBC # BLD AUTO: 5.66 K/UL
WBC # BLD AUTO: 5.8 K/UL
WBC # BLD AUTO: 6.18 K/UL
WBC # BLD AUTO: 6.18 K/UL
WBC # BLD AUTO: 6.34 K/UL
WBC # BLD AUTO: 6.45 K/UL
WBC # BLD AUTO: 6.58 K/UL
WBC # BLD AUTO: 6.59 K/UL
WBC # BLD AUTO: 6.71 K/UL
WBC # BLD AUTO: 6.91 K/UL
WBC # BLD AUTO: 7.15 K/UL
WBC # BLD AUTO: 7.22 K/UL
WBC # BLD AUTO: 7.3 K/UL
WBC # BLD AUTO: 7.47 K/UL
WBC # BLD AUTO: 7.49 K/UL
WBC # BLD AUTO: 7.58 K/UL
WBC # BLD AUTO: 7.58 K/UL
WBC # BLD AUTO: 7.77 K/UL
WBC # BLD AUTO: 7.79 K/UL
WBC # BLD AUTO: 8.18 K/UL
WBC # BLD AUTO: 8.84 K/UL
WBC # BLD AUTO: 9.2 K/UL
WBC #/AREA URNS AUTO: 12 /HPF (ref 0–5)
WBC #/AREA URNS AUTO: >100 /HPF (ref 0–5)

## 2018-01-01 PROCEDURE — 25000003 PHARM REV CODE 250: Performed by: UROLOGY

## 2018-01-01 PROCEDURE — 63600175 PHARM REV CODE 636 W HCPCS: Performed by: NURSE ANESTHETIST, CERTIFIED REGISTERED

## 2018-01-01 PROCEDURE — 25000003 PHARM REV CODE 250: Performed by: HOSPITALIST

## 2018-01-01 PROCEDURE — 82962 GLUCOSE BLOOD TEST: CPT

## 2018-01-01 PROCEDURE — 83735 ASSAY OF MAGNESIUM: CPT

## 2018-01-01 PROCEDURE — 99233 SBSQ HOSP IP/OBS HIGH 50: CPT | Mod: ,,, | Performed by: PHYSICIAN ASSISTANT

## 2018-01-01 PROCEDURE — 25000003 PHARM REV CODE 250: Performed by: STUDENT IN AN ORGANIZED HEALTH CARE EDUCATION/TRAINING PROGRAM

## 2018-01-01 PROCEDURE — G8979 MOBILITY GOAL STATUS: HCPCS | Mod: CK

## 2018-01-01 PROCEDURE — 97535 SELF CARE MNGMENT TRAINING: CPT

## 2018-01-01 PROCEDURE — P9047 ALBUMIN (HUMAN), 25%, 50ML: HCPCS | Mod: JG | Performed by: INTERNAL MEDICINE

## 2018-01-01 PROCEDURE — 93010 ELECTROCARDIOGRAM REPORT: CPT | Mod: ,,, | Performed by: INTERNAL MEDICINE

## 2018-01-01 PROCEDURE — 36415 COLL VENOUS BLD VENIPUNCTURE: CPT

## 2018-01-01 PROCEDURE — C1894 INTRO/SHEATH, NON-LASER: HCPCS

## 2018-01-01 PROCEDURE — 25000003 PHARM REV CODE 250: Performed by: PHYSICIAN ASSISTANT

## 2018-01-01 PROCEDURE — 87040 BLOOD CULTURE FOR BACTERIA: CPT

## 2018-01-01 PROCEDURE — C1769 GUIDE WIRE: HCPCS | Performed by: UROLOGY

## 2018-01-01 PROCEDURE — 85025 COMPLETE CBC W/AUTO DIFF WBC: CPT

## 2018-01-01 PROCEDURE — 99213 OFFICE O/P EST LOW 20 MIN: CPT | Mod: S$GLB,,, | Performed by: SURGERY

## 2018-01-01 PROCEDURE — 63600175 PHARM REV CODE 636 W HCPCS: Performed by: HOSPITALIST

## 2018-01-01 PROCEDURE — 80069 RENAL FUNCTION PANEL: CPT

## 2018-01-01 PROCEDURE — G0378 HOSPITAL OBSERVATION PER HR: HCPCS

## 2018-01-01 PROCEDURE — 80100016 HC MAINTENANCE HEMODIALYSIS

## 2018-01-01 PROCEDURE — 99999 PR PBB SHADOW E&M-EST. PATIENT-LVL V: CPT | Mod: PBBFAC,,, | Performed by: SURGERY

## 2018-01-01 PROCEDURE — 87350 HEPATITIS BE AG IA: CPT

## 2018-01-01 PROCEDURE — G8978 MOBILITY CURRENT STATUS: HCPCS | Mod: CK

## 2018-01-01 PROCEDURE — 90935 HEMODIALYSIS ONE EVALUATION: CPT | Mod: ,,, | Performed by: NURSE PRACTITIONER

## 2018-01-01 PROCEDURE — 25000003 PHARM REV CODE 250: Performed by: INTERNAL MEDICINE

## 2018-01-01 PROCEDURE — 83540 ASSAY OF IRON: CPT

## 2018-01-01 PROCEDURE — 11000001 HC ACUTE MED/SURG PRIVATE ROOM

## 2018-01-01 PROCEDURE — 99999 PR PBB SHADOW E&M-EST. PATIENT-LVL III: CPT | Mod: PBBFAC,,, | Performed by: SURGERY

## 2018-01-01 PROCEDURE — 85018 HEMOGLOBIN: CPT

## 2018-01-01 PROCEDURE — 83880 ASSAY OF NATRIURETIC PEPTIDE: CPT

## 2018-01-01 PROCEDURE — 80053 COMPREHEN METABOLIC PANEL: CPT

## 2018-01-01 PROCEDURE — 84100 ASSAY OF PHOSPHORUS: CPT

## 2018-01-01 PROCEDURE — 90935 HEMODIALYSIS ONE EVALUATION: CPT

## 2018-01-01 PROCEDURE — 97165 OT EVAL LOW COMPLEX 30 MIN: CPT

## 2018-01-01 PROCEDURE — 99284 EMERGENCY DEPT VISIT MOD MDM: CPT | Mod: ,,, | Performed by: PHYSICIAN ASSISTANT

## 2018-01-01 PROCEDURE — 84145 PROCALCITONIN (PCT): CPT

## 2018-01-01 PROCEDURE — 93005 ELECTROCARDIOGRAM TRACING: CPT

## 2018-01-01 PROCEDURE — 25000003 PHARM REV CODE 250: Performed by: NURSE ANESTHETIST, CERTIFIED REGISTERED

## 2018-01-01 PROCEDURE — 87040 BLOOD CULTURE FOR BACTERIA: CPT | Mod: 59

## 2018-01-01 PROCEDURE — 51798 US URINE CAPACITY MEASURE: CPT

## 2018-01-01 PROCEDURE — 87086 URINE CULTURE/COLONY COUNT: CPT

## 2018-01-01 PROCEDURE — 96366 THER/PROPH/DIAG IV INF ADDON: CPT

## 2018-01-01 PROCEDURE — 96365 THER/PROPH/DIAG IV INF INIT: CPT

## 2018-01-01 PROCEDURE — 99285 EMERGENCY DEPT VISIT HI MDM: CPT | Mod: 25

## 2018-01-01 PROCEDURE — 82962 GLUCOSE BLOOD TEST: CPT | Performed by: UROLOGY

## 2018-01-01 PROCEDURE — 99220 PR INITIAL OBSERVATION CARE,LEVL III: CPT | Mod: GC,,, | Performed by: HOSPITALIST

## 2018-01-01 PROCEDURE — G8988 SELF CARE GOAL STATUS: HCPCS | Mod: CK

## 2018-01-01 PROCEDURE — 84484 ASSAY OF TROPONIN QUANT: CPT

## 2018-01-01 PROCEDURE — 87088 URINE BACTERIA CULTURE: CPT

## 2018-01-01 PROCEDURE — 99999 PR PBB SHADOW E&M-EST. PATIENT-LVL III: CPT | Mod: PBBFAC,,, | Performed by: PHYSICIAN ASSISTANT

## 2018-01-01 PROCEDURE — 83605 ASSAY OF LACTIC ACID: CPT

## 2018-01-01 PROCEDURE — G0257 UNSCHED DIALYSIS ESRD PT HOS: HCPCS

## 2018-01-01 PROCEDURE — 97530 THERAPEUTIC ACTIVITIES: CPT

## 2018-01-01 PROCEDURE — 25000003 PHARM REV CODE 250: Performed by: NURSE PRACTITIONER

## 2018-01-01 PROCEDURE — 86901 BLOOD TYPING SEROLOGIC RH(D): CPT

## 2018-01-01 PROCEDURE — 90935 HEMODIALYSIS ONE EVALUATION: CPT | Mod: ,,, | Performed by: INTERNAL MEDICINE

## 2018-01-01 PROCEDURE — 86850 RBC ANTIBODY SCREEN: CPT

## 2018-01-01 PROCEDURE — G8987 SELF CARE CURRENT STATUS: HCPCS | Mod: CK

## 2018-01-01 PROCEDURE — 99214 OFFICE O/P EST MOD 30 MIN: CPT | Mod: ,,, | Performed by: NURSE PRACTITIONER

## 2018-01-01 PROCEDURE — G8980 MOBILITY D/C STATUS: HCPCS | Mod: CK

## 2018-01-01 PROCEDURE — 99232 SBSQ HOSP IP/OBS MODERATE 35: CPT | Mod: ,,, | Performed by: HOSPITALIST

## 2018-01-01 PROCEDURE — 99284 EMERGENCY DEPT VISIT MOD MDM: CPT | Mod: 25

## 2018-01-01 PROCEDURE — G8979 MOBILITY GOAL STATUS: HCPCS | Mod: CJ

## 2018-01-01 PROCEDURE — 63600175 PHARM REV CODE 636 W HCPCS: Performed by: STUDENT IN AN ORGANIZED HEALTH CARE EDUCATION/TRAINING PROGRAM

## 2018-01-01 PROCEDURE — 85018 HEMOGLOBIN: CPT | Mod: 91

## 2018-01-01 PROCEDURE — 99214 OFFICE O/P EST MOD 30 MIN: CPT | Mod: 25,S$GLB,, | Performed by: PODIATRIST

## 2018-01-01 PROCEDURE — 99225 PR SUBSEQUENT OBSERVATION CARE,LEVEL II: CPT | Mod: GC,,, | Performed by: HOSPITALIST

## 2018-01-01 PROCEDURE — 99225 PR SUBSEQUENT OBSERVATION CARE,LEVEL II: CPT | Mod: GC,,, | Performed by: INTERNAL MEDICINE

## 2018-01-01 PROCEDURE — 99285 EMERGENCY DEPT VISIT HI MDM: CPT | Mod: ,,, | Performed by: EMERGENCY MEDICINE

## 2018-01-01 PROCEDURE — 36000706: Performed by: UROLOGY

## 2018-01-01 PROCEDURE — 90935 HEMODIALYSIS ONE EVALUATION: CPT | Mod: GC,,, | Performed by: INTERNAL MEDICINE

## 2018-01-01 PROCEDURE — 80048 BASIC METABOLIC PNL TOTAL CA: CPT

## 2018-01-01 PROCEDURE — 87106 FUNGI IDENTIFICATION YEAST: CPT

## 2018-01-01 PROCEDURE — 81001 URINALYSIS AUTO W/SCOPE: CPT

## 2018-01-01 PROCEDURE — 99232 SBSQ HOSP IP/OBS MODERATE 35: CPT | Mod: ,,, | Performed by: INTERNAL MEDICINE

## 2018-01-01 PROCEDURE — 99284 EMERGENCY DEPT VISIT MOD MDM: CPT | Mod: ,,, | Performed by: EMERGENCY MEDICINE

## 2018-01-01 PROCEDURE — 25000003 PHARM REV CODE 250

## 2018-01-01 PROCEDURE — D9220A PRA ANESTHESIA: Mod: ANES,,, | Performed by: ANESTHESIOLOGY

## 2018-01-01 PROCEDURE — G8987 SELF CARE CURRENT STATUS: HCPCS | Mod: CL

## 2018-01-01 PROCEDURE — 97161 PT EVAL LOW COMPLEX 20 MIN: CPT

## 2018-01-01 PROCEDURE — 63600175 PHARM REV CODE 636 W HCPCS: Performed by: PHYSICIAN ASSISTANT

## 2018-01-01 PROCEDURE — 97535 SELF CARE MNGMENT TRAINING: CPT | Mod: 59

## 2018-01-01 PROCEDURE — 97116 GAIT TRAINING THERAPY: CPT

## 2018-01-01 PROCEDURE — 11721 DEBRIDE NAIL 6 OR MORE: CPT | Mod: 59,Q8,S$GLB, | Performed by: PODIATRIST

## 2018-01-01 PROCEDURE — 63600175 PHARM REV CODE 636 W HCPCS: Mod: JG | Performed by: INTERNAL MEDICINE

## 2018-01-01 PROCEDURE — 36000707: Performed by: UROLOGY

## 2018-01-01 PROCEDURE — 99239 HOSP IP/OBS DSCHRG MGMT >30: CPT | Mod: ,,, | Performed by: HOSPITALIST

## 2018-01-01 PROCEDURE — 25000003 PHARM REV CODE 250: Performed by: EMERGENCY MEDICINE

## 2018-01-01 PROCEDURE — 93990 DOPPLER FLOW TESTING: CPT | Mod: S$GLB,,, | Performed by: SURGERY

## 2018-01-01 PROCEDURE — 99152 MOD SED SAME PHYS/QHP 5/>YRS: CPT | Mod: ,,, | Performed by: SURGERY

## 2018-01-01 PROCEDURE — 93990 DOPPLER FLOW TESTING: CPT | Mod: 26,,, | Performed by: RADIOLOGY

## 2018-01-01 PROCEDURE — 63600175 PHARM REV CODE 636 W HCPCS: Performed by: EMERGENCY MEDICINE

## 2018-01-01 PROCEDURE — 36902 INTRO CATH DIALYSIS CIRCUIT: CPT | Mod: ,,, | Performed by: SURGERY

## 2018-01-01 PROCEDURE — 96361 HYDRATE IV INFUSION ADD-ON: CPT

## 2018-01-01 PROCEDURE — 99239 HOSP IP/OBS DSCHRG MGMT >30: CPT | Mod: ,,, | Performed by: PHYSICIAN ASSISTANT

## 2018-01-01 PROCEDURE — 85014 HEMATOCRIT: CPT | Mod: 91

## 2018-01-01 PROCEDURE — 85014 HEMATOCRIT: CPT

## 2018-01-01 PROCEDURE — 86920 COMPATIBILITY TEST SPIN: CPT

## 2018-01-01 PROCEDURE — P9021 RED BLOOD CELLS UNIT: HCPCS

## 2018-01-01 PROCEDURE — 99283 EMERGENCY DEPT VISIT LOW MDM: CPT | Mod: ,,, | Performed by: UROLOGY

## 2018-01-01 PROCEDURE — 99214 OFFICE O/P EST MOD 30 MIN: CPT | Mod: S$GLB,,, | Performed by: PHYSICIAN ASSISTANT

## 2018-01-01 PROCEDURE — 52001 CYSTO W/IRRG&EVAC MLT CLOTS: CPT | Mod: ,,, | Performed by: UROLOGY

## 2018-01-01 PROCEDURE — 27000014 CATH LAB PROCEDURE

## 2018-01-01 PROCEDURE — 82728 ASSAY OF FERRITIN: CPT

## 2018-01-01 PROCEDURE — 51702 INSERT TEMP BLADDER CATH: CPT

## 2018-01-01 PROCEDURE — 93990 DOPPLER FLOW TESTING: CPT | Mod: TC

## 2018-01-01 PROCEDURE — 94761 N-INVAS EAR/PLS OXIMETRY MLT: CPT

## 2018-01-01 PROCEDURE — 36430 TRANSFUSION BLD/BLD COMPNT: CPT

## 2018-01-01 PROCEDURE — 97162 PT EVAL MOD COMPLEX 30 MIN: CPT

## 2018-01-01 PROCEDURE — D9220A PRA ANESTHESIA: Mod: CRNA,,, | Performed by: NURSE ANESTHETIST, CERTIFIED REGISTERED

## 2018-01-01 PROCEDURE — 99223 1ST HOSP IP/OBS HIGH 75: CPT | Mod: 25,,, | Performed by: NURSE PRACTITIONER

## 2018-01-01 PROCEDURE — 96360 HYDRATION IV INFUSION INIT: CPT

## 2018-01-01 PROCEDURE — 83036 HEMOGLOBIN GLYCOSYLATED A1C: CPT

## 2018-01-01 PROCEDURE — 11056 PARNG/CUTG B9 HYPRKR LES 2-4: CPT | Mod: Q8,S$GLB,, | Performed by: PODIATRIST

## 2018-01-01 PROCEDURE — G8988 SELF CARE GOAL STATUS: HCPCS | Mod: CJ

## 2018-01-01 PROCEDURE — 99220 PR INITIAL OBSERVATION CARE,LEVL III: CPT | Mod: ,,, | Performed by: PHYSICIAN ASSISTANT

## 2018-01-01 PROCEDURE — 96367 TX/PROPH/DG ADDL SEQ IV INF: CPT

## 2018-01-01 PROCEDURE — 99226 PR SUBSEQUENT OBSERVATION CARE,LEVEL III: CPT | Mod: ,,, | Performed by: PHYSICIAN ASSISTANT

## 2018-01-01 PROCEDURE — 99226 PR SUBSEQUENT OBSERVATION CARE,LEVEL III: CPT | Mod: ,,, | Performed by: NURSE PRACTITIONER

## 2018-01-01 PROCEDURE — 63600175 PHARM REV CODE 636 W HCPCS

## 2018-01-01 PROCEDURE — 84443 ASSAY THYROID STIM HORMONE: CPT

## 2018-01-01 PROCEDURE — 99222 1ST HOSP IP/OBS MODERATE 55: CPT | Mod: ,,, | Performed by: INTERNAL MEDICINE

## 2018-01-01 PROCEDURE — 80074 ACUTE HEPATITIS PANEL: CPT

## 2018-01-01 PROCEDURE — 85610 PROTHROMBIN TIME: CPT

## 2018-01-01 PROCEDURE — 25000242 PHARM REV CODE 250 ALT 637 W/ HCPCS: Performed by: HOSPITALIST

## 2018-01-01 PROCEDURE — 37000009 HC ANESTHESIA EA ADD 15 MINS: Performed by: UROLOGY

## 2018-01-01 PROCEDURE — 99999 PR PBB SHADOW E&M-EST. PATIENT-LVL IV: CPT | Mod: PBBFAC,,, | Performed by: PODIATRIST

## 2018-01-01 PROCEDURE — 71000016 HC POSTOP RECOV ADDL HR: Performed by: UROLOGY

## 2018-01-01 PROCEDURE — 99222 1ST HOSP IP/OBS MODERATE 55: CPT | Mod: ,,, | Performed by: HOSPITALIST

## 2018-01-01 PROCEDURE — 99217 PR OBSERVATION CARE DISCHARGE: CPT | Mod: GC,,, | Performed by: INTERNAL MEDICINE

## 2018-01-01 PROCEDURE — 71000015 HC POSTOP RECOV 1ST HR: Performed by: UROLOGY

## 2018-01-01 PROCEDURE — 37000008 HC ANESTHESIA 1ST 15 MINUTES: Performed by: UROLOGY

## 2018-01-01 RX ORDER — ALBUMIN HUMAN 250 G/1000ML
25 SOLUTION INTRAVENOUS ONCE
Status: COMPLETED | OUTPATIENT
Start: 2018-01-01 | End: 2018-01-01

## 2018-01-01 RX ORDER — ONDANSETRON 8 MG/1
8 TABLET, ORALLY DISINTEGRATING ORAL EVERY 8 HOURS PRN
Status: DISCONTINUED | OUTPATIENT
Start: 2018-01-01 | End: 2018-01-01 | Stop reason: HOSPADM

## 2018-01-01 RX ORDER — BRIMONIDINE TARTRATE 1.5 MG/ML
1 SOLUTION/ DROPS OPHTHALMIC 2 TIMES DAILY
Status: DISCONTINUED | OUTPATIENT
Start: 2018-01-01 | End: 2018-01-01 | Stop reason: HOSPADM

## 2018-01-01 RX ORDER — MUPIROCIN 20 MG/G
OINTMENT TOPICAL
Status: CANCELLED | OUTPATIENT
Start: 2018-01-01

## 2018-01-01 RX ORDER — BRIMONIDINE TARTRATE AND TIMOLOL MALEATE 2; 5 MG/ML; MG/ML
1 SOLUTION OPHTHALMIC 2 TIMES DAILY
Status: DISCONTINUED | OUTPATIENT
Start: 2018-01-01 | End: 2018-01-01 | Stop reason: SDUPTHER

## 2018-01-01 RX ORDER — IBUPROFEN 200 MG
24 TABLET ORAL
Status: DISCONTINUED | OUTPATIENT
Start: 2018-01-01 | End: 2018-01-01

## 2018-01-01 RX ORDER — NAPROXEN SODIUM 220 MG/1
81 TABLET, FILM COATED ORAL DAILY
Status: DISCONTINUED | OUTPATIENT
Start: 2018-01-01 | End: 2018-01-01 | Stop reason: HOSPADM

## 2018-01-01 RX ORDER — AMLODIPINE BESYLATE 5 MG/1
5 TABLET ORAL DAILY
Status: DISCONTINUED | OUTPATIENT
Start: 2018-01-01 | End: 2018-01-01 | Stop reason: HOSPADM

## 2018-01-01 RX ORDER — TAMSULOSIN HYDROCHLORIDE 0.4 MG/1
0.4 CAPSULE ORAL DAILY
Qty: 30 CAPSULE | Refills: 11 | Status: SHIPPED | OUTPATIENT
Start: 2018-01-01 | End: 2019-01-01 | Stop reason: HOSPADM

## 2018-01-01 RX ORDER — BRIMONIDINE TARTRATE 2 MG/ML
1 SOLUTION/ DROPS OPHTHALMIC 2 TIMES DAILY
Status: DISCONTINUED | OUTPATIENT
Start: 2018-01-01 | End: 2018-01-01 | Stop reason: HOSPADM

## 2018-01-01 RX ORDER — SODIUM CHLORIDE 0.9 % (FLUSH) 0.9 %
5 SYRINGE (ML) INJECTION
Status: DISCONTINUED | OUTPATIENT
Start: 2018-01-01 | End: 2018-01-01 | Stop reason: HOSPADM

## 2018-01-01 RX ORDER — SODIUM CHLORIDE 9 MG/ML
INJECTION, SOLUTION INTRAVENOUS
Status: DISCONTINUED | OUTPATIENT
Start: 2018-01-01 | End: 2018-01-01 | Stop reason: HOSPADM

## 2018-01-01 RX ORDER — DUTASTERIDE 0.5 MG/1
0.5 CAPSULE, LIQUID FILLED ORAL DAILY
Qty: 30 CAPSULE | Refills: 11 | Status: SHIPPED | OUTPATIENT
Start: 2018-01-01 | End: 2018-01-01 | Stop reason: SDUPTHER

## 2018-01-01 RX ORDER — FLUCONAZOLE 100 MG/1
100 TABLET ORAL DAILY
Qty: 12 TABLET | Refills: 0 | Status: SHIPPED | OUTPATIENT
Start: 2018-01-01 | End: 2018-01-01

## 2018-01-01 RX ORDER — HEPARIN SODIUM 5000 [USP'U]/ML
5000 INJECTION, SOLUTION INTRAVENOUS; SUBCUTANEOUS EVERY 8 HOURS
Status: DISCONTINUED | OUTPATIENT
Start: 2018-01-01 | End: 2018-01-01

## 2018-01-01 RX ORDER — SODIUM CHLORIDE 9 MG/ML
INJECTION, SOLUTION INTRAVENOUS CONTINUOUS
Status: DISCONTINUED | OUTPATIENT
Start: 2018-01-01 | End: 2018-01-01 | Stop reason: HOSPADM

## 2018-01-01 RX ORDER — INSULIN ASPART 100 [IU]/ML
0-5 INJECTION, SOLUTION INTRAVENOUS; SUBCUTANEOUS
Status: DISCONTINUED | OUTPATIENT
Start: 2018-01-01 | End: 2018-01-01 | Stop reason: HOSPADM

## 2018-01-01 RX ORDER — CARVEDILOL 25 MG/1
25 TABLET ORAL 2 TIMES DAILY
Status: DISCONTINUED | OUTPATIENT
Start: 2018-01-01 | End: 2018-01-01 | Stop reason: HOSPADM

## 2018-01-01 RX ORDER — SODIUM CHLORIDE 9 MG/ML
INJECTION, SOLUTION INTRAVENOUS CONTINUOUS PRN
Status: DISCONTINUED | OUTPATIENT
Start: 2018-01-01 | End: 2018-01-01

## 2018-01-01 RX ORDER — FLUCONAZOLE 100 MG/1
100 TABLET ORAL DAILY
Qty: 12 TABLET | Refills: 0 | Status: ON HOLD
Start: 2018-01-01 | End: 2018-01-01 | Stop reason: HOSPADM

## 2018-01-01 RX ORDER — MUPIROCIN 20 MG/G
OINTMENT TOPICAL
Status: DISCONTINUED | OUTPATIENT
Start: 2018-01-01 | End: 2018-01-01 | Stop reason: HOSPADM

## 2018-01-01 RX ORDER — SODIUM CHLORIDE 9 MG/ML
INJECTION, SOLUTION INTRAVENOUS ONCE
Status: DISCONTINUED | OUTPATIENT
Start: 2018-01-01 | End: 2018-01-01

## 2018-01-01 RX ORDER — LEVETIRACETAM 500 MG/1
500 TABLET ORAL 2 TIMES DAILY
Status: DISCONTINUED | OUTPATIENT
Start: 2018-01-01 | End: 2018-01-01 | Stop reason: HOSPADM

## 2018-01-01 RX ORDER — DUTASTERIDE 0.5 MG/1
0.5 CAPSULE, LIQUID FILLED ORAL DAILY
Qty: 30 CAPSULE | Refills: 1 | Status: SHIPPED | OUTPATIENT
Start: 2018-01-01 | End: 2019-01-01 | Stop reason: HOSPADM

## 2018-01-01 RX ORDER — LIDOCAINE HYDROCHLORIDE 20 MG/ML
JELLY TOPICAL
Status: DISCONTINUED | OUTPATIENT
Start: 2018-01-01 | End: 2018-01-01 | Stop reason: HOSPADM

## 2018-01-01 RX ORDER — DUTASTERIDE 0.5 MG/1
0.5 CAPSULE, LIQUID FILLED ORAL DAILY
Status: DISCONTINUED | OUTPATIENT
Start: 2018-01-01 | End: 2018-01-01 | Stop reason: HOSPADM

## 2018-01-01 RX ORDER — CIPROFLOXACIN 250 MG/1
250 TABLET, FILM COATED ORAL EVERY 24 HOURS
Status: DISCONTINUED | OUTPATIENT
Start: 2018-01-01 | End: 2018-01-01 | Stop reason: HOSPADM

## 2018-01-01 RX ORDER — LIDOCAINE HYDROCHLORIDE 10 MG/ML
1 INJECTION, SOLUTION EPIDURAL; INFILTRATION; INTRACAUDAL; PERINEURAL ONCE
Status: DISCONTINUED | OUTPATIENT
Start: 2018-01-01 | End: 2018-01-01 | Stop reason: HOSPADM

## 2018-01-01 RX ORDER — SODIUM CHLORIDE 9 MG/ML
INJECTION, SOLUTION INTRAVENOUS CONTINUOUS
Status: CANCELLED | OUTPATIENT
Start: 2018-01-01

## 2018-01-01 RX ORDER — FENTANYL CITRATE 50 UG/ML
INJECTION, SOLUTION INTRAMUSCULAR; INTRAVENOUS
Status: DISCONTINUED | OUTPATIENT
Start: 2018-01-01 | End: 2018-01-01

## 2018-01-01 RX ORDER — CALCIUM ACETATE 667 MG/1
1334 CAPSULE ORAL
Status: DISCONTINUED | OUTPATIENT
Start: 2018-01-01 | End: 2018-01-01 | Stop reason: HOSPADM

## 2018-01-01 RX ORDER — GLUCAGON 1 MG
1 KIT INJECTION
Status: DISCONTINUED | OUTPATIENT
Start: 2018-01-01 | End: 2018-01-01

## 2018-01-01 RX ORDER — ATORVASTATIN CALCIUM 20 MG/1
40 TABLET, FILM COATED ORAL DAILY
Status: DISCONTINUED | OUTPATIENT
Start: 2018-01-01 | End: 2018-01-01 | Stop reason: HOSPADM

## 2018-01-01 RX ORDER — FLUCONAZOLE 100 MG/1
100 TABLET ORAL DAILY
Status: DISCONTINUED | OUTPATIENT
Start: 2018-01-01 | End: 2018-01-01 | Stop reason: HOSPADM

## 2018-01-01 RX ORDER — SODIUM CHLORIDE 9 MG/ML
INJECTION, SOLUTION INTRAVENOUS ONCE
Status: COMPLETED | OUTPATIENT
Start: 2018-01-01 | End: 2018-01-01

## 2018-01-01 RX ORDER — CARVEDILOL 12.5 MG/1
25 TABLET ORAL
Status: COMPLETED | OUTPATIENT
Start: 2018-01-01 | End: 2018-01-01

## 2018-01-01 RX ORDER — SODIUM CHLORIDE 9 MG/ML
INJECTION, SOLUTION INTRAVENOUS
Status: DISCONTINUED | OUTPATIENT
Start: 2018-01-01 | End: 2018-01-01

## 2018-01-01 RX ORDER — FLUTICASONE PROPIONATE 50 MCG
2 SPRAY, SUSPENSION (ML) NASAL DAILY
Status: DISCONTINUED | OUTPATIENT
Start: 2018-01-01 | End: 2018-01-01 | Stop reason: HOSPADM

## 2018-01-01 RX ORDER — DOXYCYCLINE 100 MG/1
100 CAPSULE ORAL 2 TIMES DAILY
Qty: 16 CAPSULE | Refills: 0 | Status: SHIPPED | OUTPATIENT
Start: 2018-01-01 | End: 2018-01-01

## 2018-01-01 RX ORDER — FLUCONAZOLE 100 MG/1
100 TABLET ORAL
Status: DISCONTINUED | OUTPATIENT
Start: 2018-01-01 | End: 2018-01-01 | Stop reason: HOSPADM

## 2018-01-01 RX ORDER — VANCOMYCIN 2 GRAM/500 ML IN 0.9 % SODIUM CHLORIDE INTRAVENOUS
2000
Status: COMPLETED | OUTPATIENT
Start: 2018-01-01 | End: 2018-01-01

## 2018-01-01 RX ORDER — ACETAMINOPHEN 325 MG/1
650 TABLET ORAL EVERY 8 HOURS PRN
Status: DISCONTINUED | OUTPATIENT
Start: 2018-01-01 | End: 2018-01-01

## 2018-01-01 RX ORDER — IBUPROFEN 200 MG
24 TABLET ORAL
Status: DISCONTINUED | OUTPATIENT
Start: 2018-01-01 | End: 2018-01-01 | Stop reason: HOSPADM

## 2018-01-01 RX ORDER — SODIUM CHLORIDE 9 MG/ML
INJECTION, SOLUTION INTRAVENOUS ONCE
Status: DISCONTINUED | OUTPATIENT
Start: 2018-01-01 | End: 2018-01-01 | Stop reason: HOSPADM

## 2018-01-01 RX ORDER — DOXYCYCLINE 100 MG/1
100 CAPSULE ORAL 2 TIMES DAILY
Qty: 16 CAPSULE | Refills: 0 | Status: SHIPPED | OUTPATIENT
Start: 2018-01-01 | End: 2018-01-01 | Stop reason: SDUPTHER

## 2018-01-01 RX ORDER — FLUTICASONE PROPIONATE 50 MCG
2 SPRAY, SUSPENSION (ML) NASAL DAILY
Qty: 15 ML | Refills: 3
Start: 2018-01-01 | End: 2018-01-01

## 2018-01-01 RX ORDER — GLUCAGON 1 MG
1 KIT INJECTION
Status: DISCONTINUED | OUTPATIENT
Start: 2018-01-01 | End: 2018-01-01 | Stop reason: HOSPADM

## 2018-01-01 RX ORDER — TIMOLOL MALEATE 5 MG/ML
1 SOLUTION/ DROPS OPHTHALMIC 2 TIMES DAILY
Status: DISCONTINUED | OUTPATIENT
Start: 2018-01-01 | End: 2018-01-01 | Stop reason: HOSPADM

## 2018-01-01 RX ORDER — CARVEDILOL 12.5 MG/1
25 TABLET ORAL 2 TIMES DAILY
Status: DISCONTINUED | OUTPATIENT
Start: 2018-01-01 | End: 2018-01-01

## 2018-01-01 RX ORDER — IPRATROPIUM BROMIDE AND ALBUTEROL SULFATE 2.5; .5 MG/3ML; MG/3ML
3 SOLUTION RESPIRATORY (INHALATION) EVERY 4 HOURS PRN
Status: DISCONTINUED | OUTPATIENT
Start: 2018-01-01 | End: 2018-01-01 | Stop reason: HOSPADM

## 2018-01-01 RX ORDER — HALOPERIDOL 5 MG/ML
5 INJECTION INTRAMUSCULAR ONCE
Status: DISCONTINUED | OUTPATIENT
Start: 2018-01-01 | End: 2018-01-01

## 2018-01-01 RX ORDER — POLYETHYLENE GLYCOL 3350 17 G/17G
17 POWDER, FOR SOLUTION ORAL DAILY
Status: DISCONTINUED | OUTPATIENT
Start: 2018-01-01 | End: 2018-01-01 | Stop reason: HOSPADM

## 2018-01-01 RX ORDER — HYDRALAZINE HYDROCHLORIDE 25 MG/1
25 TABLET, FILM COATED ORAL EVERY 8 HOURS PRN
Status: DISCONTINUED | OUTPATIENT
Start: 2018-01-01 | End: 2018-01-01 | Stop reason: HOSPADM

## 2018-01-01 RX ORDER — IBUPROFEN 200 MG
16 TABLET ORAL
Status: DISCONTINUED | OUTPATIENT
Start: 2018-01-01 | End: 2018-01-01 | Stop reason: HOSPADM

## 2018-01-01 RX ORDER — AMOXICILLIN 250 MG
1 CAPSULE ORAL 2 TIMES DAILY
Status: DISCONTINUED | OUTPATIENT
Start: 2018-01-01 | End: 2018-01-01

## 2018-01-01 RX ORDER — OLANZAPINE 10 MG/2ML
5 INJECTION, POWDER, FOR SOLUTION INTRAMUSCULAR EVERY 12 HOURS PRN
Status: DISCONTINUED | OUTPATIENT
Start: 2018-01-01 | End: 2018-01-01 | Stop reason: HOSPADM

## 2018-01-01 RX ORDER — AMOXICILLIN AND CLAVULANATE POTASSIUM 500; 125 MG/1; MG/1
1 TABLET, FILM COATED ORAL DAILY
Qty: 4 TABLET | Refills: 0
Start: 2018-01-01 | End: 2018-01-01

## 2018-01-01 RX ORDER — BRIMONIDINE TARTRATE AND TIMOLOL MALEATE 2; 5 MG/ML; MG/ML
1 SOLUTION OPHTHALMIC 2 TIMES DAILY
Status: DISCONTINUED | OUTPATIENT
Start: 2018-01-01 | End: 2018-01-01

## 2018-01-01 RX ORDER — AMOXICILLIN AND CLAVULANATE POTASSIUM 500; 125 MG/1; MG/1
1 TABLET, FILM COATED ORAL DAILY
Qty: 4 TABLET | Refills: 0 | Status: SHIPPED | OUTPATIENT
Start: 2018-01-01 | End: 2018-01-01

## 2018-01-01 RX ORDER — DOXYCYCLINE HYCLATE 100 MG
100 TABLET ORAL
Status: COMPLETED | OUTPATIENT
Start: 2018-01-01 | End: 2018-01-01

## 2018-01-01 RX ORDER — LIDOCAINE HYDROCHLORIDE 10 MG/ML
1 INJECTION, SOLUTION EPIDURAL; INFILTRATION; INTRACAUDAL; PERINEURAL ONCE
Status: CANCELLED | OUTPATIENT
Start: 2018-01-01 | End: 2018-01-01

## 2018-01-01 RX ORDER — FLUTICASONE PROPIONATE 50 MCG
2 SPRAY, SUSPENSION (ML) NASAL DAILY
Qty: 15 ML | Refills: 3 | Status: SHIPPED | OUTPATIENT
Start: 2018-01-01 | End: 2018-01-01

## 2018-01-01 RX ORDER — CEFAZOLIN SODIUM 1 G/3ML
2 INJECTION, POWDER, FOR SOLUTION INTRAMUSCULAR; INTRAVENOUS
Status: DISCONTINUED | OUTPATIENT
Start: 2018-01-01 | End: 2018-01-01 | Stop reason: HOSPADM

## 2018-01-01 RX ORDER — RAMELTEON 8 MG/1
8 TABLET ORAL NIGHTLY PRN
Qty: 30 TABLET | Refills: 3 | Status: SHIPPED | OUTPATIENT
Start: 2018-01-01 | End: 2018-01-01

## 2018-01-01 RX ORDER — HYDROCODONE BITARTRATE AND ACETAMINOPHEN 500; 5 MG/1; MG/1
TABLET ORAL
Status: DISCONTINUED | OUTPATIENT
Start: 2018-01-01 | End: 2018-01-01

## 2018-01-01 RX ORDER — RAMELTEON 8 MG/1
8 TABLET ORAL NIGHTLY PRN
Status: DISCONTINUED | OUTPATIENT
Start: 2018-01-01 | End: 2018-01-01 | Stop reason: HOSPADM

## 2018-01-01 RX ORDER — HYDROCODONE BITARTRATE AND ACETAMINOPHEN 5; 325 MG/1; MG/1
1 TABLET ORAL EVERY 4 HOURS PRN
Qty: 8 TABLET | Refills: 0 | Status: SHIPPED | OUTPATIENT
Start: 2018-01-01 | End: 2018-01-01 | Stop reason: SDUPTHER

## 2018-01-01 RX ORDER — HYDROCODONE BITARTRATE AND ACETAMINOPHEN 500; 5 MG/1; MG/1
TABLET ORAL
Status: DISCONTINUED | OUTPATIENT
Start: 2018-01-01 | End: 2018-01-01 | Stop reason: HOSPADM

## 2018-01-01 RX ORDER — AMLODIPINE BESYLATE 5 MG/1
5 TABLET ORAL DAILY
Qty: 30 TABLET | Refills: 3 | Status: ON HOLD | OUTPATIENT
Start: 2018-01-01 | End: 2019-01-01 | Stop reason: SDUPTHER

## 2018-01-01 RX ORDER — SODIUM CHLORIDE 9 MG/ML
500 INJECTION, SOLUTION INTRAVENOUS
Status: COMPLETED | OUTPATIENT
Start: 2018-01-01 | End: 2018-01-01

## 2018-01-01 RX ORDER — CIPROFLOXACIN 250 MG/1
250 TABLET, FILM COATED ORAL EVERY 24 HOURS
Status: DISCONTINUED | OUTPATIENT
Start: 2018-01-01 | End: 2018-01-01

## 2018-01-01 RX ORDER — CEFTRIAXONE 1 G/1
1 INJECTION, POWDER, FOR SOLUTION INTRAMUSCULAR; INTRAVENOUS
Status: ACTIVE | OUTPATIENT
Start: 2018-01-01 | End: 2018-01-01

## 2018-01-01 RX ORDER — AMOXICILLIN AND CLAVULANATE POTASSIUM 500; 125 MG/1; MG/1
1 TABLET, FILM COATED ORAL DAILY
Status: DISCONTINUED | OUTPATIENT
Start: 2018-01-01 | End: 2018-01-01 | Stop reason: HOSPADM

## 2018-01-01 RX ORDER — OLANZAPINE 10 MG/2ML
5 INJECTION, POWDER, FOR SOLUTION INTRAMUSCULAR ONCE AS NEEDED
Status: DISPENSED | OUTPATIENT
Start: 2018-01-01 | End: 2018-01-01

## 2018-01-01 RX ORDER — DOXAZOSIN 2 MG/1
4 TABLET ORAL DAILY
Status: DISCONTINUED | OUTPATIENT
Start: 2018-01-01 | End: 2018-01-01

## 2018-01-01 RX ORDER — ACETAMINOPHEN 325 MG/1
650 TABLET ORAL EVERY 4 HOURS PRN
Status: DISCONTINUED | OUTPATIENT
Start: 2018-01-01 | End: 2018-01-01 | Stop reason: HOSPADM

## 2018-01-01 RX ORDER — FINASTERIDE 5 MG/1
5 TABLET, FILM COATED ORAL DAILY
Status: DISCONTINUED | OUTPATIENT
Start: 2018-01-01 | End: 2018-01-01 | Stop reason: HOSPADM

## 2018-01-01 RX ORDER — CIPROFLOXACIN 250 MG/1
250 TABLET, FILM COATED ORAL DAILY
Qty: 6 TABLET | Refills: 0 | Status: SHIPPED | OUTPATIENT
Start: 2018-01-01 | End: 2018-01-01

## 2018-01-01 RX ORDER — LIDOCAINE HYDROCHLORIDE 20 MG/ML
JELLY TOPICAL
Status: COMPLETED | OUTPATIENT
Start: 2018-01-01 | End: 2018-01-01

## 2018-01-01 RX ORDER — BRINZOLAMIDE 10 MG/ML
1 SUSPENSION/ DROPS OPHTHALMIC 3 TIMES DAILY
Status: DISCONTINUED | OUTPATIENT
Start: 2018-01-01 | End: 2018-01-01 | Stop reason: HOSPADM

## 2018-01-01 RX ORDER — CALCIUM ACETATE 667 MG/1
CAPSULE ORAL
COMMUNITY
End: 2019-01-01

## 2018-01-01 RX ORDER — BISACODYL 10 MG
10 SUPPOSITORY, RECTAL RECTAL DAILY PRN
Status: DISCONTINUED | OUTPATIENT
Start: 2018-01-01 | End: 2018-01-01 | Stop reason: HOSPADM

## 2018-01-01 RX ORDER — NAPROXEN SODIUM 220 MG/1
81 TABLET, FILM COATED ORAL DAILY
Status: DISCONTINUED | OUTPATIENT
Start: 2018-01-01 | End: 2018-01-01

## 2018-01-01 RX ORDER — DOXYCYCLINE HYCLATE 100 MG
100 TABLET ORAL ONCE
Status: CANCELLED | OUTPATIENT
Start: 2018-01-01 | End: 2018-01-01

## 2018-01-01 RX ORDER — PROPOFOL 10 MG/ML
VIAL (ML) INTRAVENOUS
Status: DISCONTINUED | OUTPATIENT
Start: 2018-01-01 | End: 2018-01-01

## 2018-01-01 RX ORDER — ACETAMINOPHEN 325 MG/1
650 TABLET ORAL EVERY 6 HOURS PRN
Status: DISCONTINUED | OUTPATIENT
Start: 2018-01-01 | End: 2018-01-01 | Stop reason: HOSPADM

## 2018-01-01 RX ORDER — TAMSULOSIN HYDROCHLORIDE 0.4 MG/1
0.4 CAPSULE ORAL DAILY
Status: DISCONTINUED | OUTPATIENT
Start: 2018-01-01 | End: 2018-01-01 | Stop reason: HOSPADM

## 2018-01-01 RX ORDER — DUTASTERIDE 0.5 MG/1
0.5 CAPSULE, LIQUID FILLED ORAL DAILY
Qty: 30 CAPSULE | Refills: 0 | Status: ON HOLD | OUTPATIENT
Start: 2018-01-01 | End: 2018-01-01

## 2018-01-01 RX ORDER — LIDOCAINE HCL/PF 100 MG/5ML
SYRINGE (ML) INTRAVENOUS
Status: DISCONTINUED | OUTPATIENT
Start: 2018-01-01 | End: 2018-01-01

## 2018-01-01 RX ORDER — OLANZAPINE 10 MG/2ML
5 INJECTION, POWDER, FOR SOLUTION INTRAMUSCULAR ONCE
Status: DISCONTINUED | OUTPATIENT
Start: 2018-01-01 | End: 2018-01-01 | Stop reason: HOSPADM

## 2018-01-01 RX ORDER — TRAVOPROST OPHTHALMIC SOLUTION 0.04 MG/ML
1 SOLUTION OPHTHALMIC NIGHTLY
Status: DISCONTINUED | OUTPATIENT
Start: 2018-01-01 | End: 2018-01-01 | Stop reason: HOSPADM

## 2018-01-01 RX ORDER — ACETAMINOPHEN 500 MG
1000 TABLET ORAL EVERY 8 HOURS PRN
Status: DISCONTINUED | OUTPATIENT
Start: 2018-01-01 | End: 2018-01-01 | Stop reason: HOSPADM

## 2018-01-01 RX ORDER — IBUPROFEN 200 MG
16 TABLET ORAL
Status: DISCONTINUED | OUTPATIENT
Start: 2018-01-01 | End: 2018-01-01

## 2018-01-01 RX ORDER — DOXAZOSIN 2 MG/1
4 TABLET ORAL DAILY
Status: DISCONTINUED | OUTPATIENT
Start: 2018-01-01 | End: 2018-01-01 | Stop reason: HOSPADM

## 2018-01-01 RX ORDER — AMLODIPINE BESYLATE 10 MG/1
10 TABLET ORAL DAILY
Status: DISCONTINUED | OUTPATIENT
Start: 2018-01-01 | End: 2018-01-01 | Stop reason: HOSPADM

## 2018-01-01 RX ORDER — LIDOCAINE HYDROCHLORIDE 20 MG/ML
JELLY TOPICAL ONCE
Status: CANCELLED | OUTPATIENT
Start: 2018-01-01 | End: 2018-01-01

## 2018-01-01 RX ORDER — FLUCONAZOLE 200 MG/1
200 TABLET ORAL DAILY
Status: DISCONTINUED | OUTPATIENT
Start: 2018-01-01 | End: 2018-01-01

## 2018-01-01 RX ORDER — CALCIUM ACETATE 667 MG/1
667 CAPSULE ORAL
Status: DISCONTINUED | OUTPATIENT
Start: 2018-01-01 | End: 2018-01-01 | Stop reason: HOSPADM

## 2018-01-01 RX ORDER — HYDROCODONE BITARTRATE AND ACETAMINOPHEN 5; 325 MG/1; MG/1
1 TABLET ORAL
Status: COMPLETED | OUTPATIENT
Start: 2018-01-01 | End: 2018-01-01

## 2018-01-01 RX ORDER — AMLODIPINE BESYLATE 5 MG/1
5 TABLET ORAL
Status: COMPLETED | OUTPATIENT
Start: 2018-01-01 | End: 2018-01-01

## 2018-01-01 RX ORDER — HYDROCODONE BITARTRATE AND ACETAMINOPHEN 5; 325 MG/1; MG/1
1 TABLET ORAL EVERY 4 HOURS PRN
Qty: 8 TABLET | Refills: 0 | Status: ON HOLD | OUTPATIENT
Start: 2018-01-01 | End: 2018-01-01 | Stop reason: HOSPADM

## 2018-01-01 RX ORDER — DOXAZOSIN 4 MG/1
4 TABLET ORAL DAILY
Status: DISCONTINUED | OUTPATIENT
Start: 2018-01-01 | End: 2018-01-01 | Stop reason: HOSPADM

## 2018-01-01 RX ORDER — PROPOFOL 10 MG/ML
VIAL (ML) INTRAVENOUS CONTINUOUS PRN
Status: DISCONTINUED | OUTPATIENT
Start: 2018-01-01 | End: 2018-01-01

## 2018-01-01 RX ADMIN — TRAVOPROST 1 DROP: 0.04 SOLUTION/ DROPS OPHTHALMIC at 08:11

## 2018-01-01 RX ADMIN — CALCIUM ACETATE 1334 MG: 667 CAPSULE ORAL at 12:11

## 2018-01-01 RX ADMIN — LEVETIRACETAM 500 MG: 500 TABLET, FILM COATED ORAL at 09:12

## 2018-01-01 RX ADMIN — ACETAMINOPHEN 1000 MG: 500 TABLET, FILM COATED ORAL at 07:12

## 2018-01-01 RX ADMIN — CALCIUM ACETATE 1334 MG: 667 CAPSULE ORAL at 03:12

## 2018-01-01 RX ADMIN — POLYETHYLENE GLYCOL 3350 17 G: 17 POWDER, FOR SOLUTION ORAL at 08:12

## 2018-01-01 RX ADMIN — LEVETIRACETAM 500 MG: 500 TABLET, FILM COATED ORAL at 10:12

## 2018-01-01 RX ADMIN — CALCIUM ACETATE 1334 MG: 667 CAPSULE ORAL at 03:11

## 2018-01-01 RX ADMIN — TAMSULOSIN HYDROCHLORIDE 0.4 MG: 0.4 CAPSULE ORAL at 10:12

## 2018-01-01 RX ADMIN — DUTASTERIDE 0.5 MG: 0.5 CAPSULE, LIQUID FILLED ORAL at 08:12

## 2018-01-01 RX ADMIN — BRIMONIDINE TARTRATE 1 DROP: 2 SOLUTION OPHTHALMIC at 08:12

## 2018-01-01 RX ADMIN — TIMOLOL MALEATE 1 DROP: 5 SOLUTION OPHTHALMIC at 08:12

## 2018-01-01 RX ADMIN — LEVETIRACETAM 500 MG: 500 TABLET, FILM COATED ORAL at 08:12

## 2018-01-01 RX ADMIN — INSULIN ASPART 1 UNITS: 100 INJECTION, SOLUTION INTRAVENOUS; SUBCUTANEOUS at 11:08

## 2018-01-01 RX ADMIN — INSULIN ASPART 2 UNITS: 100 INJECTION, SOLUTION INTRAVENOUS; SUBCUTANEOUS at 02:12

## 2018-01-01 RX ADMIN — AMLODIPINE BESYLATE 5 MG: 5 TABLET ORAL at 08:11

## 2018-01-01 RX ADMIN — ATORVASTATIN CALCIUM 40 MG: 20 TABLET, FILM COATED ORAL at 08:12

## 2018-01-01 RX ADMIN — TIMOLOL MALEATE 1 DROP: 5 SOLUTION/ DROPS OPHTHALMIC at 09:11

## 2018-01-01 RX ADMIN — LEVETIRACETAM 500 MG: 500 TABLET ORAL at 10:11

## 2018-01-01 RX ADMIN — CARVEDILOL 25 MG: 25 TABLET, FILM COATED ORAL at 09:12

## 2018-01-01 RX ADMIN — INSULIN ASPART 2 UNITS: 100 INJECTION, SOLUTION INTRAVENOUS; SUBCUTANEOUS at 06:11

## 2018-01-01 RX ADMIN — BRIMONIDINE TARTRATE 1 DROP: 2 SOLUTION OPHTHALMIC at 09:12

## 2018-01-01 RX ADMIN — CALCIUM ACETATE 667 MG: 667 CAPSULE ORAL at 01:08

## 2018-01-01 RX ADMIN — LEVETIRACETAM 500 MG: 500 TABLET, FILM COATED ORAL at 09:08

## 2018-01-01 RX ADMIN — DUTASTERIDE 0.5 MG: 0.5 CAPSULE, LIQUID FILLED ORAL at 10:12

## 2018-01-01 RX ADMIN — BRINZOLAMIDE 1 DROP: 10 SUSPENSION/ DROPS OPHTHALMIC at 08:12

## 2018-01-01 RX ADMIN — DOXAZOSIN MESYLATE 4 MG: 2 TABLET ORAL at 10:08

## 2018-01-01 RX ADMIN — FINASTERIDE 5 MG: 5 TABLET, FILM COATED ORAL at 01:08

## 2018-01-01 RX ADMIN — SODIUM CHLORIDE 1000 ML: 0.9 INJECTION, SOLUTION INTRAVENOUS at 10:08

## 2018-01-01 RX ADMIN — BRIMONIDINE TARTRATE 1 DROP: 1.5 SOLUTION OPHTHALMIC at 09:11

## 2018-01-01 RX ADMIN — AMLODIPINE BESYLATE 10 MG: 10 TABLET ORAL at 01:08

## 2018-01-01 RX ADMIN — RAMELTEON 8 MG: 8 TABLET, FILM COATED ORAL at 01:08

## 2018-01-01 RX ADMIN — CARVEDILOL 25 MG: 25 TABLET, FILM COATED ORAL at 01:08

## 2018-01-01 RX ADMIN — BRINZOLAMIDE 1 DROP: 10 SUSPENSION/ DROPS OPHTHALMIC at 10:12

## 2018-01-01 RX ADMIN — CARVEDILOL 25 MG: 25 TABLET, FILM COATED ORAL at 09:08

## 2018-01-01 RX ADMIN — LEVETIRACETAM 500 MG: 500 TABLET ORAL at 08:11

## 2018-01-01 RX ADMIN — ATORVASTATIN CALCIUM 40 MG: 20 TABLET, FILM COATED ORAL at 09:11

## 2018-01-01 RX ADMIN — LEVETIRACETAM 500 MG: 500 TABLET ORAL at 09:11

## 2018-01-01 RX ADMIN — TIMOLOL MALEATE 1 DROP: 5 SOLUTION OPHTHALMIC at 09:12

## 2018-01-01 RX ADMIN — INSULIN ASPART 1 UNITS: 100 INJECTION, SOLUTION INTRAVENOUS; SUBCUTANEOUS at 09:11

## 2018-01-01 RX ADMIN — CARVEDILOL 25 MG: 25 TABLET, FILM COATED ORAL at 08:12

## 2018-01-01 RX ADMIN — FLUTICASONE PROPIONATE 100 MCG: 50 SPRAY, METERED NASAL at 01:08

## 2018-01-01 RX ADMIN — TAMSULOSIN HYDROCHLORIDE 0.4 MG: 0.4 CAPSULE ORAL at 09:12

## 2018-01-01 RX ADMIN — CARVEDILOL 25 MG: 12.5 TABLET, FILM COATED ORAL at 09:12

## 2018-01-01 RX ADMIN — AMLODIPINE BESYLATE 5 MG: 5 TABLET ORAL at 09:11

## 2018-01-01 RX ADMIN — CALCIUM ACETATE 1334 MG: 667 CAPSULE ORAL at 08:12

## 2018-01-01 RX ADMIN — LEVETIRACETAM 500 MG: 500 TABLET ORAL at 11:11

## 2018-01-01 RX ADMIN — POLYETHYLENE GLYCOL 3350 17 G: 17 POWDER, FOR SOLUTION ORAL at 09:08

## 2018-01-01 RX ADMIN — DOXYCYCLINE HYCLATE 100 MG: 100 TABLET, COATED ORAL at 03:11

## 2018-01-01 RX ADMIN — CARVEDILOL 25 MG: 25 TABLET, FILM COATED ORAL at 08:11

## 2018-01-01 RX ADMIN — BRIMONIDINE TARTRATE 1 DROP: 1.5 SOLUTION OPHTHALMIC at 11:11

## 2018-01-01 RX ADMIN — ASPIRIN 81 MG CHEWABLE TABLET 81 MG: 81 TABLET CHEWABLE at 08:11

## 2018-01-01 RX ADMIN — PIPERACILLIN AND TAZOBACTAM 4.5 G: 4; .5 INJECTION, POWDER, LYOPHILIZED, FOR SOLUTION INTRAVENOUS; PARENTERAL at 09:08

## 2018-01-01 RX ADMIN — DOXAZOSIN MESYLATE 4 MG: 2 TABLET ORAL at 08:11

## 2018-01-01 RX ADMIN — CARVEDILOL 25 MG: 25 TABLET, FILM COATED ORAL at 09:11

## 2018-01-01 RX ADMIN — DUTASTERIDE 0.5 MG: 0.5 CAPSULE, LIQUID FILLED ORAL at 09:11

## 2018-01-01 RX ADMIN — CALCIUM ACETATE 1334 MG: 667 CAPSULE ORAL at 10:12

## 2018-01-01 RX ADMIN — AMLODIPINE BESYLATE 5 MG: 5 TABLET ORAL at 03:11

## 2018-01-01 RX ADMIN — TRAVOPROST 1 DROP: 0.04 SOLUTION/ DROPS OPHTHALMIC at 08:12

## 2018-01-01 RX ADMIN — BRIMONIDINE TARTRATE 1 DROP: 2 SOLUTION OPHTHALMIC at 12:12

## 2018-01-01 RX ADMIN — TRAVOPROST 1 DROP: 0.04 SOLUTION/ DROPS OPHTHALMIC at 09:12

## 2018-01-01 RX ADMIN — CALCIUM ACETATE 1334 MG: 667 CAPSULE ORAL at 01:11

## 2018-01-01 RX ADMIN — CALCIUM ACETATE 667 MG: 667 CAPSULE ORAL at 04:08

## 2018-01-01 RX ADMIN — CALCIUM ACETATE 1334 MG: 667 CAPSULE ORAL at 08:11

## 2018-01-01 RX ADMIN — FLUTICASONE PROPIONATE 100 MCG: 50 SPRAY, METERED NASAL at 09:08

## 2018-01-01 RX ADMIN — SODIUM CHLORIDE 500 ML: 0.9 INJECTION, SOLUTION INTRAVENOUS at 07:11

## 2018-01-01 RX ADMIN — TRAVOPROST 1 DROP: 0.04 SOLUTION/ DROPS OPHTHALMIC at 09:11

## 2018-01-01 RX ADMIN — DUTASTERIDE 0.5 MG: 0.5 CAPSULE, LIQUID FILLED ORAL at 06:12

## 2018-01-01 RX ADMIN — DUTASTERIDE 0.5 MG: 0.5 CAPSULE, LIQUID FILLED ORAL at 09:12

## 2018-01-01 RX ADMIN — CALCIUM ACETATE 1334 MG: 667 CAPSULE ORAL at 01:12

## 2018-01-01 RX ADMIN — AMOXICILLIN AND CLAVULANATE POTASSIUM 500 MG: 500; 125 TABLET, FILM COATED ORAL at 10:08

## 2018-01-01 RX ADMIN — DOXAZOSIN MESYLATE 4 MG: 2 TABLET ORAL at 01:08

## 2018-01-01 RX ADMIN — SODIUM CHLORIDE: 0.9 INJECTION, SOLUTION INTRAVENOUS at 03:12

## 2018-01-01 RX ADMIN — TIMOLOL MALEATE 1 DROP: 5 SOLUTION OPHTHALMIC at 10:12

## 2018-01-01 RX ADMIN — SODIUM CHLORIDE 300 ML: 0.9 INJECTION, SOLUTION INTRAVENOUS at 03:08

## 2018-01-01 RX ADMIN — POLYETHYLENE GLYCOL 3350 17 G: 17 POWDER, FOR SOLUTION ORAL at 09:12

## 2018-01-01 RX ADMIN — DUTASTERIDE 0.5 MG: 0.5 CAPSULE, LIQUID FILLED ORAL at 03:11

## 2018-01-01 RX ADMIN — CALCIUM ACETATE 1334 MG: 667 CAPSULE ORAL at 06:11

## 2018-01-01 RX ADMIN — FLUTICASONE PROPIONATE 100 MCG: 50 SPRAY, METERED NASAL at 08:08

## 2018-01-01 RX ADMIN — TIMOLOL MALEATE 1 DROP: 5 SOLUTION/ DROPS OPHTHALMIC at 10:11

## 2018-01-01 RX ADMIN — CALCIUM ACETATE 1334 MG: 667 CAPSULE ORAL at 04:12

## 2018-01-01 RX ADMIN — VANCOMYCIN HYDROCHLORIDE 2000 MG: 10 INJECTION, POWDER, LYOPHILIZED, FOR SOLUTION INTRAVENOUS at 10:08

## 2018-01-01 RX ADMIN — CEFTRIAXONE 1 G: 1 INJECTION, SOLUTION INTRAVENOUS at 06:11

## 2018-01-01 RX ADMIN — INSULIN ASPART 1 UNITS: 100 INJECTION, SOLUTION INTRAVENOUS; SUBCUTANEOUS at 08:12

## 2018-01-01 RX ADMIN — LIDOCAINE HYDROCHLORIDE 30 MG: 20 INJECTION, SOLUTION INTRAVENOUS at 06:11

## 2018-01-01 RX ADMIN — ACETAMINOPHEN 1000 MG: 500 TABLET, FILM COATED ORAL at 10:12

## 2018-01-01 RX ADMIN — SODIUM CHLORIDE: 0.9 INJECTION, SOLUTION INTRAVENOUS at 02:11

## 2018-01-01 RX ADMIN — ATORVASTATIN CALCIUM 40 MG: 20 TABLET, FILM COATED ORAL at 08:11

## 2018-01-01 RX ADMIN — BRIMONIDINE TARTRATE 1 DROP: 2 SOLUTION OPHTHALMIC at 10:12

## 2018-01-01 RX ADMIN — CALCIUM ACETATE 1334 MG: 667 CAPSULE ORAL at 06:12

## 2018-01-01 RX ADMIN — TRAVOPROST 1 DROP: 0.04 SOLUTION/ DROPS OPHTHALMIC at 11:12

## 2018-01-01 RX ADMIN — CALCIUM ACETATE 1334 MG: 667 CAPSULE ORAL at 05:12

## 2018-01-01 RX ADMIN — ALBUMIN HUMAN 25 G: 0.25 SOLUTION INTRAVENOUS at 10:11

## 2018-01-01 RX ADMIN — ATORVASTATIN CALCIUM 40 MG: 20 TABLET, FILM COATED ORAL at 03:11

## 2018-01-01 RX ADMIN — TIMOLOL MALEATE 1 DROP: 5 SOLUTION/ DROPS OPHTHALMIC at 08:11

## 2018-01-01 RX ADMIN — INSULIN ASPART 2 UNITS: 100 INJECTION, SOLUTION INTRAVENOUS; SUBCUTANEOUS at 12:11

## 2018-01-01 RX ADMIN — CALCIUM ACETATE 667 MG: 667 CAPSULE ORAL at 08:08

## 2018-01-01 RX ADMIN — ATORVASTATIN CALCIUM 40 MG: 20 TABLET, FILM COATED ORAL at 10:12

## 2018-01-01 RX ADMIN — BRINZOLAMIDE 1 DROP: 10 SUSPENSION/ DROPS OPHTHALMIC at 04:12

## 2018-01-01 RX ADMIN — DOXAZOSIN MESYLATE 4 MG: 2 TABLET ORAL at 03:11

## 2018-01-01 RX ADMIN — CALCIUM ACETATE 1334 MG: 667 CAPSULE ORAL at 09:12

## 2018-01-01 RX ADMIN — AMLODIPINE BESYLATE 10 MG: 10 TABLET ORAL at 09:08

## 2018-01-01 RX ADMIN — FINASTERIDE 5 MG: 5 TABLET, FILM COATED ORAL at 09:08

## 2018-01-01 RX ADMIN — POLYETHYLENE GLYCOL 3350 17 G: 17 POWDER, FOR SOLUTION ORAL at 01:08

## 2018-01-01 RX ADMIN — ATORVASTATIN CALCIUM 40 MG: 20 TABLET, FILM COATED ORAL at 08:08

## 2018-01-01 RX ADMIN — SODIUM CHLORIDE 350 ML: 0.9 INJECTION, SOLUTION INTRAVENOUS at 05:12

## 2018-01-01 RX ADMIN — BRINZOLAMIDE 1 DROP: 10 SUSPENSION/ DROPS OPHTHALMIC at 09:12

## 2018-01-01 RX ADMIN — TAMSULOSIN HYDROCHLORIDE 0.4 MG: 0.4 CAPSULE ORAL at 08:12

## 2018-01-01 RX ADMIN — FLUCONAZOLE 200 MG: 200 TABLET ORAL at 11:11

## 2018-01-01 RX ADMIN — PROPOFOL 10 MG: 10 INJECTION, EMULSION INTRAVENOUS at 06:11

## 2018-01-01 RX ADMIN — RAMELTEON 8 MG: 8 TABLET, FILM COATED ORAL at 09:12

## 2018-01-01 RX ADMIN — FLUCONAZOLE 100 MG: 100 TABLET ORAL at 07:11

## 2018-01-01 RX ADMIN — SODIUM CHLORIDE 300 ML: 0.9 INJECTION, SOLUTION INTRAVENOUS at 12:11

## 2018-01-01 RX ADMIN — CIPROFLOXACIN HYDROCHLORIDE 250 MG: 250 TABLET, FILM COATED ORAL at 09:11

## 2018-01-01 RX ADMIN — LEVETIRACETAM 500 MG: 500 TABLET ORAL at 03:11

## 2018-01-01 RX ADMIN — PROPOFOL 20 MCG/KG/MIN: 10 INJECTION, EMULSION INTRAVENOUS at 06:11

## 2018-01-01 RX ADMIN — BRIMONIDINE TARTRATE 1 DROP: 1.5 SOLUTION OPHTHALMIC at 03:11

## 2018-01-01 RX ADMIN — TIMOLOL MALEATE 1 DROP: 5 SOLUTION/ DROPS OPHTHALMIC at 03:11

## 2018-01-01 RX ADMIN — LEVETIRACETAM 500 MG: 500 TABLET, FILM COATED ORAL at 01:08

## 2018-01-01 RX ADMIN — HEPARIN SODIUM 5000 UNITS: 5000 INJECTION, SOLUTION INTRAVENOUS; SUBCUTANEOUS at 09:08

## 2018-01-01 RX ADMIN — ASPIRIN 81 MG CHEWABLE TABLET 81 MG: 81 TABLET CHEWABLE at 09:08

## 2018-01-01 RX ADMIN — ACETAMINOPHEN 650 MG: 325 TABLET ORAL at 03:11

## 2018-01-01 RX ADMIN — DUTASTERIDE 0.5 MG: 0.5 CAPSULE, LIQUID FILLED ORAL at 08:11

## 2018-01-01 RX ADMIN — TRAVOPROST 1 DROP: 0.04 SOLUTION/ DROPS OPHTHALMIC at 02:11

## 2018-01-01 RX ADMIN — HEPARIN SODIUM 5000 UNITS: 5000 INJECTION, SOLUTION INTRAVENOUS; SUBCUTANEOUS at 05:08

## 2018-01-01 RX ADMIN — HYDRALAZINE HYDROCHLORIDE 25 MG: 25 TABLET, FILM COATED ORAL at 09:12

## 2018-01-01 RX ADMIN — BRIMONIDINE TARTRATE 1 DROP: 1.5 SOLUTION OPHTHALMIC at 08:11

## 2018-01-01 RX ADMIN — SODIUM CHLORIDE: 0.9 INJECTION, SOLUTION INTRAVENOUS at 08:11

## 2018-01-01 RX ADMIN — HYDROCODONE BITARTRATE AND ACETAMINOPHEN 1 TABLET: 5; 325 TABLET ORAL at 04:11

## 2018-01-01 RX ADMIN — AMLODIPINE BESYLATE 10 MG: 10 TABLET ORAL at 08:08

## 2018-01-01 RX ADMIN — BRINZOLAMIDE 1 DROP: 10 SUSPENSION/ DROPS OPHTHALMIC at 12:12

## 2018-01-01 RX ADMIN — BRINZOLAMIDE 1 DROP: 10 SUSPENSION/ DROPS OPHTHALMIC at 05:12

## 2018-01-01 RX ADMIN — DOXAZOSIN MESYLATE 4 MG: 2 TABLET ORAL at 08:08

## 2018-01-01 RX ADMIN — ASPIRIN 81 MG CHEWABLE TABLET 81 MG: 81 TABLET CHEWABLE at 08:08

## 2018-01-01 RX ADMIN — ATORVASTATIN CALCIUM 40 MG: 20 TABLET, FILM COATED ORAL at 09:12

## 2018-01-01 RX ADMIN — DOXAZOSIN MESYLATE 4 MG: 2 TABLET ORAL at 09:11

## 2018-01-01 RX ADMIN — AMLODIPINE BESYLATE 5 MG: 5 TABLET ORAL at 03:12

## 2018-01-01 RX ADMIN — CALCIUM ACETATE 1334 MG: 667 CAPSULE ORAL at 12:12

## 2018-01-01 RX ADMIN — SODIUM CHLORIDE 350 ML: 0.9 INJECTION, SOLUTION INTRAVENOUS at 10:12

## 2018-01-01 RX ADMIN — FLUCONAZOLE 100 MG: 100 TABLET ORAL at 03:11

## 2018-01-01 RX ADMIN — DOCUSATE SODIUM -SENNOSIDES 1 TABLET: 50; 8.6 TABLET, COATED ORAL at 08:08

## 2018-01-01 RX ADMIN — LEVETIRACETAM 500 MG: 500 TABLET, FILM COATED ORAL at 08:08

## 2018-01-01 RX ADMIN — PROPOFOL 30 MG: 10 INJECTION, EMULSION INTRAVENOUS at 06:11

## 2018-01-01 RX ADMIN — DOCUSATE SODIUM -SENNOSIDES 1 TABLET: 50; 8.6 TABLET, COATED ORAL at 09:08

## 2018-01-01 RX ADMIN — CALCIUM ACETATE 1334 MG: 667 CAPSULE ORAL at 09:11

## 2018-01-01 RX ADMIN — FENTANYL CITRATE 12.5 MCG: 50 INJECTION, SOLUTION INTRAMUSCULAR; INTRAVENOUS at 06:11

## 2018-01-01 RX ADMIN — FLUCONAZOLE 100 MG: 100 TABLET ORAL at 08:11

## 2018-01-01 RX ADMIN — CALCIUM ACETATE 667 MG: 667 CAPSULE ORAL at 09:08

## 2018-01-01 RX ADMIN — FLUCONAZOLE 100 MG: 100 TABLET ORAL at 09:11

## 2018-01-01 RX ADMIN — POLYETHYLENE GLYCOL 3350 17 G: 17 POWDER, FOR SOLUTION ORAL at 10:12

## 2018-01-01 RX ADMIN — POLYETHYLENE GLYCOL 3350 17 G: 17 POWDER, FOR SOLUTION ORAL at 08:08

## 2018-01-01 RX ADMIN — ASPIRIN 81 MG CHEWABLE TABLET 81 MG: 81 TABLET CHEWABLE at 01:08

## 2018-01-01 RX ADMIN — LEVETIRACETAM 500 MG: 500 TABLET, FILM COATED ORAL at 11:12

## 2018-01-01 RX ADMIN — ATORVASTATIN CALCIUM 40 MG: 20 TABLET, FILM COATED ORAL at 01:08

## 2018-01-01 RX ADMIN — AMOXICILLIN AND CLAVULANATE POTASSIUM 500 MG: 500; 125 TABLET, FILM COATED ORAL at 01:08

## 2018-01-01 RX ADMIN — HYDRALAZINE HYDROCHLORIDE 25 MG: 25 TABLET, FILM COATED ORAL at 12:12

## 2018-01-01 RX ADMIN — HEPARIN SODIUM 5000 UNITS: 5000 INJECTION, SOLUTION INTRAVENOUS; SUBCUTANEOUS at 10:08

## 2018-01-01 RX ADMIN — CARVEDILOL 25 MG: 25 TABLET, FILM COATED ORAL at 03:11

## 2018-01-01 RX ADMIN — CARVEDILOL 25 MG: 25 TABLET, FILM COATED ORAL at 10:11

## 2018-01-01 RX ADMIN — TIMOLOL MALEATE 1 DROP: 5 SOLUTION/ DROPS OPHTHALMIC at 11:11

## 2018-01-01 RX ADMIN — INSULIN ASPART 2 UNITS: 100 INJECTION, SOLUTION INTRAVENOUS; SUBCUTANEOUS at 12:08

## 2018-01-01 RX ADMIN — HYDRALAZINE HYDROCHLORIDE 25 MG: 25 TABLET, FILM COATED ORAL at 05:12

## 2018-01-01 RX ADMIN — LIDOCAINE HYDROCHLORIDE 10 ML: 20 JELLY TOPICAL at 07:12

## 2018-01-01 RX ADMIN — ATORVASTATIN CALCIUM 40 MG: 20 TABLET, FILM COATED ORAL at 10:08

## 2018-01-01 RX ADMIN — CALCIUM ACETATE 667 MG: 667 CAPSULE ORAL at 12:08

## 2018-01-01 RX ADMIN — SODIUM CHLORIDE 300 ML: 0.9 INJECTION, SOLUTION INTRAVENOUS at 02:08

## 2018-01-01 RX ADMIN — ACETAMINOPHEN 650 MG: 325 TABLET ORAL at 03:12

## 2018-01-01 RX ADMIN — CARVEDILOL 25 MG: 25 TABLET, FILM COATED ORAL at 10:12

## 2018-01-01 RX ADMIN — CARVEDILOL 25 MG: 25 TABLET, FILM COATED ORAL at 11:11

## 2018-01-01 RX ADMIN — CALCIUM ACETATE 1334 MG: 667 CAPSULE ORAL at 05:11

## 2018-01-01 RX ADMIN — SODIUM CHLORIDE: 0.9 INJECTION, SOLUTION INTRAVENOUS at 04:11

## 2018-01-01 RX ADMIN — FINASTERIDE 5 MG: 5 TABLET, FILM COATED ORAL at 08:08

## 2018-01-01 RX ADMIN — CARVEDILOL 25 MG: 25 TABLET, FILM COATED ORAL at 08:08

## 2018-02-22 ENCOUNTER — HOSPITAL ENCOUNTER (EMERGENCY)
Facility: HOSPITAL | Age: 83
Discharge: HOME | End: 2018-02-22
Attending: EMERGENCY MEDICINE
Payer: COMMERCIAL

## 2018-02-22 VITALS
TEMPERATURE: 99 F | RESPIRATION RATE: 13 BRPM | OXYGEN SATURATION: 99 % | HEIGHT: 73 IN | SYSTOLIC BLOOD PRESSURE: 147 MMHG | HEART RATE: 54 BPM | DIASTOLIC BLOOD PRESSURE: 70 MMHG | BODY MASS INDEX: 23.19 KG/M2 | WEIGHT: 175 LBS

## 2018-02-22 DIAGNOSIS — R31.9 URINARY TRACT INFECTION WITH HEMATURIA, SITE UNSPECIFIED: Primary | ICD-10-CM

## 2018-02-22 DIAGNOSIS — R31.9 HEMATURIA: ICD-10-CM

## 2018-02-22 DIAGNOSIS — E87.5 HYPERKALEMIA: ICD-10-CM

## 2018-02-22 DIAGNOSIS — N39.0 URINARY TRACT INFECTION WITH HEMATURIA, SITE UNSPECIFIED: Primary | ICD-10-CM

## 2018-02-22 LAB
ALBUMIN SERPL BCP-MCNC: 3.1 G/DL
ALP SERPL-CCNC: 89 U/L
ALT SERPL W/O P-5'-P-CCNC: 18 U/L
ANION GAP SERPL CALC-SCNC: 10 MMOL/L
ANISOCYTOSIS BLD QL SMEAR: SLIGHT
APTT BLDCRRT: 30 SEC
AST SERPL-CCNC: 16 U/L
BACTERIA #/AREA URNS AUTO: ABNORMAL /HPF
BASOPHILS # BLD AUTO: 0.02 K/UL
BASOPHILS NFR BLD: 0.3 %
BILIRUB SERPL-MCNC: 0.5 MG/DL
BILIRUB UR QL STRIP: NEGATIVE
BUN SERPL-MCNC: 33 MG/DL
CALCIUM SERPL-MCNC: 8.8 MG/DL
CHLORIDE SERPL-SCNC: 109 MMOL/L
CLARITY UR REFRACT.AUTO: ABNORMAL
CO2 SERPL-SCNC: 19 MMOL/L
COLOR UR AUTO: YELLOW
CREAT SERPL-MCNC: 6 MG/DL
DIFFERENTIAL METHOD: ABNORMAL
EOSINOPHIL # BLD AUTO: 0 K/UL
EOSINOPHIL NFR BLD: 0.4 %
ERYTHROCYTE [DISTWIDTH] IN BLOOD BY AUTOMATED COUNT: 20.8 %
EST. GFR  (AFRICAN AMERICAN): 8.9 ML/MIN/1.73 M^2
EST. GFR  (NON AFRICAN AMERICAN): 7.7 ML/MIN/1.73 M^2
GLUCOSE SERPL-MCNC: 90 MG/DL
GLUCOSE UR QL STRIP: NEGATIVE
HCT VFR BLD AUTO: 37.8 %
HGB BLD-MCNC: 12.3 G/DL
HGB UR QL STRIP: ABNORMAL
HYALINE CASTS UR QL AUTO: 0 /LPF
HYPOCHROMIA BLD QL SMEAR: ABNORMAL
IMM GRANULOCYTES # BLD AUTO: 0.05 K/UL
IMM GRANULOCYTES NFR BLD AUTO: 0.7 %
INR PPP: 1.1
KETONES UR QL STRIP: NEGATIVE
LEUKOCYTE ESTERASE UR QL STRIP: ABNORMAL
LYMPHOCYTES # BLD AUTO: 1.8 K/UL
LYMPHOCYTES NFR BLD: 26.8 %
MCH RBC QN AUTO: 24.3 PG
MCHC RBC AUTO-ENTMCNC: 32.5 G/DL
MCV RBC AUTO: 75 FL
MICROSCOPIC COMMENT: ABNORMAL
MONOCYTES # BLD AUTO: 0.7 K/UL
MONOCYTES NFR BLD: 10.5 %
NEUTROPHILS # BLD AUTO: 4.1 K/UL
NEUTROPHILS NFR BLD: 61.3 %
NITRITE UR QL STRIP: NEGATIVE
NRBC BLD-RTO: 0 /100 WBC
OVALOCYTES BLD QL SMEAR: ABNORMAL
PH UR STRIP: 6 [PH] (ref 5–8)
PLATELET # BLD AUTO: 99 K/UL
PLATELET # BLD AUTO: ABNORMAL K/UL
PMV BLD AUTO: ABNORMAL FL
PMV BLD AUTO: ABNORMAL FL
POCT GLUCOSE: 190 MG/DL (ref 70–110)
POCT GLUCOSE: 96 MG/DL (ref 70–110)
POIKILOCYTOSIS BLD QL SMEAR: SLIGHT
POLYCHROMASIA BLD QL SMEAR: ABNORMAL
POTASSIUM SERPL-SCNC: 5.4 MMOL/L
PROT SERPL-MCNC: 8.4 G/DL
PROT UR QL STRIP: ABNORMAL
PROTHROMBIN TIME: 11.2 SEC
RBC # BLD AUTO: 5.06 M/UL
RBC #/AREA URNS AUTO: >100 /HPF (ref 0–4)
SODIUM SERPL-SCNC: 138 MMOL/L
SP GR UR STRIP: 1.02 (ref 1–1.03)
TARGETS BLD QL SMEAR: ABNORMAL
URN SPEC COLLECT METH UR: ABNORMAL
UROBILINOGEN UR STRIP-ACNC: NEGATIVE EU/DL
WBC # BLD AUTO: 6.69 K/UL
WBC #/AREA URNS AUTO: >100 /HPF (ref 0–5)
WBC CLUMPS UR QL AUTO: ABNORMAL

## 2018-02-22 PROCEDURE — 87077 CULTURE AEROBIC IDENTIFY: CPT

## 2018-02-22 PROCEDURE — 99284 EMERGENCY DEPT VISIT MOD MDM: CPT | Mod: 25

## 2018-02-22 PROCEDURE — 85610 PROTHROMBIN TIME: CPT

## 2018-02-22 PROCEDURE — 87088 URINE BACTERIA CULTURE: CPT

## 2018-02-22 PROCEDURE — 93010 ELECTROCARDIOGRAM REPORT: CPT | Mod: ,,, | Performed by: INTERNAL MEDICINE

## 2018-02-22 PROCEDURE — 87086 URINE CULTURE/COLONY COUNT: CPT

## 2018-02-22 PROCEDURE — 85049 AUTOMATED PLATELET COUNT: CPT

## 2018-02-22 PROCEDURE — 81001 URINALYSIS AUTO W/SCOPE: CPT

## 2018-02-22 PROCEDURE — 85025 COMPLETE CBC W/AUTO DIFF WBC: CPT

## 2018-02-22 PROCEDURE — 63600175 PHARM REV CODE 636 W HCPCS: Performed by: STUDENT IN AN ORGANIZED HEALTH CARE EDUCATION/TRAINING PROGRAM

## 2018-02-22 PROCEDURE — 96375 TX/PRO/DX INJ NEW DRUG ADDON: CPT

## 2018-02-22 PROCEDURE — 85730 THROMBOPLASTIN TIME PARTIAL: CPT

## 2018-02-22 PROCEDURE — 93005 ELECTROCARDIOGRAM TRACING: CPT

## 2018-02-22 PROCEDURE — 25000003 PHARM REV CODE 250: Performed by: STUDENT IN AN ORGANIZED HEALTH CARE EDUCATION/TRAINING PROGRAM

## 2018-02-22 PROCEDURE — 87186 SC STD MICRODIL/AGAR DIL: CPT

## 2018-02-22 PROCEDURE — 82962 GLUCOSE BLOOD TEST: CPT | Mod: 91

## 2018-02-22 PROCEDURE — 96374 THER/PROPH/DIAG INJ IV PUSH: CPT

## 2018-02-22 PROCEDURE — 80053 COMPREHEN METABOLIC PANEL: CPT

## 2018-02-22 RX ORDER — CEPHALEXIN 500 MG/1
500 CAPSULE ORAL EVERY 6 HOURS
Status: DISCONTINUED | OUTPATIENT
Start: 2018-02-22 | End: 2018-02-22 | Stop reason: HOSPADM

## 2018-02-22 RX ORDER — FLUCONAZOLE 200 MG/1
200 TABLET ORAL
Status: COMPLETED | OUTPATIENT
Start: 2018-02-22 | End: 2018-02-22

## 2018-02-22 RX ORDER — CEPHALEXIN 500 MG/1
500 CAPSULE ORAL EVERY 12 HOURS
Qty: 14 CAPSULE | Refills: 0 | Status: SHIPPED | OUTPATIENT
Start: 2018-02-22 | End: 2018-03-01

## 2018-02-22 RX ORDER — CEPHALEXIN 500 MG/1
500 CAPSULE ORAL EVERY 12 HOURS
Qty: 14 CAPSULE | Refills: 0 | Status: SHIPPED | OUTPATIENT
Start: 2018-02-22 | End: 2018-02-22

## 2018-02-22 RX ADMIN — CEPHALEXIN 500 MG: 500 CAPSULE ORAL at 11:02

## 2018-02-22 RX ADMIN — DEXTROSE MONOHYDRATE 25 G: 25 INJECTION, SOLUTION INTRAVENOUS at 12:02

## 2018-02-22 RX ADMIN — FLUCONAZOLE 200 MG: 200 TABLET ORAL at 11:02

## 2018-02-22 RX ADMIN — INSULIN HUMAN 10 UNITS: 100 INJECTION, SOLUTION PARENTERAL at 12:02

## 2018-02-22 NOTE — ED TRIAGE NOTES
Patient family at [facility] and reports patient has had hematuria since Monday.  Patient has dialysis on mon, wed, Friday, and did go to dialysis yesterday.     Patient has a history of blindness, left upper arm dialysis graft.

## 2018-02-22 NOTE — ED NOTES
Pt requested to take his home medications. Dr. Amato approved. Pt's daughter feeding him a sandwhich as well.   -asprin 81 mg PO  -calcium acetate 667 mg PO  -doxazosin 4 mg PO  -Finasteride 5 mg PO  -Coreg 25 mg PO  -Keppra 500mg PO  -Nephro Dara 0.8 mg PO  -Amlodipine 5 mg PO

## 2018-02-22 NOTE — ED NOTES
LOC: The patient is awake, alert and aware of environment with an appropriate affect, the patient is oriented x 3 and speaking appropriately.  APPEARANCE: Patient resting comfortably and in no acute distress, patient is clean and well groomed, patient's clothing is properly fastened.  SKIN: The skin is warm and dry, patient has normal skin turgor and moist mucus membranes, skin intact, no breakdown or brusing noted.  MUSKULOSKELETAL: Patient moving all extremities well, no obvious swelling or deformities noted.  RESPIRATORY: Airway is open and patent, respirations are spontaneous, patient has a normal effort and rate. Breath sounds are clear and equal bilaterally.  CARDIAC:  No peripheral edema.  ABDOMEN: Soft and non tender to palpation, no distention noted. Genitalia WNL except pt is missing one testicle. No bleeding noted to urinary meatus.   NEURO: No neuro deficits, hand grasp equal, no drift noted, no facial droop noted. Speech is clear. Pt is legally blind.

## 2018-02-22 NOTE — ED PROVIDER NOTES
Encounter Date: 2/22/2018    SCRIBE #1 NOTE: I, Regine Gonzalez, am scribing for, and in the presence of,  Dr. Amato. I have scribed the following portions of the note - the Resident attestation.       History     Chief Complaint   Patient presents with    Hematuria     Pt having hematuria since Monday.  Pt on dialysis-last done yesterday.      HPI     Patient is a 88-year-old male with dementia, ESRD with dialysis Monday was referred, CHF who presents with hematuria for the past 4 days.  Patient is demented and responding however seems confused. According to daughter pt has had hematuria 4 days. Associated with dysuria since that time. States that it started urine with sediment no symptoms progressed to bright red blood this morning.  She has had similar presentation around December of last year at which point she was found to have Candida with a polymicrobial UTI. Pt was placed on Rocephin and diflucan with resolution of symptoms at that time. Pt states that he is otherwise asymptomatic with no complaints. 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 Pt is on dialysis, but obviously does still produce some urine. States that has not missed any dialysis with most recent being yesterday.     Pt has history of scrotal surgery at which time they removed 1 testicle for extreme swelling to the scrotum. At that time scrotum was extremely edematous.     Review of patient's allergies indicates:   Allergen Reactions    Lisinopril Swelling     Past Medical History:   Diagnosis Date    Anemia     Blind     bilaterally    Blindness of right eye     CHF (congestive heart failure)     Chronic kidney disease     Dementia 01/2017    Diabetes mellitus type II     General anesthetics causing adverse effect in therapeutic use     april / may 2014     Glaucoma (increased eye pressure)     Hypertension     Hypothermia 12/6/2017    Seizures      Past Surgical History:   Procedure Laterality Date    AV FISTULA PLACEMENT Left  4/2014    AV fistula to Left upper arm    EYE SURGERY       Family History   Problem Relation Age of Onset    Heart disease Sister      mi    Heart disease Brother      mi    Cancer Daughter      BREAST X 2     Social History   Substance Use Topics    Smoking status: Never Smoker    Smokeless tobacco: Never Used    Alcohol use No     Review of Systems   Constitutional: Negative for fever.   HENT: Negative for sore throat.    Respiratory: Negative for shortness of breath.    Cardiovascular: Negative for chest pain.   Gastrointestinal: Negative for nausea.   Genitourinary: Positive for dysuria and hematuria. Negative for discharge, penile pain and penile swelling.   Musculoskeletal: Negative for back pain.   Skin: Negative for rash.   Neurological: Negative for weakness.   Hematological: Does not bruise/bleed easily.       Physical Exam     Initial Vitals [02/22/18 0924]   BP Pulse Resp Temp SpO2   (!) 155/89 64 16 98.8 °F (37.1 °C) 98 %      MAP       111         Physical Exam    Nursing note and vitals reviewed.  Constitutional: He appears well-developed and well-nourished. He is not diaphoretic. No distress.   HENT:   Head: Normocephalic and atraumatic.   Right Ear: External ear normal.   Left Ear: External ear normal.   Nose: Nose normal.   Mouth/Throat: Oropharynx is clear and moist. No oropharyngeal exudate.   Eyes: Conjunctivae and EOM are normal. Pupils are equal, round, and reactive to light. Right eye exhibits no discharge. Left eye exhibits no discharge. No scleral icterus.   Neck: Normal range of motion. Neck supple. No tracheal deviation present.   Cardiovascular: Normal rate, regular rhythm, normal heart sounds and intact distal pulses. Exam reveals no gallop and no friction rub.    No murmur heard.  Pulmonary/Chest: Breath sounds normal. No respiratory distress. He has no wheezes. He has no rhonchi. He has no rales. He exhibits no tenderness.   Abdominal: Soft. Bowel sounds are normal. He  exhibits no distension and no mass. There is no tenderness. There is no rebound and no guarding.   Genitourinary: Penis normal. No discharge found.   Genitourinary Comments: Right testicle removed. No CVA tenderness.    Musculoskeletal: Normal range of motion. He exhibits no edema or tenderness.   Neurological: He is alert and oriented to person, place, and time.   Skin: Skin is warm and dry. Capillary refill takes less than 2 seconds. No erythema.   Psychiatric: He has a normal mood and affect. Thought content normal.         ED Course   Procedures  Labs Reviewed - No data to display  EKG Readings: (Independently Interpreted)   Initial Reading: No STEMI. Previous EKG: Compared with most recent EKG Previous EKG Date: 12/6/17. Rhythm: Normal Sinus Rhythm. Heart Rate: 60. Ectopy: No Ectopy. Conduction: Normal. ST Segments: Normal ST Segments. T Waves: Normal. Axis: Normal.          Medical Decision Making:   History:   Old Medical Records: I decided to obtain old medical records.  Initial Assessment:   PGY-1 MDM    Pt is a 88 y.o. male presenting with hematuria   Vitals stable on exam, slightly hypertensive to 164/77  Exam significant for completely benign,  system normal .   DDx includes but not limited to uti, candida in urine, mass   Will get U/S to evaluate for mass at this time. CVA negative. Will use urine that they brought since it has been refrigertated and pt does not wish to be straight cath at this time. Currently awaiting CBC/CMP as well. Pt sitting in no acute distress. K 5.4. Will give insulin and dextrose.       Escobar Tirado M.D.  [facility] Emergency Medicine, PGY-1  02/22/2018 12:05 PM    Clinical Tests:   Lab Tests: Ordered and Reviewed  Radiological Study: Ordered and Reviewed  ED Management:  Hematuria; ua sample refrigerated since last nite; prefer no straight cath; no const sx; no ams  HO1 Update:  Urine came back significant for many white and RBC. Platelets being recollected because none  seen on microscopic examination. Given kephlex and diflucan.     Escobar Tirado M.D.  [facility] Emergency Medicine, PGY-1  02/22/2018 1:37 PM            Scribe Attestation:   Scribe #1: I performed the above scribed service and the documentation accurately describes the services I performed. I attest to the accuracy of the note.    Attending Attestation:   Physician Attestation Statement for Resident:  As the supervising MD   Physician Attestation Statement: I have personally seen and examined this patient.   I agree with the above history. -: 88 y.o. male who presents with hematuria. Similar episodes within last couple of months. Treated for yeast in urine. No AMS. No pain. Will check labs and urine. Urine sample collected last night. Pt ESR. Pt prefers no straight cath. No constitutional symptoms. No abd pain. No smoking hx. Will do US to evaluate kidneys and bladder for clot or mass. Likely DC as pt with ABX poss treatment for yeast if labs are stable.   As the supervising MD I agree with the above PE.    As the supervising MD I agree with the above treatment, course, plan, and disposition.                    ED Course as of Feb 22 1334   Thu Feb 22, 2018   1333 Microscopic Comment: SEE COMMENT [RB]   1333 Microscopic Comment: SEE COMMENT [RB]   1333 Microscopic Comment: SEE COMMENT [RB]   1334 Platelets: SEE COMMENT [RB]   1334 Platelets: SEE COMMENT [RB]      ED Course User Index  [RB] Leni Amato MD     Clinical Impression:   Diagnoses of Hematuria and Hematuria were pertinent to this visit.                           Leni Amato MD  02/22/18 1207       Escobar Tirado MD  Resident  02/22/18 1212       Escobar Tirado MD  Resident  02/22/18 1337

## 2018-02-23 ENCOUNTER — PES CALL (OUTPATIENT)
Dept: ADMINISTRATIVE | Facility: CLINIC | Age: 83
End: 2018-02-23

## 2018-02-24 LAB — BACTERIA UR CULT: NORMAL

## 2018-03-22 NOTE — ASSESSMENT & PLAN NOTE
- Hgb 9.0 (baseline 8.7-11.4)  - Trend daily  - No s/s overt bleeding  - Epo per HD   Problem: Patient Care Overview  Goal: Plan of Care/Patient Progress Review  Outcome: No Change  Pt. oriented to self, but non verbal at baseline. Able to communicate some with hand gestures. Frequently wanting to drink water, however pt unable to swallow safely without aspiration. PRN Ativan and scheduled Ativan given for agitation with decrease in agitation. Morphine given for generalized pain with little relief. Pt incontinent of urine x2. Oral cares provided Q1hr. Pt repositioned frequently, but has preference for laying on R side. Plan for possible discharge to hospice. Nursing will continue to monitor and follow POC.

## 2018-05-15 NOTE — LETTER
May 21, 2018      Ely Cisneros MD  [facility]  [facility] [address]           [facility] - Podiatry  [address]  [facility] [address]  Phone: [phone]          Patient: Ronald Campbell   MR Number: [id number]   YOB: 1929   Date of Visit: 5/15/2018       Dear Dr. Ely Cisneros:    Thank you for referring Ronald Campbell to me for evaluation. Attached you will find relevant portions of my assessment and plan of care.    If you have questions, please do not hesitate to call me. I look forward to following Ronald Campbell along with you.    Sincerely,    Francy Spivey, JOSE F    Enclosure  CC:  No Recipients    If you would like to receive this communication electronically, please contact [email or web address] or [phone] to request more information on [employer] Link access.    For providers and/or their staff who would like to refer a patient to [facility], please contact us through our one-stop-shop provider referral line, Hari Farrell, at [phone].    If you feel you have received this communication in error or would no longer like to receive these types of communications, please e-mail [email or web address]

## 2018-05-15 NOTE — PROGRESS NOTES
Subjective:      Patient ID: Ronald Campbell is a 89 y.o. male.    Chief Complaint: Diabetes Mellitus (PCP Dr. Shaw); Diabetic Foot Exam; and Routine Foot Care    Ronald is a 89 y.o. male who presents to the clinic for evaluation and treatment of high risk feet. Ronald has a past medical history of Anemia; Blind; Blindness of right eye; CHF (congestive heart failure); Chronic kidney disease; Dementia (01/2017); Diabetes mellitus type II; General anesthetics causing adverse effect in therapeutic use; Glaucoma (increased eye pressure); Hypertension; Hypothermia (12/6/2017); and Seizures. The patient's chief complaint is long, thick toenails.  This patient has documented high risk feet requiring routine maintenance secondary to diabetes mellitis and those secondary complications of diabetes, as mentioned.     PCP: Angelika     Date Last Seen by PCP: JANENE/ [facility] doctor.   Chief Complaint   Patient presents with    Diabetes Mellitus     PCP Dr. Shaw    Diabetic Foot Exam    Routine Foot Care       Current shoe gear: DM shoes.    Hemoglobin A1C   Date Value Ref Range Status   12/05/2017 5.7 (H) 4.0 - 5.6 % Final     Comment:     According to ADA guidelines, hemoglobin A1c <7.0% represents  optimal control in non-pregnant diabetic patients. Different  metrics may apply to specific patient populations.   Standards of Medical Care in Diabetes-2016.  For the purpose of screening for the presence of diabetes:  <5.7%     Consistent with the absence of diabetes  5.7-6.4%  Consistent with increasing risk for diabetes   (prediabetes)  >or=6.5%  Consistent with diabetes  Currently, no consensus exists for use of hemoglobin A1c  for diagnosis of diabetes for children.  This Hemoglobin A1c assay has significant interference with fetal   hemoglobin   (HbF). The results are invalid for patients with abnormal amounts of   HbF,   including those with known Hereditary Persistence   of Fetal Hemoglobin. Heterozygous hemoglobin variants (HbAS,  HbAC,   HbAD, HbAE, HbA2) do not significantly interfere with this assay;   however, presence of multiple variants in a sample may impact the %   interference.     11/22/2017 5.4 4.0 - 5.6 % Final     Comment:     According to ADA guidelines, hemoglobin A1c <7.0% represents  optimal control in non-pregnant diabetic patients. Different  metrics may apply to specific patient populations.   Standards of Medical Care in Diabetes-2016.  For the purpose of screening for the presence of diabetes:  <5.7%     Consistent with the absence of diabetes  5.7-6.4%  Consistent with increasing risk for diabetes   (prediabetes)  >or=6.5%  Consistent with diabetes  Currently, no consensus exists for use of hemoglobin A1c  for diagnosis of diabetes for children.  This Hemoglobin A1c assay has significant interference with fetal   hemoglobin   (HbF). The results are invalid for patients with abnormal amounts of   HbF,   including those with known Hereditary Persistence   of Fetal Hemoglobin. Heterozygous hemoglobin variants (HbAS, HbAC,   HbAD, HbAE, HbA2) do not significantly interfere with this assay;   however, presence of multiple variants in a sample may impact the %   interference.     04/30/2014 7.7 (H) 4.5 - 6.2 % Final       Review of Systems   Constitution: Negative for chills, decreased appetite and fever.   Eyes: Positive for vision loss in left eye and vision loss in right eye.   Cardiovascular: Negative for leg swelling.   Skin: Positive for dry skin and nail changes.   Musculoskeletal: Positive for back pain and falls. Negative for arthritis, joint pain, joint swelling and myalgias.   Gastrointestinal: Negative for nausea and vomiting.   Neurological: Negative for loss of balance, numbness and paresthesias.           Objective:      Physical Exam   Constitutional: He is oriented to person, place, and time. He appears well-developed and well-nourished.   Cardiovascular:   Pulses:       Dorsalis pedis pulses are 1+ on the  right side, and 1+ on the left side.        Posterior tibial pulses are 0 on the right side.   Dorsalis pedis and posterior tibial pulses are diminished Bilaterally. Toes are cool to touch. Feet are warm proximally.There is decreased digital hair. Skin is atrophic, slightly hyperpigmented, and mildly edematous       Musculoskeletal: He exhibits no edema or tenderness.   Adequate joint range of motion without pain, limitation, nor crepitation Bilateral feet and ankle joints. Muscle strength is 5/5 in all groups bilaterally.       Feet:   Right Foot:   Protective Sensation: 10 sites tested. 9 sites sensed.   Skin Integrity: Positive for callus and dry skin. Negative for ulcer, blister, skin breakdown, erythema or warmth.   Left Foot:   Protective Sensation: 10 sites tested. 9 sites sensed.   Skin Integrity: Positive for callus and dry skin. Negative for ulcer, blister, skin breakdown, erythema or warmth.   Neurological: He is alert and oriented to person, place, and time. A sensory deficit is present.   Pocola-Kevin 5.07 monofilamant testing is diminished Shiv feet. Sharp/dull sensation diminished Bilaterally. Light touch absent Bilaterally.       Skin: Skin is warm and dry. Capillary refill takes 2 to 3 seconds. No ecchymosis and no lesion noted. No erythema.   Nails x10 are elongated by  2-4mm's, thickened by 2-9 mm's, dystrophic, and are darkened in  coloration . Xerosis Bilaterally. No open lesions noted.    Hyperkeratotic tissue noted to sub 5th MPJ b/l      Nursing note and vitals reviewed.            Assessment:       Encounter Diagnoses   Name Primary?    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 Yes    Dermatophytosis, nail     Corn or callus          Plan:       Ronald was seen today for diabetes mellitus, diabetic foot exam and routine foot care.    Diagnoses and all orders for this visit:    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    Dermatophytosis, nail    Corn or  callus      I counseled the patient on his conditions, their implications and medical management.    - Shoe inspection. Diabetic Foot Education. Patient reminded of the importance of good nutrition and blood sugar control to help prevent podiatric complications of diabetes. Patient instructed on proper foot hygeine. We discussed wearing proper shoe gear, daily foot inspections, never walking without protective shoe gear, never putting sharp instruments to feet, routine podiatric nail visits every 2-3 months.      - With patient's permission, nails were aggressively reduced and debrided x 10 to their soft tissue attachment mechanically and with electric , removing all offending nail and debris. Patient relates relief following the procedure. He will continue to monitor the areas daily, inspect his feet, wear protective shoe gear when ambulatory, moisturizer to maintain skin integrity and follow in this office in approximately 2-3 months, sooner p.r.n.    - After cleansing the  area w/ alcohol prep pad the above mentioned hyperkeratosis was trimmed utilizing No 15 scapel, to a smooth base with out incident. Patient tolerated this  well and reported comfort to the area of plantar 5th MPJ b/l and the second toe on the right.    -RTC as specified    Nydia Lara, JOSE F, PGY2

## 2018-06-14 NOTE — PROGRESS NOTES
I. HISTORY     Chief Complaint: Follow-up      HPI Ronald Campbell is a 89 y.o. male with PMH of HTN, DMII, seizures, anemia, glaucoma, CHF (EF 30%), blindness, and ESRD s/p Left BC AV fistula placed 2014, and fistulogram with stent placement in 2016.   He presents after HD doctor was concerned for stenosis, the patient's daughter also noticed increased prominence of the AVF  No complaints of terminating dialysis because of low flows.  No arm pain or weakness      Started initially on HD May 2014 after failed PD. Patient is right handed.  Had PD cath removed on 14.    Review of Systems   Constitution: Negative for chills and fever.   HENT: Negative.    Eyes: Positive for vision loss in left eye and vision loss in right eye.   Cardiovascular: Negative for chest pain and claudication.   Respiratory: Negative for cough and shortness of breath.    Endocrine: Negative.    Hematologic/Lymphatic: Negative.    Skin: Negative for color change and nail changes.   Musculoskeletal: Positive for arthritis and muscle cramps.   Gastrointestinal: Negative for abdominal pain, anorexia, nausea and vomiting.   Neurological: Negative.    Psychiatric/Behavioral: Negative.        II. PHYSICAL EXAM   Right Arm BP - Sittin/69 (18 0856)  Pulse: 62  Temp: 97.7 °F (36.5 °C)  Body mass index is 23.27 kg/m².      Physical Exam   Constitutional: He appears well-developed. No distress.   HENT:   Head: Normocephalic and atraumatic.   Eyes: No scleral icterus.   Neck: Neck supple. No JVD present.   Cardiovascular: Normal rate, regular rhythm and normal heart sounds.    Pulmonary/Chest: Effort normal and breath sounds normal. No respiratory distress.   Abdominal: Soft. Bowel sounds are normal. He exhibits no distension.   Musculoskeletal: Normal range of motion. He exhibits no edema or tenderness.   Neurological: He is alert.   Skin: Skin is warm and dry.   Psychiatric: He has a normal mood and affect.   Vitals  reviewed.    2+ bilateral radial and brachial pulses  No carotid bruits.   L arm fistula pulsatile and enlarged     III. ASSESSMENT & PLAN (MEDICAL DECISION MAKING)       Imaging Results:  Flow 1350 today previously 1225. Elevated arterial pressure     Assessment/Diagnosis and Plan:  Ronald Campbell is a 89 y.o. male s/p Left BC AV fistula placed August 2014, and stent Feb 2016.     - Plan for fistulogram, possible intervention for venous/central stenosis       Orin Sims MD FACS VI  Vascular & Endovascular Surgery

## 2018-06-19 NOTE — NURSING
Discharge instructions and medlist given.  Patient and daughter verbalized understanding.  Denies need for escort off unit.  Off unit via wheelchair with daugher pushing.  Dressing to lfa clean, dry and intact.

## 2018-06-19 NOTE — OP NOTE
DATE OF PROCEDURE:  06/19/2018    PREOPERATIVE DIAGNOSES:  1.  End-stage renal disease, on dialysis.  2.  Dysfunctional left arm AV fistula.  3.  Left cephalic vein stenosis.    POSTOPERATIVE DIAGNOSES:  1.  End-stage renal disease, on dialysis.  2.  Dysfunctional left arm AV fistula.  3.  Left cephalic vein stenosis.    PROCEDURES:  1.  Left arm fistulogram.  2.  Angioplasty, left cephalic arch, 8 x 40 [place] balloon.  3.  Moderate sedation, 45 minutes.    SURGEON:  Orin Sims M.D.    ASSISTANT:  Jayson Feng M.D. (RES).    ANESTHESIA:  Local plus nurse-monitored IV sedation.    FINDINGS:  Greater than 75% in-stent stenosis at the cephalic arch, resolved   with 8 mm high pressure angioplasty.    ESTIMATED BLOOD LOSS:  Less than 5 mL    COMPLICATIONS:  None.    SPECIMEN:  None.    DISPOSITION:  Home.    HISTORY:  Mr. Campbell is an 89-year-old gentleman, dialyzing through a left   brachiocephalic AV fistula.  He was having high venous pressures and bleeding   after access.  A duplex confirmed a high-grade stenosis of her previously placed   cephalic arch stent.  He is counseled on benefits and risks of intervention of   this lesion.  He understood the risk and his daughter gave informed consent to   proceed.    PROCEDURE:  After obtaining consent from the daughter, he was taken to the Cath   Lab.  The left arm was prepped and draped in usual sterile fashion.  We accessed   the brachiocephalic fistula using a Seldinger technique and placed a 6-[place]   angiographic sheath.  From this position performed our fistulogram, showed the   cephalic vein in the arm was well matured and widely patent.  Proximally, there   was a high-grade 75% stenosis of a previously placed cephalic arch stent.    Centrally, there did not appear to be any stenoses.  Based on these findings, we   elected to intervene.  3000 units of intravenous heparin was given.  A stiff   hydrophilic wire was passed into the central circulation.  We treated  the   cephalic arch stenosis with an 8 mm high pressure angioplasty was held for 3   minutes.  The balloon was removed.  Completion fistulogram showed brisk flow to   the treated area with no residual stenosis.  Wires and sheaths were removed.    The access point was closed with a 3-0 Monocryl stitch.  I was present   throughout the procedure and monitored the patient's cardiorespiratory status   through the administration of IV moderate sedation.  Total moderate sedation   time was approximately 45 minutes.      NARCISA/ELIZABETH  dd: 06/19/2018 15:55:37 (CDT)  td: 06/19/2018 16:58:06 (CDT)  Doc ID   #[id number]  Job ID #[id number]    CC:

## 2018-06-19 NOTE — INTERVAL H&P NOTE
The patient has been examined and the H&P has been reviewed:    I concur with the findings and no changes have occurred since H&P was written.    Anesthesia/Surgery risks, benefits and alternative options discussed and understood by patient/family.          Active Hospital Problems    Diagnosis  POA    Vein stenosis [I87.1]  Yes      Resolved Hospital Problems    Diagnosis Date Resolved POA   No resolved problems to display.

## 2018-06-19 NOTE — NURSING
Rec'd from cath lab via stretcher.  Dressing to left arm, clean, dry and intact.  Daughter at bedside.  Denies needs at this time.  Will continue to monitor.

## 2018-06-19 NOTE — BRIEF OP NOTE
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]     Dementia with behavioral disturbance, unspecified dementia type [F03.91]    Post-op Diagnosis: Same    Procedure:  Left Arm fistulagram     PTA Left cephalic arch, 8x40mm [place]     Moderate sedation, 45 minutes    Anesthesia: RN IV Sedation    Description of the findings of the procedure: >75% in-stent stenosis at cephalic arch    Findings/Key Components: resolved with 8mm PTA    Estimated Blood Loss: <5mL         Specimens:   Specimen (12h ago through future)    None          Discharge Note    SUMMARY     Admit Date: 6/19/2018    Discharge Date and Time:  06/19/2018 3:52 PM    Hospital Course (synopsis of major diagnoses, care, treatment, and services provided during the course of the hospital stay): no complications     Final Diagnosis: as above    Disposition: Home or Self Care    Follow Up/Patient Instructions: Resume regular diet, follow-up in 2-3 weeks, resume regular activity in 2-3 days.  Resume medications per post-procedure med reconciliation.      Medications:  Reconciled Home Medications:      Medication List      CHANGE how you take these medications    aspirin 81 MG Chew  Take 1 tablet (81 mg total) by mouth once daily.  What changed:  when to take this     atorvastatin 40 MG tablet  Commonly known as:  LIPITOR  Take 1 tablet (40 mg total) by mouth once daily.  What changed:  · how much to take  · when to take this        CONTINUE taking these medications    amLODIPine 5 MG tablet  Commonly known as:  NORVASC  Take 10 mg by mouth once daily.     BLOOD GLUCOSE TEST Strp  Generic drug:  blood sugar diagnostic  1 strip by Misc.(Non-Drug; Combo Route) route 2 (two) times daily.     brinzolamide 1 %  ophthalmic suspension  Commonly known as:  AZOPT  Place 1 drop into the left eye 3 (three) times daily.     calcium acetate 667 mg capsule  Commonly known as:  PHOSLO  Take 1 capsule (667 mg total) by mouth 3 (three) times daily with meals.     carvedilol 25 MG tablet  Commonly known as:  COREG  Take 1 tablet (25 mg total) by mouth 2 (two) times daily. Do not take on dialysis days. Hold for SBP < 110 or HR < 55     COMBIGAN 0.2-0.5 % Drop  Generic drug:  brimonidine-timolol  Place 1 drop into the left eye 2 (two) times daily.     doxazosin 4 MG tablet  Commonly known as:  CARDURA  Take 1 tablet (4 mg total) by mouth once daily.     finasteride 5 mg tablet  Commonly known as:  PROSCAR  Take 1 tablet (5 mg total) by mouth once daily.     heparin (porcine) 1,000 unit/mL injection  1 mL (1,000 Units total) by Intracatheter route as needed (Heparin 1000 units/ml instilled to fill volume of each lumen of dialysis catheter after each dialysis.).     ketoconazole 2 % cream  Commonly known as:  NIZORAL  Apply topically once daily. feet     levETIRAcetam 500 MG Tab  Commonly known as:  KEPPRA  Take 1 tablet (500 mg total) by mouth 2 (two) times daily.     NEPHRO-JESSICA 0.8 mg Tab  Generic drug:  B complex-vitamin C-folic acid  Take 1 tablet by mouth every morning.     NovoLOG Flexpen U-100 Insulin 100 unit/mL Inpn pen  Generic drug:  insulin aspart U-100  Insulin Pen Subcutaneous As directed.  -200 take 1 unitBS 201-250 take 2 unitsBS 251-300 take 3 unitsBS 301-350 take 4 unitsBS > 351 take 5 units and call MD     polyethylene glycol 17 gram Pwpk  Commonly known as:  GLYCOLAX  Take 17 g by mouth once daily.     TRAVATAN Z OPHT  Place 1 drop into the left eye every evening. 1 Drops Left eye Every evening            Discharge Procedure Orders  [facility] Hemodialysis Access   Standing Status: Future  Standing Exp. Date: 06/19/19     Diet Adult Regular     Diet renal     Call MD for:  temperature >100.4     Call MD for:   persistent nausea and vomiting or diarrhea     Call MD for:  severe uncontrolled pain     Call MD for:  redness, tenderness, or signs of infection (pain, swelling, redness, odor or green/yellow discharge around incision site)     Call MD for:  difficulty breathing or increased cough     Call MD for:  severe persistent headache     Call MD for:  worsening rash     Call MD for:  persistent dizziness, light-headedness, or visual disturbances     Call MD for:  increased confusion or weakness     Remove dressing in 48 hours       Follow-up Information     Orin Sims MD In 2 weeks.    Specialties:  Vascular Surgery, General Surgery  Why:  For wound re-check, Follow up, For suture removal  Contact information:  [address]  [facility] [address]  [phone]

## 2018-06-19 NOTE — PLAN OF CARE
Problem: Patient Care Overview  Goal: Plan of Care Review  Outcome: Ongoing (interventions implemented as appropriate)  Pt transferred from cath lab via stretcher.  Vss.  Procedure cancelled and will be rescheduled for a later time.  Post op orders and assessment initiated.  Pt aao, tolerating po.  Family at bs.  Pt in no acute distress and verbalizes no complaints. Call bell within reach.  Will continue to monitor.

## 2018-07-05 NOTE — PROGRESS NOTES
I. HISTORY     Chief Complaint: No chief complaint on file.      TABATHA Campbell is a 89 y.o. male with PMH of HTN, DMII, seizures, anemia, glaucoma, CHF (EF 30%), blindness, and ESRD s/p Left BC AV fistula placed August 2014, and fistulogram with stent placement in Feb 2016.   He presents after HD doctor was concerned for stenosis, the patient's daughter also noticed increased prominence of the AVF  No complaints of terminating dialysis because of low flows.  No arm pain or weakness      Some bleeding after HD in arterial cannulation zone. 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   Recent treatment of cephalic arch stenosis has improved bleeding.    PE and ROS unchanged from prior visit    Review of Systems   Constitution: Negative for chills and fever.   HENT: Negative.    Eyes: Positive for vision loss in left eye and vision loss in right eye.   Cardiovascular: Negative for chest pain and claudication.   Respiratory: Negative for cough and shortness of breath.    Endocrine: Negative.    Hematologic/Lymphatic: Negative.    Skin: Negative for color change and nail changes.   Musculoskeletal: Positive for arthritis and muscle cramps.   Gastrointestinal: Negative for abdominal pain, anorexia, nausea and vomiting.   Neurological: Negative.    Psychiatric/Behavioral: Negative.        II. PHYSICAL EXAM      Pulse: (!) 58  Temp: 97.4 °F (36.3 °C)  Body mass index is 23.5 kg/m².      Physical Exam   Constitutional: He appears well-developed. No distress.   HENT:   Head: Normocephalic and atraumatic.   Eyes: No scleral icterus.   Neck: Neck supple. No JVD present.   Cardiovascular: Normal rate, regular rhythm and normal heart sounds.    Pulmonary/Chest: Effort normal and breath sounds normal. No respiratory distress.   Abdominal: Soft. Bowel sounds are normal. He exhibits no distension.   Musculoskeletal: Normal range of motion. He exhibits no edema or tenderness.   Neurological: He is alert.   Skin: Skin is warm  and dry.   Psychiatric: He has a normal mood and affect.   Vitals reviewed.    2+ bilateral radial and brachial pulses  L arm fistula pulsatile and enlarged     III. ASSESSMENT & PLAN (MEDICAL DECISION MAKING)       Imaging Results:  HD duplex - no stenosis, flow vol 3400    Assessment/Diagnosis and Plan:  Ronald Campbell is a 89 y.o. male s/p Left BC AV fistula placed August 2014, and stent Feb 2016.     - Recent treatment of cephalic arch stenosis has improved flow and function.   Must rotate needle access especially in arterial cannulation site to avoid creating an ulcerated pseudoaneurysm.  Needs sticks in fresh sites.    See me back in 4 months with duplex - prefers late AM appointments      Orin Sims MD FACS VI  Vascular & Endovascular Surgery

## 2018-08-01 PROBLEM — R45.1 AGITATION: Status: ACTIVE | Noted: 2018-01-01

## 2018-08-01 PROBLEM — J18.9 HCAP (HEALTHCARE-ASSOCIATED PNEUMONIA): Status: ACTIVE | Noted: 2018-01-01

## 2018-08-01 PROBLEM — E87.70 VOLUME OVERLOAD: Status: ACTIVE | Noted: 2018-01-01

## 2018-08-01 NOTE — CONSULTS
"[facility]  Nephrology  Consult Note    Patient Name: Ronald Campbell  MRN: [id number]  Admission Date: 8/1/2018  Hospital Length of Stay: 0 days  Attending Provider: Lynda Nunez MD   Primary Care Physician: Bart Shaw MD  Principal Problem:<principal problem not specified>    Inpatient consult to Nephrology  Consult performed by: HUEY BARRIOS  Consult ordered by: MILAGROS FUNG  Reason for consult: esrd        Subjective:     HPI: 88 yo AAM with significant history for hypertension, DMII, blindness, seizure, dementia, and ESRD who was brought by Daughter Gabriela to hospital for sudden shortness of breath x 1 day and dry cough x 2 days. Patient reports "feel funny." Denies fever or chills. Cough not associated with meals. In ED, CXR showed bilateral effusion and edema. % on 2 L NC. Nephrology consult for hemodialysis.   ESRD due to DM/HTN on iHD x 10 years MWF via ANGLE AVF at [facility] under care of Dr. Pedro. EDW 80 kg. EDW 82 kg to 80 kg about 1-2 months ago.Outpatient nurse reports no volume removed 7/25, 1 L removed 7/27, and 500 ml removed 7/30.     Past Medical History:   Diagnosis Date    Anemia     Blind     bilaterally    Blindness of right eye     CHF (congestive heart failure)     Chronic kidney disease     Dementia 01/2017    Diabetes mellitus type II     General anesthetics causing adverse effect in therapeutic use     april / may 2014     Glaucoma (increased eye pressure)     Hypertension     Hypothermia 12/6/2017    Seizures        Past Surgical History:   Procedure Laterality Date    AV FISTULA PLACEMENT Left 4/2014    AV fistula to Left upper arm    EYE SURGERY      SCROTUM EXPLORATION Bilateral 11/2017       Review of patient's allergies indicates:   Allergen Reactions    Lisinopril Swelling     Current Facility-Administered Medications   Medication Frequency    0.9%  NaCl infusion PRN    0.9%  NaCl infusion Once    acetaminophen " tablet 650 mg Q8H PRN    [START ON 8/2/2018] amLODIPine tablet 10 mg Daily    aspirin chewable tablet 81 mg Daily    atorvastatin tablet 40 mg Daily    calcium acetate capsule 667 mg TID WM    carvedilol tablet 25 mg BID    dextrose 50% injection 12.5 g PRN    dextrose 50% injection 25 g PRN    doxazosin tablet 4 mg Daily    finasteride tablet 5 mg Daily    glucagon (human recombinant) injection 1 mg PRN    glucose chewable tablet 16 g PRN    glucose chewable tablet 24 g PRN    heparin (porcine) injection 5,000 Units Q8H    levETIRAcetam tablet 500 mg BID    polyethylene glycol packet 17 g Daily    ramelteon tablet 8 mg Nightly PRN    senna-docusate 8.6-50 mg per tablet 1 tablet BID    sodium chloride 0.9% flush 5 mL PRN     Family History     Problem Relation (Age of Onset)    Cancer Daughter    Heart disease Sister, Brother        Social History Main Topics    Smoking status: Never Smoker    Smokeless tobacco: Never Used    Alcohol use No    Drug use: No    Sexual activity: No     Review of Systems   Unable to perform ROS: Dementia     Objective:     Vital Signs (Most Recent):  Temp: 98.8 °F (37.1 °C) (08/01/18 1303)  Pulse: (!) 57 (08/01/18 1303)  Resp: 18 (08/01/18 1303)  BP: (!) 160/86 (08/01/18 1303)  SpO2: 100 % (08/01/18 1002)  O2 Device (Oxygen Therapy): nasal cannula (08/01/18 1303) Vital Signs (24h Range):  Temp:  [98.1 °F (36.7 °C)-98.8 °F (37.1 °C)] 98.8 °F (37.1 °C)  Pulse:  [53-60] 57  Resp:  [18-34] 18  SpO2:  [96 %-100 %] 100 %  BP: (147-162)/(71-87) 160/86     Weight: 80.7 kg (178 lb) (08/01/18 0644)  Body mass index is 23.48 kg/m².  Body surface area is 2.04 meters squared.    No intake/output data recorded.    Physical Exam   Constitutional:   Confusion. Blind. 2 L NC   Eyes:   Irregular   Neck: Neck supple.   Cardiovascular: Normal rate and regular rhythm.    Pulmonary/Chest:   Clear anteriorly. Unable to listen posteriorly due diffuse weakness.    Abdominal: Soft. Bowel  "sounds are normal. He exhibits no distension.   Musculoskeletal: He exhibits edema (BLE ).   Neurological:   Oriented to self only   Skin: Skin is warm and dry.       Significant Labs:  All labs within the past 24 hours have been reviewed.    Significant Imaging:  Labs: Reviewed    Assessment/Plan:     ESRD (end stage renal disease) on dialysis    88 yo AAM with significant history for hypertension, DMII, blindness, seizure, dementia, and ESRD who was brought by Daughter Gabriela to hospital for sudden shortness of breath x 1 day and dry cough x 2 days. Patient reports "feel funny." Denies fever or chills. Cough not associated with meals. In ED, CXR showed bilateral effusion and edema. % on 2 L NC.      ESRD due to DM/HTN on iHD x 10 years MWF via ANGLE AVF at [facility] under care of Dr. Pedro. EDW 80 kg. EDW 82 kg to 80 kg about 1-2 months ago.Outpatient nurse reports no volume removed 7/25, 1 L removed 7/27, and 500 ml removed 7/30. Labs reviewed.     Plan/Recommendation:  HD today 3.5 hrs. Dialysate adjusted to current labs. 2 K bath. Aim 3 L and adjust EDW accordingly. Will assess tomorrow am if need additional UF.   Renal/low salt diet when okay eat.   No ROWAN              Thank you for your consult. I will follow-up with patient. Please contact us if you have any additional questions.    Alondra Rodriguez NP  Nephrology  [facility] [address]    ATTENDING PHYSICIAN ATTESTATION  I have personally interviewed and examined the patient. I thoroughly reviewed the demographic, clinical, laboratorial and imaging information available in medical records. I agree with the assessment and recommendations provided by the GEORGES Rodriguez who was under my supervision.     HEMODIALYSIS NOTE  Patient evaluated while undergoing hemodialysis indicated for ESRD. Tolerating session with current UFR, no complications.     "

## 2018-08-01 NOTE — NURSING
Received from ER , daughter at bedside , patient awke and alert , recognizes daughter, patient is blind.Spoke with Dr Chávez , informed of need for orders especially diet , to fed and send to dialysis.Daughter at bedside informed will have dialysis today , will find out ETA

## 2018-08-01 NOTE — HPI
"88 yo AAM with significant history for hypertension, DMII, blindness, seizure, dementia, and ESRD who was brought by Daughter Gabriela to hospital for sudden shortness of breath x 1 day and dry cough x 2 days. Patient reports "feel funny." Denies fever or chills. Cough not associated with meals. In ED, CXR showed bilateral effusion and edema. % on 2 L NC. Nephrology consult for hemodialysis.   ESRD due to DM/HTN on iHD x 10 years MWF via ANGLE AVF at [facility] under care of Dr. Pedro. EDW 80 kg. EDW 82 kg to 80 kg about 1-2 months ago.Outpatient nurse reports no volume removed 7/25, 1 L removed 7/27, and 500 ml removed 7/30.   "

## 2018-08-01 NOTE — PROGRESS NOTES
Pt arrived to unit via stretcher with family member. Maintenance pt. Pt alert and stable. Accessed LT AVF x2 15g needles without difficulty. Duration 3.5hrs, Qb 400 ml/min, Qd 800ml/min, planned UFG 3.0L, orders reviewed.

## 2018-08-01 NOTE — HPI
"88 yo man with ESRD, dementia, systolic and diastolic CHF (EF 40% 2015), who presents with intermittent AMS/agitation and SOB x over the last week. HIstory obtained by pts caregiver. Pt's caregiver reports pt was at baseline until about 1 week ago when he began having periods of agitation and confusion. Agitation is decribed as pt repeatedly getting up out of bed, " flailing" arms, worsening confusion. Per family, pt has stated "he doesn't feel right."  Per family, pt also has appeared more SOB with audible congestion and increased work of breathing at rest. Pts family reports a dry cough over the few days as well. Per family, no fevers, chills, no complaints of chest pain, n/v/d, no urinary complaints ( pt urinates moderate amount each morning per family). Pt himself denies all complaints.   "

## 2018-08-01 NOTE — PLAN OF CARE
JANENE pt- see my prev note.   Gabriela is the daughter and POA/caregiver. Her number is [phone]  Per Pili WATTERS, pt has all dme     08/01/18 1313   Discharge Assessment   Assessment Type Discharge Planning Assessment   Confirmed/corrected address and phone number on facesheet? Yes   Assessment information obtained from? Patient;Caregiver   Expected Length of Stay (days) 3   Communicated expected length of stay with patient/caregiver yes   Prior to hospitilization cognitive status: (hx dementia per notes)   Lives With child(erin), adult  (gabriela)   Who are your caregiver(s) and their phone number(s)? gabriela POA   [phone]   Patient's perception of discharge disposition home health   Readmission Within The Last 30 Days no previous admission in last 30 days   Patient currently being followed by outpatient case management? No   Patient currently receives any other outside agency services? No   Equipment Currently Used at Home raised toilet;shower chair;wheelchair;walker, rolling   Do you have any problems affording any of your prescribed medications? No   Is the patient taking medications as prescribed? yes   Does the patient have transportation home? (tbd)   Transportation Available family or friend will provide   Does the patient receive services at the [facility]? No   Discharge Plan A Home with family;Home Health   Discharge Plan B Other   Patient/Family In Agreement With Plan yes

## 2018-08-01 NOTE — ASSESSMENT & PLAN NOTE
Unclear etiology   - May be related to volume overload and SOB. DDX includes infection vs. seizure d/o vs. progression of dementia   - pt still makes urine/ urinates daily per care giver  - will check UA   - will check procal; cxr and sx more consistent with volume overload than PNA   - discontinue non essential or exacerbating meds   - continue to monitor

## 2018-08-01 NOTE — PLAN OF CARE
Problem: Patient Care Overview  Goal: Plan of Care Review  Outcome: Ongoing (interventions implemented as appropriate)  Care of treatment addressed with daughter Gabriela Campbell, phone NO. [phone], dialysis , today and food first , , labs to be drawn, x aray done in er ,Report given to diaysis charge Nurse Jason , Blood sugar and vital signs noted

## 2018-08-01 NOTE — SUBJECTIVE & OBJECTIVE
Past Medical History:   Diagnosis Date    Anemia     Blind     bilaterally    Blindness of right eye     CHF (congestive heart failure)     Chronic kidney disease     Dementia 01/2017    Diabetes mellitus type II     General anesthetics causing adverse effect in therapeutic use     april / may 2014     Glaucoma (increased eye pressure)     Hypertension     Hypothermia 12/6/2017    Seizures        Past Surgical History:   Procedure Laterality Date    AV FISTULA PLACEMENT Left 4/2014    AV fistula to Left upper arm    EYE SURGERY      SCROTUM EXPLORATION Bilateral 11/2017       Review of patient's allergies indicates:   Allergen Reactions    Lisinopril Swelling     Current Facility-Administered Medications   Medication Frequency    0.9%  NaCl infusion PRN    0.9%  NaCl infusion Once    acetaminophen tablet 650 mg Q8H PRN    [START ON 8/2/2018] amLODIPine tablet 10 mg Daily    aspirin chewable tablet 81 mg Daily    atorvastatin tablet 40 mg Daily    calcium acetate capsule 667 mg TID WM    carvedilol tablet 25 mg BID    dextrose 50% injection 12.5 g PRN    dextrose 50% injection 25 g PRN    doxazosin tablet 4 mg Daily    finasteride tablet 5 mg Daily    glucagon (human recombinant) injection 1 mg PRN    glucose chewable tablet 16 g PRN    glucose chewable tablet 24 g PRN    heparin (porcine) injection 5,000 Units Q8H    levETIRAcetam tablet 500 mg BID    polyethylene glycol packet 17 g Daily    ramelteon tablet 8 mg Nightly PRN    senna-docusate 8.6-50 mg per tablet 1 tablet BID    sodium chloride 0.9% flush 5 mL PRN     Family History     Problem Relation (Age of Onset)    Cancer Daughter    Heart disease Sister, Brother        Social History Main Topics    Smoking status: Never Smoker    Smokeless tobacco: Never Used    Alcohol use No    Drug use: No    Sexual activity: No     Review of Systems   Unable to perform ROS: Dementia     Objective:     Vital Signs (Most  Recent):  Temp: 98.8 °F (37.1 °C) (08/01/18 1303)  Pulse: (!) 57 (08/01/18 1303)  Resp: 18 (08/01/18 1303)  BP: (!) 160/86 (08/01/18 1303)  SpO2: 100 % (08/01/18 1002)  O2 Device (Oxygen Therapy): nasal cannula (08/01/18 1303) Vital Signs (24h Range):  Temp:  [98.1 °F (36.7 °C)-98.8 °F (37.1 °C)] 98.8 °F (37.1 °C)  Pulse:  [53-60] 57  Resp:  [18-34] 18  SpO2:  [96 %-100 %] 100 %  BP: (147-162)/(71-87) 160/86     Weight: 80.7 kg (178 lb) (08/01/18 0644)  Body mass index is 23.48 kg/m².  Body surface area is 2.04 meters squared.    No intake/output data recorded.    Physical Exam   Constitutional:   Confusion. Blind. 2 L NC   Eyes:   Irregular   Neck: Neck supple.   Cardiovascular: Normal rate and regular rhythm.    Pulmonary/Chest:   Clear anteriorly. Unable to listen posteriorly due diffuse weakness.    Abdominal: Soft. Bowel sounds are normal. He exhibits no distension.   Musculoskeletal: He exhibits edema (BLE ).   Neurological:   Oriented to self only   Skin: Skin is warm and dry.       Significant Labs:  All labs within the past 24 hours have been reviewed.    Significant Imaging:  Labs: Reviewed

## 2018-08-01 NOTE — ED PROVIDER NOTES
"Encounter Date: 8/1/2018    SCRIBE #1 NOTE: I, Pat German, am scribing for, and in the presence of,  Dr. Masters. I have scribed the following portions of the note - the EKG reading and the Resident attestation.       History     Chief Complaint   Patient presents with    Dementia     pt family called EMS reporting that pt was SOB but on arrival pt was in no acute distress and denies SOB or CP; pt family reports patient has dementia and wants him evaluated     Mr. Cardenas 89 yo male patient with pmh of dementia, blindness, ESRD currently go to Dialysis MWF with left AV fistula, CHF (EF 30%), DMII, and seizure presented to ED with not feeling well and short of breath. History was provided by his wife who is on his bedside. This morning patient woke up from bad expressing " I don't feel right", flayling of limbs and agitation. Patient was brought in by ambulance. Patient denied fever, chill, nausea, vomiting, diarrhea, constipation, headache, bowel and urine incontinence.      The history is provided by the spouse, the patient and medical records.     Review of patient's allergies indicates:   Allergen Reactions    Lisinopril Swelling     Past Medical History:   Diagnosis Date    Anemia     Blind     bilaterally    Blindness of right eye     CHF (congestive heart failure)     Chronic kidney disease     Dementia 01/2017    Diabetes mellitus type II     General anesthetics causing adverse effect in therapeutic use     april / may 2014     Glaucoma (increased eye pressure)     Hypertension     Hypothermia 12/6/2017    Seizures      Past Surgical History:   Procedure Laterality Date    AV FISTULA PLACEMENT Left 4/2014    AV fistula to Left upper arm    EYE SURGERY      SCROTUM EXPLORATION Bilateral 11/2017     Family History   Problem Relation Age of Onset    Heart disease Sister         mi    Heart disease Brother         mi    Cancer Daughter         BREAST X 2     Social History   Substance Use " Topics    Smoking status: Never Smoker    Smokeless tobacco: Never Used    Alcohol use No     Review of Systems   Constitutional: Positive for activity change, appetite change, diaphoresis and fatigue. Negative for chills and fever.   HENT: Negative for congestion, rhinorrhea, sinus pressure, sneezing and sore throat.    Eyes: Negative for photophobia and visual disturbance.   Respiratory: Negative for apnea, cough, shortness of breath and wheezing.    Cardiovascular: Positive for leg swelling. Negative for chest pain and palpitations.   Gastrointestinal: Negative for abdominal pain, constipation, diarrhea, nausea and vomiting.   Endocrine: Negative for polydipsia and polyuria.   Genitourinary: Negative for difficulty urinating, dysuria and flank pain.   Skin: Negative for wound.   Neurological: Negative for seizures, facial asymmetry, weakness, light-headedness and headaches.   Psychiatric/Behavioral: Positive for agitation and confusion. Negative for hallucinations.       Physical Exam     Initial Vitals [08/01/18 0644]   BP Pulse Resp Temp SpO2   (!) 162/87 60 18 98.1 °F (36.7 °C) 98 %      MAP       --         Physical Exam    Constitutional: He appears well-developed and well-nourished. No distress.   HENT:   Head: Normocephalic and atraumatic.   Mouth/Throat: Oropharynx is clear and moist. No oropharyngeal exudate.   Eyes:   Patient is blind.   Neck: Normal range of motion. Neck supple.   Cardiovascular: Normal rate, regular rhythm, normal heart sounds and intact distal pulses. Exam reveals no friction rub.    No murmur heard.  Pulmonary/Chest: Breath sounds normal. No respiratory distress. He has no wheezes. He has no rhonchi. He has no rales. He exhibits no tenderness.   Abdominal: Soft. He exhibits no distension. There is no tenderness. There is no rebound and no guarding.   Hypoactive bowel sound   Musculoskeletal: Normal range of motion.   AV fistula on left side arm   Neurological: He is alert. He has  normal strength.   Patient knew he was in [facility] but answered [employer]. Patient knew it was 2018.    Skin: Skin is warm and dry. Capillary refill takes less than 2 seconds.   Psychiatric: He has a normal mood and affect. His behavior is normal. Judgment and thought content normal.         ED Course   Procedures  Labs Reviewed   CBC W/ AUTO DIFFERENTIAL - Abnormal; Notable for the following:        Result Value    RBC 4.43 (*)     Hemoglobin 11.6 (*)     Hematocrit 36.2 (*)     MCH 26.2 (*)     RDW 21.4 (*)     Platelets 111 (*)     Lymph # 0.9 (*)     nRBC 2 (*)     Gran% 74.1 (*)     Lymph% 16.0 (*)     All other components within normal limits   B-TYPE NATRIURETIC PEPTIDE - Abnormal; Notable for the following:     BNP 2,379 (*)     All other components within normal limits    Narrative:     bnp add on per Escobar Masters MD @ 08:28 on 08/01/2018; order#   [id number]   COMPREHENSIVE METABOLIC PANEL - Abnormal; Notable for the following:     Potassium 5.5 (*)     CO2 21 (*)     Glucose 150 (*)     BUN, Bld 48 (*)     Creatinine 6.4 (*)     Albumin 3.3 (*)     eGFR if  8.1 (*)     eGFR if non  7.0 (*)     All other components within normal limits   MAGNESIUM - Abnormal; Notable for the following:     Magnesium 2.7 (*)     All other components within normal limits   TROPONIN I   B-TYPE NATRIURETIC PEPTIDE   MAGNESIUM   PHOSPHORUS   PHOSPHORUS   TROPONIN I   LACTIC ACID, PLASMA   URINALYSIS, REFLEX TO URINE CULTURE     EKG Readings: (Independently Interpreted)   Sinus rhythm with first degree AV block,normal axis, ST segment flattening in lead I at 60 BPM       Imaging Results          X-Ray Chest AP Portable (Final result)  Result time 08/01/18 08:05:34    Final result by Aubrey Valdez MD (08/01/18 08:05:34)                 Impression:      See above      Electronically signed by: Aubrey Valdez MD  Date:    08/01/2018  Time:    08:05             Narrative:     EXAMINATION:  XR CHEST AP PORTABLE    CLINICAL HISTORY:  possible HAP;    TECHNIQUE:  Single frontal view of the chest was performed.    COMPARISON:  12/04/2017    FINDINGS:  Cardiac size is enlarged and is larger than on previous exam.  Some patchy airspace consolidation and pulmonary edema can be seen bilaterally at the lung bases.  There may be some pleural fluid present.                              X-Rays:   Independently Interpreted Readings:   Chest X-Ray:  Bilateral interstitial infiltrates with increased density at the right lung base concerning for consolidation.     Medical Decision Making:   History:   Old Medical Records: I decided to obtain old medical records.  Initial Assessment:   Mr. Cardenas 88 yo male patient with pmh of dementia, blindness, ESRD currently go to Dialysis MWF with left AV fistula, CHF (EF 30%), DMII, and seizure presented to ED with not feeling well and short of breath. Chest x ray, troponin, Mg, Phos, blood culture, urinalysis catch by cathter, CBC and CMP. My ddx are but not limited to Electrolyte imbalance, UTI, HCAP, and Fluid overload.   Differential Diagnosis:       Independently Interpreted Test(s):   I have ordered and independently interpreted X-rays - see prior notes.  I have ordered and independently interpreted EKG Reading(s) - see prior notes  Clinical Tests:   Lab Tests: Ordered and Reviewed  Radiological Study: Ordered and Reviewed  Medical Tests: Ordered and Reviewed  ED Management:  8:04 AM  - CBC result normal  8:08 AM  - Chest X-ray:  Cardiac size is enlarged and is larger than on previous exam.  Some patchy airspace consolidation and pulmonary edema can be seen bilaterally at the lung bases.  There may be some pleural fluid present.  10:26 AM  Consulted Hospital medicine and Nephrology  Admission to hospital medicine ordered.             Scribe Attestation:   Scribe #1: I performed the above scribed service and the documentation accurately describes the services  I performed. I attest to the accuracy of the note.    Attending Attestation:   Physician Attestation Statement for Resident:  As the supervising MD   Physician Attestation Statement: I have personally seen and examined this patient.   I agree with the above history. -: 89 y.o. male with co-morbidity of dementia and DM as well as ESRD HD presents for evaluation of SOB and diminished activity.   As the supervising MD I agree with the above PE.    As the supervising MD I agree with the above treatment, course, plan, and disposition.  I have reviewed and agree with the residents interpretation of the following: lab data, x-rays and EKG.  I have reviewed the following: EKG reports and x-ray reports.                       Clinical Impression:   The primary encounter diagnosis was HCAP (healthcare-associated pneumonia). A diagnosis of Hemodialysis patient was also pertinent to this visit.      Disposition:   Disposition: Admitted  Mr Cardenas 89 year old man with medical history of Dementia, blindness, ESRD current HD (MWF) presented to ED with agitation. Patient shows elevated WBC and chest x ray showed patchy airspace consolidation and pulmonary edema. We are working him up for HCAP. Vancomycin and Zosyn was started. Two set of bx was ordered.                         Escobar Masters MD  08/05/18 0048

## 2018-08-01 NOTE — ASSESSMENT & PLAN NOTE
Pt with Edema on exam. CXR appears to have b/l infiltrates - likely pulm edema   SOB may be related to volume overload.   - Dialysis today; will assess afterwards if pt needs additional treatments/more fluid removal

## 2018-08-01 NOTE — H&P
"[facility] Hospital Medicine  History & Physical    Patient Name: Ronald Campbell  MRN: [id number]  Admission Date: 8/1/2018  Attending Physician: Lynda Nunez MD   Primary Care Provider: Bart Shaw MD    [facility] Medicine Team: [facility] A Lynda Nunez MD     Patient information was obtained from relative(s) and ER records.     Subjective:     Principal Problem:Volume overload    Chief Complaint:   Chief Complaint   Patient presents with    Dementia     pt family called EMS reporting that pt was SOB but on arrival pt was in no acute distress and denies SOB or CP; pt family reports patient has dementia and wants him evaluated        HPI: 88 yo man with ESRD, dementia, systolic and diastolic CHF (EF 40% 2015), who presents with intermittent AMS/agitation and SOB x over the last week. HIstory obtained by pts caregiver. Pt's caregiver reports pt was at baseline until about 1 week ago when he began having periods of agitation and confusion. Agitation is decribed as pt repeatedly getting up out of bed, " flailing" arms, worsening confusion. Per family, pt has stated "he doesn't feel right."  Per family, pt also has appeared more SOB with audible congestion and increased work of breathing at rest. Pts family reports a dry cough over the few days as well. Per family, no fevers, chills, no complaints of chest pain, n/v/d, no urinary complaints ( pt urinates moderate amount each morning per family). Pt himself denies all complaints.     Past Medical History:   Diagnosis Date    Anemia     Blind     bilaterally    Blindness of right eye     CHF (congestive heart failure)     Chronic kidney disease     Dementia 01/2017    Diabetes mellitus type II     General anesthetics causing adverse effect in therapeutic use     april / may 2014     Glaucoma (increased eye pressure)     Hypertension     Hypothermia 12/6/2017    Seizures        Past Surgical History:   Procedure Laterality Date    " AV FISTULA PLACEMENT Left 4/2014    AV fistula to Left upper arm    EYE SURGERY      SCROTUM EXPLORATION Bilateral 11/2017       Review of patient's allergies indicates:   Allergen Reactions    Lisinopril Swelling       No current facility-administered medications on file prior to encounter.      Current Outpatient Prescriptions on File Prior to Encounter   Medication Sig    amLODIPine (NORVASC) 5 MG tablet Take 10 mg by mouth once daily.     aspirin 81 MG Chew Take 1 tablet (81 mg total) by mouth once daily. (Patient taking differently: Take 81 mg by mouth every morning. )    atorvastatin (LIPITOR) 40 MG tablet Take 1 tablet (40 mg total) by mouth once daily. (Patient taking differently: Take 80 mg by mouth every evening. )    B complex-vitamin C-folic acid (NEPHRO-JESSICA) 0.8 mg Tab Take 1 tablet by mouth every morning.     blood sugar diagnostic (BLOOD GLUCOSE TEST) Strp 1 strip by Misc.(Non-Drug; Combo Route) route 2 (two) times daily.    brimonidine-timolol (COMBIGAN) 0.2-0.5 % Drop Place 1 drop into the left eye 2 (two) times daily.    brinzolamide (AZOPT) 1 % ophthalmic suspension Place 1 drop into the left eye 3 (three) times daily.     calcium acetate (PHOSLO) 667 mg capsule Take 1 capsule (667 mg total) by mouth 3 (three) times daily with meals.    carvedilol (COREG) 25 MG tablet Take 1 tablet (25 mg total) by mouth 2 (two) times daily. Do not take on dialysis days. Hold for SBP < 110 or HR < 55    doxazosin (CARDURA) 4 MG tablet Take 1 tablet (4 mg total) by mouth once daily.    finasteride (PROSCAR) 5 mg tablet Take 1 tablet (5 mg total) by mouth once daily.    insulin aspart (NOVOLOG FLEXPEN) 100 unit/mL InPn  Insulin Pen Subcutaneous As directed.  -200 take 1 unitBS 201-250 take 2 unitsBS 251-300 take 3 unitsBS 301-350 take 4 unitsBS > 351 take 5 units and call MD    ketoconazole (NIZORAL) 2 % cream Apply topically once daily. feet    levetiracetam (KEPPRA) 500 MG Tab Take 1 tablet  (500 mg total) by mouth 2 (two) times daily.    TRAVOPROST (TRAVATAN Z OPHT) Place 1 drop into the left eye every evening. 1 Drops Left eye Every evening    HEPARIN SODIUM,PORCINE (HEPARIN, PORCINE,) 1,000 unit/mL injection 1 mL (1,000 Units total) by Intracatheter route as needed (Heparin 1000 units/ml instilled to fill volume of each lumen of dialysis catheter after each dialysis.).    polyethylene glycol (GLYCOLAX) 17 gram PwPk Take 17 g by mouth once daily.     Family History     Problem Relation (Age of Onset)    Cancer Daughter    Heart disease Sister, Brother        Social History Main Topics    Smoking status: Never Smoker    Smokeless tobacco: Never Used    Alcohol use No    Drug use: No    Sexual activity: No     Review of Systems   Unable to perform ROS: Dementia     Objective:     Vital Signs (Most Recent):  Temp: 98.8 °F (37.1 °C) (08/01/18 1303)  Pulse: (!) 57 (08/01/18 1303)  Resp: 18 (08/01/18 1303)  BP: (!) 160/86 (08/01/18 1303)  SpO2: 100 % (08/01/18 1002) Vital Signs (24h Range):  Temp:  [98.1 °F (36.7 °C)-98.8 °F (37.1 °C)] 98.8 °F (37.1 °C)  Pulse:  [53-60] 57  Resp:  [18-34] 18  SpO2:  [96 %-100 %] 100 %  BP: (147-162)/(71-87) 160/86     Weight: 80.7 kg (178 lb)  Body mass index is 23.48 kg/m².    Physical Exam   Constitutional: He appears well-developed and well-nourished.   Eyes: EOM are normal. Pupils are equal, round, and reactive to light.   Cardiovascular: Normal rate, regular rhythm and normal heart sounds.  Exam reveals no gallop and no friction rub.    No murmur heard.  Pulmonary/Chest: Effort normal and breath sounds normal. No respiratory distress. He has no wheezes. He has no rales. He exhibits no tenderness.   Abdominal: Soft. Bowel sounds are normal. He exhibits no distension and no mass. There is no tenderness. There is no rebound and no guarding.   Musculoskeletal: Normal range of motion. He exhibits edema (2+ to the knees ).   Neurological: He is alert.   Moving all  extremities    Skin: Skin is warm and dry.         CRANIAL NERVES     CN III, IV, VI   Pupils are equal, round, and reactive to light.  Extraocular motions are normal.        Significant Labs:   CBC:   Recent Labs  Lab 08/01/18  0734   WBC 5.80   HGB 11.6*   HCT 36.2*   *     CMP:   Recent Labs  Lab 08/01/18  0846      K 5.5*      CO2 21*   *   BUN 48*   CREATININE 6.4*   CALCIUM 9.0   PROT 7.9   ALBUMIN 3.3*   BILITOT 0.5   ALKPHOS 97   AST 14   ALT 22   ANIONGAP 8   EGFRNONAA 7.0*     Coagulation: No results for input(s): PT, INR, APTT in the last 48 hours.    Significant Imaging: I have reviewed and interpreted all pertinent imaging results/findings within the past 24 hours.    Assessment/Plan:     * Volume overload    Pt with Edema on exam. CXR appears to have b/l infiltrates - likely pulm edema   SOB may be related to volume overload.   - Dialysis today; will assess afterwards if pt needs additional treatments/more fluid removal         Agitation    Unclear etiology   - May be related to volume overload and SOB. DDX includes infection vs. seizure d/o vs. progression of dementia   - pt still makes urine/ urinates daily per care giver  - will check UA   - will check procal; cxr and sx more consistent with volume overload than PNA   - discontinue non essential or exacerbating meds   - continue to monitor           HTN (hypertension)    Stable   Cont home amlodipine         ESRD (end stage renal disease) on dialysis    Pt with dialysis MWF   Cont Ca acetate 667 w meals         Benign prostatic hyperplasia with urinary retention    Pt still makes urine per report   Cont finasteride and doxazosin         Seizure disorder    Stable   Cont home keppra 500 BID           VTE Risk Mitigation         Ordered     heparin (porcine) injection 5,000 Units  Every 8 hours      08/01/18 1258     IP VTE HIGH RISK PATIENT  Once      08/01/18 1258             Lynda Nunez MD  Department of Hospital  Medicine   [facility]

## 2018-08-01 NOTE — ASSESSMENT & PLAN NOTE
"89 yo AAM with significant history for hypertension, DMII, blindness, seizure, dementia, and ESRD who was brought by Daughter Gabriela to hospital for sudden shortness of breath x 1 day and dry cough x 2 days. Patient reports "feel funny." Denies fever or chills. Cough not associated with meals. In ED, CXR showed bilateral effusion and edema. % on 2 L NC.      ESRD due to DM/HTN on iHD x 10 years MWF via [facility] at [facility] under care of Dr. Pedro. EDW 80 kg. EDW 82 kg to 80 kg about 1-2 months ago.Outpatient nurse reports no volume removed 7/25, 1 L removed 7/27, and 500 ml removed 7/30. Labs reviewed.     Plan/Recommendation:  HD today 3.5 hrs. Dialysate adjusted to current labs. 2 K bath. Aim 3 L and adjust EDW accordingly. Will assess tomorrow am if need additional UF.   Renal/low salt diet when okay eat.   No ROWAN    "

## 2018-08-01 NOTE — PLAN OF CARE
This is a [facility] pt. Cm called [phone] to notify them of pt's admittance. I spoke with Pili who has epic access and can view clinicals (UR) and notes.  Pt is current w   And sees PCP Dr Shaw     08/01/18 3147   Discharge Assessment   Assessment Type Discharge Planning Assessment

## 2018-08-01 NOTE — ED TRIAGE NOTES
"Ronald Campbell, a 89 y.o. male presents to the ED for change in baseline mental status and SOB. Family member noted PT became restless this morning and was "flailing" in bed. Family member also noted mild diaphoresis. Family member denies fever, N/D, chest pain,  Urinary frequency/urgency or dizziness.        Chief Complaint   Patient presents with    Dementia     pt family called EMS reporting that pt was SOB but on arrival pt was in no acute distress and denies SOB or CP; pt family reports patient has dementia and wants him evaluated     Review of patient's allergies indicates:   Allergen Reactions    Lisinopril Swelling     Past Medical History:   Diagnosis Date    Anemia     Blind     bilaterally    Blindness of right eye     CHF (congestive heart failure)     Chronic kidney disease     Dementia 01/2017    Diabetes mellitus type II     General anesthetics causing adverse effect in therapeutic use     april / may 2014     Glaucoma (increased eye pressure)     Hypertension     Hypothermia 12/6/2017    Seizures      Adult Physical Assessment  LOC: Ronald Campbell, 89 y.o. male verified via two identifiers.  APPEARANCE: Patient resting comfortably and appears to be in no acute distress at this time. Patient is clean and well groomed, patient's clothing is properly fastened.  SKIN:The skin is warm and dry, color consistent with ethnicity, patient has normal skin turgor and moist mucus membranes, bilateral 2+ pitting pedal edema .  MUSCULOSKELETAL: Patient moving all extremities well, no obvious swelling or deformities noted.  RESPIRATORY: Airway is open and patent, respirations are spontaneous, patient has a normal effort and rate, no accessory muscle use noted. PT spO2 at 96% on room air at this time.   CARDIAC: Patient has a normal rate and rhythm, no periphreal edema noted in any extremity, capillary refill < 3 seconds in all extremities  ABDOMEN: Soft and non tender to palpation, no abdominal " distention noted. Bowel sounds present in all four quadrants.  NEUROLOGIC: Eyes open spontaneously, facial expression symmetrical, purposeful motor response noted.

## 2018-08-01 NOTE — SUBJECTIVE & OBJECTIVE
Past Medical History:   Diagnosis Date    Anemia     Blind     bilaterally    Blindness of right eye     CHF (congestive heart failure)     Chronic kidney disease     Dementia 01/2017    Diabetes mellitus type II     General anesthetics causing adverse effect in therapeutic use     april / may 2014     Glaucoma (increased eye pressure)     Hypertension     Hypothermia 12/6/2017    Seizures        Past Surgical History:   Procedure Laterality Date    AV FISTULA PLACEMENT Left 4/2014    AV fistula to Left upper arm    EYE SURGERY      SCROTUM EXPLORATION Bilateral 11/2017       Review of patient's allergies indicates:   Allergen Reactions    Lisinopril Swelling       No current facility-administered medications on file prior to encounter.      Current Outpatient Prescriptions on File Prior to Encounter   Medication Sig    amLODIPine (NORVASC) 5 MG tablet Take 10 mg by mouth once daily.     aspirin 81 MG Chew Take 1 tablet (81 mg total) by mouth once daily. (Patient taking differently: Take 81 mg by mouth every morning. )    atorvastatin (LIPITOR) 40 MG tablet Take 1 tablet (40 mg total) by mouth once daily. (Patient taking differently: Take 80 mg by mouth every evening. )    B complex-vitamin C-folic acid (NEPHRO-JESSICA) 0.8 mg Tab Take 1 tablet by mouth every morning.     blood sugar diagnostic (BLOOD GLUCOSE TEST) Strp 1 strip by Misc.(Non-Drug; Combo Route) route 2 (two) times daily.    brimonidine-timolol (COMBIGAN) 0.2-0.5 % Drop Place 1 drop into the left eye 2 (two) times daily.    brinzolamide (AZOPT) 1 % ophthalmic suspension Place 1 drop into the left eye 3 (three) times daily.     calcium acetate (PHOSLO) 667 mg capsule Take 1 capsule (667 mg total) by mouth 3 (three) times daily with meals.    carvedilol (COREG) 25 MG tablet Take 1 tablet (25 mg total) by mouth 2 (two) times daily. Do not take on dialysis days. Hold for SBP < 110 or HR < 55    doxazosin (CARDURA) 4 MG tablet Take 1  tablet (4 mg total) by mouth once daily.    finasteride (PROSCAR) 5 mg tablet Take 1 tablet (5 mg total) by mouth once daily.    insulin aspart (NOVOLOG FLEXPEN) 100 unit/mL InPn  Insulin Pen Subcutaneous As directed.  -200 take 1 unitBS 201-250 take 2 unitsBS 251-300 take 3 unitsBS 301-350 take 4 unitsBS > 351 take 5 units and call MD    ketoconazole (NIZORAL) 2 % cream Apply topically once daily. feet    levetiracetam (KEPPRA) 500 MG Tab Take 1 tablet (500 mg total) by mouth 2 (two) times daily.    TRAVOPROST (TRAVATAN Z OPHT) Place 1 drop into the left eye every evening. 1 Drops Left eye Every evening    HEPARIN SODIUM,PORCINE (HEPARIN, PORCINE,) 1,000 unit/mL injection 1 mL (1,000 Units total) by Intracatheter route as needed (Heparin 1000 units/ml instilled to fill volume of each lumen of dialysis catheter after each dialysis.).    polyethylene glycol (GLYCOLAX) 17 gram PwPk Take 17 g by mouth once daily.     Family History     Problem Relation (Age of Onset)    Cancer Daughter    Heart disease Sister, Brother        Social History Main Topics    Smoking status: Never Smoker    Smokeless tobacco: Never Used    Alcohol use No    Drug use: No    Sexual activity: No     Review of Systems   Unable to perform ROS: Dementia     Objective:     Vital Signs (Most Recent):  Temp: 98.8 °F (37.1 °C) (08/01/18 1303)  Pulse: (!) 57 (08/01/18 1303)  Resp: 18 (08/01/18 1303)  BP: (!) 160/86 (08/01/18 1303)  SpO2: 100 % (08/01/18 1002) Vital Signs (24h Range):  Temp:  [98.1 °F (36.7 °C)-98.8 °F (37.1 °C)] 98.8 °F (37.1 °C)  Pulse:  [53-60] 57  Resp:  [18-34] 18  SpO2:  [96 %-100 %] 100 %  BP: (147-162)/(71-87) 160/86     Weight: 80.7 kg (178 lb)  Body mass index is 23.48 kg/m².    Physical Exam   Constitutional: He appears well-developed and well-nourished.   Eyes: EOM are normal. Pupils are equal, round, and reactive to light.   Cardiovascular: Normal rate, regular rhythm and normal heart sounds.  Exam  reveals no gallop and no friction rub.    No murmur heard.  Pulmonary/Chest: Effort normal and breath sounds normal. No respiratory distress. He has no wheezes. He has no rales. He exhibits no tenderness.   Abdominal: Soft. Bowel sounds are normal. He exhibits no distension and no mass. There is no tenderness. There is no rebound and no guarding.   Musculoskeletal: Normal range of motion. He exhibits edema (2+ to the knees ).   Neurological: He is alert.   Moving all extremities    Skin: Skin is warm and dry.         CRANIAL NERVES     CN III, IV, VI   Pupils are equal, round, and reactive to light.  Extraocular motions are normal.        Significant Labs:   CBC:   Recent Labs  Lab 08/01/18  0734   WBC 5.80   HGB 11.6*   HCT 36.2*   *     CMP:   Recent Labs  Lab 08/01/18  0846      K 5.5*      CO2 21*   *   BUN 48*   CREATININE 6.4*   CALCIUM 9.0   PROT 7.9   ALBUMIN 3.3*   BILITOT 0.5   ALKPHOS 97   AST 14   ALT 22   ANIONGAP 8   EGFRNONAA 7.0*     Coagulation: No results for input(s): PT, INR, APTT in the last 48 hours.    Significant Imaging: I have reviewed and interpreted all pertinent imaging results/findings within the past 24 hours.

## 2018-08-01 NOTE — NURSING
Resting, To dialysis with Lunch tray informed , Informed dialysis nurse patient  Will need assistance with tray. Daughter with patient to address her concerns with dialysis

## 2018-08-02 NOTE — NURSING
Pt will benefit from having a telesitter as he is blind, confused, easily agitated, and has dementia. I was informed of a location on 7 with an available camera but when I went to retreive it, it was gone. Still awaiting another camera. Bed alarm is on and door is open to room. TM

## 2018-08-02 NOTE — NURSING
DR Chow  On unit order for supplement received per daughter request, stat order entered . Checked patient depend  Under garment dry skin is intact  updates given to daughter and oncoming nurse Alina. Daughter wants med to be given Ca renal supplement within 10 mins prior to food ,Patient refuses tray, Informed charge nurse the need for supplement . Daughter at bedside , informed her the house supervisor attempting to locate  Supplement. Patient requesting quietly. Patient returned to room , report given  And all questions addressed.

## 2018-08-02 NOTE — PT/OT/SLP EVAL
Physical Therapy Evaluation    Patient Name:  Ronald Campbell   MRN:  [id number]    Recommendations:     Discharge Recommendations:  home with home health (family assist) (daughter also requesting [facility])  Discharge Equipment Recommendations: none   Barriers to discharge: Inaccessible home    Assessment:     Ronald Campbell is a 89 y.o. male admitted with a medical diagnosis of Volume overload.  He presents with the following impairments/functional limitations:  weakness, gait instability, impaired balance, impaired endurance, decreased lower extremity function, decreased safety awareness, impaired cognition, impaired functional mobilty, impaired self care skills. Pt performed transfers CGA and amb ~15ft with min A B HHA then progressed to CGA with B UE support on therapist shlds and tactile cues for WS at B hips. Pt will benefit from skilled PT to improve deficits and increase overall functional mobility. Pt will benefit from HH in familiar environment 2* dementia and visual impairments.     Rehab Prognosis:  Good; patient would benefit from acute skilled PT services to address these deficits and reach maximum level of function.      Recent Surgery: * No surgery found *      Plan:     During this hospitalization, patient to be seen 3 x/week to address the above listed problems via gait training, therapeutic activities, therapeutic exercises, neuromuscular re-education  · Plan of Care Expires:  09/02/18   Plan of Care Reviewed with: patient, daughter    Subjective     Communicated with RN prior to session.  Patient found seated EOB with RN and daughter upon PT entry to room, agreeable to evaluation.      Chief Complaint: NA  Family comments/goals: return home  Pain/Comfort:  · Pain Rating 1: 0/10  · Pain Rating Post-Intervention 1: 0/10    Patients cultural, spiritual, Episcopal conflicts given the current situation:      Living Environment:  Pt history provided by pt daughter. Pt lives with daughter in raised 1-story  house with 12 DAVID with handrail in the center and tub/shower with threshold DAVID. Pt required HHA with amb and stair negotiation.  Pt daughter reports caregiver assists pt with ADLs MWF on HD days for ~1.5 hrs and TTh 2 hrs in AM and 2 hrs in PM. Pt was had [facility] in the past.   Prior to admission, patients level of function was assist with ADLs and amb.  Patient has the following equipment: shower chair, wheelchair, rollator, grab bar, bedside commode.  DME owned (not currently used): none.  Upon discharge, patient will have assistance from family and caregiver.    Objective:     Patient found with: telemetry     General Precautions: Standard, blind, fall   Orthopedic Precautions:N/A   Braces: N/A     Exams:  · Cognitive Exam:  Patient is oriented to Person and follows 100% of simple, 1-step commands commands   · Gross Motor Coordination:  WFL  · Postural Exam:  Patient presented with the following abnormalities:    · -       Rounded shoulders  · -       Forward head  · Sensation:    · -       Intact  light/touch B LE  · Skin Integrity/Edema:      · -       Skin integrity: Visible skin intact  · RLE ROM: WFL except knee ext (-) 10 degrees  · RLE Strength: WFL  · LLE ROM: WFL except knee ext (-) 10 degrees  · LLE Strength: WFL    Functional Mobility:  Transfers:     · Sit to Stand:  contact guard assistance with hand-held assist    Gait: ~15ft with min A B HHA then progressed to CGA with B UE support on therapist shlds and tactile cues for WS at B hips, increased B knee flex that reports at baseline    AM-PAC 6 CLICK MOBILITY  Total Score:18       Therapeutic Activities and Exercises:  Pt sat EOB with S.  Pt and daughter educated on:  -role of PT/POC  -importance of OOB activity  -safety with mobility  -benefits of HH PT, pt familiar with home environment; daughter agreeable and denies need for SNF  Pt daughter reports no further questions or concerns from a mobility standpoint.   Pt safe to amb in room with daughter  or RN staff.     Patient left up in chair with all lines intact, call button in reach, RN notified and daughter present.    GOALS:    Physical Therapy Goals        Problem: Physical Therapy Goal    Goal Priority Disciplines Outcome Goal Variances Interventions   Physical Therapy Goal     PT/OT, PT Ongoing (interventions implemented as appropriate)     Description:  Goals to be met by: 2018     Patient will increase functional independence with mobility by performin. Supine to sit with Stand-by Assistance  2. Sit to supine with Stand-by Assistance  3. Sit to stand transfer with Stand-by Assistance  4. Gait  x 150 feet with Contact Guard Assistance using HHA.   5. Ascend/descend 12 stair with right Handrails Contact Guard Assistance using HHA.   6. Lower extremity exercise program x15 reps per handout, with supervision                      History:     Past Medical History:   Diagnosis Date    Anemia     Blind     bilaterally    Blindness of right eye     CHF (congestive heart failure)     Chronic kidney disease     Dementia 2017    Diabetes mellitus type II     General anesthetics causing adverse effect in therapeutic use     april / may 2014     Glaucoma (increased eye pressure)     Hypertension     Hypothermia 2017    Seizures        Past Surgical History:   Procedure Laterality Date    AV FISTULA PLACEMENT Left 2014    AV fistula to Left upper arm    EYE SURGERY      SCROTUM EXPLORATION Bilateral 2017       Clinical Decision Making:     Decision Making/ Complexity Score   On examination of body system using standardized tests and measures patient presents with 3 or more elements from any of the following: body structures and functions, activity limitations, and/or participation restrictions.  Leading to a clinical presentation that is considered stable and/or uncomplicated                              Clinical Decision Making  (Eval Complexity):  Low- 63924     Time  Tracking:     PT Received On: 08/02/18  PT Start Time: 0830     PT Stop Time: 0851  PT Total Time (min): 21 min     Billable Minutes: Evaluation 21      LEE GLOVER PT  08/02/2018

## 2018-08-02 NOTE — PROGRESS NOTES
Dialysis completed. Needles removed from left upper arm fistula with pressure held to sites for 10 minutes each with hemostasis achieved. Gauze and tape to sites. Patient ultrafiltrated for 3 hours with fluid removal of 2 liters. Tolerated well with stable vital signs.

## 2018-08-02 NOTE — PROGRESS NOTES
Patient received from [facility] with report from OLIVIA Pate.  Maintenance ultrafiltration began per orders via 15 gauge fistula needles to left upper arm fistula.

## 2018-08-02 NOTE — PLAN OF CARE
Problem: Occupational Therapy Goal  Goal: Occupational Therapy Goal  Goals to be met by: 8/9  Patient will increase functional independence with ADLs by performing:    UE Dressing with Set-up Assistance and vision accomodations.  Grooming while standing at sink with Minimal Assistance and vision accommodations, AD as needed.  Toileting from toilet with Moderate Assistance for hygiene and clothing management, and vision accomodations.   Stand pivot transfers with Supervision to chair and toilet, AD as needed.  Functional mobility with AD as needed for short household distance with no LOB with HHA.     Outcome: Ongoing (interventions implemented as appropriate)  OT jazmín completed this date. Recommend home with HH upon DC. SHIRA Murguia 8/2/2018

## 2018-08-02 NOTE — NURSING
Noted need for urine patient ,patient remains in dialysis , will address or if necessary will pass to upcoming shift.

## 2018-08-02 NOTE — PT/OT/SLP EVAL
Occupational Therapy   Evaluation    Name: Ronald Campbell  MRN: [id number]  Admitting Diagnosis:  Volume overload      Recommendations:     Discharge Recommendations: home with home health (and family assist )  Discharge Equipment Recommendations:  none  Barriers to discharge:  None    History:     Occupational Profile:  Living Environment: Pt lives with his daughter in a raised home, one story, 12 steps to enter. He has a tub/shower combo with a cutout/threhold to step over and a seat.  Previous level of function: Pt requires assist with all ADL and IADL tasks. His daughter assists him with most things. He also has caregiver assistance on MWF morning (before HD) and for 2 hours in AM and 2 hours in PM on TTH. His most recent fall was in June. He requires a rollator or HHA for functional mobility. Pt is blind. Pt is a retired . Pt is a member of the [facility].   Roles and Routines: caretaker to self, father, community dweller  Equipment Owned:  shower chair, wheelchair, rollator, grab bar, bedside commode  Assistance upon Discharge: assist as listed above--daughter works during day, so pt does have periods of being alone in home    Past Medical History:   Diagnosis Date    Anemia     Blind     bilaterally    Blindness of right eye     CHF (congestive heart failure)     Chronic kidney disease     Dementia 01/2017    Diabetes mellitus type II     General anesthetics causing adverse effect in therapeutic use     april / may 2014     Glaucoma (increased eye pressure)     Hypertension     Hypothermia 12/6/2017    Seizures        Past Surgical History:   Procedure Laterality Date    AV FISTULA PLACEMENT Left 4/2014    AV fistula to Left upper arm    EYE SURGERY      SCROTUM EXPLORATION Bilateral 11/2017       Subjective     Chief Complaint: decreased balance  Patient/Family stated goals: to get better   Communicated with: RN prior to session. Eyal completed with PT. Pt's daughter present for  session.   Pain/Comfort:  · Pain Rating 1: 0/10    Patients cultural, spiritual, Scientologist conflicts given the current situation: Nondenominational     Objective:     Patient found with: telemetry, peripheral IV (avasys)    General Precautions: Standard, blind, fall, diabetic, seizure (renal diet )   Orthopedic Precautions:N/A   Braces: N/A     Occupational Performance:    Bed Mobility:    · Patient completed Rolling/Turning to Right with contact guard assistance  · Patient completed Scooting/Bridging with contact guard assistance  · Patient completed Supine to Sit with contact guard assistance    Functional Mobility/Transfers:  · Patient completed Sit <> Stand Transfer with contact guard assistance  with  no assistive device   · Demo B knee flexion during stand and functional mobility/unable to stand fully upright--reported this is baseline  · Patient completed Bed <> Chair Transfer using Stand Pivot technique with minimum assistance with no assistive device  · Functional Mobility: min A for standing balance and progressed to CGA for short household distance with B HHA and no AD     Activities of Daily Living:  · Upper Body Dressing: moderate assistance to don gown like jacket while seated EOB   · Lower Body Dressing: total assistance to don socks while seated EOB (baseline)     Cognitive/Visual Perceptual:  Cognitive/Psychosocial Skills:     -       Oriented to: Person, Place, Time and Situation   -       Follows Commands/attention:Follows two-step commands  -       Communication: short phrase answers occassionally, mostly one word answers with low volume  -       Memory: No Deficits noted  -       Safety awareness/insight to disability: intact   -       Mood/Affect/Coping skills/emotional control: Appropriate to situation  Visual/Perceptual:      -Intact    Physical Exam:  Balance:    -       Supervision-CGA for static/dynamic sitting, CGA-min A for static/dynamic standing  Postural examination/scapula alignment:    -        Rounded shoulders  -       Forward head  -       Posterior pelvic tilt  Skin integrity: Visible skin intact  Edema:  None noted  Sensation:    -       Intact BUE--reported diminished sensation in fingertips of B hands--likely due to diabetes  Motor Planning:    -       intact   Dominant hand:    -       R  Upper Extremity Range of Motion:     -       Right Upper Extremity: WFL  -       Left Upper Extremity: WFL  Upper Extremity Strength:    -       Right Upper Extremity: WFL  -       Left Upper Extremity: WFL   Strength:    -       Right Upper Extremity: WFL  -       Left Upper Extremity: WFL  Fine Motor Coordination:    -       Intact    Patient left up in chair with all lines intact, call button in reach and daughter present with tray table and breakfast set up for pt--daughter reported she is going to assist with meal.     [facility] 6 Click:  [facility] Total Score: 11    Treatment & Education:  -Edu for OT role and POC  -Edu for importance of OOB activity and impact on healing  -Edu to daughter and pt for DC recommendations   -Whiteboard updated   -Questions and concerns addressed within OT scope of practice   Education:    Assessment:     Ronald Campbell is a 89 y.o. male with a medical diagnosis of Volume overload.  He presents with decreased functional independence in various areas of his life. He demo good motivation and willingness to participate. He demo need for assist with ADL tasks at baseline. He demo decreased balance and endurance as compared to baseline. Pt would continue to benefit from skilled OT services for maximum functional outcome and safety. Performance deficits affecting function are weakness, impaired endurance, impaired self care skills, impaired functional mobilty, decreased coordination, impaired balance, gait instability, decreased upper extremity function, decreased lower extremity function, decreased safety awareness, impaired cardiopulmonary response to activity.      Rehab Prognosis:   "good; patient would benefit from acute skilled OT services to address these deficits and reach maximum level of function.         Clinical Decision Makin.  OT Low:  "Pt evaluation falls under low complexity for evaluation coding due to performance deficits noted in 1-3 areas as stated above and 0 co-morbities affecting current functional status. Data obtained from problem focused assessments. No modifications or assistance was required for completion of evaluation. Only brief occupational profile and history review completed."     Plan:     Patient to be seen 3 x/week to address the above listed problems via self-care/home management, therapeutic exercises, therapeutic activities  · Plan of Care Expires: 18  · Plan of Care Reviewed with: patient, daughter    This Plan of care has been discussed with the patient who was involved in its development and understands and is in agreement with the identified goals and treatment plan    GOALS:    Occupational Therapy Goals        Problem: Occupational Therapy Goal    Goal Priority Disciplines Outcome Interventions   Occupational Therapy Goal     OT, PT/OT Ongoing (interventions implemented as appropriate)    Description:  Goals to be met by:   Patient will increase functional independence with ADLs by performing:    UE Dressing with Set-up Assistance and vision accomodations.  Grooming while standing at sink with Minimal Assistance and vision accommodations, AD as needed.  Toileting from toilet with Moderate Assistance for hygiene and clothing management, and vision accomodations.   Stand pivot transfers with Supervision to chair and toilet, AD as needed.  Functional mobility with AD as needed for short household distance with no LOB with HHA.                       Time Tracking:     OT Date of Treatment: 18  OT Start Time: 831  OT Stop Time: 851  OT Total Time (min): 20 min    Billable Minutes:Evaluation 20    SHIRA Murguia  2018    "

## 2018-08-02 NOTE — PROGRESS NOTES
[facility] HEMODIALYSIS NOTE     Patient currently on hemodialysis for volume removal      Patient seen and evaluated on hemodialysis, tolerating treatment, see HD flowsheet for vitals and assessments.      No Hypotension, chest pain, shortness of breath, cramping, nausea or vomiting.     -116's .  Aim for 2 L UF today.   HD tomorrow aim 3 L. Hold am antihypertensives prior to dialysis.     Alondra Rodriguez NP  Nephrology

## 2018-08-02 NOTE — NURSING
Returned from dialysis, received report 2l pulled daughter updated , Dietary called patient did not receive a tray . Bladder scan performed less than 30 cc, Called DR samano orders to reflect cath with a obtainable specimen for lab /80.P60 HR 16, Care plan implemented as per MD orders

## 2018-08-02 NOTE — PROGRESS NOTES
IHD complete, blood returned. Pt alert & stable. Hemostasis < 15 minutes, dry sterile dressing applied with bandaids to sites. Duration 3.5hrs, Qb 400 ml/min, Qd 800 ml/min, UFG 3.1L. Report called. Pt transported to 1160 via stretcher.

## 2018-08-02 NOTE — PLAN OF CARE
Problem: Physical Therapy Goal  Goal: Physical Therapy Goal  Goals to be met by: 2018     Patient will increase functional independence with mobility by performin. Supine to sit with Stand-by Assistance  2. Sit to supine with Stand-by Assistance  3. Sit to stand transfer with Stand-by Assistance  4. Gait  x 150 feet with Contact Guard Assistance using HHA.   5. Ascend/descend 12 stair with right Handrails Contact Guard Assistance using HHA.   6. Lower extremity exercise program x15 reps per handout, with supervision    Outcome: Ongoing (interventions implemented as appropriate)  Pt evaluation complete; pt goals set.    LEE GLOVER, PT  2018

## 2018-08-02 NOTE — NURSING
To dialysis via stretcher with assistance , report givev to dialysis nurse . Patient denies dicomfort , orientated to self , family and place.

## 2018-08-02 NOTE — NURSING
Called DR samano regarding bladder scan  Noted less than 30cc. Instructed to hold off on cath at this time, pass on to the next shift to scan and catheterize if bladder has the appropriate volume. Will pass on to the next nurse

## 2018-08-03 NOTE — PLAN OF CARE
SW attempted to send HH orders to [facility] but they were not in chart.  SW will try to F/U.      337pm--HH orders received and sent to [facility] via [facility].      Lizzy Martinez LMSW

## 2018-08-03 NOTE — PROGRESS NOTES
Maintenance HD started on pt @0830, ANGLE fistula +buit/thrill, accessed w/15g needles w/o difficulty. Blood pressure WNL o2@2l per NC in place sats 98-99%. Daughter @ bedside, MD and NP spoke with daughter extensively about fluid removal, verbalized understanding. Goal to remove 3l over 3.5hrs HD session. O2 spot check done on pt per NP, RA 97-98%, O2 off will cont to monitor pt

## 2018-08-03 NOTE — NURSING
Pt had 67 ml's on the bladder scan and pt extremely agitated about straight catheterizing him, he became combative. I will readdress scan/cath in the morning w/ pt.     Grzegorz alston is on pt and will check temps frequently.

## 2018-08-03 NOTE — PROGRESS NOTES
"[facility] Hospital Medicine  Progress Note    Patient Name: Ronald Campbell  MRN: [id number]  Patient Class: IP- Inpatient   Admission Date: 8/1/2018  Length of Stay: 2 days  Attending Physician: Lynda Nunez MD  Primary Care Provider: Bart Shaw MD    Hospital Medicine Team: [facility]OSP MED A Lynda Nunez MD    Subjective:     Principal Problem:Volume overload    HPI:  89 yo man with ESRD, dementia, systolic and diastolic CHF (EF 40% 2015), who presents with intermittent AMS/agitation and SOB x over the last week. HIstory obtained by pts caregiver. Pt's caregiver reports pt was at baseline until about 1 week ago when he began having periods of agitation and confusion. Agitation is decribed as pt repeatedly getting up out of bed, " flailing" arms, worsening confusion. Per family, pt has stated "he doesn't feel right."  Per family, pt also has appeared more SOB with audible congestion and increased work of breathing at rest. Pts family reports a dry cough over the few days as well. Per family, no fevers, chills, no complaints of chest pain, n/v/d, no urinary complaints ( pt urinates moderate amount each morning per family). Pt himself denies all complaints.     Hospital Course:  Pt admitted to hospital medicine.  Dialyzed x 3 days consecutively for volume overload.   Noted to be hypothermic overnight     Interval History: Pt had dialysis again today. Net neg 7L. Hypothermic overnight. Improved with bear hugger.  Hemodynamically stable. No agitation. Pt became combative with straight cath - unable to obtain UA.      Review of Systems   Unable to perform ROS: Dementia   Constitutional: Positive for activity change.     Objective:     Vital Signs (Most Recent):  Temp: 98.3 °F (36.8 °C) (08/03/18 1207)  Pulse: 73 (08/03/18 1207)  Resp: 20 (08/03/18 1207)  BP: (!) 155/78 (08/03/18 1207)  SpO2: 97 % (08/03/18 1501) Vital Signs (24h Range):  Temp:  [93.6 °F (34.2 °C)-98.4 °F (36.9 °C)] 98.3 °F (36.8 " °C)  Pulse:  [54-74] 73  Resp:  [16-20] 20  SpO2:  [95 %-100 %] 97 %  BP: (132-169)/(65-86) 155/78     Weight: 80.7 kg (178 lb)  Body mass index is 23.48 kg/m².    Intake/Output Summary (Last 24 hours) at 08/03/18 1844  Last data filed at 08/03/18 1201   Gross per 24 hour   Intake              240 ml   Output             3650 ml   Net            -3410 ml      Physical Exam   Constitutional: He appears well-developed and well-nourished.   Eyes: EOM are normal. Pupils are equal, round, and reactive to light.   Cardiovascular: Normal rate, regular rhythm and normal heart sounds.  Exam reveals no gallop and no friction rub.    No murmur heard.  Pulmonary/Chest: Effort normal and breath sounds normal. No respiratory distress. He has no wheezes. He has no rales. He exhibits no tenderness.   Abdominal: Soft. Bowel sounds are normal. He exhibits no distension and no mass. There is no tenderness. There is no rebound and no guarding.   Musculoskeletal: Normal range of motion. He exhibits edema (trace - improved ).   Neurological: He is alert.   Moving all extremities    Skin: Skin is warm and dry.       MELD-Na score: 21 at 2/22/2018 11:09 AM  MELD score: 21 at 2/22/2018 11:09 AM  Calculated from:  Serum Creatinine: 6 mg/dL (Rounded to 4) at 2/22/2018 11:09 AM  Serum Sodium: 138 mmol/L (Rounded to 137) at 2/22/2018 11:09 AM  Total Bilirubin: 0.5 mg/dL (Rounded to 1) at 2/22/2018 11:09 AM  INR(ratio): 1.1 at 2/22/2018 11:09 AM  Age: 88 years    Significant Labs:  CBC:    Recent Labs  Lab 08/02/18  0931 08/03/18  0619   WBC 7.58 6.34   HGB 12.2* 11.2*   HCT 38.2* 35.6*   * 97*     CMP:    Recent Labs  Lab 08/02/18  0630 08/03/18  0619    135*   K 4.7 4.3    101   CO2 22* 25   * 107   BUN 29* 39*   CREATININE 4.7* 6.1*   CALCIUM 8.2* 8.2*   PROT 7.3 7.5   ALBUMIN 3.0* 3.1*   BILITOT 0.6 0.5   ALKPHOS 96 93   AST 17 10   ALT 18 18   ANIONGAP 10 9   EGFRNONAA 10.2* 7.5*     PTINR:  No results for  input(s): INR in the last 48 hours.    Significant Procedures:   Dobutamine Stress Test with Color Flow: No results found for this or any previous visit.    None    Assessment/Plan:      * Volume overload    Pt with Edema on exam. CXR appears to have b/l infiltrates - likely pulm edema   SOB may be related to volume overload.   - Dialysis x 3 days. Edema much improved on exam. Net neg 7L. Xray improved as well. Appreciate Nephrology assistance   - Will cont to monitor         Agitation    Unclear etiology   - May be related to volume overload and SOB. DDX includes infection vs. seizure d/o vs. progression of dementia   - pt still makes urine/ urinates daily per caregiver  - unable to check UA as pt is incontinent and becomes agitated upon trying to straight cath given dementia   - Procal slightly elevated; cxr and sx more consistent with volume overload than PNA - improvement after dialysis - still right sided effusion   - discontinue non essential or exacerbating meds   - continue to monitor   - given new onset hypothermia - will start Augmentin to cover lung vs. urine source           Hypothermia    Pt with episode of hypothermia overnight. Improved with bear hugger   Given elevated procal and hx of agitation will treat for infection with Augmentin to cover lung vs. Urinary source   Pt has a hx of hypothemia prior admit - resolved w/in 1 day after bear hugger           HTN (hypertension)    Stable   Cont home amlodipine         ESRD (end stage renal disease) on dialysis    Pt with dialysis MWF   Cont Ca acetate 667 w meals         Benign prostatic hyperplasia with urinary retention    Pt still makes urine per report   Cont finasteride and doxazosin         Seizure disorder    Stable   Cont home keppra 500 BID           VTE Risk Mitigation         Ordered     IP VTE HIGH RISK PATIENT  Once      08/03/18 0018     Place MORGAN hose  Until discontinued      08/03/18 0018              Lynda Nunez MD  Department of  Hospital Medicine [facility]

## 2018-08-03 NOTE — ASSESSMENT & PLAN NOTE
Pt with episode of hypothermia overnight. Improved with bear gamaliel   Given elevated procal and hx of agitation will treat for infection with Augmentin to cover lung vs. Urinary source   Pt has a hx of hypothemia prior admit - resolved w/in 1 day after bear hugger

## 2018-08-03 NOTE — PROGRESS NOTES
"[facility] Hospital Medicine  Progress Note    Patient Name: Ronald Campbell  MRN: [id number]  Patient Class: IP- Inpatient   Admission Date: 8/1/2018  Length of Stay: 1 days  Attending Physician: Lynda Nunez MD  Primary Care Provider: Bart Shaw MD    Hospital Medicine Team: [facility] MED A Lynda Nunez MD    Subjective:     Principal Problem:Volume overload    HPI:  89 yo man with ESRD, dementia, systolic and diastolic CHF (EF 40% 2015), who presents with intermittent AMS/agitation and SOB x over the last week. HIstory obtained by pts caregiver. Pt's caregiver reports pt was at baseline until about 1 week ago when he began having periods of agitation and confusion. Agitation is decribed as pt repeatedly getting up out of bed, " flailing" arms, worsening confusion. Per family, pt has stated "he doesn't feel right."  Per family, pt also has appeared more SOB with audible congestion and increased work of breathing at rest. Pts family reports a dry cough over the few days as well. Per family, no fevers, chills, no complaints of chest pain, n/v/d, no urinary complaints ( pt urinates moderate amount each morning per family). Pt himself denies all complaints.     Hospital Course:  No notes on file    Interval History: Pt had dialysis yesterday ~ 3L removed. Plan for dialysis today. No acute events overnight. No agitation. Per daughter- mental status and breathing appears at baseline. Pt denies any SOB currently.     Review of Systems   Unable to perform ROS: Dementia     Objective:     Vital Signs (Most Recent):  Temp: (!) 93.6 °F (34.2 °C) (08/02/18 2104)  Pulse: (!) 54 (08/02/18 2104)  Resp: 20 (08/02/18 2104)  BP: 138/66 (08/02/18 2104)  SpO2: 100 % (08/02/18 2104) Vital Signs (24h Range):  Temp:  [93.6 °F (34.2 °C)-98.5 °F (36.9 °C)] 93.6 °F (34.2 °C)  Pulse:  [50-66] 54  Resp:  [16-20] 20  SpO2:  [96 %-100 %] 100 %  BP: (115-174)/(54-90) 138/66     Weight: 80.7 kg (178 lb)  Body mass index " is 23.48 kg/m².    Intake/Output Summary (Last 24 hours) at 08/02/18 2143  Last data filed at 08/02/18 1900   Gross per 24 hour   Intake              840 ml   Output             2600 ml   Net            -1760 ml      Physical Exam   Constitutional: He appears well-developed and well-nourished.   Eyes: EOM are normal. Pupils are equal, round, and reactive to light.   Cardiovascular: Normal rate, regular rhythm and normal heart sounds.  Exam reveals no gallop and no friction rub.    No murmur heard.  Pulmonary/Chest: Effort normal and breath sounds normal. No respiratory distress. He has no wheezes. He has no rales. He exhibits no tenderness.   Abdominal: Soft. Bowel sounds are normal. He exhibits no distension and no mass. There is no tenderness. There is no rebound and no guarding.   Musculoskeletal: Normal range of motion. He exhibits edema (2+ to the knees ).   Neurological: He is alert.   Moving all extremities    Skin: Skin is warm and dry.       MELD-Na score: 21 at 2/22/2018 11:09 AM  MELD score: 21 at 2/22/2018 11:09 AM  Calculated from:  Serum Creatinine: 6 mg/dL (Rounded to 4) at 2/22/2018 11:09 AM  Serum Sodium: 138 mmol/L (Rounded to 137) at 2/22/2018 11:09 AM  Total Bilirubin: 0.5 mg/dL (Rounded to 1) at 2/22/2018 11:09 AM  INR(ratio): 1.1 at 2/22/2018 11:09 AM  Age: 88 years    Significant Labs:  CBC:    Recent Labs  Lab 08/01/18  0734 08/02/18  0931   WBC 5.80 7.58   HGB 11.6* 12.2*   HCT 36.2* 38.2*   * 100*     CMP:    Recent Labs  Lab 08/01/18  0846 08/02/18  0630    136   K 5.5* 4.7    104   CO2 21* 22*   * 141*   BUN 48* 29*   CREATININE 6.4* 4.7*   CALCIUM 9.0 8.2*   PROT 7.9 7.3   ALBUMIN 3.3* 3.0*   BILITOT 0.5 0.6   ALKPHOS 97 96   AST 14 17   ALT 22 18   ANIONGAP 8 10   EGFRNONAA 7.0* 10.2*     PTINR:  No results for input(s): INR in the last 48 hours.    Significant Procedures:   Dobutamine Stress Test with Color Flow: No results found for this or any previous  visit.    None    Assessment/Plan:      * Volume overload    Pt with Edema on exam. CXR appears to have b/l infiltrates - likely pulm edema   SOB may be related to volume overload.   - Dialysis yesterday and today. Appreciate Nephrology assistance   - Will cont to monitor         Agitation    Unclear etiology   - May be related to volume overload and SOB. DDX includes infection vs. seizure d/o vs. progression of dementia   - pt still makes urine/ urinates daily per care giver  - will check UA   - Procal slightly elevated; cxr and sx more consistent with volume overload than PNA - slight improvement after dialysis  - discontinue non essential or exacerbating meds   - continue to monitor           HTN (hypertension)    Stable   Cont home amlodipine         ESRD (end stage renal disease) on dialysis    Pt with dialysis MWF   Cont Ca acetate 667 w meals         Benign prostatic hyperplasia with urinary retention    Pt still makes urine per report   Cont finasteride and doxazosin         Seizure disorder    Stable   Cont home keppra 500 BID           VTE Risk Mitigation         Ordered     heparin (porcine) injection 5,000 Units  Every 8 hours      08/01/18 1258     IP VTE HIGH RISK PATIENT  Once      08/01/18 1258              Lynda Nunez MD  Department of Hospital Medicine   [facility]

## 2018-08-03 NOTE — PLAN OF CARE
Problem: Patient Care Overview  Goal: Plan of Care Review  Outcome: Ongoing (interventions implemented as appropriate)  NP and MD spoke w/ daughter extensively at bedside and educated on effects of wt loss and volume overload can occur. Informed daughter of duration of Hd and amt of fluid to be removed, verbalized understanding

## 2018-08-03 NOTE — SUBJECTIVE & OBJECTIVE
Interval History: Pt had dialysis again today. Net neg 7L. Hypothermic overnight. Improved with bear hugger.  Hemodynamically stable. No agitation. Pt became combative with straight cath - unable to obtain UA.      Review of Systems   Unable to perform ROS: Dementia   Constitutional: Positive for activity change.     Objective:     Vital Signs (Most Recent):  Temp: 98.3 °F (36.8 °C) (08/03/18 1207)  Pulse: 73 (08/03/18 1207)  Resp: 20 (08/03/18 1207)  BP: (!) 155/78 (08/03/18 1207)  SpO2: 97 % (08/03/18 1501) Vital Signs (24h Range):  Temp:  [93.6 °F (34.2 °C)-98.4 °F (36.9 °C)] 98.3 °F (36.8 °C)  Pulse:  [54-74] 73  Resp:  [16-20] 20  SpO2:  [95 %-100 %] 97 %  BP: (132-169)/(65-86) 155/78     Weight: 80.7 kg (178 lb)  Body mass index is 23.48 kg/m².    Intake/Output Summary (Last 24 hours) at 08/03/18 1844  Last data filed at 08/03/18 1201   Gross per 24 hour   Intake              240 ml   Output             3650 ml   Net            -3410 ml      Physical Exam   Constitutional: He appears well-developed and well-nourished.   Eyes: EOM are normal. Pupils are equal, round, and reactive to light.   Cardiovascular: Normal rate, regular rhythm and normal heart sounds.  Exam reveals no gallop and no friction rub.    No murmur heard.  Pulmonary/Chest: Effort normal and breath sounds normal. No respiratory distress. He has no wheezes. He has no rales. He exhibits no tenderness.   Abdominal: Soft. Bowel sounds are normal. He exhibits no distension and no mass. There is no tenderness. There is no rebound and no guarding.   Musculoskeletal: Normal range of motion. He exhibits edema (trace - improved ).   Neurological: He is alert.   Moving all extremities    Skin: Skin is warm and dry.       MELD-Na score: 21 at 2/22/2018 11:09 AM  MELD score: 21 at 2/22/2018 11:09 AM  Calculated from:  Serum Creatinine: 6 mg/dL (Rounded to 4) at 2/22/2018 11:09 AM  Serum Sodium: 138 mmol/L (Rounded to 137) at 2/22/2018 11:09 AM  Total  Bilirubin: 0.5 mg/dL (Rounded to 1) at 2/22/2018 11:09 AM  INR(ratio): 1.1 at 2/22/2018 11:09 AM  Age: 88 years    Significant Labs:  CBC:    Recent Labs  Lab 08/02/18  0931 08/03/18 0619   WBC 7.58 6.34   HGB 12.2* 11.2*   HCT 38.2* 35.6*   * 97*     CMP:    Recent Labs  Lab 08/02/18  0630 08/03/18 0619    135*   K 4.7 4.3    101   CO2 22* 25   * 107   BUN 29* 39*   CREATININE 4.7* 6.1*   CALCIUM 8.2* 8.2*   PROT 7.3 7.5   ALBUMIN 3.0* 3.1*   BILITOT 0.6 0.5   ALKPHOS 96 93   AST 17 10   ALT 18 18   ANIONGAP 10 9   EGFRNONAA 10.2* 7.5*     PTINR:  No results for input(s): INR in the last 48 hours.    Significant Procedures:   Dobutamine Stress Test with Color Flow: No results found for this or any previous visit.    None

## 2018-08-03 NOTE — ASSESSMENT & PLAN NOTE
Unclear etiology   - May be related to volume overload and SOB. DDX includes infection vs. seizure d/o vs. progression of dementia   - pt still makes urine/ urinates daily per care giver  - will check UA   - Procal slightly elevated; cxr and sx more consistent with volume overload than PNA - slight improvement after dialysis  - discontinue non essential or exacerbating meds   - continue to monitor

## 2018-08-03 NOTE — ASSESSMENT & PLAN NOTE
Unclear etiology   - May be related to volume overload and SOB. DDX includes infection vs. seizure d/o vs. progression of dementia   - pt still makes urine/ urinates daily per caregiver  - unable to check UA as pt is incontinent and becomes agitated upon trying to straight cath given dementia   - Procal slightly elevated; cxr and sx more consistent with volume overload than PNA - improvement after dialysis - still right sided effusion   - discontinue non essential or exacerbating meds   - continue to monitor   - given new onset hypothermia - will start Augmentin to cover lung vs. urine source

## 2018-08-03 NOTE — PLAN OF CARE
DR Nunez will write hh orders so SATYA Lizzy can send to [facility] today.     Update: 14:22 per SATYA Lizzy, she is waiting on hh orders to send to [facility]. Dr CHAMBERLAIN will write hh orders shortly.    CM called to update Pili : possible dc over the weekend with hh

## 2018-08-03 NOTE — ASSESSMENT & PLAN NOTE
Pt with Edema on exam. CXR appears to have b/l infiltrates - likely pulm edema   SOB may be related to volume overload.   - Dialysis yesterday and today. Appreciate Nephrology assistance   - Will cont to monitor

## 2018-08-03 NOTE — PROGRESS NOTES
Maintenance HD completed @1200, blood returned, 15g needles pulled ans pressure held to ANGLE fistula until hemostasis achieved, tape and guaze applied. 3l of fluid removed over 3.5hr of HD Pt tolerated well, vitals wNL, O2sats omn RA 97% report called to nurse. Pt off unit via stretcher to floor, denies any pain or discomfort

## 2018-08-03 NOTE — ASSESSMENT & PLAN NOTE
Pt with Edema on exam. CXR appears to have b/l infiltrates - likely pulm edema   SOB may be related to volume overload.   - Dialysis x 3 days. Edema much improved on exam. Net neg 7L. Xray improved as well. Appreciate Nephrology assistance   - Will cont to monitor

## 2018-08-03 NOTE — HOSPITAL COURSE
Pt admitted to hospital medicine.  Dialyzed x 3 days consecutively for volume overload.   Noted to be hypothermic overnight

## 2018-08-03 NOTE — PLAN OF CARE
[facility]    HOME HEALTH ORDERS  FACE TO FACE ENCOUNTER    Patient Name: Ronald Campbell  YOB: 1929    PCP: Bart Shaw MD   PCP Address: [address] [facility] [place] [address]  PCP Phone Number: [phone]  PCP Fax: [phone]    Encounter Date: 08/03/2018    Admit to Home Health    Diagnoses:  Active Hospital Problems    Diagnosis  POA    *Volume overload [E87.70]  Unknown    HCAP (healthcare-associated pneumonia) [J18.9]  Yes    Agitation [R45.1]  Unknown    Acute pulmonary edema [J81.0]  Unknown    HTN (hypertension) [I10]  Yes    ESRD (end stage renal disease) on dialysis [N18.6, Z99.2]  Not Applicable    Benign prostatic hyperplasia with urinary retention [N40.1, R33.8]  Yes    Seizure disorder [G40.909]  Yes     Chronic      Resolved Hospital Problems    Diagnosis Date Resolved POA   No resolved problems to display.       Future Appointments  Date Time Provider Department Center   11/15/2018 10:15 AM [facility]   11/15/2018 11:10 AM Orin Sims MD [facility]           I have seen and examined this patient face to face today. My clinical findings that support the need for the home health skilled services and home bound status are the following:  Weakness/numbness causing balance and gait disturbance due to Weakness/Debility making it taxing to leave home.  Requiring assistive device to leave home due to unsteady gait caused by  Weakness/Debility.  Medical restrictions requiring assistance of another human to leave home due to  visual impairtment .  Mental confusion making it unsafe for patient to leave home alone due to  Dementia.    Allergies:  Review of patient's allergies indicates:   Allergen Reactions    Lisinopril Swelling       Diet: renal diet    Activities: activity as tolerated    Nursing:   SN to complete comprehensive assessment including routine vital signs. Instruct on disease process and s/s of  complications to report to MD. Review/verify medication list sent home with the patient at time of discharge  and instruct patient/caregiver as needed. Frequency may be adjusted depending on start of care date.    Notify MD if SBP > 160 or < 90; DBP > 90 or < 50; HR > 120 or < 50; Temp > 101; Other:         CONSULTS:    Physical Therapy to evaluate and treat. Evaluate for home safety and equipment needs; Establish/upgrade home exercise program. Perform / instruct on therapeutic exercises, gait training, transfer training, and Range of Motion.  Occupational Therapy to evaluate and treat. Evaluate home environment for safety and equipment needs. Perform/Instruct on transfers, ADL training, ROM, and therapeutic exercises.   to evaluate for community resources/long-range planning.  Aide to provide assistance with personal care, ADLs, and vital signs.    MISCELLANEOUS CARE:  N/A    WOUND CARE ORDERS  n/a      Medications: Review discharge medications with patient and family and provide education.      Current Discharge Medication List      START taking these medications    Details   amoxicillin-clavulanate 500-125mg (AUGMENTIN) 500-125 mg Tab Take 1 tablet (500 mg total) by mouth once daily. for 4 days  Qty: 4 tablet, Refills: 0      fluticasone (FLONASE) 50 mcg/actuation nasal spray 2 sprays (100 mcg total) by Each Nare route once daily.  Qty: 15 mL, Refills: 3         CONTINUE these medications which have NOT CHANGED    Details   amLODIPine (NORVASC) 5 MG tablet Take 10 mg by mouth once daily.       aspirin 81 MG Chew Take 1 tablet (81 mg total) by mouth once daily.  Qty: 30 tablet, Refills: 3      atorvastatin (LIPITOR) 40 MG tablet Take 1 tablet (40 mg total) by mouth once daily.  Qty: 30 tablet, Refills: 9      B complex-vitamin C-folic acid (NEPHRO-JESSICA) 0.8 mg Tab Take 1 tablet by mouth every morning.       blood sugar diagnostic (BLOOD GLUCOSE TEST) Strp 1 strip by Misc.(Non-Drug; Combo Route) route  2 (two) times daily.      brimonidine-timolol (COMBIGAN) 0.2-0.5 % Drop Place 1 drop into the left eye 2 (two) times daily.      brinzolamide (AZOPT) 1 % ophthalmic suspension Place 1 drop into the left eye 3 (three) times daily.       calcium acetate (PHOSLO) 667 mg capsule Take 1 capsule (667 mg total) by mouth 3 (three) times daily with meals.  Qty: 90 capsule, Refills: 1      carvedilol (COREG) 25 MG tablet Take 1 tablet (25 mg total) by mouth 2 (two) times daily. Do not take on dialysis days. Hold for SBP < 110 or HR < 55  Qty: 60 tablet, Refills: 1      doxazosin (CARDURA) 4 MG tablet Take 1 tablet (4 mg total) by mouth once daily.  Qty: 30 tablet, Refills: 1      finasteride (PROSCAR) 5 mg tablet Take 1 tablet (5 mg total) by mouth once daily.  Qty: 30 tablet, Refills: 1      insulin aspart (NOVOLOG FLEXPEN) 100 unit/mL InPn  Insulin Pen Subcutaneous As directed.  -200 take 1 unitBS 201-250 take 2 unitsBS 251-300 take 3 unitsBS 301-350 take 4 unitsBS > 351 take 5 units and call MD      ketoconazole (NIZORAL) 2 % cream Apply topically once daily. feet  Qty: 30 g, Refills: 3    Comments: Any alternate antifungal cream OK  Associated Diagnoses: Tinea pedis of both feet      levetiracetam (KEPPRA) 500 MG Tab Take 1 tablet (500 mg total) by mouth 2 (two) times daily.  Qty: 60 tablet, Refills: 11      TRAVOPROST (TRAVATAN Z OPHT) Place 1 drop into the left eye every evening. 1 Drops Left eye Every evening      polyethylene glycol (GLYCOLAX) 17 gram PwPk Take 17 g by mouth once daily.  Qty: 30 packet, Refills: 0         STOP taking these medications       HEPARIN SODIUM,PORCINE (HEPARIN, PORCINE,) 1,000 unit/mL injection Comments:   Reason for Stopping:               I certify that this patient is confined to his home and needs intermittent skilled nursing care, physical therapy and occupational therapy.

## 2018-08-03 NOTE — SUBJECTIVE & OBJECTIVE
Interval History: Pt had dialysis yesterday ~ 3L removed. Plan for dialysis today. No acute events overnight. No agitation. Per daughter- mental status and breathing appears at baseline. Pt denies any SOB currently.     Review of Systems   Unable to perform ROS: Dementia     Objective:     Vital Signs (Most Recent):  Temp: (!) 93.6 °F (34.2 °C) (08/02/18 2104)  Pulse: (!) 54 (08/02/18 2104)  Resp: 20 (08/02/18 2104)  BP: 138/66 (08/02/18 2104)  SpO2: 100 % (08/02/18 2104) Vital Signs (24h Range):  Temp:  [93.6 °F (34.2 °C)-98.5 °F (36.9 °C)] 93.6 °F (34.2 °C)  Pulse:  [50-66] 54  Resp:  [16-20] 20  SpO2:  [96 %-100 %] 100 %  BP: (115-174)/(54-90) 138/66     Weight: 80.7 kg (178 lb)  Body mass index is 23.48 kg/m².    Intake/Output Summary (Last 24 hours) at 08/02/18 2143  Last data filed at 08/02/18 1900   Gross per 24 hour   Intake              840 ml   Output             2600 ml   Net            -1760 ml      Physical Exam   Constitutional: He appears well-developed and well-nourished.   Eyes: EOM are normal. Pupils are equal, round, and reactive to light.   Cardiovascular: Normal rate, regular rhythm and normal heart sounds.  Exam reveals no gallop and no friction rub.    No murmur heard.  Pulmonary/Chest: Effort normal and breath sounds normal. No respiratory distress. He has no wheezes. He has no rales. He exhibits no tenderness.   Abdominal: Soft. Bowel sounds are normal. He exhibits no distension and no mass. There is no tenderness. There is no rebound and no guarding.   Musculoskeletal: Normal range of motion. He exhibits edema (2+ to the knees ).   Neurological: He is alert.   Moving all extremities    Skin: Skin is warm and dry.       MELD-Na score: 21 at 2/22/2018 11:09 AM  MELD score: 21 at 2/22/2018 11:09 AM  Calculated from:  Serum Creatinine: 6 mg/dL (Rounded to 4) at 2/22/2018 11:09 AM  Serum Sodium: 138 mmol/L (Rounded to 137) at 2/22/2018 11:09 AM  Total Bilirubin: 0.5 mg/dL (Rounded to 1) at  2/22/2018 11:09 AM  INR(ratio): 1.1 at 2/22/2018 11:09 AM  Age: 88 years    Significant Labs:  CBC:    Recent Labs  Lab 08/01/18  0734 08/02/18  0931   WBC 5.80 7.58   HGB 11.6* 12.2*   HCT 36.2* 38.2*   * 100*     CMP:    Recent Labs  Lab 08/01/18  0846 08/02/18  0630    136   K 5.5* 4.7    104   CO2 21* 22*   * 141*   BUN 48* 29*   CREATININE 6.4* 4.7*   CALCIUM 9.0 8.2*   PROT 7.9 7.3   ALBUMIN 3.3* 3.0*   BILITOT 0.5 0.6   ALKPHOS 97 96   AST 14 17   ALT 22 18   ANIONGAP 8 10   EGFRNONAA 7.0* 10.2*     PTINR:  No results for input(s): INR in the last 48 hours.    Significant Procedures:   Dobutamine Stress Test with Color Flow: No results found for this or any previous visit.    None

## 2018-08-03 NOTE — PROGRESS NOTES
I personally saw and examined the patient on hemodialysis, tolerating treatment, see hemodyalisis flowsheet. I also reviewed the chart and current medication. The dialysis bath was adjusted.   Patient's volume status is improving. Lost a lot of weight and the primary unit will be informed about his new dry-weight. Will continue to remove volume on a daily base.   Target UF today 3 liter if he tolerates it.

## 2018-08-04 NOTE — NURSING
Pt and family member declined waiting for [facility] transport. MD and  advised. Internal wheelchair transport for discharge requested. Will continue to monitor.

## 2018-08-04 NOTE — NURSING
"Pt remained confused and continued to refuse meds and labs. I was able to get a last set of VS. Pt stable, resting comfortably so long as he is "left alone" WCTM   "

## 2018-08-04 NOTE — NURSING
Attempted to take VS for pt whom has refused  X 3 via verbal abuse and combative threats. Pt also refusing take any meds or allow accuchecks, though I have never had to cover him w/ insulin during my days as his nurse.. Will allow pt time to rest and try VS again later this evening per Rose Mary Trejo w/ [facility]. Pt appears to be resting comfortably w/o S/S of discomfort or distress. WCTM

## 2018-08-04 NOTE — PLAN OF CARE
CM contacted by Dr. Nunez that pt needs assistance w/ transport home - CM setup w/c transport w/ [facility] [id number]; approx p/u time is 4pm.    Update: @ 3:10pm CM is notified by pt's nurse that family will transport pt and w/c van is not needed. CM called [facility] to cancel w/c van ride.

## 2018-08-04 NOTE — NURSING
3rd attempt was able to get VS and BS w/o the restraints ordered. Pt drank some nova source but still refused oral meds. VSS, BS is 84 prior to the supplemental drink. WCTM and try BS in the am.

## 2018-08-04 NOTE — NURSING
2nd attempt made to get VS on pt. Pt still refusing to allow anyone to touch him and continues to verbally abuse any staff who tries to help. Pt appears to be resting comfortably w/ no overt s/s of distress, skin appears dry and when I was able to make contact with pt he felt normothermic. WCTM

## 2018-08-04 NOTE — PLAN OF CARE
Problem: Patient Care Overview  Goal: Plan of Care Review  Outcome: Ongoing (interventions implemented as appropriate)  Pt became verbally abusive and semi-combative throughout shift. I was finally able to get VS but pt refused any meds and labs. Communicated this to the team, restraints were ordered which I D/C'd as the pt allowed us to get VS at that point. Pt continued the behavior again. SR up x2, call bell in reach, bed in low and locked position, skid proof socks on. Care plan explained to patient, no additional complaints at this time.

## 2018-08-04 NOTE — ASSESSMENT & PLAN NOTE
Unclear etiology   - May be related to volume overload and SOB. DDX includes infection vs. seizure d/o vs. progression of dementia   - pt still makes urine/ urinates daily per caregiver  - unable to check UA as pt is incontinent and becomes agitated upon trying to straight cath given dementia   - Procal slightly elevated; cxr and sx more consistent with volume overload than PNA - improvement after dialysis - still right sided effusion   - discontinue non essential or exacerbating meds   - continue to monitor   - given new onset hypothermia - will start Augmentin to cover lung vs. urine source; will treat x 7 days

## 2018-08-06 NOTE — PLAN OF CARE
Per notes, pt was dc home with hh over the weekend. HH orders were faxed to [facility] per SATYA Ball CM called [facility]AN[facility] this am to confirm receipt of and to make sure [facility] is setting up his f/u appt. See SATYA and AMISHA notes from the weekend.     08/06/18 0808   Final Note   Assessment Type Final Discharge Note   Discharge Disposition Home-Health   Hospital Follow Up  Appt(s) scheduled? Yes   Discharge plans and expectations educations in teach back method with documentation complete? Yes   Right Care Referral Info   Post Acute Recommendation Home-care

## 2018-08-06 NOTE — DISCHARGE SUMMARY
"[facility] Hospital Medicine  Discharge Summary      Patient Name: Ronald Campbell  MRN: [id number]  Admission Date: 8/1/2018  Hospital Length of Stay: 3 days  Discharge Date and Time: 8/4/2018  3:31 PM  Attending Physician: No att. 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 HIstory obtained by pts caregiver. Pt's caregiver reports pt was at baseline until about 1 week ago when he began having periods of agitation and confusion. Agitation is decribed as pt repeatedly getting up out of bed, " flailing" arms, worsening confusion. Per family, pt has stated "he doesn't feel right."  Per family, pt also has appeared more SOB with audible congestion and increased work of breathing at rest. Pts family reports a dry cough over the few days as well. Per family, no fevers, chills, no complaints of chest pain, n/v/d, no urinary complaints ( pt urinates moderate amount each morning per family). Pt himself denies all complaints.     * No surgery found *      Hospital Course:            * Volume overload     Pt with Edema on exam. CXR appears to have b/l infiltrates - likely pulm edema   SOB may be related to volume overload.   - Dialysis x 3 days. Edema much improved on exam. Net neg 7L. Xray improved as well. Appreciate Nephrology assistance   - Nephrology to inform pts dialysis unit of new dry weight          Agitation     Unclear etiology   - May be related to volume overload and SOB. DDX includes infection vs. seizure d/o vs. progression of dementia   - pt still makes urine/ urinates daily per caregiver  - unable to check UA as pt is incontinent and becomes agitated upon trying to straight cath given dementia. Has hx of similar behavior in " setting of UTI.   - Procal slightly elevated; cxr and sx more consistent with volume overload than PNA - improvement after dialysis - still right sided effusion   - discontinue non essential or exacerbating meds   - continue to monitor   - given new onset hypothermia - will start Augmentin to cover lung vs. urine source    - seemingly resolved per daughter. Intermittent agitation may be related to known dementia          Hypothermia     Pt with episode of hypothermia overnight. Improved with bear hugger   Given elevated procal and hx of agitation will treat for infection with Augmentin to cover lung vs. Urinary source   Pt has a hx of hypothemia prior admit - resolved w/in 1 day after bear hugger    Temperature remained stable after 24 hrs off bear hugger. Pt discharged to complete short course of abx.                Consults:   Consults         Status Ordering Provider     Inpatient consult to Nephrology  Once     Provider:  (Not yet assigned)    Completed MILAGROS FUNG          Final Active Diagnoses:    Diagnosis Date Noted POA    PRINCIPAL PROBLEM:  Volume overload [E87.70] 08/01/2018 Yes    HCAP (healthcare-associated pneumonia) [J18.9] 08/01/2018 Yes    Agitation [R45.1] 08/01/2018 No    Acute pulmonary edema [J81.0]  No    Hypothermia [T68.XXXA] 12/06/2017 Yes    HTN (hypertension) [I10]  Yes    ESRD (end stage renal disease) on dialysis [N18.6, Z99.2]  Not Applicable    Benign prostatic hyperplasia with urinary retention [N40.1, R33.8] 05/06/2014 Yes    Seizure disorder [G40.909] 11/04/2012 Yes     Chronic      Problems Resolved During this Admission:    Diagnosis Date Noted Date Resolved POA      Discharged Condition: good    Disposition: Home or Self Care    Follow Up:    Patient Instructions:     Ambulatory Referral to [facility]   Referral Priority: Urgent Referral Type: Consultation   Referral Reason: Specialty Services Required    Number of Visits Requested: 1      Diet renal      Notify your health care provider if you experience any of the following:  temperature >100.4     Notify your health care provider if you experience any of the following:  increased confusion or weakness     Activity as tolerated       Medications:  Reconciled Home Medications:      Medication List      START taking these medications    amoxicillin-clavulanate 500-125mg 500-125 mg Tab  Commonly known as:  AUGMENTIN  Take 1 tablet (500 mg total) by mouth once daily. for 5 days     fluticasone 50 mcg/actuation nasal spray  Commonly known as:  FLONASE  2 sprays (100 mcg total) by Each Nare route once daily.     ramelteon 8 mg tablet  Commonly known as:  ROZEREM  Take 1 tablet (8 mg total) by mouth nightly as needed for Insomnia.        CHANGE how you take these medications    aspirin 81 MG Chew  Take 1 tablet (81 mg total) by mouth once daily.  What changed:  when to take this     atorvastatin 40 MG tablet  Commonly known as:  LIPITOR  Take 1 tablet (40 mg total) by mouth once daily.  What changed:  · how much to take  · when to take this     carvedilol 25 MG tablet  Commonly known as:  COREG  Take 1 tablet (25 mg total) by mouth 2 (two) times daily. Do not take on dialysis days. Hold for SBP < 110 or HR < 55  What changed:  additional instructions        CONTINUE taking these medications    amLODIPine 5 MG tablet  Commonly known as:  NORVASC  Take 10 mg by mouth once daily.     BLOOD GLUCOSE TEST Strp  Generic drug:  blood sugar diagnostic  1 strip by Misc.(Non-Drug; Combo Route) route 2 (two) times daily.     brinzolamide 1 % ophthalmic suspension  Commonly known as:  AZOPT  Place 1 drop into the left eye 3 (three) times daily.     calcium acetate 667 mg capsule  Commonly known as:  PHOSLO  Take 1 capsule (667 mg total) by mouth 3 (three) times daily with meals.     COMBIGAN 0.2-0.5 % Drop  Generic drug:  brimonidine-timolol  Place 1 drop into the left eye 2 (two) times daily.     doxazosin 4 MG tablet  Commonly  known as:  CARDURA  Take 1 tablet (4 mg total) by mouth once daily.     finasteride 5 mg tablet  Commonly known as:  PROSCAR  Take 1 tablet (5 mg total) by mouth once daily.     ketoconazole 2 % cream  Commonly known as:  NIZORAL  Apply topically once daily. feet     levETIRAcetam 500 MG Tab  Commonly known as:  KEPPRA  Take 1 tablet (500 mg total) by mouth 2 (two) times daily.     NEPHRO-JESSICA 0.8 mg Tab  Generic drug:  B complex-vitamin C-folic acid  Take 1 tablet by mouth every morning.     NovoLOG Flexpen U-100 Insulin 100 unit/mL Inpn pen  Generic drug:  insulin aspart U-100  Insulin Pen Subcutaneous As directed.  -200 take 1 unitBS 201-250 take 2 unitsBS 251-300 take 3 unitsBS 301-350 take 4 unitsBS > 351 take 5 units and call MD     polyethylene glycol 17 gram Pwpk  Commonly known as:  GLYCOLAX  Take 17 g by mouth once daily.     TRAVATAN Z OPHT  Place 1 drop into the left eye every evening. 1 Drops Left eye Every evening        STOP taking these medications    heparin (porcine) 1,000 unit/mL injection            Significant Diagnostic Studies: Radiology: X-Ray: CXR: X-Ray Chest 1 View (CXR): No results found for this visit on 08/01/18. and X-Ray Chest PA and Lateral (CXR):   Results for orders placed or performed during the hospital encounter of 08/01/18   X-Ray Chest PA And Lateral    Narrative    EXAMINATION:  XR CHEST PA AND LATERAL    CLINICAL HISTORY:  post dialysis - pleural effusions;    TECHNIQUE:  PA and lateral views of the chest were performed.    COMPARISON:  Non 08/03/2018 e    FINDINGS:  Cardiomegaly and pulmonary vascular congestion.  Bibasilar edema.  Blunted costophrenic angles probably pleural effusion.      Impression    See above      Electronically signed by: Murphy Kruse MD  Date:    08/04/2018  Time:    08:09       Pending Diagnostic Studies:     Procedure Component Value Units Date/Time    Urinalysis, Reflex to Urine Culture Urine, Clean Catch [[id number]] Collected:  08/01/18  1143    Order Status:  Sent Lab Status:  In process Updated:  08/01/18 1144    Specimen:  Urine         Indwelling Lines/Drains at time of discharge:   Lines/Drains/Airways     Drain                 Hemodialysis AV Fistula -- days         Hemodialysis AV Fistula Left upper arm -- days         Hemodialysis AV Fistula 08/08/14 0800 Left upper arm 1458 days          Pressure Ulcer                 Pressure Ulcer 12/16/17 1348 Right posterior buttocks suspected deep tissue injury 232 days                Time spent on the discharge of patient: >30 minutes  Patient was seen and examined on the date of discharge and determined to be suitable for discharge.         Lynda Nunez MD  Department of Hospital Medicine  [facility]

## 2018-08-07 NOTE — PATIENT INSTRUCTIONS
Hypothermia Treatment  Normal body temperature is 98.6°F (37°C). Hypothermia occurs when body temperature falls below 95°F (35°C). This is due to prolonged exposure to cold. It causes cold skin, shivering, and chills. Other symptoms include drowsiness, confusion, and slurred speech. Movements may be slow and uncoordinated. As hypothermia worsens, muscles stiffen. Heartbeat and breathing rate slow down. The person may lose consciousness. Untreated, hypothermia can be fatal.  The goal of treatment is to prevent further heat loss and rewarm your body. Any wet clothing is removed. Your head is covered and you are wrapped in blankets. You are given heated fluids through an IV line or other method. This helps raise your body temperature quickly. In addition, a mask or nasal tube may be used to supply warmed oxygen to your body. If youre awake during treatment, you may be given warm liquids to drink. Severe cases of hypothermia require a hospital stay for treatment.  Home care  Once you have returned home:  · Check your temperature for the next few days to make sure it does not fall below normal.  · Continue to drink warm liquids. But avoid drinks that contain caffeine or alcohol. These cause your blood vessels to dilate (widen), which can lower your body temperature. Also, alcohol can affect your ability to tell if you are getting too cold.  · Wear additional layers of loose-fitting clothing as needed. Use extra blankets or a sleeping bag to keep yourself warm. Make sure your home is heated appropriately.  · Apply a warm compress (heating pad or hot water bottle wrapped in a towel) to body areas for added heat. Use the compress for 10 to 15 minutes at a time.  · Stay indoors while you recover. If you have to go outdoors, keep your head and neck covered with a hat and scarf. Wear a coat or jacket that protects against the wind and rain. Also, protect your hands with gloves or mittens and your feet with socks and  boots.  · Avoid strenuous muscle exertion or exercise until you are fully recovered.  · Follow the tips given by the doctor to prevent hypothermia in the future.  Follow-up care  Follow up as advised by the healthcare provider or our staff.  When to seek medical advice  Call your healthcare provider if any of the following occurs:  · Body temperature below 95°F (35°C)  · Skin is cold, numb, or tingly  · Skin is blue, white, grey, or waxy  · Return of symptoms such as confusion, memory loss, slurred speech, and slow and uncoordinated body movements  · Heartbeat changes  · Chest pain or trouble breathing  · Loss of consciousness  Date Last Reviewed: 5/4/2015  © 5599-2602 [employer]. [address] [place] [address], [place] [address]. All rights reserved. This information is not intended as a substitute for professional medical care. Always follow your healthcare professional's instructions.

## 2018-08-09 NOTE — PHYSICIAN QUERY
PT Name: Ronald Campbell  MR #: [id number]     Physician Query Form - Diagnosis Clarification      CDS/: Cherrie Perry               Contact information:[email or web address]  This form is a permanent document in the medical record.     Query Date: August 9, 2018    By submitting this query, we are merely seeking further clarification of documentation.  Please utilize your independent clinical judgment when addressing the question(s) below.     The medical record contains the following:      Findings Supporting Clinical Information Location in Medical Record   Pneumonia     Procal slightly elevated; cxr and sx more consistent with volume overload than PNA       Zosyn IV  Vancomycin IV H&P            MAR 8/1     Please clarify if the Pneumonia diagnosis has been:    [  ] Ruled In  [  ] Ruled In, Now Resolved  [  ] Ruled Out  [  ] Clinically insignificant  [ x ] Clinically undetermined  [  ] Other/Clarification of findings (please specify)_______________________________    Please document in your progress notes daily for the duration of treatment, until resolved, and include in your discharge summary.

## 2018-08-09 NOTE — PHYSICIAN QUERY
"PT Name: Ronald Campbell  MR #: [id number]    Physician Query Form - Heart  Condition Clarification     CDS/: Cherrie Perry               Contact information:[email or web address]  This form is a permanent document in the medical record.     Query Date: August 9, 2018    By submitting this query, we are merely seeking further clarification of documentation. Please utilize your independent clinical judgment when addressing the question(s) below.    The medical record contains the following   Indicators     Supporting Clinical Findings Location in Medical Record   X BNP BNP= 2379 LAB 8/1   X EF EF= 40 Echo= 8/4    Radiology findings     X Echo Results 1 - Mildly to moderately depressed left ventricular systolic function (EF 40-45%) with anterior and anterolateral hypokinesis.     2 - Normal right ventricular systolic function .     3 - Left ventricular diastolic dysfunction.     4 - Severe left atrial enlargement.  Echo= 8/4    "Ascites" documented     X "SOB" or "ORELLANA" documented presents with intermittent AMS/agitation and SOB x over the last week H&P    "Hypoxia" documented     X Heart Failure documented systolic and diastolic CHF (EF 40% 2015),  H&P   X "Edema" documented Pt with Edema on exam. CXR appears to have b/l infiltrates - likely pulm edema  H&P   X Diuretics/Meds Coreg Po  Amlodipine Po MAR 8/2    Treatment:      Other:      Heart failure (HF) can be acute, chronic or both. It is generally further specificed as systolic, diastolic, or combined. Lastly, it is important to identify an underlying etiology if known or suspected.     Common clues to acute exacerbation:  Rapidly progressive symptoms (w/in 2 weeks of presentation), using IV diuretics to treat, using supplemental O2, pulmonary edema on Xray, MI w/in 4 weeks, and/or BNP >500    Systolic Heart Failure: is defined as chart documentation of a left ventricular ejection fraction (LVEF) less than 40%     Diastolic Heart Failure: is defined as a left " ventricular ejection fraction (LVEF) greater than 40%   +      Evidence of diastolic dysfunction on echocardiography OR    Right heart catheterization wedge pressure above 12 mm Hg OR    Left heart catheterization left ventricular end diastolic pressure 18 mm Hg or above.    References: *American Heart Association    The clinical guidelines noted below are only system guidelines, and do not replace the providers clinical judgment.     Provider, please specify the diagnosis associated with above clinical findings    [   ] Chronic Systolic Heart Failure - Pre-existing systolic HF diagnosis.  EF < 40%  without  acute HF symptoms documented                                [  x ] Chronic Diastolic Heart Failure - Pre-existing diastolic HF diagnosis.  EF > 40%  without  acute HF symptoms documented                [ x  ] Chronic Combined Systolic and Diastolic Heart Failure  [   ] Other Type of Heart Failure (please specify type): _________________________  [   ] Heart Failure Ruled Out  [   ] Other (please specify): ___________________________________  [   ] Clinically Undetermined                          Please document in your progress notes daily for the duration of treatment until resolved and include in your discharge summary.

## 2018-09-25 NOTE — PROGRESS NOTES
Please note this patients information has been forwarded to  [facility] for assistance and .    Contact Outpatient Complex Care Mgmt at ext [phone] with questions.    Thank you,    Orquidea Woo, [employer]  Outpatient [facility].  [phone]

## 2018-10-11 NOTE — TELEPHONE ENCOUNTER
Reason for Disposition   Shock suspected (e.g., cold/pale/clammy skin, too weak to stand, low BP, rapid pulse)    Protocols used: ST LOW BLOOD PRESSURE-A-OH    Spoke to patient's daughter. She states that Mr. Campbell's blood pressure was 92/52, then 91/44. She states patient is very weak. Advised to call 911.

## 2018-11-13 NOTE — PROGRESS NOTES
CHIEF COMPLAINT:    Mr. Campbell is a 89 y.o. male presenting for gross hematuria.  PRESENTING ILLNESS:    Ronald Campbell is a 89 y.o. male who presents for gross hematuria.  He presents with his daughter who provides the history.  He has dementia.  He lives with his daughter.    It has been going on for longer than 2 weeks.  It is worse after dialysis and occurs every day.  He also is passing clots.  He denies any pain with urination.    He goes to dialysis three times a week (M-W-F) due to ESRD.  He urinates every morning and sometime in the evenings.  He wears pull ups at night.  Some mornings he wakes up with urine in his pull ups.  He has not experienced an increase in urination.  No urgency.  He denies fevers and chills.   He takes miralax for constipation.      Previous/Current Smoker: no  Radiation therapy to pelvis: no  Chemotherapy: no  Personal/ family history of bladder/ kidney/ prostate cancer: no  Exposure to harmful chemicals: no sure  History of kidney stones: no    2/22/18 renal u/s showed a questionable mass in the upper pole of the right kidney, bilateral simple cyst and enlarged prostate.    s/p left orchiectomy for severe hydrocele, suspected infected, possibly rupture testicle on 11/22/17   FINAL PATHOLOGIC DIAGNOSIS  1. LEFT TESTIS AND HYDROCELE SAC, RESECTION:  -Atrophic testis with aspermatogenesis/sertoli only tubules.  -13 cm old hemorrhage/cyst adherent to testis .  -Negative for malignancy.  2. RIGHT HYDROCELE, REPAIR:  -Hydrocele sac.    REVIEW OF SYSTEMS:    Constitutional: Negative for fever and chills.   HENT: Negative for hearing loss.   Eyes: Positive for visual disturbance. (blind)  Respiratory: Negative for shortness of breath.   Cardiovascular: Negative for chest pain.   Gastrointestinal: Negative for vomiting.   Genitourinary: See HPI  Neurological: Negative for dizziness.   Hematological: Does not bruise/bleed easily.   Psychiatric/Behavioral: Positive for confusion.        PATIENT HISTORY:    Past Medical History:   Diagnosis Date    Anemia     Blind     bilaterally    Blindness of right eye     CHF (congestive heart failure)     Chronic kidney disease     Dementia 01/2017    Diabetes mellitus type II     General anesthetics causing adverse effect in therapeutic use     april / may 2014     Glaucoma (increased eye pressure)     Hypertension     Hypothermia 12/6/2017    Seizures        Past Surgical History:   Procedure Laterality Date    ANGIOPLASTY WITH STENT PLACEMENT Left 11/18/2016    Performed [facility] OR [address] [place] [address]    AV FISTULA PLACEMENT Left 4/2014    AV fistula to Left upper arm    [id number]-MG left RC vs BC AVF Left 8/8/2014    Performed by [facility] at [facility] OR [address] [place] [address]    EYE SURGERY      FISTULOGRAM Left 11/18/2016    [facility]    FISTULOGRAM Left 4/15/2016    [facility]    FISTULOGRAM Left 1/29/2016    Performed by [facility] CATH LAB    FISTULOGRAM Room 11 case Left 3/27/2015    Performed by [facility] at [facility] OR [address] [place] [address]    HYDROCELECTOMY Bilateral 11/22/2017    [facility]    INSERTION-CATHETER-DIALYSIS-PERITONEAL-LAPAROSCOPIC N/A 5/8/2014    Performed by Js Ladd [facility] at [facility] OR [facility]    INSERTION-CATHETER-DIALYSIS-PERITONEAL-LAPAROSCOPIC N/A 5/5/2014    [facility]    INSERTION-CATHETER-HEMODIALYSIS N/A 5/21/2014    [facility]    PERMCATH INSERTION-TUNNELED CVC N/A 5/13/2014    Performed by Lolis Jackson MD at [facility] CATH LAB    REMOVAL-CATHETER-PERITONEAL DIALYSIS N/A 7/30/2014    Performed by Js Ladd [facility] at [facility] OR [address] [place] [address]    SCROTUM EXPLORATION Bilateral 11/2017       Family History   Problem Relation Age of Onset    Heart disease  Sister         mi    Heart disease Brother         mi    Cancer Daughter         BREAST X 2       Social History     Socioeconomic History    Marital status:      Spouse name: Not on file    Number of children: Not on file    Years of education: Not on file    Highest education level: Not on file   Social Needs    Financial resource strain: Not on file    Food insecurity - worry: Not on file    Food insecurity - inability: Not on file    Transportation needs - medical: Not on file    Transportation needs - non-medical: Not on file   Occupational History    Not on file   Tobacco Use    Smoking status: Never Smoker    Smokeless tobacco: Never Used   Substance and Sexual Activity    Alcohol use: No    Drug use: No    Sexual activity: No   Other Topics Concern    Not on file   Social History Narrative    Not on file       Allergies:  Lisinopril    Medications:    Current Outpatient Medications:     aspirin 81 MG Chew, Take 1 tablet (81 mg total) by mouth once daily. (Patient taking differently: Take 81 mg by mouth every morning. ), Disp: 30 tablet, Rfl: 3    atorvastatin (LIPITOR) 40 MG tablet, Take 1 tablet (40 mg total) by mouth once daily. (Patient taking differently: Take 80 mg by mouth every evening. ), Disp: 30 tablet, Rfl: 9    B complex-vitamin C-folic acid (NEPHRO-JESSICA) 0.8 mg Tab, Take 1 tablet by mouth every morning. , Disp: , Rfl:     blood sugar diagnostic (BLOOD GLUCOSE TEST) Strp, 1 strip by Misc.(Non-Drug; Combo Route) route 2 (two) times daily., Disp: , Rfl:     brimonidine-timolol (COMBIGAN) 0.2-0.5 % Drop, Place 1 drop into the left eye 2 (two) times daily., Disp: , Rfl:     brinzolamide (AZOPT) 1 % ophthalmic suspension, Place 1 drop into the left eye 3 (three) times daily. , Disp: , Rfl:     calcium acetate (PHOSLO) 667 mg capsule, Take 1 capsule (667 mg total) by mouth 3 (three) times daily with meals., Disp: 90 capsule, Rfl: 1    carvedilol (COREG) 25 MG tablet,  Take 1 tablet (25 mg total) by mouth 2 (two) times daily. Do not take on dialysis days. Hold for SBP < 110 or HR < 55 (Patient taking differently: Take 25 mg by mouth 2 (two) times daily. Hold for SBP < 110 or HR < 55), Disp: 60 tablet, Rfl: 1    doxazosin (CARDURA) 4 MG tablet, Take 1 tablet (4 mg total) by mouth once daily., Disp: 30 tablet, Rfl: 1    finasteride (PROSCAR) 5 mg tablet, Take 1 tablet (5 mg total) by mouth once daily., Disp: 30 tablet, Rfl: 1    insulin aspart (NOVOLOG FLEXPEN) 100 unit/mL InPn,  Insulin Pen Subcutaneous As directed.  -200 take 1 unitBS 201-250 take 2 unitsBS 251-300 take 3 unitsBS 301-350 take 4 unitsBS > 351 take 5 units and call MD, Disp: , Rfl:     ketoconazole (NIZORAL) 2 % cream, Apply topically once daily. feet, Disp: 30 g, Rfl: 3    levetiracetam (KEPPRA) 500 MG Tab, Take 1 tablet (500 mg total) by mouth 2 (two) times daily., Disp: 60 tablet, Rfl: 11    polyethylene glycol (GLYCOLAX) 17 gram PwPk, Take 17 g by mouth once daily., Disp: 30 packet, Rfl: 0    TRAVOPROST (TRAVATAN Z OPHT), Place 1 drop into the left eye every evening. 1 Drops Left eye Every evening, Disp: , Rfl:     amLODIPine (NORVASC) 5 MG tablet, Take 10 mg by mouth once daily. , Disp: , Rfl:     PHYSICAL EXAMINATION:    Constitutional: He appears well-developed and well-nourished.  He is in no apparent distress.    Eyes: No scleral icterus noted bilaterally. No discharge bilaterally.    Nose: No rhinorrhea    Cardiovascular: Normal rate.  No pitting edema noted in lower extremities bilaterally    Pulmonary/Chest: Effort normal. No respiratory distress.     Abdominal:  He exhibits no distension.  There is no CVA tenderness.     Lymphadenopathy:        Right: No supraclavicular adenopathy present.        Left: No supraclavicular adenopathy present.     Neurological: He is alert and oriented to person, place, and time.     Skin: Skin is warm and dry.     Psych: Cooperative with normal  affect.  Physical Exam      LABS:    Lab Results   Component Value Date    PSA 28.3 (H) 05/01/2014    PSA 9.4 (H) 10/18/2010    PSA 8.9 (H) 04/27/2004       IMPRESSION:    Encounter Diagnoses   Name Primary?    Gross hematuria Yes    Abnormal ultrasound of kidney          PLAN:    I explained to the patient that the causes of hematuria, whether it be gross hematuria or microhematuria, are many. In patients with gross hematuria, the chances of an underlying  malignancy are low at 15-20%.    Nevertheless, I explained to the patient that the evaluation in both cases consists of upper tract imaging followed by flexible cystoscopy. I described the rationale and procedure for both and answered all questions.    Hematuria work up discussed in detail.  Will proceed with hematuria work up:  Discussed upper tract imaging with Dr. Banerjee.  We will proceed with renal ultrasound at this time and follow up with CT urogram depending results.  Will refer to Dr. Montana or Dr. Moreno depending the results.  Cysto  Patient unable to void in clinic.  He will do a home collect for urine culture.         Dara Germain PA-C

## 2018-11-15 NOTE — PROGRESS NOTES
Ronald Campbell is a 89 y.o. male with ESRD via left BC AVF. Presents for routine follow up.   His dementia precludes any ROS or information about his HD sessions. He doesn't have any complaints today  He is with a transportation service from [place] who also does not have any clinical information.    Fistula feels pulsatile but with good flow.  Getting duplex after our visit today - issue with timing of transport    I spoke by phone with his daughter, Gabriela, who states he had an episode of prolonged bleeding a few weeks ago but it has since resolved.  No problems with flows or pressures reported to her from HD unit.    Check HD duplex today  RTC 4-5 months with duplex.    She will call the office if any problems are reported from HD center.    Orin Sims MD FACS [facility]  Vascular & Endovascular Surgery    >15 minute visit

## 2018-11-21 PROBLEM — R31.0 GROSS HEMATURIA: Status: ACTIVE | Noted: 2018-01-01

## 2018-11-21 NOTE — SUBJECTIVE & OBJECTIVE
Past Medical History:   Diagnosis Date    Anemia     Blind     bilaterally    CHF (congestive heart failure)     Chronic kidney disease     Dementia 01/2017    Diabetes mellitus type II     General anesthetics causing adverse effect in therapeutic use     april / may 2014     Glaucoma (increased eye pressure)     Hypertension     Hypothermia 12/6/2017    Seizures        Past Surgical History:   Procedure Laterality Date    ANGIOPLASTY WITH STENT PLACEMENT Left 11/18/2016    Performed by [facility]in [facility] at [facility] OR [address] [place] [address]    AV FISTULA PLACEMENT Left 4/2014    AV fistula to Left upper arm    [id number] left RC vs BC AVF Left 8/8/2014    Performed by [facility]in [facility]s MD at [facility] OR [address] [place] [address]    EYE SURGERY      FISTULOGRAM Left 11/18/2016    [facility]    FISTULOGRAM Left 4/15/2016    [facility]    FISTULOGRAM Left 1/29/2016    Performed by [facility] at [facility] CATH LAB    FISTULOGRAM Room 11 case Left 3/27/2015    Performed by [facility]in [facility]s MD at [facility] OR [address] [place] [address]    HYDROCELECTOMY Bilateral 11/22/2017    Performed by Temo Banerjee MD at [facility] OR [facility]    INSERTION-CATHETER-DIALYSIS-PERITONEAL-LAPAROSCOPIC N/A 5/8/2014    Performed by Js Ladd MD at [facility] OR [facility]    INSERTION-CATHETER-DIALYSIS-PERITONEAL-LAPAROSCOPIC N/A 5/5/2014    [facility]formed by Js [facility]add [facility] at [facility]    INSERTION-CATHETER-HEMODIALYSIS N/A 5/21/2014    [facility]    PERMCATH INSERTION-TUNNELED CVC N/A 5/13/2014    Performed by Lolis Jackson MD at [facility] CATH LAB    REMOVAL-CATHETER-PERITONEAL DIALYSIS N/A 7/30/2014    Performed by Js Ladd MD at [facility] OR [address] [place] [address]    SCROTUM EXPLORATION Bilateral 11/2017       Review of patient's allergies indicates:   Allergen Reactions    Lisinopril Swelling       Family History     Problem  Relation (Age of Onset)    Cancer Daughter    Heart disease Sister, Brother          Tobacco Use    Smoking status: Never Smoker    Smokeless tobacco: Never Used   Substance and Sexual Activity    Alcohol use: No    Drug use: No    Sexual activity: No       Review of Systems   Unable to perform ROS: Dementia       Objective:     Temp:  [97.8 °F (36.6 °C)-97.9 °F (36.6 °C)] 97.9 °F (36.6 °C)  Pulse:  [56-70] 67  Resp:  [12-20] 20  SpO2:  [98 %-100 %] 99 %  BP: (116-194)/() 194/105     Body mass index is 22.38 kg/m².            Drains     Drain                 Hemodialysis AV Fistula -- days         Hemodialysis AV Fistula Left upper arm -- days         Hemodialysis AV Fistula 08/08/14 0800 Left upper arm 1566 days                Physical Exam   Nursing note and vitals reviewed.  Constitutional: He is oriented to person, place, and time. Vital signs are normal. He appears well-nourished. No distress.   Neck: Trachea normal. Neck supple. No tracheal deviation present.   Cardiovascular: Normal rate and intact distal pulses.    Pulmonary/Chest: Effort normal. No accessory muscle usage. No respiratory distress.   Abdominal: Soft. He exhibits no distension and no mass. There is no tenderness. There is no CVA tenderness. No hernia.   Genitourinary: Rectum normal. Rectal exam shows no mass and anal tone normal.   Genitourinary Comments: Penis is uncircumcised, foreskin is retractable, there are no lesions, masses, plaques.   Scrotum showed no rashes or lesions. Well healed hydrocelectomy and orchiectomy incisions. Right testicle and epididymis present, left side surgically absent. No masses or tenderness. Urethral meatus is orthotopic, patent and without lesions or discharge. Clots present at the meatus.    The anus and perineum show no lesions or abnormalities.    The prostate is >80 g. Normal landmarks. Lateral sulci. Median furrow intact. No nodularity, induration, or tenderness.   Musculoskeletal: He exhibits  no edema or tenderness.   Lymphadenopathy:     He has no cervical adenopathy.   Neurological: He is alert and oriented to person, place, and time.   Skin: Skin is warm and dry. No lesion and no rash noted. No cyanosis.     Psychiatric: He has a normal mood and affect.     Significant Labs:    BMP:  Recent Labs   Lab 11/21/18  1054   *   K 4.6      CO2 24   BUN 38*   CREATININE 5.5*   CALCIUM 9.0       CBC:  Recent Labs   Lab 11/21/18  1054   WBC 6.45   HGB 10.2*   HCT 32.4*   *       All pertinent labs results from the past 24 hours have been reviewed.    Significant Imaging:  All pertinent imaging results/findings from the past 24 hours have been reviewed.

## 2018-11-21 NOTE — ASSESSMENT & PLAN NOTE
89 M with gross hematuria. He is currently HDS, hemoglobin stable, emptying his bladder without signs of urinary retention. No urgent indication for intervention, no indication for catheter at this time.   - will have him follow up as scheduled for outpatient cystoscopy  - recommend changing finasteride to dutasteride daily  - recommend covering with doxycyline empirically for 8 days until the date of his cystoscopy 11/29  - okay for discharge from a urology standpoint

## 2018-11-21 NOTE — DISCHARGE INSTRUCTIONS
Take Norco as needed for pain relief.  Start taking Dustasteride for enlarged prostate once per day. Begin course of Doxycycline for urinary tract infection.  Follow-up with your urologist for further evaluation of blood in urination.   Return to the ED for any concerning symptoms.    Future Appointments   Date Time Provider Department Center   11/29/2018 10:30 AM [facility]

## 2018-11-21 NOTE — ED PROVIDER NOTES
Encounter Date: 11/21/2018       History     Chief Complaint   Patient presents with    Hematuria     bright red blood in urine x two weeks      Patient is an 89-yo -American male with a PMHx of HTN, DMII, ESRD on dialysis (MWF), CHF, and dementia who presents to the ED for urgent evaluation of hematuria. Patient's seen by urologist on 11/13 for same complaint and a renal ultrasound was scheduled for 11/29; however, patient's daughter reports hematuria has increased in frequency and volume with large blood clots within the past 2 days. The hematuria remains painless. He does endorse dysuria when queried. He/daughter denies fever/chills, dizziness/lightheadedness, fatigue, URI symptoms, chest pain, SOB, abdominal pain, N/V/D, or leg swelling. No recent falls reported.       The history is provided by the patient and a relative. The history is limited by the absence of a caregiver.     Review of patient's allergies indicates:   Allergen Reactions    Lisinopril Swelling     Past Medical History:   Diagnosis Date    Anemia     Blind     bilaterally    CHF (congestive heart failure)     Chronic kidney disease     Dementia 01/2017    Diabetes mellitus type II     General anesthetics causing adverse effect in therapeutic use     april / may 2014     Glaucoma (increased eye pressure)     Hypertension     Hypothermia 12/6/2017    Seizures      Past Surgical History:   Procedure Laterality Date    ANGIOPLASTY WITH STENT PLACEMENT Left 11/18/2016    [facility]    AV FISTULA PLACEMENT Left 4/2014    AV fistula to Left upper arm    [id number]-OI left RC vs BC AVF Left 8/8/2014    [facility]    EYE SURGERY      FISTULOGRAM Left 11/18/2016    [facility]    FISTULOGRAM Left 4/15/2016    [facility]    FISTULOGRAM Left 1/29/2016     [facility] C[facility]    FISTULOGRAM Room 11 case Left 3/27/2015    Performed by [facility] at [facility] OR [facility]    HYDROCELECTOMY Bilateral 11/22/2017    Performed by Temo [facility]jee [facility] at [facility] OR [facility]LR    INSERTION-CATHETER-DIALYSIS-PERITONEAL-LAPAROSCOPIC N/A 5/8/2014    Performed by Js Ladd [facility] at [facility] OR 2ND [facility]LR    INSERTION-CATHETER-DIALYSIS-PERITONEAL-LAPAROSCOPIC N/A 5/5/2014    Performed by [facility]s [facility]add [facility] at [facility] OR 2ND [facility]LR    INSERTION-CATHETER-HEMODIALYSIS N/A 5/21/2014    Performed by [facility] at [facility] OR 2ND [facility]LR    PERMCATH INSERTION-TUNNELED CVC N/A 5/13/2014    Performed by Lolis Jackson MD at [facility] CATH LAB    REMOVAL-CATHETER-PERITONEAL DIALYSIS N/A 7/30/2014    Performed by Js Ladd [facility] at [facility] OR 2ND [facility]LR    SCROTUM EXPLORATION Bilateral 11/2017     Family History   Problem Relation Age of Onset    Heart disease Sister         mi    Heart disease Brother         mi    Cancer Daughter         BREAST X 2     Social History     Tobacco Use    Smoking status: Never Smoker    Smokeless tobacco: Never Used   Substance Use Topics    Alcohol use: No    Drug use: No     Review of Systems   Constitutional: Negative for activity change, appetite change, chills and fever.   HENT: Negative for sore throat.    Eyes: Negative for pain.             Respiratory: Negative for cough and shortness of breath.    Cardiovascular: Negative for chest pain and leg swelling.   Gastrointestinal: Negative for abdominal pain, constipation, diarrhea, nausea and vomiting.   Genitourinary: Positive for decreased urine volume, dysuria and hematuria. Negative for difficulty urinating and flank pain.   Musculoskeletal: Negative for back pain.   Skin: Negative for color change.   Neurological: Negative for dizziness, weakness, light-headedness and headaches.   Psychiatric/Behavioral: Negative for dysphoric mood.       Physical Exam      Initial Vitals [11/21/18 0953]   BP Pulse Resp Temp SpO2   116/66 70 16 97.8 °F (36.6 °C) 98 %      MAP       --         Physical Exam    Nursing note and vitals reviewed.  Constitutional: He appears well-developed and well-nourished. He is not diaphoretic. No distress.   HENT:   Head: Normocephalic and atraumatic.   Mouth/Throat: Oropharynx is clear and moist. No oropharyngeal exudate.   Eyes: Conjunctivae are normal.   Blindness   Neck: Normal range of motion. Neck supple.   Cardiovascular: Normal rate, regular rhythm, normal heart sounds and intact distal pulses.   Pulmonary/Chest: Breath sounds normal. No respiratory distress.   Abdominal: Soft. Bowel sounds are normal. There is no tenderness.   Musculoskeletal: Normal range of motion. He exhibits no edema or tenderness.   Neurological: He is alert and oriented to person, place, and time. He has normal strength.   Skin: Skin is warm and dry. Capillary refill takes 2 to 3 seconds.   Psychiatric: He has a normal mood and affect. Thought content normal.         ED Course   Procedures  Labs Reviewed   CBC W/ AUTO DIFFERENTIAL - Abnormal; Notable for the following components:       Result Value    RBC 4.11 (*)     Hemoglobin 10.2 (*)     Hematocrit 32.4 (*)     MCV 79 (*)     MCH 24.8 (*)     MCHC 31.5 (*)     RDW 22.7 (*)     Platelets 127 (*)     All other components within normal limits   COMPREHENSIVE METABOLIC PANEL   URINALYSIS, REFLEX TO URINE CULTURE          Imaging Results    None          Medical Decision Making:   History:   Old Medical Records: I decided to obtain old medical records.  Initial Assessment:   89AAM with PMHx of DMII, HTN, ESRD on dialysis MWF (last session yesterday (Tuesday) due to Thanksgiving temporary schedule change), CHF, and dementia who presents to the ED for urgent evaluation of hematuria. Patient was evaluated by urology on 11/13 for this problem and scheduled to undergo hematuria workup on 11/29. Patient's daughter reports  increase in hematuria with passing of large blood clots. PE reveals a non-toxic, afebrile, well-appearing elderly male in no acute distress. VSS. Heart RRR. Lungs CTAB. Abdomen soft and nontender. No CVA tenderness. No leg edema. Neuro exam nonfocal. Intact distal pulses.   Differential Diagnosis:   DDx includes but is not limited to: UTI, malignancy, vascular malformation, kidney stone.   Clinical Tests:   Lab Tests: Ordered and Reviewed  Radiological Study: Ordered and Reviewed  ED Management:  Will obtain basic labs and CT renal stone study.     Pertinent lab findings decreased H&H (10.2&32.4---2 g lower than previous 3 months ago), no leukocytosis, stable electrolytes, elevated BUN and CR to 38 and 5.5, respectively which is consistent with his baseline ESRD. CT study demonstrates possible mass in the right kidney, high-density material within the bladder (likely blood), stable prostate enlargement, and new finding of enlarged para-aortic lymph nodes along left ileopsoas and left external iliac LN.     Consulted urology, who are aware of mass in right kidney. Suspect blood originating from severely enlarged prostate. They recommend empiric treatment of UTI with doxycycline and a change in home finasteride to dutasteride. They do not feel an emergent intervention is necessary at this time and recommend patient receive cystoscopy outpatient as scheduled on 11/29.     3:06PM  Patient's BP elevated to 194/95. Patient denies HA, SOB, lightheadedness/dizziness. Will give home dose Amlodipine. Patient's daughter provided explanation at length the danger of rapidly decreasing BP. She verbalized understanding.     4:00PM  Patient complaining of burning pain in his penis. Will give Norco for pain.     Patient's BP now 183/89. Given patient's stable H&H decrease and imaging findings, I feel that this patient is stable for discharge home with strict ED return precautions. I suspect new penile pain is secondary to straight  catheterization for urine collection. Will give 8 tablets of Norco 5-325 mg q4h PRN for pain and prescribe Doxycycline and Dutasteride per urology recommendations. Patient's daughter verbalized understanding. I have discussed patient case with my supervisory physician, who is in agreement with my assessment and plan.    Dr. Win Warren MD, assumed supervisory care of the patient from Dr. Ochoa Jimenez and has approved and signed all discharge medications for this patient.               Attending Attestation:     Physician Attestation Statement for NP/PA:   I have conducted a face to face encounter with this patient in addition to the NP/PA, due to NP/PA Request    Other NP/PA Attestation Additions:      Medical Decision Making: Hemodynamically stable. Hb down 2 grams from 2 months prior, but overall at pt's baseline. Urology follow up. Extensive return precautions.                    Clinical Impression:   The primary encounter diagnosis was Hematuria, unspecified type. A diagnosis of Urinary tract infection with hematuria, site unspecified was also pertinent to this visit.      Disposition:   Disposition: Discharged  Condition: Stable                        Cecy Wells PA-C  11/21/18 1800

## 2018-11-21 NOTE — CONSULTS
[facility]  Urology  Consult Note    Patient Name: Ronald Campbell  MRN: [id number]  Admission Date: 11/21/2018  Hospital Length of Stay: 0   Code Status: Prior   Attending Provider: Ochoa Jimenez MD   Consulting Provider: Jeremy Clayton MD  Primary Care Physician: Bart Shaw MD  Principal Problem:<principal problem not specified>    Inpatient consult to Urology  Consult performed by: Jeremy Clayton MD  Consult ordered by: Ochoa Jimenez MD        Subjective:     HPI:  Ronald Campbell is a 89 y.o. male who presents to the ED for painless gross hematuria. He has dementia and lives with his daughter, who is providing a majority of the history. He has HTN, type II diabetes mellitus, ESRD on HD MWF, blindness. The hematuria has been going on for the past few weeks but has progressively increased. He also is passing clots.  He  reports occasional dysuria, denies frequency, urgency, or incomplete emptying. He urinates usually every morning and sometime in the evenings, unsure about how much urine he actually produces. He wears diapers and has been waking up with blood in diaper. No fevers and chills. He was seen in clinic a week ago and was scheduled to have a renal US and outpatient cystoscopy with Dr. Banerjee next week.       Past Medical History:   Diagnosis Date    Anemia     Blind     bilaterally    CHF (congestive heart failure)     Chronic kidney disease     Dementia 01/2017    Diabetes mellitus type II     General anesthetics causing adverse effect in therapeutic use     april / may 2014     Glaucoma (increased eye pressure)     Hypertension     Hypothermia 12/6/2017    Seizures        Past Surgical History:   Procedure Laterality Date    ANGIOPLASTY WITH STENT PLACEMENT Left 11/18/2016    [facility]    AV FISTULA PLACEMENT Left 4/2014    AV fistula to Left upper arm    [id number]-VT left RC vs BC AVF Left 8/8/2014    Performed by  [facility] at [facility] OR [address] [place] [address]    EYE SURGERY      FISTULOGRAM Left 11/18/2016    [facility]    FISTULOGRAM Left 4/15/2016    Performed by [facility] at [facility]    FISTULOGRAM Left 1/29/2016    Performed by [facility] at [facility] CATH LAB    FISTULOGRAM Room 11 case Left 3/27/2015    Performed by Orin Smis MD at [facility] OR [address] [place] [address]    HYDROCELECTOMY Bilateral 11/22/2017    Performed by [facility]o [facility] at [facility] OR [facility]    INSERTION-CATHETER-DIALYSIS-PERITONEAL-LAPAROSCOPIC N/A 5/8/2014    [facility]    INSERTION-CATHETER-DIALYSIS-PERITONEAL-LAPAROSCOPIC N/A 5/5/2014    [facility]    INSERTION-CATHETER-HEMODIALYSIS N/A 5/21/2014    [facility]    PERMCATH INSERTION-TUNNELED CVC N/A 5/13/2014    Performed by Lolis Jackson MD at [facility] CATH LAB    REMOVAL-CATHETER-PERITONEAL DIALYSIS N/A 7/30/2014    Performed by Js Ladd MD at [facility] OR [address] [place] [address]    SCROTUM EXPLORATION Bilateral 11/2017       Review of patient's allergies indicates:   Allergen Reactions    Lisinopril Swelling       Family History     Problem Relation (Age of Onset)    Cancer Daughter    Heart disease Sister, Brother          Tobacco Use    Smoking status: Never Smoker    Smokeless tobacco: Never Used   Substance and Sexual Activity    Alcohol use: No    Drug use: No    Sexual activity: No       Review of Systems   Unable to perform ROS: Dementia       Objective:     Temp:  [97.8 °F (36.6 °C)-97.9 °F (36.6 °C)] 97.9 °F (36.6 °C)  Pulse:  [56-70] 67  Resp:  [12-20] 20  SpO2:  [98 %-100 %] 99 %  BP: (116-194)/() 194/105     Body mass index is 22.38 kg/m².            Drains     Drain                 Hemodialysis AV Fistula -- days         Hemodialysis AV Fistula Left upper arm -- days         Hemodialysis AV  Fistula 08/08/14 0800 Left upper arm 1566 days                Physical Exam   Nursing note and vitals reviewed.  Constitutional: He is oriented to person, place, and time. Vital signs are normal. He appears well-nourished. No distress.   Neck: Trachea normal. Neck supple. No tracheal deviation present.   Cardiovascular: Normal rate and intact distal pulses.    Pulmonary/Chest: Effort normal. No accessory muscle usage. No respiratory distress.   Abdominal: Soft. He exhibits no distension and no mass. There is no tenderness. There is no CVA tenderness. No hernia.   Genitourinary: Rectum normal. Rectal exam shows no mass and anal tone normal.   Genitourinary Comments: Penis is uncircumcised, foreskin is retractable, there are no lesions, masses, plaques.   Scrotum showed no rashes or lesions. Well healed hydrocelectomy and orchiectomy incisions. Right testicle and epididymis present, left side surgically absent. No masses or tenderness. Urethral meatus is orthotopic, patent and without lesions or discharge. Clots present at the meatus.    The anus and perineum show no lesions or abnormalities.    The prostate is >80 g. Normal landmarks. Lateral sulci. Median furrow intact. No nodularity, induration, or tenderness.   Musculoskeletal: He exhibits no edema or tenderness.   Lymphadenopathy:     He has no cervical adenopathy.   Neurological: He is alert and oriented to person, place, and time.   Skin: Skin is warm and dry. No lesion and no rash noted. No cyanosis.     Psychiatric: He has a normal mood and affect.     Significant Labs:    BMP:  Recent Labs   Lab 11/21/18  1054   *   K 4.6      CO2 24   BUN 38*   CREATININE 5.5*   CALCIUM 9.0       CBC:  Recent Labs   Lab 11/21/18  1054   WBC 6.45   HGB 10.2*   HCT 32.4*   *       All pertinent labs results from the past 24 hours have been reviewed.    Significant Imaging:  All pertinent imaging results/findings from the past 24 hours have been  reviewed.        Assessment and Plan:     Gross hematuria    89 M with gross hematuria. He is currently HDS, hemoglobin stable, emptying his bladder without signs of urinary retention. No urgent indication for intervention, no indication for catheter at this time.   - will have him follow up as scheduled for outpatient cystoscopy  - recommend changing finasteride to dutasteride daily  - recommend covering with doxycyline empirically for 8 days until the date of his cystoscopy 11/29  - small right renal hypodensity, can follow up renal US outpatient, will likely observe  - okay for discharge from a urology standpoint         VTE Risk Mitigation (From admission, onward)    None          Thank you for your consult.    Jeremy Clayton MD  Urology  [facility]

## 2018-11-21 NOTE — ED TRIAGE NOTES
Patient was brought in through EMS for patient with known the last 2 weeks with gross hematuria with reports from daughter that he has had increased blood in urine last night and found blood in his disposable brief this am.

## 2018-11-21 NOTE — HPI
Ronald Campbell is a 89 y.o. male who presents to the ED for painless gross hematuria. He has dementia and lives with his daughter, who is providing a majority of the history. He has HTN, type II diabetes mellitus, ESRD on HD MWF, blindness. The hematuria has been going on for the past few weeks but has progressively increased. He also is passing clots.  He reports occasional dysuria, denies frequency, urgency, or incomplete emptying. He urinates usually every morning and sometime in the evenings, unsure about how much urine he actually produces. He wears diapers and has been waking up with blood in diaper. No fevers and chills. He was seen in clinic a week ago and was scheduled to have a renal US and outpatient cystoscopy with Dr. Banerjee next week.

## 2018-11-22 NOTE — ED NOTES
At bedside to address patients daughters concerns, patient is discharged per Dr. Warren, Dr. Warren aware that patient's daughter is frustrated with patient being in pain and having a high blood pressure, SUSY Tenorio called to bedside to talk with patient and daughter

## 2018-11-22 NOTE — ED NOTES
Pt. Reports that the pain he is having is new today and was not here upon arrival; mid lower abdominal pain.

## 2018-11-22 NOTE — ED NOTES
Dr. Warren aware of patient's blood pressure of 187/90 upon discharge.  Respiratory rate even and unlabored;

## 2018-11-24 PROBLEM — R53.1 WEAKNESS: Status: ACTIVE | Noted: 2018-01-01

## 2018-11-24 PROBLEM — G93.41 METABOLIC ENCEPHALOPATHY: Status: ACTIVE | Noted: 2018-01-01

## 2018-11-24 PROBLEM — Z75.8 DISCHARGE PLANNING ISSUES: Status: ACTIVE | Noted: 2018-01-01

## 2018-11-24 PROBLEM — R53.1 WEAKNESS: Status: ACTIVE | Noted: 2017-12-05

## 2018-11-24 PROBLEM — G93.41 ENCEPHALOPATHY, METABOLIC: Status: ACTIVE | Noted: 2018-01-01

## 2018-11-24 NOTE — PT/OT/SLP PROGRESS
Physical Therapy      Patient Name:  Ronald Campbell   MRN:  [id number]    Patient not seen today. Attempt in PM; patient off floor for HD. Will follow-up next scheduled visit for PT evaluation.    Jac Morillo III, PT   11/24/2018

## 2018-11-24 NOTE — ED NOTES
Dr. Rodriguez reports that urinalysis can be held off for now, wants to wait until urology confirms one is necessary due to the pain it creates pt. Being straight cathed.

## 2018-11-24 NOTE — NURSING
X 2 assist to toilet. Patient is very unsteady on feet, does not stand straight and has to be encouraged to walk, stumps over. Alert to self. Clots in urine. Had solid BM. Dr maynard and daughter Kaylee consented on phone for dialysis. Patient is up in chair with 1:1 assist eating breakfast with PCT.

## 2018-11-24 NOTE — ASSESSMENT & PLAN NOTE
Patient's last echo in 2016 had EF 40% and diastolic dsyfunction. Patient no longer following up with cardiology. Patient is not volume overloaded on exam today.    - Coreg 12.5 bid continued  - On dialysis

## 2018-11-24 NOTE — NURSING
Updated Dr Toure on HR went down to 45. Team was at bedside. This nurse took an radial pulse for a full minute on right arm at 52. Family at bedside. Dialysis is scheduled for this afternoon.

## 2018-11-24 NOTE — ASSESSMENT & PLAN NOTE
89 M with gross hematuria. He is currently HDS, hemoglobin stable, emptying his bladder without signs of urinary retention. No urgent indication for intervention today, no indication for catheter at this time, but evidence of possible old blood clot in bladder and enlarged iliac lymph node on CT scan.   .   - We will plan to take him to the OR this Wednesday for cystoscopy, possible clot evacuation, fulguration of bleeding areas, retrograde pyelograms and all other indicated procedures to evaluate his gross hematuria with Dr. Banerjee  - We do not believe that he needs a catheter at this time as he is still emptying his bladder and is not having high volumes on bladder scan, if he has worsening abdominal pain or high volume on bladder scan then we will likely need to place a catheter   - Recommend medically optimizing for surgery on Wednesday  - Recommend continuing dutasteride daily  - Recommend treating his candiduria with antifungal   - Small right renal hypodensity, can follow up renal US outpatient, will likely observe  - Please make NPO at midnight in Tuesday night for surgery

## 2018-11-24 NOTE — HPI
History from daughter Chantale, telephone conversation  Mr. Campbell is an 89 year old male with a past medical history of ESRD (Still making urine, dialysis MWF), T2DM, CHF, and seizure disorder who presents to [facility] ED for evaluation of gross hematuria and weakness. The patient has dementia and is blind and is accompanied by his daughter who is his primary caregiver. He is beng evaluated for gross hematuria which has worsened over the last  2 weeks Patient was too weak to  go  to HD yesterday, unable to walk. HBg 10.2-> 8.7 in 3 days   Last HD Tuesday 11/22 4 days ago.  Patient denies N&V, SOB, diarrhea  Recently bp meds adjusted due to hypotension at HD, unclear that med list reflects current bp meds   HD unit No Niels, Dr. Smith  [facility],4 hrs  [facility]DAF  Nephrology consulted for ESRD management

## 2018-11-24 NOTE — ASSESSMENT & PLAN NOTE
Patient on sliding scale insulin at home, per daughter patient only receives it for blood glucoses greater than 200.  - Will restart low dose SSI  - POCT glucose qid with meals

## 2018-11-24 NOTE — ASSESSMENT & PLAN NOTE
Patient presents with altered mental status and weakness on background of ESRD and gross hematuria for past two weeks. Patient's hemoglobin remains unchanged from previous ED visit at 10.2, baseline appears to be 12 per chart review. Patient 3 days since last dialysis as well as endorsing decreased PO intake. Denying any symptoms of infection at this time. WBC wnl and afebrile. Rectal exam negative for prostate tenderness. Patient has been receiving tramadol for pain, per daughter however the patient had no side effects to the medication previously. Patient is at his baseline at this time, appropriately answering questions.    Given infection is less likely on history, presumptive cause is due to electrolyte derangement due to missing dialysis. Patient hemodynamically stable and not complaining of other signs of anemia at this time.    - Nephrology consulted for dialysis tomorrow, appreciate their help  - Blood glucose elevated at 241, possible contributing. Patient is on SSI insulin at home. Will restart and closely monitor blood glucoses with goal 140-180 while inpatient.  - Urinalysis reflex to culture drawn  - Holding patient's home tramadol  - Fall precautions and delirium precautions

## 2018-11-24 NOTE — PT/OT/SLP PROGRESS
Occupational Therapy      Patient Name:  Ronald Campbell   MRN:  [id number]    Patient not seen today. Attempt in PM; patient off floor for HD. Will follow-up next scheduled visit for OT evaluation.     SHIRA Bull  11/24/2018

## 2018-11-24 NOTE — HPI
"Mr. Campbell is an 89 year old male with a past medical history of ESRD (Still making urine, dialysis MWF), T2DM, CHF, and seizure disorder who [place] [facility] for evaluation of gross hematuria and weakness. The patient has dementia and is blind and is accompanied by his daughter who is his primary caregiver. She states that he has been having hematuria for the past two weeks. She states first noticed it when there was sidney blood in his diaper and has progressed to passing clots. He was evaluated by urology on 11/13/18 at which point he was scheduled for an outpatient cystoscopy and ultrasound which is scheduled for 11/29. The patient was seen recently in the ED because his daughter noticed increasing clots in his diaper. He was evaluated by urology at that time and determined safe for discharge and to follow up for scheduled cystoscopy. The patient was brought to the ED today because he had an episode of weakness on the background of persistent hematuria. The patient is unable to ambulate with assistance and per the daughter while he was walking with her today his legs "gave out on him." She supported him to the floor to a seated position and called the fire department to help him up and he was transported to the ED. Per the daughter the patient had only been complaining of penile pain. The patient and daughter deny any recent illness or sick contacts. He was scheduled for dialysis today however could not go due to weakness. Last dialysis was Tuesday. He is complaining of some mild low back pain at this time as well, he states he feels like he "needs to go" but can't. Per daughter the patient's last bowel movement was the previous night and was normal. He was prescribed Norco 5mg in ED for pain and had taken 2-3 pills since discharge per the daughter. The patient has had some decreased PO intake over the past few days 2/2 symptoms.  "

## 2018-11-24 NOTE — HPI
Ronald Campbell is a 89 y.o. male who presented to the ED for continued painless gross hematuria. He has dementia and lives with his daughter. He has HTN, type II diabetes mellitus, ESRD on HD MWF, blindness. The hematuria has been going on for the past few weeks but has progressively increased. He also is passing clots.  He  reports occasional dysuria, denies frequency, urgency, or incomplete emptying. He urinates usually every morning and sometime in the evenings, unsure about how much urine he actually produces. He wears diapers and has been waking up with blood in diaper. No fevers and chills. He was seen in clinic a week ago and was scheduled to have a renal US and outpatient cystoscopy with Dr. Banerjee next week. He was most recently seen in the ED several days ago with gross hematuria and at that time plan was to follow up with Dr. Banerjee for outpatient cystoscopy, he was also started on empiric doxycycline and changed from finasteride to dutasteride. In the interim he has continued having consistent gross hematuria, but denies other urinary symptoms, his urine culture resulted + for candiduria and he has had some mild abdominal pain. Random bladder scan revealed 200ml.    He also underwent CT noncontrast of abdomen pelvis revealing a possible right renal mass, possible clot in bladder, a large prostate and several enlarged para-aortic lymph nodes extending along the left ileopsoas with some obliteration of the fat planes with an additional enlarged left external iliac lymph node.    He was admitted to medicine due to weakness and he missed dialysis this week.

## 2018-11-24 NOTE — ASSESSMENT & PLAN NOTE
Bladder scanned, 200cc in bladder. Per urology will not insert catheter at this time and will reassess in AM    - Continuing home dutasteride 0.5mg daily

## 2018-11-24 NOTE — PHARMACY MED REC
"Admission Medication Reconciliation - Pharmacy Consult Note    The home medication history was taken by Suzie De, Pharmacy Technician.  Based on information gathered and subsequent review by the clinical pharmacist, the items below may need attention.     You may go to "Admission" then "Reconcile Home Medications" tabs to review and/or act upon these items.     Potentially problematic discrepancies with current MAR  o Patient IS taking the following which was not ordered upon admit  Calcium acetate 667 mg capsule: Take 2 capsules by mouth every day in the morning, then take 1 capsule with lunch and 2 capsules in the evening with meals     Potential issues to be addressed PRIOR TO DISCHARGE  o Dosage clarification: brinzolamide 1% ophthalmic suspension not recommended for CrCl < 30 mL/min    Please address this information as you see fit.  Feel free to contact us if you have any questions or require assistance.    India Mcdonald, PharmD, BCPS  [id number]     PTA Medications   Medication Sig    aspirin 81 MG Chew Take 1 tablet (81 mg total) by mouth once daily.    atorvastatin (LIPITOR) 40 MG tablet Take 1 tablet (40 mg total) by mouth once daily.    B complex-vitamin C-folic acid (NEPHRO-JESSICA) 0.8 mg Tab Take 1 tablet by mouth every morning.     brimonidine-timolol (COMBIGAN) 0.2-0.5 % Drop Place 1 drop into the left eye 2 (two) times daily.    brinzolamide (AZOPT) 1 % ophthalmic suspension Place 1 drop into the left eye 3 (three) times daily.     calcium acetate (PHOSLO) 667 mg capsule Take 2 capsules by mouth every day in the morning, then take 1 capsule with lunch and 2 capsules in the evening with meals    carvedilol (COREG) 25 MG tablet Take 1 tablet (25 mg total) by mouth 2 (two) times daily. Do not take on dialysis days. Hold for SBP < 110 or HR < 55 (Patient taking differently: Take 25 mg by mouth 2 (two) times daily. Hold for SBP < 110 or HR < 55)    doxazosin (CARDURA) 4 MG tablet Take 1 tablet (4 " mg total) by mouth once daily.    doxycycline (MONODOX) 100 MG capsule Take 1 capsule (100 mg total) by mouth 2 (two) times daily. for 8 days    HYDROcodone-acetaminophen (NORCO) 5-325 mg per tablet Take 1 tablet by mouth every 4 (four) hours as needed for Pain.    insulin aspart (NOVOLOG FLEXPEN) 100 unit/mL InPn  Insulin Pen Subcutaneous As directed.  -200 take 1 unitBS 201-250 take 2 unitsBS 251-300 take 3 unitsBS 301-350 take 4 unitsBS > 351 take 5 units and call MD    ketoconazole (NIZORAL) 2 % cream Apply topically once daily. feet    levetiracetam (KEPPRA) 500 MG Tab Take 1 tablet (500 mg total) by mouth 2 (two) times daily.    TRAVOPROST (TRAVATAN Z OPHT) Place 1 drop into the left eye every evening. 1 Drops Left eye Every evening    blood sugar diagnostic (BLOOD GLUCOSE TEST) Strp 1 strip by Misc.(Non-Drug; Combo Route) route daily as needed.      .    .

## 2018-11-24 NOTE — ASSESSMENT & PLAN NOTE
Patient with 2 week history of hematuria, worsening per daughter. Evaluated by urology as an outpatient and during last ED visit for similar symptoms and recommended continued outpatient workup with cystoscopy scheduled 10/29/18. On course of doxycycline for presumed UTI from prior visist    - Patient currently hemodynamically stable, Hemoglobin 10.2, same as last check 4 days previous  - Urology consulted, appreciate their recommendations  - Culture from previous visit reviewed, positive for candida. Urinalysis reflex to culture ordered today. Will hold doxycyline  - CBC's daily

## 2018-11-24 NOTE — H&P
"[facility] Hospital Medicine  History & Physical    Patient Name: Ronald Campbell  MRN: [id number]  Admission Date: 11/23/2018  Attending Physician: Js Medeiros MD   Primary Care Provider: Bart Shaw MD    [place] Medicine [place] [facility] 2 Yasir Santos MD     Patient information was obtained from patient, relative(s), past medical records and ER records.     Subjective:     Principal Problem:Metabolic encephalopathy    Chief Complaint:   Chief Complaint   Patient presents with    Hematuria     seen here few days ago, now just passing clots        HPI: Mr. Campbell is an 89 year old male with a past medical history of ESRD (Still making urine, dialysis MWF), T2DM, CHF, and seizure disorder who [place] [facility] [place] evaluation of gross hematuria and weakness. The patient has dementia and is blind and is accompanied by his daughter who is his primary caregiver. She states that he has been having hematuria for the past two weeks. She states first noticed it when there was sidney blood in his diaper and has progressed to passing clots. He was evaluated by urology on 11/13/18 at which point he was scheduled for an outpatient cystoscopy and ultrasound which is scheduled for 11/29. The patient was seen recently in the ED because his daughter noticed increasing clots in his diaper. He was evaluated by urology at that time and determined safe for discharge and to follow up for scheduled cystoscopy. The patient was brought to the ED today because he had an episode of weakness on the background of persistent hematuria. The patient is unable to ambulate with assistance and per the daughter while he was walking with her today his legs "gave out on him." She supported him to the floor to a seated position and called the fire department to help him up and he was transported to the ED. Per the daughter the patient had only been complaining of penile pain. The patient and daughter deny any recent " "illness or sick contacts. He was scheduled for dialysis today however could not go due to weakness. Last dialysis was Tuesday. He is complaining of some mild low back pain at this time as well, he states he feels like he "needs to go" but can't. Per daughter the patient's last bowel movement was the previous night and was normal. He was prescribed Norco 5mg in ED for pain and had taken 2-3 pills since discharge per the daughter. The patient has had some decreased PO intake over the past few days 2/2 symptoms.    Past Medical History:   Diagnosis Date    Anemia     Blind     bilaterally    CHF (congestive heart failure)     Chronic kidney disease     Dementia 01/2017    Diabetes mellitus type II     General anesthetics causing adverse effect in therapeutic use     april / may 2014     Glaucoma (increased eye pressure)     Hypertension     Hypothermia 12/6/2017    Seizures        Past Surgical History:   Procedure Laterality Date    ANGIOPLASTY WITH STENT PLACEMENT Left 11/18/2016    Performed by Orin Sims MD at [facility] OR 2ND FLR    AV FISTULA PLACEMENT Left 4/2014    AV fistula to Left upper arm    VZEAELVDWLYY-SXJYPFO-DO left RC vs BC AVF Left 8/8/2014    Performed by Orin Sims MD at [facility] OR 2ND FLR    EYE SURGERY      FISTULOGRAM Left 11/18/2016    Performed by Orin Sim[facility] MD at [facility]    FISTULOGRAM Left 4/15/2016    Performed by Orin Sims MD at [facility]    FISTULOGRAM Left 1/29/2016    Performed by Orin Sims MD at [facility] CATH LAB    FISTULOGRAM Room 11 case Left 3/27/2015    Performed by Orin Sims MD at [facility] OR 2ND FLR    HYDROCELECTOMY Bilateral 11/22/2017    Performed by Temo Banerjee MD at [facility] OR 2ND FLR    INSERTION-CATHETER-DIALYSIS-PERITONEAL-LAPAROSCOPIC N/A 5/8/2014    Performed by Js Ladd MD at [facility] OR 2ND FLR    INSERTION-CATHETER-DIALYSIS-PERITONEAL-LAPAROSCOPIC N/A 5/5/2014    Performed by Js Ladd, " MD at [facility] OR 2ND FLR    INSERTION-CATHETER-HEMODIALYSIS N/A 5/21/2014    Performed by [facility] at [facility] OR 2ND FLR    PERMCATH INSERTION-TUNNELED CVC N/A 5/13/2014    Performed by Lolis Jackson MD at [facility] CATH LAB    REMOVAL-CATHETER-PERITONEAL DIALYSIS N/A 7/30/2014    Performed by Js Ladd MD at [facility] OR 2ND FLR    SCROTUM EXPLORATION Bilateral 11/2017       Review of patient's allergies indicates:   Allergen Reactions    Lisinopril Swelling       No current facility-administered medications on file prior to encounter.      Current Outpatient Medications on File Prior to Encounter   Medication Sig    aspirin 81 MG Chew Take 1 tablet (81 mg total) by mouth once daily. (Patient taking differently: Take 81 mg by mouth every morning. )    atorvastatin (LIPITOR) 40 MG tablet Take 1 tablet (40 mg total) by mouth once daily. (Patient taking differently: Take 80 mg by mouth every evening. )    B complex-vitamin C-folic acid (NEPHRO-JESSICA) 0.8 mg Tab Take 1 tablet by mouth every morning.     brimonidine-timolol (COMBIGAN) 0.2-0.5 % Drop Place 1 drop into the left eye 2 (two) times daily.    brinzolamide (AZOPT) 1 % ophthalmic suspension Place 1 drop into the left eye 3 (three) times daily.     calcium acetate (PHOSLO) 667 mg capsule Take 1 capsule (667 mg total) by mouth 3 (three) times daily with meals.    carvedilol (COREG) 25 MG tablet Take 1 tablet (25 mg total) by mouth 2 (two) times daily. Do not take on dialysis days. Hold for SBP < 110 or HR < 55 (Patient taking differently: Take 25 mg by mouth 2 (two) times daily. Hold for SBP < 110 or HR < 55)    doxazosin (CARDURA) 4 MG tablet Take 1 tablet (4 mg total) by mouth once daily.    doxycycline (MONODOX) 100 MG capsule Take 1 capsule (100 mg total) by mouth 2 (two) times daily. for 8 days    HYDROcodone-acetaminophen (NORCO) 5-325 mg per tablet Take 1 tablet by mouth every 4 (four) hours as needed for Pain.    levetiracetam  (KEPPRA) 500 MG Tab Take 1 tablet (500 mg total) by mouth 2 (two) times daily.    TRAVOPROST (TRAVATAN Z OPHT) Place 1 drop into the left eye every evening. 1 Drops Left eye Every evening    blood sugar diagnostic (BLOOD GLUCOSE TEST) Strp 1 strip by Misc.(Non-Drug; Combo Route) route 2 (two) times daily.    dutasteride (AVODART) 0.5 mg capsule Take 1 capsule (0.5 mg total) by mouth once daily.    insulin aspart (NOVOLOG FLEXPEN) 100 unit/mL InPn  Insulin Pen Subcutaneous As directed.  -200 take 1 unitBS 201-250 take 2 unitsBS 251-300 take 3 unitsBS 301-350 take 4 unitsBS > 351 take 5 units and call MD    ketoconazole (NIZORAL) 2 % cream Apply topically once daily. feet    polyethylene glycol (GLYCOLAX) 17 gram PwPk Take 17 g by mouth once daily.    [DISCONTINUED] amLODIPine (NORVASC) 5 MG tablet Take 10 mg by mouth once daily.      Family History     Problem Relation (Age of Onset)    Cancer Daughter    Heart disease Sister, Brother        Tobacco Use    Smoking status: Never Smoker    Smokeless tobacco: Never Used   Substance and Sexual Activity    Alcohol use: No    Drug use: No    Sexual activity: No     Review of Systems   Constitutional: Negative for chills, fever and unexpected weight change.   HENT: Negative for sore throat.    Eyes: Negative for visual disturbance.   Respiratory: Negative for cough and shortness of breath.    Cardiovascular: Negative for chest pain and palpitations.   Gastrointestinal: Negative for abdominal pain, constipation, diarrhea, nausea and vomiting.   Genitourinary: Positive for hematuria and penile pain.   Musculoskeletal: Negative for arthralgias and myalgias.   Skin: Negative for pallor and rash.   Neurological: Positive for weakness. Negative for headaches.   Hematological: Negative for adenopathy.     Objective:     Vital Signs (Most Recent):  Temp: 97.5 °F (36.4 °C) (11/23/18 2230)  Pulse: 73 (11/23/18 2230)  Resp: 14 (11/23/18 2230)  BP: (!) 162/69  (11/23/18 2230)  SpO2: 99 % (11/23/18 2230) Vital Signs (24h Range):  Temp:  [97.4 °F (36.3 °C)-98.5 °F (36.9 °C)] 97.5 °F (36.4 °C)  Pulse:  [63-73] 73  Resp:  [14-20] 14  SpO2:  [98 %-100 %] 99 %  BP: (154-184)/(69-83) 162/69     Weight: 74.8 kg (165 lb)  Body mass index is 21.77 kg/m².    Physical Exam   Constitutional: He is oriented to person, place, and time. He appears well-developed and well-nourished. No distress.   HENT:   Head: Normocephalic and atraumatic.   Eyes:   Patient blind, no acute abnormality noted   Neck: Normal range of motion. Neck supple.   Cardiovascular: Normal rate, regular rhythm and normal heart sounds. Exam reveals no gallop and no friction rub.   No murmur heard.  Pulmonary/Chest: Effort normal and breath sounds normal. He has no wheezes. He has no rales.   Abdominal: Soft. Bowel sounds are normal. He exhibits no mass. There is no tenderness. There is no rebound and no guarding.   Genitourinary: Rectum normal. Prostate is not tender.   Genitourinary Comments: Gross blood noted at urethral meatus. No sign of laceration obvious on urethra or foreskin.    Hard stool in rectal vault   Musculoskeletal: Normal range of motion. He exhibits no edema.   Lymphadenopathy:     He has no cervical adenopathy.   Neurological: He is alert and oriented to person, place, and time.           Significant Labs:   CBC:   Recent Labs   Lab 11/23/18  1856   WBC 8.84   HGB 10.2*   HCT 31.5*   *     CMP:   Recent Labs   Lab 11/23/18  1856   *   K 5.4*   CL 97   CO2 20*   *   BUN 85*   CREATININE 9.6*   CALCIUM 9.4   ANIONGAP 14   EGFRNONAA 4.3*       Significant Imaging: I have reviewed all pertinent imaging results/findings within the past 24 hours.         Assessment/Plan:     * Metabolic encephalopathy    Patient presents with altered mental status and weakness on background of ESRD and gross hematuria for past two weeks. Patient's hemoglobin remains unchanged from previous ED visit at  10.2, baseline appears to be 12 per chart review. Patient 3 days since last dialysis as well as endorsing decreased PO intake. Denying any symptoms of infection at this time. WBC wnl and afebrile. Rectal exam negative for prostate tenderness. Patient has been receiving tramadol for pain, per daughter however the patient had no side effects to the medication previously. Patient is at his baseline at this time, appropriately answering questions.    Given infection is less likely on history, presumptive cause is due to electrolyte derangement due to missing dialysis. Patient hemodynamically stable and not complaining of other signs of anemia at this time.    - Nephrology consulted for dialysis tomorrow, appreciate their help  - Blood glucose elevated at 241, possible contributing. Patient is on SSI insulin at home. Will restart and closely monitor blood glucoses with goal 140-180 while inpatient.  - Urinalysis reflex to culture drawn  - Holding patient's home tramadol  - Fall precautions and delirium precautions       Gross hematuria    Patient with 2 week history of hematuria, worsening per daughter. Evaluated by urology as an outpatient and during last ED visit for similar symptoms and recommended continued outpatient workup with cystoscopy scheduled 10/29/18. On course of doxycycline for presumed UTI from prior visist    - Patient currently hemodynamically stable, Hemoglobin 10.2, same as last check 4 days previous  - Urology consulted, appreciate their recommendations  - Culture from previous visit reviewed, positive for candida. Urinalysis reflex to culture ordered today. Will hold doxycyline  - CBC's daily     Discharge planning issues    - PT/OT consulted  - Case management consulted         Renovascular hypertension    Restarted home carvedilol 12.5mg bid     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    Patient on sliding scale insulin at home, per daughter patient only receives it for blood  glucoses greater than 200.  - Will restart low dose SSI  - POCT glucose qid with meals       Hyperkalemia    Potassium 5.4 on presentation to ED. EKG negative for changes due to hyperkalemia. Patient asymptomatic at this time.    - Patient to receive dialysis on Saturday 11/24/18  - Cardiac monitoring       ESRD (end stage renal disease) on dialysis    Patient ESRD, dialysis dependent. Access via left arm AVF. Patient normally on MWF dialysis however was on modified schedule due to holiday scheduling. Last dialysis was Tuesday. Possibly contributing to patient's episode of weakness.    - Nephrology consulted for dialysis, appreciate their help       Benign prostatic hyperplasia with urinary retention    Bladder scanned, 200cc in bladder. Per urology will not insert catheter at this time and will reassess in AM    - Continuing home dutasteride 0.5mg daily       Chronic combined systolic and diastolic heart failure    Patient's last echo in 2016 had EF 40% and diastolic dsyfunction. Patient no longer following up with cardiology. Patient is not volume overloaded on exam today.    - Coreg 12.5 bid continued  - On dialysis       Seizure disorder    Presumed epilepsy, patient seizure free for several years  - Resumed home keppra 500mg bid         VTE Risk Mitigation (From admission, onward)        Ordered     IP VTE HIGH RISK PATIENT  Once      11/23/18 2153     Place sequential compression device  Until discontinued      11/23/18 2153     Place sequential compression device  Until discontinued      11/23/18 2153        Staff attestation to follow       Yasir Santos MD  Department of Hospital Medicine   [facility]

## 2018-11-24 NOTE — ASSESSMENT & PLAN NOTE
Potassium 5.4 on presentation to ED. EKG negative for changes due to hyperkalemia. Patient asymptomatic at this time.    - Patient to receive dialysis on Saturday 11/24/18  - Cardiac monitoring

## 2018-11-24 NOTE — CONSULTS
[facility]  Nephrology  Consult Note    Patient Name: Ronald Campbell  MRN: [id number]  Admission Date: 11/23/2018  Hospital Length of Stay: 0 days  Attending Provider: Js Medeiros MD   Primary Care Physician: Bart Shaw MD  Principal Problem:Metabolic encephalopathy    Consults  Subjective:     HPI:    History from daughter Chantale, telephone conversation  Mr. Campbell is an 89 year old male with a past medical history of ESRD (Still making urine, dialysis MWF), T2DM, CHF, and seizure disorder who presents to [facility] ED for evaluation of gross hematuria and weakness. The patient has dementia and is blind and is accompanied by his daughter who is his primary caregiver. He is beng evaluated for gross hematuria which has worsened over the last  2 weeks Patient was too weak to  go  to HD yesterday, unable to walk. HBg 10.2-> 8.7 in 3 days   Last HD Tuesday 11/22 4 days ago.  Patient denies N&V, SOB, diarrhea  Recently bp meds adjusted due to hypotension at HD, unclear that med list reflects current bp meds   HD unit Dr. Sarah Cutler  [facility],4 hrs  [facility]  Nephrology consulted for ESRD management    Past Medical History:   Diagnosis Date    Anemia     Blind     bilaterally    CHF (congestive heart failure)     Chronic kidney disease     Dementia 01/2017    Diabetes mellitus type II     General anesthetics causing adverse effect in therapeutic use     april / may 2014     Glaucoma (increased eye pressure)     Hypertension     Hypothermia 12/6/2017    Seizures        Past Surgical History:   Procedure Laterality Date    ANGIOPLASTY WITH STENT PLACEMENT Left 11/18/2016    [facility] by [facility] 2ND FLR    AV FISTULA PLACEMENT Left 4/2014    AV fistula to Left upper arm    [id number] left RC vs BC AVF Left 8/8/2014    [facility]formed by [facility] at [facility] OR 2ND FLR    EYE SURGERY      FISTULOGRAM Left 11/18/2016    Performed by [facility]  at [facility] 2ND FLR    FISTULOGRAM Left 4/15/2016    [facility]formed [facility] at [facility] OR [facility]ND [facility]LR    FISTULOGRAM Left 1/29/2016    Performed by [facility] at [facility] CATH LAB    FISTULOGRAM Room 11 case Left 3/27/2015    Performed by [facility] at [facility] OR 2ND FLR    HYDROCELECTOMY Bilateral 11/22/2017    Performed by Temo Banerjee MD at [facility] OR 2ND FLR    INSERTION-CATHETER-DIALYSIS-PERITONEAL-LAPAROSCOPIC N/A 5/8/2014    Performed by Js Ladd MD at [facility] OR 2ND FLR    INSERTION-CATHETER-DIALYSIS-PERITONEAL-LAPAROSCOPIC N/A 5/5/2014    Performed by [facility] at [facility] OR 2ND FLR    INSERTION-CATHETER-HEMODIALYSIS N/A 5/21/2014    Performed by [facility] at [facility] OR 2ND FLR    PERMCATH INSERTION-TUNNELED CVC N/A 5/13/2014    Performed by Lolis Jackson MD at [facility] CATH LAB    REMOVAL-CATHETER-PERITONEAL DIALYSIS N/A 7/30/2014    Performed by Js Ladd [facility] at [facility] OR 2ND FLR    SCROTUM EXPLORATION Bilateral 11/2017       Review of patient's allergies indicates:   Allergen Reactions    Lisinopril Swelling     Current Facility-Administered Medications   Medication Frequency    acetaminophen tablet 650 mg Q6H PRN    aspirin chewable tablet 81 mg Daily    atorvastatin tablet 40 mg Daily    brimonidine 0.15 % OPTH DROP ophthalmic solution 1 drop BID    carvedilol tablet 25 mg BID    dextrose 50% injection 12.5 g PRN    dextrose 50% injection 25 g PRN    doxazosin tablet 4 mg Daily    dutasteride capsule 0.5 mg Daily    glucagon (human recombinant) injection 1 mg PRN    glucose chewable tablet 16 g PRN    glucose chewable tablet 24 g PRN    insulin aspart U-100 pen 0-5 Units QID (AC + HS) PRN    levETIRAcetam tablet 500 mg BID    sodium chloride 0.9% flush 5 mL PRN    timolol maleate 0.5% ophthalmic solution 1 drop BID    travoprost 0.004 % ophthalmic solution 1 drop QHS     Family History     Problem Relation (Age of Onset)     Cancer Daughter    Heart disease Sister, Brother        Tobacco Use    Smoking status: Never Smoker    Smokeless tobacco: Never Used   Substance and Sexual Activity    Alcohol use: No    Drug use: No    Sexual activity: No     Review of Systems   Constitutional: Positive for fatigue. Negative for activity change, appetite change, chills, diaphoresis, fever and unexpected weight change.   HENT: Negative for congestion, ear discharge, ear pain, facial swelling, hearing loss, nosebleeds, sinus pressure, sore throat and trouble swallowing.    Eyes: Negative for photophobia, pain, discharge, redness, itching and visual disturbance.        Blind bilateral   Respiratory: Negative for apnea, cough, chest tightness, shortness of breath and wheezing.    Cardiovascular: Negative for chest pain, palpitations and leg swelling.   Gastrointestinal: Negative for abdominal distention, abdominal pain, constipation, diarrhea and vomiting.   Endocrine: Negative for cold intolerance, heat intolerance, polydipsia and polyuria.   Genitourinary: Positive for hematuria. Negative for decreased urine volume, difficulty urinating, dysuria, flank pain, frequency, scrotal swelling, testicular pain and urgency.   Musculoskeletal: Negative for arthralgias, back pain, gait problem, joint swelling, myalgias, neck pain and neck stiffness.   Skin: Negative for color change, pallor, rash and wound.   Allergic/Immunologic: Negative for environmental allergies and food allergies.   Neurological: Positive for weakness. Negative for dizziness, tremors, seizures, syncope, facial asymmetry, speech difficulty, light-headedness, numbness and headaches.        Dificulty walking   oriented as per daughter   Hematological: Negative for adenopathy. Does not bruise/bleed easily.   Psychiatric/Behavioral: Negative for agitation, behavioral problems, dysphoric mood and sleep disturbance. The patient is not nervous/anxious.      Objective:     Vital Signs  (Most Recent):  Temp: 97.5 °F (36.4 °C) (11/24/18 0757)  Pulse: (!) 57 (11/24/18 0757)  Resp: 18 (11/24/18 0757)  BP: (!) 155/73 (11/24/18 0757)  SpO2: 100 % (11/24/18 0757)  O2 Device (Oxygen Therapy): room air (11/24/18 0412) Vital Signs (24h Range):  Temp:  [96.7 °F (35.9 °C)-98.5 °F (36.9 °C)] 97.5 °F (36.4 °C)  Pulse:  [57-73] 57  Resp:  [14-20] 18  SpO2:  [98 %-100 %] 100 %  BP: (154-184)/(68-83) 155/73     Weight: 74.8 kg (165 lb) (11/23/18 1612)  Body mass index is 21.77 kg/m².  Body surface area is 1.96 meters squared.    I/O last 3 completed shifts:  In: 480 [P.O.:480]  Out: -     Physical Exam   Constitutional: He appears well-developed and well-nourished. No distress.   HENT:   Head: Normocephalic and atraumatic.   Mouth/Throat: Oropharynx is clear and moist. No oropharyngeal exudate.   Eyes: Right eye exhibits no discharge. Left eye exhibits no discharge. No scleral icterus.   Blind, nonicteric,   Neck: Normal range of motion. Neck supple. No JVD present. No tracheal deviation present. No thyromegaly present.   Cardiovascular: Normal rate, regular rhythm and normal heart sounds. Exam reveals no gallop and no friction rub.   No murmur heard.  Trace ankle edema ,  ANGLE AVF good thrill and bruit, does not collapse on elevation, + aneurysmal dilitation   Pulmonary/Chest: Effort normal and breath sounds normal. No stridor. No respiratory distress. He has no wheezes. He has no rales. He exhibits no tenderness.   No crackles good excursion   Abdominal: Soft. Bowel sounds are normal. He exhibits no distension and no mass. There is no tenderness. There is no rebound and no guarding. No hernia.   Musculoskeletal: Normal range of motion. He exhibits no edema or tenderness.   Lymphadenopathy:     He has no cervical adenopathy.   Neurological: He is alert. No cranial nerve deficit. He exhibits normal muscle tone. Coordination normal.   Oriented to person and time   Skin: Skin is warm and dry. No rash noted. He is not  diaphoretic. No erythema. No pallor.   Psychiatric: He has a normal mood and affect. His behavior is normal. Judgment and thought content normal.   Nursing note and vitals reviewed.      Significant Labs:  All labs within the past 24 hours have been reviewed.    Significant Imaging:  Labs: Reviewed  CT: Reviewed  Echo: Reviewed    Assessment/Plan:     ESRD (end stage renal disease) on dialysis    88 yo AAM with ESRD (Still making urine, dialysis MWF), T2DM, CHF, and seizure disorder with recent onset of hematuria, possible mass R kidney seen on recnet CT with worsening gross hematuria and weakness and hbg 10.2--> 8.7 in 3 days,  Who has missed dialysis yesterday due to weakness.  He now presents with worsening anemia, azotemia hyponatremia, metabolic acidosis, hypoalbuminemia    ESRD :  underdialyzed  Consent obtained from Daughter and will plan for HD today  And MON, Tuesday, Wed as tolerated    Anemia  Will order procrit, after checking iron stores, txf as needed hbg> 8    Hypotension at HD, H/o pulmonary edema : will order cxr to guide volume management, bnp, will order blood c/s at HD today   agree with holding bp meds for now  Would try and reduce coreg dose if used   Suggest repeat echo  as indicated,        Metabolic acidosis: repeat labs after HD    Metabolic bone disease n calcium acetate, check serum phos                 Thank you for your consult. I will follow-up with patient. Please contact us if you have any additional questions.    Edda Velazquez MD  Nephrology  [facility]

## 2018-11-24 NOTE — SUBJECTIVE & OBJECTIVE
Past Medical History:   Diagnosis Date    Anemia     Blind     bilaterally    CHF (congestive heart failure)     Chronic kidney disease     Dementia 01/2017    Diabetes mellitus type II     General anesthetics causing adverse effect in therapeutic use     april / may 2014     Glaucoma (increased eye pressure)     Hypertension     Hypothermia 12/6/2017    Seizures        Past Surgical History:   Procedure Laterality Date    ANGIOPLASTY WITH STENT PLACEMENT Left 11/18/2016    Performed by [facility]in [facility]s [facility] at [facility] OR [address] [place] [address]    AV FISTULA PLACEMENT Left 4/2014    AV fistula to Left upper arm    [id number] left RC vs BC AVF Left 8/8/2014    Performed by [facility]in [facility]s MD at [facility] OR [address] [place] [address]    EYE SURGERY      FISTULOGRAM Left 11/18/2016    [facility]    FISTULOGRAM Left 4/15/2016    [facility]    FISTULOGRAM Left 1/29/2016    Performed by [facility] at [facility] CATH LAB    FISTULOGRAM Room 11 case Left 3/27/2015    Performed by Orin Sims MD at [facility] OR [address] [place] [address]    HYDROCELECTOMY Bilateral 11/22/2017    Performed by Temo Banerjee MD at [facility] OR 2ND FLR    INSERTION-CATHETER-DIALYSIS-PERITONEAL-LAPAROSCOPIC N/A 5/8/2014    Performed by Js Ladd [facility] at [facility] OR [facility] FLR    INSERTION-CATHETER-DIALYSIS-PERITONEAL-LAPAROSCOPIC N/A 5/5/2014    Performed by Js Ladd [facility] at [facility] OR [facility] FLR    INSERTION-CATHETER-HEMODIALYSIS N/A 5/21/2014    [facility]    PERMCATH INSERTION-TUNNELED CVC N/A 5/13/2014    Performed by Lolis Jackson MD at [facility] CATH LAB    REMOVAL-CATHETER-PERITONEAL DIALYSIS N/A 7/30/2014    Performed by Js Ladd MD at [facility] OR [address] [place] [address]    SCROTUM EXPLORATION Bilateral 11/2017       Review of patient's allergies indicates:   Allergen Reactions    Lisinopril Swelling       Family History     Problem  Relation (Age of Onset)    Cancer Daughter    Heart disease Sister, Brother          Tobacco Use    Smoking status: Never Smoker    Smokeless tobacco: Never Used   Substance and Sexual Activity    Alcohol use: No    Drug use: No    Sexual activity: No       Review of Systems   Constitutional: Positive for activity change and fatigue. Negative for chills and fever.   HENT: Negative for facial swelling.    Eyes: Negative for discharge.   Respiratory: Negative for apnea.    Cardiovascular: Negative for chest pain.   Gastrointestinal: Positive for abdominal pain. Negative for abdominal distention and diarrhea.   Genitourinary: Positive for hematuria. Negative for difficulty urinating and dysuria.   Musculoskeletal: Negative for arthralgias.   Neurological: Positive for dizziness.   Psychiatric/Behavioral: Negative for agitation.       Objective:     Temp:  [96.7 °F (35.9 °C)-98.5 °F (36.9 °C)] 97.5 °F (36.4 °C)  Pulse:  [49-73] 49  Resp:  [14-20] 18  SpO2:  [98 %-100 %] 100 %  BP: (154-184)/(68-83) 155/73     Body mass index is 21.77 kg/m².    Date 11/24/18 0700 - 11/25/18 0659   Shift 6503-6967 0231-2103 0884-4078 24 Hour Total   INTAKE   P.O. 120   120   Shift Total(mL/kg) 120(1.6)   120(1.6)   OUTPUT   Shift Total(mL/kg)       Weight (kg) 74.8 74.8 74.8 74.8     Bladder Scan Volume (mL): 218 mL (11/23/18 2326)    Drains     Drain                 Hemodialysis AV Fistula -- days         Hemodialysis AV Fistula Left upper arm -- days         Hemodialysis AV Fistula 08/08/14 0800 Left upper arm 1569 days                Physical Exam   Nursing note and vitals reviewed.  Constitutional: He is oriented to person, place, and time. Vital signs are normal. He appears well-developed and well-nourished. No distress.   Neck: Trachea normal. Neck supple. No tracheal deviation present.   Cardiovascular: Normal rate and intact distal pulses.    Pulmonary/Chest: Effort normal. No accessory muscle usage. No respiratory distress.    Abdominal: Soft. He exhibits no distension and no mass. There is no tenderness. There is no CVA tenderness. No hernia.   Genitourinary: Rectum normal. Rectal exam shows no mass and anal tone normal.   Genitourinary Comments: Penis is uncircumcised, foreskin is retractable, there are no lesions, masses, plaques.   Scrotum showed no rashes or lesions. Well healed hydrocelectomy and orchiectomy incisions. Right testicle and epididymis present, left side surgically absent. No masses or tenderness. Urethral meatus is orthotopic, patent and without lesions or discharge. Clots present at the meatus and in diaper.    The anus and perineum show no lesions or abnormalities.    The prostate is >80 g. Normal landmarks. Lateral sulci. Median furrow intact. No nodularity, induration, or tenderness.   Musculoskeletal: He exhibits no edema or tenderness.   Lymphadenopathy:     He has no cervical adenopathy.   Neurological: He is alert and oriented to person, place, and time.   Skin: Skin is warm and dry. No lesion and no rash noted. He is not diaphoretic. No cyanosis.     Psychiatric: He has a normal mood and affect.       Significant Labs:    BMP:  Recent Labs   Lab 11/21/18  1054 11/23/18  1856 11/24/18  0624   * 131* 131*   K 4.6 5.4* 4.9    97 100   CO2 24 20* 18*   BUN 38* 85* 88*   CREATININE 5.5* 9.6* 10.0*   CALCIUM 9.0 9.4 8.8       CBC:  Recent Labs   Lab 11/21/18  1054 11/23/18  1856 11/24/18  0624   WBC 6.45 8.84 7.79   HGB 10.2* 10.2* 8.7*   HCT 32.4* 31.5* 26.8*   * 124* 122*       Urine Culture:   Recent Labs   Lab 11/21/18  1157   LABURIN CANDIDA ALBICANS  10,000 - 49,999 cfu/ml  Treatment of asymptomatic candiduria is not recommended (except for   specific populations). Candida isolated in the urine typically   represents colonization. If an indwelling urinary catheter is present  it should be removed or replaced.       Urine Studies:   Recent Labs   Lab 11/21/18  1157   COLORU Red*    APPEARANCEUA Cloudy*   PHUR 7.0   SPECGRAV >=1.030*   PROTEINUA 3+*   GLUCUA Negative   KETONESU Negative   BILIRUBINUA Negative   OCCULTUA 3+*   NITRITE Negative   LEUKOCYTESUR Trace*   RBCUA >100*   WBCUA 12*   BACTERIA Occasional   SQUAMEPITHEL 1   HYALINECASTS 2*       Significant Imaging:  All pertinent imaging results/findings from the past 24 hours have been reviewed.

## 2018-11-24 NOTE — CONSULTS
[facility]  Urology  Consult Note    Patient Name: Ronald Campbell  MRN: [id number]  Admission Date: 11/23/2018  Hospital Length of Stay: 0   Code Status: Full Code   Attending Provider: Js Medeiros MD   Consulting Provider: Jae Ashton MD  Primary Care Physician: Bart Shaw MD  Principal Problem:Metabolic encephalopathy    Inpatient consult to Urology  Consult performed by: Jae Ashton MD  Consult ordered by: Yasir Santos MD          Subjective:     HPI:  Ronald Campbell is a 89 y.o. male who presented to the ED for continued painless gross hematuria. He has dementia and lives with his daughter. He has HTN, type II diabetes mellitus, ESRD on HD MWF, blindness. The hematuria has been going on for the past few weeks but has progressively increased. He also is passing clots.  He  reports occasional dysuria, denies frequency, urgency, or incomplete emptying. He urinates usually every morning and sometime in the evenings, unsure about how much urine he actually produces. He wears diapers and has been waking up with blood in diaper. No fevers and chills. He was seen in clinic a week ago and was scheduled to have a renal US and outpatient cystoscopy with Dr. Banerjee next week. He was most recently seen in the ED several days ago with gross hematuria and at that time plan was to follow up with Dr. Banerjee for outpatient cystoscopy, he was also started on empiric doxycycline and changed from finasteride to dutasteride. In the interim he has continued having consistent gross hematuria, but denies other urinary symptoms, his urine culture resulted + for candiduria and he has had some mild abdominal pain. Random bladder scan revealed 200ml.    He also underwent CT noncontrast of abdomen pelvis revealing a possible right renal mass, possible clot in bladder, a large prostate and several enlarged para-aortic lymph nodes extending along the left ileopsoas with some obliteration of the fat planes with an  additional enlarged left external iliac lymph node.    He was admitted to medicine due to weakness and he missed dialysis this week.         Past Medical History:   Diagnosis Date    Anemia     Blind     bilaterally    CHF (congestive heart failure)     Chronic kidney disease     Dementia 01/2017    Diabetes mellitus type II     General anesthetics causing adverse effect in therapeutic use     april / may 2014     Glaucoma (increased eye pressure)     Hypertension     Hypothermia 12/6/2017    Seizures        Past Surgical History:   Procedure Laterality Date    ANGIOPLASTY WITH STENT PLACEMENT Left 11/18/2016    Performed by [facility]in [facility]s [facility] at [facility] OR [address] [place] [address]    AV FISTULA PLACEMENT Left 4/2014    AV fistula to Left upper arm    GYWHHUXUAVMG-RROVDIX-UP left RC vs BC AVF Left 8/8/2014    Performed by [facility]in [facility]s [facility] at [facility] OR [address] [place] [address]    EYE SURGERY      FISTULOGRAM Left 11/18/2016    [facility]    FISTULOGRAM Left 4/15/2016    [facility]    FISTULOGRAM Left 1/29/2016    Performed by [facility] at [facility] CATH LAB    FISTULOGRAM Room 11 case Left 3/27/2015    Performed by [facility] at [facility] OR [address] [place] [address]    HYDROCELECTOMY Bilateral 11/22/2017    [facility]    INSERTION-CATHETER-DIALYSIS-PERITONEAL-LAPAROSCOPIC N/A 5/8/2014    [facility]    INSERTION-CATHETER-DIALYSIS-PERITONEAL-LAPAROSCOPIC N/A 5/5/2014    [facility]    INSERTION-CATHETER-HEMODIALYSIS N/A 5/21/2014    Performed by [facility]in [facility]s MD at [facility] OR [address] [place] [address]    PERMCATH INSERTION-TUNNELED CVC N/A 5/13/2014    Performed by Lolis Jackson MD at [facility] CATH LAB    REMOVAL-CATHETER-PERITONEAL DIALYSIS N/A 7/30/2014    Performed by Js Ladd MD at [facility] OR [address] [place] [address]    SCROTUM EXPLORATION  Bilateral 11/2017       Review of patient's allergies indicates:   Allergen Reactions    Lisinopril Swelling       Family History     Problem Relation (Age of Onset)    Cancer Daughter    Heart disease Sister, Brother          Tobacco Use    Smoking status: Never Smoker    Smokeless tobacco: Never Used   Substance and Sexual Activity    Alcohol use: No    Drug use: No    Sexual activity: No       Review of Systems   Constitutional: Positive for activity change and fatigue. Negative for chills and fever.   HENT: Negative for facial swelling.    Eyes: Negative for discharge.   Respiratory: Negative for apnea.    Cardiovascular: Negative for chest pain.   Gastrointestinal: Positive for abdominal pain. Negative for abdominal distention and diarrhea.   Genitourinary: Positive for hematuria. Negative for difficulty urinating and dysuria.   Musculoskeletal: Negative for arthralgias.   Neurological: Positive for dizziness.   Psychiatric/Behavioral: Negative for agitation.       Objective:     Temp:  [96.7 °F (35.9 °C)-98.5 °F (36.9 °C)] 97.5 °F (36.4 °C)  Pulse:  [49-73] 49  Resp:  [14-20] 18  SpO2:  [98 %-100 %] 100 %  BP: (154-184)/(68-83) 155/73     Body mass index is 21.77 kg/m².    Date 11/24/18 0700 - 11/25/18 0659   Shift 3122-1795 0582-9617 7138-9160 24 Hour Total   INTAKE   P.O. 120   120   Shift Total(mL/kg) 120(1.6)   120(1.6)   OUTPUT   Shift Total(mL/kg)       Weight (kg) 74.8 74.8 74.8 74.8     Bladder Scan Volume (mL): 218 mL (11/23/18 2326)    Drains     Drain                 Hemodialysis AV Fistula -- days         Hemodialysis AV Fistula Left upper arm -- days         Hemodialysis AV Fistula 08/08/14 0800 Left upper arm 1569 days                Physical Exam   Nursing note and vitals reviewed.  Constitutional: He is oriented to person, place, and time. Vital signs are normal. He appears well-developed and well-nourished. No distress.   Neck: Trachea normal. Neck supple. No tracheal deviation present.    Cardiovascular: Normal rate and intact distal pulses.    Pulmonary/Chest: Effort normal. No accessory muscle usage. No respiratory distress.   Abdominal: Soft. He exhibits no distension and no mass. There is no tenderness. There is no CVA tenderness. No hernia.   Genitourinary: Rectum normal. Rectal exam shows no mass and anal tone normal.   Genitourinary Comments: Penis is uncircumcised, foreskin is retractable, there are no lesions, masses, plaques.   Scrotum showed no rashes or lesions. Well healed hydrocelectomy and orchiectomy incisions. Right testicle and epididymis present, left side surgically absent. No masses or tenderness. Urethral meatus is orthotopic, patent and without lesions or discharge. Clots present at the meatus and in diaper.    The anus and perineum show no lesions or abnormalities.    The prostate is >80 g. Normal landmarks. Lateral sulci. Median furrow intact. No nodularity, induration, or tenderness.   Musculoskeletal: He exhibits no edema or tenderness.   Lymphadenopathy:     He has no cervical adenopathy.   Neurological: He is alert and oriented to person, place, and time.   Skin: Skin is warm and dry. No lesion and no rash noted. He is not diaphoretic. No cyanosis.     Psychiatric: He has a normal mood and affect.       Significant Labs:    BMP:  Recent Labs   Lab 11/21/18  1054 11/23/18  1856 11/24/18  0624   * 131* 131*   K 4.6 5.4* 4.9    97 100   CO2 24 20* 18*   BUN 38* 85* 88*   CREATININE 5.5* 9.6* 10.0*   CALCIUM 9.0 9.4 8.8       CBC:  Recent Labs   Lab 11/21/18  1054 11/23/18  1856 11/24/18  0624   WBC 6.45 8.84 7.79   HGB 10.2* 10.2* 8.7*   HCT 32.4* 31.5* 26.8*   * 124* 122*       Urine Culture:   Recent Labs   Lab 11/21/18  1157   LABURIN CANDIDA ALBICANS  10,000 - 49,999 cfu/ml  Treatment of asymptomatic candiduria is not recommended (except for   specific populations). Candida isolated in the urine typically   represents colonization. If an indwelling  urinary catheter is present  it should be removed or replaced.       Urine Studies:   Recent Labs   Lab 11/21/18  1157   COLORU Red*   APPEARANCEUA Cloudy*   PHUR 7.0   SPECGRAV >=1.030*   PROTEINUA 3+*   GLUCUA Negative   KETONESU Negative   BILIRUBINUA Negative   OCCULTUA 3+*   NITRITE Negative   LEUKOCYTESUR Trace*   RBCUA >100*   WBCUA 12*   BACTERIA Occasional   SQUAMEPITHEL 1   HYALINECASTS 2*       Significant Imaging:  All pertinent imaging results/findings from the past 24 hours have been reviewed.                    Assessment and Plan:     Gross hematuria    89 M with gross hematuria. He is currently HDS, hemoglobin stable, emptying his bladder without signs of urinary retention. No urgent indication for intervention today, no indication for catheter at this time, but evidence of possible old blood clot in bladder and enlarged iliac lymph node on CT scan.   .   - We will plan to take him to the OR this Wednesday for cystoscopy, possible clot evacuation, fulguration of bleeding areas, retrograde pyelograms and all other indicated procedures to evaluate his gross hematuria with Dr. Banerjee  - We do not believe that he needs a catheter at this time as he is still emptying his bladder and is not having high volumes on bladder scan, if he has worsening abdominal pain or high volume on bladder scan then we will likely need to place a catheter   - Recommend medically optimizing for surgery on Wednesday  - Recommend continuing  dutasteride daily  - Recommend treating his candiduria with antifungal   - Small right renal hypodensity, can follow up renal US outpatient, will likely observe  - Please make NPO at midnight in Tuesday night for surgery          VTE Risk Mitigation (From admission, onward)        Ordered     IP VTE HIGH RISK PATIENT  Once      11/24/18 0126     Place sequential compression device  Until discontinued      11/24/18 0126          Thank you for your consult. I will follow-up with patient. Please  contact us if you have any additional questions.    Jae Ashton MD  Urology  [facility]

## 2018-11-24 NOTE — PLAN OF CARE
Problem: Patient Care Overview  Goal: Plan of Care Review  Outcome: Ongoing (interventions implemented as appropriate)  Treated pain that arose with a heat pack.  No signs of distress noted this shift.  Changed brief frequently.  Pt had no falls this shift.  No further complaints this shift.

## 2018-11-24 NOTE — ED NOTES
Patient received with complaint of hematuria; was seen in this ER 2 days ago and discharged from ER with a cystoscopy scheduled from next Thursday and pain medication; pt. Is having suprapubic abdominal pain to palpation and blood clots in urine;     No LDA's in place on arrival to department.    Daughter present.    Pain:  Denies pain currently; pain come with clots    Psychosocial:  Patient is calm and cooperative.  Patients insight and judgement are appropriate to situation.  Appears clean, well maintained, with clothing appropriate to environment.  No evidence of hallucinations, delusions, or psychosis.    Neuro:  Eyes open spontaneously.  Awake, alert.  Oriented x 4.  Speech clear and appropriate.  Tolerating saliva secretions well.  Able to follow commands, demonstrating ability to actively and appropriately communicate within context of current conversation.  Symmetrical facial muscles. 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      Airway:  Bilateral chest rise and fall.  RR regular and non labored.  Air entry patent and clear x 5 lobes of the lungs.     Circulatory:  Skin warm, dry, and pink.  Apical and radial pulses strong and regular.      Abdomen:  Abdomen soft and non distended. Suprapubic abdominal pain to palpation.     Urinary:  Little urine is produced , reports the daughter, due to dialysis; blood and blood clots coming from urethra.

## 2018-11-24 NOTE — SUBJECTIVE & OBJECTIVE
Past Medical History:   Diagnosis Date    Anemia     Blind     bilaterally    CHF (congestive heart failure)     Chronic kidney disease     Dementia 01/2017    Diabetes mellitus type II     General anesthetics causing adverse effect in therapeutic use     april / may 2014     Glaucoma (increased eye pressure)     Hypertension     Hypothermia 12/6/2017    Seizures        Past Surgical History:   Procedure Laterality Date    ANGIOPLASTY WITH STENT PLACEMENT Left 11/18/2016    Performed by [facility] at [facility] OR [address] [place] [address]    AV FISTULA PLACEMENT Left 4/2014    AV fistula to Left upper arm    [id number]-[id number]-WV left RC vs BC AVF Left 8/8/2014    Performed by [facility]in [facility]s MD at [facility] OR [address] [place] [address]    EYE SURGERY      FISTULOGRAM Left 11/18/2016    [facility]    FISTULOGRAM Left 4/15/2016    [facility]    FISTULOGRAM Left 1/29/2016    Performed by [facility] at [facility] CATH LAB    FISTULOGRAM Room 11 case Left 3/27/2015    Performed by [facility] MD at [facility] OR [address] [place] [address]    HYDROCELECTOMY Bilateral 11/22/2017    Performed by Temo [facility]erjee MD at [facility] OR 2ND [facility]    INSERTION-CATHETER-DIALYSIS-PERITONEAL-LAPAROSCOPIC N/A 5/8/2014    [facility]    INSERTION-CATHETER-DIALYSIS-PERITONEAL-LAPAROSCOPIC N/A 5/5/2014    [facility]    INSERTION-CATHETER-HEMODIALYSIS N/A 5/21/2014    [facility]    PERMCATH INSERTION-TUNNELED CVC N/A 5/13/2014    Performed by Lolis Jackson MD at [facility] CATH LAB    REMOVAL-CATHETER-PERITONEAL DIALYSIS N/A 7/30/2014    Performed by Js Ladd MD at [facility] OR [address] [place] [address]    SCROTUM EXPLORATION Bilateral 11/2017       Review of patient's allergies indicates:   Allergen Reactions    Lisinopril Swelling       No current  facility-administered medications on file prior to encounter.      Current Outpatient Medications on File Prior to Encounter   Medication Sig    aspirin 81 MG Chew Take 1 tablet (81 mg total) by mouth once daily. (Patient taking differently: Take 81 mg by mouth every morning. )    atorvastatin (LIPITOR) 40 MG tablet Take 1 tablet (40 mg total) by mouth once daily. (Patient taking differently: Take 80 mg by mouth every evening. )    B complex-vitamin C-folic acid (NEPHRO-JESSICA) 0.8 mg Tab Take 1 tablet by mouth every morning.     brimonidine-timolol (COMBIGAN) 0.2-0.5 % Drop Place 1 drop into the left eye 2 (two) times daily.    brinzolamide (AZOPT) 1 % ophthalmic suspension Place 1 drop into the left eye 3 (three) times daily.     calcium acetate (PHOSLO) 667 mg capsule Take 1 capsule (667 mg total) by mouth 3 (three) times daily with meals.    carvedilol (COREG) 25 MG tablet Take 1 tablet (25 mg total) by mouth 2 (two) times daily. Do not take on dialysis days. Hold for SBP < 110 or HR < 55 (Patient taking differently: Take 25 mg by mouth 2 (two) times daily. Hold for SBP < 110 or HR < 55)    doxazosin (CARDURA) 4 MG tablet Take 1 tablet (4 mg total) by mouth once daily.    doxycycline (MONODOX) 100 MG capsule Take 1 capsule (100 mg total) by mouth 2 (two) times daily. for 8 days    HYDROcodone-acetaminophen (NORCO) 5-325 mg per tablet Take 1 tablet by mouth every 4 (four) hours as needed for Pain.    levetiracetam (KEPPRA) 500 MG Tab Take 1 tablet (500 mg total) by mouth 2 (two) times daily.    TRAVOPROST (TRAVATAN Z OPHT) Place 1 drop into the left eye every evening. 1 Drops Left eye Every evening    blood sugar diagnostic (BLOOD GLUCOSE TEST) Strp 1 strip by Misc.(Non-Drug; Combo Route) route 2 (two) times daily.    dutasteride (AVODART) 0.5 mg capsule Take 1 capsule (0.5 mg total) by mouth once daily.    insulin aspart (NOVOLOG FLEXPEN) 100 unit/mL InPn  Insulin Pen Subcutaneous As directed.  DAMIEN  151-200 take 1 unitBS 201-250 take 2 unitsBS 251-300 take 3 unitsBS 301-350 take 4 unitsBS > 351 take 5 units and call MD    ketoconazole (NIZORAL) 2 % cream Apply topically once daily. feet    polyethylene glycol (GLYCOLAX) 17 gram PwPk Take 17 g by mouth once daily.    [DISCONTINUED] amLODIPine (NORVASC) 5 MG tablet Take 10 mg by mouth once daily.      Family History     Problem Relation (Age of Onset)    Cancer Daughter    Heart disease Sister, Brother        Tobacco Use    Smoking status: Never Smoker    Smokeless tobacco: Never Used   Substance and Sexual Activity    Alcohol use: No    Drug use: No    Sexual activity: No     Review of Systems   Constitutional: Negative for chills, fever and unexpected weight change.   HENT: Negative for sore throat.    Eyes: Negative for visual disturbance.   Respiratory: Negative for cough and shortness of breath.    Cardiovascular: Negative for chest pain and palpitations.   Gastrointestinal: Negative for abdominal pain, constipation, diarrhea, nausea and vomiting.   Genitourinary: Positive for hematuria and penile pain.   Musculoskeletal: Negative for arthralgias and myalgias.   Skin: Negative for pallor and rash.   Neurological: Positive for weakness. Negative for headaches.   Hematological: Negative for adenopathy.     Objective:     Vital Signs (Most Recent):  Temp: 97.5 °F (36.4 °C) (11/23/18 2230)  Pulse: 73 (11/23/18 2230)  Resp: 14 (11/23/18 2230)  BP: (!) 162/69 (11/23/18 2230)  SpO2: 99 % (11/23/18 2230) Vital Signs (24h Range):  Temp:  [97.4 °F (36.3 °C)-98.5 °F (36.9 °C)] 97.5 °F (36.4 °C)  Pulse:  [63-73] 73  Resp:  [14-20] 14  SpO2:  [98 %-100 %] 99 %  BP: (154-184)/(69-83) 162/69     Weight: 74.8 kg (165 lb)  Body mass index is 21.77 kg/m².    Physical Exam   Constitutional: He is oriented to person, place, and time. He appears well-developed and well-nourished. No distress.   HENT:   Head: Normocephalic and atraumatic.   Eyes:   Patient blind, no acute  abnormality noted   Neck: Normal range of motion. Neck supple.   Cardiovascular: Normal rate, regular rhythm and normal heart sounds. Exam reveals no gallop and no friction rub.   No murmur heard.  Pulmonary/Chest: Effort normal and breath sounds normal. He has no wheezes. He has no rales.   Abdominal: Soft. Bowel sounds are normal. He exhibits no mass. There is no tenderness. There is no rebound and no guarding.   Genitourinary: Rectum normal. Prostate is not tender.   Genitourinary Comments: Gross blood noted at urethral meatus. No sign of laceration obvious on urethra or foreskin.    Hard stool in rectal vault   Musculoskeletal: Normal range of motion. He exhibits no edema.   Lymphadenopathy:     He has no cervical adenopathy.   Neurological: He is alert and oriented to person, place, and time.           Significant Labs:   CBC:   Recent Labs   Lab 11/23/18  1856   WBC 8.84   HGB 10.2*   HCT 31.5*   *     CMP:   Recent Labs   Lab 11/23/18  1856   *   K 5.4*   CL 97   CO2 20*   *   BUN 85*   CREATININE 9.6*   CALCIUM 9.4   ANIONGAP 14   EGFRNONAA 4.3*       Significant Imaging: I have reviewed all pertinent imaging results/findings within the past 24 hours.

## 2018-11-24 NOTE — SUBJECTIVE & OBJECTIVE
Past Medical History:   Diagnosis Date    Anemia     Blind     bilaterally    CHF (congestive heart failure)     Chronic kidney disease     Dementia 01/2017    Diabetes mellitus type II     General anesthetics causing adverse effect in therapeutic use     april / may 2014     Glaucoma (increased eye pressure)     Hypertension     Hypothermia 12/6/2017    Seizures        Past Surgical History:   Procedure Laterality Date    ANGIOPLASTY WITH STENT PLACEMENT Left 11/18/2016    Performed by Orin Sims MD at [facility] OR [address] [place] [address]    AV FISTULA PLACEMENT Left 4/2014    AV fistula to Left upper arm    SPMVFBYGZHNT-DEPADHC-XH left RC vs BC AVF Left 8/8/2014    Performed by Orin Sims MD at [facility] OR [address] [place] [address]    EYE SURGERY      FISTULOGRAM Left 11/18/2016    [facility]    FISTULOGRAM Left 4/15/2016    [facility]    FISTULOGRAM Left 1/29/2016    Performed by [facility]in [facility]s MD at [facility] CATH LAB    FISTULOGRAM Room 11 case Left 3/27/2015    Performed by Orin Sims MD at [facility] OR [address] [place] [address]    HYDROCELECTOMY Bilateral 11/22/2017    Performed by Temo Banerjee MD at [facility] OR 2ND FLR    INSERTION-CATHETER-DIALYSIS-PERITONEAL-LAPAROSCOPIC N/A 5/8/2014    Performed by Js Ladd MD at [facility] OR [facility]    INSERTION-CATHETER-DIALYSIS-PERITONEAL-LAPAROSCOPIC N/A 5/5/2014    Performed by Js Ladd MD at [facility]    INSERTION-CATHETER-HEMODIALYSIS N/A 5/21/2014    [facility]    PERMCATH INSERTION-TUNNELED CVC N/A 5/13/2014    Performed by Lolis Jackson MD at [facility] CATH LAB    REMOVAL-CATHETER-PERITONEAL DIALYSIS N/A 7/30/2014    Performed by Js Ladd MD at [facility] OR [address] [place] [address]    SCROTUM EXPLORATION Bilateral 11/2017       Review of patient's allergies indicates:   Allergen Reactions    Lisinopril Swelling     Current Facility-Administered  Medications   Medication Frequency    acetaminophen tablet 650 mg Q6H PRN    aspirin chewable tablet 81 mg Daily    atorvastatin tablet 40 mg Daily    brimonidine 0.15 % OPTH DROP ophthalmic solution 1 drop BID    carvedilol tablet 25 mg BID    dextrose 50% injection 12.5 g PRN    dextrose 50% injection 25 g PRN    doxazosin tablet 4 mg Daily    dutasteride capsule 0.5 mg Daily    glucagon (human recombinant) injection 1 mg PRN    glucose chewable tablet 16 g PRN    glucose chewable tablet 24 g PRN    insulin aspart U-100 pen 0-5 Units QID (AC + HS) PRN    levETIRAcetam tablet 500 mg BID    sodium chloride 0.9% flush 5 mL PRN    timolol maleate 0.5% ophthalmic solution 1 drop BID    travoprost 0.004 % ophthalmic solution 1 drop QHS     Family History     Problem Relation (Age of Onset)    Cancer Daughter    Heart disease Sister, Brother        Tobacco Use    Smoking status: Never Smoker    Smokeless tobacco: Never Used   Substance and Sexual Activity    Alcohol use: No    Drug use: No    Sexual activity: No     Review of Systems   Constitutional: Positive for fatigue. Negative for activity change, appetite change, chills, diaphoresis, fever and unexpected weight change.   HENT: Negative for congestion, ear discharge, ear pain, facial swelling, hearing loss, nosebleeds, sinus pressure, sore throat and trouble swallowing.    Eyes: Negative for photophobia, pain, discharge, redness, itching and visual disturbance.        Blind bilateral   Respiratory: Negative for apnea, cough, chest tightness, shortness of breath and wheezing.    Cardiovascular: Negative for chest pain, palpitations and leg swelling.   Gastrointestinal: Negative for abdominal distention, abdominal pain, constipation, diarrhea and vomiting.   Endocrine: Negative for cold intolerance, heat intolerance, polydipsia and polyuria.   Genitourinary: Positive for hematuria. Negative for decreased urine volume, difficulty urinating,  dysuria, flank pain, frequency, scrotal swelling, testicular pain and urgency.   Musculoskeletal: Negative for arthralgias, back pain, gait problem, joint swelling, myalgias, neck pain and neck stiffness.   Skin: Negative for color change, pallor, rash and wound.   Allergic/Immunologic: Negative for environmental allergies and food allergies.   Neurological: Positive for weakness. Negative for dizziness, tremors, seizures, syncope, facial asymmetry, speech difficulty, light-headedness, numbness and headaches.        Dificulty walking   oriented as per daughter   Hematological: Negative for adenopathy. Does not bruise/bleed easily.   Psychiatric/Behavioral: Negative for agitation, behavioral problems, dysphoric mood and sleep disturbance. The patient is not nervous/anxious.      Objective:     Vital Signs (Most Recent):  Temp: 97.5 °F (36.4 °C) (11/24/18 0757)  Pulse: (!) 57 (11/24/18 0757)  Resp: 18 (11/24/18 0757)  BP: (!) 155/73 (11/24/18 0757)  SpO2: 100 % (11/24/18 0757)  O2 Device (Oxygen Therapy): room air (11/24/18 0412) Vital Signs (24h Range):  Temp:  [96.7 °F (35.9 °C)-98.5 °F (36.9 °C)] 97.5 °F (36.4 °C)  Pulse:  [57-73] 57  Resp:  [14-20] 18  SpO2:  [98 %-100 %] 100 %  BP: (154-184)/(68-83) 155/73     Weight: 74.8 kg (165 lb) (11/23/18 1612)  Body mass index is 21.77 kg/m².  Body surface area is 1.96 meters squared.    I/O last 3 completed shifts:  In: 480 [P.O.:480]  Out: -     Physical Exam   Constitutional: He appears well-developed and well-nourished. No distress.   HENT:   Head: Normocephalic and atraumatic.   Mouth/Throat: Oropharynx is clear and moist. No oropharyngeal exudate.   Eyes: Right eye exhibits no discharge. Left eye exhibits no discharge. No scleral icterus.   Blind, nonicteric,   Neck: Normal range of motion. Neck supple. No JVD present. No tracheal deviation present. No thyromegaly present.   Cardiovascular: Normal rate, regular rhythm and normal heart sounds. Exam reveals no gallop  and no friction rub.   No murmur heard.  Trace ankle edema ,  ANGLE AVF good thrill and bruit, does not collapse on elevation, + aneurysmal dilitation   Pulmonary/Chest: Effort normal and breath sounds normal. No stridor. No respiratory distress. He has no wheezes. He has no rales. He exhibits no tenderness.   No crackles good excursion   Abdominal: Soft. Bowel sounds are normal. He exhibits no distension and no mass. There is no tenderness. There is no rebound and no guarding. No hernia.   Musculoskeletal: Normal range of motion. He exhibits no edema or tenderness.   Lymphadenopathy:     He has no cervical adenopathy.   Neurological: He is alert. No cranial nerve deficit. He exhibits normal muscle tone. Coordination normal.   Oriented to person and time   Skin: Skin is warm and dry. No rash noted. He is not diaphoretic. No erythema. No pallor.   Psychiatric: He has a normal mood and affect. His behavior is normal. Judgment and thought content normal.   Nursing note and vitals reviewed.      Significant Labs:  All labs within the past 24 hours have been reviewed.    Significant Imaging:  Labs: Reviewed  CT: Reviewed  Echo: Reviewed

## 2018-11-24 NOTE — ED PROVIDER NOTES
Encounter Date: 11/23/2018    SCRIBE #1 NOTE: I, Nic Lim, am scribing for, and in the presence of,  Dr. Rodriguez. I have scribed the entire note.       History     Chief Complaint   Patient presents with    Hematuria     seen here few days ago, now just passing clots     Time patient was seen by the provider: 6:18 PM      The patient is a 89 y.o. male with co-morbidities including: HTN, DM, CHF, dementia, and ESRD, who presents to the ED with a complaint of gross hematuria. He was seen in the ED 11/21 and by urology on 11/13 for the same complaint. Patient's daughter at bedside states hematuria has remained at a constant rate for the past few days but notes an increase in clots. She also states the patient has told her his penis hurts. She reports that she had to call the fire department this morning to hep get him from his bed to his chair today due to the patient's weakness. He missed dialysis today and was last dialyzed 3 days ago. Patient also complains of abdominal pain since his discharge 11/21. His daughter at bedside denies nausea, vomiting, diarrhea, fever, cough, SOB, and chest pain.       The history is provided by a relative.     Review of patient's allergies indicates:   Allergen Reactions    Lisinopril Swelling     Past Medical History:   Diagnosis Date    Anemia     Blind     bilaterally    CHF (congestive heart failure)     Chronic kidney disease     Dementia 01/2017    Diabetes mellitus type II     General anesthetics causing adverse effect in therapeutic use     april / may 2014     Glaucoma (increased eye pressure)     Hypertension     Hypothermia 12/6/2017    Seizures      Past Surgical History:   Procedure Laterality Date    ANGIOPLASTY WITH STENT PLACEMENT Left 11/18/2016    [facility]formed by [facility]    AV FISTULA PLACEMENT Left 4/2014    AV fistula to Left upper arm    MKIBTRLAKEFU-PCYWZIJ-YN left RC vs BC AVF Left 8/8/2014    Performed by Orin  JA Sims MD at [facility] OR [address] [place] [address]    EYE SURGERY      FISTULOGRAM Left 11/18/2016    [facility]    FISTULOGRAM Left 4/15/2016    [facility]formed by [facility] at [facility]    FISTULOGRAM Left 1/29/2016    Performed by Orin Sims MD at [facility] CATH LAB    FISTULOGRAM Room 11 case Left 3/27/2015    Performed by Orin Sims MD at [facility] OR [address] [place] [address]    HYDROCELECTOMY Bilateral 11/22/2017    Performed by Temo [facility]jee MD at [facility] OR [facility]    INSERTION-CATHETER-DIALYSIS-PERITONEAL-LAPAROSCOPIC N/A 5/8/2014    [facility]    INSERTION-CATHETER-DIALYSIS-PERITONEAL-LAPAROSCOPIC N/A 5/5/2014    [facility]    INSERTION-CATHETER-HEMODIALYSIS N/A 5/21/2014    Performed by Orin Sims MD at [facility] OR [address] [place] [address]    PERMCATH INSERTION-TUNNELED CVC N/A 5/13/2014    Performed by Lolis Jackson MD at [facility] CATH LAB    REMOVAL-CATHETER-PERITONEAL DIALYSIS N/A 7/30/2014    Performed by Js Ladd MD at [facility] OR [address] [place] [address]    SCROTUM EXPLORATION Bilateral 11/2017     Family History   Problem Relation Age of Onset    Heart disease Sister         mi    Heart disease Brother         mi    Cancer Daughter         BREAST X 2     Social History     Tobacco Use    Smoking status: Never Smoker    Smokeless tobacco: Never Used   Substance Use Topics    Alcohol use: No    Drug use: No     Review of Systems   Constitutional: Negative for fever.   HENT: Negative for sore throat.    Eyes: Negative for discharge.   Respiratory: Negative for cough and shortness of breath.    Cardiovascular: Negative for chest pain.   Gastrointestinal: Positive for abdominal pain. Negative for diarrhea, nausea and vomiting.   Genitourinary: Positive for hematuria (with clots) and penile pain.   Musculoskeletal: Negative for back pain.   Skin: Negative for rash.   Neurological: Positive for  weakness.       Physical Exam     Initial Vitals [11/23/18 1612]   BP Pulse Resp Temp SpO2   (!) 174/77 63 20 97.4 °F (36.3 °C) 99 %      MAP       --         Physical Exam    Nursing note and vitals reviewed.  Constitutional: He appears well-developed and well-nourished. He is not diaphoretic. No distress.   HENT:   Head: Normocephalic and atraumatic.   Nose: Nose normal.   Eyes: Right eye exhibits no discharge. Left eye exhibits no discharge.   Pale conjunctiva.   Neck: Normal range of motion. Neck supple.   Cardiovascular: Normal rate, regular rhythm and normal heart sounds. Exam reveals no gallop and no friction rub.    No murmur heard.  Pulmonary/Chest: Breath sounds normal. No respiratory distress. He has no wheezes. He has no rhonchi. He has no rales.   Abdominal: Soft. There is tenderness (suprapubic). There is no rebound and no guarding.   superpubic ttp   Genitourinary: Discharge found.   Genitourinary Comments: Uncircumcised penis. Gross hematuria.    Musculoskeletal: Normal range of motion. He exhibits no edema or tenderness.   No CVA tenderness.    Neurological: He is alert.   Skin: Skin is warm and dry. No rash noted. No erythema.         ED Course   Procedures  Labs Reviewed   BASIC METABOLIC PANEL - Abnormal; Notable for the following components:       Result Value    Sodium 131 (*)     Potassium 5.4 (*)     CO2 20 (*)     Glucose 224 (*)     BUN, Bld 85 (*)     Creatinine 9.6 (*)     eGFR if  5.0 (*)     eGFR if non  4.3 (*)     All other components within normal limits   CBC W/ AUTO DIFFERENTIAL - Abnormal; Notable for the following components:    RBC 4.06 (*)     Hemoglobin 10.2 (*)     Hematocrit 31.5 (*)     MCV 78 (*)     MCH 25.1 (*)     RDW 22.4 (*)     Platelets 124 (*)     Gran% 76.3 (*)     Lymph% 13.8 (*)     All other components within normal limits     EKG Readings: (Independently Interpreted)   Initial Reading: No STEMI. Rhythm: Normal Sinus Rhythm.  Heart Rate: 72. Ectopy: No Ectopy. Conduction: Normal. ST Segments: Normal ST Segments. T Waves: Normal. Clinical Impression: Normal Sinus Rhythm       Imaging Results    None          Medical Decision Making:   History:   Old Medical Records: I decided to obtain old medical records.  Old Records Summarized: records from previous admission(s).  Clinical Tests:   Lab Tests: Ordered and Reviewed  ED Management:  89-year-old male who presents with persistent gross hematuria.  Today was unable to do activities of daily living or transfer from bed to chair.  He missed his dialysis appointment.  Physical exam as above.  Labs show stable H&H.  Elevated potassium likely secondary to missing dialysis.  Patient is now nonambulatory.  Will admit to Internal Medicine.  Patient will need dialysis tomorrow, PT/OT, and Urology eval.  Did bedside teaching.  All questions answered.  Daughter acknowledges understanding.  Other:   I have discussed this case with another health care provider.       <> Summary of the Discussion: FRANCES Hart Attestation:   Scribe #1: I performed the above scribed service and the documentation accurately describes the services I performed. I attest to the accuracy of the note.               Clinical Impression:   The primary encounter diagnosis was Gross hematuria. Diagnoses of Anemia, unspecified type, Right kidney mass, Hyperkalemia, Acute hyperkalemia, and ESRD (end stage renal disease) on dialysis were also pertinent to this visit.      Disposition:   Disposition: Placed in Observation  Condition: Stable                        Jaspreet Rodriguez MD  11/23/18 2040       Jaspreet Rodriguez MD  11/23/18 2114

## 2018-11-24 NOTE — ASSESSMENT & PLAN NOTE
88 yo AAM with ESRD (Still making urine, dialysis MWF), T2DM, CHF, and seizure disorder with recent onset of hematuria, possible mass R kidney seen on recnet CT with worsening gross hematuria and weakness and hbg 10.2--> 8.7 in 3 days,  Who has missed dialysis yesterday due to weakness.  He now presents with worsening anemia, azotemia hyponatremia, metabolic acidosis, hypoalbuminemia    ESRD :  underdialyzed  Consent obtained from Daughter and will plan for HD today  And MON, Tuesday, Wed as tolerated    Anemia  Will order procrit, after checking iron stores, txf as needed hbg> 8    Hypotension at HD, H/o pulmonary edema : will order cxr to guide volume management, bnp, will order blood c/s at HD today   agree with holding bp meds for now  Would try and reduce coreg dose if used   Suggest repeat echo  as indicated,        Metabolic acidosis: repeat labs after HD    Metabolic bone disease n calcium acetate, check serum phos

## 2018-11-24 NOTE — ASSESSMENT & PLAN NOTE
Patient ESRD, dialysis dependent. Access via left arm AVF. Patient normally on MWF dialysis however was on modified schedule due to holiday scheduling. Last dialysis was Tuesday. Possibly contributing to patient's episode of weakness.    - Nephrology consulted for dialysis, appreciate their help

## 2018-11-25 NOTE — SUBJECTIVE & OBJECTIVE
Interval History: Patient refused blood draw overnight. Switched his blood draws to 9:00am daily. Patient feels better after dialysis yesterday and does not have complains as now. Patient scheduled for cytoscope on Wednesday. Nurse reported that he has visibly less hematuria and no clots.     Review of Systems  Objective:     Vital Signs (Most Recent):  Temp: 96.3 °F (35.7 °C) (11/25/18 1116)  Pulse: 60 (11/25/18 1116)  Resp: 16 (11/25/18 1116)  BP: 131/62 (11/25/18 1116)  SpO2: 99 % (11/25/18 1116) Vital Signs (24h Range):  Temp:  [96.1 °F (35.6 °C)-98.2 °F (36.8 °C)] 96.3 °F (35.7 °C)  Pulse:  [51-81] 60  Resp:  [16-18] 16  SpO2:  [96 %-100 %] 99 %  BP: (100-174)/(45-84) 131/62     Weight: 74.8 kg (165 lb)  Body mass index is 21.77 kg/m².    Intake/Output Summary (Last 24 hours) at 11/25/2018 1204  Last data filed at 11/24/2018 1740  Gross per 24 hour   Intake 890 ml   Output 2150 ml   Net -1260 ml      Physical Exam   Constitutional: He is oriented to person, place, and time. He appears well-developed and well-nourished. No distress.   HENT:   Head: Normocephalic and atraumatic.   Eyes:   Patient blind, no acute abnormality noted   Neck: Normal range of motion. Neck supple.   Cardiovascular: Normal rate, regular rhythm and normal heart sounds. Exam reveals no gallop and no friction rub.   No murmur heard.  Pulmonary/Chest: Effort normal and breath sounds normal. He has no wheezes. He has no rales.   Abdominal: Soft. Bowel sounds are normal. He exhibits no mass. There is no tenderness. There is no rebound and no guarding.   Genitourinary: Rectum normal. Prostate is not tender.   Genitourinary Comments: No hematuria was visualized.    Musculoskeletal: Normal range of motion. He exhibits no edema.   AV fistula Left upper arm thrilled   Lymphadenopathy:     He has no cervical adenopathy.   Neurological: He is alert and oriented to person, place, and time.       Significant Labs:   CBC:   Recent Labs   Lab  11/24/18  1102 11/24/18  1530 11/25/18  1123   WBC 7.15 7.77 6.91   HGB 8.4* 8.6* 8.2*   HCT 25.6* 25.9* 24.7*   * 132* 121*     CMP:   Recent Labs   Lab 11/23/18  1856 11/24/18  0624 11/25/18  1123   * 131* 134*   K 5.4* 4.9 4.3   CL 97 100 99   CO2 20* 18* 26   * 215* 187*   BUN 85* 88* 47*   CREATININE 9.6* 10.0* 7.4*   CALCIUM 9.4 8.8 8.3*   PROT  --  7.2  --    ALBUMIN  --  2.7* 2.6*   BILITOT  --  0.4  --    ALKPHOS  --  81  --    AST  --  27  --    ALT  --  26  --    ANIONGAP 14 13 9   EGFRNONAA 4.3* 4.1* 5.9*       Significant Imaging: I have reviewed all pertinent imaging results/findings within the past 24 hours.

## 2018-11-25 NOTE — PLAN OF CARE
Problem: Physical Therapy Goal  Goal: Physical Therapy Goal  Goals to be met by: 18    Patient will increase functional independence with mobility by performin. Supine to sit with MInimal Assistance  2. Sit to supine with MInimal Assistance  3. Gait  x 100 feet with Minimal Assistance no device.  4. Ascend/descend 7 stairs with right Handrails Minimal Assistance.    Outcome: Ongoing (interventions implemented as appropriate)  Evaluation complete.  Goals established to improve functional mobility.    DC rec's for HHPT. Patient needs increased level of care at home to decrease fall risk; however, if family unable to provide patient may need potential long-term placement.    Jac Morillo III, DPT, PT  2018

## 2018-11-25 NOTE — PLAN OF CARE
Problem: Occupational Therapy Goal  Goal: Occupational Therapy Goal  Goals to be met by: 12/2/18     Patient will increase functional independence with ADLs by performing following goals with cues and assist as noted:    UE Dressing with Minimal Assistance.  LE Dressing with Moderate Assistance.  Grooming while seated with Minimal Assistance.  Toileting from bedside commode/toilet as appropriate with Moderate Assistance for hygiene and clothing management.   Toilet transfer to bedside commode/toilet as appropriate with Minimal Assistance.    Outcome: Ongoing (interventions implemented as appropriate)  Goals established  Shannan Rasmussen, JULIETTER

## 2018-11-25 NOTE — ASSESSMENT & PLAN NOTE
Patient presents with altered mental status and weakness on background of ESRD and gross hematuria for past two weeks. Patient's hemoglobin remains unchanged from previous ED visit at 10.2, baseline appears to be 12 per chart review. Patient 3 days since last dialysis as well as endorsing decreased PO intake. Denying any symptoms of infection at this time. WBC wnl and afebrile. Rectal exam negative for prostate tenderness. Patient has been receiving tramadol for pain, per daughter however the patient had no side effects to the medication previously. Patient is at his baseline at this time, appropriately answering questions.    Given infection is less likely on history, presumptive cause is due to electrolyte derangement due to missing dialysis. Patient hemodynamically stable and not complaining of other signs of anemia at this time.    - Nephrology consulted for dialysis tomorrow, appreciate their help  - Blood glucose elevated at 241, possible contributing. Patient is on SSI insulin at home. Will restart and closely monitor blood glucoses with goal 140-180 while inpatient.  - Urinalysis reflex to culture drawn - NGTD  - Holding patient's home tramadol  - Fall precautions and delirium precautions - 9 AM blood draw

## 2018-11-25 NOTE — PLAN OF CARE
Problem: Patient Care Overview  Goal: Plan of Care Review  Outcome: Ongoing (interventions implemented as appropriate)  Patient is alert and oriented to self and time. Mood is elevated: smiling. More talkative. Called phebotomy to get lab draw as patient had refused last night. H/hg results in. Team to see patient and daughter at bedside. SNF will be the best next move for patient to get stronger. Daughter would like patient to go to [facility] down the street. No falls noted. PT/OT saw patient. Patient said he was real tired after walking up the stairs. POC reviewed with patient and daughter. Glucose was within range and did not need coverage as of this witting. Tele was discontinued.

## 2018-11-25 NOTE — PT/OT/SLP EVAL
"Occupational Therapy   Evaluation    Name: Ronald Campbell  MRN: [id number]  Admitting Diagnosis:  Metabolic encephalopathy      Recommendations:     Discharge Recommendations: home with home health(and increased caregiver support/environment)  Discharge Equipment Recommendations:  none  Barriers to discharge:  Decreased caregiver support, Inaccessible home environment    History:     Occupational Profile:  Living Environment: Lives with dtr in 2 story home with 7 steps  Previous level of function: assist with ADL's and ambulation, blind  Roles and Routines: none stated  Equipment Used at Home:  walker, rolling, bedside commode, shower chair, wheelchair  Assistance upon Discharge: some, dtr works, pt reports that an "assistant" comes 2-3 day's/wk for "a couple of hours" (unclear via pt history)    Past Medical History:   Diagnosis Date    Anemia     Blind     bilaterally    CHF (congestive heart failure)     Chronic kidney disease     Dementia 01/2017    Diabetes mellitus type II     General anesthetics causing adverse effect in therapeutic use     april / may 2014     Glaucoma (increased eye pressure)     Hypertension     Hypothermia 12/6/2017    Seizures        Past Surgical History:   Procedure Laterality Date    ANGIOPLASTY WITH STENT PLACEMENT Left 11/18/2016    Performed by Orin Sims [facility] at [facility] OR [facility]    AV FISTULA PLACEMENT Left 4/2014    AV fistula to Left upper arm    [id number] left RC vs BC AVF Left 8/8/2014    Performed by [facility] at [facility]    EYE SURGERY      FISTULOGRAM Left 11/18/2016    Performed by [facility] at [facility] OR [facility]    FISTULOGRAM Left 4/15/2016    Performed by [facility] at [facility]    FISTULOGRAM Left 1/29/2016    Performed by Orin [facility]s [facility] at [facility] CATH LAB    FISTULOGRAM Room 11 case Left 3/27/2015    Performed by [facility] at [facility]    HYDROCELECTOMY Bilateral 11/22/2017    " "Performed by [facility]o Banerjee [facility] at [facility]    INSERTION-CATHETER-DIALYSIS-PERITONEAL-LAPAROSCOPIC N/A 5/8/2014    Performed by Js Ladd [facility] at [facility]    INSERTION-CATHETER-DIALYSIS-PERITONEAL-LAPAROSCOPIC N/A 5/5/2014    Performed by [facility]add [facility] at [facility]    INSERTION-CATHETER-HEMODIALYSIS N/A 5/21/2014    Performed by [facility] at [facility]    PERMCATH INSERTION-TUNNELED CVC N/A 5/13/2014    Performed by Lolis Jackson MD at [facility] CATH LAB    REMOVAL-CATHETER-PERITONEAL DIALYSIS N/A 7/30/2014    Performed by Js Ladd [facility] at [facility]    SCROTUM EXPLORATION Bilateral 11/2017       Subjective     Chief Complaint: says he feels ok, a little weak  Patient/Family Comments/goals: none stated    Pain/Comfort:  · Pain Rating 1: 0/10    Patients cultural, spiritual, Worship conflicts given the current situation: none    Objective:     Communicated with: nsg and PT prior to session.  Patient found with:  telemetry, peripheral IV upon OT entry to room.    General Precautions: Standard, fall   Orthopedic Precautions:    Braces:       Occupational Performance:    Bed Mobility:    Up in chair on arrival    Functional Mobility/Transfers:cues throughout to navigate environment  · Sit to stand from chair x 2 reps with min assist/hand held assist  · Functional Mobility: ambulated x ~40 ft without AD, min assist/HHA  · Climbed up/down ~8 steps using HR on R and min assist/HHA cues    Activities of Daily Living:  · Donned gown with cues and mod assist  · Socks total assist    Cognitive/Visual Perceptual:  Follows basic commands with min cues at times, decreased insight, but did state that he knows not to get up without help, oriented to name, "hospital", decreased memory/recall    Physical Exam:  BUE's grossly wfl with shoulders to ~90/100' and decreased internal rotation    AMPAC 6 Click ADL:  AMPAC Total Score: 15    Treatment & " "Education:  -Pt edu on OT role/POC  -Importance of OOB activity with staff assistance only  -Safety during functional t/f and mobility   - Multiple self care tasks completed-- assistance level noted above  - All questions/concerns answered within OT scope of practice           Education:    Patient left up in chair with call button in reach    Assessment:     Ronald Campbell is a 89 y.o. male with a medical diagnosis of Metabolic encephalopathy.  He presents with the following performance deficits affecting function: impaired endurance, weakness, impaired self care skills, impaired functional mobilty, visual deficits, decreased lower extremity function, gait instability. Pt likely very close to baseline and would benefit from increased caregiver assist.    Rehab Prognosis: Fair; patient would benefit from acute skilled OT services to address these deficits and reach maximum level of function.         Clinical Decision Makin.  OT Low:  "Pt evaluation falls under low complexity for evaluation coding due to performance deficits noted in 1-3 areas as stated above and 0 co-morbities affecting current functional status. Data obtained from problem focused assessments. No modifications or assistance was required for completion of evaluation. Only brief occupational profile and history review completed."     Plan:     Patient to be seen 2 x/week to address the above listed problems via self-care/home management, therapeutic activities, therapeutic exercises  · Plan of Care Expires: 18  · Plan of Care Reviewed with: patient    This Plan of care has been discussed with the patient who was involved in its development and understands and is in agreement with the identified goals and treatment plan    GOALS:   Multidisciplinary Problems     Occupational Therapy Goals        Problem: Occupational Therapy Goal    Goal Priority Disciplines Outcome Interventions   Occupational Therapy Goal     OT, PT/OT Ongoing " (interventions implemented as appropriate)    Description:  Goals to be met by: 12/2/18     Patient will increase functional independence with ADLs by performing following goals with cues and assist as noted:    UE Dressing with Minimal Assistance.  LE Dressing with Moderate Assistance.  Grooming while seated with Minimal Assistance.  Toileting from bedside commode/toilet as appropriate with Moderate Assistance for hygiene and clothing management.   Toilet transfer to bedside commode/toilet as appropriate with Minimal Assistance.                      Time Tracking:     OT Date of Treatment: 11/25/18  OT Start Time: 0829  OT Stop Time: 0851  OT Total Time (min): 22 min    Billable Minutes:Evaluation 22 min    Shannan Rasmussen OT  11/25/2018

## 2018-11-25 NOTE — ASSESSMENT & PLAN NOTE
Patient with 2 week history of hematuria, worsening per daughter. Evaluated by urology as an outpatient and during last ED visit for similar symptoms and recommended continued outpatient workup with cystoscopy scheduled 10/29/18. On course of doxycycline for presumed UTI from prior visit. Patient denied blood draw. Less hematuria, will set blood draw to 9AM     - Patient currently hemodynamically stable, Hemoglobin 10.2, same as last check 4 days previous  - Urology consulted, and cytoscope on Wednesday  - Culture from previous visit reviewed, positive for candida. Urinalysis reflex to culture ordered today. Will hold doxycyline  - CBC's daily

## 2018-11-25 NOTE — PLAN OF CARE
Problem: Patient Care Overview  Goal: Plan of Care Review  Outcome: Ongoing (interventions implemented as appropriate)  Hemodialysis tx complete, 1.5L Removed in 3.5 hour tx, tolerated well. Blood returned via ANGLE AVF, 15g needles removed x2, gauze and tape applied, pressure held for 10 minutes, hemostasis achieved

## 2018-11-25 NOTE — PLAN OF CARE
"Problem: Patient Care Overview  Goal: Plan of Care Review  Outcome: Ongoing (interventions implemented as appropriate)  Pt was getting a little confused at night. He refused lab draw, telemetry monitor, to be repositioned, also refusing to be touch. Pt verbalized," just leave me alone" while pulling his telemetry monitor and his gown. Dr Grace made aware, no further order received. Free of fall, bed rails up x 3. Will continue to monitor.      "

## 2018-11-25 NOTE — NURSING
Patient back from dialysis. Daughter at bedside and will assist patient with dinner. Vitals stable. Dressing on LUE CDI.

## 2018-11-25 NOTE — ASSESSMENT & PLAN NOTE
Patient ESRD, dialysis dependent. Access via left arm AVF. Patient normally on MWF dialysis however was on modified schedule due to holiday scheduling. Last dialysis was Tuesday. Possibly contributing to patient's episode of weakness.    - Nephrology consulted for dialysis and received dialysis. (MWF schedule)

## 2018-11-25 NOTE — PROGRESS NOTES
"[facility] Hospital Medicine  Progress Note    Patient Name: Ronald Campbell  MRN: [id number]  Patient Class: OP- Observation   Admission Date: 11/23/2018  Length of Stay: 0 days  Attending Physician: Js Medeiros MD  Primary Care Provider: Bart Shaw MD    Hospital Medicine Team: [facility] MED 2 ARNEL Toure MD    Subjective:     Principal Problem:Metabolic encephalopathy    HPI:  Mr. Campbell is an 89 year old male with a past medical history of ESRD (Still making urine, dialysis MWF), T2DM, CHF, and seizure disorder who presents to [facility] ED for evaluation of gross hematuria and weakness. The patient has dementia and is blind and is accompanied by his daughter who is his primary caregiver. She states that he has been having hematuria for the past two weeks. She states first noticed it when there was sidney blood in his diaper and has progressed to passing clots. He was evaluated by urology on 11/13/18 at which point he was scheduled for an outpatient cystoscopy and ultrasound which is scheduled for 11/29. The patient was seen recently in the ED because his daughter noticed increasing clots in his diaper. He was evaluated by urology at that time and determined safe for discharge and to follow up for scheduled cystoscopy. The patient was brought to the ED today because he had an episode of weakness on the background of persistent hematuria. The patient is unable to ambulate with assistance and per the daughter while he was walking with her today his legs "gave out on him." She supported him to the floor to a seated position and called the fire department to help him up and he was transported to the ED. Per the daughter the patient had only been complaining of penile pain. The patient and daughter deny any recent illness or sick contacts. He was scheduled for dialysis today however could not go due to weakness. Last dialysis was Tuesday. He is complaining of some mild low back pain at this time as " "well, he states he feels like he "needs to go" but can't. Per daughter the patient's last bowel movement was the previous night and was normal. He was prescribed Norco 5mg in ED for pain and had taken 2-3 pills since discharge per the daughter. The patient has had some decreased PO intake over the past few days 2/2 symptoms.    Hospital Course:  No notes on file    Interval History: Patient refused blood draw overnight. Switched his blood draws to 9:00am daily. Patient feels better after dialysis yesterday and does not have complains as now. Patient scheduled for cytoscope on Wednesday. Nurse reported that he has visibly less hematuria and no clots.     Review of Systems  Objective:     Vital Signs (Most Recent):  Temp: 96.3 °F (35.7 °C) (11/25/18 1116)  Pulse: 60 (11/25/18 1116)  Resp: 16 (11/25/18 1116)  BP: 131/62 (11/25/18 1116)  SpO2: 99 % (11/25/18 1116) Vital Signs (24h Range):  Temp:  [96.1 °F (35.6 °C)-98.2 °F (36.8 °C)] 96.3 °F (35.7 °C)  Pulse:  [51-81] 60  Resp:  [16-18] 16  SpO2:  [96 %-100 %] 99 %  BP: (100-174)/(45-84) 131/62     Weight: 74.8 kg (165 lb)  Body mass index is 21.77 kg/m².    Intake/Output Summary (Last 24 hours) at 11/25/2018 1204  Last data filed at 11/24/2018 1740  Gross per 24 hour   Intake 890 ml   Output 2150 ml   Net -1260 ml      Physical Exam   Constitutional: He is oriented to person, place, and time. He appears well-developed and well-nourished. No distress.   HENT:   Head: Normocephalic and atraumatic.   Eyes:   Patient blind, no acute abnormality noted   Neck: Normal range of motion. Neck supple.   Cardiovascular: Normal rate, regular rhythm and normal heart sounds. Exam reveals no gallop and no friction rub.   No murmur heard.  Pulmonary/Chest: Effort normal and breath sounds normal. He has no wheezes. He has no rales.   Abdominal: Soft. Bowel sounds are normal. He exhibits no mass. There is no tenderness. There is no rebound and no guarding.   Genitourinary: Rectum normal. " Prostate is not tender.   Genitourinary Comments: No hematuria was visualized.    Musculoskeletal: Normal range of motion. He exhibits no edema.   AV fistula Left upper arm thrilled   Lymphadenopathy:     He has no cervical adenopathy.   Neurological: He is alert and oriented to person, place, and time.       Significant Labs:   CBC:   Recent Labs   Lab 11/24/18  1102 11/24/18  1530 11/25/18  1123   WBC 7.15 7.77 6.91   HGB 8.4* 8.6* 8.2*   HCT 25.6* 25.9* 24.7*   * 132* 121*     CMP:   Recent Labs   Lab 11/23/18  1856 11/24/18  0624 11/25/18  1123   * 131* 134*   K 5.4* 4.9 4.3   CL 97 100 99   CO2 20* 18* 26   * 215* 187*   BUN 85* 88* 47*   CREATININE 9.6* 10.0* 7.4*   CALCIUM 9.4 8.8 8.3*   PROT  --  7.2  --    ALBUMIN  --  2.7* 2.6*   BILITOT  --  0.4  --    ALKPHOS  --  81  --    AST  --  27  --    ALT  --  26  --    ANIONGAP 14 13 9   EGFRNONAA 4.3* 4.1* 5.9*       Significant Imaging: I have reviewed all pertinent imaging results/findings within the past 24 hours.    Assessment/Plan:      * Metabolic encephalopathy    Patient presents with altered mental status and weakness on background of ESRD and gross hematuria for past two weeks. Patient's hemoglobin remains unchanged from previous ED visit at 10.2, baseline appears to be 12 per chart review. Patient 3 days since last dialysis as well as endorsing decreased PO intake. Denying any symptoms of infection at this time. WBC wnl and afebrile. Rectal exam negative for prostate tenderness. Patient has been receiving tramadol for pain, per daughter however the patient had no side effects to the medication previously. Patient is at his baseline at this time, appropriately answering questions.    Given infection is less likely on history, presumptive cause is due to electrolyte derangement due to missing dialysis. Patient hemodynamically stable and not complaining of other signs of anemia at this time.    - Nephrology consulted for dialysis  tomorrow, appreciate their help  - Blood glucose elevated at 241, possible contributing. Patient is on SSI insulin at home. Will restart and closely monitor blood glucoses with goal 140-180 while inpatient.  - Urinalysis reflex to culture drawn - NGTD  - Holding patient's home tramadol  - Fall precautions and delirium precautions - 9 AM blood draw        Discharge planning issues    - PT/OT consulted- likely need to go to SNF  - Case management consulted         Gross hematuria    Patient with 2 week history of hematuria, worsening per daughter. Evaluated by urology as an outpatient and during last ED visit for similar symptoms and recommended continued outpatient workup with cystoscopy scheduled 10/29/18. On course of doxycycline for presumed UTI from prior visit. Patient denied blood draw. Less hematuria, will set blood draw to 9AM     - Patient currently hemodynamically stable, Hemoglobin 10.2, same as last check 4 days previous  - Urology consulted, and cytoscope on Wednesday  - Culture from previous visit reviewed, positive for candida. Urinalysis reflex to culture ordered today. Will hold doxycyline  - CBC's daily     Weakness    Patient assessed by PT today - rec appreciated.        Renovascular hypertension    Restarted home carvedilol 12.5mg bid     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    Patient on sliding scale insulin at home, per daughter patient only receives it for blood glucoses greater than 200.  - Will restart low dose SSI  - POCT glucose qid with meals       Hyperkalemia    Potassium 5.4 on presentation to ED. EKG negative for changes due to hyperkalemia. Patient asymptomatic at this time.    - Patient to receive dialysis on Saturday 11/24/18  - Cardiac monitoring       ESRD (end stage renal disease)    Patient ESRD, dialysis dependent. Access via left arm AVF. Patient normally on MWF dialysis however was on modified schedule due to holiday scheduling. Last dialysis was Tuesday.  Possibly contributing to patient's episode of weakness.    - Nephrology consulted for dialysis and received dialysis. (MWF schedule)       Benign prostatic hyperplasia with urinary retention    Bladder scanned, 200cc in bladder. Per urology will not insert catheter at this time and will reassess in AM    - Continuing home dutasteride 0.5mg daily       Chronic combined systolic and diastolic heart failure    Patient's last echo in 2016 had EF 40% and diastolic dsyfunction. Patient no longer following up with cardiology. Patient is not volume overloaded on exam today.    - Coreg 12.5 bid continued  - On dialysis       Seizure disorder    Presumed epilepsy, patient seizure free for several years  - Resumed home keppra 500mg bid         VTE Risk Mitigation (From admission, onward)        Ordered     IP VTE HIGH RISK PATIENT  Once      11/24/18 0126     Place sequential compression device  Until discontinued      11/24/18 0126              ARNEL Toure MD  Department of Hospital Medicine   [facility]

## 2018-11-25 NOTE — PT/OT/SLP EVAL
Physical Therapy Evaluation    Patient Name:  Ronald Campbell   MRN:  [id number]    Recommendations:     Discharge Recommendations:  home with home health(needs increased level of care)   Discharge Equipment Recommendations: none   Barriers to discharge: Decreased caregiver support    Assessment:     Ronald Campbell is a 89 y.o. male admitted with a medical diagnosis of Metabolic encephalopathy.  He presents with the following impairments/functional limitations:  impaired self care skills, impaired functional mobilty, gait instability, impaired balance, impaired endurance, visual deficits limiting overall functional mobility. Good tolerance to PT session. Most limited by visual impairment. Safest to mobilize with 1 person assist at all times. Demonstrates mild posterior lean with hallway ambulation without device with bilateral hand-held assist. Patient prefers to ambulate without AD. To benefit from continued skilled PT intervention to address deficits and decrease caregiver burden. DC rec's for HHPT with increased level of care; however if family unable to provide, patient may need long-term placement.    Rehab Prognosis:  Fair; patient would benefit from acute skilled PT services to address these deficits and reach maximum level of function.      Recent Surgery: Procedure(s) (LRB):  CYSTOSCOPY, WITH BLADDER BIOPSY, WITH FULGURATION IF INDICATED Clot evacuation, TURBT, Bilateral retrograde pyelograms (N/A)      Plan:     During this hospitalization, patient to be seen 3 x/week to address the above listed problems via gait training, therapeutic activities, neuromuscular re-education  · Plan of Care Expires:  12/25/18   Plan of Care Reviewed with: patient    Subjective     Communicated with RN prior to session.  Patient found seated in bedside chair upon PT entry to room, agreeable to evaluation. Patient endorses most recent fall occurring x1 week ago.     Chief Complaint: visual impairment  Patient comments/goals:  reports not attempting to get up without assistance of someone  Pain/Comfort:  · Pain Rating 1: 0/10  · Pain Rating Post-Intervention 1: 0/10    Patients cultural, spiritual, Roman [employer] conflicts given the current situation: none stated    Living Environment:  Patient lives with daughter in 2-story home no DAVID. B/L handrails present on flight of stairs.   Prior to admission, patients level of function was requiring assistance with all ADL tasks; ambulation; and stair negotiation to decrease fall risk.  Patient has the following equipment: wheelchair, walker, rolling, bedside commode, shower chair.  DME owned (not currently used): none.  Upon discharge, patient will have assistance from daughter; however, unable to provide 24-hr assistance secondary to work schedule. Patient reports that someone comes in for a few hours a day 3 days/week; however, spends majority of the day alone on Tuesday/Thursday.    Objective:     Patient found with: telemetry, peripheral IV     General Precautions: Standard, fall   Orthopedic Precautions:N/A   Braces: N/A     Exams:  · Cognitive Exam:  Patient is oriented to Person and Situation  · Postural Exam:  Patient presented with the following abnormalities:    · -       Rounded shoulders  · -       Forward head  · Sensation: -       Intact  light/touch BLE; denies numbness/tingling  · RLE ROM: WFL  · RLE Strength: WFL  · LLE ROM: WFL  · LLE Strength: WFL    Functional Mobility:  · Transfers:    · Sit to Stand:  minimum assistance with hand-held assist  · Stand pivot transfer: Minimum assistance with HHA  · Gait: 38ftx1 with B/L HHA and Min A without device. Prefers to ambulate without device. Ambulates with short, shuffling steps; poor HSTO progression; and mild posterior lean  · Balance: poor dynamic standing balance  · Stairs:  Pt ascended/descended 7 stair(s) with No Assistive Device with right handrail with Minimal Assistance.     AM-PAC 6 CLICK MOBILITY  Total  Score:16       Therapeutic Activities and Exercises:  Co-evaluation with OT.     Patient educated on:   - role of PT/POC    - safety with all functional mobility   - transfer training   - gait training with HHA   - stair gait training with step-to pattern   - importance of participation with therapy services   - safest to ambulate with 1 person assist at this time.   - calling for assist with all mobility    Verbalized understanding of all education provided. Needs reinforcement.    Patient required assistance and verbal/tactile cues for hand placement with all mobility      Patient left up in chair with all lines intact, call button in reach and RN notified.    GOALS:   Multidisciplinary Problems     Physical Therapy Goals        Problem: Physical Therapy Goal    Goal Priority Disciplines Outcome Goal Variances Interventions   Physical Therapy Goal     PT, PT/OT Ongoing (interventions implemented as appropriate)     Description:  Goals to be met by: 18    Patient will increase functional independence with mobility by performin. Supine to sit with MInimal Assistance  2. Sit to supine with MInimal Assistance  3. Gait  x 100 feet with Minimal Assistance no device.  4. Ascend/descend 7 stairs with right Handrails Minimal Assistance.                      History:     Past Medical History:   Diagnosis Date    Anemia     Blind     bilaterally    CHF (congestive heart failure)     Chronic kidney disease     Dementia 2017    Diabetes mellitus type II     General anesthetics causing adverse effect in therapeutic use     april / may 2014     Glaucoma (increased eye pressure)     Hypertension     Hypothermia 2017    Seizures        Past Surgical History:   Procedure Laterality Date    ANGIOPLASTY WITH STENT PLACEMENT Left 2016    [facility] 2ND FLR    AV FISTULA PLACEMENT Left 2014    AV fistula to Left upper arm    [id number]-[id number]-PK left RC vs BC  AVF Left 8/8/2014    Performed by [facility] at [facility] OR [address] [place] [address]    EYE SURGERY      FISTULOGRAM Left 11/18/2016    Performed by [facility] at [facility] OR [facility] FLR    FISTULOGRAM Left 4/15/2016    Performed by [facility] at [facility] OR [facility] FLR    FISTULOGRAM Left 1/29/2016    Performed by [facility] at [facility] CATH LAB    FISTULOGRAM Room 11 case Left 3/27/2015    [facility]    HYDROCELECTOMY Bilateral 11/22/2017    Performed by Temo Banerjee MD at [facility] OR [facility] FLR    INSERTION-CATHETER-DIALYSIS-PERITONEAL-LAPAROSCOPIC N/A 5/8/2014    [facility]    INSERTION-CATHETER-DIALYSIS-PERITONEAL-LAPAROSCOPIC N/A 5/5/2014    Performed by Js Ladd [facility] at [facility] OR [facility] FLR    INSERTION-CATHETER-HEMODIALYSIS N/A 5/21/2014    [facility]    PERMCATH INSERTION-TUNNELED CVC N/A 5/13/2014    Performed by Lolis Jackson MD at [facility] CATH LAB    REMOVAL-CATHETER-PERITONEAL DIALYSIS N/A 7/30/2014    Performed by Js Ladd MD at [facility] OR [address] [place] [address]    SCROTUM EXPLORATION Bilateral 11/2017       Clinical Decision Making:     History  Co-morbidities and personal factors that may impact the plan of care Examination  Body Structures and Functions, activity limitations and participation restrictions that may impact the plan of care Clinical Presentation   Decision Making/ Complexity Score   Co-morbidities:   [x] Time since onset of injury / illness / exacerbation  [x] Status of current condition  [x]Patient's cognitive status and safety concerns    [x] Multiple Medical Problems (see med hx)  Personal Factors:   [] Patient's age  [] Prior Level of function   [x] Patient's home situation (environment and family support)  [] Patient's level of motivation  [] Expected progression of patient      HISTORY:(criteria)    [] 52902 - no personal factors/history    []  09672 - has 1-2 personal factor/comorbidity     [x] 59093 - has >3 personal factor/comorbidity     Body Regions:  [] Objective examination findings  [] Head     []  Neck  [] Trunk   [] Upper Extremity  [] Lower Extremity    Body Systems:  [x] For communication ability, affect, cognition, language, and learning style: the assessment of the ability to make needs known, consciousness, orientation (person, place, and time), expected emotional /behavioral responses, and learning preferences (eg, learning barriers, education  needs)  [x] For the neuromuscular system: a general assessment of gross coordinated movement (eg, balance, gait, locomotion, transfers, and transitions) and motor function  (motor control and motor learning)  [] For the musculoskeletal system: the assessment of gross symmetry, gross range of motion, gross strength, height, and weight  [] For the integumentary system: the assessment of pliability(texture), presence of scar formation, skin color, and skin integrity  [] For cardiovascular/pulmonary system: the assessment of heart rate, respiratory rate, blood pressure, and edema     Activity limitations:    [x] Patient's cognitive status and saf ety concerns          [x] Status of current condition      [] Weight bearing restriction  [] Cardiopulmunary Restriction    Participation Restrictions:   [] Goals and goal agreement with the patient     [] Rehab potential (prognosis) and probable outcome      Examination of Body System: (criteria)    [] 94884 - addressing 1-2 elements    [] 29393 - addressing a total of 3 or more elements     [x] 36856 -  Addressing a total of 4 or more elements         Clinical Presentation: (criteria)  Stable - 83286     On examination of body system using standardized tests and measures patient presents with 4 or more elements from any of the following: body structures and functions, activity limitations, and/or participation restrictions.  Leading to a clinical presentation  that is considered stable and/or uncomplicated                              Clinical Decision Making  (Eval Complexity):  Low- 29691     Time Tracking:     PT Received On: 11/25/18  PT Start Time: 0829     PT Stop Time: 0850  PT Total Time (min): 21 min     Billable Minutes: Evaluation 21      Jac Morillo III, PT  11/25/2018

## 2018-11-26 NOTE — ASSESSMENT & PLAN NOTE
Patient ESRD, dialysis dependent. Access via left arm AVF. Patient normally on MWF dialysis however was on modified schedule due to holiday scheduling. Last dialysis was Tuesday. Possibly contributing to patient's episode of weakness.  --11/24 dialyzed sat, next dialysis tomorrow.    - Nephrology consulted for dialysis and received dialysis. (MWF schedule)

## 2018-11-26 NOTE — ASSESSMENT & PLAN NOTE
Patient with 2 week history of hematuria, worsening per daughter. Evaluated by urology as an outpatient and during last ED visit for similar symptoms and recommended continued outpatient workup with cystoscopy scheduled 10/29/18. On course of doxycycline for presumed UTI from prior visit. Patient denied blood draw. Less hematuria, will set blood draw to 9AM     - Patient currently hemodynamically stable, Hemoglobin 10.2, same as last check 4 days previous  - Urology consulted, and cytoscope on Wednesday  - Culture from previous visit reviewed, positive for candida. Urinalysis reflex to culture ordered today. Will hold doxycyline>>> 11/25 started fluconazole 200 mg daily for total of 7 days (end date 12/02/2018)  - CBC's daily

## 2018-11-26 NOTE — SUBJECTIVE & OBJECTIVE
Interval History: NEON. H&H stable.    Review of Systems    Objective:     Vital Signs (Most Recent):  Temp: 96.1 °F (35.6 °C) (11/26/18 0809)  Pulse: (!) 58 (11/26/18 1114)  Resp: 18 (11/26/18 1114)  BP: (!) 150/74 (11/26/18 1114)  SpO2: 100 % (11/26/18 1114) Vital Signs (24h Range):  Temp:  [96.1 °F (35.6 °C)-97.3 °F (36.3 °C)] 96.1 °F (35.6 °C)  Pulse:  [58-69] 58  Resp:  [16-18] 18  SpO2:  [99 %-100 %] 100 %  BP: (150-192)/(72-82) 150/74     Weight: 74.8 kg (165 lb)  Body mass index is 21.77 kg/m².  No intake or output data in the 24 hours ending 11/26/18 1358   Physical Exam   Constitutional: He is oriented to person, place, and time. He appears well-developed and well-nourished. No distress.   HENT:   Head: Normocephalic and atraumatic.   Eyes:   Patient blind, no acute abnormality noted   Neck: Normal range of motion. Neck supple.   Cardiovascular: Normal rate, regular rhythm and normal heart sounds. Exam reveals no gallop and no friction rub.   No murmur heard.  Pulmonary/Chest: Effort normal and breath sounds normal. He has no wheezes. He has no rales.   Abdominal: Soft. Bowel sounds are normal. He exhibits no mass. There is no tenderness. There is no rebound and no guarding.   Genitourinary: Rectum normal. Prostate is not tender.   Genitourinary Comments: No hematuria was visualized.    Musculoskeletal: Normal range of motion. He exhibits no edema.   AV fistula Left upper arm thrilled   Lymphadenopathy:     He has no cervical adenopathy.   Neurological: He is alert and oriented to person, place, and time.       Significant Labs:   CBC:   Recent Labs   Lab 11/24/18  1530 11/25/18  1123 11/26/18  0903   WBC 7.77 6.91 7.30   HGB 8.6* 8.2* 8.4*   HCT 25.9* 24.7* 26.3*   * 121* 128*     CMP:   Recent Labs   Lab 11/25/18  1123 11/26/18  0903   * 133*   K 4.3 4.6   CL 99 99   CO2 26 23   * 130*   BUN 47* 59*   CREATININE 7.4* 8.5*   CALCIUM 8.3* 8.1*   ALBUMIN 2.6* 2.7*   ANIONGAP 9 11    EGFRNONAA 5.9* 5.0*       Significant Imaging: I have reviewed all pertinent imaging results/findings within the past 24 hours.

## 2018-11-26 NOTE — PLAN OF CARE
Problem: Patient Care Overview  Goal: Plan of Care Review  Outcome: Ongoing (interventions implemented as appropriate)  No c/o this shift.  Allowed pt to rest this night with minimal disturbances.  No signs of distress noted this shift.  Help keep pt aware of his surroundings.

## 2018-11-26 NOTE — ASSESSMENT & PLAN NOTE
89 M with gross hematuria. Currently no indication for urgent intervention or catheter. Urine overall clearing.    - If he is still inpatient, we will plan to take him to the OR Wednesday for cystoscopy, possible clot evacuation, fulguration of bleeding areas, retrograde pyelograms and all other indicated procedures to evaluate his gross hematuria with Dr. Banerjee  - Recommend medically optimizing for surgery on Wednesday  - Recommend continuing dutasteride daily  - Recommend treating his candiduria with antifungal   - Small right renal hypodensity, can follow up renal US outpatient, will likely observe  - Please make NPO at midnight in Tuesday night for surgery    - if the patient is discharged home with home health or SNF, we will do cystoscopy outpatient on Thursday

## 2018-11-26 NOTE — HOSPITAL COURSE
Mr Campbell was admitted to hospital medicine 11/24 due to weakness and hematuria. Pt had dialysis 11/24, PT/Ot reccs was HH, H&H stable, no hematuria. Urology consulted and following the pt, possible cystoscopy either wed vs thur. Patient received another round of dialysis on 11/27 and PT /OT recommended HH with PT/OT. Patient is scheduled for outpatient cytoscope with bladder biopsy 11/29 with Dr. Banerjee. Patient got cystoscope and urology wanted him to follow up as a out patient.

## 2018-11-26 NOTE — PROGRESS NOTES
[facility]  Urology  Progress Note    Patient Name: Ronald Campbell  MRN: [id number]  Admission Date: 11/23/2018  Hospital Length of Stay: 0 days  Code Status: Full Code   Attending Provider: Js Medeiros MD   Primary Care Physician: Bart Shaw MD    Subjective:     HPI:  Ronald Campbell is a 89 y.o. male who presented to the ED for continued painless gross hematuria. He has dementia and lives with his daughter. He has HTN, type II diabetes mellitus, ESRD on HD MWF, blindness. The hematuria has been going on for the past few weeks but has progressively increased. He also is passing clots.  He  reports occasional dysuria, denies frequency, urgency, or incomplete emptying. He urinates usually every morning and sometime in the evenings, unsure about how much urine he actually produces. He wears diapers and has been waking up with blood in diaper. No fevers and chills. He was seen in clinic a week ago and was scheduled to have a renal US and outpatient cystoscopy with Dr. Banerjee next week. He was most recently seen in the ED several days ago with gross hematuria and at that time plan was to follow up with Dr. Banerjee for outpatient cystoscopy, he was also started on empiric doxycycline and changed from finasteride to dutasteride. In the interim he has continued having consistent gross hematuria, but denies other urinary symptoms, his urine culture resulted + for candiduria and he has had some mild abdominal pain. Random bladder scan revealed 200ml.    He also underwent CT noncontrast of abdomen pelvis revealing a possible right renal mass, possible clot in bladder, a large prostate and several enlarged para-aortic lymph nodes extending along the left ileopsoas with some obliteration of the fat planes with an additional enlarged left external iliac lymph node.    He was admitted to medicine due to weakness and he missed dialysis this week.         Interval History:   No new complaints. Urine clearing up per  nursing.     Review of Systems  Objective:     Temp:  [96.1 °F (35.6 °C)-97.3 °F (36.3 °C)] 96.1 °F (35.6 °C)  Pulse:  [60-69] 63  Resp:  [16-18] 18  SpO2:  [99 %-100 %] 99 %  BP: (131-192)/(62-82) 192/82     Body mass index is 21.77 kg/m².       Bladder Scan Volume (mL): 218 mL (11/23/18 2326)    Drains     Drain                 Hemodialysis AV Fistula -- days         Hemodialysis AV Fistula Left upper arm -- days         Hemodialysis AV Fistula 08/08/14 0800 Left upper arm 1571 days                Physical Exam   Nursing note and vitals reviewed.  Constitutional: He is oriented to person, place, and time. Vital signs are normal. He appears well-developed and well-nourished. No distress.   Neck: Trachea normal. Neck supple. No tracheal deviation present.   Cardiovascular: Normal rate and intact distal pulses.    Pulmonary/Chest: Effort normal. No accessory muscle usage. No respiratory distress.   Abdominal: Soft. He exhibits no distension and no mass. There is no tenderness. There is no CVA tenderness. No hernia.   Genitourinary: Rectum normal. Rectal exam shows no mass and anal tone normal.   Musculoskeletal: He exhibits no edema or tenderness.   Lymphadenopathy:     He has no cervical adenopathy.   Neurological: He is alert and oriented to person, place, and time.   Skin: Skin is warm and dry. No lesion and no rash noted. He is not diaphoretic. No cyanosis.     Psychiatric: He has a normal mood and affect.     Significant Labs:    BMP:  Recent Labs   Lab 11/23/18  1856 11/24/18  0624 11/25/18  1123   * 131* 134*   K 5.4* 4.9 4.3   CL 97 100 99   CO2 20* 18* 26   BUN 85* 88* 47*   CREATININE 9.6* 10.0* 7.4*   CALCIUM 9.4 8.8 8.3*       CBC:   Recent Labs   Lab 11/24/18  1102 11/24/18  1530 11/25/18  1123   WBC 7.15 7.77 6.91   HGB 8.4* 8.6* 8.2*   HCT 25.6* 25.9* 24.7*   * 132* 121*       All pertinent labs results from the past 24 hours have been reviewed.    Significant Imaging:  All pertinent  imaging results/findings from the past 24 hours have been reviewed.      Assessment/Plan:     Gross hematuria    89 M with gross hematuria. Currently no indication for urgent intervention or catheter. Urine overall clearing.    - If he is still inpatient, we will plan to take him to the OR Wednesday for cystoscopy, possible clot evacuation, fulguration of bleeding areas, retrograde pyelograms and all other indicated procedures to evaluate his gross hematuria with Dr. Banerjee  - Recommend medically optimizing for surgery on Wednesday  - Recommend continuing dutasteride daily  - Recommend treating his candiduria with antifungal   - Small right renal hypodensity, can follow up renal US outpatient, will likely observe  - Please make NPO at midnight in Tuesday night for surgery    - if the patient is discharged home with home health or SNF, we will do cystoscopy outpatient on Thursday         VTE Risk Mitigation (From admission, onward)        Ordered     IP VTE HIGH RISK PATIENT  Once      11/24/18 0126     Place sequential compression device  Until discontinued      11/24/18 0126          Jeremy Clayton MD  Urology  [facility]

## 2018-11-26 NOTE — PROGRESS NOTES
"[facility] Hospital Medicine  Progress Note    Patient Name: Ronald Campbell  MRN: [id number]  Patient Class: OP- Observation   Admission Date: 11/23/2018  Length of Stay: 0 days  Attending Physician: Js Medeiros MD  Primary Care Provider: Bart Shaw MD    Hospital Medicine Team: [facility]OSP MED 2 Leon Rivas MD    Subjective:     Principal Problem:Metabolic encephalopathy    HPI:  Mr. Campbell is an 89 year old male with a past medical history of ESRD (Still making urine, dialysis MWF), T2DM, CHF, and seizure disorder who presents to [facility] ED for evaluation of gross hematuria and weakness. The patient has dementia and is blind and is accompanied by his daughter who is his primary caregiver. She states that he has been having hematuria for the past two weeks. She states first noticed it when there was sidney blood in his diaper and has progressed to passing clots. He was evaluated by urology on 11/13/18 at which point he was scheduled for an outpatient cystoscopy and ultrasound which is scheduled for 11/29. The patient was seen recently in the ED because his daughter noticed increasing clots in his diaper. He was evaluated by urology at that time and determined safe for discharge and to follow up for scheduled cystoscopy. The patient was brought to the ED today because he had an episode of weakness on the background of persistent hematuria. The patient is unable to ambulate with assistance and per the daughter while he was walking with her today his legs "gave out on him." She supported him to the floor to a seated position and called the fire department to help him up and he was transported to the ED. Per the daughter the patient had only been complaining of penile pain. The patient and daughter deny any recent illness or sick contacts. He was scheduled for dialysis today however could not go due to weakness. Last dialysis was Tuesday. He is complaining of some mild low back pain at this " "time as well, he states he feels like he "needs to go" but can't. Per daughter the patient's last bowel movement was the previous night and was normal. He was prescribed Norco 5mg in ED for pain and had taken 2-3 pills since discharge per the daughter. The patient has had some decreased PO intake over the past few days 2/2 symptoms.    Hospital Course:  -- Pt was admitted to hospital medicine 11/24, had dialysis 11/24, PT/Ot reccs was HH, H&H stable, no hematuria. Urology consulted and following the pt, possible cystoscopy either wed vs thur.    Interval History: NEON. H&H stable.    Review of Systems    Objective:     Vital Signs (Most Recent):  Temp: 96.1 °F (35.6 °C) (11/26/18 0809)  Pulse: (!) 58 (11/26/18 1114)  Resp: 18 (11/26/18 1114)  BP: (!) 150/74 (11/26/18 1114)  SpO2: 100 % (11/26/18 1114) Vital Signs (24h Range):  Temp:  [96.1 °F (35.6 °C)-97.3 °F (36.3 °C)] 96.1 °F (35.6 °C)  Pulse:  [58-69] 58  Resp:  [16-18] 18  SpO2:  [99 %-100 %] 100 %  BP: (150-192)/(72-82) 150/74     Weight: 74.8 kg (165 lb)  Body mass index is 21.77 kg/m².  No intake or output data in the 24 hours ending 11/26/18 1358   Physical Exam   Constitutional: He is oriented to person, place, and time. He appears well-developed and well-nourished. No distress.   HENT:   Head: Normocephalic and atraumatic.   Eyes:   Patient blind, no acute abnormality noted   Neck: Normal range of motion. Neck supple.   Cardiovascular: Normal rate, regular rhythm and normal heart sounds. Exam reveals no gallop and no friction rub.   No murmur heard.  Pulmonary/Chest: Effort normal and breath sounds normal. He has no wheezes. He has no rales.   Abdominal: Soft. Bowel sounds are normal. He exhibits no mass. There is no tenderness. There is no rebound and no guarding.   Genitourinary: Rectum normal. Prostate is not tender.   Genitourinary Comments: No hematuria was visualized.    Musculoskeletal: Normal range of motion. He exhibits no edema.   AV fistula " Left upper arm thrilled   Lymphadenopathy:     He has no cervical adenopathy.   Neurological: He is alert and oriented to person, place, and time.       Significant Labs:   CBC:   Recent Labs   Lab 11/24/18  1530 11/25/18  1123 11/26/18  0903   WBC 7.77 6.91 7.30   HGB 8.6* 8.2* 8.4*   HCT 25.9* 24.7* 26.3*   * 121* 128*     CMP:   Recent Labs   Lab 11/25/18  1123 11/26/18  0903   * 133*   K 4.3 4.6   CL 99 99   CO2 26 23   * 130*   BUN 47* 59*   CREATININE 7.4* 8.5*   CALCIUM 8.3* 8.1*   ALBUMIN 2.6* 2.7*   ANIONGAP 9 11   EGFRNONAA 5.9* 5.0*       Significant Imaging: I have reviewed all pertinent imaging results/findings within the past 24 hours.    Assessment/Plan:      * Metabolic encephalopathy    Patient presents with altered mental status and weakness on background of ESRD and gross hematuria for past two weeks. Patient's hemoglobin remains unchanged from previous ED visit at 10.2, baseline appears to be 12 per chart review. Patient 3 days since last dialysis as well as endorsing decreased PO intake. Denying any symptoms of infection at this time. WBC wnl and afebrile. Rectal exam negative for prostate tenderness. Patient has been receiving tramadol for pain, per daughter however the patient had no side effects to the medication previously. Patient is at his baseline at this time, appropriately answering questions.    Given infection is less likely on history, presumptive cause is due to electrolyte derangement due to missing dialysis. Patient hemodynamically stable and not complaining of other signs of anemia at this time.    - Nephrology consulted for dialysis tomorrow, appreciate their help  - Blood glucose elevated at 241, possible contributing. Patient is on SSI insulin at home. Will restart and closely monitor blood glucoses with goal 140-180 while inpatient.  - Urinalysis reflex to culture drawn - NGTD  - Holding patient's home tramadol  - Fall precautions and delirium precautions  - 9 AM blood draw        Discharge planning issues    - PT/OT consulted- rec home with home health  - Case management consulted         Gross hematuria    Patient with 2 week history of hematuria, worsening per daughter. Evaluated by urology as an outpatient and during last ED visit for similar symptoms and recommended continued outpatient workup with cystoscopy scheduled 10/29/18. On course of doxycycline for presumed UTI from prior visit. Patient denied blood draw. Less hematuria, will set blood draw to 9AM     - Patient currently hemodynamically stable, Hemoglobin 10.2, same as last check 4 days previous  - Urology consulted, and cytoscope on Wednesday  - Culture from previous visit reviewed, positive for candida. Urinalysis reflex to culture ordered today. Will hold doxycyline>>> 11/25 started fluconazole 200 mg daily for total of 7 days (end date 12/02/2018)  - CBC's daily     Weakness    Patient assessed by PT today - rec appreciated.        Renovascular hypertension    Restarted home carvedilol 12.5mg bid     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    Patient on sliding scale insulin at home, per daughter patient only receives it for blood glucoses greater than 200.  - Will restart low dose SSI  - POCT glucose qid with meals       Hyperkalemia    Potassium 5.4 on presentation to ED. EKG negative for changes due to hyperkalemia. Patient asymptomatic at this time.    - Patient to receive dialysis on Saturday 11/24/18  - Cardiac monitoring       ESRD (end stage renal disease)    Patient ESRD, dialysis dependent. Access via left arm AVF. Patient normally on MWF dialysis however was on modified schedule due to holiday scheduling. Last dialysis was Tuesday. Possibly contributing to patient's episode of weakness.  --11/24 dialyzed sat, next dialysis tomorrow.    - Nephrology consulted for dialysis and received dialysis. (MWF schedule)       Benign prostatic hyperplasia with urinary retention     Bladder scanned, 200cc in bladder. Per urology will not insert catheter at this time and will reassess in AM    - Continuing home dutasteride 0.5mg daily       Anemia in chronic kidney disease, on chronic dialysis           Chronic combined systolic and diastolic heart failure    Patient's last echo in 2016 had EF 40% and diastolic dsyfunction. Patient no longer following up with cardiology. Patient is not volume overloaded on exam today.    - Coreg 12.5 bid continued  - On dialysis       Seizure disorder    Presumed epilepsy, patient seizure free for several years  - Resumed home keppra 500mg bid         VTE Risk Mitigation (From admission, onward)        Ordered     IP VTE HIGH RISK PATIENT  Once      11/24/18 0126     Place sequential compression device  Until discontinued      11/24/18 0126              Leon Rivas MD  Department of Hospital Medicine   [facility]

## 2018-11-26 NOTE — PROGRESS NOTES
Pharmacist Renal Dose Adjustment Note    Ronald Campbell is a 89 y.o. male being treated with the medication fluconazole    Patient Data:    Vital Signs (Most Recent):  Temp: 96.1 °F (35.6 °C) (11/26/18 0809)  Pulse: (!) 58 (11/26/18 1114)  Resp: 18 (11/26/18 1114)  BP: (!) 150/74 (11/26/18 1114)  SpO2: 100 % (11/26/18 1114)   Vital Signs (72h Range):  Temp:  [96.1 °F (35.6 °C)-98.5 °F (36.9 °C)]   Pulse:  [47-81]   Resp:  [14-20]   BP: (100-192)/(45-84)   SpO2:  [96 %-100 %]      Recent Labs   Lab 11/24/18  0624 11/25/18  1123 11/26/18  0903   CREATININE 10.0* 7.4* 8.5*     Serum creatinine: 8.5 mg/dL (H) 11/26/18 0903  Estimated creatinine clearance: 6.2 mL/min (A)    Medication: fluconazole 200 mg PO daily will be changed to fluconazole 100 mg PO daily with extra 100 mg given post-HD on HD days only    Pharmacist's Name: MEGHNA SNYDER  Pharmacist's Extension: [phone]

## 2018-11-26 NOTE — PLAN OF CARE
On Discharge planning assessment patient is in bed, resting quietly, daughter is at the bedside, currently upset. Introduced myself and CM role in patients care plan, patient verbalized understanding.     Pt lives in a single story raised home with 12 steps to enter the home.  The patient lives with his daughter who is at the bedside. Patient has HD MW. Pt denies HH. Verified Pts Address, Emergency Contact, Pharmacy and PCP.     Pt denies any concerns for this visit, CM will continue to follow. CM name and number placed on patients white board and Health Packet given to the patient with a brief overview of the information provided patient verbalized understanding.     CM and Dr. Quintero at the patients bedside to address the daughters concerns.   Daughter was upset on assessment and not willing to answer further questions. She is upset about the PT/OT assessment of the patient. Daughter is wanting SNF placement, but it is currently not recommended. Order for SNF was still placed per family request.        11/26/18 1217   Discharge Assessment   Assessment Type Discharge Planning Assessment   Confirmed/corrected address and phone number on facesheet? Yes   Assessment information obtained from? Caregiver   Expected Length of Stay (days) 4   Communicated expected length of stay with patient/caregiver yes   Prior to hospitilization cognitive status: Alert/Oriented   Prior to hospitalization functional status: Assistive Equipment   Current cognitive status: Alert/Oriented   Current Functional Status: Assistive Equipment   Facility Arrived From: N/A   Lives With child(erin), adult   Able to Return to Prior Arrangements yes   Is patient able to care for self after discharge? Unable to determine at this time (comments)   Who are your caregiver(s) and their phone number(s)? Gabriela [phone]   Patient's perception of discharge disposition home health   Readmission Within The Last 30 Days no previous admission  in last 30 days   Patient currently being followed by outpatient case management? No   Patient currently receives any other outside agency services? No   Do you have any problems affording any of your prescribed medications? No   Is the patient taking medications as prescribed? yes   Does the patient have transportation home? Yes   Transportation Available car;family or friend will provide   Dialysis Name and Scheduled days HD -MWF    Discharge Plan A Home Health   Discharge Plan B Home with family   Patient/Family In Agreement With Plan yes

## 2018-11-26 NOTE — NURSING
Pt assisted to bed from chair with firm support of one person. All care explained to pt prior to performing. Pt is calm,coop, and compliant with all care given. Safety monitored. No c/o pain or discomfort. Diaper intact. Will con't to monitor.

## 2018-11-26 NOTE — SUBJECTIVE & OBJECTIVE
Interval History:   No new complaints. Urine clearing up per nursing.     Review of Systems  Objective:     Temp:  [96.1 °F (35.6 °C)-97.3 °F (36.3 °C)] 96.1 °F (35.6 °C)  Pulse:  [60-69] 63  Resp:  [16-18] 18  SpO2:  [99 %-100 %] 99 %  BP: (131-192)/(62-82) 192/82     Body mass index is 21.77 kg/m².       Bladder Scan Volume (mL): 218 mL (11/23/18 2326)    Drains     Drain                 Hemodialysis AV Fistula -- days         Hemodialysis AV Fistula Left upper arm -- days         Hemodialysis AV Fistula 08/08/14 0800 Left upper arm 1571 days                Physical Exam   Nursing note and vitals reviewed.  Constitutional: He is oriented to person, place, and time. Vital signs are normal. He appears well-developed and well-nourished. No distress.   Neck: Trachea normal. Neck supple. No tracheal deviation present.   Cardiovascular: Normal rate and intact distal pulses.    Pulmonary/Chest: Effort normal. No accessory muscle usage. No respiratory distress.   Abdominal: Soft. He exhibits no distension and no mass. There is no tenderness. There is no CVA tenderness. No hernia.   Genitourinary: Rectum normal. Rectal exam shows no mass and anal tone normal.   Musculoskeletal: He exhibits no edema or tenderness.   Lymphadenopathy:     He has no cervical adenopathy.   Neurological: He is alert and oriented to person, place, and time.   Skin: Skin is warm and dry. No lesion and no rash noted. He is not diaphoretic. No cyanosis.     Psychiatric: He has a normal mood and affect.     Significant Labs:    BMP:  Recent Labs   Lab 11/23/18  1856 11/24/18  0624 11/25/18  1123   * 131* 134*   K 5.4* 4.9 4.3   CL 97 100 99   CO2 20* 18* 26   BUN 85* 88* 47*   CREATININE 9.6* 10.0* 7.4*   CALCIUM 9.4 8.8 8.3*       CBC:   Recent Labs   Lab 11/24/18  1102 11/24/18  1530 11/25/18  1123   WBC 7.15 7.77 6.91   HGB 8.4* 8.6* 8.2*   HCT 25.6* 25.9* 24.7*   * 132* 121*       All pertinent labs results from the past 24 hours  have been reviewed.    Significant Imaging:  All pertinent imaging results/findings from the past 24 hours have been reviewed.

## 2018-11-27 NOTE — PROGRESS NOTES
[facility] NOTE    Ronald Campbell is a 89 y.o. male currently on hemodialysis for removal of uremic toxins and volume.    Hypotensive on HD, asymptomatic, s/p 200cc bolus x 2.     Labs have been reviewed and the dialysate bath has been adjusted.    There are no symptoms of hypotension, chest pain, dyspnea, nausea or vomiting.    Labs:      Recent Labs   Lab 11/25/18  1123 11/26/18  0903 11/27/18  0833   * 133* 131*   K 4.3 4.6 5.2*   CL 99 99 96   CO2 26 23 24   BUN 47* 59* 78*   CREATININE 7.4* 8.5* 9.2*   CALCIUM 8.3* 8.1* 8.0*   PHOS 3.8 4.9* 5.8*       Recent Labs   Lab 11/25/18  1123 11/26/18  0903 11/27/18  0833   WBC 6.91 7.30 7.49   HGB 8.2* 8.4* 8.3*   HCT 24.7* 26.3* 25.0*   * 128* 134*       Assessment/Plan:    HD today for removal of uremic toxins and volume.  Unable to UF 2/2 hypotension, will try albumin  Hold antihypertensive on HD days.

## 2018-11-27 NOTE — PLAN OF CARE
Problem: Patient Care Overview  Goal: Plan of Care Review  Outcome: Ongoing (interventions implemented as appropriate)  3.5 hour dialysis complete.  Blood rinsed back.  Bronson pulled from ANLGE fistula.  Pressure held x 5 minutes.  Hemostasis achieved.  Covered with gauze and paper tape.  +thrill +bruit.  No fluid removed due to asymptomatic hypotension.  Albumin given for BP support.  Net +0.450L.  Transported from dialysis unit to [place] [id number] via stretcher by transporter.

## 2018-11-27 NOTE — PLAN OF CARE
Problem: Diabetes, Type 2 (Adult)  Goal: Signs and Symptoms of Listed Potential Problems Will be Absent, Minimized or Managed (Diabetes, Type 2)  Signs and symptoms of listed potential problems will be absent, minimized or managed by discharge/transition of care (reference Diabetes, Type 2 (Adult) CPG).  Outcome: Ongoing (interventions implemented as appropriate)  POC reviewed with Pt and daughter. Pt aao x 3. Pt assisted to bathroom x 2 person assist. Pt noted to be unsteady. small amount of dried blood noted on brief Safety precautions maintained. Bed in low position wheels locked. Side rails up x 2. Call light within reach. Pt free of falls. Bg monitored achs. Skin intact, no pressure ulcers noted.

## 2018-11-27 NOTE — ANESTHESIA PREPROCEDURE EVALUATION
[facility]  Anesthesia Pre-Operative Evaluation         Patient Name: Ronald Campbell  YOB: 1929  MRN: [id number]    SUBJECTIVE:     11/27/2018    Pre-operative evaluation for Procedure(s) (LRB):  CYSTOSCOPY, WITH BLADDER BIOPSY, WITH FULGURATION IF INDICATED Clot evacuation, TURBT, Bilateral retrograde pyelograms (N/A)    Ronald Campbell is a 89 y.o. male with ESRD (dialysis MWF), T2DM, CHF, and seizure disorder who [place] [facility] [place] evaluation of gross hematuria and weakness on 11/23.  Patient admitted to hospital medicine.  Currently scheduled for the above procedure.  Patient underwent HD on 11/27.      Daughter expressed concern about previous difficulty with anesthesia.  She reports that he remains weak after anesthesia.  Patient has tolerated MAC cases for fistulogram in the past.     LDA:        Peripheral IV - Single Lumen 11/23/18 1900 Right Hand (Active)   Site Assessment Clean;Dry;Intact;No redness;No swelling 11/26/2018  8:00 PM   Line Status Saline locked 11/26/2018  8:00 PM   Dressing Status Clean;Dry;Intact 11/26/2018  8:00 PM   Number of days: 3            Hemodialysis AV Fistula 08/08/14 0800 Left upper arm (Active)   Needle Size 15ga 11/27/2018  8:19 AM   Site Assessment Clean;Dry;Intact 11/27/2018  8:09 AM   Patency Present;Thrill;Bruit 11/27/2018 12:06 PM   Status Deaccessed 11/27/2018 12:06 PM   Flows Good 11/27/2018  8:19 AM   Dressing Intervention New dressing 11/27/2018 12:06 PM   Dressing Status Clean;Dry;Intact 11/27/2018 12:06 PM   Site Condition No complications 11/27/2018 12:06 PM   Dressing Pressure dressing 11/27/2018 12:06 PM   Number of days: 1572            Hemodialysis AV Fistula Left upper arm (Active)   Number of days:             Hemodialysis AV Fistula (Active)   Number of days:        Previous airway:   Mask ventilation not attempted, [place], grade 1, 8.0 ETT    Drips:   None documented.      Patient Active Problem List   Diagnosis    Seizure  disorder    Chronic combined systolic and diastolic heart failure    Cerebrovascular disease    Anemia in chronic kidney disease, on chronic dialysis    Thrombocytopenia    Blindness of right eye    Benign prostatic hyperplasia with urinary retention    ESRD (end stage renal disease)    Shortness of breath    Hyperkalemia    Hypernatremia    HTN (hypertension)    Dyslipidemia    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    Edema of both legs    Effusion of left knee    Glaucoma    Syncope and collapse    Renovascular hypertension    AV fistula stenosis    Vein stenosis    Insulin long-term use    Tinea pedis of both feet    Dermatophytosis, nail    Hemodialysis patient    Scrotal mass    Acute cystitis with hematuria    Weakness    Blind in both eyes    Dementia with behavioral disturbance    Hypothermia    Phimosis    History of unilateral orchiectomy    HCAP (healthcare-associated pneumonia)    Volume overload    Agitation    Gross hematuria    Metabolic encephalopathy    Discharge planning issues    Metabolic acidosis       Review of patient's allergies indicates:   Allergen Reactions    Lisinopril Swelling       Current Inpatient Medications:   atorvastatin  40 mg Oral Daily    brimonidine 0.15 % OPTH DROP  1 drop Left Eye BID    calcium acetate  1,334 mg Oral TID WM    carvedilol  25 mg Oral BID    doxazosin  4 mg Oral Daily    dutasteride  0.5 mg Oral Daily    fluconazole  100 mg Oral Daily    And    fluconazole  100 mg Oral Every Tues, Thurs, Sat    levETIRAcetam  500 mg Oral BID    timolol maleate 0.5%  1 drop Left Eye BID    travoprost  1 drop Left Eye QHS       No current facility-administered medications on file prior to encounter.      Current Outpatient Medications on File Prior to Encounter   Medication Sig Dispense Refill    aspirin 81 MG Chew Take 1 tablet (81 mg total) by mouth once daily. 30 tablet 3    atorvastatin (LIPITOR) 40 MG  tablet Take 1 tablet (40 mg total) by mouth once daily. 30 tablet 9    B complex-vitamin C-folic acid (NEPHRO-JESSICA) 0.8 mg Tab Take 1 tablet by mouth every morning.       brimonidine-timolol (COMBIGAN) 0.2-0.5 % Drop Place 1 drop into the left eye 2 (two) times daily.      brinzolamide (AZOPT) 1 % ophthalmic suspension Place 1 drop into the left eye 3 (three) times daily.       calcium acetate (PHOSLO) 667 mg capsule Take 2 capsules by mouth every day in the morning, then take 1 capsule with lunch and 2 capsules in the evening with meals      carvedilol (COREG) 25 MG tablet Take 1 tablet (25 mg total) by mouth 2 (two) times daily. Do not take on dialysis days. Hold for SBP < 110 or HR < 55 (Patient taking differently: Take 25 mg by mouth 2 (two) times daily. Hold for SBP < 110 or HR < 55) 60 tablet 1    doxazosin (CARDURA) 4 MG tablet Take 1 tablet (4 mg total) by mouth once daily. 30 tablet 1    doxycycline (MONODOX) 100 MG capsule Take 1 capsule (100 mg total) by mouth 2 (two) times daily. for 8 days 16 capsule 0    insulin aspart (NOVOLOG FLEXPEN) 100 unit/mL InPn  Insulin Pen Subcutaneous As directed.  -200 take 1 unitBS 201-250 take 2 unitsBS 251-300 take 3 unitsBS 301-350 take 4 unitsBS > 351 take 5 units and call MD      ketoconazole (NIZORAL) 2 % cream Apply topically once daily. feet 30 g 3    levetiracetam (KEPPRA) 500 MG Tab Take 1 tablet (500 mg total) by mouth 2 (two) times daily. 60 tablet 11    TRAVOPROST (TRAVATAN Z OPHT) Place 1 drop into the left eye every evening. 1 Drops Left eye Every evening      blood sugar diagnostic (BLOOD GLUCOSE TEST) Strp 1 strip by Misc.(Non-Drug; Combo Route) route daily as needed.          Past Surgical History:   Procedure Laterality Date    ANGIOPLASTY WITH STENT PLACEMENT Left 11/18/2016    [facility] OR 2ND FLR    AV FISTULA PLACEMENT Left 4/2014    AV fistula to Left upper arm    DOJOYMKHDTVA-SFDROCV-XO left RC vs  BC AVF Left 8/8/2014    Performed [facility] at [facility] OR 2ND FLR    EYE SURGERY      FISTULOGRAM Left 11/18/2016    Performed by [facility] at [facility] OR 2ND FLR    FISTULOGRAM Left 4/15/2016    Performed by [facility] at [facility] OR 2ND FLR    FISTULOGRAM Left 1/29/2016    Performed by [facility] at [facility] CATH LAB    FISTULOGRAM Room 11 case Left 3/27/2015    Performed by [facility] at [facility] OR 2ND FLR    HYDROCELECTOMY Bilateral 11/22/2017    Performed by Temo Banerjee MD at [facility] OR 2ND FLR    INSERTION-CATHETER-DIALYSIS-PERITONEAL-LAPAROSCOPIC N/A 5/8/2014    Performed by Js Ladd MD at [facility] OR 2ND FLR    INSERTION-CATHETER-DIALYSIS-PERITONEAL-LAPAROSCOPIC N/A 5/5/2014    Performed by Js Ladd MD at [facility] OR 2ND FLR    INSERTION-CATHETER-HEMODIALYSIS N/A 5/21/2014    [facility]    PERMCATH INSERTION-TUNNELED CVC N/A 5/13/2014    Performed by Lolis Jackson MD at [facility] CATH LAB    REMOVAL-CATHETER-PERITONEAL DIALYSIS N/A 7/30/2014    Performed by Js [facility]add [facility] at [facility] OR [facility] FLR    SCROTUM EXPLORATION Bilateral 11/2017       Social History     Socioeconomic History    Marital status:      Spouse name: Not on file    Number of children: Not on file    Years of education: Not on file    Highest education level: Not on file   Social Needs    Financial resource strain: Not on file    Food insecurity - worry: Not on file    Food insecurity - inability: Not on file    Transportation needs - medical: Not on file    Transportation needs - non-medical: Not on file   Occupational History    Not on file   Tobacco Use    Smoking status: Never Smoker    Smokeless tobacco: Never Used   Substance and Sexual Activity    Alcohol use: No    Drug use: No    Sexual activity: No   Other Topics Concern    Not on file   Social History Narrative    Not on file       OBJECTIVE:      Vital Signs Range (Last 24H):  Temp:  [34.1 °C (93.3 °F)-36.6 °C (97.8 °F)]   Pulse:  [56-73]   Resp:  [16-18]   BP: ()/(38-76)   SpO2:  [99 %-100 %]       Significant Labs:  Lab Results   Component Value Date    WBC 7.49 11/27/2018    HGB 8.3 (L) 11/27/2018    HCT 25.0 (L) 11/27/2018     (L) 11/27/2018    CHOL 95 (L) 05/26/2016    TRIG 61 05/26/2016    HDL 30 (L) 05/26/2016    ALT 26 11/24/2018    AST 27 11/24/2018     (L) 11/27/2018    K 5.2 (H) 11/27/2018    CL 96 11/27/2018    CREATININE 9.2 (H) 11/27/2018    BUN 78 (H) 11/27/2018    CO2 24 11/27/2018    TSH 1.263 08/03/2018    PSA 28.3 (H) 05/01/2014    INR 1.1 11/21/2018    HGBA1C 6.3 (H) 11/23/2018         Diagnostic Studies:  No relevant studies.      Cardiac Studies:  EKG:   No recent studies available.    2D Echo:  Results for orders placed or performed during the hospital encounter of 06/04/15   2D echo with color flow doppler   Result Value Ref Range    QEF 40 (A) 55 - 65    Mitral Valve Regurgitation TRIVIAL     Diastolic Dysfunction Yes (A)     Est. PA Systolic Pressure 23.04     Mitral Valve Mobility NORMAL     Tricuspid Valve Regurgitation TRIVIAL          ASSESSMENT/PLAN:         Anesthesia Evaluation         Review of Systems  Anesthesia Hx:  History of prior surgery of interest to airway management or planning: Previous anesthesia: MAC 11/2016: Fistulagram with MAC.  Denies Family Hx of Anesthesia complications.  Personal Hx of Anesthesia complications (Versed: confusion)   Social:  Denies Tobacco Use. Denies Alcohol Use.   Hematology/Oncology:     Oncology Normal    -- Anemia:  -- Thrombocytopenia (plt. count 11/7/17: 116):   -- Transfusion: -- Transfusion Hx (no complications) (before 2013):  Hematology Comments: thrombocytopenia   Anemia 2/2 renal disease    EENT/Dental:  EENT/Dental Normal Eyes: Visual Impairment , Is Legally Blind Eye Disease: Glaucoma:    Denies Throat Symptoms  Denies Jaw Problems   Cardiovascular:    "Hypertension CHF  Functional Capacity PT twice weekly: + SOB/denies CP  Congestive Heart Failure (CHF)  Denies Deep Venous Thrombosis (DVT)  Hypertension    Pulmonary:  Denies Asthma.  Denies Chronic Obstructive Pulmonary Disease (COPD).  Possible Obstructive Sleep Apnea    Renal/:   Chronic Renal Disease, ESRD  Kidney Function/Disease, Chronic Kidney Disease (CKD) , CKD Stage V (ESRD) (GFR <15 or on Dialysis)  Dialysis Dependent, M-W-F, chronic hemodialysis , dry weight of 175 lb. [facility]   Hepatic/GI:  Denies Esophageal / Stomach Disorders  Denies Liver Disease    Musculoskeletal:  Joint Disease:  Arthritis bilateral knee   Neurological:   Seizures  Neuro Symptoms of numbness Bilateral handDementia  Seizure Disorder , Most recent seizure occurred >1 year ago Has not had a seizure since starting on Keppra- "years" Denies CVA - Cerebrovasular Accident  TIA - Transient Ischemic Attack , transient deficits are no residual deficit.   Endocrine:   Diabetes, using insulin  Diabetes, Type 2 Diabetes for 20 years , controlled by insulin. Typical AM glucose range: 180-185  Denies Thyroid Disease  Metabolic Disorders  Psych:   Psychiatric History          Physical Exam  General:  Well nourished    Airway/Jaw/Neck:  Airway Findings: Mouth Opening: Normal Tongue: Normal  General Airway Assessment: Adult  Mallampati: II  Improves to II with phonation.  TM Distance: Normal, at least 6 cm  Jaw/Neck Findings:     Neck ROM: Normal ROM      Dental:  Dental Findings: Lower Dentures, Upper Dentures   Chest/Lungs:  Chest/Lungs Findings: Clear to auscultation     Heart/Vascular:  Heart Findings: Rate: Normal  Rhythm: Regular Rhythm  Sounds: Normal        Mental Status:  Mental Status Findings:  Cooperative, Alert and Oriented         Anesthesia Plan  Type of Anesthesia, risks & benefits discussed:  Anesthesia Type:  general, MAC  Patient's Preference:   Intra-op Monitoring Plan: standard ASA " monitors  Intra-op Monitoring Plan Comments:   Post Op Pain Control Plan: per primary service following discharge from PACU  Post Op Pain Control Plan Comments:   Induction:   IV  Beta Blocker:  Patient is on a Beta-Blocker and has received one dose within the past 24 hours (No further documentation required).       Informed Consent:  Anesthesia consent signed with patient representative.  ASA Score: 3     Day of Surgery Review of History & Physical:    H&P update referred to the surgeon.     Anesthesia Plan Notes: Discussed risks and benefits with patient's daughter.  Concern about effects of anesthetics in the context of dementia.         Ready For Surgery From Anesthesia Perspective.

## 2018-11-27 NOTE — ASSESSMENT & PLAN NOTE
89 M with gross hematuria. Currently no indication for urgent intervention or catheter. Urine overall clearing.    - Plan to take him to the OR Wednesday for cystoscopy, possible clot evacuation, fulguration of bleeding areas, retrograde pyelograms and all other indicated procedures   - Continue dutasteride daily  - Continue treating his candiduria with antifungal   - Small right renal hypodensity, can follow up renal US outpatient, will observe for now  - Please make NPO at midnight in Tuesday night for surgery    - if the patient is discharged home with home health or SNF, we will do cystoscopy outpatient on Thursday

## 2018-11-27 NOTE — PLAN OF CARE
Problem: Patient Care Overview  Goal: Plan of Care Review  Outcome: Ongoing (interventions implemented as appropriate)  Pt with no falls this shift. Blood sugar checked and insulin given per MAR. PT advised and assisted to turn q2h and PRN. B/P elevated and meds given per MAR for that as well. Call light in reach, bed alarm on, side rails up x2, bed in lowest position. Will continue to monitor.

## 2018-11-27 NOTE — PLAN OF CARE
Clinical Information faxed to [facility] to arrange HH follow-up for the patient.

## 2018-11-27 NOTE — PLAN OF CARE
[facility] ORDERS  FACE TO FACE ENCOUNTER    Patient Name: Ronald Campbell  YOB: 1929    PCP: Bart Shaw MD   PCP Address: [address] [facility] [place] [address]  PCP Phone Number: [phone]  PCP Fax: [phone]    Encounter Date: 11/27/2018    Admit to Home Health    Diagnoses:  Active Hospital Problems    Diagnosis  POA    *Metabolic encephalopathy [G93.41]  Yes    Discharge planning issues [Z02.9]  Not Applicable    Metabolic acidosis [E87.2]  Yes    Gross hematuria [R31.0]  Yes    Weakness [R53.1]  Yes    Renovascular hypertension [I15.0]  Yes    Hyperkalemia [E87.5]  Yes    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 [E11.22, N18.6]  Yes    ESRD (end stage renal disease) [N18.6]  Yes    Anemia in chronic kidney disease, on chronic dialysis [N18.6, D63.1, Z99.2]  Not Applicable    Seizure disorder [G40.909]  Yes     Chronic    Chronic combined systolic and diastolic heart failure [I50.42]  Yes     Chronic      Resolved Hospital Problems   No resolved problems to display.       Future Appointments   Date Time Provider Department Center   11/29/2018 10:30 AM [facility] NOM CYST[facility]           I have seen and examined this patient face to face today. My clinical findings that support the need for the home health skilled services and home bound status are the following:  Weakness/numbness causing balance and gait disturbance due to Weakness/Debility making it taxing to leave home.    Allergies:  Review of patient's allergies indicates:   Allergen Reactions    Lisinopril Swelling       Diet: diabetic diet: 2000 calorie    Activities: activity as tolerated    Nursing:   SN to complete comprehensive assessment including routine vital signs. Instruct on disease process and s/s of complications to report to MD. Review/verify medication list sent home with the patient at time of discharge  and instruct patient/caregiver as  needed. Frequency may be adjusted depending on start of care date.      CONSULTS:    Physical Therapy to evaluate and treat. Evaluate for home safety and equipment needs; Establish/upgrade home exercise program. Perform / instruct on therapeutic exercises, gait training, transfer training, and Range of Motion.  Occupational Therapy to evaluate and treat. Evaluate home environment for safety and equipment needs. Perform/Instruct on transfers, ADL training, ROM, and therapeutic exercises.    MISCELLANEOUS CARE:  N/A    WOUND CARE ORDERS  n/a      Medications: Review discharge medications with patient and family and provide education.      Current Discharge Medication List      START taking these medications    Details   fluconazole (DIFLUCAN) 100 MG tablet Take 1 tablet (100 mg total) by mouth once daily. On Dialysis day take 1 pill before the dialysis and one after the dialysis. Stop taking antibiotic after 12/5/18 for 8 days  Qty: 12 tablet, Refills: 0         CONTINUE these medications which have NOT CHANGED    Details   aspirin 81 MG Chew Take 1 tablet (81 mg total) by mouth once daily.  Qty: 30 tablet, Refills: 3      atorvastatin (LIPITOR) 40 MG tablet Take 1 tablet (40 mg total) by mouth once daily.  Qty: 30 tablet, Refills: 9      B complex-vitamin C-folic acid (NEPHRO-JESSICA) 0.8 mg Tab Take 1 tablet by mouth every morning.       brimonidine-timolol (COMBIGAN) 0.2-0.5 % Drop Place 1 drop into the left eye 2 (two) times daily.      brinzolamide (AZOPT) 1 % ophthalmic suspension Place 1 drop into the left eye 3 (three) times daily.       calcium acetate (PHOSLO) 667 mg capsule Take 2 capsules by mouth every day in the morning, then take 1 capsule with lunch and 2 capsules in the evening with meals      carvedilol (COREG) 25 MG tablet Take 1 tablet (25 mg total) by mouth 2 (two) times daily. Do not take on dialysis days. Hold for SBP < 110 or HR < 55  Qty: 60 tablet, Refills: 1      doxazosin (CARDURA) 4 MG tablet  Take 1 tablet (4 mg total) by mouth once daily.  Qty: 30 tablet, Refills: 1      doxycycline (MONODOX) 100 MG capsule Take 1 capsule (100 mg total) by mouth 2 (two) times daily. for 8 days  Qty: 16 capsule, Refills: 0      insulin aspart (NOVOLOG FLEXPEN) 100 unit/mL InPn  Insulin Pen Subcutaneous As directed.  -200 take 1 unitBS 201-250 take 2 unitsBS 251-300 take 3 unitsBS 301-350 take 4 unitsBS > 351 take 5 units and call MD      ketoconazole (NIZORAL) 2 % cream Apply topically once daily. feet  Qty: 30 g, Refills: 3    Comments: Any alternate antifungal cream OK  Associated Diagnoses: Tinea pedis of both feet      levetiracetam (KEPPRA) 500 MG Tab Take 1 tablet (500 mg total) by mouth 2 (two) times daily.  Qty: 60 tablet, Refills: 11      TRAVOPROST (TRAVATAN Z OPHT) Place 1 drop into the left eye every evening. 1 Drops Left eye Every evening      blood sugar diagnostic (BLOOD GLUCOSE TEST) Strp 1 strip by Misc.(Non-Drug; Combo Route) route daily as needed.          STOP taking these medications       HYDROcodone-acetaminophen (NORCO) 5-325 mg per tablet Comments:   Reason for Stopping:         amLODIPine (NORVASC) 5 MG tablet Comments:   Reason for Stopping:         dutasteride (AVODART) 0.5 mg capsule Comments:   Reason for Stopping:               I certify that this patient is confined to his home and needs physical therapy and occupational therapy.

## 2018-11-27 NOTE — ASSESSMENT & PLAN NOTE
Patient with 2 week history of hematuria, worsening per daughter. Evaluated by urology as an outpatient and during last ED visit for similar symptoms and recommended continued outpatient workup with cystoscopy scheduled 10/29/18. On course of doxycycline for presumed UTI from prior visit. Patient denied blood draw. Less hematuria, will set blood draw to 9AM     - Patient currently hemodynamically stable, Hemoglobin 10.2, same as last check 4 days previous  - Urology consulted, and cytoscope on Wednesday  - Culture from previous visit reviewed, positive for candida. Urinalysis reflex to culture ordered today. Will hold doxycyline>>> 11/25 started fluconazole 200 mg daily for total of 7 days (end date 12/02/2018) On Dialysis days give 100mg of fluconazole in before dialysis and 100mg of fluconazole after dialysis.   - CBC's daily

## 2018-11-27 NOTE — PLAN OF CARE
Problem: Physical Therapy Goal  Goal: Physical Therapy Goal  Goals to be met by: 18    Patient will increase functional independence with mobility by performin. Supine to sit with MInimal Assistance  2. Sit to supine with MInimal Assistance  3. Gait  x 100 feet with Minimal Assistance no device.  4. Ascend/descend 7 stairs with right Handrails Minimal Assistance.     Outcome: Ongoing (interventions implemented as appropriate)  Extensive education provided to pt's daughter at her request    Diogo Waters DPT  2018

## 2018-11-27 NOTE — PLAN OF CARE
11/27/18 1509   Final Note   Assessment Type Final Discharge Note   Anticipated Discharge Disposition Home-Health   What phone number can be called within the next 1-3 days to see how you are doing after discharge? [phone]   Hospital Follow Up  Appt(s) scheduled? Yes   Discharge plans and expectations educations in teach back method with documentation complete? Yes   Right Care Referral Info   Post Acute Recommendation Home-care   Facility Name [facility]      Future Appointments   Date Time Provider Department Center   11/29/2018 10:30 AM [facility]ST[facility]

## 2018-11-27 NOTE — PT/OT/SLP PROGRESS
Occupational Therapy   Treatment    Name: Ronald Campbell  MRN: [id number]  Admitting Diagnosis:  Metabolic encephalopathy       Recommendations:     Discharge Recommendations: home with home health(and increased caregiver support/environment)  Discharge Equipment Recommendations:  none  Barriers to discharge:  Decreased caregiver support, Inaccessible home environment    Subjective     Pain/Comfort:  ·      Objective:   Educational session with pt's dtr ; pt currently at HD    Communicated with: PT prior to session.    General Precautions: Standard, fall   Orthopedic Precautions:    Braces:       Occupational Performance: Pt at  at this time        [facility] 6 Click ADL:      Treatment & Education:  Educational session at pt's dtr's request as dtr would like clarification and further understanding of therapist's documented objective findings on Evaluation and recommendations at that time for home health. Spent extensive time explaining pt's functional mobility of Min assist, meaning pt performs at least 75% of a particular functional task while therapist provides some level <25 % of the task, also discussed and attempted to demonstrate the various levels of assistance that are documented by therapy. Attempted to explain that there was a difference between therapy's objective finding and documentation of a particular functional task (ie. Min assist for ambulation) and the pt's need for some level of assist at all times with basic ADL's and mobility. Therapy validated and agreed with daughters concerns that pt needs assist at all times and informed dtr that this was stated on therapy IE and in notes that pt would benefit from increased caregiver support  and that therapy also recommended HH. Educated daughter on SNF for a short stay and that this would not change pt's current functional status with ADL's and mobility as SNF's require that a pt can improve objectively with goals for improvement with functional status (ie.  Min assist to spvn or Mod I) and due to pt's PLOF/baseline status of requiring assist with ADL's and mobility and pt's multiple co-morbidities, his functional status will not change significantly and he will con't to need assist at all times. Educated on why rec's for HH were made vs SNF for before mentioned reasons. Validated dtr's concerns about pt's status and her significant caregiver burden and commended dtr for providing all the care that she currently takes on with the pt. Daughter stated basic understanding of therapy recommendations and understood that her father's functional status would not significantly change, although she continues to disagree with therapist use of min assist in reference to her father, but stated that if SNF is denying placement for her dad then she would like increased HH with increased caregiver support/environment as per therapy recommendations. Also referred dtr to CM for any possible assist with increased care at home or any recommendations. Dtr thanked therapists for our time and explanation/education.     Education:    Assessment:     Ronald Campbell is a 89 y.o. male with a medical diagnosis of Metabolic encephalopathy.  He presents with the following performance deficits affecting function are impaired endurance, weakness, impaired self care skills, impaired functional mobilty, visual deficits, decreased lower extremity function, gait instability.     Rehab Prognosis:  Poor; patient would benefit from acute skilled OT services to address these deficits and reach maximum level of function.       Plan:     Patient to be seen 2 x/week to address the above listed problems via self-care/home management, therapeutic activities, therapeutic exercises  · Plan of Care Expires: 11/22/18  · Plan of Care Reviewed with: patient    This Plan of care has been discussed with the patient who was involved in its development and understands and is in agreement with the identified goals and  treatment plan    GOALS:   Multidisciplinary Problems     Occupational Therapy Goals        Problem: Occupational Therapy Goal    Goal Priority Disciplines Outcome Interventions   Occupational Therapy Goal     OT, PT/OT Ongoing (interventions implemented as appropriate)    Description:  Goals to be met by: 12/2/18     Patient will increase functional independence with ADLs by performing following goals with cues and assist as noted:    UE Dressing with Minimal Assistance.  LE Dressing with Moderate Assistance.  Grooming while seated with Minimal Assistance.  Toileting from bedside commode/toilet as appropriate with Moderate Assistance for hygiene and clothing management.   Toilet transfer to bedside commode/toilet as appropriate with Minimal Assistance.                      Time Tracking:     OT Date of Treatment: 11/27/18  OT Start Time: 1000  OT Stop Time: 1030  OT Total Time (min): 30 min    Billable Minutes:Self Care/Home Management 30 min    Shannan Rasmussen OT  11/27/2018

## 2018-11-27 NOTE — ASSESSMENT & PLAN NOTE
Patient ESRD, dialysis dependent. Access via left arm AVF. Patient normally on MWF dialysis however was on modified schedule due to holiday scheduling. Last dialysis was saturday. Possibly contributing to patient's episode of weakness.  --11/24 dialyzed sat, 11/27    - Nephrology consulted for dialysis and received dialysis. (MWF schedule)

## 2018-11-27 NOTE — PROGRESS NOTES
[facility]  Urology  Progress Note    Patient Name: Ronald Campbell  MRN: [id number]  Admission Date: 11/23/2018  Hospital Length of Stay: 0 days  Code Status: Full Code   Attending Provider: Js Medeiros MD   Primary Care Physician: Bart Shaw MD    Subjective:     HPI:  Ronald Campbell is a 89 y.o. male who presented to the ED for continued painless gross hematuria. He has dementia and lives with his daughter. He has HTN, type II diabetes mellitus, ESRD on HD MWF, blindness. The hematuria has been going on for the past few weeks but has progressively increased. He also is passing clots.  He  reports occasional dysuria, denies frequency, urgency, or incomplete emptying. He urinates usually every morning and sometime in the evenings, unsure about how much urine he actually produces. He wears diapers and has been waking up with blood in diaper. No fevers and chills. He was seen in clinic a week ago and was scheduled to have a renal US and outpatient cystoscopy with Dr. Banerjee next week. He was most recently seen in the ED several days ago with gross hematuria and at that time plan was to follow up with Dr. Banerjee for outpatient cystoscopy, he was also started on empiric doxycycline and changed from finasteride to dutasteride. In the interim he has continued having consistent gross hematuria, but denies other urinary symptoms, his urine culture resulted + for candiduria and he has had some mild abdominal pain. Random bladder scan revealed 200ml.    He also underwent CT noncontrast of abdomen pelvis revealing a possible right renal mass, possible clot in bladder, a large prostate and several enlarged para-aortic lymph nodes extending along the left ileopsoas with some obliteration of the fat planes with an additional enlarged left external iliac lymph node.    He was admitted to medicine due to weakness and he missed dialysis this week.         Interval History:   No acute events. No pains. No issues  voiding. Clear yellow urine per nursing.    Review of Systems  Objective:     Temp:  [93.3 °F (34.1 °C)-97.8 °F (36.6 °C)] 97.8 °F (36.6 °C)  Pulse:  [56-64] 62  Resp:  [16-18] 16  SpO2:  [99 %-100 %] 100 %  BP: (127-192)/(62-82) 127/62     Body mass index is 21.77 kg/m².       Bladder Scan Volume (mL): 218 mL (11/23/18 2326)    Drains     Drain                 Hemodialysis AV Fistula -- days         Hemodialysis AV Fistula Left upper arm -- days         Hemodialysis AV Fistula 08/08/14 0800 Left upper arm 1571 days              Physical Exam   Nursing note and vitals reviewed.  Constitutional: Vital signs are normal. He appears well-developed and well-nourished. No distress.   Cardiovascular: Normal rate and intact distal pulses.    Pulmonary/Chest: Effort normal. No accessory muscle usage. No respiratory distress.   Abdominal: Soft. He exhibits no distension and no mass. There is no tenderness. There is no CVA tenderness. No hernia.   Genitourinary: Rectum normal. Rectal exam shows no mass and anal tone normal.   Musculoskeletal: He exhibits no edema or tenderness.   Neurological: He is alert.   Skin: Skin is warm and dry. No lesion and no rash noted. He is not diaphoretic. No cyanosis.       Significant Labs:    BMP:  Recent Labs   Lab 11/24/18  0624 11/25/18  1123 11/26/18  0903   * 134* 133*   K 4.9 4.3 4.6    99 99   CO2 18* 26 23   BUN 88* 47* 59*   CREATININE 10.0* 7.4* 8.5*   CALCIUM 8.8 8.3* 8.1*       CBC:   Recent Labs   Lab 11/24/18  1530 11/25/18  1123 11/26/18  0903   WBC 7.77 6.91 7.30   HGB 8.6* 8.2* 8.4*   HCT 25.9* 24.7* 26.3*   * 121* 128*       All pertinent labs results from the past 24 hours have been reviewed.    Significant Imaging:  All pertinent imaging results/findings from the past 24 hours have been reviewed.      Assessment/Plan:     Gross hematuria    89 M with gross hematuria. Currently no indication for urgent intervention or catheter. Urine overall clearing.    -  Plan to take him to the OR Wednesday for cystoscopy, possible clot evacuation, fulguration of bleeding areas, retrograde pyelograms and all other indicated procedures   - Continue dutasteride daily  - Continue treating his candiduria with antifungal   - Small right renal hypodensity, can follow up renal US outpatient, will observe for now  - Please make NPO at midnight in Tuesday night for surgery    - if the patient is discharged home with home health or SNF, we will do cystoscopy outpatient on Thursday         VTE Risk Mitigation (From admission, onward)        Ordered     IP VTE HIGH RISK PATIENT  Once      11/24/18 0126     Place sequential compression device  Until discontinued      11/24/18 0126          Jeremy Clayton MD  Urology  [facility]

## 2018-11-27 NOTE — SUBJECTIVE & OBJECTIVE
Interval History: NAEON, patient rested well. Getting dialysis today. Schedule for cystocopy with bladder bx Thursday. Continue to treating for candida with fluconazole.     Review of Systems   Constitutional: Positive for fatigue. Negative for chills, diaphoresis and fever.   HENT: Negative for congestion, sinus pressure and sore throat.    Eyes: Negative for pain, discharge, redness and itching.        Blind bilateral   Respiratory: Negative for cough, chest tightness, shortness of breath and wheezing.    Cardiovascular: Negative for chest pain, palpitations and leg swelling.   Gastrointestinal: Negative for abdominal pain, constipation, diarrhea and vomiting.   Genitourinary: Positive for hematuria. Negative for decreased urine volume, difficulty urinating, dysuria, flank pain, frequency, scrotal swelling, testicular pain and urgency.   Musculoskeletal: Positive for gait problem. Negative for arthralgias, joint swelling and myalgias.   Skin: Negative for wound.   Neurological: Positive for weakness. Negative for dizziness, tremors, seizures, light-headedness and headaches.        Difficulty walking due to weakness in lower extremity     Hematological: Negative for adenopathy. Does not bruise/bleed easily.   Psychiatric/Behavioral: Negative for agitation and behavioral problems.     Objective:     Vital Signs (Most Recent):  Temp: 97.7 °F (36.5 °C) (11/27/18 0809)  Pulse: 62 (11/27/18 1100)  Resp: 16 (11/27/18 0006)  BP: (!) 125/54 (11/27/18 1100)  SpO2: 100 % (11/27/18 0006) Vital Signs (24h Range):  Temp:  [93.3 °F (34.1 °C)-97.8 °F (36.6 °C)] 97.7 °F (36.5 °C)  Pulse:  [56-73] 62  Resp:  [16-18] 16  SpO2:  [99 %-100 %] 100 %  BP: ()/(38-76) 125/54     Weight: 74.8 kg (165 lb)  Body mass index is 21.77 kg/m².    Intake/Output Summary (Last 24 hours) at 11/27/2018 1111  Last data filed at 11/27/2018 0914  Gross per 24 hour   Intake 400 ml   Output --   Net 400 ml      Physical Exam   Constitutional: He is  oriented to person, place, and time. He appears well-developed and well-nourished. No distress.   HENT:   Head: Normocephalic and atraumatic.   Eyes:   Patient blind, no acute abnormality noted   Neck: Normal range of motion. Neck supple.   Cardiovascular: Normal rate, regular rhythm and normal heart sounds. Exam reveals no gallop and no friction rub.   No murmur heard.  Pulmonary/Chest: Effort normal and breath sounds normal. He has no wheezes. He has no rales.   Abdominal: Soft. Bowel sounds are normal. He exhibits no mass. There is no tenderness. There is no rebound and no guarding.   Genitourinary: Rectum normal. Prostate is not tender.   Genitourinary Comments: No hematuria was visualized.    Musculoskeletal: Normal range of motion. He exhibits no edema.   AV fistula Left upper arm thrilled   Lymphadenopathy:     He has no cervical adenopathy.   Neurological: He is alert and oriented to person, place, and time.   Skin: Skin is warm and dry. Capillary refill takes less than 2 seconds.       Significant Labs:   CBC:   Recent Labs   Lab 11/25/18  1123 11/26/18  0903 11/27/18  0833   WBC 6.91 7.30 7.49   HGB 8.2* 8.4* 8.3*   HCT 24.7* 26.3* 25.0*   * 128* 134*     CMP:   Recent Labs   Lab 11/25/18  1123 11/26/18  0903 11/27/18  0833   * 133* 131*   K 4.3 4.6 5.2*   CL 99 99 96   CO2 26 23 24   * 130* 129*   BUN 47* 59* 78*   CREATININE 7.4* 8.5* 9.2*   CALCIUM 8.3* 8.1* 8.0*   ALBUMIN 2.6* 2.7* 2.6*   ANIONGAP 9 11 11   EGFRNONAA 5.9* 5.0* 4.5*       Significant Imaging: I have reviewed all pertinent imaging results/findings within the past 24 hours.

## 2018-11-27 NOTE — PT/OT/SLP PROGRESS
Physical Therapy Treatment    Patient Name:  Ronald Campbell   MRN:  [id number]    Recommendations:     Discharge Recommendations:  home with home health   Discharge Equipment Recommendations: none   Barriers to discharge: None    Assessment:     Ronald Campbell is a 89 y.o. male admitted with a medical diagnosis of Metabolic encephalopathy.  He presents with the following impairments/functional limitations:  weakness, impaired functional mobilty, gait instability, impaired endurance, impaired balance, impaired self care skills, visual deficits, decreased lower extremity function.  Attempted to visit pt in AM - pt off floor for dialysis.  Unable to return later in day for treatment session however, visited to pt's daughter per her request to discuss PT/OT findings and d/c recommendations from initial evaluation.    Rehab Prognosis:  fair; patient would benefit from acute skilled PT services to address these deficits and reach maximum level of function.      Recent Surgery: Procedure(s) (LRB):  CYSTOSCOPY, WITH BLADDER BIOPSY, WITH FULGURATION IF INDICATED Clot evacuation, TURBT, Bilateral retrograde pyelograms (N/A)      Plan:     During this hospitalization, patient to be seen 3 x/week to address the above listed problems via gait training, therapeutic activities, therapeutic exercises, neuromuscular re-education  · Plan of Care Expires:  12/25/18   Plan of Care Reviewed with: patient    Subjective     ·      Patients cultural, spiritual, Baptism conflicts given the current situation: none stated    Objective:     Patient found with:       General Precautions: Standard, fall   Orthopedic Precautions:N/A   Braces:       Functional Mobility:  ·       AM-PAC 6 CLICK MOBILITY  Turning over in bed (including adjusting bedclothes, sheets and blankets)?: 3  Sitting down on and standing up from a chair with arms (e.g., wheelchair, bedside commode, etc.): 3  Moving from lying on back to sitting on the side of the bed?:  3  Moving to and from a bed to a chair (including a wheelchair)?: 2  Need to walk in hospital room?: 3  Climbing 3-5 steps with a railing?: 2  Basic Mobility Total Score: 16       Therapeutic Activities and Exercises:  Extensive education provided to pt's daughter per her request.  Pt's daughter wanted clarification and further understanding of PT's/OT's objective findings and d/c recommendations from initial evaluation.  Pt's daughter concern c recommendation for HH as she feels pt requires SNF prior to returning home.  Pt's daughter upset at PT's and OT's use of minimal assistance used in initial evaluation as this is preventing pt from being authorized from insurance for SNF.  Extensive time spent educating pt's daughter on why minimal assistance was used to describe pt's current functional status.  Educated daughter that pt able to perform majority of functional tasks such as transferring in/out of a chair, ambulating and navigating stairs while only requiring <25% assistance hence minimal assistance was used.  Further explained and demonstrated difference between min A, mod A, max A and supervision.  Pt's daughter was concern as she felt pt requires constant assistance which PT/OT are in agreement d/t pt's comorbidities - but explained to daughter that the amount of assistance he required to perform functional tasks was minimal.  Education/explaination provided on recommendation of HH vs SNF.  Educated on receiving additional therapy in SNF setting would not change pt's current functional status c mobility and ADLs as pt continue to require assistance d/t his impairments.  Explained that pt would not objectively improve c SNF (ie requiring min A for mobility to supervision) therefore was not appropriate for recommendation.  Pt's daughter states continues dissatisfaction c PT/OT recommendations and objective findings but express basic understanding.  Pt's daughter thanked PT/OT for taking time to discuss this  matter.    GOALS:   Multidisciplinary Problems     Physical Therapy Goals        Problem: Physical Therapy Goal    Goal Priority Disciplines Outcome Goal Variances Interventions   Physical Therapy Goal     PT, PT/OT Ongoing (interventions implemented as appropriate)     Description:  Goals to be met by: 18    Patient will increase functional independence with mobility by performin. Supine to sit with MInimal Assistance  2. Sit to supine with MInimal Assistance  3. Gait  x 100 feet with Minimal Assistance no device.  4. Ascend/descend 7 stairs with right Handrails Minimal Assistance.                      Time Tracking:     PT Received On: 18  PT Start Time: 1000     PT Stop Time: 1032  PT Total Time (min): 32 min     Billable Minutes: Therapeutic Activity 32min    Treatment Type: Treatment              Diogo Waters, PT  2018

## 2018-11-27 NOTE — SUBJECTIVE & OBJECTIVE
Interval History:   No acute events. No pains. No issues voiding. Clear yellow urine per nursing.    Review of Systems  Objective:     Temp:  [93.3 °F (34.1 °C)-97.8 °F (36.6 °C)] 97.8 °F (36.6 °C)  Pulse:  [56-64] 62  Resp:  [16-18] 16  SpO2:  [99 %-100 %] 100 %  BP: (127-192)/(62-82) 127/62     Body mass index is 21.77 kg/m².       Bladder Scan Volume (mL): 218 mL (11/23/18 2326)    Drains     Drain                 Hemodialysis AV Fistula -- days         Hemodialysis AV Fistula Left upper arm -- days         Hemodialysis AV Fistula 08/08/14 0800 Left upper arm 1571 days              Physical Exam   Nursing note and vitals reviewed.  Constitutional: Vital signs are normal. He appears well-developed and well-nourished. No distress.   Cardiovascular: Normal rate and intact distal pulses.    Pulmonary/Chest: Effort normal. No accessory muscle usage. No respiratory distress.   Abdominal: Soft. He exhibits no distension and no mass. There is no tenderness. There is no CVA tenderness. No hernia.   Genitourinary: Rectum normal. Rectal exam shows no mass and anal tone normal.   Musculoskeletal: He exhibits no edema or tenderness.   Neurological: He is alert.   Skin: Skin is warm and dry. No lesion and no rash noted. He is not diaphoretic. No cyanosis.       Significant Labs:    BMP:  Recent Labs   Lab 11/24/18  0624 11/25/18  1123 11/26/18  0903   * 134* 133*   K 4.9 4.3 4.6    99 99   CO2 18* 26 23   BUN 88* 47* 59*   CREATININE 10.0* 7.4* 8.5*   CALCIUM 8.8 8.3* 8.1*       CBC:   Recent Labs   Lab 11/24/18  1530 11/25/18  1123 11/26/18  0903   WBC 7.77 6.91 7.30   HGB 8.6* 8.2* 8.4*   HCT 25.9* 24.7* 26.3*   * 121* 128*       All pertinent labs results from the past 24 hours have been reviewed.    Significant Imaging:  All pertinent imaging results/findings from the past 24 hours have been reviewed.

## 2018-11-28 NOTE — PT/OT/SLP PROGRESS
Occupational Therapy   Treatment    Name: Ronald Campbell  MRN: [id number]  Admitting Diagnosis:  Metabolic encephalopathy       Recommendations:     Discharge Recommendations: home with home health(and increased caregiver support/environment)  Discharge Equipment Recommendations:  none  Barriers to discharge:  Decreased caregiver support, Inaccessible home environment    Subjective     Pain/Comfort:  ·      Objective:     Communicated with: nsg and PT prior to session.  Patient found with call button in reach and telemetry, peripheral IV upon OT entry to room.    General Precautions: Standard, fall   Orthopedic Precautions:    Braces:       Occupational Performance:    Bed Mobility:    · Sup to sit with CGA and increased time     Functional Mobility/Transfers:  · Sit to stand with min assist from bed and min assist from chair  · Functional Mobility: ambulated with min assist/HHA x 20 ft and x 40 ft with rest between, no AD, cues for household ambulation tasks and functional mobility    Activities of Daily Living:  · UE dress with mod assist  · Simple grooming with setup/cues      [facility] 6 Click ADL:      Treatment & Education:  -Pt edu on OT role/POC  -Importance of OOB activity with staff assistance only  -Safety during functional t/f and mobility   -self care tasks completed-- assistance level noted above  - All questions/concerns answered within OT scope of practice             Patient left up in chair with call button in reach  Education:    Assessment:     Ronald Campbell is a 89 y.o. male with a medical diagnosis of Metabolic encephalopathy.  He presents with the following performance deficits affecting function are impaired endurance, weakness, impaired self care skills, impaired functional mobilty, visual deficits, decreased lower extremity function, gait instability.     Rehab Prognosis:  Poor; patient would benefit from acute skilled OT services to address these deficits and reach maximum level of function.        Plan:     Patient to be seen 2 x/week to address the above listed problems via self-care/home management, therapeutic activities, therapeutic exercises  · Plan of Care Expires: 11/22/18  · Plan of Care Reviewed with: patient    This Plan of care has been discussed with the patient who was involved in its development and understands and is in agreement with the identified goals and treatment plan    GOALS:   Multidisciplinary Problems     Occupational Therapy Goals        Problem: Occupational Therapy Goal    Goal Priority Disciplines Outcome Interventions   Occupational Therapy Goal     OT, PT/OT Ongoing (interventions implemented as appropriate)    Description:  Goals to be met by: 12/2/18     Patient will increase functional independence with ADLs by performing following goals with cues and assist as noted:    UE Dressing with Minimal Assistance.  LE Dressing with Moderate Assistance.  Grooming while seated with Minimal Assistance.  Toileting from bedside commode/toilet as appropriate with Moderate Assistance for hygiene and clothing management.   Toilet transfer to bedside commode/toilet as appropriate with Minimal Assistance.                      Time Tracking:     OT Date of Treatment: 11/28/18  OT Start Time: 1132  OT Stop Time: 1151  OT Total Time (min): 19 min    Billable Minutes:Self Care/Home Management 19 min    Shannan Rasmussen OT  11/28/2018

## 2018-11-28 NOTE — PLAN OF CARE
Problem: Patient Care Overview  Goal: Plan of Care Review  Outcome: Ongoing (interventions implemented as appropriate)  Pt slept most of the night. Pt doesn't c/o any pain or any other acute distress. AAOx2, free of fall. Will cont to monitor

## 2018-11-28 NOTE — PLAN OF CARE
Problem: Patient Care Overview  Goal: Plan of Care Review  Outcome: Ongoing (interventions implemented as appropriate)  Patient's AAOX3 safety and infection precautions taken. Will cont to monitor.

## 2018-11-28 NOTE — PLAN OF CARE
Problem: Physical Therapy Goal  Goal: Physical Therapy Goal  Goals to be met by: 18    Patient will increase functional independence with mobility by performin. Supine to sit with MInimal Assistance  2. Sit to supine with MInimal Assistance  3. Gait  x 100 feet with Minimal Assistance no device.  4. Ascend/descend 7 stairs with right Handrails Minimal Assistance.     Outcome: Ongoing (interventions implemented as appropriate)  Progressing towards goals    Diogo Waters DPT  2018

## 2018-11-28 NOTE — ANESTHESIA PREPROCEDURE EVALUATION
[facility]  Anesthesia Pre-Operative Evaluation         Patient Name: Ronald Campbell  YOB: 1929  MRN: [id number]    SUBJECTIVE:     Pre-operative evaluation for Procedure(s) (LRB):  CYSTOSCOPY, WITH BLADDER BIOPSY, WITH FULGURATION IF INDICATED (Bilateral)     11/28/2018    Ronald Campbell is a 89 y.o. male w/ a significant PMHx of ESRD (Still making urine, dialysis MWF), T2DM, CHF, and seizure disorder who presents to [facility] ED for evaluation of gross hematuria and weakness. The patient has dementia and is blind and is accompanied by his daughter who is his primary caregiver. She states that he has been having hematuria for the past two weeks. The patient was brought to the ED today because he had an episode of weakness on the background of persistent hematuria.  Schedule for cystocopy with bladder bx Thursday.          LDA:        Peripheral IV - Single Lumen 11/23/18 1900 Right Hand (Active)   Site Assessment Clean;Dry;Intact 11/28/2018  8:00 AM   Line Status Saline locked 11/28/2018  8:00 AM   Dressing Status Clean;Dry;Intact 11/28/2018  8:00 AM   Number of days: 4            Hemodialysis AV Fistula 08/08/14 0800 Left upper arm (Active)   Needle Size 15ga 11/28/2018  8:00 AM   Site Assessment Clean;Dry;Intact 11/28/2018  8:00 AM   Patency Present;Thrill 11/28/2018  8:00 AM   Status Deaccessed 11/27/2018  8:00 PM   Flows Good 11/27/2018  8:00 PM   Dressing Intervention New dressing 11/27/2018 12:06 PM   Dressing Status Clean;Dry;Intact 11/27/2018  8:00 PM   Site Condition No complications 11/27/2018  8:00 PM   Dressing Pressure dressing 11/27/2018  8:00 PM   Number of days: 1573            Hemodialysis AV Fistula Left upper arm (Active)   Number of days:             Hemodialysis AV Fistula (Active)   Number of days:        Prev airway: Moderately difficult mask ventilation with oral airway. Grade I view with Bermudez, ETT placed without complication. 1 attempt.     Drips: None  documented.      Patient Active Problem List   Diagnosis    Seizure disorder    Chronic combined systolic and diastolic heart failure    Cerebrovascular disease    Anemia in chronic kidney disease, on chronic dialysis    Thrombocytopenia    Blindness of right eye    Benign prostatic hyperplasia with urinary retention    ESRD (end stage renal disease)    Shortness of breath    Hyperkalemia    Hypernatremia    HTN (hypertension)    Dyslipidemia    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    Edema of both legs    Effusion of left knee    Glaucoma    Syncope and collapse    Renovascular hypertension    AV fistula stenosis    Vein stenosis    Insulin long-term use    Tinea pedis of both feet    Dermatophytosis, nail    Hemodialysis patient    Scrotal mass    Acute cystitis with hematuria    Weakness    Blind in both eyes    Dementia with behavioral disturbance    Hypothermia    Phimosis    History of unilateral orchiectomy    HCAP (healthcare-associated pneumonia)    Volume overload    Agitation    Gross hematuria    Metabolic encephalopathy    Discharge planning issues    Metabolic acidosis       Review of patient's allergies indicates:   Allergen Reactions    Lisinopril Swelling       Current Inpatient Medications:   amLODIPine  5 mg Oral Daily    atorvastatin  40 mg Oral Daily    brimonidine 0.15 % OPTH DROP  1 drop Left Eye BID    calcium acetate  1,334 mg Oral TID WM    carvedilol  25 mg Oral BID    doxazosin  4 mg Oral Daily    dutasteride  0.5 mg Oral Daily    fluconazole  100 mg Oral Daily    And    fluconazole  100 mg Oral Every Tues, Thurs, Sat    levETIRAcetam  500 mg Oral BID    timolol maleate 0.5%  1 drop Left Eye BID    travoprost  1 drop Left Eye QHS       No current facility-administered medications on file prior to encounter.      Current Outpatient Medications on File Prior to Encounter   Medication Sig Dispense Refill    aspirin 81 MG  Chew Take 1 tablet (81 mg total) by mouth once daily. 30 tablet 3    atorvastatin (LIPITOR) 40 MG tablet Take 1 tablet (40 mg total) by mouth once daily. 30 tablet 9    B complex-vitamin C-folic acid (NEPHRO-JESSICA) 0.8 mg Tab Take 1 tablet by mouth every morning.       brimonidine-timolol (COMBIGAN) 0.2-0.5 % Drop Place 1 drop into the left eye 2 (two) times daily.      brinzolamide (AZOPT) 1 % ophthalmic suspension Place 1 drop into the left eye 3 (three) times daily.       calcium acetate (PHOSLO) 667 mg capsule Take 2 capsules by mouth every day in the morning, then take 1 capsule with lunch and 2 capsules in the evening with meals      carvedilol (COREG) 25 MG tablet Take 1 tablet (25 mg total) by mouth 2 (two) times daily. Do not take on dialysis days. Hold for SBP < 110 or HR < 55 (Patient taking differently: Take 25 mg by mouth 2 (two) times daily. Hold for SBP < 110 or HR < 55) 60 tablet 1    doxazosin (CARDURA) 4 MG tablet Take 1 tablet (4 mg total) by mouth once daily. 30 tablet 1    doxycycline (MONODOX) 100 MG capsule Take 1 capsule (100 mg total) by mouth 2 (two) times daily. for 8 days 16 capsule 0    insulin aspart (NOVOLOG FLEXPEN) 100 unit/mL InPn  Insulin Pen Subcutaneous As directed.  -200 take 1 unitBS 201-250 take 2 unitsBS 251-300 take 3 unitsBS 301-350 take 4 unitsBS > 351 take 5 units and call MD      ketoconazole (NIZORAL) 2 % cream Apply topically once daily. feet 30 g 3    levetiracetam (KEPPRA) 500 MG Tab Take 1 tablet (500 mg total) by mouth 2 (two) times daily. 60 tablet 11    TRAVOPROST (TRAVATAN Z OPHT) Place 1 drop into the left eye every evening. 1 Drops Left eye Every evening      blood sugar diagnostic (BLOOD GLUCOSE TEST) Strp 1 strip by Misc.(Non-Drug; Combo Route) route daily as needed.          Past Surgical History:   Procedure Laterality Date    ANGIOPLASTY WITH STENT PLACEMENT Left 11/18/2016    Performed by [facility] at [facility] OR [address] [place] [address]    AV  FISTULA PLACEMENT Left 4/2014    AV fistula to Left upper arm    ENRYCREWFPZK-ETHXHZR-PF left RC vs BC AVF Left 8/8/2014    Performed by [facility] at [facility] OR [address] [place] [address]    EYE SURGERY      FISTULOGRAM Left 11/18/2016    [facility]    FISTULOGRAM Left 4/15/2016    [facility]    FISTULOGRAM Left 1/29/2016    Performed by [facility] at [facility] CATH LAB    FISTULOGRAM Room 11 case Left 3/27/2015    Performed by [facility]in [facility]s MD at [facility] OR [address] [place] [address]    HYDROCELECTOMY Bilateral 11/22/2017    Performed by Temo Banerjee MD at [facility] OR [facility]    INSERTION-CATHETER-DIALYSIS-PERITONEAL-LAPAROSCOPIC N/A 5/8/2014    Performed by Js Ldad [facility] at [facility] OR [facility]    INSERTION-CATHETER-DIALYSIS-PERITONEAL-LAPAROSCOPIC N/A 5/5/2014    [facility]    INSERTION-CATHETER-HEMODIALYSIS N/A 5/21/2014    [facility]    PERMCATH INSERTION-TUNNELED CVC N/A 5/13/2014    Performed by Lolis Jackson MD at [facility] CATH LAB    REMOVAL-CATHETER-PERITONEAL DIALYSIS N/A 7/30/2014    Performed by Js Ladd MD at [facility] OR [address] [place] [address]    SCROTUM EXPLORATION Bilateral 11/2017       Social History     Socioeconomic History    Marital status:      Spouse name: Not on file    Number of children: Not on file    Years of education: Not on file    Highest education level: Not on file   Social Needs    Financial resource strain: Not on file    Food insecurity - worry: Not on file    Food insecurity - inability: Not on file    Transportation needs - medical: Not on file    Transportation needs - non-medical: Not on file   Occupational History    Not on file   Tobacco Use    Smoking status: Never Smoker    Smokeless tobacco: Never Used   Substance and Sexual Activity    Alcohol use: No    Drug use: No    Sexual activity: No    Other Topics Concern    Not on file   Social History Narrative    Not on file       OBJECTIVE:     Vital Signs Range (Last 24H):  Temp:  [35.8 °C (96.4 °F)-37.1 °C (98.8 °F)]   Pulse:  [62-75]   Resp:  [16-18]   BP: (147-178)/(72-77)   SpO2:  [98 %-99 %]       Significant Labs:  Lab Results   Component Value Date    WBC 6.18 11/28/2018    HGB 7.8 (L) 11/28/2018    HCT 25.3 (L) 11/28/2018     (L) 11/28/2018    CHOL 95 (L) 05/26/2016    TRIG 61 05/26/2016    HDL 30 (L) 05/26/2016    ALT 26 11/24/2018    AST 27 11/24/2018     (L) 11/28/2018    K 4.4 11/28/2018     11/28/2018    CREATININE 6.2 (H) 11/28/2018    BUN 39 (H) 11/28/2018    CO2 21 (L) 11/28/2018    TSH 1.263 08/03/2018    PSA 28.3 (H) 05/01/2014    INR 1.1 11/21/2018    HGBA1C 6.3 (H) 11/23/2018     Diagnostic Studies: No relevant studies.    EKG:   Vent. Rate : 049 BPM     Atrial Rate : 049 BPM     P-R Int : 150 ms          QRS Dur : 086 ms      QT Int : 556 ms       P-R-T Axes : 020 -19 020 degrees     QTc Int : 502 ms    Sinus bradycardia  Moderate voltage criteria for LVH, may be normal variant  Nonspecific ST and T wave abnormality  Prolonged QT  Abnormal ECG  When compared with ECG of 23-NOV-2018 21:10,  Previous ECG has undetermined rhythm, needs review  Confirmed by VY STARKS MD ([id number]) on 11/25/2018 8:53:43 PM    2D ECHO:  Results for orders placed or performed during the hospital encounter of 06/04/15   2D echo with color flow doppler   Result Value Ref Range    QEF 40 (A) 55 - 65    Mitral Valve Regurgitation TRIVIAL     Diastolic Dysfunction Yes (A)     Est. PA Systolic Pressure 23.04     Mitral Valve Mobility NORMAL     Tricuspid Valve Regurgitation TRIVIAL          ASSESSMENT/PLAN:       Anesthesia Evaluation         Review of Systems  Anesthesia Hx:  History of prior surgery of interest to airway management or planning: Previous anesthesia: MAC 11/2016: Fistulagram with MAC.  Denies Family Hx of Anesthesia  "complications.  Personal Hx of Anesthesia complications (Versed: confusion)   Social:  Denies Tobacco Use. Denies Alcohol Use.   Hematology/Oncology:     Oncology Normal    -- Anemia:  -- Thrombocytopenia (plt. count 11/7/17: 116):   -- Transfusion: -- Transfusion Hx (no complications) (before 2013):  Hematology Comments: thrombocytopenia   Anemia 2/2 renal disease    EENT/Dental:  EENT/Dental Normal Eyes: Visual Impairment , Is Legally Blind Eye Disease: Glaucoma:    Denies Throat Symptoms  Denies Jaw Problems   Cardiovascular:   Hypertension CHF  Functional Capacity PT twice weekly: + SOB/denies CP  Congestive Heart Failure (CHF)  Denies Deep Venous Thrombosis (DVT)  Hypertension    Pulmonary:  Denies Asthma.  Denies Chronic Obstructive Pulmonary Disease (COPD).  Possible Obstructive Sleep Apnea    Renal/:   Chronic Renal Disease, ESRD  Kidney Function/Disease, Chronic Kidney Disease (CKD) , CKD Stage V (ESRD) (GFR <15 or on Dialysis)  Dialysis Dependent, M-W-F, chronic hemodialysis , dry weight of 175 lb. [facility] in [place]   Hepatic/GI:  Denies Esophageal / Stomach Disorders  Denies Liver Disease    Musculoskeletal:  Joint Disease:  Arthritis bilateral knee   Neurological:   Seizures  Neuro Symptoms of numbness Bilateral handDementia  Seizure Disorder , Most recent seizure occurred >1 year ago Has not had a seizure since starting on Keppra- "years" Denies CVA - Cerebrovasular Accident  TIA - Transient Ischemic Attack , transient deficits are no residual deficit.   Endocrine:   Diabetes, using insulin  Diabetes, Type 2 Diabetes for 20 years , controlled by insulin. Typical AM glucose range: 180-185  Denies Thyroid Disease  Metabolic Disorders  Psych:   Psychiatric History          Physical Exam  General:  Well nourished    Airway/Jaw/Neck:  Airway Findings: Mouth Opening: Normal Tongue: Normal  General Airway Assessment: Adult  Mallampati: II  Improves to II with phonation.  TM Distance: " Normal, at least 6 cm  Jaw/Neck Findings:     Neck ROM: Normal ROM      Dental:  Dental Findings: Lower Dentures, Upper Dentures   Chest/Lungs:  Chest/Lungs Findings: Clear to auscultation     Heart/Vascular:  Heart Findings: Rate: Normal  Rhythm: Regular Rhythm  Sounds: Normal        Mental Status:  Mental Status Findings:  Cooperative, Alert and Oriented         Anesthesia Plan  Type of Anesthesia, risks & benefits discussed:  Anesthesia Type:  general, MAC  Patient's Preference:   Intra-op Monitoring Plan: standard ASA monitors  Intra-op Monitoring Plan Comments:   Post Op Pain Control Plan: per primary service following discharge from PACU  Post Op Pain Control Plan Comments:   Induction:   IV  Beta Blocker:  Patient is on a Beta-Blocker and has received one dose within the past 24 hours (No further documentation required).       Informed Consent:  Anesthesia consent signed with patient representative.  ASA Score: 3     Day of Surgery Review of History & Physical:    H&P update referred to the surgeon.     Anesthesia Plan Notes: Discussed risks and benefits with patient's daughter.  Concern about effects of anesthetics in the context of dementia.         Ready For Surgery From Anesthesia Perspective.

## 2018-11-28 NOTE — CARE UPDATE
Urology Note    Due to scheduling issues, patient will not be able to have cystoscopy tomorrow. The patient is also no longer having hematuria and is stable from urology perspective. He will follow up as scheduled for outpatient cystoscopy on Thursday.    Discussed with Dr. Banerjee and internal medicine team.    Jeremy Clayton MD  Urology, PGY-2  [facility]

## 2018-11-28 NOTE — PROGRESS NOTES
"[facility] Hospital Medicine  Progress Note    Patient Name: Ronald Campbell  MRN: [id number]  Patient Class: OP- Observation   Admission Date: 11/23/2018  Length of Stay: 0 days  Attending Physician: Melina Marte MD  Primary Care Provider: Bart Shaw MD    Hospital Medicine Team: [facility] HOSP MED 2 Hannah Toure MD    Subjective:     Principal Problem:Metabolic encephalopathy    HPI:  Mr. Campbell is an 89 year old male with a past medical history of ESRD (Still making urine, dialysis MWF), T2DM, CHF, and seizure disorder who presents to [facility] ED for evaluation of gross hematuria and weakness. The patient has dementia and is blind and is accompanied by his daughter who is his primary caregiver. She states that he has been having hematuria for the past two weeks. She states first noticed it when there was sidney blood in his diaper and has progressed to passing clots. He was evaluated by urology on 11/13/18 at which point he was scheduled for an outpatient cystoscopy and ultrasound which is scheduled for 11/29. The patient was seen recently in the ED because his daughter noticed increasing clots in his diaper. He was evaluated by urology at that time and determined safe for discharge and to follow up for scheduled cystoscopy. The patient was brought to the ED today because he had an episode of weakness on the background of persistent hematuria. The patient is unable to ambulate with assistance and per the daughter while he was walking with her today his legs "gave out on him." She supported him to the floor to a seated position and called the fire department to help him up and he was transported to the ED. Per the daughter the patient had only been complaining of penile pain. The patient and daughter deny any recent illness or sick contacts. He was scheduled for dialysis today however could not go due to weakness. Last dialysis was Tuesday. He is complaining of some mild low back pain at this time as " "well, he states he feels like he "needs to go" but can't. Per daughter the patient's last bowel movement was the previous night and was normal. He was prescribed Norco 5mg in ED for pain and had taken 2-3 pills since discharge per the daughter. The patient has had some decreased PO intake over the past few days 2/2 symptoms.    Hospital Course:  Mr Campbell was admitted to hospital medicine 11/24 due to weakness and hematuria. Pt had dialysis 11/24, PT/Ot reccs was HH, H&H stable, no hematuria. Urology consulted and following the pt, possible cystoscopy either wed vs thur. Patient received another round of dialysis on 11/27 and PT /OT recommended HH with PT/OT. Patient is scheduled for outpatient cytoscope with bladder biopsy tomorrow with Dr. Banerjee.     Interval History: NAEON, patient rested well. Getting dialysis today. Schedule for cystocopy with bladder bx Thursday. Continue to treating for candida with fluconazole.     Review of Systems   Constitutional: Positive for fatigue. Negative for chills, diaphoresis and fever.   HENT: Negative for congestion, sinus pressure and sore throat.    Eyes: Negative for pain, discharge, redness and itching.        Blind bilateral   Respiratory: Negative for cough, chest tightness, shortness of breath and wheezing.    Cardiovascular: Negative for chest pain, palpitations and leg swelling.   Gastrointestinal: Negative for abdominal pain, constipation, diarrhea and vomiting.   Genitourinary: Positive for hematuria. Negative for decreased urine volume, difficulty urinating, dysuria, flank pain, frequency, scrotal swelling, testicular pain and urgency.   Musculoskeletal: Positive for gait problem. Negative for arthralgias, joint swelling and myalgias.   Skin: Negative for wound.   Neurological: Positive for weakness. Negative for dizziness, tremors, seizures, light-headedness and headaches.        Difficulty walking due to weakness in lower extremity     Hematological: Negative for " adenopathy. Does not bruise/bleed easily.   Psychiatric/Behavioral: Negative for agitation and behavioral problems.     Objective:     Vital Signs (Most Recent):  Temp: 97.7 °F (36.5 °C) (11/27/18 0809)  Pulse: 62 (11/27/18 1100)  Resp: 16 (11/27/18 0006)  BP: (!) 125/54 (11/27/18 1100)  SpO2: 100 % (11/27/18 0006) Vital Signs (24h Range):  Temp:  [93.3 °F (34.1 °C)-97.8 °F (36.6 °C)] 97.7 °F (36.5 °C)  Pulse:  [56-73] 62  Resp:  [16-18] 16  SpO2:  [99 %-100 %] 100 %  BP: ()/(38-76) 125/54     Weight: 74.8 kg (165 lb)  Body mass index is 21.77 kg/m².    Intake/Output Summary (Last 24 hours) at 11/27/2018 1111  Last data filed at 11/27/2018 0914  Gross per 24 hour   Intake 400 ml   Output --   Net 400 ml      Physical Exam   Constitutional: He is oriented to person, place, and time. He appears well-developed and well-nourished. No distress.   HENT:   Head: Normocephalic and atraumatic.   Eyes:   Patient blind, no acute abnormality noted   Neck: Normal range of motion. Neck supple.   Cardiovascular: Normal rate, regular rhythm and normal heart sounds. Exam reveals no gallop and no friction rub.   No murmur heard.  Pulmonary/Chest: Effort normal and breath sounds normal. He has no wheezes. He has no rales.   Abdominal: Soft. Bowel sounds are normal. He exhibits no mass. There is no tenderness. There is no rebound and no guarding.   Genitourinary: Rectum normal. Prostate is not tender.   Genitourinary Comments: No hematuria was visualized.    Musculoskeletal: Normal range of motion. He exhibits no edema.   AV fistula Left upper arm thrilled   Lymphadenopathy:     He has no cervical adenopathy.   Neurological: He is alert and oriented to person, place, and time.   Skin: Skin is warm and dry. Capillary refill takes less than 2 seconds.       Significant Labs:   CBC:   Recent Labs   Lab 11/25/18  1123 11/26/18  0903 11/27/18  0833   WBC 6.91 7.30 7.49   HGB 8.2* 8.4* 8.3*   HCT 24.7* 26.3* 25.0*   * 128* 134*      CMP:   Recent Labs   Lab 11/25/18  1123 11/26/18  0903 11/27/18  0833   * 133* 131*   K 4.3 4.6 5.2*   CL 99 99 96   CO2 26 23 24   * 130* 129*   BUN 47* 59* 78*   CREATININE 7.4* 8.5* 9.2*   CALCIUM 8.3* 8.1* 8.0*   ALBUMIN 2.6* 2.7* 2.6*   ANIONGAP 9 11 11   EGFRNONAA 5.9* 5.0* 4.5*       Significant Imaging: I have reviewed all pertinent imaging results/findings within the past 24 hours.    Assessment/Plan:      * Metabolic encephalopathy    Patient presents with altered mental status and weakness on background of ESRD and gross hematuria for past two weeks. Patient's hemoglobin remains unchanged from previous ED visit at 10.2, baseline appears to be 12 per chart review. Patient 3 days since last dialysis as well as endorsing decreased PO intake. Denying any symptoms of infection at this time. WBC wnl and afebrile. Rectal exam negative for prostate tenderness. Patient has been receiving tramadol for pain, per daughter however the patient had no side effects to the medication previously. Patient is at his baseline at this time, appropriately answering questions.    Given infection is less likely on history, presumptive cause is due to electrolyte derangement due to missing dialysis. Patient hemodynamically stable and not complaining of other signs of anemia at this time.    - Nephrology consulted for dialysis tomorrow, appreciate their help  - Blood glucose elevated at 241, possible contributing. Patient is on SSI insulin at home. Will restart and closely monitor blood glucoses with goal 140-180 while inpatient.  - Urinalysis reflex to culture drawn - NGTD  - Holding patient's home tramadol  - Fall precautions and delirium precautions - 9 AM blood draw        Discharge planning issues    - [facility]  - Case management consulted         Gross hematuria    Patient with 2 week history of hematuria, worsening per daughter. Evaluated by urology as an outpatient and  during last ED visit for similar symptoms and recommended continued outpatient workup with cystoscopy scheduled 10/29/18. On course of doxycycline for presumed UTI from prior visit. Patient denied blood draw. Less hematuria, will set blood draw to 9AM     - Patient currently hemodynamically stable, Hemoglobin 10.2, same as last check 4 days previous  - Urology consulted, and cytoscope on Wednesday  - Culture from previous visit reviewed, positive for candida. Urinalysis reflex to culture ordered today. Will hold doxycyline>>> 11/25 started fluconazole 200 mg daily for total of 7 days (end date 12/02/2018) On Dialysis days give 100mg of fluconazole in before dialysis and 100mg of fluconazole after dialysis.   - CBC's daily     Weakness    Patient assessed by PT /OT home with home health        Renovascular hypertension    Restarted home carvedilol 12.5mg bid     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    Patient on sliding scale insulin at home, per daughter patient only receives it for blood glucoses greater than 200.  - Will restart low dose SSI  - POCT glucose qid with meals       Hyperkalemia    Potassium 5.4 on presentation to ED. EKG negative for changes due to hyperkalemia. Patient asymptomatic at this time.    - Patient to receive dialysis on Saturday 11/24 and Tuesday 11/25   - Cardiac monitoring       ESRD (end stage renal disease)    Patient ESRD, dialysis dependent. Access via left arm AVF. Patient normally on MWF dialysis however was on modified schedule due to holiday scheduling. Last dialysis was saturday. Possibly contributing to patient's episode of weakness.  --11/24 dialyzed sat, 11/27    - Nephrology consulted for dialysis and received dialysis. (MWF schedule)       Benign prostatic hyperplasia with urinary retention    Bladder scanned, 200cc in bladder. Per urology will not insert catheter at this time and will reassess in AM    - Continuing home dutasteride 0.5mg daily       Anemia  in chronic kidney disease, on chronic dialysis    - MWF dialysis  - Dialyzed on Tuesday. Continue with MWF schedule when patient goes home.         Chronic combined systolic and diastolic heart failure    Patient's last echo in 2016 had EF 40% and diastolic dsyfunction. Patient no longer following up with cardiology. Patient is not volume overloaded on exam today.    - Coreg 12.5 bid continued  - On dialysis       Seizure disorder    Presumed epilepsy, patient seizure free for several years  - Resumed home keppra 500mg bid         VTE Risk Mitigation (From admission, onward)        Ordered     IP VTE HIGH RISK PATIENT  Once      11/24/18 0126     Place sequential compression device  Until discontinued      11/24/18 0126              ARNEL Toure MD  Department of Hospital Medicine   [facility]

## 2018-11-28 NOTE — PLAN OF CARE
Problem: Patient Care Overview  Goal: Plan of Care Review  Outcome: Ongoing (interventions implemented as appropriate)  Patient sitting up in bed. Tolerating diet well with assisstance. Patient denies any complaints of pain or discomfort at this time. Will continue to monitor.

## 2018-11-28 NOTE — PLAN OF CARE
Problem: Occupational Therapy Goal  Goal: Occupational Therapy Goal  Goals to be met by: 12/2/18     Patient will increase functional independence with ADLs by performing following goals with cues and assist as noted:    UE Dressing with Minimal Assistance.  LE Dressing with Moderate Assistance.  Grooming while seated with Minimal Assistance.  Toileting from bedside commode/toilet as appropriate with Moderate Assistance for hygiene and clothing management.   Toilet transfer to bedside commode/toilet as appropriate with Minimal Assistance.     Outcome: Ongoing (interventions implemented as appropriate)  Con't with goals  Shannan Rasmussen, OTR

## 2018-11-28 NOTE — PT/OT/SLP PROGRESS
"Physical Therapy Treatment    Patient Name:  Ronald Campbell   MRN:  [id number]    Recommendations:     Discharge Recommendations:  home with home health   Discharge Equipment Recommendations: none   Barriers to discharge: Decreased caregiver support    Assessment:     Ronald Campbell is a 89 y.o. male admitted with a medical diagnosis of Metabolic encephalopathy.  He presents with the following impairments/functional limitations:  weakness, impaired functional mobilty, gait instability, impaired endurance, impaired balance, impaired self care skills, visual deficits.  Pt tolerated session c c/o fatigue.  Demo improved functional mobility on this date - increased gait distance.  Performed bed mobility c CGA, transfer c min A and gait c min A using HHAx2.  Pt ambulating short distance (20ft, 40ft) and demo decreased gait speed, shuffled gait, flat foot contact, mild unsteadiness, no overt LOB, c/o fatigue.  Pt safe to transfer to/from bedside chair c assistance of 1 person.  Pt would benefit from continued skilled acute PT 2x/wk to improve functional mobility.      Rehab Prognosis:  good; patient would benefit from acute skilled PT services to address these deficits and reach maximum level of function.      Recent Surgery: Procedure(s) (LRB):  CYSTOSCOPY, WITH BLADDER BIOPSY, WITH FULGURATION IF INDICATED Clot evacuation, TURBT, Bilateral retrograde pyelograms (N/A)      Plan:     During this hospitalization, patient to be seen 2 x/week to address the above listed problems via gait training, therapeutic activities, therapeutic exercises, neuromuscular re-education  · Plan of Care Expires:  12/25/18   Plan of Care Reviewed with: patient    Subjective     Communicated with RN and OT prior to session.  Patient found HOB elevated upon PT entry to room, agreeable to treatment.      Chief Complaint: fatigue  Patient comments/goals: "I'm never ready." - when asked if pt ready for second gait trial.  Pain/Comfort:  · Pain Rating " 1: 0/10    Patients cultural, spiritual, Spiritism conflicts given the current situation: none    Objective:     Patient found with: telemetry, peripheral IV     General Precautions: Standard, fall   Orthopedic Precautions:N/A   Braces: N/A     Functional Mobility:  · Bed Mobility:     · Rolling Left:  contact guard assistance  · Scooting: contact guard assistance  · Supine to Sit: contact guard assistance  · Transfers:     · Sit to Stand:  minimum assistance with hand-held assist  · Bed to Chair: minimum assistance with  hand-held assist  using  Stand Pivot  · Gait: 20ft, 40ft c HHAx2 min A   · decreased gait speed, shuffled gait, flat foot contact, mild unsteadiness, no overt LOB, c/o fatigue  · Balance: sitting (S); standing (CGA)      AM-PAC 6 CLICK MOBILITY  Turning over in bed (including adjusting bedclothes, sheets and blankets)?: 3  Sitting down on and standing up from a chair with arms (e.g., wheelchair, bedside commode, etc.): 3  Moving from lying on back to sitting on the side of the bed?: 3  Moving to and from a bed to a chair (including a wheelchair)?: 3  Need to walk in hospital room?: 3  Climbing 3-5 steps with a railing?: 2  Basic Mobility Total Score: 17       Therapeutic Activities and Exercises:  Pt educated on: PT role/POC; safety c mobility; benefits of OOB activities; performing therex; d/c recs - v/u  Mod-max cues throughout session d/t visual deficits    Patient left up in chair with all lines intact, call button in reach and RN notified..    GOALS:   Multidisciplinary Problems     Physical Therapy Goals        Problem: Physical Therapy Goal    Goal Priority Disciplines Outcome Goal Variances Interventions   Physical Therapy Goal     PT, PT/OT Ongoing (interventions implemented as appropriate)     Description:  Goals to be met by: 18    Patient will increase functional independence with mobility by performin. Supine to sit with MInimal Assistance  2. Sit to supine with MInimal  Assistance  3. Gait  x 100 feet with Minimal Assistance no device.  4. Ascend/descend 7 stairs with right Handrails Minimal Assistance.                      Time Tracking:     PT Received On: 11/28/18  PT Start Time: 1132     PT Stop Time: 1150  PT Total Time (min): 18 min     Billable Minutes: Gait Training 18 min    Treatment Type: Treatment  PT/PTA: PT           Diogo Waters, PT  11/28/2018

## 2018-11-29 NOTE — PLAN OF CARE
Problem: Patient Care Overview  Goal: Individualization & Mutuality  Outcome: Ongoing (interventions implemented as appropriate)  Patient AAOX3 safety and infection precautions taken. Patient has no c/o pain/discomfort at this time. Will cont to monitor.

## 2018-11-29 NOTE — PROGRESS NOTES
[facility] NOTE     Patient currently on hemodialysis for removal of uremic toxins and volume.    Patient seen and evaluated on hemodialysis, tolerating treatment, see HD flowsheet for vitals and assessments.      Ultrafiltration goal is 500-100cc as tolerated     Labs have been reviewed and the dialysate bath has been adjusted.     Assessment/Plan:     HD today for removal of uremic toxins and volume.

## 2018-11-29 NOTE — PROGRESS NOTES
Dialysis tx completed. 3.5 hours. 1 liter removed. Needles pulled from left arm AVF. Hemostasis achieved. Gauze and tape to sites. Tolerated tx well. Report called to Oneyda BAKER.

## 2018-11-29 NOTE — ASSESSMENT & PLAN NOTE
89 M with gross hematuria. Currently no indication for urgent intervention or catheter. Urine overall clearing.    - To OR today for cystoscopy, possible clot evacuation, fulguration of bleeding areas, retrograde pyelograms and all other indicated procedures   - Continue dutasteride daily  - Continue treating his candiduria with antifungal

## 2018-11-29 NOTE — NURSING
Patient AAOX3 during morning care a small amount of blood was noted coming from the patient's penis.  Patient being transported via stretcher to attend dialysis per transport.

## 2018-11-29 NOTE — PROGRESS NOTES
[facility]  Urology  Progress Note    Patient Name: Ronald Campbell  MRN: [id number]  Admission Date: 11/23/2018  Hospital Length of Stay: 0 days  Code Status: Full Code   Attending Provider: Melina Marte MD   Primary Care Physician: Bart Shaw MD    Subjective:     HPI:  Ronald Campbell is a 89 y.o. male who presented to the ED for continued painless gross hematuria. He has dementia and lives with his daughter. He has HTN, type II diabetes mellitus, ESRD on HD MWF, blindness. The hematuria has been going on for the past few weeks but has progressively increased. He also is passing clots.  He  reports occasional dysuria, denies frequency, urgency, or incomplete emptying. He urinates usually every morning and sometime in the evenings, unsure about how much urine he actually produces. He wears diapers and has been waking up with blood in diaper. No fevers and chills. He was seen in clinic a week ago and was scheduled to have a renal US and outpatient cystoscopy with Dr. Banerjee next week. He was most recently seen in the ED several days ago with gross hematuria and at that time plan was to follow up with Dr. Banerjee for outpatient cystoscopy, he was also started on empiric doxycycline and changed from finasteride to dutasteride. In the interim he has continued having consistent gross hematuria, but denies other urinary symptoms, his urine culture resulted + for candiduria and he has had some mild abdominal pain. Random bladder scan revealed 200ml.    He also underwent CT noncontrast of abdomen pelvis revealing a possible right renal mass, possible clot in bladder, a large prostate and several enlarged para-aortic lymph nodes extending along the left ileopsoas with some obliteration of the fat planes with an additional enlarged left external iliac lymph node.    He was admitted to medicine due to weakness and he missed dialysis this week.         Interval History:   HD today. Blood noted at the meatus. No  complaints from the patient this AM.    Review of Systems  Objective:     Temp:  [96 °F (35.6 °C)-97.9 °F (36.6 °C)] 96 °F (35.6 °C)  Pulse:  [55-68] 55  Resp:  [18] 18  SpO2:  [97 %-99 %] 97 %  BP: (134-198)/(64-93) 140/64     Body mass index is 21.77 kg/m².       Bladder Scan Volume (mL): 218 mL (11/23/18 2326)    Drains     Drain                 Hemodialysis AV Fistula -- days         Hemodialysis AV Fistula Left upper arm -- days         Hemodialysis AV Fistula 08/08/14 0800 Left upper arm 1574 days                Physical Exam   Nursing note and vitals reviewed.  Constitutional: Vital signs are normal. He appears well-developed and well-nourished. No distress.   Cardiovascular: Normal rate and intact distal pulses.    Pulmonary/Chest: Effort normal. No accessory muscle usage. No respiratory distress.   Abdominal: Soft. He exhibits no distension and no mass. There is no tenderness. There is no CVA tenderness. No hernia.   Musculoskeletal: He exhibits no edema or tenderness.   Neurological: He is alert.   Skin: Skin is warm and dry. No lesion and no rash noted. He is not diaphoretic. No cyanosis.       Significant Labs:    BMP:  Recent Labs   Lab 11/27/18  0833 11/28/18  0858 11/29/18  0821   * 131* 128*   K 5.2* 4.4 4.7   CL 96 100 97   CO2 24 21* 19*   BUN 78* 39* 56*   CREATININE 9.2* 6.2* 7.7*   CALCIUM 8.0* 8.0* 8.1*       CBC:   Recent Labs   Lab 11/27/18  0833 11/28/18  0858 11/29/18  0821   WBC 7.49 6.18 6.71   HGB 8.3* 7.8* 8.0*   HCT 25.0* 25.3* 24.6*   * 119* 127*       All pertinent labs results from the past 24 hours have been reviewed.    Significant Imaging:  All pertinent imaging results/findings from the past 24 hours have been reviewed.      Assessment/Plan:     Gross hematuria    89 M with gross hematuria. Currently no indication for urgent intervention or catheter. Urine overall clearing.    - To OR today for cystoscopy, possible clot evacuation, fulguration of bleeding areas,  retrograde pyelograms and all other indicated procedures   - Continue dutasteride daily  - Continue treating his candiduria with antifungal          VTE Risk Mitigation (From admission, onward)        Ordered     IP VTE HIGH RISK PATIENT  Once      11/24/18 0126     Place sequential compression device  Until discontinued      11/24/18 0126          Jeremy Clayton MD  Urology  [facility]

## 2018-11-29 NOTE — PROGRESS NOTES
"[facility] Hospital Medicine  Progress Note    Patient Name: Ronald Campbell  MRN: [id number]  Patient Class: OP- Observation   Admission Date: 11/23/2018  Length of Stay: 0 days  Attending Physician: Melina Marte MD  Primary Care Provider: Bart Shaw MD    Hospital Medicine Team: [facility] HOSP MED 2 ARNEL Toure MD    Subjective:     Principal Problem:Metabolic encephalopathy    HPI:  Mr. Campebll is an 89 year old male with a past medical history of ESRD (Still making urine, dialysis MWF), T2DM, CHF, and seizure disorder who presents to [facility] ED for evaluation of gross hematuria and weakness. The patient has dementia and is blind and is accompanied by his daughter who is his primary caregiver. She states that he has been having hematuria for the past two weeks. She states first noticed it when there was sidney blood in his diaper and has progressed to passing clots. He was evaluated by urology on 11/13/18 at which point he was scheduled for an outpatient cystoscopy and ultrasound which is scheduled for 11/29. The patient was seen recently in the ED because his daughter noticed increasing clots in his diaper. He was evaluated by urology at that time and determined safe for discharge and to follow up for scheduled cystoscopy. The patient was brought to the ED today because he had an episode of weakness on the background of persistent hematuria. The patient is unable to ambulate with assistance and per the daughter while he was walking with her today his legs "gave out on him." She supported him to the floor to a seated position and called the fire department to help him up and he was transported to the ED. Per the daughter the patient had only been complaining of penile pain. The patient and daughter deny any recent illness or sick contacts. He was scheduled for dialysis today however could not go due to weakness. Last dialysis was Tuesday. He is complaining of some mild low back pain at this time as " "well, he states he feels like he "needs to go" but can't. Per daughter the patient's last bowel movement was the previous night and was normal. He was prescribed Norco 5mg in ED for pain and had taken 2-3 pills since discharge per the daughter. The patient has had some decreased PO intake over the past few days 2/2 symptoms.    Hospital Course:  Mr Campbell was admitted to hospital medicine 11/24 due to weakness and hematuria. Pt had dialysis 11/24, PT/Ot reccs was HH, H&H stable, no hematuria. Urology consulted and following the pt, possible cystoscopy either wed vs thur. Patient received another round of dialysis on 11/27 and PT /OT recommended HH with PT/OT. Patient is scheduled for outpatient cytoscope with bladder biopsy 11/29 with Dr. Banerjee.     Interval History: NAEON, patient refused vital overnight. Patient expressed that he feels tired. Went to dialysis today and planning to go do cystoscope later this afternoon. Patient plan to get discharge tomorrow morning to go to his dialysis at 10:30.      Review of Systems   Constitutional: Positive for fatigue. Negative for chills, diaphoresis and fever.   HENT: Negative for congestion, sinus pressure and sore throat.    Eyes: Negative for pain, discharge, redness and itching.        Blind bilateral   Respiratory: Negative for cough, chest tightness, shortness of breath and wheezing.    Cardiovascular: Negative for chest pain, palpitations and leg swelling.   Gastrointestinal: Negative for abdominal pain, constipation, diarrhea and vomiting.   Genitourinary: Positive for hematuria. Negative for decreased urine volume, difficulty urinating, dysuria, flank pain, frequency, scrotal swelling, testicular pain and urgency.   Musculoskeletal: Positive for gait problem. Negative for arthralgias, joint swelling and myalgias.   Skin: Negative for wound.   Neurological: Positive for weakness. Negative for dizziness, tremors, seizures, light-headedness and headaches.        " Difficulty walking due to weakness in lower extremity     Hematological: Negative for adenopathy. Does not bruise/bleed easily.   Psychiatric/Behavioral: Negative for agitation and behavioral problems.     Objective:     Vital Signs (Most Recent):  Temp: 96.9 °F (36.1 °C) (11/29/18 1330)  Pulse: 60 (11/29/18 1330)  Resp: 18 (11/29/18 0814)  BP: (!) 143/62 (11/29/18 1330)  SpO2: 97 % (11/29/18 0814) Vital Signs (24h Range):  Temp:  [96 °F (35.6 °C)-97.7 °F (36.5 °C)] 96.9 °F (36.1 °C)  Pulse:  [54-68] 60  Resp:  [18] 18  SpO2:  [97 %-99 %] 97 %  BP: (116-198)/(57-93) 143/62     Weight: 74.8 kg (165 lb)  Body mass index is 21.77 kg/m².    Intake/Output Summary (Last 24 hours) at 11/29/2018 1431  Last data filed at 11/29/2018 1330  Gross per 24 hour   Intake 600 ml   Output 1600 ml   Net -1000 ml      Physical Exam   Constitutional: He is oriented to person, place, and time. He appears well-developed and well-nourished. No distress.   HENT:   Head: Normocephalic and atraumatic.   Eyes:   Patient blind, no acute abnormality noted   Neck: Normal range of motion. Neck supple.   Cardiovascular: Normal rate, regular rhythm and normal heart sounds. Exam reveals no gallop and no friction rub.   No murmur heard.  Pulmonary/Chest: Effort normal and breath sounds normal. He has no wheezes. He has no rales.   Abdominal: Soft. Bowel sounds are normal. He exhibits no mass. There is no tenderness. There is no rebound and no guarding.   Genitourinary: Rectum normal. Prostate is not tender. No penile tenderness.   Genitourinary Comments:  dried up bloody diaper from  was visualized.    Musculoskeletal: Normal range of motion. He exhibits no edema.   AV fistula Left upper arm thrilled. Covered with dressing   Lymphadenopathy:     He has no cervical adenopathy.   Neurological: He is alert and oriented to person, place, and time.   Skin: Skin is warm and dry. Capillary refill takes less than 2 seconds.       Significant Labs:   CBC:    Recent Labs   Lab 11/28/18  0858 11/29/18  0821   WBC 6.18 6.71   HGB 7.8* 8.0*   HCT 25.3* 24.6*   * 127*     CMP:   Recent Labs   Lab 11/28/18  0858 11/29/18  0821   * 128*   K 4.4 4.7    97   CO2 21* 19*   GLU 90 121*   BUN 39* 56*   CREATININE 6.2* 7.7*   CALCIUM 8.0* 8.1*   ALBUMIN 2.6* 2.8*   ANIONGAP 10 12   EGFRNONAA 7.3* 5.6*       Significant Imaging: I have reviewed all pertinent imaging results/findings within the past 24 hours.    Assessment/Plan:      * Metabolic encephalopathy    Patient presents with altered mental status and weakness on background of ESRD and gross hematuria for past two weeks. Patient's hemoglobin remains unchanged from previous ED visit at 10.2, baseline appears to be 12 per chart review. Patient 3 days since last dialysis as well as endorsing decreased PO intake. Denying any symptoms of infection at this time. WBC wnl and afebrile. Rectal exam negative for prostate tenderness. Patient has been receiving tramadol for pain, per daughter however the patient had no side effects to the medication previously. Patient is at his baseline at this time, appropriately answering questions.    Given infection is less likely on history, presumptive cause is due to electrolyte derangement due to missing dialysis. Patient hemodynamically stable and not complaining of other signs of anemia at this time.    - Nephrology consulted for dialysis tomorrow, appreciate their help  - Blood glucose elevated at 241, possible contributing. Patient is on SSI insulin at home. Will restart and closely monitor blood glucoses with goal 140-180 while inpatient.  - Urinalysis reflex to culture drawn - NGTD  - Holding patient's home tramadol  - Fall precautions and delirium precautions - 9 AM blood draw        Discharge planning issues    - PT/OT consulted- [facility] home with home health  - Case management consulted         Gross hematuria    Patient with 2 week history of hematuria, worsening per  daughter. Evaluated by urology as an outpatient and during last ED visit for similar symptoms and recommended continued outpatient workup with cystoscopy scheduled 10/29/18. On course of doxycycline for presumed UTI from prior visit. Patient denied blood draw. Less hematuria, will set blood draw to 9AM     - Patient currently hemodynamically stable, Hemoglobin 10.2, same as last check 4 days previous  - Urology consulted, and cytoscope on Wednesday  - Culture from previous visit reviewed, positive for candida. Urinalysis reflex to culture ordered today. Will hold doxycyline>>> 11/25 started fluconazole 200 mg daily for total of 7 days (end date 12/02/2018) On Dialysis days give 100mg of fluconazole in before dialysis and 100mg of fluconazole after dialysis.   - CBC's daily     Weakness    Patient assessed by PT /OT home with home health        Renovascular hypertension    Restarted home carvedilol 12.5mg bid  - adding Amlodipin 5mg QD     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    Patient on sliding scale insulin at home, per daughter patient only receives it for blood glucoses greater than 200.  - Will restart low dose SSI  - POCT glucose qid with meals       Hyperkalemia    Potassium 5.4 on presentation to ED. EKG negative for changes due to hyperkalemia. Patient asymptomatic at this time.    - Patient to receive dialysis on Saturday 11/24 and Tuesday 11/25   - Cardiac monitoring       ESRD (end stage renal disease)    Patient ESRD, dialysis dependent. Access via left arm AVF. Patient normally on MWF dialysis however was on modified schedule due to holiday scheduling. Last dialysis was saturday. Possibly contributing to patient's episode of weakness.  --11/24 dialyzed sat, 11/27, 11/29    - Nephrology consulted for dialysis and received dialysis. (MWF schedule)       Benign prostatic hyperplasia with urinary retention    Bladder scanned, 200cc in bladder. Per urology will not insert catheter at this  time and will reassess in AM    - Continuing home dutasteride 0.5mg daily       Anemia in chronic kidney disease, on chronic dialysis    - MWF dialysis  - Dialyzed on Tuesday. Continue with MWF schedule when patient goes home.         Chronic combined systolic and diastolic heart failure    Patient's last echo in 2016 had EF 40% and diastolic dsyfunction. Patient no longer following up with cardiology. Patient is not volume overloaded on exam today.    - Coreg 12.5 bid continued  - On dialysis       Seizure disorder    Presumed epilepsy, patient seizure free for several years  - Resumed home keppra 500mg bid         VTE Risk Mitigation (From admission, onward)        Ordered     IP VTE HIGH RISK PATIENT  Once      11/24/18 0126     Place sequential compression device  Until discontinued      11/24/18 0126              ARNEL Toure MD  Department of Hospital Medicine   [facility]

## 2018-11-29 NOTE — PROGRESS NOTES
Patient arrived on unit via stretcher. Weight obtained in bed @ 81.2kg. Dialysis tx initiated via left arm AVF with 15g needles x 2 without difficulty. Lines connected and secured. Orders and machine settings verified. Tx started. Good blood flows established. VSS.

## 2018-11-29 NOTE — ASSESSMENT & PLAN NOTE
Patient ESRD, dialysis dependent. Access via left arm AVF. Patient normally on MWF dialysis however was on modified schedule due to holiday scheduling. Last dialysis was saturday. Possibly contributing to patient's episode of weakness.  --11/24 dialyzed sat, 11/27, 11/29    - Nephrology consulted for dialysis and received dialysis. (MWF schedule)

## 2018-11-29 NOTE — SUBJECTIVE & OBJECTIVE
Interval History: NAEON, patient refused vital overnight. Patient expressed that he feels tired. Went to dialysis today and planning to go do cystoscope later this afternoon. Patient plan to get discharge tomorrow morning to go to his dialysis at 10:30.      Review of Systems   Constitutional: Positive for fatigue. Negative for chills, diaphoresis and fever.   HENT: Negative for congestion, sinus pressure and sore throat.    Eyes: Negative for pain, discharge, redness and itching.        Blind bilateral   Respiratory: Negative for cough, chest tightness, shortness of breath and wheezing.    Cardiovascular: Negative for chest pain, palpitations and leg swelling.   Gastrointestinal: Negative for abdominal pain, constipation, diarrhea and vomiting.   Genitourinary: Positive for hematuria. Negative for decreased urine volume, difficulty urinating, dysuria, flank pain, frequency, scrotal swelling, testicular pain and urgency.   Musculoskeletal: Positive for gait problem. Negative for arthralgias, joint swelling and myalgias.   Skin: Negative for wound.   Neurological: Positive for weakness. Negative for dizziness, tremors, seizures, light-headedness and headaches.        Difficulty walking due to weakness in lower extremity     Hematological: Negative for adenopathy. Does not bruise/bleed easily.   Psychiatric/Behavioral: Negative for agitation and behavioral problems.     Objective:     Vital Signs (Most Recent):  Temp: 96.9 °F (36.1 °C) (11/29/18 1330)  Pulse: 60 (11/29/18 1330)  Resp: 18 (11/29/18 0814)  BP: (!) 143/62 (11/29/18 1330)  SpO2: 97 % (11/29/18 0814) Vital Signs (24h Range):  Temp:  [96 °F (35.6 °C)-97.7 °F (36.5 °C)] 96.9 °F (36.1 °C)  Pulse:  [54-68] 60  Resp:  [18] 18  SpO2:  [97 %-99 %] 97 %  BP: (116-198)/(57-93) 143/62     Weight: 74.8 kg (165 lb)  Body mass index is 21.77 kg/m².    Intake/Output Summary (Last 24 hours) at 11/29/2018 1431  Last data filed at 11/29/2018 1330  Gross per 24 hour    Intake 600 ml   Output 1600 ml   Net -1000 ml      Physical Exam   Constitutional: He is oriented to person, place, and time. He appears well-developed and well-nourished. No distress.   HENT:   Head: Normocephalic and atraumatic.   Eyes:   Patient blind, no acute abnormality noted   Neck: Normal range of motion. Neck supple.   Cardiovascular: Normal rate, regular rhythm and normal heart sounds. Exam reveals no gallop and no friction rub.   No murmur heard.  Pulmonary/Chest: Effort normal and breath sounds normal. He has no wheezes. He has no rales.   Abdominal: Soft. Bowel sounds are normal. He exhibits no mass. There is no tenderness. There is no rebound and no guarding.   Genitourinary: Rectum normal. Prostate is not tender. No penile tenderness.   Genitourinary Comments:  dried up bloody diaper from  was visualized.    Musculoskeletal: Normal range of motion. He exhibits no edema.   AV fistula Left upper arm thrilled. Covered with dressing   Lymphadenopathy:     He has no cervical adenopathy.   Neurological: He is alert and oriented to person, place, and time.   Skin: Skin is warm and dry. Capillary refill takes less than 2 seconds.       Significant Labs:   CBC:   Recent Labs   Lab 11/28/18  0858 11/29/18  0821   WBC 6.18 6.71   HGB 7.8* 8.0*   HCT 25.3* 24.6*   * 127*     CMP:   Recent Labs   Lab 11/28/18  0858 11/29/18  0821   * 128*   K 4.4 4.7    97   CO2 21* 19*   GLU 90 121*   BUN 39* 56*   CREATININE 6.2* 7.7*   CALCIUM 8.0* 8.1*   ALBUMIN 2.6* 2.8*   ANIONGAP 10 12   EGFRNONAA 7.3* 5.6*       Significant Imaging: I have reviewed all pertinent imaging results/findings within the past 24 hours.

## 2018-11-29 NOTE — SUBJECTIVE & OBJECTIVE
Interval History:   HD today. Blood noted at the meatus. No complaints from the patient this AM.    Review of Systems  Objective:     Temp:  [96 °F (35.6 °C)-97.9 °F (36.6 °C)] 96 °F (35.6 °C)  Pulse:  [55-68] 55  Resp:  [18] 18  SpO2:  [97 %-99 %] 97 %  BP: (134-198)/(64-93) 140/64     Body mass index is 21.77 kg/m².       Bladder Scan Volume (mL): 218 mL (11/23/18 2326)    Drains     Drain                 Hemodialysis AV Fistula -- days         Hemodialysis AV Fistula Left upper arm -- days         Hemodialysis AV Fistula 08/08/14 0800 Left upper arm 1574 days                Physical Exam   Nursing note and vitals reviewed.  Constitutional: Vital signs are normal. He appears well-developed and well-nourished. No distress.   Cardiovascular: Normal rate and intact distal pulses.    Pulmonary/Chest: Effort normal. No accessory muscle usage. No respiratory distress.   Abdominal: Soft. He exhibits no distension and no mass. There is no tenderness. There is no CVA tenderness. No hernia.   Musculoskeletal: He exhibits no edema or tenderness.   Neurological: He is alert.   Skin: Skin is warm and dry. No lesion and no rash noted. He is not diaphoretic. No cyanosis.       Significant Labs:    BMP:  Recent Labs   Lab 11/27/18  0833 11/28/18  0858 11/29/18  0821   * 131* 128*   K 5.2* 4.4 4.7   CL 96 100 97   CO2 24 21* 19*   BUN 78* 39* 56*   CREATININE 9.2* 6.2* 7.7*   CALCIUM 8.0* 8.0* 8.1*       CBC:   Recent Labs   Lab 11/27/18  0833 11/28/18  0858 11/29/18  0821   WBC 7.49 6.18 6.71   HGB 8.3* 7.8* 8.0*   HCT 25.0* 25.3* 24.6*   * 119* 127*       All pertinent labs results from the past 24 hours have been reviewed.    Significant Imaging:  All pertinent imaging results/findings from the past 24 hours have been reviewed.

## 2018-11-29 NOTE — SUBJECTIVE & OBJECTIVE
Interval History: Patient continues to pass clot. Urology notifed     Review of Systems   Constitutional: Positive for fatigue. Negative for chills, diaphoresis and fever.   HENT: Negative for congestion, sinus pressure and sore throat.    Eyes: Negative for pain, discharge, redness and itching.        Blind bilateral   Respiratory: Negative for cough, chest tightness, shortness of breath and wheezing.    Cardiovascular: Negative for chest pain, palpitations and leg swelling.   Gastrointestinal: Negative for abdominal pain, constipation, diarrhea and vomiting.   Genitourinary: Positive for hematuria. Negative for decreased urine volume, difficulty urinating, dysuria, flank pain, frequency, scrotal swelling, testicular pain and urgency.   Musculoskeletal: Positive for gait problem. Negative for arthralgias, joint swelling and myalgias.   Skin: Negative for wound.   Neurological: Positive for weakness. Negative for dizziness, tremors, seizures, light-headedness and headaches.        Difficulty walking due to weakness in lower extremity     Hematological: Negative for adenopathy. Does not bruise/bleed easily.   Psychiatric/Behavioral: Negative for agitation and behavioral problems.     Objective:     Vital Signs (Most Recent):  Temp: 97.4 °F (36.3 °C) (11/28/18 1653)  Pulse: (!) 59 (11/28/18 1653)  Resp: 18 (11/28/18 1653)  BP: (!) 154/70 (11/28/18 1653)  SpO2: 99 % (11/28/18 1653) Vital Signs (24h Range):  Temp:  [97.4 °F (36.3 °C)-98.8 °F (37.1 °C)] 97.4 °F (36.3 °C)  Pulse:  [59-74] 59  Resp:  [16-18] 18  SpO2:  [98 %-99 %] 99 %  BP: (147-178)/(70-74) 154/70     Weight: 74.8 kg (165 lb)  Body mass index is 21.77 kg/m².  No intake or output data in the 24 hours ending 11/28/18 1841   Physical Exam   Constitutional: He is oriented to person, place, and time. He appears well-developed and well-nourished. No distress.   HENT:   Head: Normocephalic and atraumatic.   Eyes:   Patient blind, no acute abnormality noted    Neck: Normal range of motion. Neck supple.   Cardiovascular: Normal rate, regular rhythm and normal heart sounds. Exam reveals no gallop and no friction rub.   No murmur heard.  Pulmonary/Chest: Effort normal and breath sounds normal. He has no wheezes. He has no rales.   Abdominal: Soft. Bowel sounds are normal. He exhibits no mass. There is no tenderness. There is no rebound and no guarding.   Genitourinary: Rectum normal. Prostate is not tender. No penile tenderness.   Genitourinary Comments:  dried upBloody diaper from  was visualized.    Musculoskeletal: Normal range of motion. He exhibits no edema.   AV fistula Left upper arm thrilled. Covered with dressing   Lymphadenopathy:     He has no cervical adenopathy.   Neurological: He is alert and oriented to person, place, and time.   Skin: Skin is warm and dry. Capillary refill takes less than 2 seconds.       Significant Labs:   CBC:   Recent Labs   Lab 11/27/18  0833 11/28/18  0858   WBC 7.49 6.18   HGB 8.3* 7.8*   HCT 25.0* 25.3*   * 119*     CMP:   Recent Labs   Lab 11/27/18  0833 11/28/18  0858   * 131*   K 5.2* 4.4   CL 96 100   CO2 24 21*   * 90   BUN 78* 39*   CREATININE 9.2* 6.2*   CALCIUM 8.0* 8.0*   ALBUMIN 2.6* 2.6*   ANIONGAP 11 10   EGFRNONAA 4.5* 7.3*       Significant Imaging: I have reviewed all pertinent imaging results/findings within the past 24 hours.

## 2018-11-29 NOTE — PLAN OF CARE
Problem: Patient Care Overview  Goal: Plan of Care Review  Outcome: Ongoing (interventions implemented as appropriate)  Slept well overnight, Safety maintained, no fall or injury occurred.   AV fistula on left arm positive for bruit and thrill. Denies pain. For HD in the daytime.

## 2018-11-29 NOTE — NURSING
Patient AAOX3 returned from dialysis via stretcher per transport. Pt refused vital signs at this time ,safety and infection precautions taken. Will cont to monitor.

## 2018-11-29 NOTE — PROGRESS NOTES
"[facility] Hospital Medicine  Progress Note    Patient Name: Ronald Campbell  MRN: [id number]  Patient Class: OP- Observation   Admission Date: 11/23/2018  Length of Stay: 0 days  Attending Physician: Melina Marte MD  Primary Care Provider: Bart Shaw MD    Hospital Medicine Team: [facility] HOSP MED 2 ARNEL Toure MD    Subjective:     Principal Problem:Metabolic encephalopathy    HPI:  Mr. Campbell is an 89 year old male with a past medical history of ESRD (Still making urine, dialysis MWF), T2DM, CHF, and seizure disorder who presents to [facility] ED for evaluation of gross hematuria and weakness. The patient has dementia and is blind and is accompanied by his daughter who is his primary caregiver. She states that he has been having hematuria for the past two weeks. She states first noticed it when there was sidney blood in his diaper and has progressed to passing clots. He was evaluated by urology on 11/13/18 at which point he was scheduled for an outpatient cystoscopy and ultrasound which is scheduled for 11/29. The patient was seen recently in the ED because his daughter noticed increasing clots in his diaper. He was evaluated by urology at that time and determined safe for discharge and to follow up for scheduled cystoscopy. The patient was brought to the ED today because he had an episode of weakness on the background of persistent hematuria. The patient is unable to ambulate with assistance and per the daughter while he was walking with her today his legs "gave out on him." She supported him to the floor to a seated position and called the fire department to help him up and he was transported to the ED. Per the daughter the patient had only been complaining of penile pain. The patient and daughter deny any recent illness or sick contacts. He was scheduled for dialysis today however could not go due to weakness. Last dialysis was Tuesday. He is complaining of some mild low back pain at this time as " "well, he states he feels like he "needs to go" but can't. Per daughter the patient's last bowel movement was the previous night and was normal. He was prescribed Norco 5mg in ED for pain and had taken 2-3 pills since discharge per the daughter. The patient has had some decreased PO intake over the past few days 2/2 symptoms.    Hospital Course:  Mr Campbell was admitted to hospital medicine 11/24 due to weakness and hematuria. Pt had dialysis 11/24, PT/Ot reccs was HH, H&H stable, no hematuria. Urology consulted and following the pt, possible cystoscopy either wed vs thur. Patient received another round of dialysis on 11/27 and PT /OT recommended HH with PT/OT. Patient is scheduled for outpatient cytoscope with bladder biopsy 11/29 with Dr. Banerjee.     Interval History: Patient continues to pass clot. Urology notifed     Review of Systems   Constitutional: Positive for fatigue. Negative for chills, diaphoresis and fever.   HENT: Negative for congestion, sinus pressure and sore throat.    Eyes: Negative for pain, discharge, redness and itching.        Blind bilateral   Respiratory: Negative for cough, chest tightness, shortness of breath and wheezing.    Cardiovascular: Negative for chest pain, palpitations and leg swelling.   Gastrointestinal: Negative for abdominal pain, constipation, diarrhea and vomiting.   Genitourinary: Positive for hematuria. Negative for decreased urine volume, difficulty urinating, dysuria, flank pain, frequency, scrotal swelling, testicular pain and urgency.   Musculoskeletal: Positive for gait problem. Negative for arthralgias, joint swelling and myalgias.   Skin: Negative for wound.   Neurological: Positive for weakness. Negative for dizziness, tremors, seizures, light-headedness and headaches.        Difficulty walking due to weakness in lower extremity     Hematological: Negative for adenopathy. Does not bruise/bleed easily.   Psychiatric/Behavioral: Negative for agitation and behavioral " problems.     Objective:     Vital Signs (Most Recent):  Temp: 97.4 °F (36.3 °C) (11/28/18 1653)  Pulse: (!) 59 (11/28/18 1653)  Resp: 18 (11/28/18 1653)  BP: (!) 154/70 (11/28/18 1653)  SpO2: 99 % (11/28/18 1653) Vital Signs (24h Range):  Temp:  [97.4 °F (36.3 °C)-98.8 °F (37.1 °C)] 97.4 °F (36.3 °C)  Pulse:  [59-74] 59  Resp:  [16-18] 18  SpO2:  [98 %-99 %] 99 %  BP: (147-178)/(70-74) 154/70     Weight: 74.8 kg (165 lb)  Body mass index is 21.77 kg/m².  No intake or output data in the 24 hours ending 11/28/18 1841   Physical Exam   Constitutional: He is oriented to person, place, and time. He appears well-developed and well-nourished. No distress.   HENT:   Head: Normocephalic and atraumatic.   Eyes:   Patient blind, no acute abnormality noted   Neck: Normal range of motion. Neck supple.   Cardiovascular: Normal rate, regular rhythm and normal heart sounds. Exam reveals no gallop and no friction rub.   No murmur heard.  Pulmonary/Chest: Effort normal and breath sounds normal. He has no wheezes. He has no rales.   Abdominal: Soft. Bowel sounds are normal. He exhibits no mass. There is no tenderness. There is no rebound and no guarding.   Genitourinary: Rectum normal. Prostate is not tender. No penile tenderness.   Genitourinary Comments:  dried upBloody diaper from  was visualized.    Musculoskeletal: Normal range of motion. He exhibits no edema.   AV fistula Left upper arm thrilled. Covered with dressing   Lymphadenopathy:     He has no cervical adenopathy.   Neurological: He is alert and oriented to person, place, and time.   Skin: Skin is warm and dry. Capillary refill takes less than 2 seconds.       Significant Labs:   CBC:   Recent Labs   Lab 11/27/18  0833 11/28/18  0858   WBC 7.49 6.18   HGB 8.3* 7.8*   HCT 25.0* 25.3*   * 119*     CMP:   Recent Labs   Lab 11/27/18  0833 11/28/18  0858   * 131*   K 5.2* 4.4   CL 96 100   CO2 24 21*   * 90   BUN 78* 39*   CREATININE 9.2* 6.2*   CALCIUM  8.0* 8.0*   ALBUMIN 2.6* 2.6*   ANIONGAP 11 10   EGFRNONAA 4.5* 7.3*       Significant Imaging: I have reviewed all pertinent imaging results/findings within the past 24 hours.    Assessment/Plan:      * Metabolic encephalopathy    Patient presents with altered mental status and weakness on background of ESRD and gross hematuria for past two weeks. Patient's hemoglobin remains unchanged from previous ED visit at 10.2, baseline appears to be 12 per chart review. Patient 3 days since last dialysis as well as endorsing decreased PO intake. Denying any symptoms of infection at this time. WBC wnl and afebrile. Rectal exam negative for prostate tenderness. Patient has been receiving tramadol for pain, per daughter however the patient had no side effects to the medication previously. Patient is at his baseline at this time, appropriately answering questions.    Given infection is less likely on history, presumptive cause is due to electrolyte derangement due to missing dialysis. Patient hemodynamically stable and not complaining of other signs of anemia at this time.    - Nephrology consulted for dialysis tomorrow, appreciate their help  - Blood glucose elevated at 241, possible contributing. Patient is on SSI insulin at home. Will restart and closely monitor blood glucoses with goal 140-180 while inpatient.  - Urinalysis reflex to culture drawn - NGTD  - Holding patient's home tramadol  - Fall precautions and delirium precautions - 9 AM blood draw        Discharge planning issues    - PT/OT consulted- recc home with home health  - Case management consulted         Gross hematuria    Patient with 2 week history of hematuria, worsening per daughter. Evaluated by urology as an outpatient and during last ED visit for similar symptoms and recommended continued outpatient workup with cystoscopy scheduled 10/29/18. On course of doxycycline for presumed UTI from prior visit. Patient denied blood draw. Less hematuria, will set  blood draw to 9AM     - Patient currently hemodynamically stable, Hemoglobin 10.2, same as last check 4 days previous  - Urology consulted, and cytoscope on Wednesday  - Culture from previous visit reviewed, positive for candida. Urinalysis reflex to culture ordered today. Will hold doxycyline>>> 11/25 started fluconazole 200 mg daily for total of 7 days (end date 12/02/2018) On Dialysis days give 100mg of fluconazole in before dialysis and 100mg of fluconazole after dialysis.   - CBC's daily     Weakness    Patient assessed by PT /OT home with home health        Renovascular hypertension    Restarted home carvedilol 12.5mg bid  - adding Amlodipin 5mg QD     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    Patient on sliding scale insulin at home, per daughter patient only receives it for blood glucoses greater than 200.  - Will restart low dose SSI  - POCT glucose qid with meals       Hyperkalemia    Potassium 5.4 on presentation to ED. EKG negative for changes due to hyperkalemia. Patient asymptomatic at this time.    - Patient to receive dialysis on Saturday 11/24 and Tuesday 11/25   - Cardiac monitoring       ESRD (end stage renal disease)    Patient ESRD, dialysis dependent. Access via left arm AVF. Patient normally on MWF dialysis however was on modified schedule due to holiday scheduling. Last dialysis was saturday. Possibly contributing to patient's episode of weakness.  --11/24 dialyzed sat, 11/27    - Nephrology consulted for dialysis and received dialysis. (MWF schedule)       Benign prostatic hyperplasia with urinary retention    Bladder scanned, 200cc in bladder. Per urology will not insert catheter at this time and will reassess in AM    - Continuing home dutasteride 0.5mg daily       Anemia in chronic kidney disease, on chronic dialysis    - MWF dialysis  - Dialyzed on Tuesday. Continue with MWF schedule when patient goes home.         Chronic combined systolic and diastolic heart failure     Patient's last echo in 2016 had EF 40% and diastolic dsyfunction. Patient no longer following up with cardiology. Patient is not volume overloaded on exam today.    - Coreg 12.5 bid continued  - On dialysis       Seizure disorder    Presumed epilepsy, patient seizure free for several years  - Resumed home keppra 500mg bid         VTE Risk Mitigation (From admission, onward)        Ordered     IP VTE HIGH RISK PATIENT  Once      11/24/18 0126     Place sequential compression device  Until discontinued      11/24/18 0126              ARNEL Toure MD  Department of Hospital Medicine   [facility]

## 2018-11-30 NOTE — TELEPHONE ENCOUNTER
Called patient's pharmacy for Augmentin prescription.  500mg Augmentin QHS for two days and Flowmax 0.4mg QD was phoned in for him     ARNEL Toure M.D.  Internal Medicine PGY-1  [phone]

## 2018-11-30 NOTE — ANESTHESIA POSTPROCEDURE EVALUATION
"Anesthesia Post Evaluation    Patient: Ronald Campbell    Procedure(s) Performed: Procedure(s) (LRB):  CYSTOSCOPY (N/A)  REMOVAL, BLOOD CLOT (N/A)    Final Anesthesia Type: MAC  Patient location during evaluation: PACU  Patient participation: No - Unable to Participate, Other Reason (see comments)  Level of consciousness: awake  Post-procedure vital signs: reviewed and stable  Pain management: adequate  Airway patency: patent  PONV status at discharge: No PONV  Anesthetic complications: no      Cardiovascular status: blood pressure returned to baseline  Respiratory status: unassisted  Hydration status: euvolemic  Follow-up not needed.        Visit Vitals  BP (!) 175/72   Pulse 70   Temp 36.6 °C (97.9 °F) (Temporal)   Resp 18   Ht 6' 1" (1.854 m)   Wt 74.8 kg (165 lb)   SpO2 100%   BMI 21.77 kg/m²       Pain/Annabel Score: Pain Assessment Performed: Yes (11/29/2018  8:15 PM)  Presence of Pain: denies (11/29/2018  8:15 PM)  Pain Rating Prior to Med Admin: 0 (11/29/2018  4:00 PM)  Pain Rating Post Med Admin: 0 (11/29/2018  4:00 PM)  Annabel Score: 10 (11/29/2018  8:15 PM)  Modified Annabel Score: 20 (11/29/2018  8:15 PM)        "

## 2018-11-30 NOTE — TRANSFER OF CARE
"Anesthesia Transfer of Care Note    Patient: Ronald Campbell    Procedure(s) Performed: Procedure(s) (LRB):  CYSTOSCOPY (N/A)  REMOVAL, BLOOD CLOT (N/A)    Patient location: [facility]    Anesthesia Type: MAC    Transport from OR: Transported from OR on 6-10 L/min O2 by face mask with adequate spontaneous ventilation. Continuous SpO2 monitoring in transport    Post pain: adequate analgesia    Post assessment: no apparent anesthetic complications    Post vital signs: stable    Level of consciousness: awake    Complications: none    Transfer of care protocol was followed      Last vitals:   Visit Vitals  BP (!) 174/74   Pulse 63   Temp 36.6 °C (97.8 °F)   Resp 18   Ht 6' 1" (1.854 m)   Wt 74.8 kg (165 lb)   SpO2 98%   BMI 21.77 kg/m²     "

## 2018-11-30 NOTE — OP NOTE
[facility]  Operative Note    Date: 11/29/2018    Pre-Op Diagnosis:   - gross hematuria  - BPH with urinary obstruction  - ESRD    Patient Active Problem List    Diagnosis Date Noted    Blindness     Metabolic encephalopathy 11/24/2018    Discharge planning issues 11/24/2018    Metabolic acidosis     Gross hematuria 11/21/2018    HCAP (healthcare-associated pneumonia) 08/01/2018    Volume overload 08/01/2018    Agitation 08/01/2018    Phimosis 12/14/2017    History of unilateral orchiectomy 12/14/2017    Hypothermia 12/06/2017    Weakness 12/05/2017    Blind in both eyes 12/05/2017    Dementia with behavioral disturbance 12/05/2017    Acute cystitis with hematuria 12/04/2017    Hemodialysis patient 11/07/2017    Scrotal mass 11/07/2017    Tinea pedis of both feet 01/31/2017    Dermatophytosis, nail 01/31/2017    Insulin long-term use 08/24/2016    Vein stenosis 04/15/2016    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    Syncope and collapse 12/13/2015    Renovascular hypertension 12/13/2015    Shortness of breath     Acute hyperkalemia     Hypernatremia     HTN (hypertension)     Dyslipidemia     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     Edema of both legs     Effusion of left knee     Glaucoma     ESRD (end stage renal disease) on dialysis     Benign prostatic hyperplasia with urinary retention 05/06/2014    Thrombocytopenia 04/30/2014    Blindness of right eye     Anemia in chronic kidney disease, on chronic dialysis 11/28/2012    Seizure disorder 11/04/2012    Chronic combined systolic and diastolic heart failure 11/04/2012    Cerebrovascular disease 11/04/2012     Post-Op Diagnosis: same    Procedure(s) Performed:   1.  Cystoscopy with clot evacuation    Specimen(s): none    Staff Surgeon: Temo Banerjee MD    Assistant Surgeon: Jeremy Clayton MD, Dolly Ford MD    Anesthesia: Monitored Local Anesthesia with Sedation    Indications: Blainesamuel Campbell is a  89 y.o. male with gross hematuria which has been persistent. He presents today for cystoscopy as well as clot evacuation and all other indicated procedures.    Findings:   - visualization severely limited due to small bladder capacity, bladder spasms, and bleeding  - no obvious bladder tumors  - multiple old clots in the bladder evacuated  - evidence of bleeding in the prostatic urethra, however not active bleeding worthy of fulguration    Estimated Blood Loss: min    Drains:   24 3-way tejeda catheter    Procedure in Detail:  After risks, benefits and possible complications of cystoscopy were explained, the patient elected to undergo the procedure and informed consent was obtained. All questions were answered in the ta-operative area. The patient was transferred to the cystoscopy suite and placed in the supine position.  SCDs were applied and working.  Anesthesia was administered.  Once the patient was adequately sedated, he was placed in the dorsal lithotomy position and prepped and draped in the usual sterile fashion.  Time out was performed, ta-procedural antibiotics were confirmed.     We attempted to start with flexible cystoscopy. The entire urethra was visualized however, the bladder was unable to be visualized. Next, a rigid cystoscope in a 22 Fr sheath was introduced into the bladder per urethra. This passed easily.  The entire urethra was visualized which showed no masses or strictures. There was clots in the urethra and evidence of bleeding in the prostatic urethra, however not active bleeding worthy of fulguration. The right ureteral orifices was visualized, the left UO was not. Formal cystoscopy was performed, however visualization was severely limited due to small bladder capacity, bladder spasms, and bleeding. There were no obvious bladder tumors. There were multiple old clots in the bladder which were evacuated. The bladder was drained, and the patient was removed from lithotomy.     A 24 3-way  catheter was placed and the patient was started on CBI    The patient tolerated the procedure well and was transferred to recovery in stable condition.    Disposition:  The patient will return to the care of the medicine service. He will remain on CBI overnight.    Jeremy Clayton MD

## 2018-11-30 NOTE — PROGRESS NOTES
[facility]  Urology  Progress Note    Patient Name: Ronald Campbell  MRN: [id number]  Admission Date: 11/23/2018  Hospital Length of Stay: 0 days  Code Status: Full Code   Attending Provider: Melina Marte MD   Primary Care Physician: Bart Shaw MD    Subjective:     HPI:  Ronald Campbell is a 89 y.o. male who presented to the ED for continued painless gross hematuria. He has dementia and lives with his daughter. He has HTN, type II diabetes mellitus, ESRD on HD MWF, blindness. The hematuria has been going on for the past few weeks but has progressively increased. He also is passing clots.  He  reports occasional dysuria, denies frequency, urgency, or incomplete emptying. He urinates usually every morning and sometime in the evenings, unsure about how much urine he actually produces. He wears diapers and has been waking up with blood in diaper. No fevers and chills. He was seen in clinic a week ago and was scheduled to have a renal US and outpatient cystoscopy with Dr. Banerjee next week. He was most recently seen in the ED several days ago with gross hematuria and at that time plan was to follow up with Dr. Banerjee for outpatient cystoscopy, he was also started on empiric doxycycline and changed from finasteride to dutasteride. In the interim he has continued having consistent gross hematuria, but denies other urinary symptoms, his urine culture resulted + for candiduria and he has had some mild abdominal pain. Random bladder scan revealed 200ml.    He also underwent CT noncontrast of abdomen pelvis revealing a possible right renal mass, possible clot in bladder, a large prostate and several enlarged para-aortic lymph nodes extending along the left ileopsoas with some obliteration of the fat planes with an additional enlarged left external iliac lymph node.    He was admitted to medicine due to weakness and he missed dialysis this week.         Interval History:   HD yesterday  Cystoscopy yesterday largely  unrevealing, multiple old cots irrigated out  On CBI overnight, pink this AM  No complaints from the patient this AM.  Barrett removed on rounds    Review of Systems    Objective:     Temp:  [96 °F (35.6 °C)-98.2 °F (36.8 °C)] 97.8 °F (36.6 °C)  Pulse:  [54-72] 67  Resp:  [16-19] 16  SpO2:  [97 %-100 %] 98 %  BP: (116-198)/(57-93) 163/76     Body mass index is 24.08 kg/m².       Bladder Scan Volume (mL): 218 mL (11/23/18 2326)    Drains     Drain                 Hemodialysis AV Fistula -- days         Hemodialysis AV Fistula Left upper arm -- days         Hemodialysis AV Fistula 08/08/14 0800 Left upper arm 1574 days                Physical Exam   Nursing note and vitals reviewed.  Constitutional: Vital signs are normal. He appears well-developed and well-nourished. No distress.   Cardiovascular: Normal rate and intact distal pulses.    Pulmonary/Chest: Effort normal. No accessory muscle usage. No respiratory distress.   Abdominal: Soft. He exhibits no distension and no mass. There is no tenderness. There is no CVA tenderness. No hernia.   Genitourinary:   Genitourinary Comments: Barrett medium pink on rounds, no clots, removed this AM   Musculoskeletal: He exhibits no edema or tenderness.   Neurological: He is alert.   Skin: Skin is warm and dry. No lesion and no rash noted. He is not diaphoretic. No cyanosis.       Significant Labs:    BMP:  Recent Labs   Lab 11/27/18  0833 11/28/18  0858 11/29/18  0821   * 131* 128*   K 5.2* 4.4 4.7   CL 96 100 97   CO2 24 21* 19*   BUN 78* 39* 56*   CREATININE 9.2* 6.2* 7.7*   CALCIUM 8.0* 8.0* 8.1*       CBC:   Recent Labs   Lab 11/27/18  0833 11/28/18  0858 11/29/18  0821   WBC 7.49 6.18 6.71   HGB 8.3* 7.8* 8.0*   HCT 25.0* 25.3* 24.6*   * 119* 127*       All pertinent labs results from the past 24 hours have been reviewed.    Significant Imaging:  All pertinent imaging results/findings from the past 24 hours have been reviewed.      Assessment/Plan:     Gross  hematuria    89 M with gross hematuria. No source identified other than enlarged prostate    -On CBI overnight, Barrett removed this AM  -Follow up to make sure voids  -Recommend continuing dutasteride and flomax  -Recommend adding cipro daily for 1 week    Please call with questions         VTE Risk Mitigation (From admission, onward)        Ordered     IP VTE HIGH RISK PATIENT  Once      11/24/18 0126     Place sequential compression device  Until discontinued      11/24/18 0126          Jae Ashton MD  Urology  [facility]

## 2018-11-30 NOTE — PLAN OF CARE
11/30/18 1120   Final Note   Assessment Type Final Discharge Note   Anticipated Discharge Disposition Home-Health   What phone number can be called within the next 1-3 days to see how you are doing after discharge? [phone]   Hospital Follow Up  Appt(s) scheduled? Yes   Discharge plans and expectations educations in teach back method with documentation complete? Yes   Right Care Referral Info   Post Acute Recommendation Home-care   Facility Name  with [facility]

## 2018-11-30 NOTE — PLAN OF CARE
Problem: Patient Care Overview  Goal: Plan of Care Review  Outcome: Ongoing (interventions implemented as appropriate)  Patient had no falls and no resp issues. He remained disoriented to time and situation. He completed his bladder irrigation as documented. SCDs placed. Medication and nursing care given per MD order.

## 2018-11-30 NOTE — NURSING TRANSFER
Nursing Transfer Note      11/29/2018     Transfer To: [address] [place]    Transfer via stretcher    Transported by [facility]    Chart send with patient: Yes    Notified: daughter    Patient reassessed at: 11/29/2018    Upon arrival to floor: cardiac monitor applied, patient oriented to room, call bell in reach and bed in lowest position

## 2018-11-30 NOTE — DISCHARGE SUMMARY
"[facility] Hospital Medicine  Discharge Summary      Patient Name: Ronald Campbell  MRN: [id number]  Admission Date: 11/23/2018  Hospital Length of Stay: 0 days  Discharge Date and Time:  11/30/2018 1:11 PM  Attending Physician: Franchesca att. providers found   Discharging Provider: Maddie Toure MD  Primary Care Provider: Bart Shaw MD  [facility] Medicine Team: [facility] MED 2 ARNEL Toure MD    HPI:   Mr. Campbell is an 89 year old male with a past medical history of ESRD (Still making urine, dialysis MWF), T2DM, CHF, and seizure disorder who presents to [facility] ED for evaluation of gross hematuria and weakness. The patient has dementia and is blind and is accompanied by his daughter who is his primary caregiver. She states that he has been having hematuria for the past two weeks. She states first noticed it when there was sidney blood in his diaper and has progressed to passing clots. He was evaluated by urology on 11/13/18 at which point he was scheduled for an outpatient cystoscopy and ultrasound which is scheduled for 11/29. The patient was seen recently in the ED because his daughter noticed increasing clots in his diaper. He was evaluated by urology at that time and determined safe for discharge and to follow up for scheduled cystoscopy. The patient was brought to the ED today because he had an episode of weakness on the background of persistent hematuria. The patient is unable to ambulate with assistance and per the daughter while he was walking with her today his legs "gave out on him." She supported him to the floor to a seated position and called the fire department to help him up and he was transported to the ED. Per the daughter the patient had only been complaining of penile pain. The patient and daughter deny any recent illness or sick contacts. He was scheduled for dialysis today however could not go due to weakness. Last dialysis was Tuesday. He is complaining of some mild low back pain at " "this time as well, he states he feels like he "needs to go" but can't. Per daughter the patient's last bowel movement was the previous night and was normal. He was prescribed Norco 5mg in ED for pain and had taken 2-3 pills since discharge per the daughter. The patient has had some decreased PO intake over the past few days 2/2 symptoms.    Procedure(s) (LRB):  CYSTOSCOPY (N/A)  REMOVAL, BLOOD CLOT (N/A)      Hospital Course:   Mr Campbell was admitted to hospital medicine 11/24 due to weakness and hematuria. Pt had dialysis 11/24, PT/Ot reccs was HH, H&H stable, no hematuria. Urology consulted and following the pt, possible cystoscopy either wed vs thur. Patient received another round of dialysis on 11/27 and PT /OT recommended HH with PT/OT. Patient is scheduled for outpatient cytoscope with bladder biopsy 11/29 with Dr. Banerjee. Patient got cystoscope and urology wanted him to follow up as a out patient.      Vitals:    11/30/18 0820   BP: (!) 120/57   Pulse: 80   Resp: 16   Temp: 97.8 °F (36.6 °C)     Physical Exam   Constitutional: He is oriented to person, place, and time. He appears well-developed and well-nourished. No distress.   HENT:   Head: Normocephalic and atraumatic.   Eyes:   Patient blind, no acute abnormality noted   Neck: Normal range of motion. Neck supple.   Cardiovascular: Normal rate, regular rhythm and normal heart sounds. Exam reveals no gallop and no friction rub.   No murmur heard.  Pulmonary/Chest: Effort normal and breath sounds normal. He has no wheezes. He has no rales.   Abdominal: Soft. Bowel sounds are normal. He exhibits no mass. There is no tenderness. There is no rebound and no guarding.   Genitourinary: Rectum normal. Prostate is not tender. No penile tenderness.   Genitourinary Comments: bloody diaper from  was visualized.    Musculoskeletal: Normal range of motion. He exhibits no edema.   AV fistula Left upper arm thrilled. Covered with dressing   Lymphadenopathy:     He has no " cervical adenopathy.   Neurological: He is alert and oriented to person, place, and time.   Skin: Skin is warm and dry. Capillary refill takes less than 2 seconds.       Consults:   Consults (From admission, onward)        Status Ordering Provider     Case Management/  Once     Provider:  (Not yet assigned)    Acknowledged MIGUEL LAROSE     Inpatient consult to Nephrology  Once     Provider:  (Not yet assigned)    Completed MIGUEL LAROSE     Inpatient consult to [facility]  Once     Provider:  (Not yet assigned)    Completed GUY MARIEE     Inpatient consult to Urology  Once     Provider:  (Not yet assigned)    Completed MIGUEL LAROSE          No new Assessment & Plan notes have been filed under this hospital service since the last note was generated.  Service: Hospital Medicine    Final Active Diagnoses:    Diagnosis Date Noted POA    PRINCIPAL PROBLEM:  Metabolic encephalopathy [G93.41] 11/24/2018 Yes    Blindness [H54.7]  Yes    Discharge planning issues [Z02.9] 11/24/2018 Not Applicable    Metabolic acidosis [E87.2]  Yes    Gross hematuria [R31.0] 11/21/2018 Yes    Weakness [R53.1] 12/05/2017 Yes    Renovascular hypertension [I15.0] 12/13/2015 Yes    Acute hyperkalemia [E87.5]  Yes    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 [E11.22, N18.6]  Yes    ESRD (end stage renal disease) on dialysis [N18.6, Z99.2]  Not Applicable    Anemia in chronic kidney disease, on chronic dialysis [N18.6, D63.1, Z99.2] 11/28/2012 Not Applicable    Seizure disorder [G40.909] 11/04/2012 Yes     Chronic    Chronic combined systolic and diastolic heart failure [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] 11/04/2012 Yes     Chronic      Problems Resolved During this Admission:       Discharged Condition: fair    Disposition: Home or Self Care    Follow Up:    Patient Instructions:      Ambulatory referral to Urology   Referral Priority: Routine Referral Type: Consultation   Referral Reason: Specialty  Services Required   Requested Specialty: Urology   Number of Visits Requested: 1       Significant Diagnostic Studies: Labs:   CMP   Recent Labs   Lab 11/29/18  0821   *   K 4.7   CL 97   CO2 19*   *   BUN 56*   CREATININE 7.7*   CALCIUM 8.1*   ALBUMIN 2.8*   ANIONGAP 12   ESTGFRAFRICA 6.5*   EGFRNONAA 5.6*    and CBC   Recent Labs   Lab 11/29/18  0821   WBC 6.71   HGB 8.0*   HCT 24.6*   *       Pending Diagnostic Studies:     None         Medications:  Reconciled Home Medications:      Medication List      START taking these medications       Augmentin 500mg Tab for 2 day QHS     tamsulosin 0.4 mg Cap  Commonly known as:  FLOMAX  Take 1 capsule (0.4 mg total) by mouth once daily.        CHANGE how you take these medications    carvedilol 25 MG tablet  Commonly known as:  COREG  Take 1 tablet (25 mg total) by mouth 2 (two) times daily. Do not take on dialysis days. Hold for SBP < 110 or HR < 55  What changed:  additional instructions        CONTINUE taking these medications    aspirin 81 MG Chew  Take 1 tablet (81 mg total) by mouth once daily.     atorvastatin 40 MG tablet  Commonly known as:  LIPITOR  Take 1 tablet (40 mg total) by mouth once daily.     BLOOD GLUCOSE TEST Strp  Generic drug:  blood sugar diagnostic  1 strip by Misc.(Non-Drug; Combo Route) route daily as needed.     brinzolamide 1 % ophthalmic suspension  Commonly known as:  AZOPT  Place 1 drop into the left eye 3 (three) times daily.     calcium acetate 667 mg capsule  Commonly known as:  PHOSLO  Take 2 capsules by mouth every day in the morning, then take 1 capsule with lunch and 2 capsules in the evening with meals     COMBIGAN 0.2-0.5 % Drop  Generic drug:  brimonidine-timolol  Place 1 drop into the left eye 2 (two) times daily.     doxazosin 4 MG tablet  Commonly known as:  CARDURA  Take 1 tablet (4 mg total) by mouth once daily.     ketoconazole 2 % cream  Commonly known as:  NIZORAL  Apply topically once daily. feet      levETIRAcetam 500 MG Tab  Commonly known as:  KEPPRA  Take 1 tablet (500 mg total) by mouth 2 (two) times daily.     NEPHRO-JESSICA 0.8 mg Tab  Generic drug:  B complex-vitamin C-folic acid  Take 1 tablet by mouth every morning.     NovoLOG Flexpen U-100 Insulin 100 unit/mL Inpn pen  Generic drug:  insulin aspart U-100  Insulin Pen Subcutaneous As directed.  -200 take 1 unitBS 201-250 take 2 unitsBS 251-300 take 3 unitsBS 301-350 take 4 unitsBS > 351 take 5 units and call MD RICKEY URBINA OPHT  Place 1 drop into the left eye every evening. 1 Drops Left eye Every evening        STOP taking these medications    amLODIPine 5 MG tablet  Commonly known as:  NORVASC     HYDROcodone-acetaminophen 5-325 mg per tablet  Commonly known as:  NORCO        ASK your doctor about these medications    doxycycline 100 MG capsule  Commonly known as:  MONODOX  Take 1 capsule (100 mg total) by mouth 2 (two) times daily. for 8 days  Ask about: Should I take this medication?            Indwelling Lines/Drains at time of discharge:   Lines/Drains/Airways     Drain                 Hemodialysis AV Fistula -- days         Hemodialysis AV Fistula Left upper arm -- days         Hemodialysis AV Fistula 08/08/14 0800 Left upper arm 1575 days         Urethral Catheter 11/29/18 1855 Triple-lumen;Latex 24 Fr. less than 1 day                Time spent on the discharge of patient: 30 minutes  Patient was seen and examined on the date of discharge and determined to be suitable for discharge.         ARNEL Toure MD  Department of Hospital Medicine  [facility]

## 2018-11-30 NOTE — PT/OT/SLP DISCHARGE
Physical Therapy Discharge Summary    Name: Ronald Campbell  MRN: [id number]   Principal Problem: Metabolic encephalopathy     Patient Discharged from acute Physical Therapy on 2018.  Please refer to prior PT noted date on 2018 for functional status.     Assessment:     Patient was discharged unexpectedly.  Information required to complete an accurate discharge summary is unknown.  Refer to therapy initial evaluation and last progress note for initial and most recent functional status and goal achievement.  Recommendations made may be found in medical record.    Objective:     GOALS:   Multidisciplinary Problems     Physical Therapy Goals     Not on file          Multidisciplinary Problems (Resolved)        Problem: Physical Therapy Goal    Goal Priority Disciplines Outcome Goal Variances Interventions   Physical Therapy Goal   (Resolved)     PT, PT/OT Outcome(s) achieved     Description:  Goals to be met by: 18    Patient will increase functional independence with mobility by performin. Supine to sit with MInimal Assistance  2. Sit to supine with MInimal Assistance  3. Gait  x 100 feet with Minimal Assistance no device.  4. Ascend/descend 7 stairs with right Handrails Minimal Assistance.                      Reasons for Discontinuation of Therapy Services  Transfer to alternate level of care.      Plan:     Patient Discharged to: Home with Home Health Service.    Diogo Waters, PT  2018

## 2018-11-30 NOTE — PROGRESS NOTES
Discharge orders placed this morning. pts caretaker (daughter) left room to go have a injection at the clinic done at 1000. Caretaker and this nurse discussed that pt would be leaving around then to go to outpt dialysis and then to home. Daughter verbalized understanding. [facility] transport came to transfer pt. Pt was dressed by the PCT. PIV was removed, catheter intact. PCT fed pt breakfast. Discharge instructions was sent with pt. Voicemail was left on daughters phone and this nurse gave instructions to call if daughter had any questions. EKG was done at bedside before pt was placed in wc. VSS. NAD noted upon discharge.

## 2018-11-30 NOTE — ASSESSMENT & PLAN NOTE
89 M with gross hematuria. No source identified other than enlarged prostate    -On CBI overnight, Barrett removed this AM  -Follow up to make sure voids  -Recommend continuing dutasteride and flomax  -Recommend adding cipro daily for 1 week    Please call with questions

## 2018-11-30 NOTE — SUBJECTIVE & OBJECTIVE
Interval History:   HD yesterday  Cystoscopy yesterday largely unrevealing, multiple old cots irrigated out  On CBI overnight, pink this AM  No complaints from the patient this AM.  Barrett removed on rounds    Review of Systems    Objective:     Temp:  [96 °F (35.6 °C)-98.2 °F (36.8 °C)] 97.8 °F (36.6 °C)  Pulse:  [54-72] 67  Resp:  [16-19] 16  SpO2:  [97 %-100 %] 98 %  BP: (116-198)/(57-93) 163/76     Body mass index is 24.08 kg/m².       Bladder Scan Volume (mL): 218 mL (11/23/18 2326)    Drains     Drain                 Hemodialysis AV Fistula -- days         Hemodialysis AV Fistula Left upper arm -- days         Hemodialysis AV Fistula 08/08/14 0800 Left upper arm 1574 days                Physical Exam   Nursing note and vitals reviewed.  Constitutional: Vital signs are normal. He appears well-developed and well-nourished. No distress.   Cardiovascular: Normal rate and intact distal pulses.    Pulmonary/Chest: Effort normal. No accessory muscle usage. No respiratory distress.   Abdominal: Soft. He exhibits no distension and no mass. There is no tenderness. There is no CVA tenderness. No hernia.   Genitourinary:   Genitourinary Comments: Barrett medium pink on rounds, no clots, removed this AM   Musculoskeletal: He exhibits no edema or tenderness.   Neurological: He is alert.   Skin: Skin is warm and dry. No lesion and no rash noted. He is not diaphoretic. No cyanosis.       Significant Labs:    BMP:  Recent Labs   Lab 11/27/18 0833 11/28/18 0858 11/29/18  0821   * 131* 128*   K 5.2* 4.4 4.7   CL 96 100 97   CO2 24 21* 19*   BUN 78* 39* 56*   CREATININE 9.2* 6.2* 7.7*   CALCIUM 8.0* 8.0* 8.1*       CBC:   Recent Labs   Lab 11/27/18  0833 11/28/18  0858 11/29/18  0821   WBC 7.49 6.18 6.71   HGB 8.3* 7.8* 8.0*   HCT 25.0* 25.3* 24.6*   * 119* 127*       All pertinent labs results from the past 24 hours have been reviewed.    Significant Imaging:  All pertinent imaging results/findings from the past 24  hours have been reviewed.

## 2018-11-30 NOTE — ANESTHESIA RELEASE NOTE
"Anesthesia Release from PACU Note    Patient: Ovalee Shannan    Procedure(s) Performed: Procedure(s) (LRB):  CYSTOSCOPY (N/A)  REMOVAL, BLOOD CLOT (N/A)    Anesthesia type: MAC    Post pain: Adequate analgesia    Post assessment: no apparent anesthetic complications    Last Vitals:   Visit Vitals  BP (!) 175/72   Pulse 70   Temp 36.6 °C (97.9 °F) (Temporal)   Resp 18   Ht 6' 1" (1.854 m)   Wt 74.8 kg (165 lb)   SpO2 100%   BMI 21.77 kg/m²       Post vital signs: stable    Level of consciousness: awake    Nausea/Vomiting: no nausea/no vomiting    Complications: none    Airway Patency: patent    Respiratory: unassisted    Cardiovascular: stable and blood pressure at baseline    Hydration: euvolemic  "

## 2018-12-05 PROBLEM — R31.9 HEMATURIA: Status: ACTIVE | Noted: 2018-01-01

## 2018-12-05 NOTE — ED TRIAGE NOTES
Ronald Campbell, a 89 y.o. male presents to the ED with complaints of low H/H- sent after hemodialysis.  was recently discharged for hematuria and had a hemoglobin of 10 over the weekend and is 6 on today's blood work.  does have some blood in urine- but nothing like it was during admission.  patient normally urinates 2xday.  only urinated once yesterday, but has gone twice today per home CNA.       LOC: The patient is awake, alert and aware of environment with an appropriate affect, the patient is oriented x 3, is somewhat slow to respond, but speaking appropriately per basline.  APPEARANCE: Patient appears comfortable and in no acute distress, patient is clean and well groomed.  SKIN: The skin is warm and dry, patient has poor skin turgor and dry mucus membranes, skin intact, no breakdown or brusing noted. HD access noted to left upper arm.   MUSKULOSKELETAL: Patient moving all extremities well, no obvious swelling or deformities noted.  RESPIRATORY: Airway is open and patent, respirations are spontaneous, patient has a normal effort and rate. Breath sounds are clear and equal bilaterally.  CARDIAC: Normal heart sounds. No peripheral edema.  ABDOMEN: Soft and non tender to palpation, no distention noted. Bowel sounds present.  : Reports some blood in the urine.   NEURO: No neuro deficits- hand grasp equal, no drift noted, no facial droop noted. Speech is clear.

## 2018-12-06 PROBLEM — E83.39 HYPERPHOSPHATEMIA: Status: ACTIVE | Noted: 2018-01-01

## 2018-12-06 NOTE — ASSESSMENT & PLAN NOTE
-22 Fr Coude tejeda placed at the bedside this morning  -Tejeda irrigated to clear  -Will recheck tejeda this afternoon  -Irrigate tejeda PRN  -If bleeding continues, may consider 3 way tejeda for CBI  -Continue flomax  -F/u Hgb/Hct, transfuse as needed  -Avoid nephrotoxic agents

## 2018-12-06 NOTE — CONSULTS
"[facility]  Nephrology  Consult Note    Patient Name: Ronald Campbell  MRN: [id number]  Admission Date: 12/5/2018  Hospital Length of Stay: 0 days  Attending Provider: Alix Beckford MD   Primary Care Physician: Bart Shaw MD  Principal Problem:Hematuria    Consults  Subjective:     HPI: Cc: Presented to ED 12/5/18 for low H/H after dialysis and gross hematuria    History obtained from patient, his daughter, and the medical record    PMH: T2DM, CHF, dementia, BPH, HTN, blindness, and seizure disorder    Nephrology consulted for ESRD management    Dialysis Information: Pt receives HD MWF via at  and is followed by Dr. Vang; last HD Wed 12/5/18; NP called and spoke w/ [facility] Manager Rio: states HD tx time: 3:30 and states unable to tolerate >0.8-1.7L due to recent hypotension on HD    Anemia:   -being followed by Urology due to gross hematuria  -Hgb 7.5  -will monitor H/H  -transfuse PRBCs on HD, if needed      Mineral Bone Disorder:   -most recent  10/2013  -Ph- 5.1; on calcium acetate 1334 mg po TID WM  -Serum Ca+ 8.8    Electrolyte Abnormalities: K+ 3.9    Acidosis: CO2: 26    HTN: BPs 190s-170s/80s; on carvedilol 25mg po BID    Xray 11/24/18: stable cardiomegaly    CT: renal stone study abd pelvis WO: 11/21/18: "Irregular contour about the superolateral aspect of the right kidney, mass cannot be excluded"    U/A: 12/5/18: 3+ occult blood; + leukocytes, RBCs, and bacteria; urine cx results 12/5/18 pending    Echo: 6/4/15: EF 40-45% with anterior and anterolateral hypokinesis    Diet: renal diet    - Daily standing weights and chart..  - Strict I/O and chart.  - Avoid nephrotoxic medications.  - Maintain MAP >65.  - Keep target Bp <140/90.   - Hb > 7 gm/dL.  - Medications to renal parameters.  - Daily standing weights and chart.  - Strict I/O and chart.  - Avoid nephrotoxic medications.  - Maintain MAP >65.  - Keep target Bp <140/90.   - Hb > 7 gm/dL.  - Medications " to renal parameters.    Past Medical History:   Diagnosis Date    Anemia     Blind     bilaterally    CHF (congestive heart failure)     Chronic kidney disease     Dementia 01/2017    Diabetes mellitus type II     General anesthetics causing adverse effect in therapeutic use     april / may 2014     Glaucoma (increased eye pressure)     Hypertension     Hypothermia 12/6/2017    Seizures        Past Surgical History:   Procedure Laterality Date    ANGIOPLASTY WITH STENT PLACEMENT Left 11/18/2016    [facility]    AV FISTULA PLACEMENT Left 4/2014    AV fistula to Left upper arm    [id number]-[id number] left RC vs BC AVF Left 8/8/2014    Per[facility] by [facility] at [facility]    CYSTOSCOPY N/A 11/29/2018    Procedure: CYSTOSCOPY;  Surgeon: Temo Banerjee MD;  Location: [facility] OR [address] [place] [address];  Service: Urology;  Laterality: N/A;    CYSTOSCOPY N/A 11/29/2018    Per[facility] at [facility]    EYE SURGERY      FISTULOGRAM Left 11/18/2016    [facility] by [facility] at [facility]    FISTULOGRAM Left 4/15/2016    [facility] by [facility] at [facility]    FISTULOGRAM Left 1/29/2016    [facility] by [facility] at [facility] CATH LAB    FISTULOGRAM Room 11 case Left 3/27/2015    Performed by [facility]s [facility] at [facility] OR [address] [place] [address]    HYDROCELECTOMY Bilateral 11/22/2017    Per[facility] by [facility] at [facility]    INSERTION-CATHETER-DIALYSIS-PERITONEAL-LAPAROSCOPIC N/A 5/8/2014    Performed by Js Ladd [facility] at [facility]    INSERTION-CATHETER-DIALYSIS-PERITONEAL-LAPAROSCOPIC N/A 5/5/2014    Performed by Js Ladd [facility] at [facility]    INSERTION-CATHETER-HEMODIALYSIS N/A 5/21/2014    Performed by [facility]s [facility] at [facility] OR [address] [place] [address]    PERMCATH INSERTION-TUNNELED CVC N/A 5/13/2014    Performed by Lolis Jackson MD at [facility] CATH LAB    REMOVAL OF BLOOD CLOT  N/A 11/29/2018    Procedure: REMOVAL, BLOOD CLOT;  Surgeon: Temo Banerjee MD;  Location: [facility] OR 1ST FLR;  Service: Urology;  Laterality: N/A;    REMOVAL, BLOOD CLOT N/A 11/29/2018    Performed by [facility] at [facility] OR [facility]ST [facility]    REMOVAL-CATHETER-PERITONEAL DIALYSIS N/A 7/30/2014    Performed by Js Ladd [facility] at [facility] OR [facility]ND [facility]LR    SCROTUM EXPLORATION Bilateral 11/2017       Review of patient's allergies indicates:   Allergen Reactions    Lisinopril Swelling     Current Facility-Administered Medications   Medication Frequency    acetaminophen tablet 1,000 mg Q8H PRN    acetaminophen tablet 650 mg Q4H PRN    albuterol-ipratropium 2.5 mg-0.5 mg/3 mL nebulizer solution 3 mL Q4H PRN    atorvastatin tablet 40 mg Daily    bisacodyl suppository 10 mg Daily PRN    brimonidine 0.2% ophthalmic solution 1 drop BID    And    timolol maleate 0.5% ophthalmic solution 1 drop BID    brinzolamide 1 % ophthalmic suspension 1 drop TID    calcium acetate capsule 1,334 mg TID WM    carvedilol tablet 25 mg BID    dextrose 50% injection 12.5 g PRN    dextrose 50% injection 25 g PRN    doxazosin tablet 4 mg Daily    glucagon (human recombinant) injection 1 mg PRN    glucose chewable tablet 16 g PRN    glucose chewable tablet 24 g PRN    hydrALAZINE tablet 25 mg Q8H PRN    insulin aspart U-100 pen 0-5 Units QID (AC + HS) PRN    levETIRAcetam tablet 500 mg BID    OLANZapine injection 5 mg Once PRN    ondansetron disintegrating tablet 8 mg Q8H PRN    polyethylene glycol packet 17 g Daily    promethazine (PHENERGAN) 6.25 mg in dextrose 5 % 50 mL IVPB Q6H PRN    ramelteon tablet 8 mg Nightly PRN    sodium chloride 0.9% flush 5 mL PRN    tamsulosin 24 hr capsule 0.4 mg Daily    travoprost 0.004 % ophthalmic solution 1 drop QHS     Family History     Problem Relation (Age of Onset)    Cancer Daughter    Heart disease Sister, Brother        Tobacco Use    Smoking status: Never Smoker    Smokeless  tobacco: Never Used   Substance and Sexual Activity    Alcohol use: No    Drug use: No    Sexual activity: No     Review of Systems   Constitutional: Negative.    HENT: Negative.    Eyes: Negative.    Respiratory: Negative.    Cardiovascular: Negative.    Gastrointestinal: Negative.    Endocrine: Negative.    Genitourinary:        Patient's daughter states hematuria today and reports oliguria since yesterday afternoon; patient's daughter also states tejeda catheter placed today   Musculoskeletal: Negative.    Skin: Negative.    Allergic/Immunologic: Negative.    Neurological: Negative.    Hematological: Negative.    Psychiatric/Behavioral: Negative.      Objective:     Vital Signs (Most Recent):  Temp: 97.6 °F (36.4 °C) (12/06/18 1119)  Pulse: 60 (12/06/18 1119)  Resp: 18 (12/06/18 1119)  BP: (!) 170/77 (12/06/18 1119)  SpO2: 97 % (12/06/18 1119)  O2 Device (Oxygen Therapy): room air (12/06/18 0837) Vital Signs (24h Range):  Temp:  [96.4 °F (35.8 °C)-97.9 °F (36.6 °C)] 97.6 °F (36.4 °C)  Pulse:  [60-72] 60  Resp:  [14-18] 18  SpO2:  [97 %-100 %] 97 %  BP: (169-198)/(60-86) 170/77     Weight: 74.8 kg (165 lb) (12/05/18 1632)  Body mass index is 21.77 kg/m².  Body surface area is 1.96 meters squared.    I/O last 3 completed shifts:  In: -   Out: 150 [Urine:150]    Physical Exam   Constitutional: He is oriented to person, place, and time. He appears well-developed and well-nourished.   HENT:   Head: Normocephalic and atraumatic.   Cardiovascular: Normal rate, regular rhythm, normal heart sounds and intact distal pulses.   Pulmonary/Chest: Effort normal and breath sounds normal.   Abdominal: Soft. Bowel sounds are normal.   Genitourinary:   Genitourinary Comments: Blood-tinged urine noted to tejeda catheter tubing   Neurological: He is alert and oriented to person, place, and time.   Skin: Skin is warm and dry.   Psychiatric: He has a normal mood and affect. His behavior is normal. Judgment and thought content normal.  "  Nursing note and vitals reviewed.      Significant Labs:  CBC:   Recent Labs   Lab 12/06/18  1002 12/06/18  1422   WBC 6.59  --    RBC 2.76*  --    HGB 7.0* 7.4*   HCT 22.1* 22.9*     --    MCV 80*  --    MCH 25.4*  --    MCHC 31.7*  --      CMP:   Recent Labs   Lab 12/05/18  1727 12/06/18  1002   * 151*   CALCIUM 8.8 8.8   ALBUMIN 2.9* 2.6*   PROT 8.0  --     138   K 3.9 3.9   CO2 26 27    104   BUN 18 27*   CREATININE 3.7* 4.9*   ALKPHOS 99  --    ALT 42  --    AST 42*  --    BILITOT 0.4  --      Recent Labs   Lab 12/05/18  1940   COLORU Micki   SPECGRAV 1.025   PHUR 7.0   PROTEINUA 3+*   BACTERIA Many*   NITRITE Negative   LEUKOCYTESUR 3+*   HYALINECASTS 0     All labs within the past 24 hours have been reviewed.    Significant Imaging:  ECG: Reviewed  X-Ray: Reviewed  CT: Reviewed  Echo: Reviewed    Assessment/Plan:     Hyperphosphatemia    -Ph- 5.1; on calcium acetate 1334 mg po TID WM  -renal diet     Blindness    Safety precautions     ESRD (end stage renal disease) on dialysis    Dialysis Information: Pt receives HD MWF via at  and is followed by Dr. Vang; last HD Wed 12/5/18; NP called and spoke w/ [facility] Manager Rio: states HD tx time: 3:30 and states unable to tolerate >0.8-1.7L due to recent hypotension on HD    Anemia:   -being followed by Urology due to gross hematuria  -Hgb 7.5  -will monitor H/H  -transfuse PRBCs on HD, if needed      Mineral Bone Disorder:   -most recent  10/2013  -Ph- 5.1; on calcium acetate 1334 mg po TID WM  -Serum Ca+ 8.8    Electrolyte Abnormalities: K+ 3.9    Acidosis: CO2: 26    HTN: BPs 190s-170s/80s; on carvedilol 25mg po BID    Xray 11/24/18: stable cardiomegaly    CT: renal stone study abd pelvis WO: 11/21/18: "Irregular contour about the superolateral aspect of the right kidney, mass cannot be excluded"    U/A: 12/5/18: 3+ occult blood; + leukocytes, RBCs, and bacteria; urine cx results 12/5/18 pending    Echo: " 6/4/15: EF 40-45% with anterior and anterolateral hypokinesis    Diet: renal diet    - Daily standing weights and chart..  - Strict I/O and chart.  - Avoid nephrotoxic medications.  - Maintain MAP >65.  - Keep target Bp <140/90.   - Hb > 7 gm/dL.  - Medications to renal parameters.  - Daily standing weights and chart.  - Strict I/O and chart.  - Avoid nephrotoxic medications.  - Maintain MAP >65.  - Keep target Bp <140/90.   - Hb > 7 gm/dL.  - Medications to renal parameters.     Anemia in chronic kidney disease, on chronic dialysis    Anemia:   -being followed by Urology due to gross hematuria  -Hgb 7.5  -will monitor H/H  -transfuse PRBCs on HD, if needed           Thank you for your consult. I will follow-up with patient. Please contact us if you have any additional questions.    MOHSEN Caceres  Nephrology  [facility]

## 2018-12-06 NOTE — NURSING
Patient transferred from ER, arrived via stretcher, admitted to room 3084, daughter accompanied patient. Patient AA, blind on both eyes. Denies pain, not in distress. Has indwelling tejeda catheter in situ, with dark red colored urine draining out. VS taken, slightly hypertensive  mmHg. Afebrile. Patient daughter assisted patient with the turkey sandwhich, tolerated well. Plan of care discussed with patient and daughter, awaiting review with urology. Will monitor urine output.

## 2018-12-06 NOTE — SUBJECTIVE & OBJECTIVE
Interval History: Patient has no complaints. Updated on plan and agrees. Daughter at bedside during assessment.     Review of Systems   Unable to perform ROS: Dementia   Genitourinary:        Endorses penile pain after tejeda placement (daughter reported, not patient)      Objective:     Vital Signs (Most Recent):  Temp: 97.6 °F (36.4 °C) (12/06/18 1119)  Pulse: 60 (12/06/18 1119)  Resp: 18 (12/06/18 1119)  BP: (!) 170/77 (12/06/18 1119)  SpO2: 97 % (12/06/18 1119) Vital Signs (24h Range):  Temp:  [96.4 °F (35.8 °C)-97.9 °F (36.6 °C)] 97.6 °F (36.4 °C)  Pulse:  [60-72] 60  Resp:  [14-18] 18  SpO2:  [97 %-100 %] 97 %  BP: (169-198)/(60-86) 170/77     Weight: 74.8 kg (165 lb)  Body mass index is 21.77 kg/m².    Intake/Output Summary (Last 24 hours) at 12/6/2018 1341  Last data filed at 12/6/2018 0600  Gross per 24 hour   Intake --   Output 150 ml   Net -150 ml      Physical Exam   Constitutional: He is oriented to person, place, and time. He appears well-developed and well-nourished. No distress.   Eyes:   Blind   Cardiovascular: Normal rate and regular rhythm.   No murmur heard.  Pulmonary/Chest: Effort normal and breath sounds normal. No respiratory distress. He has no wheezes.   Abdominal: Soft. Bowel sounds are normal. He exhibits no distension. There is no guarding.   Genitourinary:   Genitourinary Comments: Tejeda with gross blood   Musculoskeletal: He exhibits no edema.   Neurological: He is alert and oriented to person, place, and time.   Skin: Skin is dry. He is not diaphoretic.   Psychiatric: He has a normal mood and affect. His behavior is normal.   Nursing note and vitals reviewed.      Significant Labs:   CBC:   Recent Labs   Lab 12/05/18  1727 12/06/18  1002   WBC 6.18 6.59   HGB 7.5* 7.0*   HCT 22.9* 22.1*    175     CMP:   Recent Labs   Lab 12/05/18  1727 12/06/18  1002    138   K 3.9 3.9    104   CO2 26 27   * 151*   BUN 18 27*   CREATININE 3.7* 4.9*   CALCIUM 8.8 8.8   PROT  8.0  --    ALBUMIN 2.9* 2.6*   BILITOT 0.4  --    ALKPHOS 99  --    AST 42*  --    ALT 42  --    ANIONGAP 8 7*   EGFRNONAA 13.7* 9.7*       Significant Imaging: I have reviewed all pertinent imaging results/findings within the past 24 hours.

## 2018-12-06 NOTE — HPI
"Cc: Presented to ED 12/5/18 for low H/H after dialysis and gross hematuria    History obtained from patient, his daughter, and the medical record    PMH: T2DM, CHF, dementia, BPH, HTN, blindness, and seizure disorder    Nephrology consulted for ESRD management    Dialysis Information: Pt receives HD MWF via at  and is followed by Dr. Vang; last HD Wed 12/5/18; NP called and spoke w/ [facility] Manager Rio: states HD tx time: 3:30 and states unable to tolerate >0.8-1.7L due to recent hypotension on HD    Anemia:   -being followed by Urology due to gross hematuria  -Hgb 7.5  -will monitor H/H  -transfuse PRBCs on HD, if needed      Mineral Bone Disorder:   -most recent  10/2013  -Ph- 5.1; on calcium acetate 1334 mg po TID WM  -Serum Ca+ 8.8    Electrolyte Abnormalities: K+ 3.9    Acidosis: CO2: 26    HTN: BPs 190s-170s/80s; on carvedilol 25mg po BID    Xray 11/24/18: stable cardiomegaly    CT: renal stone study abd pelvis WO: 11/21/18: "Irregular contour about the superolateral aspect of the right kidney, mass cannot be excluded"    U/A: 12/5/18: 3+ occult blood; + leukocytes, RBCs, and bacteria; urine cx results 12/5/18 pending    Echo: 6/4/15: EF 40-45% with anterior and anterolateral hypokinesis    Diet: renal diet    - Daily standing weights and chart..  - Strict I/O and chart.  - Avoid nephrotoxic medications.  - Maintain MAP >65.  - Keep target Bp <140/90.   - Hb > 7 gm/dL.  - Medications to renal parameters.  - Daily standing weights and chart.  - Strict I/O and chart.  - Avoid nephrotoxic medications.  - Maintain MAP >65.  - Keep target Bp <140/90.   - Hb > 7 gm/dL.  - Medications to renal parameters.  "

## 2018-12-06 NOTE — SUBJECTIVE & OBJECTIVE
Past Medical History:   Diagnosis Date    Anemia     Blind     bilaterally    CHF (congestive heart failure)     Chronic kidney disease     Dementia 01/2017    Diabetes mellitus type II     General anesthetics causing adverse effect in therapeutic use     april / may 2014     Glaucoma (increased eye pressure)     Hypertension     Hypothermia 12/6/2017    Seizures        Past Surgical History:   Procedure Laterality Date    ANGIOPLASTY WITH STENT PLACEMENT Left 11/18/2016    [facility]    AV FISTULA PLACEMENT Left 4/2014    AV fistula to Left upper arm    [id number]-[id number] left RC vs BC AVF Left 8/8/2014    [facility] at [facility]    CYSTOSCOPY N/A 11/29/2018    Procedure: CYSTOSCOPY;  Surgeon: Temo Banerjee MD;  Location: [facility] OR [address] [place] [address];  Service: Urology;  Laterality: N/A;    CYSTOSCOPY N/A 11/29/2018    [facility]    EYE SURGERY      FISTULOGRAM Left 11/18/2016    [facility] at [facility]    FISTULOGRAM Left 4/15/2016    [facility] at [facility]    FISTULOGRAM Left 1/29/2016    [facility] at [facility] CATH LAB    FISTULOGRAM Room 11 case Left 3/27/2015    Performed by [facility]in [facility]s MD at [facility] OR [address] [place] [address]    HYDROCELECTOMY Bilateral 11/22/2017    Performed by [facility] at [facility]LR    INSERTION-CATHETER-DIALYSIS-PERITONEAL-LAPAROSCOPIC N/A 5/8/2014    Performed by Js Ladd MD at [facility] FLR    INSERTION-CATHETER-DIALYSIS-PERITONEAL-LAPAROSCOPIC N/A 5/5/2014    Performed by Js Ladd MD at [facility]LR    INSERTION-CATHETER-HEMODIALYSIS N/A 5/21/2014    [facility]    PERMCATH INSERTION-TUNNELED CVC N/A 5/13/2014    Performed by Lolis Jackson MD at [facility] CATH LAB    REMOVAL OF BLOOD CLOT N/A 11/29/2018     Procedure: REMOVAL, BLOOD CLOT;  Surgeon: Temo Banerjee MD;  Location: [facility] OR 1ST FLR;  Service: Urology;  Laterality: N/A;    REMOVAL, BLOOD CLOT N/A 11/29/2018    [facility] at [facility]LR    REMOVAL-CATHETER-PERITONEAL DIALYSIS N/A 7/30/2014    [facility] J[facility] at [facility]    SCROTUM EXPLORATION Bilateral 11/2017       Review of patient's allergies indicates:   Allergen Reactions    Lisinopril Swelling       Family History     Problem Relation (Age of Onset)    Cancer Daughter    Heart disease Sister, Brother          Tobacco Use    Smoking status: Never Smoker    Smokeless tobacco: Never Used   Substance and Sexual Activity    Alcohol use: No    Drug use: No    Sexual activity: No       Review of Systems   Unable to perform ROS: Dementia       Objective:     Temp:  [96.4 °F (35.8 °C)-97.9 °F (36.6 °C)] 97.7 °F (36.5 °C)  Pulse:  [62-72] 64  Resp:  [14-18] 18  SpO2:  [97 %-100 %] 97 %  BP: (169-198)/(60-86) 178/83     Body mass index is 21.77 kg/m².       Bladder Scan Volume (mL): 433 mL (12/05/18 1819)    Drains     Drain                 Hemodialysis AV Fistula 08/08/14 0800 Left upper arm 1581 days         Urethral Catheter 12/05/18 1940 Coude less than 1 day                Physical Exam   Nursing note and vitals reviewed.  Constitutional: He is oriented to person, place, and time. He appears well-developed and well-nourished. No distress.   HENT:   Head: Normocephalic and atraumatic.   Eyes: No scleral icterus.   Pulmonary/Chest: Effort normal. No respiratory distress.   Abdominal: Soft. He exhibits no distension and no mass. There is no tenderness. There is no rebound and no guarding.   Genitourinary:   Genitourinary Comments: 16 Fr coude tejeda in place draining dark maroon urine   Neurological: He is alert and oriented to person, place, and time.   Skin: Skin is warm and dry.     Psychiatric: He has a normal mood and affect. His behavior is  normal. Judgment and thought content normal.       Significant Labs:    BMP:  Recent Labs   Lab 12/05/18  1727      K 3.9      CO2 26   BUN 18   CREATININE 3.7*   CALCIUM 8.8       CBC:  Recent Labs   Lab 12/05/18  1727   WBC 6.18   HGB 7.5*   HCT 22.9*          All pertinent labs results from the past 24 hours have been reviewed.    Significant Imaging:  All pertinent imaging results/findings from the past 24 hours have been reviewed.

## 2018-12-06 NOTE — H&P
"[facility] Medicine  History & Physical    Patient Name: Ronald Campbell  MRN: [id number]  Admission Date: 12/5/2018  Attending Physician: Alix Beckford MD   Primary Care Provider: Bart Shaw MD    [facility] Medicine Team: [facility] F Win Pendleton PA-C     Patient information was obtained from patient, past medical records and ER records.     Subjective:     Principal Problem:Hematuria    Chief Complaint:   Chief Complaint   Patient presents with    Abnormal Lab     received full run of dialysis today. low h&H        HPI: Ronald Campbell is am 89M with ESRD (Still making urine, dialysis MWF), T2DM, CHF, dementia, BPH, HTN, blindness, and seizure disorder presents for evaluation of abnormal lab during dialysis today. No family at bedside and patient unreliable historian. History per chart review. He was recently admitted to this hospital 11/30 for evaluation of hematuria. Urology was consulted and cystoscopy was unrevealing and was discharged on antifungals for candida albicans growing in UCx from 11/21. Per chart, Hgb "6 something" at dialysis and patient referred to ED. Hgb with slow decline since August (when Hgb 12), now ~8. Per daughter, hematuria has improved, but continues intermittently.    On admission, the patient is calm and cooperative, he endorses no complaints and is unable to see his hematuria and can not comment on the quantity or improvement.     ED: Hgb 7.5, HDS, lytes non-emergent, tejeda placed with gross hematuria, urology consulted.     Past Medical History:   Diagnosis Date    Anemia     Blind     bilaterally    CHF (congestive heart failure)     Chronic kidney disease     Dementia 01/2017    Diabetes mellitus type II     General anesthetics causing adverse effect in therapeutic use     april / may 2014     Glaucoma (increased eye pressure)     Hypertension     Hypothermia 12/6/2017    Seizures        Past Surgical History:   Procedure Laterality Date "    ANGIOPLASTY WITH STENT PLACEMENT Left 11/18/2016    Performed by [facility]in Sims MD at [facility] OR [address] [place] [address]    AV FISTULA PLACEMENT Left 4/2014    AV fistula to Left upper arm    [id number]-[id number]-KU left RC vs BC AVF Left 8/8/2014    Performed by Orin Sims MD at [facility] OR [address] [place] [address]    CYSTOSCOPY N/A 11/29/2018    Procedure: CYSTOSCOPY;  Surgeon: Temo Banerjee MD;  Location: [facility] OR [address] [place] [address];  Service: Urology;  Laterality: N/A;    CYSTOSCOPY N/A 11/29/2018    Performed by Temo Banerjee MD at [facility] OR [address] [place] [address]    EYE SURGERY      FISTULOGRAM Left 11/18/2016    [facility]    FISTULOGRAM Left 4/15/2016    [facility]    FISTULOGRAM Left 1/29/2016    Performed by [facility] at [facility] CATH LAB    FISTULOGRAM Room 11 case Left 3/27/2015    Performed by Orin Sims MD at [facility] OR [address] [place] [address]    HYDROCELECTOMY Bilateral 11/22/2017    [facility]    INSERTION-CATHETER-DIALYSIS-PERITONEAL-LAPAROSCOPIC N/A 5/8/2014    Performed by Js Ladd MD at [facility] OR [address] [place] [address]    INSERTION-CATHETER-DIALYSIS-PERITONEAL-LAPAROSCOPIC N/A 5/5/2014    [facility]    INSERTION-CATHETER-HEMODIALYSIS N/A 5/21/2014    Performed by Orin Sims MD at [facility] OR [address] [place] [address]    PERMCATH INSERTION-TUNNELED CVC N/A 5/13/2014    Performed by Lolis Jackson MD at [facility] CATH LAB    REMOVAL OF BLOOD CLOT N/A 11/29/2018    Procedure: REMOVAL, BLOOD CLOT;  Surgeon: Temo Banerjee MD;  Location: [facility] OR [address] [place] [address];  Service: Urology;  Laterality: N/A;    REMOVAL, BLOOD CLOT N/A 11/29/2018    Performed by Temo Banerjee MD at [facility] OR [address] [place] [address]    REMOVAL-CATHETER-PERITONEAL DIALYSIS N/A 7/30/2014    Performed by Js Ladd MD at [facility] OR [address] [place] [address]    SCROTUM EXPLORATION Bilateral 11/2017       Review of patient's allergies indicates:   Allergen  Reactions    Lisinopril Swelling       No current facility-administered medications on file prior to encounter.      Current Outpatient Medications on File Prior to Encounter   Medication Sig    aspirin 81 MG Chew Take 1 tablet (81 mg total) by mouth once daily.    atorvastatin (LIPITOR) 40 MG tablet Take 1 tablet (40 mg total) by mouth once daily.    B complex-vitamin C-folic acid (NEPHRO-JESSICA) 0.8 mg Tab Take 1 tablet by mouth every morning.     blood sugar diagnostic (BLOOD GLUCOSE TEST) Strp 1 strip by Misc.(Non-Drug; Combo Route) route daily as needed.     brimonidine-timolol (COMBIGAN) 0.2-0.5 % Drop Place 1 drop into the left eye 2 (two) times daily.    brinzolamide (AZOPT) 1 % ophthalmic suspension Place 1 drop into the left eye 3 (three) times daily.     calcium acetate (PHOSLO) 667 mg capsule Take 2 capsules by mouth every day in the morning, then take 1 capsule with lunch and 2 capsules in the evening with meals    carvedilol (COREG) 25 MG tablet Take 1 tablet (25 mg total) by mouth 2 (two) times daily. Do not take on dialysis days. Hold for SBP < 110 or HR < 55 (Patient taking differently: Take 25 mg by mouth 2 (two) times daily. Hold for SBP < 110 or HR < 55)    ciprofloxacin HCl (CIPRO) 250 MG tablet Take 1 tablet (250 mg total) by mouth once daily. for 6 days    doxazosin (CARDURA) 4 MG tablet Take 1 tablet (4 mg total) by mouth once daily.    [] fluconazole (DIFLUCAN) 100 MG tablet Take 1 tablet (100 mg total) by mouth once daily. Dialysis day take 1 pill before the dialysis and 1 after the dialysis. Stop taking @ 12/5 for 8 days    insulin aspart (NOVOLOG FLEXPEN) 100 unit/mL InPn  Insulin Pen Subcutaneous As directed.  -200 take 1 unitBS 201-250 take 2 unitsBS 251-300 take 3 unitsBS 301-350 take 4 unitsBS > 351 take 5 units and call MD    ketoconazole (NIZORAL) 2 % cream Apply topically once daily. feet    levetiracetam (KEPPRA) 500 MG Tab Take 1 tablet (500 mg total) by  mouth 2 (two) times daily.    tamsulosin (FLOMAX) 0.4 mg Cap Take 1 capsule (0.4 mg total) by mouth once daily.    TRAVOPROST (TRAVATAN Z OPHT) Place 1 drop into the left eye every evening. 1 Drops Left eye Every evening     Family History     Problem Relation (Age of Onset)    Cancer Daughter    Heart disease Sister, Brother        Tobacco Use    Smoking status: Never Smoker    Smokeless tobacco: Never Used   Substance and Sexual Activity    Alcohol use: No    Drug use: No    Sexual activity: No     Review of Systems   Unable to perform ROS: Dementia     Objective:     Vital Signs (Most Recent):  Temp: 97.7 °F (36.5 °C) (12/05/18 2239)  Pulse: 72 (12/05/18 2239)  Resp: 18 (12/05/18 2239)  BP: (!) 179/82 (12/05/18 2239)  SpO2: 99 % (12/05/18 2239) Vital Signs (24h Range):  Temp:  [97.7 °F (36.5 °C)-97.9 °F (36.6 °C)] 97.7 °F (36.5 °C)  Pulse:  [62-72] 72  Resp:  [14-18] 18  SpO2:  [98 %-100 %] 99 %  BP: (169-195)/(74-86) 179/82     Weight: 74.8 kg (165 lb)  Body mass index is 21.77 kg/m².    Physical Exam   Constitutional: He appears well-developed and well-nourished. No distress.   HENT:   Head: Normocephalic and atraumatic.   Eyes:   Blind   Cardiovascular: Normal rate and regular rhythm.   No murmur heard.  Pulmonary/Chest: Effort normal and breath sounds normal. No respiratory distress. He has no wheezes.   Abdominal: Soft. Bowel sounds are normal. He exhibits no distension. There is no guarding.   Genitourinary:   Genitourinary Comments: Barrett with gross blood   Musculoskeletal: He exhibits no edema.   Neurological: He is alert.   Skin: Skin is dry. He is not diaphoretic.   Psychiatric: He has a normal mood and affect. His behavior is normal.   Nursing note and vitals reviewed.          Significant Labs:   CBC:   Recent Labs   Lab 12/05/18  1727   WBC 6.18   HGB 7.5*   HCT 22.9*        CMP:   Recent Labs   Lab 12/05/18  1727      K 3.9      CO2 26   *   BUN 18   CREATININE 3.7*    CALCIUM 8.8   PROT 8.0   ALBUMIN 2.9*   BILITOT 0.4   ALKPHOS 99   AST 42*   ALT 42   ANIONGAP 8   EGFRNONAA 13.7*     Cardiac Markers: No results for input(s): CKMB, MYOGLOBIN, BNP, TROPISTAT in the last 48 hours.  Lipase: No results for input(s): LIPASE in the last 48 hours.  Urine Culture: No results for input(s): LABURIN in the last 48 hours.  Urine Studies:   Recent Labs   Lab 12/05/18 1940   COLORU Micki   APPEARANCEUA Cloudy*   PHUR 7.0   SPECGRAV 1.025   PROTEINUA 3+*   GLUCUA Negative   KETONESU Negative   BILIRUBINUA Negative   OCCULTUA 3+*   NITRITE Negative   LEUKOCYTESUR 3+*   RBCUA >100*   WBCUA >100*   BACTERIA Many*   SQUAMEPITHEL 0   HYALINECASTS 0       Significant Imaging: I have reviewed all pertinent imaging results/findings within the past 24 hours.    Assessment/Plan:     * Hematuria    - urology consulted with appreciation  - UA from 11/21 with candida and completed diflucan treatment. Discharged with empiric cipro, also completed. Follow UCx from this admission.  - cystoscopy last admission unrevealing  - NPO at [place]  - reported low H/H at dialysis, not confirmed on our labs here  - HDS  - CT 11/21/2018:  1. Irregular contour about the superolateral aspect of the right kidney, mass cannot be excluded.  There is a tiny non-obstructing calcification within that region.  Right renal cyst.  4.  Several enlarged para-aortic lymph nodes extending along the left ileopsoas with some obliteration of the fat planes with an additional enlarged left external iliac lymph node.  These findings are new since CT pelvis 11/14/2017.     Anemia in chronic kidney disease, on chronic dialysis    - H/H at baseline, but has trended down from Hgb of 12 in August  - see hematuria     Blindness    - home gtts     Dementia with behavioral disturbance    - delirium precautions  - zyprexa PRN     Renovascular hypertension     - continue home medications     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    -  diet controlled, start low dose SSI     ESRD (end stage renal disease) on dialysis    - HD MWF  - nephrology consulted  - BP and lytes non-emergent     Benign prostatic hyperplasia with urinary retention    - continue home medications     Chronic combined systolic and diastolic heart failure    - compensated, continue home medications     Seizure disorder    - continue home keppra       VTE Risk Mitigation (From admission, onward)        Ordered     Place MORGAN hose  Until discontinued      12/05/18 2114     Place sequential compression device  Until discontinued      12/05/18 2114     IP VTE HIGH RISK PATIENT  Once      12/05/18 2114             Win Pendleton PA-C  Department of Hospital Medicine   [facility]

## 2018-12-06 NOTE — HPI
"Ronald Campbell is am 89M with ESRD (Still making urine, dialysis MWF), T2DM, CHF, dementia, BPH, HTN, blindness, and seizure disorder presents for evaluation of abnormal lab during dialysis today. No family at bedside and patient unreliable historian. History per chart review. He was recently admitted to this hospital 11/30 for evaluation of hematuria. Urology was consulted and cystoscopy was unrevealing and was discharged on antifungals for candida albicans growing in UCx from 11/21. Per chart, Hgb "6 something" at dialysis and patient referred to ED. Hgb with slow decline since August (when Hgb 12), now ~8. Per daughter, hematuria has improved, but continues intermittently.    On admission, the patient is calm and cooperative, he endorses no complaints and is unable to see his hematuria and can not comment on the quantity or improvement.     ED: Hgb 7.5, HDS, lytes non-emergent, tejeda placed with gross hematuria, urology consulted.   "

## 2018-12-06 NOTE — ED NOTES
Daughter states patient already urinated today and is concerned for infection with how many times patient has been catheterized recently. Bladder scan performed at bedside- 433mL recorded. LALITA Moreno notified of daughters concerns and bladder scan results. Daughter wishes to let patient try to urinate on his own and reevaluate if unable.

## 2018-12-06 NOTE — HPI
"89 y.o. male with a history of HTN, Type 2 DM, ESRD on HD, CHF, dementia, and blindness who presented to [facility] due to Hgb reading of "6" at dialysis yesterday. Patient is a poor historian and the majority of the history was gathered from chart review and daughter at bedside. He was hospitalized at the end of November 2018 due to weakness and missed dialysis. Urology was consulted at the time due to painless gross hematuria for a few weeks as well as passage of clots. He also underwent CT noncontrast of abdomen pelvis revealing a possible right renal mass, possible clot in bladder, a large prostate and several enlarged para-aortic lymph nodes extending along the left ileopsoas with some obliteration of the fat planes with an additional enlarged left external iliac lymph node. He underwent cystoscopy on 11/30 which showed no tumors, old clots, and bleeding from the prostatic urethra. Urine cx on 11/21 grew C. Albicans and he is s/p tx with diflucan. He was discharged with empiric cipro.    He urinates twice daily and since discharge and his urine has been consistently bloody. He does not feel bladder fullness or the urge to urinate. Bladder scan in the ED showed 430 cc and a 16 Fr coude was placed. UA showed >100 RBC/hpf, > 100 WBC/hpf, 3+ protein, and many bacteria. Hgb is 7.5 from 8 on discharge at the end of November.    Patient has no complaints and denies fevers, chills, abdominal pain, suprapubic pain, dysuria.  "

## 2018-12-06 NOTE — PLAN OF CARE
Problem: Patient Care Overview  Goal: Plan of Care Review  Outcome: Ongoing (interventions implemented as appropriate)  Pt to have U/S of kidney today. CBG 4 times daily. VSS. Pt will remain free of falls during hospital stay. Will continue to monitor.

## 2018-12-06 NOTE — HOSPITAL COURSE
Patient admitted to observation for hematuria. Patient recently discharged with similar complaint. Urology saw the patient but cystoscopy was non diagnostic. Urology consulted, placed a 22 Fr coude and irrigated tejeda. 12/7: Hgb 7.5> 6.1, 2 units prbc given. HD today. PT/ OT consulted, recommend home health care. 12/8: Hgb improvement to 8.5 this AM; discussed case extensively with urology- patient is high risk for surgical intervention. Not necessary to further work up renal masses, continue to monitor H/H and consider transfusions with HD 12/9: Hgb stable, daughter agreeable to palliative care consult tomorrow for help with management of complex medical problems. 12/10: HD today; Hgb 7.7. Palliative care discussed plan of care and options with daughter at bedside this PM. Tejeda removed after discussion with daughter over concern for increased risk of infection if patient to be sent home with it. SW and CM coordinating with [facility]. 12/11: Lengthy discussion with daughter on whether to discharge patient with tejeda or not, requesting to speak to urology. Urology consulted. 12/12: Urology spoke to daughter, patient will not be discharged with a tejeda. Home health orders and Palliative care referral sent to [facility]. 12/13: Pt's blood pressure consistently elevated despite morning meds, added Norvasc 5mg. Daughter had meeting with [facility]. Will be discharged home with palliative care. All questions answered. Follow up with PCP in 1 week. PCP to titrate up Norvasc as needed. [facility] will facilitate follow up.

## 2018-12-06 NOTE — ASSESSMENT & PLAN NOTE
Anemia:   -being followed by Urology due to gross hematuria  -Hgb 7.5  -will monitor H/H  -transfuse PRBCs on HD, if needed

## 2018-12-06 NOTE — CONSULTS
"2[facility]  Urology  Consult Note    Patient Name: Ronald Campbell  MRN: [id number]  Admission Date: 12/5/2018  Hospital Length of Stay: 0   Code Status: Full Code   Attending Provider: Alix Beckford MD   Consulting Provider: Murphy Lema MD  Primary Care Physician: Bart Shaw MD  Principal Problem:Hematuria    Inpatient consult to Urology  Consult performed by: Murphy Lema MD  Consult ordered by: Win Pendleton PA-C          Subjective:     HPI:  89 y.o. male with a history of HTN, Type 2 DM, ESRD on HD, CHF, dementia, and blindness who presented to [facility] due to Hgb reading of "6" at dialysis yesterday. Patient is a poor historian and the majority of the history was gathered from chart review and daughter at bedside. He was hospitalized at the end of November 2018 due to weakness and missed dialysis. Urology was consulted at the time due to painless gross hematuria for a few weeks as well as passage of clots. He also underwent CT noncontrast of abdomen pelvis revealing a possible right renal mass, possible clot in bladder, a large prostate and several enlarged para-aortic lymph nodes extending along the left ileopsoas with some obliteration of the fat planes with an additional enlarged left external iliac lymph node. He underwent cystoscopy on 11/30 which showed no tumors, old clots, and bleeding from the prostatic urethra. Urine cx on 11/21 grew C. Albicans and he is s/p tx with diflucan. He was discharged with empiric cipro.    He urinates twice daily and since discharge and his urine has been consistently bloody. He does not feel bladder fullness or the urge to urinate. Bladder scan in the ED showed 430 cc and a 16 Fr coude was placed. UA showed >100 RBC/hpf, > 100 WBC/hpf, 3+ protein, and many bacteria. Hgb is 7.5 from 8 on discharge at the end of November.    Patient has no complaints and denies fevers, chills, abdominal pain, suprapubic pain, dysuria.    Past Medical History: "   Diagnosis Date    Anemia     Blind     bilaterally    CHF (congestive heart failure)     Chronic kidney disease     Dementia 01/2017    Diabetes mellitus type II     General anesthetics causing adverse effect in therapeutic use     april / may 2014     Glaucoma (increased eye pressure)     Hypertension     Hypothermia 12/6/2017    Seizures        Past Surgical History:   Procedure Laterality Date    ANGIOPLASTY WITH STENT PLACEMENT Left 11/18/2016    [facility]    AV FISTULA PLACEMENT Left 4/2014    AV fistula to Left upper arm    [id number] left RC vs BC AVF Left 8/8/2014    Performed by [facility] at [facility] OR [facility]    CYSTOSCOPY N/A 11/29/2018    Procedure: CYSTOSCOPY;  Surgeon: Temo Banerjee MD;  Location: [facility] OR [address] [place] [address];  Service: Urology;  Laterality: N/A;    CYSTOSCOPY N/A 11/29/2018    Performed by [facility] at [facility]    EYE SURGERY      FISTULOGRAM Left 11/18/2016    Performed by [facility] at [facility]    FISTULOGRAM Left 4/15/2016    [facility]formed by [facility] at [facility]    FISTULOGRAM Left 1/29/2016    Performed by [facility] at [facility] CATH LAB    FISTULOGRAM Room 11 case Left 3/27/2015    Performed by [facility]in [facility] at [facility] OR [address] [place] [address]    HYDROCELECTOMY Bilateral 11/22/2017    Performed by [facility] at [facility]    INSERTION-CATHETER-DIALYSIS-PERITONEAL-LAPAROSCOPIC N/A 5/8/2014    Performed by Js Ladd MD at [facility] OR [facility]    INSERTION-CATHETER-DIALYSIS-PERITONEAL-LAPAROSCOPIC N/A 5/5/2014    Performed by Js Ladd [facility] at [facility]    INSERTION-CATHETER-HEMODIALYSIS N/A 5/21/2014    [facility]    PERMCATH INSERTION-TUNNELED CVC N/A 5/13/2014    Performed by Lolis Jackson MD at [facility] CATH LAB    REMOVAL OF BLOOD CLOT N/A 11/29/2018    Procedure: REMOVAL, BLOOD  CLOT;  Surgeon: Temo Banerjee MD;  Location: [facility] OR [facility]ST [facility];  Service: Urology;  Laterality: N/A;    REMOVAL, BLOOD CLOT N/A 11/29/2018    [facility] at [facility]    REMOVAL-CATHETER-PERITONEAL DIALYSIS N/A 7/30/2014    Per[facility] by Js [facility] at [facility]    SCROTUM EXPLORATION Bilateral 11/2017       Review of patient's allergies indicates:   Allergen Reactions    Lisinopril Swelling       Family History     Problem Relation (Age of Onset)    Cancer Daughter    Heart disease Sister, Brother          Tobacco Use    Smoking status: Never Smoker    Smokeless tobacco: Never Used   Substance and Sexual Activity    Alcohol use: No    Drug use: No    Sexual activity: No       Review of Systems   Unable to perform ROS: Dementia       Objective:     Temp:  [96.4 °F (35.8 °C)-97.9 °F (36.6 °C)] 97.7 °F (36.5 °C)  Pulse:  [62-72] 64  Resp:  [14-18] 18  SpO2:  [97 %-100 %] 97 %  BP: (169-198)/(60-86) 178/83     Body mass index is 21.77 kg/m².       Bladder Scan Volume (mL): 433 mL (12/05/18 1819)    Drains     Drain                 Hemodialysis AV Fistula 08/08/14 0800 Left upper arm 1581 days         Urethral Catheter 12/05/18 1940 Coude less than 1 day                Physical Exam   Constitutional: He is well-developed, well-nourished, and in no distress.   HENT:   Head: Normocephalic and atraumatic.   Eyes: No scleral icterus.   Pulmonary/Chest: Effort normal. No respiratory distress.   Abdominal: Soft. He exhibits no distension and no mass. There is no tenderness. There is no rebound and no guarding.   Genitourinary:   Genitourinary Comments: 16 Fr coude catheter in place draining dark maroon urine   Neurological: He is alert.   Skin: Skin is warm and dry.   Nursing note and vitals reviewed.        Significant Labs:    BMP:  Recent Labs   Lab 12/05/18  1727      K 3.9      CO2 26   BUN 18   CREATININE 3.7*   CALCIUM 8.8       CBC:  Recent Labs   Lab  12/05/18  1727   WBC 6.18   HGB 7.5*   HCT 22.9*          All pertinent labs results from the past 24 hours have been reviewed.    Significant Imaging:  All pertinent imaging results/findings from the past 24 hours have been reviewed.                    Assessment and Plan:     * Hematuria    -22 Fr Coude tejeda placed at the bedside this morning  -Tejeda irrigated to clear  -Will recheck tejeda this afternoon  -Irrigate tejeda PRN  -If bleeding continues, may consider 3 way tejeda for CBI  -Continue flomax  -F/u Hgb/Hct, transfuse as needed  -Avoid nephrotoxic agents         VTE Risk Mitigation (From admission, onward)        Ordered     Place MORGAN hose  Until discontinued      12/05/18 2114     Place sequential compression device  Until discontinued      12/05/18 2114     IP VTE HIGH RISK PATIENT  Once      12/05/18 2114          Thank you for your consult. I will follow-up with patient. Please contact us if you have any additional questions.    Murphy Lema MD  Urology  [facility]

## 2018-12-06 NOTE — SUBJECTIVE & OBJECTIVE
Past Medical History:   Diagnosis Date    Anemia     Blind     bilaterally    CHF (congestive heart failure)     Chronic kidney disease     Dementia 01/2017    Diabetes mellitus type II     General anesthetics causing adverse effect in therapeutic use     april / may 2014     Glaucoma (increased eye pressure)     Hypertension     Hypothermia 12/6/2017    Seizures        Past Surgical History:   Procedure Laterality Date    ANGIOPLASTY WITH STENT PLACEMENT Left 11/18/2016    [facility]    AV FISTULA PLACEMENT Left 4/2014    AV fistula to Left upper arm    YEZFLUDGNKOZ-BXHAVYV-XA left RC vs BC AVF Left 8/8/2014    Performed by [facility] at [facility] OR 2ND [facility]    CYSTOSCOPY N/A 11/29/2018    Procedure: CYSTOSCOPY;  Surgeon: Temo Banerjee MD;  Location: [facility] OR [address] [place] [address];  Service: Urology;  Laterality: N/A;    CYSTOSCOPY N/A 11/29/2018    [facility]    EYE SURGERY      FISTULOGRAM Left 11/18/2016    Performed by [facility] at [facility] OR [facility]    FISTULOGRAM Left 4/15/2016    [facility]formed by [facility] at [facility]    FISTULOGRAM Left 1/29/2016    Performed by [facility] at [facility] CATH LAB    FISTULOGRAM Room 11 case Left 3/27/2015    Performed by [facility]in [facility]s MD at [facility] OR [address] [place] [address]    HYDROCELECTOMY Bilateral 11/22/2017    Performed by [facility] at [facility] OR [facility]    INSERTION-CATHETER-DIALYSIS-PERITONEAL-LAPAROSCOPIC N/A 5/8/2014    Performed by Js Ladd MD at [facility] OR [facility]    INSERTION-CATHETER-DIALYSIS-PERITONEAL-LAPAROSCOPIC N/A 5/5/2014    Performed by Js Ladd MD at [facility] OR [facility]    INSERTION-CATHETER-HEMODIALYSIS N/A 5/21/2014    [facility]    PERMCATH INSERTION-TUNNELED CVC N/A 5/13/2014    Performed by Lolis Jackson MD at [facility] CATH LAB    REMOVAL OF BLOOD CLOT N/A 11/29/2018     Procedure: REMOVAL, BLOOD CLOT;  Surgeon: Temo Banerjee MD;  Location: [facility] OR 1ST FLR;  Service: Urology;  Laterality: N/A;    REMOVAL, BLOOD CLOT N/A 11/29/2018    Performed by [facility] at [facility] OR [facility]    REMOVAL-CATHETER-PERITONEAL DIALYSIS N/A 7/30/2014    Performed by Js Ladd [facility] at [facility] OR [facility]ND [facility]    SCROTUM EXPLORATION Bilateral 11/2017       Review of patient's allergies indicates:   Allergen Reactions    Lisinopril Swelling     Current Facility-Administered Medications   Medication Frequency    acetaminophen tablet 1,000 mg Q8H PRN    acetaminophen tablet 650 mg Q4H PRN    albuterol-ipratropium 2.5 mg-0.5 mg/3 mL nebulizer solution 3 mL Q4H PRN    atorvastatin tablet 40 mg Daily    bisacodyl suppository 10 mg Daily PRN    brimonidine 0.2% ophthalmic solution 1 drop BID    And    timolol maleate 0.5% ophthalmic solution 1 drop BID    brinzolamide 1 % ophthalmic suspension 1 drop TID    calcium acetate capsule 1,334 mg TID WM    carvedilol tablet 25 mg BID    dextrose 50% injection 12.5 g PRN    dextrose 50% injection 25 g PRN    doxazosin tablet 4 mg Daily    glucagon (human recombinant) injection 1 mg PRN    glucose chewable tablet 16 g PRN    glucose chewable tablet 24 g PRN    hydrALAZINE tablet 25 mg Q8H PRN    insulin aspart U-100 pen 0-5 Units QID (AC + HS) PRN    levETIRAcetam tablet 500 mg BID    OLANZapine injection 5 mg Once PRN    ondansetron disintegrating tablet 8 mg Q8H PRN    polyethylene glycol packet 17 g Daily    promethazine (PHENERGAN) 6.25 mg in dextrose 5 % 50 mL IVPB Q6H PRN    ramelteon tablet 8 mg Nightly PRN    sodium chloride 0.9% flush 5 mL PRN    tamsulosin 24 hr capsule 0.4 mg Daily    travoprost 0.004 % ophthalmic solution 1 drop QHS     Family History     Problem Relation (Age of Onset)    Cancer Daughter    Heart disease Sister, Brother        Tobacco Use    Smoking status: Never Smoker    Smokeless tobacco: Never  Used   Substance and Sexual Activity    Alcohol use: No    Drug use: No    Sexual activity: No     Review of Systems   Constitutional: Negative.    HENT: Negative.    Eyes: Negative.    Respiratory: Negative.    Cardiovascular: Negative.    Gastrointestinal: Negative.    Endocrine: Negative.    Genitourinary:        Patient's daughter states hematuria today and reports oliguria since yesterday afternoon; patient's daughter also states tejeda catheter placed today   Musculoskeletal: Negative.    Skin: Negative.    Allergic/Immunologic: Negative.    Neurological: Negative.    Hematological: Negative.    Psychiatric/Behavioral: Negative.      Objective:     Vital Signs (Most Recent):  Temp: 97.6 °F (36.4 °C) (12/06/18 1119)  Pulse: 60 (12/06/18 1119)  Resp: 18 (12/06/18 1119)  BP: (!) 170/77 (12/06/18 1119)  SpO2: 97 % (12/06/18 1119)  O2 Device (Oxygen Therapy): room air (12/06/18 0837) Vital Signs (24h Range):  Temp:  [96.4 °F (35.8 °C)-97.9 °F (36.6 °C)] 97.6 °F (36.4 °C)  Pulse:  [60-72] 60  Resp:  [14-18] 18  SpO2:  [97 %-100 %] 97 %  BP: (169-198)/(60-86) 170/77     Weight: 74.8 kg (165 lb) (12/05/18 1632)  Body mass index is 21.77 kg/m².  Body surface area is 1.96 meters squared.    I/O last 3 completed shifts:  In: -   Out: 150 [Urine:150]    Physical Exam   Constitutional: He is oriented to person, place, and time. He appears well-developed and well-nourished.   HENT:   Head: Normocephalic and atraumatic.   Cardiovascular: Normal rate, regular rhythm, normal heart sounds and intact distal pulses.   Pulmonary/Chest: Effort normal and breath sounds normal.   Abdominal: Soft. Bowel sounds are normal.   Genitourinary:   Genitourinary Comments: Blood-tinged urine noted to tejeda catheter tubing   Neurological: He is alert and oriented to person, place, and time.   Skin: Skin is warm and dry.   Psychiatric: He has a normal mood and affect. His behavior is normal. Judgment and thought content normal.   Nursing note  and vitals reviewed.      Significant Labs:  CBC:   Recent Labs   Lab 12/06/18  1002 12/06/18  1422   WBC 6.59  --    RBC 2.76*  --    HGB 7.0* 7.4*   HCT 22.1* 22.9*     --    MCV 80*  --    MCH 25.4*  --    MCHC 31.7*  --      CMP:   Recent Labs   Lab 12/05/18  1727 12/06/18  1002   * 151*   CALCIUM 8.8 8.8   ALBUMIN 2.9* 2.6*   PROT 8.0  --     138   K 3.9 3.9   CO2 26 27    104   BUN 18 27*   CREATININE 3.7* 4.9*   ALKPHOS 99  --    ALT 42  --    AST 42*  --    BILITOT 0.4  --      Recent Labs   Lab 12/05/18  1940   COLORU Micki   SPECGRAV 1.025   PHUR 7.0   PROTEINUA 3+*   BACTERIA Many*   NITRITE Negative   LEUKOCYTESUR 3+*   HYALINECASTS 0     All labs within the past 24 hours have been reviewed.    Significant Imaging:  ECG: Reviewed  X-Ray: Reviewed  CT: Reviewed  Echo: Reviewed

## 2018-12-06 NOTE — NURSING
ARNEL Shea NP notified of pt daughter refusing doxazosin this am. Daughter states med can not be taken with Flomax.

## 2018-12-06 NOTE — PLAN OF CARE
Problem: Patient Care Overview  Goal: Plan of Care Review  Outcome: Ongoing (interventions implemented as appropriate)  Hypertensive towards the morning, SBP 192mmHg, given hydralazine 25mg as ordered.  Denies pain, hematuria seems to have slowed down. Afebrile, safety maintained, falls risk band, limb alert and allergy band put on. TEDs and SCD applied on both legs. Encouraged to shift weight while lying in bed. Promoted rest and sleep.

## 2018-12-06 NOTE — ED NOTES
Report received from Lori ALFORD RN. Care assumed. Pt resting comfortably and independently repositioned in stretcher with bed locked in lowest position for safety. NAD and patient states he feels a little better. Respirations even and unlabored and visible chest rise noted. Patient offered bathroom assistance and denies need at this time. Pt instructed to call if assistance is needed.. No needs at this time. Will continue to monitor. Call light within reach. Family at bedside.

## 2018-12-06 NOTE — PROGRESS NOTES
"[facility] Medicine  Progress Note    Patient Name: Ronald Campbell  MRN: [id number]  Patient Class: OP- Observation   Admission Date: 12/5/2018  Length of Stay: 0 days  Attending Physician: Alix Beckford MD  Primary Care Provider: Bart Shaw MD    [facility] Medicine Team: [facility] MED F Fiorella Shea NP    Subjective:     Principal Problem:Hematuria    HPI:  Ronald Campbell is am 89M with ESRD (Still making urine, dialysis MWF), T2DM, CHF, dementia, BPH, HTN, blindness, and seizure disorder presents for evaluation of abnormal lab during dialysis today. No family at bedside and patient unreliable historian. History per chart review. He was recently admitted to this hospital 11/30 for evaluation of hematuria. Urology was consulted and cystoscopy was unrevealing and was discharged on antifungals for candida albicans growing in UCx from 11/21. Per chart, Hgb "6 something" at dialysis and patient referred to ED. Hgb with slow decline since August (when Hgb 12), now ~8. Per daughter, hematuria has improved, but continues intermittently.    On admission, the patient is calm and cooperative, he endorses no complaints and is unable to see his hematuria and can not comment on the quantity or improvement.     ED: Hgb 7.5, HDS, lytes non-emergent, tejeda placed with gross hematuria, urology consulted.     Hospital Course:  Patient admitted to observation for hematuria. Patient recently discharged with similar complaint. Urology saw the patient but cystoscopy was no diagnostic. Urology saw patient; placed a 22 Fr coude and irrigated tejeda. Trending H&H. Urology to follow up. PT/OT.    Interval History: Patient has no complaints. Updated on plan and agrees. Daughter at bedside during assessment.     Review of Systems   Unable to perform ROS: Dementia   Genitourinary:        Endorses penile pain after tejeda placement (daughter reported, not patient)      Objective:     Vital Signs (Most Recent):  Temp: " 97.6 °F (36.4 °C) (12/06/18 1119)  Pulse: 60 (12/06/18 1119)  Resp: 18 (12/06/18 1119)  BP: (!) 170/77 (12/06/18 1119)  SpO2: 97 % (12/06/18 1119) Vital Signs (24h Range):  Temp:  [96.4 °F (35.8 °C)-97.9 °F (36.6 °C)] 97.6 °F (36.4 °C)  Pulse:  [60-72] 60  Resp:  [14-18] 18  SpO2:  [97 %-100 %] 97 %  BP: (169-198)/(60-86) 170/77     Weight: 74.8 kg (165 lb)  Body mass index is 21.77 kg/m².    Intake/Output Summary (Last 24 hours) at 12/6/2018 1341  Last data filed at 12/6/2018 0600  Gross per 24 hour   Intake --   Output 150 ml   Net -150 ml      Physical Exam   Constitutional: He is oriented to person, place, and time. He appears well-developed and well-nourished. No distress.   Eyes:   Blind   Cardiovascular: Normal rate and regular rhythm.   No murmur heard.  Pulmonary/Chest: Effort normal and breath sounds normal. No respiratory distress. He has no wheezes.   Abdominal: Soft. Bowel sounds are normal. He exhibits no distension. There is no guarding.   Genitourinary:   Genitourinary Comments: Barrett with gross blood   Musculoskeletal: He exhibits no edema.   Neurological: He is alert and oriented to person, place, and time.   Skin: Skin is dry. He is not diaphoretic.   Psychiatric: He has a normal mood and affect. His behavior is normal.   Nursing note and vitals reviewed.      Significant Labs:   CBC:   Recent Labs   Lab 12/05/18  1727 12/06/18  1002   WBC 6.18 6.59   HGB 7.5* 7.0*   HCT 22.9* 22.1*    175     CMP:   Recent Labs   Lab 12/05/18  1727 12/06/18  1002    138   K 3.9 3.9    104   CO2 26 27   * 151*   BUN 18 27*   CREATININE 3.7* 4.9*   CALCIUM 8.8 8.8   PROT 8.0  --    ALBUMIN 2.9* 2.6*   BILITOT 0.4  --    ALKPHOS 99  --    AST 42*  --    ALT 42  --    ANIONGAP 8 7*   EGFRNONAA 13.7* 9.7*       Significant Imaging: I have reviewed all pertinent imaging results/findings within the past 24 hours.    Assessment/Plan:      * Hematuria    - urology consulted with  appreciation  Urology saw patient; placed a 22 Fr coude and irrigated tejeda  - UA from 11/21 with candida and completed diflucan treatment. Discharged with empiric cipro, also completed. Follow UCx from this admission.  - cystoscopy last admission unrevealing  - reported low H/H at dialysis, not confirmed on our labs here  - trend H&H  - HDS  - CT 11/21/2018:  1. Irregular contour about the superolateral aspect of the right kidney, mass cannot be excluded.  There is a tiny non-obstructing calcification within that region.  Right renal cyst.  4.  Several enlarged para-aortic lymph nodes extending along the left ileopsoas with some obliteration of the fat planes with an additional enlarged left external iliac lymph node.  These findings are new since CT pelvis 11/14/2017.  - renal US pending  - transfuse is Hb <7     ESRD (end stage renal disease) on dialysis    - HD MWF  - nephrology consulted  - BP and lytes non-emergent     Hyperphosphatemia    - HD; nephrology following     Anemia in chronic kidney disease, on chronic dialysis    - H/H at baseline, but has trended down from Hgb of 12 in August  - see hematuria     Renovascular hypertension     - continue home medications     Chronic combined systolic and diastolic heart failure    - compensated, continue home medications     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    - diet controlled, start low dose SSI     Dementia with behavioral disturbance    - delirium precautions  - zyprexa PRN  -PT/OT     Benign prostatic hyperplasia with urinary retention    - continue home medications     Blindness    - home gtts     Seizure disorder    - continue home keppra       VTE Risk Mitigation (From admission, onward)        Ordered     Place MORGAN hose  Until discontinued      12/05/18 2114     Place sequential compression device  Until discontinued      12/05/18 2114     IP VTE HIGH RISK PATIENT  Once      12/05/18 2114              Fiorella Shea NP  Department  [facility] Medicine   [facility]

## 2018-12-06 NOTE — PROGRESS NOTES
Urology Progress Note    Urine draining pink-tinged on rounds  Long conversation with daughter regarding management of hematuria and catheter care.    - Nursing staff may irrigate as needed for low urine output or clots.  - recommend maintaining tejeda catheter upon discharge  - needs to be exchanged monthly either with Home health or with urology DWAINE    We will sign off.    Jeremy Clayton MD  Urology, PGY-2  [facility]

## 2018-12-06 NOTE — ASSESSMENT & PLAN NOTE
"Dialysis Information: Pt receives HD MWF via at  and is followed by Dr. Vang; last HD Wed 12/5/18; NP called and spoke w/ [facility] Manager [place]: states HD tx time: 3:30 and states unable to tolerate >0.8-1.7L due to recent hypotension on HD    Anemia:   -being followed by Urology due to gross hematuria  -Hgb 7.5  -will monitor H/H  -transfuse PRBCs on HD, if needed      Mineral Bone Disorder:   -most recent  10/2013  -Ph- 5.1; on calcium acetate 1334 mg po TID WM  -Serum Ca+ 8.8    Electrolyte Abnormalities: K+ 3.9    Acidosis: CO2: 26    HTN: BPs 190s-170s/80s; on carvedilol 25mg po BID    Xray 11/24/18: stable cardiomegaly    CT: renal stone study abd pelvis WO: 11/21/18: "Irregular contour about the superolateral aspect of the right kidney, mass cannot be excluded"    U/A: 12/5/18: 3+ occult blood; + leukocytes, RBCs, and bacteria; urine cx results 12/5/18 pending    Echo: 6/4/15: EF 40-45% with anterior and anterolateral hypokinesis    Diet: renal diet    - Daily standing weights and chart..  - Strict I/O and chart.  - Avoid nephrotoxic medications.  - Maintain MAP >65.  - Keep target Bp <140/90.   - Hb > 7 gm/dL.  - Medications to renal parameters.  - Daily standing weights and chart.  - Strict I/O and chart.  - Avoid nephrotoxic medications.  - Maintain MAP >65.  - Keep target Bp <140/90.   - Hb > 7 gm/dL.  - Medications to renal parameters.  "

## 2018-12-06 NOTE — ASSESSMENT & PLAN NOTE
- urology consulted with argenis  Urology saw patient; placed a 22 Fr coude and irrigated tejeda  - UA from 11/21 with candida and completed diflucan treatment. Discharged with empiric cipro, also completed. Follow UCx from this admission.  - cystoscopy last admission unrevealing  - reported low H/H at dialysis, not confirmed on our labs here  - trend H&H  - HDS  - CT 11/21/2018:  1. Irregular contour about the superolateral aspect of the right kidney, mass cannot be excluded.  There is a tiny non-obstructing calcification within that region.  Right renal cyst.  4.  Several enlarged para-aortic lymph nodes extending along the left ileopsoas with some obliteration of the fat planes with an additional enlarged left external iliac lymph node.  These findings are new since CT pelvis 11/14/2017.  - renal US pending  - transfuse is Hb <7

## 2018-12-06 NOTE — ASSESSMENT & PLAN NOTE
- urology consulted with appreciation  - UA from 11/21 with candida and completed diflucan treatment. Discharged with empiric cipro, also completed. Follow UCx from this admission.  - cystoscopy last admission unrevealing  - NPO at [facility]  - reported low H/H at dialysis, not confirmed on our labs here  - HDS  - CT 11/21/2018:  1. Irregular contour about the superolateral aspect of the right kidney, mass cannot be excluded.  There is a tiny non-obstructing calcification within that region.  Right renal cyst.  4.  Several enlarged para-aortic lymph nodes extending along the left ileopsoas with some obliteration of the fat planes with an additional enlarged left external iliac lymph node.  These findings are new since CT pelvis 11/14/2017.

## 2018-12-06 NOTE — ED PROVIDER NOTES
"Encounter Date: 12/5/2018       History     Chief Complaint   Patient presents with    Abnormal Lab     received full run of dialysis today. low h&H     Mr Shannan is a 89YOAAM who presents for low H&H found during dialysis today; pertinent PMHx ESRD on HD (M/W/F), DM 2, CHF, HTN, blind OU. Patient's daughter states hemoglobin was "6 something" during dialysis today (completed full session). She is concerned his "blood counts have been dropping over the past few weeks". On note review, patient's average Hgb has gone from around 12 in August to 8 this past admission (11/30). He is currently under workup for gross hematuria. Cystoscope was completed 11/29 with some clots visualized, yet not active source of bleeding was found. Daughter states hematuria has significantly improved, though still occurs intermittently. Patient usually urinates 1-2x a day.  Patient denies CP, SOB, abdominal pain, N/V, cough at this time. No charted value for H&H drawn at HD PTA.           Review of patient's allergies indicates:   Allergen Reactions    Lisinopril Swelling     Past Medical History:   Diagnosis Date    Anemia     Blind     bilaterally    CHF (congestive heart failure)     Chronic kidney disease     Dementia 01/2017    Diabetes mellitus type II     General anesthetics causing adverse effect in therapeutic use     april / may 2014     Glaucoma (increased eye pressure)     Hypertension     Hypothermia 12/6/2017    Seizures      Past Surgical History:   Procedure Laterality Date    ANGIOPLASTY WITH STENT PLACEMENT Left 11/18/2016    [facility] 2ND [facility]    AV FISTULA PLACEMENT Left 4/2014    AV fistula to Left upper arm    [id number]-[id number]-KN left RC vs BC AVF Left 8/8/2014    Performed by [facility] at [facility] OR 2ND [facility]LR    CYSTOSCOPY N/A 11/29/2018    Procedure: CYSTOSCOPY;  Surgeon: Temo Banerjee MD;  Location: [facility] OR [address] [place] [address];  Service: Urology;  Laterality: N/A;    " CYSTOSCOPY N/A 11/29/2018    [facility] at [facility] OR 1ST FLR    EYE SURGERY      FISTULOGRAM Left 11/18/2016    [facility] by [facility] at [facility] OR [facility]ND [facility]    FISTULOGRAM Left 4/15/2016    Performed by [facility] at [facility] OR [facility]ND [facility]    FISTULOGRAM Left 1/29/2016    Performed by [facility] at [facility] CATH LAB    FISTULOGRAM Room 11 case Left 3/27/2015    Performed by [facility]ni [facility] at [facility] OR 2ND [facility]LR    HYDROCELECTOMY Bilateral 11/22/2017    Performed by [facility] at [facility] OR 2ND FLR    INSERTION-CATHETER-DIALYSIS-PERITONEAL-LAPAROSCOPIC N/A 5/8/2014    Performed by Js Ladd [facility] at [facility] OR 2ND FLR    INSERTION-CATHETER-DIALYSIS-PERITONEAL-LAPAROSCOPIC N/A 5/5/2014    [facility]    INSERTION-CATHETER-HEMODIALYSIS N/A 5/21/2014    Performed by [facility] at [facility] OR 2ND [facility]LR    PERMCATH INSERTION-TUNNELED CVC N/A 5/13/2014    Performed by Lolis Jackson [facility] at [facility] CATH LAB    REMOVAL OF BLOOD CLOT N/A 11/29/2018    Procedure: REMOVAL, BLOOD CLOT;  Surgeon: Temo Banerjee MD;  Location: [facility] OR [address] [place] [address];  Service: Urology;  Laterality: N/A;    REMOVAL, BLOOD CLOT N/A 11/29/2018    Performed by [facility]o [facility]jee [facility] at [facility] OR [address] [place] [address]    REMOVAL-CATHETER-PERITONEAL DIALYSIS N/A 7/30/2014    Performed by Js Ladd [facility] at [facility] OR 2ND FLR    SCROTUM EXPLORATION Bilateral 11/2017     Family History   Problem Relation Age of Onset    Heart disease Sister         mi    Heart disease Brother         mi    Cancer Daughter         BREAST X 2     Social History     Tobacco Use    Smoking status: Never Smoker    Smokeless tobacco: Never Used   Substance Use Topics    Alcohol use: No    Drug use: No     Review of Systems   Constitutional: Negative for chills, fatigue and fever.   Respiratory: Negative for cough and shortness of breath.    Cardiovascular: Negative for chest pain.    Gastrointestinal: Negative for abdominal pain, diarrhea, nausea and vomiting. Constipation: experiences often.   Genitourinary: Positive for hematuria.   Musculoskeletal: Negative for arthralgias and myalgias.   Skin: Negative for pallor and rash.   Neurological: Negative for dizziness, weakness and headaches.   Psychiatric/Behavioral: Negative for agitation and confusion.       Physical Exam     Initial Vitals [12/05/18 1632]   BP Pulse Resp Temp SpO2   (!) 195/74 66 18 97.9 °F (36.6 °C) 100 %      MAP       --         Physical Exam    Vitals reviewed.  Constitutional: He appears well-developed and well-nourished. He is not diaphoretic. No distress.   Chronically-ill appearing male in NAD, VSS, afebrile, 100% on RA.     HENT:   Head: Normocephalic and atraumatic.   Right Ear: External ear normal.   Left Ear: External ear normal.   Nose: Nose normal.   Mouth/Throat: Oropharynx is clear and moist.   Eyes: No scleral icterus.   Blind OU   Neck: Normal range of motion. Neck supple.   Cardiovascular: Normal rate, regular rhythm and intact distal pulses.   Pulmonary/Chest: Breath sounds normal. No respiratory distress.   Abdominal: Soft. There is no tenderness.   Genitourinary: Rectal exam shows guaiac negative stool. Guaiac negative stool. : Acceptable.  Musculoskeletal: Edema: 1+ BLE, baseline.   Neurological: He is alert and oriented to person, place, and time. He has normal strength. No sensory deficit.   Skin: Skin is warm and dry. Capillary refill takes less than 2 seconds. No erythema. No pallor.   Psychiatric: He has a normal mood and affect. His behavior is normal. Judgment and thought content normal.         ED Course   Procedures  Labs Reviewed   CBC W/ AUTO DIFFERENTIAL - Abnormal; Notable for the following components:       Result Value    RBC 2.92 (*)     Hemoglobin 7.5 (*)     Hematocrit 22.9 (*)     MCV 78 (*)     MCH 25.7 (*)     RDW 22.4 (*)     nRBC 1 (*)     Gran% 73.7 (*)      Lymph% 17.2 (*)     All other components within normal limits   COMPREHENSIVE METABOLIC PANEL - Abnormal; Notable for the following components:    Glucose 150 (*)     Creatinine 3.7 (*)     Albumin 2.9 (*)     AST 42 (*)     eGFR if  15.8 (*)     eGFR if non  13.7 (*)     All other components within normal limits   URINALYSIS, REFLEX TO URINE CULTURE - Abnormal; Notable for the following components:    Appearance, UA Cloudy (*)     Protein, UA 3+ (*)     Occult Blood UA 3+ (*)     Leukocytes, UA 3+ (*)     All other components within normal limits    Narrative:     Preferred Collection Type->Urine, Clean Catch   URINALYSIS MICROSCOPIC - Abnormal; Notable for the following components:    RBC, UA >100 (*)     WBC, UA >100 (*)     Bacteria, UA Many (*)     All other components within normal limits    Narrative:     Preferred Collection Type->Urine, Clean Catch   CULTURE, URINE   TYPE & SCREEN          Imaging Results    None          Medical Decision Making:   History:   Old Medical Records: I decided to obtain old medical records.  Old Records Summarized: records from clinic visits and records from previous admission(s).  Initial Assessment:   Patient presents for suspected worsening anemia, POD 6 cystoscopy for gross hematuria. VSS, afebrile, exam unremarkable with no symptomatic anemia at this time.   Differential Diagnosis:   DDX includes gross hematuria d/t known prostate bleed, renal dysfunction, ?acute bleed d/t heparin used for dialysis. Physical exam and history taking lower clinical suspicion for GIB, acute abdomen, hemoptysis, hemolytic anemia, symptomatic anemia, hypovolemic shock.   Clinical Tests:   Lab Tests: Ordered and Reviewed  Medical Tests: Ordered and Reviewed  ED Management:  EKG without acute ischemic etiology. Labs show Hgb down to 7.5 from 7.8 post-op (11/29). Patient has head steady decline from around Hgb 12 in August. Lab work otherwise largely unremarkable.  Patient denies urge to urinate and cannot do so when asked. Bladder scan reveals 430ml. Barrett placed with gross hematuria output- an acute worsening compared to urine quality in the last few days per daughter.  Urology consulted and has discussed this case extensively with staff and family members of patient. Anemia is not symptomatic and VSS throughout ED stay (patient requiring anti-HTN regimen). They will see him in the morning and patient will be placed in observation for H&H trend. Patient agreed to plan of care and voiced understanding.    Tiffanie Woo PA-C  12/05/2018    I discussed the following case, diagnosis and plan of care with attending physician.                        Clinical Impression:   The primary encounter diagnosis was Hematuria, unspecified type. Diagnoses of Anemia and Enlarged prostate were also pertinent to this visit.      Disposition:   Disposition: Placed in Observation  Condition: Stable                        Tiffanie Woo PA-C  12/05/18 2209

## 2018-12-06 NOTE — SUBJECTIVE & OBJECTIVE
Past Medical History:   Diagnosis Date    Anemia     Blind     bilaterally    CHF (congestive heart failure)     Chronic kidney disease     Dementia 01/2017    Diabetes mellitus type II     General anesthetics causing adverse effect in therapeutic use     april / may 2014     Glaucoma (increased eye pressure)     Hypertension     Hypothermia 12/6/2017    Seizures        Past Surgical History:   Procedure Laterality Date    ANGIOPLASTY WITH STENT PLACEMENT Left 11/18/2016    [facility]    AV FISTULA PLACEMENT Left 4/2014    AV fistula to Left upper arm    BTYWZGEELHUK-[id number]-FK left RC vs BC AVF Left 8/8/2014    Performed by [facility]in [facility] at [facility] OR 2ND FLR    CYSTOSCOPY N/A 11/29/2018    Procedure: CYSTOSCOPY;  Surgeon: Temo Banerjee MD;  Location: [facility] OR [address] [place] [address];  Service: Urology;  Laterality: N/A;    CYSTOSCOPY N/A 11/29/2018    Performed by [facility] at [facility]    EYE SURGERY      FISTULOGRAM Left 11/18/2016    Performed by [facility]in [facility] at [facility] OR 2ND FLR    FISTULOGRAM Left 4/15/2016    Performed by [facility] at [facility] OR 2ND FLR    FISTULOGRAM Left 1/29/2016    Performed by [facility] at [facility] CATH LAB    FISTULOGRAM Room 11 case Left 3/27/2015    Performed by [facility]in [facility]s MD at [facility] OR [address] [place] [address]    HYDROCELECTOMY Bilateral 11/22/2017    Performed by [facility] at [facility] OR 2ND FLR    INSERTION-CATHETER-DIALYSIS-PERITONEAL-LAPAROSCOPIC N/A 5/8/2014    Performed by Js Ladd MD at [facility] OR 2ND FLR    INSERTION-CATHETER-DIALYSIS-PERITONEAL-LAPAROSCOPIC N/A 5/5/2014    Performed by Js Ladd MD at [facility] OR 2ND FLR    INSERTION-CATHETER-HEMODIALYSIS N/A 5/21/2014    [facility]    PERMCATH INSERTION-TUNNELED CVC N/A 5/13/2014    Performed by Lolis Jackson MD at [facility] CATH LAB    REMOVAL OF BLOOD CLOT N/A 11/29/2018     Procedure: REMOVAL, BLOOD CLOT;  Surgeon: Temo Banerjee MD;  [facility];  Service: Urology;  Laterality: N/A;    REMOVAL, BLOOD CLOT N/A 2018    Performed by [facility] at [facility] OR 1ST FLR    REMOVAL-CATHETER-PERITONEAL DIALYSIS N/A 2014    Performed by Js Ladd MD at [facility] OR 2ND FLR    SCROTUM EXPLORATION Bilateral 2017       Review of patient's allergies indicates:   Allergen Reactions    Lisinopril Swelling       No current facility-administered medications on file prior to encounter.      Current Outpatient Medications on File Prior to Encounter   Medication Sig    aspirin 81 MG Chew Take 1 tablet (81 mg total) by mouth once daily.    atorvastatin (LIPITOR) 40 MG tablet Take 1 tablet (40 mg total) by mouth once daily.    B complex-vitamin C-folic acid (NEPHRO-JESSICA) 0.8 mg Tab Take 1 tablet by mouth every morning.     blood sugar diagnostic (BLOOD GLUCOSE TEST) Strp 1 strip by Misc.(Non-Drug; Combo Route) route daily as needed.     brimonidine-timolol (COMBIGAN) 0.2-0.5 % Drop Place 1 drop into the left eye 2 (two) times daily.    brinzolamide (AZOPT) 1 % ophthalmic suspension Place 1 drop into the left eye 3 (three) times daily.     calcium acetate (PHOSLO) 667 mg capsule Take 2 capsules by mouth every day in the morning, then take 1 capsule with lunch and 2 capsules in the evening with meals    carvedilol (COREG) 25 MG tablet Take 1 tablet (25 mg total) by mouth 2 (two) times daily. Do not take on dialysis days. Hold for SBP < 110 or HR < 55 (Patient taking differently: Take 25 mg by mouth 2 (two) times daily. Hold for SBP < 110 or HR < 55)    ciprofloxacin HCl (CIPRO) 250 MG tablet Take 1 tablet (250 mg total) by mouth once daily. for 6 days    doxazosin (CARDURA) 4 MG tablet Take 1 tablet (4 mg total) by mouth once daily.    [] fluconazole (DIFLUCAN) 100 MG tablet Take 1 tablet (100 mg total) by mouth once daily. Dialysis day take 1 pill  before the dialysis and 1 after the dialysis. Stop taking @ 12/5 for 8 days    insulin aspart (NOVOLOG FLEXPEN) 100 unit/mL InPn  Insulin Pen Subcutaneous As directed.  -200 take 1 unitBS 201-250 take 2 unitsBS 251-300 take 3 unitsBS 301-350 take 4 unitsBS > 351 take 5 units and call MD    ketoconazole (NIZORAL) 2 % cream Apply topically once daily. feet    levetiracetam (KEPPRA) 500 MG Tab Take 1 tablet (500 mg total) by mouth 2 (two) times daily.    tamsulosin (FLOMAX) 0.4 mg Cap Take 1 capsule (0.4 mg total) by mouth once daily.    TRAVOPROST (TRAVATAN Z OPHT) Place 1 drop into the left eye every evening. 1 Drops Left eye Every evening     Family History     Problem Relation (Age of Onset)    Cancer Daughter    Heart disease Sister, Brother        Tobacco Use    Smoking status: Never Smoker    Smokeless tobacco: Never Used   Substance and Sexual Activity    Alcohol use: No    Drug use: No    Sexual activity: No     Review of Systems   Unable to perform ROS: Dementia     Objective:     Vital Signs (Most Recent):  Temp: 97.7 °F (36.5 °C) (12/05/18 2239)  Pulse: 72 (12/05/18 2239)  Resp: 18 (12/05/18 2239)  BP: (!) 179/82 (12/05/18 2239)  SpO2: 99 % (12/05/18 2239) Vital Signs (24h Range):  Temp:  [97.7 °F (36.5 °C)-97.9 °F (36.6 °C)] 97.7 °F (36.5 °C)  Pulse:  [62-72] 72  Resp:  [14-18] 18  SpO2:  [98 %-100 %] 99 %  BP: (169-195)/(74-86) 179/82     Weight: 74.8 kg (165 lb)  Body mass index is 21.77 kg/m².    Physical Exam   Constitutional: He appears well-developed and well-nourished. No distress.   HENT:   Head: Normocephalic and atraumatic.   Eyes:   Blind   Cardiovascular: Normal rate and regular rhythm.   No murmur heard.  Pulmonary/Chest: Effort normal and breath sounds normal. No respiratory distress. He has no wheezes.   Abdominal: Soft. Bowel sounds are normal. He exhibits no distension. There is no guarding.   Genitourinary:   Genitourinary Comments: Barrett with gross blood    Musculoskeletal: He exhibits no edema.   Neurological: He is alert.   Skin: Skin is dry. He is not diaphoretic.   Psychiatric: He has a normal mood and affect. His behavior is normal.   Nursing note and vitals reviewed.          Significant Labs:   CBC:   Recent Labs   Lab 12/05/18  1727   WBC 6.18   HGB 7.5*   HCT 22.9*        CMP:   Recent Labs   Lab 12/05/18  1727      K 3.9      CO2 26   *   BUN 18   CREATININE 3.7*   CALCIUM 8.8   PROT 8.0   ALBUMIN 2.9*   BILITOT 0.4   ALKPHOS 99   AST 42*   ALT 42   ANIONGAP 8   EGFRNONAA 13.7*     Cardiac Markers: No results for input(s): CKMB, MYOGLOBIN, BNP, TROPISTAT in the last 48 hours.  Lipase: No results for input(s): LIPASE in the last 48 hours.  Urine Culture: No results for input(s): LABURIN in the last 48 hours.  Urine Studies:   Recent Labs   Lab 12/05/18  1940   COLORU Micki   APPEARANCEUA Cloudy*   PHUR 7.0   SPECGRAV 1.025   PROTEINUA 3+*   GLUCUA Negative   KETONESU Negative   BILIRUBINUA Negative   OCCULTUA 3+*   NITRITE Negative   LEUKOCYTESUR 3+*   RBCUA >100*   WBCUA >100*   BACTERIA Many*   SQUAMEPITHEL 0   HYALINECASTS 0       Significant Imaging: I have reviewed all pertinent imaging results/findings within the past 24 hours.

## 2018-12-07 NOTE — PT/OT/SLP EVAL
Occupational Therapy   Evaluation    Name: Ronald Campbell  MRN: [id number]  Admitting Diagnosis:  Hematuria      Recommendations:     Discharge Recommendations: home health OT, home health PT  Discharge Equipment Recommendations:  none  Barriers to discharge: ~12 DAVID    History:     Pt poor historian 2/2 cog status; called daughter for PLOF  Occupational Profile:  Living Environment: Pt lives with daughter in 1SH with 12STE with railing; bathroom contains walk-in shower with shower chair and BSC located over toilet   Previous level of function: PTA, daughter reports pt required assistance with ADL and functional mobility using rollator ( phsyical and visual assistance)   Roles and Routines: Home dweller, sits in recliner most of the day, Dialysis MWF   Equipment Used at Home:  rollator, wheelchair, walker, rolling, shower chair, bedside commode  Assistance upon Discharge: Pt has sitter to assist when daughter is a work; daughter reports he is only home alone ~2 hrs per day and is sleeping in bed during those two hours.     Past Medical History:   Diagnosis Date    Anemia     Blind     bilaterally    CHF (congestive heart failure)     Chronic kidney disease     Dementia 01/2017    Diabetes mellitus type II     General anesthetics causing adverse effect in therapeutic use     april / may 2014     Glaucoma (increased eye pressure)     Hypertension     Hypothermia 12/6/2017    Seizures        Past Surgical History:   Procedure Laterality Date    ANGIOPLASTY WITH STENT PLACEMENT Left 11/18/2016    Performed by [facility] at [facility] OR [address] [place] [address]    AV FISTULA PLACEMENT Left 4/2014    AV fistula to Left upper arm    [id number] left RC vs BC AVF Left 8/8/2014    Performed by [facility]in [facility] at [facility] OR [address] [place] [address]    CYSTOSCOPY N/A 11/29/2018    Procedure: CYSTOSCOPY;  Surgeon: Temo Banerjee MD;  Location: [facility] OR [address] [place] [address];  Service: Urology;  Laterality: N/A;    CYSTOSCOPY N/A 11/29/2018     "Performed by Temo [facility]jee MD at [facility] OR 1ST FLR    EYE SURGERY      FISTULOGRAM Left 11/18/2016    Performed by Orin [facility]s MD at [facility] OR 2ND FLR    FISTULOGRAM Left 4/15/2016    Performed by Orin [facility]s MD at [facility] OR 2ND FLR    FISTULOGRAM Left 1/29/2016    Performed by [facility] at [facility] CATH LAB    FISTULOGRAM Room 11 case Left 3/27/2015    Performed by Orin [facility]s MD at [facility] OR 2ND FLR    HYDROCELECTOMY Bilateral 11/22/2017    Performed by Temo Banerjee MD at [facility] OR 2ND FLR    INSERTION-CATHETER-DIALYSIS-PERITONEAL-LAPAROSCOPIC N/A 5/8/2014    [facility]    INSERTION-CATHETER-DIALYSIS-PERITONEAL-LAPAROSCOPIC N/A 5/5/2014    [facility]    INSERTION-CATHETER-HEMODIALYSIS N/A 5/21/2014    [facility]    PERMCATH INSERTION-TUNNELED CVC N/A 5/13/2014    Performed by Lolis Jackson MD at [facility] CATH LAB    REMOVAL OF BLOOD CLOT N/A 11/29/2018    Procedure: REMOVAL, BLOOD CLOT;  Surgeon: Temo Banerjee MD;  Location: [facility] OR [address] [place] [address];  Service: Urology;  Laterality: N/A;    REMOVAL, BLOOD CLOT N/A 11/29/2018    Performed by Temo Banerjee MD at [facility] OR [address] [place] [address]    REMOVAL-CATHETER-PERITONEAL DIALYSIS N/A 7/30/2014    [facility]    SCROTUM EXPLORATION Bilateral 11/2017       Subjective     Chief Complaint: " I want to get back in bed"   Patient/Family Comments/goals: Return home     Pain/Comfort:  · Pain Rating 1: 0/10  · Pain Rating Post-Intervention 1: 0/10  · Pain Rating Post-Intervention 2: 0/10    Patients cultural, spiritual, Mosque conflicts given the current situation:  None stated    Objective:     Communicated with: RN prior to session.  Patient found with: all lines intact, call button in reach and RN notified and telemetry, tejeda catheter upon OT entry to room.    General Precautions: " Standard, fall   Orthopedic Precautions:N/A   Braces: N/A     Occupational Performance:    Bed Mobility:    · Patient completed Sit to Supine with minimum assistance    Functional Mobility/Transfers:  · Patient completed Sit <> Stand Transfer with minimum assistance  with  hand-held assist   · Patient completed Bed <> Chair Transfer using Stand Pivot technique with minimum assistance x2 with hand-held assist  · Patient completed Toilet Transfer Stand Pivot technique with minimum assistance and of 2 persons with  hand-held assist  ** Pt visual impaired, requiring both visual and physical assistance with functional transfers    Activities of Daily Living:  · Grooming: moderate assistance to wash hands while seated on toielt ( using bath wipes)   · Toileting: moderate assistance to complete toileting hygiene and clothing management while alternating between sitting/standing     Cognitive/Visual Perceptual:  Cognitive/Psychosocial Skills:     -       Oriented to: Person and Place   -       Follows Commands/attention:Inattentive, Easily distracted and Follows one-step commands  -       Communication: clear/fluent  -       Memory: baseline dementia  -       Safety awareness/insight to disability: impaired   -       Mood/Affect/Coping skills/emotional control: Agitated  Visual/Perceptual:      -Blind    Physical Exam:  Postural examination/scapula alignment:    -       Rounded shoulders  Skin integrity: Visible skin intact  Edema:  None noted  Dominant hand:    -       RUE  Upper Extremity Range of Motion:  BUE Shoulder ROM limited ( 90* AROM)  Upper Extremity Strength:    -      Unable to perform MMT 2/2 cog status and agitation   Strength:    -       Right Upper Extremity: WFL  -       Left Upper Extremity: WFL    AMPAC 6 Click ADL:  AMPAC Total Score: 15    Treatment & Education:  -Pt edu on OT role/POC  -Safety during functional t/f and mobility  - White board updated  - Multiple self care tasks completed--  "assistance level noted above   - All questions/concerns answered within OT scope of practice  - Communicated with daughter over phone-- discussed PLOF/POC/therapy recommendations  Education:    Patient left supine with all lines intact, call button in reach and RN notified    Assessment:     Ronald Campbell is a 89 y.o. male with a medical diagnosis of Hematuria. Pt alert and oriented to person and place only. He presents with the following performance deficits affecting function: weakness, impaired endurance, gait instability, impaired balance, impaired self care skills, impaired functional mobilty, decreased safety awareness, impaired cognition.  At this time pt presents near functional baseline, requiring assistance with ADL and functional mobility. He has good support at home. Pt would benefit from HHOT following d/c to continue to progress towards goals and ensure safe transition to home environment.     Rehab Prognosis: Good; patient would benefit from acute skilled OT services to address these deficits and reach maximum level of function.         Clinical Decision Makin.  OT Low:  "Pt evaluation falls under low complexity for evaluation coding due to performance deficits noted in 1-3 areas as stated above and 0 co-morbities affecting current functional status. Data obtained from problem focused assessments. No modifications or assistance was required for completion of evaluation. Only brief occupational profile and history review completed."     Plan:     Patient to be seen 2 x/week to address the above listed problems via self-care/home management, therapeutic activities, therapeutic exercises, neuromuscular re-education  · Plan of Care Expires: 19  · Plan of Care Reviewed with: patient(Discussed POC with daughter over phone)    This Plan of care has been discussed with the patient who was involved in its development and understands and is in agreement with the identified goals and treatment " plan    GOALS:   Multidisciplinary Problems     Occupational Therapy Goals        Problem: Occupational Therapy Goal    Goal Priority Disciplines Outcome Interventions   Occupational Therapy Goal     OT, PT/OT Ongoing (interventions implemented as appropriate)    Description:  Goals to be met by: 12/21/18    Patient will increase functional independence with ADLs by performing:    UE Dressing with Moderate Assistance.  LE Dressing with Moderate Assistance.  Grooming while EOB with Minimal Assistance with set- up assistance.  Toileting from toilet with Minimal Assistance for hygiene and clothing management.   Supine to sit with Minimal Assistance.  Toilet transfer to toilet with Contact Guard Assistance.                      Time Tracking:     OT Date of Treatment: 12/07/18  OT Start Time: 1001  OT Stop Time: 1020  OT Total Time (min): 19 min    Billable Minutes:Evaluation 19    Heather matthews OT  12/7/2018

## 2018-12-07 NOTE — PROGRESS NOTES
Patient complained of suprapubic pain, catheter not draining. Bladder scan done revealed  147cc of urine.  Catheter flushed, done in sterile manner. Blood clots +++, urine flowed , 100cc still hematuric. IMF markiedLALITA returned the page, informed of the above. Advised to flush gain later, primary team will review tomorrow. Will continue to monitor patient.

## 2018-12-07 NOTE — PLAN OF CARE
Problem: Hemodialysis (Adult)  Goal: Signs and Symptoms of Listed Potential Problems Will be Absent, Minimized or Managed (Hemodialysis)  Signs and symptoms of listed potential problems will be absent, minimized or managed by discharge/transition of care (reference Hemodialysis (Adult) CPG).  Electrolyte imbalance corrected in dialysate bath

## 2018-12-07 NOTE — PLAN OF CARE
Problem: Patient Care Overview  Goal: Plan of Care Review  Outcome: Ongoing (interventions implemented as appropriate)  Pt to receive 2u PRBCs today. Pt CBG 4 times daily. Pt to receive unit #2 in dialysis. VSS. Will continue to monitor.

## 2018-12-07 NOTE — PLAN OF CARE
Problem: Occupational Therapy Goal  Goal: Occupational Therapy Goal  Goals to be met by: 12/21/18    Patient will increase functional independence with ADLs by performing:    UE Dressing with Moderate Assistance.  LE Dressing with Moderate Assistance.  Grooming while EOB with Minimal Assistance with set- up assistance.  Toileting from toilet with Minimal Assistance for hygiene and clothing management.   Supine to sit with Minimal Assistance.  Toilet transfer to toilet with Contact Guard Assistance.    Outcome: Ongoing (interventions implemented as appropriate)  Evaluation completed. Initiate POC.   Heather matthews OT  12/7/2018

## 2018-12-07 NOTE — PROGRESS NOTES
[facility] HEMODIALYSIS NOTE     Patient currently on hemodialysis for removal of uremic toxins and volume.    Patient seen and evaluated on hemodialysis, tolerating treatment, see HD flowsheet for vitals and assessments.      Ultrafiltration goal is 1-2L     Labs have been reviewed and the dialysate bath has been adjusted.     Assessment/Plan: ESRD, HD today for removal of uremic toxins and volume. Continue MWF while in-house.  Will give PRBC on HD, discussed need to reassess active bleeding.

## 2018-12-07 NOTE — PT/OT/SLP EVAL
"Physical Therapy Evaluation    Patient Name:  Ronald Campbell   MRN:  [id number]    Recommendations:     Discharge Recommendations:  home health PT, home health OT   Discharge Equipment Recommendations: none   Barriers to discharge: Inaccessible home (~12 DAVID)    Assessment:     Ronald Campbell is a 89 y.o. male admitted with a medical diagnosis of Hematuria.  He presents with the following impairments/functional limitations:  weakness, gait instability, impaired endurance, impaired balance, decreased safety awareness, impaired cognition, visual deficits, impaired self care skills, impaired functional mobilty, decreased coordination. Pt requiring assist to complete functional mobility this date. Able to perform stand pivot transfers, but with assist 2 needed to safely complete 2* poor safety awareness, decreased standing balance, and visual impairments. Pt demo'ing decreased attention to cues throughout session and became easily agitated with therapists' attempts to provide education. Pt would benefit from skilled acute PT in order to address current deficits and progress functional mobility. Pt is near functional baseline and has good support system at home.     Rehab Prognosis:  fair; patient would benefit from acute skilled PT services to address these deficits and reach maximum level of function.      Recent Surgery: * No surgery found *      Plan:     During this hospitalization, patient to be seen 2 x/week to address the above listed problems via gait training, therapeutic activities, therapeutic exercises, neuromuscular re-education  · Plan of Care Expires:  01/05/19   Plan of Care Reviewed with: patient    Subjective     Communicated with RN prior to session.  Patient found seated on toilet upon PT entry to room, agreeable to evaluation.      Chief Complaint: none noted   Patient comments/goals: "Let me get back in bed."   Pain/Comfort:  · Pain Rating 1: 0/10    Patients cultural, spiritual, Jew conflicts " given the current situation: none noted     Living Environment:  History obtained from pt's daughter following session, as pt is a poor historian.   Pt lives with his daughter in a 1SH with 12 DAVID, handrail in middle of stairs so can be used on R or L.   Prior to admission, patient required assist and rollator for mobility and required assist with ADLs (physical and visual assist). Pt has dialysis M, W, F. Sitter present when pt's daughter is at work. Pt's daughter reports pt is only home alone for ~2 hours/day and pt is sleeping during this time. Patient has the following equipment: bedside commode, rollator, walker, rolling, shower chair, wheelchair.  Upon discharge, patient will have assistance from daughter and sitter.    Objective:     Patient found with: telemetry, tejeda catheter     General Precautions: Standard, fall, seizure, vision impaired   Orthopedic Precautions:N/A   Braces: N/A     Exams:  · Cognitive Exam:  Patient is oriented to Person and Place  · Poor safety awareness  · Decreased command-following, inattention  · Easily agitated   · BLE ROM and Strength: unable to formally assess 2* decreased command-following and agitation    Functional Mobility:  · Bed Mobility:     · Sit to Supine: minimum assistance  · Seated scooting along EOB x2 reps with CGA   · Transfers:     · Sit to Stand:  minimum assistance with hand-held assist and x2 reps  · Bed to Chair: minimum assistance and of 2 persons with  hand-held assist  using  Stand Pivot (2nd person for safety)  · Toilet Transfer: minimum assistance and of 2 persons with  hand-held assist  using  Stand Pivot (2nd person for safety)  · V/c and t/c provided throughout transfers 2* visual impairments and poor safety awareness   · Gait: limited to steps during stand pivot transfers with minAx2 and HHA  · Increased trunk flexion and knee flexion noted, decreased toe-floor clearance, and impaired weight-shifting ability  · poor safety awareness noted  throughout along with visual impairments, requiring max cues to safely complete    AM-PAC 6 CLICK MOBILITY  Total Score:15       Therapeutic Activities and Exercises:   Pt educated on role of PT and PT; however, no understanding noted.   Daily orientation provided. Pt oriented to person and place only.   OT contacted pt's daughter via phone following session to obtain pt history and educate on role of acute therapy and POC.     Patient left supine with all lines intact, call button in reach, bed alarm on and RN present.    GOALS:   Multidisciplinary Problems     Physical Therapy Goals        Problem: Physical Therapy Goal    Goal Priority Disciplines Outcome Goal Variances Interventions   Physical Therapy Goal     PT, PT/OT Ongoing (interventions implemented as appropriate)     Description:  Goals to be met by: 18     Patient will increase functional independence with mobility by performin. Supine to sit with Contact Guard Assistance  2. Sit to stand transfer with Contact Guard Assistance  3. Bed to chair transfer with Contact Guard Assistance using AD as needed  4. Gait  x 10 feet with Minimal Assistance using AD as needed.   5. Lower extremity exercise program x15 reps, with assist as needed, in order to increase LE strength and (I) with functional mobility.                       History:     Past Medical History:   Diagnosis Date    Anemia     Blind     bilaterally    CHF (congestive heart failure)     Chronic kidney disease     Dementia 2017    Diabetes mellitus type II     General anesthetics causing adverse effect in therapeutic use     april / may 2014     Glaucoma (increased eye pressure)     Hypertension     Hypothermia 2017    Seizures        Past Surgical History:   Procedure Laterality Date    ANGIOPLASTY WITH STENT PLACEMENT Left 2016    [facility] 2ND FLR    AV FISTULA PLACEMENT Left 2014    AV fistula to Left upper arm     [id number] left RC vs BC AVF Left 8/8/2014    Performed by [facility] at [facility] OR [address] [place] [address]    CYSTOSCOPY N/A 11/29/2018    Procedure: CYSTOSCOPY;  Surgeon: Temo Banerjee MD;  Location: [facility] OR [address] [place] [address];  Service: Urology;  Laterality: N/A;    CYSTOSCOPY N/A 11/29/2018    Performed by [facility]jee MD at [facility] OR [address] [place] [address]    EYE SURGERY      FISTULOGRAM Left 11/18/2016    Per[facility] at [facility] OR 2ND FLR    FISTULOGRAM Left 4/15/2016    Performed by [facility]in [facility] MD at [facility] OR 2ND FLR    FISTULOGRAM Left 1/29/2016    [facility] at [facility] CATH LAB    FISTULOGRAM Room 11 case Left 3/27/2015    Performed by [facility]in [facility] MD at [facility] OR [address] [place] [address]    HYDROCELECTOMY Bilateral 11/22/2017    Performed by [facility] MD at [facility] OR 2ND FLR    INSERTION-CATHETER-DIALYSIS-PERITONEAL-LAPAROSCOPIC N/A 5/8/2014    Performed by Js Ladd MD at [facility] OR 2ND FLR    INSERTION-CATHETER-DIALYSIS-PERITONEAL-LAPAROSCOPIC N/A 5/5/2014    [facility]    INSERTION-CATHETER-HEMODIALYSIS N/A 5/21/2014    [facility]    PERMCATH INSERTION-TUNNELED CVC N/A 5/13/2014    Performed by Lolis Jackson MD at [facility] CATH LAB    REMOVAL OF BLOOD CLOT N/A 11/29/2018    Procedure: REMOVAL, BLOOD CLOT;  Surgeon: Temo Banerjee MD;  Location: [facility] OR [address] [place] [address];  Service: Urology;  Laterality: N/A;    REMOVAL, BLOOD CLOT N/A 11/29/2018    Performed by Temo Ban[facility]jee MD at [facility] OR [address] [place] [address]    REMOVAL-CATHETER-PERITONEAL DIALYSIS N/A 7/30/2014    Performed by Js Ladd MD at [facility] OR [address] [place] [address]    SCROTUM EXPLORATION Bilateral 11/2017       Clinical Decision Making:     History  Co-morbidities and personal factors that may impact the plan of care Examination  Body Structures and Functions, activity limitations and participation restrictions that may impact the plan of care  Clinical Presentation   Decision Making/ Complexity Score   Co-morbidities:   [] Time since onset of injury / illness / exacerbation  [] Status of current condition  [x]Patient's cognitive status and safety concerns    [x] Multiple Medical Problems (see med hx)  Personal Factors:   [x] Patient's age  [] Prior Level of function   [x] Patient's home situation (environment and family support)  [] Patient's level of motivation  [] Expected progression of patient      HISTORY:(criteria)    [] 32316 - no personal factors/history    [] 37557 - has 1-2 personal factor/comorbidity     [x] 46968 - has >3 personal factor/comorbidity     Body Regions:  [x] Objective examination findings  [] Head     []  Neck  [] Trunk   [] Upper Extremity  [] Lower Extremity    Body Systems:  [x] For communication ability, affect, cognition, language, and learning style: the assessment of the ability to make needs known, consciousness, orientation (person, place, and time), expected emotional /behavioral responses, and learning preferences (eg, learning barriers, education  needs)  [x] For the neuromuscular system: a general assessment of gross coordinated movement (eg, balance, gait, locomotion, transfers, and transitions) and motor function  (motor control and motor learning)  [x] For the musculoskeletal system: the assessment of gross symmetry, gross range of motion, gross strength, height, and weight  [] For the integumentary system: the assessment of pliability(texture), presence of scar formation, skin color, and skin integrity  [] For cardiovascular/pulmonary system: the assessment of heart rate, respiratory rate, blood pressure, and edema     Activity limitations:    [x] Patient's cognitive status and saf ety concerns          [] Status of current condition      [] Weight bearing restriction  [] Cardiopulmunary Restriction    Participation Restrictions:   [] Goals and goal agreement with the patient     [] Rehab potential (prognosis)  and probable outcome      Examination of Body System: (criteria)    [] 23627 - addressing 1-2 elements    [] 73851 - addressing a total of 3 or more elements     [x] 76494 -  Addressing a total of 4 or more elements         Clinical Presentation: (criteria)  Evolving - 34945     On examination of body system using standardized tests and measures patient presents with 4 or more elements from any of the following: body structures and functions, activity limitations, and/or participation restrictions.  Leading to a clinical presentation that is considered evolving with changing characteristics                              Clinical Decision Making  (Eval Complexity):  Moderate - 54743     Time Tracking:     PT Received On: 12/07/18  PT Start Time: 1004     PT Stop Time: 1023  PT Total Time (min): 19 min     Billable Minutes: Evaluation 19  (co-eval with OT)    Archana Schmitt, PT, DPT   12/7/2018  [phone]

## 2018-12-07 NOTE — PROGRESS NOTES
Paged urology on call re: placing order for PRN catheter flushing. Dr Ashton responded to page, asked to place the order on his name.

## 2018-12-07 NOTE — PROGRESS NOTES
Arrived to dialysis via stretcher. Blind. Oriented to person and place.  NAD noted. Successful cannulation to left upper arm fistula x 2.

## 2018-12-07 NOTE — PLAN OF CARE
12/06/18 0651   Discharge Assessment   Assessment Type Discharge Planning Assessment   Confirmed/corrected address and phone number on facesheet? Yes   Assessment information obtained from? Caregiver  (daughter, Gabriela Campbell ([phone]))   Expected Length of Stay (days) 6   Communicated expected length of stay with patient/caregiver yes   Prior to hospitilization cognitive status: Not Oriented to Time;Not Oriented to Place   Prior to hospitalization functional status: Partially Dependent   Current cognitive status: Not Oriented to Time   Current Functional Status: Partially Dependent   Lives With child(erin), adult  (daughter, Gabriela Campbell ([phone]))   Able to Return to Prior Arrangements yes   Is patient able to care for self after discharge? No   Patient's perception of discharge disposition home health   Readmission Within The Last 30 Days current reason for admission unrelated to previous admission   If yes, most recent facility name: [facility]   Patient currently being followed by outpatient case management? No   Patient currently receives any other outside agency services? ([facility])   Equipment Currently Used at Home walker, rolling;bedside commode;wheelchair;shower chair;rollator   Do you have any problems affording any of your prescribed medications? No   Is the patient taking medications as prescribed? yes   Does the patient have transportation home? Yes   Transportation Available air transport;other (see comments)  ([facility])   Dialysis Name and Scheduled days [facility] ([id number])   Does the patient receive services at the [facility]? No   Discharge Plan A Home Health   Discharge Plan B Skilled Nursing Facility   Patient/Family In Agreement With Plan yes   Readmission Questionnaire   At the time of your discharge, did someone talk to you about what your health problems were? Yes   At the time of discharge, did someone talk to you about what to watch out for regarding worsening of your health  problem? Yes   At the time of discharge, did someone talk to you about what to do if you experienced worsening of your health problem? Yes   At the time of discharge, did someone talk to you about which medication to take when you left the hospital and which ones to stop taking? Yes   At the time of discharge, did someone talk to you about when and where to follow up with a doctor after you left the hospital? Yes   What do you believe caused you to be sick enough to be re-admitted? abnormal labs   How often do you need to have someone help you when you read instructions, pamphlets, or other written material from your doctor or pharmacy? Always   Do you have problems taking your medications as prescribed? No   Do you have any problems affording any of  your prescribed medications? No   Do you have problems obtaining/receiving your medications? No   Does the patient have transportation to healthcare appointments? Yes   Living Arrangements house   Does the patient have family/friends to help with healtcare needs after discharge? yes   Does your caregiver provide all the help you need? Yes     Dx: hematuria  Consult: urol, neph, & PT/OT  Pharm: [facility]  Hosp f/u appt: Dr. Shaw (PCP) with [facility]

## 2018-12-07 NOTE — NURSING
"Pt daughter request warm bath wipes to clean pt. Nurse notified pt daughter that nurse will call the PCT to bathe patient. Pt daughter declined PCT cleaning pt stating," I can clean him by myself. I just need the wipes." Nurse provided daughter with wipes. Will continue to monitor.   "

## 2018-12-07 NOTE — PLAN OF CARE
Problem: Physical Therapy Goal  Goal: Physical Therapy Goal  Goals to be met by: 18     Patient will increase functional independence with mobility by performin. Supine to sit with Contact Guard Assistance  2. Sit to stand transfer with Contact Guard Assistance  3. Bed to chair transfer with Contact Guard Assistance using AD as needed  4. Gait  x 10 feet with Minimal Assistance using AD as needed.   5. Lower extremity exercise program x15 reps, with assist as needed, in order to increase LE strength and (I) with functional mobility.      Outcome: Ongoing (interventions implemented as appropriate)  PT evaluation complete and appropriate goals established.    Archana Schmitt, PT, DPT   2018  [phone]

## 2018-12-07 NOTE — PLAN OF CARE
Orders noted for the patient to receive 2U PRBC for H&H 5.9/18.9 this AM. Voicemail message left for the patient's [facility] SW May Brunoace ([phone]) informing of the patient's hospitalization. Plan to discharge patient home with home health when medically stable. Will continue to follow.

## 2018-12-08 PROBLEM — D62 ACUTE POST-HEMORRHAGIC ANEMIA: Status: ACTIVE | Noted: 2018-01-01

## 2018-12-08 NOTE — SUBJECTIVE & OBJECTIVE
Interval History: Daughter at bedside, discussed plan of care extensively with daughter last night and this AM. She has concerns for what is causing the bleeding and how she will manage it at home. Discussed that patient is high risk for any surgical interventions and that bleeding is most likely coming from BPH. She voiced understanding. Pt reports no complaints this AM.    Review of Systems   Unable to perform ROS: Dementia     Objective:     Vital Signs (Most Recent):  Temp: 97.4 °F (36.3 °C) (12/08/18 1119)  Pulse: 62 (12/08/18 1119)  Resp: 18 (12/08/18 1119)  BP: (!) 159/76 (12/08/18 1119)  SpO2: 98 % (12/08/18 1119) Vital Signs (24h Range):  Temp:  [96.2 °F (35.7 °C)-99.3 °F (37.4 °C)] 97.4 °F (36.3 °C)  Pulse:  [62-79] 62  Resp:  [18-20] 18  SpO2:  [93 %-98 %] 98 %  BP: (146-186)/(60-86) 159/76     Weight: 81.7 kg (180 lb 1.9 oz)  Body mass index is 23.76 kg/m².    Intake/Output Summary (Last 24 hours) at 12/8/2018 1251  Last data filed at 12/8/2018 0740  Gross per 24 hour   Intake 1573 ml   Output 2297 ml   Net -724 ml      Physical Exam   Constitutional: He is oriented to person, place, and time. He appears well-developed and well-nourished. No distress.   Eyes: Right eye exhibits no discharge. Left eye exhibits no discharge.   Blind   Cardiovascular: Normal rate and regular rhythm.   No murmur heard.  Pulmonary/Chest: Effort normal and breath sounds normal. No respiratory distress.   Abdominal: Soft. Bowel sounds are normal. He exhibits no distension. There is no tenderness.   Genitourinary:   Genitourinary Comments: Tejeda in place, sidney blood in tejeda bag, no clots appreciated   Musculoskeletal: He exhibits no edema.   Neurological: He is alert and oriented to person, place, and time.   Skin: Skin is warm and dry. He is not diaphoretic.   Psychiatric: He has a normal mood and affect. His behavior is normal.   Nursing note and vitals reviewed.      Significant Labs: All pertinent labs within the past 24  hours have been reviewed.    Significant Imaging: I have reviewed all pertinent imaging results/findings within the past 24 hours.

## 2018-12-08 NOTE — NURSING
Pt in bed resting at this time. Pt daughter at bedside, discusses POC. Pt A/O x4, blind in both eyes with cataract prominent to R eye. States only can see flashes of light, currently prescribed otic medications for this. Follows all commands with assist. Coude cath in place, BS provided with max amount of 16 cc present, irrigated for clot prevention with irrigate return, no clots present at this time to gravity bag, however drainage still noted as bright blood red color. PIV c/d/i flushed/capped. Daughter assisted pt with meal at bedside. VSS as charted per f/s, will follow up. No distress noted with breathing unlabored on RA.

## 2018-12-08 NOTE — ASSESSMENT & PLAN NOTE
- HD MWF  - nephrology consulted; appreciated  - BP and lytes non-emergent  12/7: pt dialyzed today and received 2units blood

## 2018-12-08 NOTE — ASSESSMENT & PLAN NOTE
- urology consulted with argenis  Urology saw patient; placed a 22 Fr coude and irrigated tejeda  - UA from 11/21 with candida and completed diflucan treatment. Discharged with empiric cipro, also completed. UCx shows yeast, tx not indicated  - cystoscopy last admission unrevealing  - reported low H/H at dialysis, not confirmed on our labs here  - trend H&H  - HDS  - CT 11/21/2018:  1. Irregular contour about the superolateral aspect of the right kidney, mass cannot be excluded.  There is a tiny non-obstructing calcification within that region.  Right renal cyst.  4.  Several enlarged para-aortic lymph nodes extending along the left ileopsoas with some obliteration of the fat planes with an additional enlarged left external iliac lymph node.  These findings are new since CT pelvis 11/14/2017.  - renal US: Multiple lesions in the right kidney, several of which were identified on recent CT and ultrasound.  Inferior pole hypoechoic lesion is slightly enlarged since prior exam, allowing for differences in measurement technique.  These are nonspecific and a neoplastic process cannot be definitively excluded.  Further evaluation on a nonemergent basis with CT or MRI renal mass protocol.  Ultrasound surveillance to document stability can also be considered.  - transfuse is Hb <7  12/7: Hgb 7.5>6.1; transfused 2U pRBC, 1U on the floor and 1U in dialysis  - urology notified and discussed extensively, pt is high risk for surgical intervention, no further imaging/work up needed for renal masses while in house, blood in tejeda is 2/2 BPH; appreciated  12/8:  Hgb 8.5 this AM, monitor H/H q12H; palliative care consult Monday to help with outpatient management of complex medical problems (will discuss with daughter tomorrow)

## 2018-12-08 NOTE — ASSESSMENT & PLAN NOTE
- continue home medications  12/7: discontinued doxazosin per daughters request (states that urology told her pt should not take this anymore)

## 2018-12-08 NOTE — PLAN OF CARE
Problem: Patient Care Overview  Goal: Plan of Care Review  3.5 hour Dialysis Tx complete. Tolerated.Needles removed from left upper arm AVF. Pressure applied until hemostasis. Gauze applied.

## 2018-12-08 NOTE — SUBJECTIVE & OBJECTIVE
Interval History: Overnight nurses noted blood clots and hematuria when flushing catheter. Hgb 7.5>6.1. 2U pRBC given, Urology to evaluate patient. This AM pt sitting in bed trying to get to bedside commode with nurses assistance, no complaints. Discussed plan of care extensively with daughter at bedside.     Review of Systems  Objective:     Vital Signs (Most Recent):  Temp: 98.5 °F (36.9 °C) (12/07/18 2019)  Pulse: 74 (12/07/18 2019)  Resp: 20 (12/07/18 2019)  BP: (!) 165/74 (12/07/18 2019)  SpO2: 97 % (12/07/18 2019) Vital Signs (24h Range):  Temp:  [96.1 °F (35.6 °C)-99.3 °F (37.4 °C)] 98.5 °F (36.9 °C)  Pulse:  [65-79] 74  Resp:  [16-20] 20  SpO2:  [97 %-100 %] 97 %  BP: (146-186)/(60-84) 165/74     Weight: 74.8 kg (165 lb)  Body mass index is 21.77 kg/m².    Intake/Output Summary (Last 24 hours) at 12/7/2018 2027  Last data filed at 12/7/2018 1730  Gross per 24 hour   Intake 1573 ml   Output 2632 ml   Net -1059 ml      Physical Exam   Constitutional: He appears well-developed and well-nourished. No distress.   Eyes: Right eye exhibits no discharge. Left eye exhibits no discharge.   Blind   Cardiovascular: Normal rate and regular rhythm.   No murmur heard.  Pulmonary/Chest: Effort normal and breath sounds normal. No respiratory distress.   Abdominal: Soft. Bowel sounds are normal. He exhibits no distension.   Genitourinary:   Genitourinary Comments: Barrett in place   Musculoskeletal: He exhibits no edema.   Neurological: He is alert.   Skin: Skin is dry. He is not diaphoretic.   Psychiatric: He has a normal mood and affect. His behavior is normal.   Nursing note and vitals reviewed.      Significant Labs: All pertinent labs within the past 24 hours have been reviewed.    Significant Imaging: I have reviewed all pertinent imaging results/findings within the past 24 hours.

## 2018-12-08 NOTE — PROGRESS NOTES
"[facility] Hospital Medicine  Progress Note    Patient Name: Ronald Campbell  MRN: [id number]  Patient Class: IP- Inpatient   Admission Date: 12/5/2018  Length of Stay: 0 days  Attending Physician: Alix Beckford MD  Primary Care Provider: Bart Shaw MD    [facility] Medicine Team: [facility] HOSP MED F Jayla Gardner PA-C    Subjective:     Principal Problem:Hematuria    HPI:  Ronald Campbell is am 89M with ESRD (Still making urine, dialysis MWF), T2DM, CHF, dementia, BPH, HTN, blindness, and seizure disorder presents for evaluation of abnormal lab during dialysis today. No family at bedside and patient unreliable historian. History per chart review. He was recently admitted to this hospital 11/30 for evaluation of hematuria. Urology was consulted and cystoscopy was unrevealing and was discharged on antifungals for candida albicans growing in UCx from 11/21. Per chart, Hgb "6 something" at dialysis and patient referred to ED. Hgb with slow decline since August (when Hgb 12), now ~8. Per daughter, hematuria has improved, but continues intermittently.    On admission, the patient is calm and cooperative, he endorses no complaints and is unable to see his hematuria and can not comment on the quantity or improvement.     ED: Hgb 7.5, HDS, lytes non-emergent, tejeda placed with gross hematuria, urology consulted.     Hospital Course:  Patient admitted to observation for hematuria. Patient recently discharged with similar complaint. Urology saw the patient but cystoscopy was no diagnostic. Urology saw patient; placed a 22 Fr coude and irrigated tejeda. 12/7: Hgb 7.5> 6.1, 2 units prbc given. HD today. 12/8: Hgb improvement to 8.5 this AM; discussed case extensively with urology- patient his high risk for surgical intervention. Not necessary to further work up renal masses, continue to monitor H/H and consider transfusions with HD  Will consult palliative care on Monday for help with management of complex medical " problems.    PT/OT rec [facility]. Pt will need follow up with PCP and Urology at D/C    Interval History: Daughter at bedside, discussed plan of care extensively with daughter last night and this AM. She has concerns for what is causing the bleeding and how she will manage it at home. Discussed that patient is high risk for any surgical interventions and that bleeding is most likely coming from BPH. She voiced understanding. Pt reports no complaints this AM.    Review of Systems   Unable to perform ROS: Dementia     Objective:     Vital Signs (Most Recent):  Temp: 97.4 °F (36.3 °C) (12/08/18 1119)  Pulse: 62 (12/08/18 1119)  Resp: 18 (12/08/18 1119)  BP: (!) 159/76 (12/08/18 1119)  SpO2: 98 % (12/08/18 1119) Vital Signs (24h Range):  Temp:  [96.2 °F (35.7 °C)-99.3 °F (37.4 °C)] 97.4 °F (36.3 °C)  Pulse:  [62-79] 62  Resp:  [18-20] 18  SpO2:  [93 %-98 %] 98 %  BP: (146-186)/(60-86) 159/76     Weight: 81.7 kg (180 lb 1.9 oz)  Body mass index is 23.76 kg/m².    Intake/Output Summary (Last 24 hours) at 12/8/2018 1251  Last data filed at 12/8/2018 0740  Gross per 24 hour   Intake 1573 ml   Output 2297 ml   Net -724 ml      Physical Exam   Constitutional: He is oriented to person, place, and time. He appears well-developed and well-nourished. No distress.   Eyes: Right eye exhibits no discharge. Left eye exhibits no discharge.   Blind   Cardiovascular: Normal rate and regular rhythm.   No murmur heard.  Pulmonary/Chest: Effort normal and breath sounds normal. No respiratory distress.   Abdominal: Soft. Bowel sounds are normal. He exhibits no distension. There is no tenderness.   Genitourinary:   Genitourinary Comments: Tejeda in place, sidney blood in tejeda bag, no clots appreciated   Musculoskeletal: He exhibits no edema.   Neurological: He is alert and oriented to person, place, and time.   Skin: Skin is warm and dry. He is not diaphoretic.   Psychiatric: He has a normal mood and affect. His behavior is normal.   Nursing note  and vitals reviewed.      Significant Labs: All pertinent labs within the past 24 hours have been reviewed.    Significant Imaging: I have reviewed all pertinent imaging results/findings within the past 24 hours.    Assessment/Plan:      * Hematuria    - urology consulted with appreciation  Urology saw patient; placed a 22 Fr coude and irrigated tejeda  - UA from 11/21 with candida and completed diflucan treatment. Discharged with empiric cipro, also completed. UCx shows yeast, tx not indicated  - cystoscopy last admission unrevealing  - reported low H/H at dialysis, not confirmed on our labs here  - trend H&H  - HDS  - CT 11/21/2018:  1. Irregular contour about the superolateral aspect of the right kidney, mass cannot be excluded.  There is a tiny non-obstructing calcification within that region.  Right renal cyst.  4.  Several enlarged para-aortic lymph nodes extending along the left ileopsoas with some obliteration of the fat planes with an additional enlarged left external iliac lymph node.  These findings are new since CT pelvis 11/14/2017.  - renal US: Multiple lesions in the right kidney, several of which were identified on recent CT and ultrasound.  Inferior pole hypoechoic lesion is slightly enlarged since prior exam, allowing for differences in measurement technique.  These are nonspecific and a neoplastic process cannot be definitively excluded.  Further evaluation on a nonemergent basis with CT or MRI renal mass protocol.  Ultrasound surveillance to document stability can also be considered.  - transfuse is Hb <7  12/7: Hgb 7.5>6.1; transfused 2U pRBC, 1U on the floor and 1U in dialysis  - urology notified and discussed extensively, pt is high risk for surgical intervention, no further imaging/work up needed for renal masses while in house, blood in tejeda is 2/2 BPH; appreciated  12/8:  Hgb 8.5 this AM, monitor H/H q12H; palliative care consult Monday to help with outpatient management of complex medical  problems (will discuss with daughter tomorrow)     ESRD (end stage renal disease) on dialysis    - HD MWF  - nephrology consulted; appreciated  - BP and lytes non-emergent  12/7: pt dialyzed today and received 2units blood     Hyperphosphatemia    - HD; nephrology following     Anemia in chronic kidney disease, on chronic dialysis    - H/H at baseline, but has trended down from Hgb of 12 in August  - see hematuria     Renovascular hypertension     - continue home medications  12/7: elevated but stable  12/8: hypertensive, will not adjust meds at this time in setting of bleeding     Chronic combined systolic and diastolic heart failure    - compensated, continue home medications     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    - diet controlled, start low dose SSI  12/7-8: stable, continue to monitor     Dementia with behavioral disturbance    - delirium precautions  - zyprexa PRN  12/7: PT/OT rec C     Benign prostatic hyperplasia with urinary retention    - continue home medications  12/7: discontinued doxazosin per daughters request (states that urology told her pt should not take this anymore)     Seizure disorder    - continue home keppra     Blindness    - home gtts       VTE Risk Mitigation (From admission, onward)        Ordered     Place MORGAN hose  Until discontinued      12/05/18 2114     Place sequential compression device  Until discontinued      12/05/18 2114     IP VTE HIGH RISK PATIENT  Once      12/05/18 2114            Discussed with staff.  Jayla Gardner PA-C  Department of Hospital Medicine   [facility]

## 2018-12-08 NOTE — PLAN OF CARE
Problem: Patient Care Overview  Goal: Plan of Care Review  1322 ml removed during Dialysis Tx. 1 unit of PRBCs given during TX. Tolerated well

## 2018-12-08 NOTE — NURSING
Pt resting in chair at bedside, no distress noted at this time. VSS as charted per f/s. Medications provided per scheduled MAR. Coude cath remains in place, noted new urine to gravity bag. LUE fistula intact, remains with + bruit/thrill, pt is MWF HD. Pt to be transferred to inpatient room, not yet assigned. No other changes noted at this time. Will follow up and report to oncoming shift nurse.

## 2018-12-08 NOTE — ASSESSMENT & PLAN NOTE
- urology consulted with appreciation  Urology saw patient; placed a 22 Fr coude and irrigated tejeda  - UA from 11/21 with candida and completed diflucan treatment. Discharged with empiric cipro, also completed. UCx shows yeast, tx not indicated  - cystoscopy last admission unrevealing  - reported low H/H at dialysis, not confirmed on our labs here  - trend H&H  - HDS  - CT 11/21/2018:  1. Irregular contour about the superolateral aspect of the right kidney, mass cannot be excluded.  There is a tiny non-obstructing calcification within that region.  Right renal cyst.  4.  Several enlarged para-aortic lymph nodes extending along the left ileopsoas with some obliteration of the fat planes with an additional enlarged left external iliac lymph node.  These findings are new since CT pelvis 11/14/2017.  - renal US: Multiple lesions in the right kidney, several of which were identified on recent CT and ultrasound.  Inferior pole hypoechoic lesion is slightly enlarged since prior exam, allowing for differences in measurement technique.  These are nonspecific and a neoplastic process cannot be definitively excluded.  Further evaluation on a nonemergent basis with CT or MRI renal mass protocol.  Ultrasound surveillance to document stability can also be considered.  - transfuse is Hb <7  12/7: Hgb 7.5>6.1; transfused 2U pRBC, 1U on the floor and 1U in dialysis  - urology notified and to re-evaluate patient; appreciated

## 2018-12-08 NOTE — PLAN OF CARE
Problem: Patient Care Overview  Goal: Plan of Care Review  Outcome: Ongoing (interventions implemented as appropriate)  Patient had minimal urine ouput through the night.  A bladder scan was done showing 55 ml in the bladder.  No irrigation was done.  Patient reported no pain in the pubic area.  No bladder irrigation was done.

## 2018-12-08 NOTE — ASSESSMENT & PLAN NOTE
- continue home medications  12/7: elevated but stable  12/8: hypertensive, will not adjust meds at this time in setting of bleeding

## 2018-12-08 NOTE — PROGRESS NOTES
12/07/18 1843   Vital Signs   Temp 99.2 °F (37.3 °C)   Pulse 76   Resp 18   SpO2 97 %   BP (!) 186/84   MAP (mmHg) 120   ABEL Gardner NP with [facility] notified of pt BP. Instructed to take manual. Manual taken see VS flow sheet.

## 2018-12-08 NOTE — PROGRESS NOTES
"[facility] Hospital Medicine  Progress Note    Patient Name: Ronald Campbell  MRN: [id number]  Patient Class: OP- Observation   Admission Date: 12/5/2018  Length of Stay: 0 days  Attending Physician: Alix Beckford MD  Primary Care Provider: Bart Shaw MD    [facility] Medicine Team: [facility] [place] [facility] [place] Jayla Gardner PA-C    Subjective:     Principal Problem:Hematuria    HPI:  Ronald Campbell is am 89M with ESRD (Still making urine, dialysis MWF), T2DM, CHF, dementia, BPH, HTN, blindness, and seizure disorder presents for evaluation of abnormal lab during dialysis today. No family at bedside and patient unreliable historian. History per chart review. He was recently admitted to this hospital 11/30 for evaluation of hematuria. Urology was consulted and cystoscopy was unrevealing and was discharged on antifungals for candida albicans growing in UCx from 11/21. Per chart, Hgb "6 something" at dialysis and patient referred to ED. Hgb with slow decline since August (when Hgb 12), now ~8. Per daughter, hematuria has improved, but continues intermittently.    On admission, the patient is calm and cooperative, he endorses no complaints and is unable to see his hematuria and can not comment on the quantity or improvement.     ED: Hgb 7.5, HDS, lytes non-emergent, tejeda placed with gross hematuria, urology consulted.     Hospital Course:  Patient admitted to observation for hematuria. Patient recently discharged with similar complaint. Urology saw the patient but cystoscopy was no diagnostic. Urology saw patient; placed a 22 Fr coude and irrigated tejeda. 12/7: Hgb 7.5> 6.1, 2 units prbc given. HD today. Urology to follow up.    PT/OT rec [facility]. Pt will need follow up with PCP and Urology at D/C    Interval History: Overnight nurses noted blood clots and hematuria when flushing catheter. Hgb 7.5>6.1. 2U pRBC given, Urology to evaluate patient. This AM pt sitting in bed trying to get to bedside commode with nurses " assistance, no complaints. Discussed plan of care extensively with daughter at bedside.     Review of Systems  Objective:     Vital Signs (Most Recent):  Temp: 98.5 °F (36.9 °C) (12/07/18 2019)  Pulse: 74 (12/07/18 2019)  Resp: 20 (12/07/18 2019)  BP: (!) 165/74 (12/07/18 2019)  SpO2: 97 % (12/07/18 2019) Vital Signs (24h Range):  Temp:  [96.1 °F (35.6 °C)-99.3 °F (37.4 °C)] 98.5 °F (36.9 °C)  Pulse:  [65-79] 74  Resp:  [16-20] 20  SpO2:  [97 %-100 %] 97 %  BP: (146-186)/(60-84) 165/74     Weight: 74.8 kg (165 lb)  Body mass index is 21.77 kg/m².    Intake/Output Summary (Last 24 hours) at 12/7/2018 2027  Last data filed at 12/7/2018 1730  Gross per 24 hour   Intake 1573 ml   Output 2632 ml   Net -1059 ml      Physical Exam   Constitutional: He appears well-developed and well-nourished. No distress.   Eyes: Right eye exhibits no discharge. Left eye exhibits no discharge.   Blind   Cardiovascular: Normal rate and regular rhythm.   No murmur heard.  Pulmonary/Chest: Effort normal and breath sounds normal. No respiratory distress.   Abdominal: Soft. Bowel sounds are normal. He exhibits no distension.   Genitourinary:   Genitourinary Comments: Tejeda in place   Musculoskeletal: He exhibits no edema.   Neurological: He is alert.   Skin: Skin is dry. He is not diaphoretic.   Psychiatric: He has a normal mood and affect. His behavior is normal.   Nursing note and vitals reviewed.      Significant Labs: All pertinent labs within the past 24 hours have been reviewed.    Significant Imaging: I have reviewed all pertinent imaging results/findings within the past 24 hours.    Assessment/Plan:      * Hematuria    - urology consulted with appreciation  Urology saw patient; placed a 22 Fr coude and irrigated tejeda  - UA from 11/21 with candida and completed diflucan treatment. Discharged with empiric cipro, also completed. UCx shows yeast, tx not indicated  - cystoscopy last admission unrevealing  - reported low H/H at dialysis,  not confirmed on our labs here  - trend H&H  - HDS  - CT 11/21/2018:  1. Irregular contour about the superolateral aspect of the right kidney, mass cannot be excluded.  There is a tiny non-obstructing calcification within that region.  Right renal cyst.  4.  Several enlarged para-aortic lymph nodes extending along the left ileopsoas with some obliteration of the fat planes with an additional enlarged left external iliac lymph node.  These findings are new since CT pelvis 11/14/2017.  - renal US: Multiple lesions in the right kidney, several of which were identified on recent CT and ultrasound.  Inferior pole hypoechoic lesion is slightly enlarged since prior exam, allowing for differences in measurement technique.  These are nonspecific and a neoplastic process cannot be definitively excluded.  Further evaluation on a nonemergent basis with CT or MRI renal mass protocol.  Ultrasound surveillance to document stability can also be considered.  - transfuse is Hb <7  12/7: Hgb 7.5>6.1; transfused 2U pRBC, 1U on the floor and 1U in dialysis  - urology notified and to re-evaluate patient; appreciated     ESRD (end stage renal disease) on dialysis    - HD MWF  - nephrology consulted; appreciated  - BP and lytes non-emergent  12/7: pt dialyzed today and received 2units blood     Hyperphosphatemia    - HD; nephrology following     Anemia in chronic kidney disease, on chronic dialysis    - H/H at baseline, but has trended down from Hgb of 12 in August  - see hematuria     Renovascular hypertension     - continue home medications  12/7: elevated but stable     Chronic combined systolic and diastolic heart failure    - compensated, continue home medications     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    - diet controlled, start low dose SSI  12/7: stable, continue to monitor     Dementia with behavioral disturbance    - delirium precautions  - zyprexa PRN  12/7: PT/OT rec [facility]     Benign prostatic hyperplasia with  urinary retention    - continue home medications     Seizure disorder    - continue home keppra     Blindness    - home gtts       VTE Risk Mitigation (From admission, onward)        Ordered     Place MORGAN hose  Until discontinued      12/05/18 2114     Place sequential compression device  Until discontinued      12/05/18 2114     IP VTE HIGH RISK PATIENT  Once      12/05/18 2114              Jayla Gardner PA-C  Department of Hospital Medicine   [facility]

## 2018-12-09 NOTE — ASSESSMENT & PLAN NOTE
- continue home medications  12/7: elevated but stable  12/8: hypertensive, will not adjust meds at this time in setting of bleeding  12/9: hypertensive, but stable, BP responds to AM meds

## 2018-12-09 NOTE — ASSESSMENT & PLAN NOTE
- continue home medications  12/7: discontinued doxazosin per daughters request (states that urology told her pt should not take this anymore)  12/9: d/c tejeda today for voiding trial

## 2018-12-09 NOTE — ASSESSMENT & PLAN NOTE
Acute Post-hemorrhagic Anemia  - urology consulted with argenis  Urology saw patient; placed a 22 Fr coude and irrigated tejeda  - UA from 11/21 with candida and completed diflucan treatment. Discharged with empiric cipro, also completed. UCx shows yeast, tx not indicated  - cystoscopy last admission unrevealing  - reported low H/H at dialysis, not confirmed on our labs here  - trend H&H  - HDS  - CT 11/21/2018:  1. Irregular contour about the superolateral aspect of the right kidney, mass cannot be excluded.  There is a tiny non-obstructing calcification within that region.  Right renal cyst.  4.  Several enlarged para-aortic lymph nodes extending along the left ileopsoas with some obliteration of the fat planes with an additional enlarged left external iliac lymph node.  These findings are new since CT pelvis 11/14/2017.  - renal US: Multiple lesions in the right kidney, several of which were identified on recent CT and ultrasound.  Inferior pole hypoechoic lesion is slightly enlarged since prior exam, allowing for differences in measurement technique.  These are nonspecific and a neoplastic process cannot be definitively excluded.  Further evaluation on a nonemergent basis with CT or MRI renal mass protocol.  Ultrasound surveillance to document stability can also be considered.  - transfuse is Hb <7  12/7: Hgb 7.5>6.1; transfused 2U pRBC, 1U on the floor and 1U in dialysis  - urology notified and discussed extensively, pt is high risk for surgical intervention, no further imaging/work up needed for renal masses while in house, blood in tejeda is 2/2 BPH; appreciated  12/8:  Hgb 8.5 this AM, monitor H/H q12H  12/9: H/H stable 8.8/27.5 this AM  - daughter agreeable to palliative care consult in AM to help with outpatient management of complex medical problems  - D/C tejeda today for voiding trial

## 2018-12-09 NOTE — SUBJECTIVE & OBJECTIVE
"Interval History: No acute events overnight. Pt sitting in chair in NAD with no complaints. Daughter at bedside this AM, discussed plan of care. Daughter is agreeable to palliative care consult tomorrow to discuss outpatient management of pt's complex medical problems. Daughter understands bleeding is due to BPH and no further work up is necessary for finding of renal masses as risks>benefits.    Review of Systems   Unable to perform ROS: Dementia   Patient reports feeling "all right"    Objective:     Vital Signs (Most Recent):  Temp: 96.2 °F (35.7 °C) (12/09/18 0446)  Pulse: 74 (12/09/18 0446)  Resp: 18 (12/09/18 0446)  BP: (!) 189/87 (12/09/18 0446)  SpO2: 97 % (12/09/18 0446) Vital Signs (24h Range):  Temp:  [96.2 °F (35.7 °C)-98.4 °F (36.9 °C)] 96.2 °F (35.7 °C)  Pulse:  [74-78] 74  Resp:  [18] 18  SpO2:  [97 %-98 %] 97 %  BP: (166-189)/(74-87) 189/87     Weight: 81.7 kg (180 lb 1.9 oz)  Body mass index is 23.76 kg/m².  No intake or output data in the 24 hours ending 12/09/18 1549   Physical Exam   Constitutional: He is oriented to person, place, and time. He appears well-developed and well-nourished. No distress.   Eyes: Right eye exhibits no discharge. Left eye exhibits no discharge.   Blind   Cardiovascular: Normal rate and regular rhythm.   No murmur heard.  Pulmonary/Chest: Effort normal and breath sounds normal. No respiratory distress.   Abdominal: Soft. Bowel sounds are normal. He exhibits no distension. There is no tenderness.   Genitourinary:   Genitourinary Comments: Tejeda in place, sidney blood in tejeda bag, no clots appreciated   Musculoskeletal: He exhibits no edema.   Neurological: He is alert and oriented to person, place, and time.   Skin: Skin is warm and dry. He is not diaphoretic.   Psychiatric: He has a normal mood and affect. His behavior is normal.   Nursing note and vitals reviewed.      Significant Labs: All pertinent labs within the past 24 hours have been reviewed.    Significant " Imaging: I have reviewed all pertinent imaging results/findings within the past 24 hours.

## 2018-12-09 NOTE — PROGRESS NOTES
"[facility] Hospital Medicine  Progress Note    Patient Name: Ronald Campbell  MRN: [id number]  Patient Class: IP- Inpatient   Admission Date: 12/5/2018  Length of Stay: 1 days  Attending Physician: Alix Beckford MD  Primary Care Provider: Bart Shaw MD    [facility] Medicine [place] [facility] [place] [facility] [place] Jayla Gardner PA-C    Subjective:     Principal Problem:Hematuria    HPI:  Ronald Campbell is am 89M with ESRD (Still making urine, dialysis MWF), T2DM, CHF, dementia, BPH, HTN, blindness, and seizure disorder presents for evaluation of abnormal lab during dialysis today. No family at bedside and patient unreliable historian. History per chart review. He was recently admitted to this hospital 11/30 for evaluation of hematuria. Urology was consulted and cystoscopy was unrevealing and was discharged on antifungals for candida albicans growing in UCx from 11/21. Per chart, Hgb "6 something" at dialysis and patient referred to ED. Hgb with slow decline since August (when Hgb 12), now ~8. Per daughter, hematuria has improved, but continues intermittently.    On admission, the patient is calm and cooperative, he endorses no complaints and is unable to see his hematuria and can not comment on the quantity or improvement.     ED: Hgb 7.5, HDS, lytes non-emergent, tejeda placed with gross hematuria, urology consulted.     Hospital Course:  Patient admitted to observation for hematuria. Patient recently discharged with similar complaint. Urology saw the patient but cystoscopy was no diagnostic. Urology saw patient; placed a 22 Fr coude and irrigated tejeda. 12/7: Hgb 7.5> 6.1, 2 units prbc given. HD today. 12/8: Hgb improvement to 8.5 this AM; discussed case extensively with urology- patient his high risk for surgical intervention. Not necessary to further work up renal masses, continue to monitor H/H and consider transfusions with HD 12/9: Hgb stable, daughter agreeable to palliative care consult tomorrow for help " "with management of complex medical problems.    PT/OT rec [facility]. Pt will need follow up with PCP and Urology at D/C    Interval History: No acute events overnight. Pt sitting in chair in NAD with no complaints. Daughter at bedside this AM, discussed plan of care. Daughter is agreeable to palliative care consult tomorrow to discuss outpatient management of pt's complex medical problems. Daughter understands bleeding is due to BPH and no further work up is necessary for finding of renal masses as risks>benefits.    Review of Systems   Unable to perform ROS: Dementia   Patient reports feeling "all right"    Objective:     Vital Signs (Most Recent):  Temp: 96.2 °F (35.7 °C) (12/09/18 0446)  Pulse: 74 (12/09/18 0446)  Resp: 18 (12/09/18 0446)  BP: (!) 189/87 (12/09/18 0446)  SpO2: 97 % (12/09/18 0446) Vital Signs (24h Range):  Temp:  [96.2 °F (35.7 °C)-98.4 °F (36.9 °C)] 96.2 °F (35.7 °C)  Pulse:  [74-78] 74  Resp:  [18] 18  SpO2:  [97 %-98 %] 97 %  BP: (166-189)/(74-87) 189/87     Weight: 81.7 kg (180 lb 1.9 oz)  Body mass index is 23.76 kg/m².  No intake or output data in the 24 hours ending 12/09/18 1549   Physical Exam   Constitutional: He is oriented to person, place, and time. He appears well-developed and well-nourished. No distress.   Eyes: Right eye exhibits no discharge. Left eye exhibits no discharge.   Blind   Cardiovascular: Normal rate and regular rhythm.   No murmur heard.  Pulmonary/Chest: Effort normal and breath sounds normal. No respiratory distress.   Abdominal: Soft. Bowel sounds are normal. He exhibits no distension. There is no tenderness.   Genitourinary:   Genitourinary Comments: Tejeda in place, sidney blood in tejeda bag, no clots appreciated   Musculoskeletal: He exhibits no edema.   Neurological: He is alert and oriented to person, place, and time.   Skin: Skin is warm and dry. He is not diaphoretic.   Psychiatric: He has a normal mood and affect. His behavior is normal.   Nursing note and vitals " reviewed.      Significant Labs: All pertinent labs within the past 24 hours have been reviewed.    Significant Imaging: I have reviewed all pertinent imaging results/findings within the past 24 hours.    Assessment/Plan:      * Hematuria    Acute Post-hemorrhagic Anemia  - urology consulted with appreciation  Urology saw patient; placed a 22 Fr coude and irrigated tejeda  - UA from 11/21 with candida and completed diflucan treatment. Discharged with empiric cipro, also completed. UCx shows yeast, tx not indicated  - cystoscopy last admission unrevealing  - reported low H/H at dialysis, not confirmed on our labs here  - trend H&H  - HDS  - CT 11/21/2018:  1. Irregular contour about the superolateral aspect of the right kidney, mass cannot be excluded.  There is a tiny non-obstructing calcification within that region.  Right renal cyst.  4.  Several enlarged para-aortic lymph nodes extending along the left ileopsoas with some obliteration of the fat planes with an additional enlarged left external iliac lymph node.  These findings are new since CT pelvis 11/14/2017.  - renal US: Multiple lesions in the right kidney, several of which were identified on recent CT and ultrasound.  Inferior pole hypoechoic lesion is slightly enlarged since prior exam, allowing for differences in measurement technique.  These are nonspecific and a neoplastic process cannot be definitively excluded.  Further evaluation on a nonemergent basis with CT or MRI renal mass protocol.  Ultrasound surveillance to document stability can also be considered.  - transfuse is Hb <7  12/7: Hgb 7.5>6.1; transfused 2U pRBC, 1U on the floor and 1U in dialysis  - urology notified and discussed extensively, pt is high risk for surgical intervention, no further imaging/work up needed for renal masses while in house, blood in tjeeda is 2/2 BPH; appreciated  12/8:  Hgb 8.5 this AM, monitor H/H q12H  12/9: H/H stable 8.8/27.5 this AM  - daughter agreeable to  palliative care consult in AM to help with outpatient management of complex medical problems  - D/C tejeda today for voiding trial     ESRD (end stage renal disease) on dialysis    - HD MWF  - nephrology consulted; appreciated  - BP and lytes non-emergent  12/7: pt dialyzed today and received 2units blood     Hyperphosphatemia    - HD; nephrology following     Anemia in chronic kidney disease, on chronic dialysis    - H/H at baseline, but has trended down from Hgb of 12 in August  - see hematuria     Renovascular hypertension     - continue home medications  12/7: elevated but stable  12/8: hypertensive, will not adjust meds at this time in setting of bleeding  12/9: hypertensive, but stable, BP responds to AM meds     Chronic combined systolic and diastolic heart failure    - compensated, continue home medications     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    - diet controlled, start low dose SSI  12/7-9: stable, continue to monitor     Dementia with behavioral disturbance    - delirium precautions  - zyprexa PRN  12/7: PT/OT rec [facility]     Benign prostatic hyperplasia with urinary retention    - continue home medications  12/7: discontinued doxazosin per daughters request (states that urology told her pt should not take this anymore)  12/9: d/c nikhil today for voiding trial     Seizure disorder    - continue home keppra     Blindness    - home gtts     Acute post-hemorrhagic anemia             VTE Risk Mitigation (From admission, onward)        Ordered     Place MORGAN hose  Until discontinued      12/05/18 2114     Place sequential compression device  Until discontinued      12/05/18 2114     IP VTE HIGH RISK PATIENT  Once      12/05/18 2114              Jayla Gardner PA-C  Department of Hospital Medicine   [facility]

## 2018-12-10 PROBLEM — Z51.5 PALLIATIVE CARE ENCOUNTER: Status: ACTIVE | Noted: 2018-01-01

## 2018-12-10 NOTE — SUBJECTIVE & OBJECTIVE
Interval History: Overnight daughter insists no labs or vitals to be drawn early AM, daughter was educated on importance of labs and vitals in acute setting, especially with HD.   This AM, pt is sleeping in bed in NAD, Daughter not at bedside. No complaints from patient.     Review of Systems   Unable to perform ROS: Dementia     Objective:     Vital Signs (Most Recent):  Temp: 96.7 °F (35.9 °C) (12/10/18 1006)  Pulse: (!) 57 (12/10/18 1415)  Resp: 16 (12/10/18 1415)  BP: (!) 147/66 (12/10/18 1415)  SpO2: 98 % (12/10/18 0926) Vital Signs (24h Range):  Temp:  [96 °F (35.6 °C)-96.7 °F (35.9 °C)] 96.7 °F (35.9 °C)  Pulse:  [54-72] 57  Resp:  [16-18] 16  SpO2:  [98 %] 98 %  BP: (118-173)/(48-83) 147/66     Weight: 81.7 kg (180 lb 1.9 oz)  Body mass index is 23.76 kg/m².    Intake/Output Summary (Last 24 hours) at 12/10/2018 1659  Last data filed at 12/10/2018 1415  Gross per 24 hour   Intake 300 ml   Output 1835 ml   Net -1535 ml      Physical Exam   Constitutional: He is oriented to person, place, and time. He appears well-developed and well-nourished. No distress.   HENT:   Head: Atraumatic.   Eyes: Right eye exhibits no discharge. Left eye exhibits no discharge.   Blind   Cardiovascular: Normal rate and regular rhythm.   No murmur heard.  Pulmonary/Chest: Effort normal and breath sounds normal. No respiratory distress.   Abdominal: Soft. Bowel sounds are normal. He exhibits no distension. There is no tenderness.   Musculoskeletal: He exhibits no edema.   Neurological: He is alert and oriented to person, place, and time.   Skin: Skin is warm and dry. He is not diaphoretic.   Psychiatric: He has a normal mood and affect. His behavior is normal.   Nursing note and vitals reviewed.      Significant Labs: All pertinent labs within the past 24 hours have been reviewed.    Significant Imaging: I have reviewed all pertinent imaging results/findings within the past 24 hours.

## 2018-12-10 NOTE — ASSESSMENT & PLAN NOTE
- continue home medications  12/7: elevated but stable  12/8: hypertensive, will not adjust meds at this time in setting of bleeding  12/9-10: hypertensive, but stable, BP responds to AM meds

## 2018-12-10 NOTE — PLAN OF CARE
CM received a call from the patient's SW May Dumace ([phone]), w/[facility] requesting an update. CM informed May that palliative care has been consulted for the patient & that this CM provided Dr Alix Beckford with May's contact information. Will continue to follow.

## 2018-12-10 NOTE — PLAN OF CARE
CM was informed by the patient's daughter, Gabriela Campbell ([phone]), that she is at the patient's bedside. CM informed Danuta ([id number]) w/palliative care of above. Will continue to follow.

## 2018-12-10 NOTE — CONSULTS
Palliative Care Acknowledgement of Consult - .date    Consult received. Palliative Care Provider:Dr. LEXIS Villeda will touch base with team prior to seeing patient. Full consult to follow.    Thank you for allowing us to be a part of the care of this patient.      Danuta Willson, H[place]IL[place], ACHP-SW

## 2018-12-10 NOTE — PROGRESS NOTES
"[facility] Hospital Medicine  Progress Note    Patient Name: Ronald Campbell  MRN: [id number]  Patient Class: IP- Inpatient   Admission Date: 12/5/2018  Length of Stay: 2 days  Attending Physician: Alix Beckford MD  Primary Care Provider: Bart Shaw MD    [facility] Medicine Team: [facility] F Jayla Gardner PA-C    Subjective:     Principal Problem:Hematuria    HPI:  Ronald Campbell is am 89M with ESRD (Still making urine, dialysis MWF), T2DM, CHF, dementia, BPH, HTN, blindness, and seizure disorder presents for evaluation of abnormal lab during dialysis today. No family at bedside and patient unreliable historian. History per chart review. He was recently admitted to this hospital 11/30 for evaluation of hematuria. Urology was consulted and cystoscopy was unrevealing and was discharged on antifungals for candida albicans growing in UCx from 11/21. Per chart, Hgb "6 something" at dialysis and patient referred to ED. Hgb with slow decline since August (when Hgb 12), now ~8. Per daughter, hematuria has improved, but continues intermittently.    On admission, the patient is calm and cooperative, he endorses no complaints and is unable to see his hematuria and can not comment on the quantity or improvement.     ED: Hgb 7.5, HDS, lytes non-emergent, tejeda placed with gross hematuria, urology consulted.     Hospital Course:  Patient admitted to observation for hematuria. Patient recently discharged with similar complaint. Urology saw the patient but cystoscopy was non diagnostic. Urology saw patient; placed a 22 Fr coude and irrigated tejeda. 12/7: Hgb 7.5> 6.1, 2 units prbc given. HD today. 12/8: Hgb improvement to 8.5 this AM; discussed case extensively with urology- patient his high risk for surgical intervention. Not necessary to further work up renal masses, continue to monitor H/H and consider transfusions with HD 12/9: Hgb stable, daughter agreeable to palliative care consult tomorrow for help " with management of complex medical problems. 12/10: pt dialyzed today; Hgb 7.7. Palliative care to discuss plan of care and options with daughter at bedside this PM.     PT/OT rec [facility]. Pt will need follow up with PCP. Pt follow with AMISHA WATTERS working to develop outpt plan of care pending palliative care discussion this PM.    Interval History: Overnight daughter insists no labs or vitals to be drawn early AM, daughter was educated on importance of labs and vitals in acute setting, especially with HD.   This AM, pt is sleeping in bed in NAD, Daughter not at bedside. No complaints from patient.     Review of Systems   Unable to perform ROS: Dementia     Objective:     Vital Signs (Most Recent):  Temp: 96.7 °F (35.9 °C) (12/10/18 1006)  Pulse: (!) 57 (12/10/18 1415)  Resp: 16 (12/10/18 1415)  BP: (!) 147/66 (12/10/18 1415)  SpO2: 98 % (12/10/18 0926) Vital Signs (24h Range):  Temp:  [96 °F (35.6 °C)-96.7 °F (35.9 °C)] 96.7 °F (35.9 °C)  Pulse:  [54-72] 57  Resp:  [16-18] 16  SpO2:  [98 %] 98 %  BP: (118-173)/(48-83) 147/66     Weight: 81.7 kg (180 lb 1.9 oz)  Body mass index is 23.76 kg/m².    Intake/Output Summary (Last 24 hours) at 12/10/2018 1659  Last data filed at 12/10/2018 1415  Gross per 24 hour   Intake 300 ml   Output 1835 ml   Net -1535 ml      Physical Exam   Constitutional: He is oriented to person, place, and time. He appears well-developed and well-nourished. No distress.   HENT:   Head: Atraumatic.   Eyes: Right eye exhibits no discharge. Left eye exhibits no discharge.   Blind   Cardiovascular: Normal rate and regular rhythm.   No murmur heard.  Pulmonary/Chest: Effort normal and breath sounds normal. No respiratory distress.   Abdominal: Soft. Bowel sounds are normal. He exhibits no distension. There is no tenderness.   Musculoskeletal: He exhibits no edema.   Neurological: He is alert and oriented to person, place, and time.   Skin: Skin is warm and dry. He is not diaphoretic.   Psychiatric: He has a  normal mood and affect. His behavior is normal.   Nursing note and vitals reviewed.      Significant Labs: All pertinent labs within the past 24 hours have been reviewed.    Significant Imaging: I have reviewed all pertinent imaging results/findings within the past 24 hours.    Assessment/Plan:      * Hematuria    Acute Post-hemorrhagic Anemia  - urology consulted with appreciation  Urology saw patient; placed a 22 Fr coude and irrigated tejeda  - UA from 11/21 with candida and completed diflucan treatment. Discharged with empiric cipro, also completed. UCx shows yeast, tx not indicated  - cystoscopy last admission unrevealing  - reported low H/H at dialysis, not confirmed on our labs here  - trend H&H  - HDS  - CT 11/21/2018:  1. Irregular contour about the superolateral aspect of the right kidney, mass cannot be excluded.  There is a tiny non-obstructing calcification within that region.  Right renal cyst.  4.  Several enlarged para-aortic lymph nodes extending along the left ileopsoas with some obliteration of the fat planes with an additional enlarged left external iliac lymph node.  These findings are new since CT pelvis 11/14/2017.  - renal US: Multiple lesions in the right kidney, several of which were identified on recent CT and ultrasound.  Inferior pole hypoechoic lesion is slightly enlarged since prior exam, allowing for differences in measurement technique.  These are nonspecific and a neoplastic process cannot be definitively excluded.  Further evaluation on a nonemergent basis with CT or MRI renal mass protocol.  Ultrasound surveillance to document stability can also be considered.  - transfuse is Hb <7  12/7: Hgb 7.5>6.1; transfused 2U pRBC, 1U on the floor and 1U in dialysis  - urology notified and discussed extensively, pt is high risk for surgical intervention, no further imaging/work up needed for renal masses while in house, blood in tejeda is 2/2 BPH; appreciated  12/8:  Hgb 8.5 this AM, monitor  H/H q12H  12/9: H/H stable 8.8/27.5 this AM  - D/C tejeda today for voiding trial  12/10: Hgb 8.8>7.4, recheck in AM, will proactively type and screen for AM labs  - pt voiding small amounts of urine without tejeda (baseline since pt is ESRD on HD)  - daughter agreeable to palliative care discussion this evening to help with outpatient management of complex medical problems  - CM aware and working on outpatient plan of care     ESRD (end stage renal disease) on dialysis    - HD MWF  - nephrology consulted; appreciated  - BP and lytes non-emergent  12/7: pt dialyzed today and received 2units blood  12/10: pt dialyzed today     Hyperphosphatemia    - HD; nephrology following     Anemia in chronic kidney disease, on chronic dialysis    - H/H at baseline, but has trended down from Hgb of 12 in August  - see hematuria     Renovascular hypertension     - continue home medications  12/7: elevated but stable  12/8: hypertensive, will not adjust meds at this time in setting of bleeding  12/9-10: hypertensive, but stable, BP responds to AM meds     Chronic combined systolic and diastolic heart failure    - compensated, continue home medications     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    - diet controlled, start low dose SSI  12/7-10: stable, continue to monitor     Dementia with behavioral disturbance    - delirium precautions  - zyprexa PRN  12/7: PT/OT rec [facility]     Benign prostatic hyperplasia with urinary retention    - continue home medications  12/7: discontinued doxazosin per daughters request (states that urology told her pt should not take this anymore)  12/9: d/c tejeda today for voiding trial;see above     Seizure disorder    - continue home keppra     Blindness    - home gtts     Acute post-hemorrhagic anemia             VTE Risk Mitigation (From admission, onward)        Ordered     Place MORGAN hose  Until discontinued      12/05/18 2114     Place sequential compression device  Until discontinued       12/05/18 2114     IP VTE HIGH RISK PATIENT  Once      12/05/18 2114            Discussed with staff.  Jayla Gardner PA-C  Department of Hospital Medicine   [facility]

## 2018-12-10 NOTE — PROGRESS NOTES
[facility] HEMODIALYSIS NOTE     Patient currently on hemodialysis for removal of uremic toxins and volume.    Patient seen and evaluated on hemodialysis, tolerating treatment, see HD flowsheet for vitals and assessments.      Ultrafiltration goal is 1L as tolerated     Labs have been reviewed and the dialysate bath has been adjusted.     Assessment/Plan:     HD today for removal of uremic toxins and volume.

## 2018-12-10 NOTE — ASSESSMENT & PLAN NOTE
Acute Post-hemorrhagic Anemia  - urology consulted with argenis  Urology saw patient; placed a 22 Fr coude and irrigated tejeda  - UA from 11/21 with candida and completed diflucan treatment. Discharged with empiric cipro, also completed. UCx shows yeast, tx not indicated  - cystoscopy last admission unrevealing  - reported low H/H at dialysis, not confirmed on our labs here  - trend H&H  - HDS  - CT 11/21/2018:  1. Irregular contour about the superolateral aspect of the right kidney, mass cannot be excluded.  There is a tiny non-obstructing calcification within that region.  Right renal cyst.  4.  Several enlarged para-aortic lymph nodes extending along the left ileopsoas with some obliteration of the fat planes with an additional enlarged left external iliac lymph node.  These findings are new since CT pelvis 11/14/2017.  - renal US: Multiple lesions in the right kidney, several of which were identified on recent CT and ultrasound.  Inferior pole hypoechoic lesion is slightly enlarged since prior exam, allowing for differences in measurement technique.  These are nonspecific and a neoplastic process cannot be definitively excluded.  Further evaluation on a nonemergent basis with CT or MRI renal mass protocol.  Ultrasound surveillance to document stability can also be considered.  - transfuse is Hb <7  12/7: Hgb 7.5>6.1; transfused 2U pRBC, 1U on the floor and 1U in dialysis  - urology notified and discussed extensively, pt is high risk for surgical intervention, no further imaging/work up needed for renal masses while in house, blood in tejeda is 2/2 BPH; appreciated  12/8:  Hgb 8.5 this AM, monitor H/H q12H  12/9: H/H stable 8.8/27.5 this AM  - D/C tejeda today for voiding trial  12/10: Hgb 8.8>7.4, recheck in AM, will proactively type and screen for AM labs  - pt voiding small amounts of urine without tejeda (baseline since pt is ESRD on HD)  - daughter agreeable to palliative care discussion this evening to  help with outpatient management of complex medical problems  - CM aware and working on outpatient plan of care

## 2018-12-10 NOTE — PLAN OF CARE
Problem: Patient Care Overview  Goal: Plan of Care Review  Outcome: Ongoing (interventions implemented as appropriate)  Patient being transferred to dialysis via stretcher. Patient alert and oriented. Patient denies any complaint of pain or discomfort at this time. Will continue to monitor.

## 2018-12-10 NOTE — ASSESSMENT & PLAN NOTE
Elderly man with blindness (glaucoma) and ESRD x 4 years who continues to have intermittent hematuria with anemia. Urology is unable to cure the problem and recommends symptomatic treatment. Tejeda is out. Daughter wonders if the tejeda staying in would help him more despite the risk of infection/bleeding. I don't have the answer for that. She also wonders if he could go to SNF to get stronger so he can function better at home. She said she would want him to die naturally in his sleep but would want fixable things fixed. I have not entered DNR order but will tomorrow and complete paperwork.  Discussed with May at [phone] at [facility]. Will discuss further with them in AM. Discussed with Dr. Beckford.

## 2018-12-10 NOTE — PLAN OF CARE
12/10/18 1559   Discharge Reassessment   Assessment Type Discharge Planning Reassessment   Do you have any problems affording any of your prescribed medications? No   Discharge Plan A Home Health   Discharge Plan B Skilled Nursing Facility   Anticipated Discharge Disposition Home-Health   Can the patient answer the patient profile reliably? Yes, cognitively intact  (daughter)   How does the patient rate their overall health at the present time? Fair   Describe the patient's ability to walk at the present time. Major restrictions/daily assistance from another person   How often would a person be available to care for the patient? Whenever needed   Number of comorbid conditions (as recorded on the chart) Five or more

## 2018-12-10 NOTE — ASSESSMENT & PLAN NOTE
- HD MWF  - nephrology consulted; appreciated  - BP and lytes non-emergent  12/7: pt dialyzed today and received 2units blood  12/10: pt dialyzed today

## 2018-12-10 NOTE — PROGRESS NOTES
HD treatment complete. Duration of treatment 3.5 hours and 1.2 L removed. Treatment was tolerated well and no complications with access to left upper arm. Needles removed and hemostasis achieved. Dressing intact and no drainage noted. Thrill and bruit present.

## 2018-12-10 NOTE — CONSULTS
"[facility]  Palliative Medicine  Consult Note    Patient Name: Ronald Campbell  MRN: [id number]  Admission Date: 12/5/2018  Hospital Length of Stay: 2 days  Code Status: Full Code   Attending Provider: Alix Beckford MD  Consulting Provider: Nydia Villeda MD  Primary Care Physician: Bart Shaw MD  Principal Problem:Hematuria    Patient information was obtained from patient, relative(s), past medical records and ER records.      Consults  Assessment/Plan:     Palliative care encounter    Elderly man with blindness (glaucoma) and ESRD x 4 years who continues to have intermittent hematuria with anemia. Urology is unable to cure the problem and recommends symptomatic treatment. Tejeda is out. Daughter wonders if the tejeda staying in would help him more despite the risk of infection/bleeding. I don't have the answer for that. She also wonders if he could go to SNF to get stronger so he can function better at home. She said she would want him to die naturally in his sleep but would want fixable things fixed. I have not entered DNR order but will tomorrow and complete paperwork.  Discussed with May at [phone] at [facility]. Will discuss further with them in AM. Discussed with Dr. Beckford.         Thank you for your consult. I will follow-up with patient. Please contact us if you have any additional questions.    Subjective:     HPI:   Ronald Campbell is am 89M with ESRD (Still making urine, dialysis MWF), T2DM, CHF, dementia, BPH, HTN, blindness, and seizure disorder presents for evaluation of abnormal lab during dialysis today. [facility]. He was recently admitted to this hospital 11/30 for evaluation of hematuria. Urology was consulted and cystoscopy was unrevealing and was discharged on antifungals for candida albicans growing in UCx from 11/21. Per chart, Hgb "6 something" at dialysis and patient referred to ED. Hgb with slow decline since August (when Hgb 12), now ~8. Per daughter, hematuria has " improved, but continues intermittently.     On admission, the patient is calm and cooperative, he endorses no complaints and is unable to see his hematuria and can not comment on the quantity or improvement. Daughter is concerned about bleeding and clotting off of urine flow and problems with transfusions.        Hospital Course:  No notes on file    Interval History: Barrett removed. Daughter says he is weak. [facility] not [facility] go to [facility]. Goes to HD  MWF and has sitters per [facility] all day TTH. Daughter is concerned about his ability to be at home by himself. Wonders if SNF/ICF placement with some therapy would be a good plan over the next week or so. He can receive blood at HD.    Past Medical History:   Diagnosis Date    Anemia     Blind     bilaterally    CHF (congestive heart failure)     Chronic kidney disease     Dementia 01/2017    Diabetes mellitus type II     General anesthetics causing adverse effect in therapeutic use     april / may 2014     Glaucoma (increased eye pressure)     Hypertension     Hypothermia 12/6/2017    Seizures        Past Surgical History:   Procedure Laterality Date    ANGIOPLASTY WITH STENT PLACEMENT Left 11/18/2016    [facility]    AV FISTULA PLACEMENT Left 4/2014    AV fistula to Left upper arm    [id number]-VTZQGBP-FB left RC vs BC AVF Left 8/8/2014    [facility] [address] [place] [address]    CYSTOSCOPY N/A 11/29/2018    Procedure: CYSTOSCOPY;  Surgeon: Temo Banerjee MD;  [facility] [address] [place] [address];  Service: Urology;  Laterality: N/A;    CYSTOSCOPY N/A 11/29/2018    [facility] [address] [place] [address]    EYE SURGERY      FISTULOGRAM Left 11/18/2016    [facility]    FISTULOGRAM Left 4/15/2016    [facility]    FISTULOGRAM Left 1/29/2016    [facility]    FISTULOGRAM Room 11  case Left 3/27/2015    [facility]    HYDROCELECTOMY Bilateral 11/22/2017    [facility]    INSERTION-CATHETER-DIALYSIS-PERITONEAL-LAPAROSCOPIC N/A 5/8/2014    [facility]add [facility]    INSERTION-CATHETER-DIALYSIS-PERITONEAL-LAPAROSCOPIC N/A 5/5/2014    [facility]    INSERTION-CATHETER-HEMODIALYSIS N/A 5/21/2014    [facility]    PERMCATH INSERTION-TUNNELED CVC N/A 5/13/2014    [facility] Lolis Jackson [facility] at [facility] CATH LAB    REMOVAL OF BLOOD CLOT N/A 11/29/2018    Procedure: REMOVAL, BLOOD CLOT;  Surgeon: Temo Banerjee MD;  [facility];  Service: Urology;  Laterality: N/A;    REMOVAL, BLOOD CLOT N/A 11/29/2018    [facility]    REMOVAL-CATHETER-PERITONEAL DIALYSIS N/A 7/30/2014    [facility] Js [facility]add [facility]    SCROTUM EXPLORATION Bilateral 11/2017       Review of patient's allergies indicates:   Allergen Reactions    Lisinopril Swelling       Medications:  Continuous Infusions:  Scheduled Meds:   atorvastatin  40 mg Oral Daily    brimonidine 0.2%  1 drop Left Eye BID    And    timolol maleate 0.5%  1 drop Left Eye BID    brinzolamide  1 drop Left Eye TID    calcium acetate  1,334 mg Oral TID WM    carvedilol  25 mg Oral BID    dutasteride  0.5 mg Oral Daily    levETIRAcetam  500 mg Oral BID    polyethylene glycol  17 g Oral Daily    tamsulosin  0.4 mg Oral Daily    travoprost  1 drop Left Eye QHS     PRN Meds:sodium chloride, sodium chloride, sodium chloride 0.9%, acetaminophen, acetaminophen, albuterol-ipratropium, bisacodyl, dextrose 50%, dextrose 50%, glucagon (human recombinant), glucose, glucose, hydrALAZINE, insulin aspart U-100, ondansetron, promethazine (PHENERGAN) IVPB, ramelteon, sodium chloride  0.9%    Family History     Problem Relation (Age of Onset)    Cancer Daughter    Heart disease Sister, Brother        Tobacco Use    Smoking status: Never Smoker    Smokeless tobacco: Never Used   Substance and Sexual Activity    Alcohol use: No    Drug use: No    Sexual activity: No       Review of Systems   Unable to perform ROS: Patient nonverbal     Objective:     Vital Signs (Most Recent):  Temp: 96.7 °F (35.9 °C) (12/10/18 1006)  Pulse: (!) 57 (12/10/18 1415)  Resp: 16 (12/10/18 1415)  BP: (!) 147/66 (12/10/18 1415)  SpO2: 98 % (12/10/18 0926) Vital Signs (24h Range):  Temp:  [96 °F (35.6 °C)-96.7 °F (35.9 °C)] 96.7 °F (35.9 °C)  Pulse:  [54-72] 57  Resp:  [16-18] 16  SpO2:  [98 %] 98 %  BP: (118-173)/(48-83) 147/66     Weight: 81.7 kg (180 lb 1.9 oz)  Body mass index is 23.76 kg/m².    Review of Symptoms  Symptom Assessment (ESAS 0-10 scale)   ESAS 0 1 2 3 4 5 6 7 8 9 10   Pain x             Dyspnea x             Anxiety x             Nausea x             Depression  x             Anorexia x             Fatigue x             Insomnia x             Restlessness  x             Agitation x             CAM / Delirium _x_ --  ___+   Constipation     _x_ --  ___+   Diarrhea           _x --  ___+  Bowel Management Plan (BMP): Yes    Pain Assessment: no pain  OME in 24 hours: 0    Performance Status: 70    ECOG Performance Status Grade: 2 - Ambulates, capable of self care only. He requires set up for ADL.    Physical Exam   Constitutional: He appears well-developed and well-nourished.   Blind. Sees shadows/light and dark   Cardiovascular: Normal rate and regular rhythm.   Pulmonary/Chest: Effort normal and breath sounds normal.   Musculoskeletal: Normal range of motion.   Psychiatric: He has a normal mood and affect. His behavior is normal.   Nursing note and vitals reviewed.      Significant Labs: All pertinent labs within the past 24 hours have been reviewed.  CBC:   Recent Labs   Lab 12/10/18  0838   WBC 5.66    HGB 7.7*   HCT 23.7*   MCV 81*   *     BMP:  Recent Labs   Lab 12/10/18  0838   GLU 92   *   K 4.6   CL 99   CO2 25   BUN 53*   CREATININE 7.5*   CALCIUM 8.3*     LFT:  Lab Results   Component Value Date    AST 42 (H) 12/05/2018    ALKPHOS 99 12/05/2018    BILITOT 0.4 12/05/2018     Albumin:   Albumin   Date Value Ref Range Status   12/10/2018 2.3 (L) 3.5 - 5.2 g/dL Final     Protein:   Total Protein   Date Value Ref Range Status   12/05/2018 8.0 6.0 - 8.4 g/dL Final     Lactic acid:   Lab Results   Component Value Date    LACTATE 0.6 08/01/2018    LACTATE 0.8 12/06/2017       Significant Imaging: I have reviewed all pertinent imaging results/findings within the past 24 hours.    Advanced Directives::  Living Will: No  LaPOST: No  Do Not Resuscitate Status: Yes- daughter said that she would want him to die in his sleep but would want treatment of fixable things.   Medical Power of : Yes. Agent's Name: Gabriela. Agent's Contact Number: on chart    Decision-Making Capacity: Patient answered questions, Family answered questions    Living Arrangements: Lives with family, Lives in apartment    Psychosocial/Cultural:  Being home is important  Spiritual:   Used to enjoy going to [employer].      > 50% of 60 min visit spent in chart review, face to face discussion of goals of care,  symptom assessment, coordination of care and emotional support.    Nydia Villeda MD  Palliative Medicine  [facility]  [phone] cell

## 2018-12-10 NOTE — NURSING
Pt's daughter request that we not wake him up before 9am and hold vital signs during night, stating he got no sleep the night before.

## 2018-12-10 NOTE — PROGRESS NOTES
Arrived to dialysis via stretcher. Oriented x 2. NAD Noted. Agreed to maintenance TX. Cannulation success to left upper arm x 2 15G needles. Needles secured. Denies pain

## 2018-12-10 NOTE — SUBJECTIVE & OBJECTIVE
Interval History: Barrett removed. Daughter says he is weak. He does not like to go to [facility]. Goes to HD  MWF and has sitters per [facility] all day TTH. Daughter is concerned about his ability to be at home by himself. Wonders if SNF/ICF placement with some therapy would be a good plan over the next week or so. He can receive blood at HD.    Past Medical History:   Diagnosis Date    Anemia     Blind     bilaterally    CHF (congestive heart failure)     Chronic kidney disease     Dementia 01/2017    Diabetes mellitus type II     General anesthetics causing adverse effect in therapeutic use     april / may 2014     Glaucoma (increased eye pressure)     Hypertension     Hypothermia 12/6/2017    Seizures        Past Surgical History:   Procedure Laterality Date    ANGIOPLASTY WITH STENT PLACEMENT Left 11/18/2016    Performed by Orin Sims MD at [facility] OR [address] [place] [address]    AV FISTULA PLACEMENT Left 4/2014    AV fistula to Left upper arm    [id number]-LPDXCJI-NO left RC vs BC AVF Left 8/8/2014    Performed by Orin Sims MD at [facility] OR [address] [place] [address]    CYSTOSCOPY N/A 11/29/2018    Procedure: CYSTOSCOPY;  Surgeon: Temo Banerjee MD;  Location: [facility] OR [address] [place] [address];  Service: Urology;  Laterality: N/A;    CYSTOSCOPY N/A 11/29/2018    Performed by Temo Banerjee MD at [facility] OR [address] [place] [address]    EYE SURGERY      FISTULOGRAM Left 11/18/2016    [facility]    FISTULOGRAM Left 4/15/2016    [facility]    FISTULOGRAM Left 1/29/2016    Performed by [facility]in [facility]s MD at [facility] CATH LAB    FISTULOGRAM Room 11 case Left 3/27/2015    [facility]    HYDROCELECTOMY Bilateral 11/22/2017    [facility]    INSERTION-CATHETER-DIALYSIS-PERITONEAL-LAPAROSCOPIC N/A 5/8/2014    [facility]    INSERTION-CATHETER-DIALYSIS-PERITONEAL-LAPAROSCOPIC  N/A 5/5/2014    Performed by [facility]s [facility]add [facility] at [facility] OR 2ND FLR    INSERTION-CATHETER-HEMODIALYSIS N/A 5/21/2014    Performed by Orin [facility]s MD at [facility] OR 2ND FLR    PERMCATH INSERTION-TUNNELED CVC N/A 5/13/2014    Performed by Lolis Jackson MD at [facility] CATH LAB    REMOVAL OF BLOOD CLOT N/A 11/29/2018    Procedure: REMOVAL, BLOOD CLOT;  Surgeon: Temo Banerjee MD;  Location: [facility] OR 1ST FLR;  Service: Urology;  Laterality: N/A;    REMOVAL, BLOOD CLOT N/A 11/29/2018    Performed by Temo Banerjee MD at [facility] OR 1ST FLR    REMOVAL-CATHETER-PERITONEAL DIALYSIS N/A 7/30/2014    Performed by Js Ladd MD at [facility] OR 2ND FLR    SCROTUM EXPLORATION Bilateral 11/2017       Review of patient's allergies indicates:   Allergen Reactions    Lisinopril Swelling       Medications:  Continuous Infusions:  Scheduled Meds:   atorvastatin  40 mg Oral Daily    brimonidine 0.2%  1 drop Left Eye BID    And    timolol maleate 0.5%  1 drop Left Eye BID    brinzolamide  1 drop Left Eye TID    calcium acetate  1,334 mg Oral TID WM    carvedilol  25 mg Oral BID    dutasteride  0.5 mg Oral Daily    levETIRAcetam  500 mg Oral BID    polyethylene glycol  17 g Oral Daily    tamsulosin  0.4 mg Oral Daily    travoprost  1 drop Left Eye QHS     PRN Meds:sodium chloride, sodium chloride, sodium chloride 0.9%, acetaminophen, acetaminophen, albuterol-ipratropium, bisacodyl, dextrose 50%, dextrose 50%, glucagon (human recombinant), glucose, glucose, hydrALAZINE, insulin aspart U-100, ondansetron, promethazine (PHENERGAN) IVPB, ramelteon, sodium chloride 0.9%    Family History     Problem Relation (Age of Onset)    Cancer Daughter    Heart disease Sister, Brother        Tobacco Use    Smoking status: Never Smoker    Smokeless tobacco: Never Used   Substance and Sexual Activity    Alcohol use: No    Drug use: No    Sexual activity: No       Review of Systems   Unable to perform ROS: Patient nonverbal      Objective:     Vital Signs (Most Recent):  Temp: 96.7 °F (35.9 °C) (12/10/18 1006)  Pulse: (!) 57 (12/10/18 1415)  Resp: 16 (12/10/18 1415)  BP: (!) 147/66 (12/10/18 1415)  SpO2: 98 % (12/10/18 0926) Vital Signs (24h Range):  Temp:  [96 °F (35.6 °C)-96.7 °F (35.9 °C)] 96.7 °F (35.9 °C)  Pulse:  [54-72] 57  Resp:  [16-18] 16  SpO2:  [98 %] 98 %  BP: (118-173)/(48-83) 147/66     Weight: 81.7 kg (180 lb 1.9 oz)  Body mass index is 23.76 kg/m².    Review of Symptoms  Symptom Assessment (ESAS 0-10 scale)   ESAS 0 1 2 3 4 5 6 7 8 9 10   Pain x             Dyspnea x             Anxiety x             Nausea x             Depression  x             Anorexia x             Fatigue x             Insomnia x             Restlessness  x             Agitation x             CAM / Delirium _x_ --  ___+   Constipation     _x_ --  ___+   Diarrhea           _x --  ___+  Bowel Management Plan (BMP): Yes    Pain Assessment: no pain  OME in 24 hours: 0    Performance Status: 70    ECOG Performance Status Grade: 2 - Ambulates, capable of self care only. He requires set up for ADL.    Physical Exam   Constitutional: He appears well-developed and well-nourished.   Blind. Sees shadows/light and dark   Cardiovascular: Normal rate and regular rhythm.   Pulmonary/Chest: Effort normal and breath sounds normal.   Musculoskeletal: Normal range of motion.   Psychiatric: He has a normal mood and affect. His behavior is normal.   Nursing note and vitals reviewed.      Significant Labs: All pertinent labs within the past 24 hours have been reviewed.  CBC:   Recent Labs   Lab 12/10/18  0838   WBC 5.66   HGB 7.7*   HCT 23.7*   MCV 81*   *     BMP:  Recent Labs   Lab 12/10/18  0838   GLU 92   *   K 4.6   CL 99   CO2 25   BUN 53*   CREATININE 7.5*   CALCIUM 8.3*     LFT:  Lab Results   Component Value Date    AST 42 (H) 12/05/2018    ALKPHOS 99 12/05/2018    BILITOT 0.4 12/05/2018     Albumin:   Albumin   Date Value Ref Range Status    12/10/2018 2.3 (L) 3.5 - 5.2 g/dL Final     Protein:   Total Protein   Date Value Ref Range Status   12/05/2018 8.0 6.0 - 8.4 g/dL Final     Lactic acid:   Lab Results   Component Value Date    LACTATE 0.6 08/01/2018    LACTATE 0.8 12/06/2017       Significant Imaging: I have reviewed all pertinent imaging results/findings within the past 24 hours.    Advanced Directives::  Living Will: No  LaPOST: No  Do Not Resuscitate Status: Yes- daughter said that she would want him to die in his sleep but would want treatment of fixable things.   Medical Power of : Yes. Agent's Name: Gabriela. Agent's Contact Number: on chart    Decision-Making Capacity: Patient answered questions, Family answered questions    Living Arrangements: Lives with family, Lives in apartment    Psychosocial/Cultural:  Being home is important  Spiritual:   Used to enjoy going to [employer].

## 2018-12-10 NOTE — ASSESSMENT & PLAN NOTE
- continue home medications  12/7: discontinued doxazosin per daughters request (states that urology told her pt should not take this anymore)  12/9: d/c tejeda today for voiding trial;see above

## 2018-12-10 NOTE — PLAN OF CARE
Problem: Patient Care Overview  Goal: Plan of Care Review  Outcome: Ongoing (interventions implemented as appropriate)  Pt resting in bed, no c/o pain or signs of distress, no c/o abd tenderness, UOP small amount noted on briefs, side rails up x 2 for safety, call light in reach, will cont to monitor pt.

## 2018-12-10 NOTE — HPI
"Ronald Campbell is am 89M with ESRD (Still making urine, dialysis MWF), T2DM, CHF, dementia, BPH, HTN, blindness, and seizure disorder presents for evaluation of abnormal lab during dialysis today. HE IS A [facility]. He was recently admitted to this hospital 11/30 for evaluation of hematuria. Urology was consulted and cystoscopy was unrevealing and was discharged on antifungals for candida albicans growing in UCx from 11/21. Per chart, Hgb "6 something" at dialysis and patient referred to ED. Hgb with slow decline since August (when Hgb 12), now ~8. Per daughter, hematuria has improved, but continues intermittently.     On admission, the patient is calm and cooperative, he endorses no complaints and is unable to see his hematuria and can not comment on the quantity or improvement. Daughter is concerned about bleeding and clotting off of urine flow and problems with transfusions.      "

## 2018-12-11 PROBLEM — E83.39 HYPERPHOSPHATEMIA: Status: RESOLVED | Noted: 2018-01-01 | Resolved: 2018-01-01

## 2018-12-11 NOTE — PROGRESS NOTES
Discussed with Dr. Shaw ([facility]) and Dr. Toure.  [facility] has a palliative care program. Patient may be a candidate for SNF at [facility] to improve level of function with carve out for HD at his regular program vs ICF and HD. [facility] will assist with discharge planning. Daughter works during the day. Urology recommends tejeda to assist with control of bleeding and urine flushes.

## 2018-12-11 NOTE — NURSING
Patient has refused AM blood draws after the nurse has attempted to educate on the necessity of the blood draw. Will continue to monitor until arrival of day shift.

## 2018-12-11 NOTE — PLAN OF CARE
Problem: Patient Care Overview  Goal: Plan of Care Review  Outcome: Ongoing (interventions implemented as appropriate)  Patient supine in bed. Patient alert and oriented. Patient resting quietly. Denies any complaints. Patient denies any pain or discomfort at this time. Will continue to monitor.

## 2018-12-11 NOTE — PLAN OF CARE
Patient resting quietly in recliner at bedside when CM rounded. No family present. AMISHA was informed by MAIRA Graham ([phone]) w/[facility] that [facility] has a palliative program & that the patient's daughter, Gabriela Campbell ([phone]), would not want the patient to be admitted to a facility. CM requested palliative care orders from LALITA Fernando. Will continue to follow.

## 2018-12-11 NOTE — NURSING
Patient is lying in bed AOx2 with no complaint of pain or discomfort. Vitals are WDL, and fall precautions are in place. Bed is low and locked, and call light is within reach. Will continue to monitor until arrival of day shift.

## 2018-12-12 NOTE — PROGRESS NOTES
Report received from OLIVIA Taylor. Pt arrived from floor AAOx4. Maintenance dialysis initiated via ANGLE fistula without difficulty.

## 2018-12-12 NOTE — SUBJECTIVE & OBJECTIVE
Interval History: Patient with no complaints. Daughter not in room when assessed this morning. Urology spoke to daughter, tejeda not needed for discharge. Home health orders completed for SW to submit to [facility]. Palliative care orders already completed. Likely discharge tomorrow pending [facility] approval. Will contact daughter tomorrow.    Review of Systems   Unable to perform ROS: Dementia     Objective:     Vital Signs (Most Recent):  Temp: 98.6 °F (37 °C) (12/12/18 1230)  Pulse: (P) 67 (12/12/18 1615)  Resp: 16 (12/12/18 1230)  BP: (!) (P) 178/76 (12/12/18 1615)  SpO2: 96 % (12/12/18 0900) Vital Signs (24h Range):  Temp:  [98.5 °F (36.9 °C)-99.1 °F (37.3 °C)] 98.6 °F (37 °C)  Pulse:  [62-78] (P) 67  Resp:  [16-19] 16  SpO2:  [95 %-99 %] 96 %  BP: (153-182)/(65-88) (P) 178/76     Weight: 79.5 kg (175 lb 4.3 oz)  Body mass index is 23.12 kg/m².  No intake or output data in the 24 hours ending 12/12/18 1639   Physical Exam   Constitutional: He is oriented to person, place, and time. He appears well-developed and well-nourished. No distress.   HENT:   Head: Atraumatic.   Eyes: Right eye exhibits no discharge. Left eye exhibits no discharge.   Blind   Cardiovascular: Normal rate and regular rhythm.   No murmur heard.  Pulmonary/Chest: Effort normal and breath sounds normal. No respiratory distress.   Abdominal: Soft. Bowel sounds are normal. He exhibits no distension. There is no tenderness.   Musculoskeletal: He exhibits no edema.   Neurological: He is alert and oriented to person, place, and time.   Skin: Skin is warm and dry. He is not diaphoretic.   Psychiatric: He has a normal mood and affect. His behavior is normal.   Nursing note and vitals reviewed.      Significant Labs:   CBC:   Recent Labs   Lab 12/11/18  1021 12/12/18  0913   WBC 6.58 7.22   HGB 8.2* 7.8*   HCT 25.5* 24.3*   * 131*     CMP:   Recent Labs   Lab 12/11/18  1021 12/12/18  0913   * 132*   K 4.1 4.7    100   CO2 25 23   GLU 93 91    BUN 27* 43*   CREATININE 5.0* 7.0*   CALCIUM 8.7 8.2*   ALBUMIN 2.5* 2.4*   ANIONGAP 6* 9   EGFRNONAA 9.5* 6.3*       Significant Imaging: I have reviewed all pertinent imaging results/findings within the past 24 hours.

## 2018-12-12 NOTE — ASSESSMENT & PLAN NOTE
- HD MWF  - nephrology consulted; appreciated  - BP and lytes non-emergent  12/7: pt dialyzed today and received 2units blood  12/10,12: pt dialyzed today

## 2018-12-12 NOTE — ASSESSMENT & PLAN NOTE
- continue home medications  12/7: elevated but stable  12/8: hypertensive, will not adjust meds at this time in setting of bleeding  12/9-12: hypertensive, but stable, BP responds to AM meds

## 2018-12-12 NOTE — SUBJECTIVE & OBJECTIVE
Interval History: Per nurse, scant blood seen in urine. Patient HD stable, no complaints. Long discussion with pt's daughter over whether to discharge home with a tejeda in. Risk of obstruction from clots vs. Risk of infection from tejeda discussed at length. Daughter requesting to speak with urology. Feels as if care is not coordinated, slightly hostile. Per previous notes, PA and Physician have discussed at length that bleeding is from highly vascular enlarged prostate, but daughter not convinced. Urology consulted. Per , ambulatory referral to palliative care.     Review of Systems   Unable to perform ROS: Dementia     Objective:     Vital Signs (Most Recent):  Temp: 98.7 °F (37.1 °C) (12/11/18 2000)  Pulse: 72 (12/11/18 2000)  Resp: 17 (12/11/18 2000)  BP: (!) 168/72 (12/11/18 2000)  SpO2: 99 % (12/11/18 2000) Vital Signs (24h Range):  Temp:  [97.1 °F (36.2 °C)-98.8 °F (37.1 °C)] 98.7 °F (37.1 °C)  Pulse:  [61-72] 72  Resp:  [16-20] 17  SpO2:  [96 %-100 %] 99 %  BP: (130-187)/(62-86) 168/72     Weight: 79.5 kg (175 lb 4.3 oz)  Body mass index is 23.12 kg/m².  No intake or output data in the 24 hours ending 12/11/18 2029   Physical Exam   Constitutional: He is oriented to person, place, and time. He appears well-developed and well-nourished. No distress.   HENT:   Head: Atraumatic.   Eyes: Right eye exhibits no discharge. Left eye exhibits no discharge.   Blind   Cardiovascular: Normal rate and regular rhythm.   No murmur heard.  Pulmonary/Chest: Effort normal and breath sounds normal. No respiratory distress.   Abdominal: Soft. Bowel sounds are normal. He exhibits no distension. There is no tenderness.   Musculoskeletal: He exhibits no edema.   Neurological: He is alert and oriented to person, place, and time.   Skin: Skin is warm and dry. He is not diaphoretic.   Psychiatric: He has a normal mood and affect. His behavior is normal.   Nursing note and vitals reviewed.      Significant Labs:   CBC:   Recent Labs    Lab 12/10/18  0838 12/11/18  1021   WBC 5.66 6.58   HGB 7.7* 8.2*   HCT 23.7* 25.5*   * 138*     CMP:   Recent Labs   Lab 12/10/18  0838 12/11/18  1021   * 131*   K 4.6 4.1   CL 99 100   CO2 25 25   GLU 92 93   BUN 53* 27*   CREATININE 7.5* 5.0*   CALCIUM 8.3* 8.7   ALBUMIN 2.3* 2.5*   ANIONGAP 7* 6*   EGFRNONAA 5.8* 9.5*     Significant Imaging: I have reviewed all pertinent imaging results/findings within the past 24 hours.

## 2018-12-12 NOTE — ASSESSMENT & PLAN NOTE
Acute Post-hemorrhagic Anemia  - urology consulted with argenis  Urology saw patient; placed a 22 Fr coude and irrigated tejeda  - UA from 11/21 with candida and completed diflucan treatment. Discharged with empiric cipro, also completed. UCx shows yeast, tx not indicated  - cystoscopy last admission unrevealing  - reported low H/H at dialysis, not confirmed on our labs here  - trend H&H  - HDS  - CT 11/21/2018:  1. Irregular contour about the superolateral aspect of the right kidney, mass cannot be excluded.  There is a tiny non-obstructing calcification within that region.  Right renal cyst.  4.  Several enlarged para-aortic lymph nodes extending along the left ileopsoas with some obliteration of the fat planes with an additional enlarged left external iliac lymph node.  These findings are new since CT pelvis 11/14/2017.  - renal US: Multiple lesions in the right kidney, several of which were identified on recent CT and ultrasound.  Inferior pole hypoechoic lesion is slightly enlarged since prior exam, allowing for differences in measurement technique.  These are nonspecific and a neoplastic process cannot be definitively excluded.  Further evaluation on a nonemergent basis with CT or MRI renal mass protocol.  Ultrasound surveillance to document stability can also be considered.  - transfuse is Hb <7  12/7: Hgb 7.5>6.1; transfused 2U pRBC, 1U on the floor and 1U in dialysis  - urology notified and discussed extensively, pt is high risk for surgical intervention, no further imaging/work up needed for renal masses while in house, blood in tejeda is 2/2 BPH; appreciated  12/8:  Hgb 8.5 this AM, monitor H/H q12H  12/9: H/H stable 8.8/27.5 this AM  - D/C tejeda today for voiding trial  12/10: Hgb 8.8>7.4, recheck in AM, will proactively type and screen for AM labs  - pt voiding small amounts of urine without tejeda (baseline since pt is ESRD on HD)  - daughter agreeable to palliative care discussion this evening to  help with outpatient management of complex medical problems  - CM aware and working on outpatient plan of care  12/11: Hgb 8.2, small amounts of blood in urine per nursing  -  ambulatory referral to palliative care per  for [facility]  - lengthy discussion on whether to discharge with Barrett. Risks of obstruction  vs. Risk of infection. Daughter requesting to speak to Urology. Will follow up  - urology consulted, appreciate recs

## 2018-12-12 NOTE — PLAN OF CARE
[facility]    Home Health Orders  Patient Name: Ronald Campbell  YOB: 1929    PCP: Bart Shaw MD   PCP Address: [address] [facility] [address]  PCP Phone Number: [phone]  PCP Fax: [phone]    Encounter Date: 12/12/2018    Admit to PACE    Diagnoses:  Active Hospital Problems    Diagnosis  POA    *Hematuria [R31.9]  Yes    Palliative care encounter [Z51.5]  Not Applicable    Acute post-hemorrhagic anemia [D62]  Yes    Blindness [H54.7]  Yes    Dementia with behavioral disturbance [F03.91]  Yes    Renovascular hypertension [I15.0]  Yes    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 [E11.22, N18.6]  Yes    ESRD (end stage renal disease) on dialysis [N18.6, Z99.2]  Not Applicable    Benign prostatic hyperplasia with urinary retention [N40.1, R33.8]  Yes    Anemia in chronic kidney disease, on chronic dialysis [N18.6, D63.1, Z99.2]  Not Applicable    Chronic combined systolic and diastolic heart failure [I50.42]  Yes     Chronic    Seizure disorder [G40.909]  Yes     Chronic      Resolved Hospital Problems    Diagnosis Date Resolved POA    Hyperphosphatemia [E83.39] 12/11/2018 Yes     -Ph- 5.1; on calcium acetate 1334 mg po TID WM  -renal diet         No future appointments.  Follow-up Information     Bart Shaw MD On 12/14/2018.    Specialties:  Internal Medicine, Geriatric Medicine  Why:  [place] to schedule hospital follow up appiontment.  Contact information:  [address] [place] [id number] [phone]                     I have seen and examined this patient face to face today. My clinical findings that support the need for the home health skilled services and home bound status are the following:  Weakness/numbness causing balance and gait disturbance due to Infection and Weakness/Debility making it taxing to leave home.  Requiring assistive device to leave home due to unsteady gait caused by  Weakness/Debility.  Medical restrictions  requiring assistance of another human to leave home due to  Blindness.    Allergies:  Review of patient's allergies indicates:   Allergen Reactions    Lisinopril Swelling       Diet: cardiac diet, diabetic diet: 2000 calorie and renal diet    Activities: See below  Physical therapy:    Functional Mobility:  · Bed Mobility:     ? Sit to Supine: minimum assistance  ? Seated scooting along EOB x2 reps with CGA   · Transfers:     ? Sit to Stand:  minimum assistance with hand-held assist and x2 reps  ? Bed to Chair: minimum assistance and of 2 persons with  hand-held assist  using  Stand Pivot (2nd person for safety)  ? Toilet Transfer: minimum assistance and of 2 persons with  hand-held assist  using  Stand Pivot (2nd person for safety)  ? V/c and t/c provided throughout transfers 2* visual impairments and poor safety awareness   · Gait: limited to steps during stand pivot transfers with minAx2 and HHA  ? Increased trunk flexion and knee flexion noted, decreased toe-floor clearance, and impaired weight-shifting ability  ? poor safety awareness noted throughout along with visual impairments, requiring max cues to safely complete     AM-PAC 6 CLICK MOBILITY  Total Score:15               Patient will increase functional independence with mobility by performin. Supine to sit with Contact Guard Assistance  2. Sit to stand transfer with Contact Guard Assistance  3. Bed to chair transfer with Contact Guard Assistance using AD as needed  4. Gait  x 10 feet with Minimal Assistance using AD as needed.   5. Lower extremity exercise program x15 reps, with assist as needed, in order to increase LE strength and (I) with functional mobility.     Occupational Therapy:   Occupational Performance:     Bed Mobility:    · Patient completed Sit to Supine with minimum assistance     Functional Mobility/Transfers:  · Patient completed Sit <> Stand Transfer with minimum assistance  with  hand-held assist   · Patient completed Bed <>  Chair Transfer using Stand Pivot technique with minimum assistance x2 with hand-held assist  · Patient completed Toilet Transfer Stand Pivot technique with minimum assistance and of 2 persons with  hand-held assist  ** Pt visual impaired, requiring both visual and physical assistance with functional transfers     Activities of Daily Living:  · Grooming: moderate assistance to wash hands while seated on toielt ( using bath wipes)   · Toileting: moderate assistance to complete toileting hygiene and clothing management while alternating between sitting/standing      Cognitive/Visual Perceptual:  Cognitive/Psychosocial Skills:     -       Oriented to: Person and Place   -       Follows Commands/attention:Inattentive, Easily distracted and Follows one-step commands  -       Communication: clear/fluent  -       Memory: baseline dementia  -       Safety awareness/insight to disability: impaired   -       Mood/Affect/Coping skills/emotional control: Agitated  Visual/Perceptual:      -Blind     Nursing:   SN to complete comprehensive assessment including routine vital signs. Instruct on disease process and s/s of complications to report to MD. Review/verify medication list sent home with the patient at time of discharge  and instruct patient/caregiver as needed. Frequency may be adjusted depending on start of care date.    Notify MD if SBP > 160 or < 90; DBP > 90 or < 50; HR > 120 or < 50; Temp > 101; Other:      - Turn patient q2 hours  - Progressive mobility (mobilize patient to their highest level of functioning at least twice a day)  - Please note that patient is oliguric (chronic) from ESRD  - Receives HD MWF  - Strict I/O's  - Bladder scan if distended bladder or abdominal pain, notify physician        CONSULTS:    Physical Therapy to evaluate and treat. Evaluate for home safety and equipment needs; Establish/upgrade home exercise program. Perform / instruct on therapeutic exercises, gait training, transfer training,  and Range of Motion.  Occupational Therapy to evaluate and treat. Evaluate home environment for safety and equipment needs. Perform/Instruct on transfers, ADL training, ROM, and therapeutic exercises.   to evaluate for community resources/long-range planning.  Aide to provide assistance with personal care, ADLs, and vital signs.    MISCELLANEOUS CARE:  Routine Skin for Bedridden Patients: Instruct patient/caregiver to apply moisture barrier cream to all skin folds and wet areas in perineal area daily and after baths and all bowel movements.  Diabetic Care:   SN to perform and educate Diabetic management with blood glucose monitoring:, Fingerstick blood sugar AC and HS and Report CBG < 60 or > 350 to physician.  Heart Failure:    SN to instruct on the following:    Instruct on the definition of CHF.   Instruct on the signs/sympoms of CHF to be reported.   Instruct on and monitor daily weights.   Instruct on factors that cause exacerbation.   Instruct on action, dose, schedule, and side effects of medications.   Instruct on diet as prescribed.   Instruct on activity allowed.   Instruct on life-style modifications for life long management of CHF   SN to assess compliance with daily weights, diet, medications, fluid retention,    safety precautions, activities permitted and life-style modifications.   Additional 1-2 SN visits per week as needed for signs and symptoms     of CHF exacerbation.    WOUND CARE ORDERS  n/a      Medications: Review discharge medications with patient and family and provide education.      Current Discharge Medication List      START taking these medications    Details   dutasteride (AVODART) 0.5 mg capsule Take 1 capsule (0.5 mg total) by mouth once daily.  Qty: 30 capsule, Refills: 1         CONTINUE these medications which have NOT CHANGED    Details   aspirin 81 MG Chew Take 1 tablet (81 mg total) by mouth once daily.  Qty: 30 tablet, Refills: 3      atorvastatin (LIPITOR) 40 MG  tablet Take 1 tablet (40 mg total) by mouth once daily.  Qty: 30 tablet, Refills: 9      B complex-vitamin C-folic acid (NEPHRO-JESSICA) 0.8 mg Tab Take 1 tablet by mouth every morning.       blood sugar diagnostic (BLOOD GLUCOSE TEST) Strp 1 strip by Misc.(Non-Drug; Combo Route) route daily as needed.       brimonidine-timolol (COMBIGAN) 0.2-0.5 % Drop Place 1 drop into the left eye 2 (two) times daily.      brinzolamide (AZOPT) 1 % ophthalmic suspension Place 1 drop into the left eye 3 (three) times daily.       calcium acetate (PHOSLO) 667 mg capsule Take 2 capsules by mouth every day in the morning, then take 1 capsule with lunch and 2 capsules in the evening with meals      carvedilol (COREG) 25 MG tablet Take 1 tablet (25 mg total) by mouth 2 (two) times daily. Do not take on dialysis days. Hold for SBP < 110 or HR < 55  Qty: 60 tablet, Refills: 1      doxazosin (CARDURA) 4 MG tablet Take 1 tablet (4 mg total) by mouth once daily.  Qty: 30 tablet, Refills: 1      insulin aspart (NOVOLOG FLEXPEN) 100 unit/mL InPn  Insulin Pen Subcutaneous As directed.  -200 take 1 unitBS 201-250 take 2 unitsBS 251-300 take 3 unitsBS 301-350 take 4 unitsBS > 351 take 5 units and call MD      ketoconazole (NIZORAL) 2 % cream Apply topically once daily. feet  Qty: 30 g, Refills: 3    Comments: Any alternate antifungal cream OK  Associated Diagnoses: Tinea pedis of both feet      levetiracetam (KEPPRA) 500 MG Tab Take 1 tablet (500 mg total) by mouth 2 (two) times daily.  Qty: 60 tablet, Refills: 11      tamsulosin (FLOMAX) 0.4 mg Cap Take 1 capsule (0.4 mg total) by mouth once daily.  Qty: 30 capsule, Refills: 11      TRAVOPROST (TRAVATAN Z OPHT) Place 1 drop into the left eye every evening. 1 Drops Left eye Every evening         STOP taking these medications       ciprofloxacin HCl (CIPRO) 250 MG tablet Comments:   Reason for Stopping:         fluconazole (DIFLUCAN) 100 MG tablet Comments:   Reason for Stopping:                I certify that this patient is confined to his home and needs intermittent skilled nursing care, physical therapy and occupational therapy.      Heather Fernando PA-C  Department of [facility] Medicine  [facility]

## 2018-12-12 NOTE — ASSESSMENT & PLAN NOTE
Acute Post-hemorrhagic Anemia  - urology consulted with argenis  Urology saw patient; placed a 22 Fr coude and irrigated tejeda  - UA from 11/21 with candida and completed diflucan treatment. Discharged with empiric cipro, also completed. UCx shows yeast, tx not indicated  - cystoscopy last admission unrevealing  - reported low H/H at dialysis, not confirmed on our labs here  - trend H&H  - HDS  - CT 11/21/2018:  1. Irregular contour about the superolateral aspect of the right kidney, mass cannot be excluded.  There is a tiny non-obstructing calcification within that region.  Right renal cyst.  4.  Several enlarged para-aortic lymph nodes extending along the left ileopsoas with some obliteration of the fat planes with an additional enlarged left external iliac lymph node.  These findings are new since CT pelvis 11/14/2017.  - renal US: Multiple lesions in the right kidney, several of which were identified on recent CT and ultrasound.  Inferior pole hypoechoic lesion is slightly enlarged since prior exam, allowing for differences in measurement technique.  These are nonspecific and a neoplastic process cannot be definitively excluded.  Further evaluation on a nonemergent basis with CT or MRI renal mass protocol.  Ultrasound surveillance to document stability can also be considered.  - transfuse is Hb <7  12/7: Hgb 7.5>6.1; transfused 2U pRBC, 1U on the floor and 1U in dialysis  - urology notified and discussed extensively, pt is high risk for surgical intervention, no further imaging/work up needed for renal masses while in house, blood in tejeda is 2/2 BPH; appreciated  12/8:  Hgb 8.5 this AM, monitor H/H q12H  12/9: H/H stable 8.8/27.5 this AM  - D/C tejeda today for voiding trial  12/10: Hgb 8.8>7.4, recheck in AM, will proactively type and screen for AM labs  - pt voiding small amounts of urine without tejeda (baseline since pt is ESRD on HD)  - daughter agreeable to palliative care discussion this evening to  help with outpatient management of complex medical problems  - CM aware and working on outpatient plan of care  12/11: Hgb 8.2, small amounts of blood in urine per nursing  -  ambulatory referral to palliative care per  for [facility]  - lengthy discussion on whether to discharge with Barrett. Risks of obstruction  vs. Risk of infection. Daughter requesting to speak to Urology. Will follow up  - urology consulted, appreciate recs  12/12: Urology spoke to daughter, patient will NOT be discharged with Barrett  - likely discharge tomorrow pending PACE approval

## 2018-12-12 NOTE — PROGRESS NOTES
Urology Progress Note    Recommend that patient be discharged without tejeda catheter. This was discussed with the patient's daughter. Recommendations for when to return to the hospital were discussed.     Advised to call urology on call with questions if there is concern for when to return to the ED.    Call with questions.    Jeremy Clayton MD  Urology, PGY-2  [facility]

## 2018-12-12 NOTE — PROGRESS NOTES
"[facility] Medicine  Progress Note    Patient Name: Ronald Campbell  MRN: [id number]  Patient Class: IP- Inpatient   Admission Date: 12/5/2018  Length of Stay: 3 days  Attending Physician: JULIO C Toure MD  Primary Care Provider: Bart Shaw MD    [facility] Medicine Team: [facility] Heather Fernando PA-C    Subjective:     Principal Problem:Hematuria    HPI:  Ronald Campbell is am 89M with ESRD (Still making urine, dialysis MWF), T2DM, CHF, dementia, BPH, HTN, blindness, and seizure disorder presents for evaluation of abnormal lab during dialysis today. No family at bedside and patient unreliable historian. History per chart review. He was recently admitted to this hospital 11/30 for evaluation of hematuria. Urology was consulted and cystoscopy was unrevealing and was discharged on antifungals for candida albicans growing in UCx from 11/21. Per chart, Hgb "6 something" at dialysis and patient referred to ED. Hgb with slow decline since August (when Hgb 12), now ~8. Per daughter, hematuria has improved, but continues intermittently.    On admission, the patient is calm and cooperative, he endorses no complaints and is unable to see his hematuria and can not comment on the quantity or improvement.     ED: Hgb 7.5, HDS, lytes non-emergent, tejeda placed with gross hematuria, urology consulted.     Hospital Course:  Patient admitted to observation for hematuria. Patient recently discharged with similar complaint. Urology saw the patient but cystoscopy was non diagnostic. Urology saw patient; placed a 22 Fr coude and irrigated tejeda. 12/7: Hgb 7.5> 6.1, 2 units prbc given. HD today. 12/8: Hgb improvement to 8.5 this AM; discussed case extensively with urology- patient his high risk for surgical intervention. Not necessary to further work up renal masses, continue to monitor H/H and consider transfusions with HD 12/9: Hgb stable, daughter agreeable to palliative care consult tomorrow for help " with management of complex medical problems. 12/10: pt dialyzed today; Hgb 7.7. Palliative care to discuss plan of care and options with daughter at bedside this PM.     PT/OT rec [facility]. Pt will need follow up with PCP. Pt follow with AMISHA WATTERS working to develop outpt plan of care pending palliative care discussion this PM.    Interval History: Per nurse, scant blood seen in urine. Patient HD stable, no complaints. Long discussion with pt's daughter over whether to discharge home with a tejeda in. Risk of obstruction from clots vs. Risk of infection from tejeda discussed at length. Daughter requesting to speak with urology. Feels as if care is not coordinated, slightly hostile. Per previous notes, PA and Physician have discussed at length that bleeding is from highly vascular enlarged prostate, but daughter not convinced. Urology consulted. Per MAIRA, ambulatory referral to palliative care.     Review of Systems   Unable to perform ROS: Dementia     Objective:     Vital Signs (Most Recent):  Temp: 98.7 °F (37.1 °C) (12/11/18 2000)  Pulse: 72 (12/11/18 2000)  Resp: 17 (12/11/18 2000)  BP: (!) 168/72 (12/11/18 2000)  SpO2: 99 % (12/11/18 2000) Vital Signs (24h Range):  Temp:  [97.1 °F (36.2 °C)-98.8 °F (37.1 °C)] 98.7 °F (37.1 °C)  Pulse:  [61-72] 72  Resp:  [16-20] 17  SpO2:  [96 %-100 %] 99 %  BP: (130-187)/(62-86) 168/72     Weight: 79.5 kg (175 lb 4.3 oz)  Body mass index is 23.12 kg/m².  No intake or output data in the 24 hours ending 12/11/18 2029   Physical Exam   Constitutional: He is oriented to person, place, and time. He appears well-developed and well-nourished. No distress.   HENT:   Head: Atraumatic.   Eyes: Right eye exhibits no discharge. Left eye exhibits no discharge.   Blind   Cardiovascular: Normal rate and regular rhythm.   No murmur heard.  Pulmonary/Chest: Effort normal and breath sounds normal. No respiratory distress.   Abdominal: Soft. Bowel sounds are normal. He exhibits no distension. There is no  tenderness.   Musculoskeletal: He exhibits no edema.   Neurological: He is alert and oriented to person, place, and time.   Skin: Skin is warm and dry. He is not diaphoretic.   Psychiatric: He has a normal mood and affect. His behavior is normal.   Nursing note and vitals reviewed.      Significant Labs:   CBC:   Recent Labs   Lab 12/10/18  0838 12/11/18  1021   WBC 5.66 6.58   HGB 7.7* 8.2*   HCT 23.7* 25.5*   * 138*     CMP:   Recent Labs   Lab 12/10/18  0838 12/11/18  1021   * 131*   K 4.6 4.1   CL 99 100   CO2 25 25   GLU 92 93   BUN 53* 27*   CREATININE 7.5* 5.0*   CALCIUM 8.3* 8.7   ALBUMIN 2.3* 2.5*   ANIONGAP 7* 6*   EGFRNONAA 5.8* 9.5*     Significant Imaging: I have reviewed all pertinent imaging results/findings within the past 24 hours.    Assessment/Plan:      * Hematuria    Acute Post-hemorrhagic Anemia  - urology consulted with appreciation  Urology saw patient; placed a 22 Fr coude and irrigated tejeda  - UA from 11/21 with candida and completed diflucan treatment. Discharged with empiric cipro, also completed. UCx shows yeast, tx not indicated  - cystoscopy last admission unrevealing  - reported low H/H at dialysis, not confirmed on our labs here  - trend H&H  - HDS  - CT 11/21/2018:  1. Irregular contour about the superolateral aspect of the right kidney, mass cannot be excluded.  There is a tiny non-obstructing calcification within that region.  Right renal cyst.  4.  Several enlarged para-aortic lymph nodes extending along the left ileopsoas with some obliteration of the fat planes with an additional enlarged left external iliac lymph node.  These findings are new since CT pelvis 11/14/2017.  - renal US: Multiple lesions in the right kidney, several of which were identified on recent CT and ultrasound.  Inferior pole hypoechoic lesion is slightly enlarged since prior exam, allowing for differences in measurement technique.  These are nonspecific and a neoplastic process cannot be  definitively excluded.  Further evaluation on a nonemergent basis with CT or MRI renal mass protocol.  Ultrasound surveillance to document stability can also be considered.  - transfuse is Hb <7  12/7: Hgb 7.5>6.1; transfused 2U pRBC, 1U on the floor and 1U in dialysis  - urology notified and discussed extensively, pt is high risk for surgical intervention, no further imaging/work up needed for renal masses while in house, blood in tejeda is 2/2 BPH; appreciated  12/8:  Hgb 8.5 this AM, monitor H/H q12H  12/9: H/H stable 8.8/27.5 this AM  - D/C tejeda today for voiding trial  12/10: Hgb 8.8>7.4, recheck in AM, will proactively type and screen for AM labs  - pt voiding small amounts of urine without tejeda (baseline since pt is ESRD on HD)  - daughter agreeable to palliative care discussion this evening to help with outpatient management of complex medical problems  - CM aware and working on outpatient plan of care  12/11: Hgb 8.2, small amounts of blood in urine per nursing  -  ambulatory referral to palliative care per  for [facility]  - lengthy discussion on whether to discharge with Tejeda. Risks of obstruction  vs. Risk of infection. Daughter requesting to speak to Urology. 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    - diet controlled, start low dose SSI  12/7-11: stable, continue to monitor     ESRD (end stage renal disease) on dialysis    - HD MWF  - nephrology consulted; appreciated  - BP and lytes non-emergent  12/7: pt dialyzed today and received 2units  blood  12/10: pt dialyzed today     Benign prostatic hyperplasia with urinary retention    - continue home medications  12/7: discontinued doxazosin per daughters request (states that urology told her pt should not take this anymore)  12/9: d/c tejeda today for voiding trial;see above  12/11: discussion over whether to discharge with tejeda in place, or leave it removed. Risk of obstruction vs. Risk of infection. Daughter requesting to speak to urology.  - urology consulted, recs appreciated.     Anemia in chronic kidney disease, on chronic dialysis    - H/H at baseline, but has trended down from Hgb of 12 in August  - see hematuria     Chronic combined systolic and diastolic heart failure    - compensated, continue home medications     Seizure disorder    - continue home keppra       VTE Risk Mitigation (From admission, onward)        Ordered     Place MORGAN hose  Until discontinued      12/05/18 2114     Place sequential compression device  Until discontinued      12/05/18 2114     IP VTE HIGH RISK PATIENT  Once      12/05/18 2114              Heather Fernando PA-C  Department of Hospital Medicine   [facility]

## 2018-12-12 NOTE — PROGRESS NOTES
"[facility] Hospital Medicine  Progress Note    Patient Name: Ronald Campbell  MRN: [id number]  Patient Class: IP- Inpatient   Admission Date: 12/5/2018  Length of Stay: 4 days  Attending Physician: JULIO C Toure MD  Primary Care Provider: Bart Shaw MD    [facility] Team: [facility] MED F Heather Fernando PA-C    Subjective:     Principal Problem:Hematuria    HPI:  Ronald Campbell is am 89M with ESRD (Still making urine, dialysis MWF), T2DM, CHF, dementia, BPH, HTN, blindness, and seizure disorder presents for evaluation of abnormal lab during dialysis today. No family at bedside and patient unreliable historian. History per chart review. He was recently admitted to this hospital 11/30 for evaluation of hematuria. Urology was consulted and cystoscopy was unrevealing and was discharged on antifungals for candida albicans growing in UCx from 11/21. Per chart, Hgb "6 something" at dialysis and patient referred to ED. Hgb with slow decline since August (when Hgb 12), now ~8. Per daughter, hematuria has improved, but continues intermittently.    On admission, the patient is calm and cooperative, he endorses no complaints and is unable to see his hematuria and can not comment on the quantity or improvement.     ED: Hgb 7.5, HDS, lytes non-emergent, tejeda placed with gross hematuria, urology consulted.     Hospital Course:  Patient admitted to observation for hematuria. Patient recently discharged with similar complaint. Urology saw the patient but cystoscopy was non diagnostic. Urology saw patient; placed a 22 Fr coude and irrigated tejeda. 12/7: Hgb 7.5> 6.1, 2 units prbc given. HD today. 12/8: Hgb improvement to 8.5 this AM; discussed case extensively with urology- patient his high risk for surgical intervention. Not necessary to further work up renal masses, continue to monitor H/H and consider transfusions with HD 12/9: Hgb stable, daughter agreeable to palliative care consult tomorrow for help " with management of complex medical problems. 12/10: pt dialyzed today; Hgb 7.7. Palliative care to discuss plan of care and options with daughter at bedside this PM.     PT/OT rec [facility]. Pt will need follow up with PCP. Pt follow with AMISHA WATTERS working to develop outpt plan of care pending palliative care discussion this PM.    Interval History: Patient with no complaints. Daughter not in room when assessed this morning. Urology spoke to daughter, tejeda not needed for discharge. Home health orders completed for SW to submit to [facility]. Palliative care orders already completed. Likely discharge tomorrow pending [facility] approval. Will contact daughter tomorrow.    Review of Systems   Unable to perform ROS: Dementia     Objective:     Vital Signs (Most Recent):  Temp: 98.6 °F (37 °C) (12/12/18 1230)  Pulse: (P) 67 (12/12/18 1615)  Resp: 16 (12/12/18 1230)  BP: (!) (P) 178/76 (12/12/18 1615)  SpO2: 96 % (12/12/18 0900) Vital Signs (24h Range):  Temp:  [98.5 °F (36.9 °C)-99.1 °F (37.3 °C)] 98.6 °F (37 °C)  Pulse:  [62-78] (P) 67  Resp:  [16-19] 16  SpO2:  [95 %-99 %] 96 %  BP: (153-182)/(65-88) (P) 178/76     Weight: 79.5 kg (175 lb 4.3 oz)  Body mass index is 23.12 kg/m².  No intake or output data in the 24 hours ending 12/12/18 1639   Physical Exam   Constitutional: He is oriented to person, place, and time. He appears well-developed and well-nourished. No distress.   HENT:   Head: Atraumatic.   Eyes: Right eye exhibits no discharge. Left eye exhibits no discharge.   Blind   Cardiovascular: Normal rate and regular rhythm.   No murmur heard.  Pulmonary/Chest: Effort normal and breath sounds normal. No respiratory distress.   Abdominal: Soft. Bowel sounds are normal. He exhibits no distension. There is no tenderness.   Musculoskeletal: He exhibits no edema.   Neurological: He is alert and oriented to person, place, and time.   Skin: Skin is warm and dry. He is not diaphoretic.   Psychiatric: He has a normal mood and affect. His  behavior is normal.   Nursing note and vitals reviewed.      Significant Labs:   CBC:   Recent Labs   Lab 12/11/18  1021 12/12/18  0913   WBC 6.58 7.22   HGB 8.2* 7.8*   HCT 25.5* 24.3*   * 131*     CMP:   Recent Labs   Lab 12/11/18  1021 12/12/18  0913   * 132*   K 4.1 4.7    100   CO2 25 23   GLU 93 91   BUN 27* 43*   CREATININE 5.0* 7.0*   CALCIUM 8.7 8.2*   ALBUMIN 2.5* 2.4*   ANIONGAP 6* 9   EGFRNONAA 9.5* 6.3*       Significant Imaging: I have reviewed all pertinent imaging results/findings within the past 24 hours.    Assessment/Plan:      * Hematuria    Acute Post-hemorrhagic Anemia  - urology consulted with appreciation  Urology saw patient; placed a 22 Fr coude and irrigated tejeda  - UA from 11/21 with candida and completed diflucan treatment. Discharged with empiric cipro, also completed. UCx shows yeast, tx not indicated  - cystoscopy last admission unrevealing  - reported low H/H at dialysis, not confirmed on our labs here  - trend H&H  - HDS  - CT 11/21/2018:  1. Irregular contour about the superolateral aspect of the right kidney, mass cannot be excluded.  There is a tiny non-obstructing calcification within that region.  Right renal cyst.  4.  Several enlarged para-aortic lymph nodes extending along the left ileopsoas with some obliteration of the fat planes with an additional enlarged left external iliac lymph node.  These findings are new since CT pelvis 11/14/2017.  - renal US: Multiple lesions in the right kidney, several of which were identified on recent CT and ultrasound.  Inferior pole hypoechoic lesion is slightly enlarged since prior exam, allowing for differences in measurement technique.  These are nonspecific and a neoplastic process cannot be definitively excluded.  Further evaluation on a nonemergent basis with CT or MRI renal mass protocol.  Ultrasound surveillance to document stability can also be considered.  - transfuse is Hb <7  12/7: Hgb 7.5>6.1; transfused 2U  pRBC, 1U on the floor and 1U in dialysis  - urology notified and discussed extensively, pt is high risk for surgical intervention, no further imaging/work up needed for renal masses while in house, blood in tejeda is 2/2 BPH; appreciated  12/8:  Hgb 8.5 this AM, monitor H/H q12H  12/9: H/H stable 8.8/27.5 this AM  - D/C tejeda today for voiding trial  12/10: Hgb 8.8>7.4, recheck in AM, will proactively type and screen for AM labs  - pt voiding small amounts of urine without tejeda (baseline since pt is ESRD on HD)  - daughter agreeable to palliative care discussion this evening to help with outpatient management of complex medical problems  - CM aware and working on outpatient plan of care  12/11: Hgb 8.2, small amounts of blood in urine per nursing  -  ambulatory referral to palliative care per  for [facility]  - lengthy discussion on whether to discharge with Tejeda. Risks of obstruction  vs. Risk of infection. Daughter requesting to speak to Urology. Will follow up  - urology consulted, appreciate recs  12/12: Urology spoke to daughter, patient will NOT be discharged with Tejeda  - likely discharge tomorrow pending [facility] approval     Acute post-hemorrhagic anemia           Blindness    - home gtts     Dementia with behavioral disturbance    - delirium precautions  - zyprexa PRN  12/7: PT/OT rec [facility]     Renovascular hypertension     - continue home medications  12/7: elevated but stable  12/8: hypertensive, will not adjust meds at this time in setting of bleeding  12/9-12: hypertensive, but stable, BP responds to AM meds     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    - diet controlled, start low dose SSI  12/7-12: stable, continue to monitor     ESRD (end stage renal disease) on dialysis    - HD MWF  - nephrology consulted; appreciated  - BP and lytes non-emergent  12/7: pt dialyzed today and received 2units blood  12/10,12: pt dialyzed today     Benign prostatic hyperplasia with urinary retention    -  continue home medications  12/7: discontinued doxazosin per daughters request (states that urology told her pt should not take this anymore)  12/9: d/c tejeda today for voiding trial;see above  12/11: discussion over whether to discharge with tejeda in place, or leave it removed. Risk of obstruction vs. Risk of infection. Daughter requesting to speak to urology.  - urology consulted, recs appreciated.  12/12: Urology discussed with daughter; Patient will not go home with tejeda     Anemia in chronic kidney disease, on chronic dialysis    - H/H at baseline, but has trended down from Hgb of 12 in August  - see hematuria     Chronic combined systolic and diastolic heart failure    - compensated, continue home medications     Seizure disorder    - continue home keppra       VTE Risk Mitigation (From admission, onward)        Ordered     Place MORGAN hose  Until discontinued      12/05/18 2114     Place sequential compression device  Until discontinued      12/05/18 2114     IP VTE HIGH RISK PATIENT  Once      12/05/18 2114              Heather Fernando PA-C  Department of Hospital Medicine   [facility]

## 2018-12-12 NOTE — ASSESSMENT & PLAN NOTE
- continue home medications  12/7: elevated but stable  12/8: hypertensive, will not adjust meds at this time in setting of bleeding  12/9-11: hypertensive, but stable, BP responds to AM meds

## 2018-12-12 NOTE — ASSESSMENT & PLAN NOTE
- continue home medications  12/7: discontinued doxazosin per daughters request (states that urology told her pt should not take this anymore)  12/9: d/c tejeda today for voiding trial;see above  12/11: discussion over whether to discharge with tejeda in place, or leave it removed. Risk of obstruction vs. Risk of infection. Daughter requesting to speak to urology.  - urology consulted, recs appreciated.  12/12: Urology discussed with daughter; Patient will not go home with tejeda

## 2018-12-12 NOTE — ASSESSMENT & PLAN NOTE
- continue home medications  12/7: discontinued doxazosin per daughters request (states that urology told her pt should not take this anymore)  12/9: d/c tejeda today for voiding trial;see above  12/11: discussion over whether to discharge with tejeda in place, or leave it removed. Risk of obstruction vs. Risk of infection. Daughter requesting to speak to urology.  - urology consulted, recs appreciated.

## 2018-12-13 NOTE — ASSESSMENT & PLAN NOTE
Acute Post-hemorrhagic Anemia  - urology consulted with argenis  Urology saw patient; placed a 22 Fr coude and irrigated tejeda  - UA from 11/21 with candida and completed diflucan treatment. Discharged with empiric cipro, also completed. UCx shows yeast, tx not indicated  - cystoscopy last admission unrevealing  - reported low H/H at dialysis, not confirmed on our labs here  - trend H&H  - HDS  - CT 11/21/2018:  1. Irregular contour about the superolateral aspect of the right kidney, mass cannot be excluded.  There is a tiny non-obstructing calcification within that region.  Right renal cyst.  4.  Several enlarged para-aortic lymph nodes extending along the left ileopsoas with some obliteration of the fat planes with an additional enlarged left external iliac lymph node.  These findings are new since CT pelvis 11/14/2017.  - renal US: Multiple lesions in the right kidney, several of which were identified on recent CT and ultrasound.  Inferior pole hypoechoic lesion is slightly enlarged since prior exam, allowing for differences in measurement technique.  These are nonspecific and a neoplastic process cannot be definitively excluded.  Further evaluation on a nonemergent basis with CT or MRI renal mass protocol.  Ultrasound surveillance to document stability can also be considered.  - transfuse is Hb <7  12/7: Hgb 7.5>6.1; transfused 2U pRBC, 1U on the floor and 1U in dialysis  - urology notified and discussed extensively, pt is high risk for surgical intervention, no further imaging/work up needed for renal masses while in house, blood in tejeda is 2/2 BPH; appreciated  12/8:  Hgb 8.5 this AM, monitor H/H q12H  12/9: H/H stable 8.8/27.5 this AM  - D/C tejeda today for voiding trial  12/10: Hgb 8.8>7.4, recheck in AM, will proactively type and screen for AM labs  - pt voiding small amounts of urine without tejeda (baseline since pt is ESRD on HD)  - daughter agreeable to palliative care discussion this evening to  help with outpatient management of complex medical problems  - CM aware and working on outpatient plan of care  12/11: Hgb 8.2, small amounts of blood in urine per nursing  -  ambulatory referral to palliative care per SW for [facility]  - lengthy discussion on whether to discharge with Barrett. Risks of obstruction  vs. Risk of infection. Daughter requesting to speak to Urology.   - urology consulted, appreciate recs  12/12: Urology spoke to daughter, patient will NOT be discharged with Barrett  12/13: Pt discharged home with [facility]. Follow up with PCP in one week, [facility] will facilitate.

## 2018-12-13 NOTE — PLAN OF CARE
Problem: Patient Care Overview  Goal: Plan of Care Review  Outcome: Ongoing (interventions implemented as appropriate)  Pt was pleasant all night without agitation. No c/o of pain. I have not seen any hematuria from him. Slept well most of the night. Bed alarm was on, pt free of fall. BP still elevated all night. No other acute distress noted. Report given to Brandon BAKER

## 2018-12-13 NOTE — PLAN OF CARE
Problem: Physical Therapy Goal  Goal: Physical Therapy Goal  Goals to be met by: 18     Patient will increase functional independence with mobility by performin. Supine to sit with Contact Guard Assistance  2. Sit to stand transfer with Contact Guard Assistance  3. Bed to chair transfer with Contact Guard Assistance using AD as needed  4. Gait  x 10 feet with Minimal Assistance using AD as needed.   5. Lower extremity exercise program x15 reps, with assist as needed, in order to increase LE strength and (I) with functional mobility.      Outcome: Ongoing (interventions implemented as appropriate)  Goals remain appropriate. Continue with POC.    Tacos Can, SPT  2018

## 2018-12-13 NOTE — PT/OT/SLP PROGRESS
Occupational Therapy   Treatment    Name: Ronald Campbell  MRN: [id number]  Admitting Diagnosis:  Hematuria       Recommendations:     Discharge Recommendations: (HHOT/PT ( with 24 hr supersion) --pursuing palliative care  Discharge Equipment Recommendations:  none  Barriers to discharge:  None    Subjective     Pain/Comfort:  · Pain Rating 1: 0/10  · Pain Rating Post-Intervention 1: 0/10    Objective:     Communicated with: RN prior to session.  Patient found with all lines intact, call button in reach and RN notified and telemetry, tejeda catheter upon OT entry to room.    General Precautions: Standard, fall, seizure, vision impaired   Orthopedic Precautions:N/A   Braces: N/A     Occupational Performance:    Bed Mobility:    · Patient completed Rolling/Turning to Right with minimum assistance  · Patient completed Supine to Sit with minimum assistance     Functional Mobility/Transfers:  · Patient completed Sit <> Stand Transfer with moderate assistance  with  no assistive device   · Patient completed Bed <> Chair Transfer using Stand Pivot technique with maximal assistance with no assistive device  ( max cues provided for visual assistance)     Activities of Daily Living:  · Feeding:  minimum assistance to drink from cup using RUE while seated UIC  · Upper Body Dressing: moderate assistance to rufina gown like jacket while seated UIC  · Lower Body Dressing: maximal assistance to doff/rufina brief while alternating between sitting/standing position  · Toileting: maximum assistance to complete toilet hygiene; pt able to assist with perineal care in standing using RUE    [facility] 6 Click ADL: 14    Treatment & Education:  -Pt edu on OT role/POC  -Importance of OOB activity with staff assistance  -Safety during functional t/f and mobility  - White board updated  - Multiple self care tasks completed-- assistance level noted  - Communicated with Gisela( ) regarding patients assistance level in therapy session/d/c  recommendations with 24 hr assistance needed at home    Patient left up in chair with all lines intact, call button in reach and RN notified  Education:    Assessment:     Ronald Campbell is a 89 y.o. male with a medical diagnosis of Hematuria.  He presents with the following performance deficits affecting function are weakness, gait instability, impaired endurance, impaired balance, impaired self care skills, impaired cognition, impaired functional mobilty, decreased lower extremity function, visual deficits. Pt required  Increased level of skilled assistance with ADL and functional mobility at this time.  OT recommending HHOT ( with 24hr supervision) to ensure safe transition to home environment.     Rehab Prognosis:  Poor; patient would benefit from acute skilled OT services to address these deficits and reach maximum level of function.       Plan:     Patient to be seen 2 x/week to address the above listed problems via self-care/home management, therapeutic activities, therapeutic exercises, neuromuscular re-education  · Plan of Care Expires: 01/05/19  · Plan of Care Reviewed with: patient    This Plan of care has been discussed with the patient who was involved in its development and understands and is in agreement with the identified goals and treatment plan    GOALS:   Multidisciplinary Problems     Occupational Therapy Goals        Problem: Occupational Therapy Goal    Goal Priority Disciplines Outcome Interventions   Occupational Therapy Goal     OT, PT/OT Ongoing (interventions implemented as appropriate)    Description:  Goals to be met by: 12/21/18    Patient will increase functional independence with ADLs by performing:    UE Dressing with Moderate Assistance.  LE Dressing with Moderate Assistance.  Grooming while EOB with Minimal Assistance with set- up assistance.  Toileting from toilet with Minimal Assistance for hygiene and clothing management.   Supine to sit with Minimal Assistance. Met  12/13  Toilet transfer to toilet with Contact Guard Assistance.                       Time Tracking:     OT Date of Treatment: 12/13/18  OT Start Time: 0952  OT Stop Time: 1013  OT Total Time (min): 21 min    Billable Minutes:Self Care/Home Management 21    Heather matthews OT  12/13/2018

## 2018-12-13 NOTE — ASSESSMENT & PLAN NOTE
- continue home medications  12/7: elevated but stable  12/8: hypertensive, will not adjust meds at this time in setting of bleeding  12/9-12: hypertensive, but stable, BP responds to AM meds  12/12: hypertension not responding to morning meds, added Norvasc 5mg. Upon discharge, PCP to titrate up with goal of 140/90.

## 2018-12-13 NOTE — DISCHARGE SUMMARY
"[facility] Hospital Medicine  Discharge Summary      Patient Name: Ronald Campbell  MRN: [id number]  Admission Date: 12/5/2018  Hospital Length of Stay: 5 days  Discharge Date and Time: No discharge date for patient encounter.  Attending Physician: JULIO C Toure MD   Discharging Provider: Heather Fernando PA-C  Primary Care Provider: Bart Shaw MD  [facility] Medicine Team: [facility] MED F Heather Fernando PA-C    HPI:   Ronald Campbell is am 89M with ESRD (Still making urine, dialysis MWF), T2DM, CHF, dementia, BPH, HTN, blindness, and seizure disorder presents for evaluation of abnormal lab during dialysis today. No family at bedside and patient unreliable historian. History per chart review. He was recently admitted to this hospital 11/30 for evaluation of hematuria. Urology was consulted and cystoscopy was unrevealing and was discharged on antifungals for candida albicans growing in UCx from 11/21. Per chart, Hgb "6 something" at dialysis and patient referred to ED. Hgb with slow decline since August (when Hgb 12), now ~8. Per daughter, hematuria has improved, but continues intermittently.    On admission, the patient is calm and cooperative, he endorses no complaints and is unable to see his hematuria and can not comment on the quantity or improvement.     ED: Hgb 7.5, HDS, lytes non-emergent, tejeda placed with gross hematuria, urology consulted.     * No surgery found *      Hospital Course:   Patient admitted to observation for hematuria. Patient recently discharged with similar complaint. Urology saw the patient but cystoscopy was non diagnostic. Urology consulted, placed a 22 Fr coude and irrigated tejeda. 12/7: Hgb 7.5> 6.1, 2 units prbc given. HD today. PT/ OT consulted, recommend home health care. 12/8: Hgb improvement to 8.5 this AM; discussed case extensively with urology- patient is high risk for surgical intervention. Not necessary to further work up renal masses, continue to monitor " H/H and consider transfusions with HD 12/9: Hgb stable, daughter agreeable to palliative care consult tomorrow for help with management of complex medical problems. 12/10: HD today; Hgb 7.7. Palliative care discussed plan of care and options with daughter at bedside this PM. Tejeda removed after discussion with daughter over concern for increased risk of infection if patient to be sent home with it. SW and CM coordinating with [facility]. 12/11: Lengthy discussion with daughter on whether to discharge patient with tejeda or not, requesting to speak to urology. Urology consulted. 12/12: Urology spoke to daughter, patient will not be discharged with a tejeda. Home health orders and Palliative care referral sent to [facility]. 12/13: Pt's blood pressure consistently elevated despite morning meds, added Norvasc 5mg. Daughter had meeting with [facility]. Will be discharged home with palliative care. All questions answered. Follow up with PCP in 1 week. PCP to titrate up Norvasc as needed. [facility] will facilitate follow up.      Consults:   Consults (From admission, onward)        Status Ordering Provider     Inpatient consult to Nephrology  Once     Provider:  (Not yet assigned)    Completed GORAN HADLEY     Inpatient consult to Palliative Care  Once     Provider:  (Not yet assigned)    Completed ERMELINDA AZEVEDO     Inpatient consult to Urology  Once     Provider:  (Not yet assigned)    Completed DENNISE RAMIRES          * Hematuria    Acute Post-hemorrhagic Anemia  - urology consulted with argenis  Urology saw patient; placed a 22 Fr coude and irrigated tejeda  - UA from 11/21 with candida and completed diflucan treatment. Discharged with empiric cipro, also completed. UCx shows yeast, tx not indicated  - cystoscopy last admission unrevealing  - reported low H/H at dialysis, not confirmed on our labs here  - trend H&H  - HDS  - CT 11/21/2018:  1. Irregular contour about the superolateral aspect of the right kidney, mass cannot be  excluded.  There is a tiny non-obstructing calcification within that region.  Right renal cyst.  4.  Several enlarged para-aortic lymph nodes extending along the left ileopsoas with some obliteration of the fat planes with an additional enlarged left external iliac lymph node.  These findings are new since CT pelvis 11/14/2017.  - renal US: Multiple lesions in the right kidney, several of which were identified on recent CT and ultrasound.  Inferior pole hypoechoic lesion is slightly enlarged since prior exam, allowing for differences in measurement technique.  These are nonspecific and a neoplastic process cannot be definitively excluded.  Further evaluation on a nonemergent basis with CT or MRI renal mass protocol.  Ultrasound surveillance to document stability can also be considered.  - transfuse is Hb <7  12/7: Hgb 7.5>6.1; transfused 2U pRBC, 1U on the floor and 1U in dialysis  - urology notified and discussed extensively, pt is high risk for surgical intervention, no further imaging/work up needed for renal masses while in house, blood in tejeda is 2/2 BPH; appreciated  12/8:  Hgb 8.5 this AM, monitor H/H q12H  12/9: H/H stable 8.8/27.5 this AM  - D/C tejeda today for voiding trial  12/10: Hgb 8.8>7.4, recheck in AM, will proactively type and screen for AM labs  - pt voiding small amounts of urine without tejeda (baseline since pt is ESRD on HD)  - daughter agreeable to palliative care discussion this evening to help with outpatient management of complex medical problems  - CM aware and working on outpatient plan of care  12/11: Hgb 8.2, small amounts of blood in urine per nursing  -  ambulatory referral to palliative care per  for [facility]  - lengthy discussion on whether to discharge with Tejeda. Risks of obstruction  vs. Risk of infection. Daughter requesting to speak to Urology.   - urology consulted, appreciate recs  12/12: Urology spoke to daughter, patient will NOT be discharged with Tejeda  12/13: Pt  discharged home with [facility]. Follow up with PCP in one week, [facility] will facilitate.     Blindness    - home gtts     Dementia with behavioral disturbance    - delirium precautions  - zyprexa PRN  12/7: PT/OT rec [facility]     Renovascular hypertension     - continue home medications  12/7: elevated but stable  12/8: hypertensive, will not adjust meds at this time in setting of bleeding  12/9-12: hypertensive, but stable, BP responds to AM meds  12/12: hypertension not responding to morning meds, added Norvasc 5mg. Upon discharge, PCP to titrate up with goal of 140/90.     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    - diet controlled, start low dose SSI  12/7-13: stable, continue to monitor     ESRD (end stage renal disease) on dialysis    - HD MWF  - nephrology consulted; appreciated  - BP and lytes non-emergent  12/7: pt dialyzed today and received 2units blood  12/10,12: pt dialyzed today     Benign prostatic hyperplasia with urinary retention    - continue home medications  12/7: discontinued doxazosin per daughters request (states that urology told her pt should not take this anymore)  12/9: d/c tejeda today for voiding trial;see above  12/11: discussion over whether to discharge with tejeda in place, or leave it removed. Risk of obstruction vs. Risk of infection. Daughter requesting to speak to urology.  - urology consulted, recs appreciated.  12/12: Urology discussed with daughter; Patient will not go home with tejeda     Anemia in chronic kidney disease, on chronic dialysis    - H/H at baseline, but has trended down from Hgb of 12 in August  - see hematuria     Chronic combined systolic and diastolic heart failure    - compensated, continue home medications     Seizure disorder    - continue home keppra       Final Active Diagnoses:    Diagnosis Date Noted POA    PRINCIPAL PROBLEM:  Hematuria [R31.9] 12/05/2018 Yes    Palliative care encounter [Z51.5] 12/10/2018 Not Applicable    Acute post-hemorrhagic  anemia [D62] 12/08/2018 Yes    Blindness [H54.7]  Yes    Dementia with behavioral disturbance [F03.91] 12/05/2017 Yes    Renovascular hypertension [I15.0] 12/13/2015 Yes    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 [E11.22, N18.6]  Yes    ESRD (end stage renal disease) on dialysis [N18.6, Z99.2]  Not Applicable    Benign prostatic hyperplasia with urinary retention [N40.1, R33.8] 05/06/2014 Yes    Anemia in chronic kidney disease, on chronic dialysis [N18.6, D63.1, Z99.2] 11/28/2012 Not Applicable    Chronic combined systolic and diastolic heart failure [I50.42] 11/04/2012 Yes     Chronic    Seizure disorder [G40.909] 11/04/2012 Yes     Chronic      Problems Resolved During this Admission:    Diagnosis Date Noted Date Resolved POA    Hyperphosphatemia [E83.39] 12/06/2018 12/11/2018 Yes       Discharged Condition: stable    Disposition: Home or Self Care    Follow Up:  Follow-up Information     Bart Shaw MD On 12/14/2018.    Specialties:  Internal Medicine, Geriatric Medicine  Why:  [facility] to schedule hospital follow up appiontment.  Contact information:  [address] [facility] [facility] [address] [phone]                 Patient Instructions:      Ambulatory Referral to Palliative Care   Referral Priority: Routine Referral Type: Consultation   Requested Specialty: Hospice and Palliative Medicine   Number of Visits Requested: 1     Notify your health care provider if you experience any of the following:  persistent nausea and vomiting or diarrhea     Notify your health care provider if you experience any of the following:  temperature >100.4     Notify your health care provider if you experience any of the following:  severe uncontrolled pain     Notify your health care provider if you experience any of the following:  increased confusion or weakness     Activity as tolerated       Significant Diagnostic Studies: Labs:   CMP   Recent Labs   Lab 12/12/18  0913   *   K 4.7      CO2 23    GLU 91   BUN 43*   CREATININE 7.0*   CALCIUM 8.2*   ALBUMIN 2.4*   ANIONGAP 9   ESTGFRAFRICA 7.3*   EGFRNONAA 6.3*   , CBC   Recent Labs   Lab 12/12/18  0913   WBC 7.22   HGB 7.8*   HCT 24.3*   *     Recent Labs   Lab 08/01/18  1917 11/23/18  2216   HGBA1C 5.4 6.3*     Microbiology:   Urine Culture    Lab Results   Component Value Date    LABURIN  12/05/2018     PALMER ALBICANS  10,000 - 49,999 cfu/ml  Treatment of asymptomatic candiduria is not recommended (except for   specific populations). Candida isolated in the urine typically   represents colonization. If an indwelling urinary catheter is present  it should be removed or replaced.       Imaging:  US Retroperitoneal Complete   Order: [id number]   Status:  Final result   Visible to patient:  Yes (Patient Portal) Next appt:  None   Details     Reading Physician Reading Date Result Priority   Frankie Sow MD 12/6/2018    Diogo Campos MD 12/6/2018       Narrative     EXAMINATION:  US RETROPERITONEAL COMPLETE    CLINICAL HISTORY:  renal mass;    TECHNIQUE:  Ultrasound of the kidneys and urinary bladder was performed including color flow and Doppler evaluation of the kidneys.    COMPARISON:  CT renal stone 11/21/2018.  Ultrasound retroperitoneal 02/22/2018.    FINDINGS:  Right kidney: The right kidney measures 9.1 cm.  Resistive index measures 0.78 there is increased parenchymal echogenicity, loss of corticomedullary distinction, and decreased perfusion, findings that are associated with chronic medical renal disease.  No hydronephrosis.    There are several lesions in the right kidney:    -2.0 x 1.7 x 1.7 cm ill-defined solid mass, relatively similar in size to prior exam allowing for differences    in measurement technique.  Within this mass there is a hyper echo area likely calcification.    -Adjacent cyst measuring 1.0 x 1.1 x 1.0 cm.    -Questionable area of ill-defined cortical thickening roughly measuring 1.4 x 0.9 x 1.1 cm with  correlated finding on CT renal stone 11/21/2018 that may represent cortical irregularity or additional mass.    -Mid aspect simple cyst measuring 1.3 x 1.0 x 1.0 cm.    -Hypoechoic inferior pole lesion measuring 2.4 x 1.7 x 2.6 cm, slightly enlarged since prior exam and possibly representing hemorrhagic cyst.    Left kidney: The left kidney measures 8.0 cm. There is increased parenchymal echogenicity, loss of corticomedullary distinction, and decreased perfusion, findings that are associated with chronic medical renal disease.  Resistive index measures 1.0.  No mass. No renal stone. No hydronephrosis.    Left kidney:    Simple cyst in the mid aspect measuring 1.2 x 0.8 x 0.9 cm.    Bladder contains a Barrett catheter and is incompletely distended.  There is echogenic material within the bladder that is incompletely visualized.  This may represent blood products or nonspecific debris.    Prostate is enlarged measuring 5.5 x 4.8 x 6.8 cm.      Impression       Multiple lesions in the right kidney, several of which were identified on recent CT and ultrasound.  Inferior pole hypoechoic lesion is slightly enlarged since prior exam, allowing for differences in measurement technique.  These are nonspecific and a neoplastic process cannot be definitively excluded.  Further evaluation on a nonemergent basis with CT or MRI renal mass protocol.  Ultrasound surveillance to document stability can also be considered.    Bilateral medical renal disease.    Incompletely distended bladder with echogenic material that may represent blood products or nonspecific debris    Bilateral simple renal cysts.    Prostatomegaly.    This report was flagged in Epic as abnormal.    Electronically signed by resident: Diogo Campos  Date: 12/06/2018  Time: 18:38    Electronically signed by: Frankie Sow MD  Date: 12/06/2018  Time: 19:06             Last Resulted: 12/06/18 19:06 Order Details View Encounter Lab and Collection Details Routing Result  History               Pending Diagnostic Studies:     Procedure Component Value Units Date/Time    CBC with Automated Differential [[id number]] Collected:  12/08/18 0803    Order Status:  Sent Lab Status:  In process Updated:  12/08/18 0803    Specimen:  Blood     Hepatitis B e antigen [[id number]] Collected:  12/10/18 1406    Order Status:  Sent Lab Status:  In process Updated:  12/10/18 1409    Specimen:  Blood     Narrative:       Collection has been rescheduled by SM3 at 12/10/2018 13:46 Reason:   Patient unavailable in dialysis spoke with ritesh Marquez         Medications:  Reconciled Home Medications:      Medication List      START taking these medications    amLODIPine 5 MG tablet  Commonly known as:  NORVASC  Take 1 tablet (5 mg total) by mouth once daily.     dutasteride 0.5 mg capsule  Commonly known as:  AVODART  Take 1 capsule (0.5 mg total) by mouth once daily.        CHANGE how you take these medications    carvedilol 25 MG tablet  Commonly known as:  COREG  Take 1 tablet (25 mg total) by mouth 2 (two) times daily. Do not take on dialysis days. Hold for SBP < 110 or HR < 55  What changed:  additional instructions        CONTINUE taking these medications    aspirin 81 MG Chew  Take 1 tablet (81 mg total) by mouth once daily.     atorvastatin 40 MG tablet  Commonly known as:  LIPITOR  Take 1 tablet (40 mg total) by mouth once daily.     BLOOD GLUCOSE TEST Strp  Generic drug:  blood sugar diagnostic  1 strip by Misc.(Non-Drug; Combo Route) route daily as needed.     brinzolamide 1 % ophthalmic suspension  Commonly known as:  AZOPT  Place 1 drop into the left eye 3 (three) times daily.     calcium acetate 667 mg capsule  Commonly known as:  PHOSLO  Take 2 capsules by mouth every day in the morning, then take 1 capsule with lunch and 2 capsules in the evening with meals     COMBIGAN 0.2-0.5 % Drop  Generic drug:  brimonidine-timolol  Place 1 drop into the left eye 2 (two) times daily.     doxazosin 4 MG  tablet  Commonly known as:  CARDURA  Take 1 tablet (4 mg total) by mouth once daily.     ketoconazole 2 % cream  Commonly known as:  NIZORAL  Apply topically once daily. feet     levETIRAcetam 500 MG Tab  Commonly known as:  KEPPRA  Take 1 tablet (500 mg total) by mouth 2 (two) times daily.     NEPHRO-JESSICA 0.8 mg Tab  Generic drug:  B complex-vitamin C-folic acid  Take 1 tablet by mouth every morning.     NovoLOG Flexpen U-100 Insulin 100 unit/mL Inpn pen  Generic drug:  insulin aspart U-100  Insulin Pen Subcutaneous As directed.  -200 take 1 unitBS 201-250 take 2 unitsBS 251-300 take 3 unitsBS 301-350 take 4 unitsBS > 351 take 5 units and call MD     tamsulosin 0.4 mg Cap  Commonly known as:  FLOMAX  Take 1 capsule (0.4 mg total) by mouth once daily.     TRAVATAN Z OPHT  Place 1 drop into the left eye every evening. 1 Drops Left eye Every evening        STOP taking these medications    fluconazole 100 MG tablet  Commonly known as:  DIFLUCAN        ASK your doctor about these medications    ciprofloxacin HCl 250 MG tablet  Commonly known as:  CIPRO  Take 1 tablet (250 mg total) by mouth once daily. for 6 days  Ask about: Should I take this medication?            Indwelling Lines/Drains at time of discharge:   Lines/Drains/Airways     Drain                 Hemodialysis AV Fistula 08/08/14 0800 Left upper arm 1588 days                Time spent on the discharge of patient: 40 minutes  Patient was seen and examined on the date of discharge and determined to be suitable for discharge.         Heather Fernando PA-C  Department of Hospital Medicine  [facility]

## 2018-12-13 NOTE — PLAN OF CARE
12/13/18 1631   Final Note   Assessment Type Final Discharge Note     Patient discharged home with [facility] on 12/13/18.  van transportation arranged by MAIRA Correa ([id number]).

## 2018-12-13 NOTE — PLAN OF CARE
Problem: Patient Care Overview  Goal: Plan of Care Review  Outcome: Ongoing (interventions implemented as appropriate)  Pt in bed resting at this time, VSS as charted earlier. Pt did undergo dialysis with 2L removed. LUE fistula + bruit and thrill noted. PIV c/d/i to RFA flushed/capped. Daughter did discuss POC with Urology, urology to forego tejeda placement upon pt d/c when placed, have signed off at this this time. Dutasteride also d/c'd. Pt currently unlabored on RA. Did have small soft brown BM earlier. No other changes noted. Report given to nightshift RN.

## 2018-12-13 NOTE — PROGRESS NOTES
3.5hr HD treatment completed 2L of fluid removed pt tolerated well. Both needles of a ANGLE fistula removed and pressure held until hemostasis achieved dressing applied. Report given to OLIVIA Taylor.

## 2018-12-13 NOTE — PLAN OF CARE
Problem: Occupational Therapy Goal  Goal: Occupational Therapy Goal  Goals to be met by: 12/21/18    Patient will increase functional independence with ADLs by performing:    UE Dressing with Moderate Assistance.  LE Dressing with Moderate Assistance.  Grooming while EOB with Minimal Assistance with set- up assistance.  Toileting from toilet with Minimal Assistance for hygiene and clothing management.   Supine to sit with Minimal Assistance. Met 12/13  Toilet transfer to toilet with Contact Guard Assistance.     Outcome: Ongoing (interventions implemented as appropriate)  Continue with POC.  Heather matthews OT  12/13/2018

## 2018-12-13 NOTE — PT/OT/SLP PROGRESS
Physical Therapy Treatment    Patient Name:  Ronald Campbell   MRN:  [id number]    Recommendations:     Discharge Recommendations:  (Home health with 24/7 assistance )   Discharge Equipment Recommendations: none   Barriers to discharge: increased assistance required    Assessment:     Ronald Campbell is a 89 y.o. male admitted with a medical diagnosis of Hematuria.  He presents with the following impairments/functional limitations:  weakness, impaired endurance, impaired self care skills, impaired functional mobilty, gait instability, impaired balance, visual deficits, impaired cognition, decreased upper extremity function, decreased lower extremity function, decreased safety awareness. Pt unable to follow commands consistently throughout session and participation limited 2/2 impaired cognition. Pt requires max Ax2 to transfer bed>chair via stand pivot 2/2 functional weakness of BLE. Pt requires increased assistance for all functional mobility 2/2 the above impairments. Visual impairments also contributing to mobility limitations. Pt will benefit from skilled physical therapy services to receive education as needed, improve the above deficits, and return to prior level of function.    Rehab Prognosis:  Fair-good; patient would benefit from acute skilled PT services to address these deficits and reach maximum level of function.      Recent Surgery: * No surgery found *      Plan:     During this hospitalization, patient to be seen 2 x/week to address the above listed problems via gait training, therapeutic activities, therapeutic exercises, neuromuscular re-education  · Plan of Care Expires:  01/05/19   Plan of Care Reviewed with: patient    Subjective     Communicated with RN prior to session.  Patient found laying supine upon PT entry to room, agreeable to treatment.      Chief Complaint: none stated  Patient comments/goals: Pt agreeable to therapy and very quiet throughout session.   Pain/Comfort:  · Pain Rating 1:  0/10  · Pain Rating Post-Intervention 1: 0/10    Patients cultural, spiritual, Orthodox conflicts given the current situation: none noted     Objective:     Patient found with: telemetry, tejeda catheter     General Precautions: Standard, fall, vision impaired, seizure   Orthopedic Precautions:N/A   Braces: N/A     Functional Mobility:  Bed mobility:  Supine>sit: x1 trial(s); min A  Rolling R: x1 trial(s); min A    Transfers:  Sit>stand: x2 trial(s) from bed; mod A without AD  Stand>sit: x2 trial(s) to bed/bedside chair; mod/max A without AD (max A to bedside chair)  Bed>chair: x1 trial; max Ax2 via stand pivot. Pt took two steps to the R to get into chair. Pt required tot A for moving L foot.   **Pt required facilitation to promote hip extension and prevent knee buckling. Maximal verbal cues given to promote proper form with stand pivot transfer; poor understanding noted by pt (no carryover).     Gait:  · Pt unsafe to ambulate at this time 2/2 functional weakness and impaired standing balance.     Balance:  · Static sitting: SBA  · Dyamic sitting: SBA  · Static standing: mod A  · See above for facilitation provided during standing   · Dynamic standing: max Ax2  · Pt very unsteady on his feet and unable to maintain balance without max Ax2. See above for facilitation provided during standing and details on bed>chair transfer.     AM-PAC 6 CLICK MOBILITY  Turning over in bed (including adjusting bedclothes, sheets and blankets)?: 3  Sitting down on and standing up from a chair with arms (e.g., wheelchair, bedside commode, etc.): 2  Moving from lying on back to sitting on the side of the bed?: 2  Moving to and from a bed to a chair (including a wheelchair)?: 2  Need to walk in hospital room?: 1  Climbing 3-5 steps with a railing?: 1  Basic Mobility Total Score: 11     Therapeutic Activities and Exercises:  PT assisted pt in maintaining balance during standing while OT assisted pt with changing adult brief.   Pt  educated on the following:  · Importance of participating in therapy to return to PLOF  · Importance of sitting up in chair throughout the day to improve tolerance to upright activity  · PT POC  · Not getting up without assistance from RN to prevent falls  Pt verbalized understanding; pt understanding unknown at this time 2/2 impaired cognition. All needs and questions addressed.    Patient left up in chair with all lines intact, call button in reach and .RN present. Telemetry lead placed on call light to make call button more accessible for patient. Pt demonstrated ability to find call button independently.     GOALS:   Multidisciplinary Problems     Physical Therapy Goals        Problem: Physical Therapy Goal    Goal Priority Disciplines Outcome Goal Variances Interventions   Physical Therapy Goal     PT, PT/OT Ongoing (interventions implemented as appropriate)     Description:  Goals to be met by: 18     Patient will increase functional independence with mobility by performin. Supine to sit with Contact Guard Assistance  2. Sit to stand transfer with Contact Guard Assistance  3. Bed to chair transfer with Contact Guard Assistance using AD as needed  4. Gait  x 10 feet with Minimal Assistance using AD as needed.   5. Lower extremity exercise program x15 reps, with assist as needed, in order to increase LE strength and (I) with functional mobility.                       Time Tracking:     PT Received On: 18  PT Start Time: 0953     PT Stop Time: 1013  PT Total Time (min): 20 min     Billable Minutes: Therapeutic Activity 20    Treatment Type: Treatment  PT/PTA: PT     PTA Visit Number: 0     PAZ Tapia  2018

## 2018-12-13 NOTE — PLAN OF CARE
Problem: Patient Care Overview  Goal: Plan of Care Review  Outcome: Outcome(s) achieved Date Met: 12/13/18  Pt resting in bedside chair at this time. Order placed for d/c per MD order, will initiate. Daughter at bedside, spoke with her and SW came to bedside regarding d/c plan, also daughter spoke with CM via telephone regarding PACE setup. SW to arrange for transportation. Pt VSS as last charted per f/s,BP fluctuating, no PRN required at this time.+ bruit/thrill to LUE with dressing in place. Pt remains anuric with no signs of bleed currently. Did assist to bedside commode, moderate brown soft BM noted with hygiene addressed. Did tolerate >50% of meals today. 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 Daughter requests to speak with nephrologist regarding etiology of kidney mass, did call, awaiting return call. PIV removed per protocol with no complications noted. Reviewed care plan and education with pt and daughter at bedside, verbalizes understanding. Belongings at bedside. Did clarify that pt is up-to-date on immunizations per daughter, also noted on previous admission dates in Epic. No distress noted. Will initiate at this time and await for transport.

## 2019-01-01 ENCOUNTER — HOSPITAL ENCOUNTER (EMERGENCY)
Facility: HOSPITAL | Age: 84
Discharge: HOME | End: 2019-01-03
Attending: EMERGENCY MEDICINE
Payer: COMMERCIAL

## 2019-01-01 ENCOUNTER — TELEPHONE (OUTPATIENT)
Dept: UROLOGY | Facility: CLINIC | Age: 84
End: 2019-01-01

## 2019-01-01 ENCOUNTER — HOSPITAL ENCOUNTER (EMERGENCY)
Facility: HOSPITAL | Age: 84
Discharge: HOME | End: 2019-03-23
Attending: EMERGENCY MEDICINE
Payer: COMMERCIAL

## 2019-01-01 ENCOUNTER — LAB VISIT (OUTPATIENT)
Dept: LAB | Facility: HOSPITAL | Age: 84
End: 2019-01-01
Attending: INTERNAL MEDICINE
Payer: COMMERCIAL

## 2019-01-01 ENCOUNTER — HOSPITAL ENCOUNTER (INPATIENT)
Facility: HOSPITAL | Age: 84
LOS: 13 days | Discharge: HOME HEALTH | DRG: 713 | End: 2019-01-17
Attending: EMERGENCY MEDICINE | Admitting: INTERNAL MEDICINE
Payer: COMMERCIAL

## 2019-01-01 ENCOUNTER — OFFICE VISIT (OUTPATIENT)
Dept: VASCULAR SURGERY | Facility: CLINIC | Age: 84
End: 2019-01-01
Payer: COMMERCIAL

## 2019-01-01 ENCOUNTER — HOSPITAL ENCOUNTER (INPATIENT)
Facility: HOSPITAL | Age: 84
LOS: 3 days | Discharge: HOME | DRG: 312 | End: 2019-02-02
Attending: EMERGENCY MEDICINE | Admitting: EMERGENCY MEDICINE
Payer: COMMERCIAL

## 2019-01-01 ENCOUNTER — HOME CARE VISIT (OUTPATIENT)
Dept: NEUROLOGY | Facility: HOSPITAL | Age: 84
End: 2019-01-01

## 2019-01-01 ENCOUNTER — HOSPITAL ENCOUNTER (INPATIENT)
Facility: HOSPITAL | Age: 84
LOS: 1 days | DRG: 871 | End: 2019-03-30
Attending: EMERGENCY MEDICINE | Admitting: INTERNAL MEDICINE
Payer: COMMERCIAL

## 2019-01-01 ENCOUNTER — ANESTHESIA (OUTPATIENT)
Dept: SURGERY | Facility: HOSPITAL | Age: 84
DRG: 713 | End: 2019-01-01
Payer: COMMERCIAL

## 2019-01-01 ENCOUNTER — ANESTHESIA EVENT (OUTPATIENT)
Dept: SURGERY | Facility: HOSPITAL | Age: 84
DRG: 713 | End: 2019-01-01
Payer: COMMERCIAL

## 2019-01-01 ENCOUNTER — HOSPITAL ENCOUNTER (OUTPATIENT)
Dept: RADIOLOGY | Facility: HOSPITAL | Age: 84
Discharge: HOME | End: 2019-03-28
Attending: SURGERY
Payer: COMMERCIAL

## 2019-01-01 ENCOUNTER — NURSE TRIAGE (OUTPATIENT)
Dept: ADMINISTRATIVE | Facility: CLINIC | Age: 84
End: 2019-01-01

## 2019-01-01 ENCOUNTER — OFFICE VISIT (OUTPATIENT)
Dept: UROLOGY | Facility: CLINIC | Age: 84
End: 2019-01-01
Payer: COMMERCIAL

## 2019-01-01 ENCOUNTER — PATIENT MESSAGE (OUTPATIENT)
Dept: UROLOGY | Facility: CLINIC | Age: 84
End: 2019-01-01

## 2019-01-01 VITALS
DIASTOLIC BLOOD PRESSURE: 78 MMHG | OXYGEN SATURATION: 99 % | WEIGHT: 166.69 LBS | RESPIRATION RATE: 20 BRPM | HEART RATE: 69 BPM | SYSTOLIC BLOOD PRESSURE: 128 MMHG | OXYGEN SATURATION: 99 % | BODY MASS INDEX: 21.99 KG/M2 | HEART RATE: 72 BPM | SYSTOLIC BLOOD PRESSURE: 138 MMHG | DIASTOLIC BLOOD PRESSURE: 64 MMHG | RESPIRATION RATE: 21 BRPM | TEMPERATURE: 98 F

## 2019-01-01 VITALS
HEART RATE: 92 BPM | TEMPERATURE: 97 F | HEIGHT: 73 IN | SYSTOLIC BLOOD PRESSURE: 124 MMHG | DIASTOLIC BLOOD PRESSURE: 62 MMHG | WEIGHT: 166.69 LBS | OXYGEN SATURATION: 94 % | BODY MASS INDEX: 22.09 KG/M2 | RESPIRATION RATE: 37 BRPM

## 2019-01-01 VITALS
TEMPERATURE: 98 F | HEART RATE: 66 BPM | DIASTOLIC BLOOD PRESSURE: 74 MMHG | HEIGHT: 73 IN | WEIGHT: 166.69 LBS | RESPIRATION RATE: 20 BRPM | BODY MASS INDEX: 22.09 KG/M2 | SYSTOLIC BLOOD PRESSURE: 158 MMHG | OXYGEN SATURATION: 93 %

## 2019-01-01 VITALS
OXYGEN SATURATION: 97 % | SYSTOLIC BLOOD PRESSURE: 157 MMHG | RESPIRATION RATE: 20 BRPM | WEIGHT: 175 LBS | TEMPERATURE: 97 F | BODY MASS INDEX: 23.7 KG/M2 | HEART RATE: 57 BPM | HEIGHT: 72 IN | DIASTOLIC BLOOD PRESSURE: 72 MMHG

## 2019-01-01 VITALS
SYSTOLIC BLOOD PRESSURE: 121 MMHG | BODY MASS INDEX: 23.73 KG/M2 | HEIGHT: 72 IN | DIASTOLIC BLOOD PRESSURE: 58 MMHG | HEART RATE: 70 BPM

## 2019-01-01 VITALS
BODY MASS INDEX: 23.09 KG/M2 | HEART RATE: 80 BPM | OXYGEN SATURATION: 100 % | RESPIRATION RATE: 18 BRPM | TEMPERATURE: 99 F | WEIGHT: 175 LBS | DIASTOLIC BLOOD PRESSURE: 75 MMHG | SYSTOLIC BLOOD PRESSURE: 160 MMHG

## 2019-01-01 VITALS
TEMPERATURE: 98 F | DIASTOLIC BLOOD PRESSURE: 65 MMHG | BODY MASS INDEX: 21.99 KG/M2 | SYSTOLIC BLOOD PRESSURE: 144 MMHG | HEIGHT: 73 IN | HEART RATE: 62 BPM

## 2019-01-01 DIAGNOSIS — N42.1 HEMORRHAGE OF PROSTATE: Primary | ICD-10-CM

## 2019-01-01 DIAGNOSIS — A41.9 SEPSIS DUE TO PNEUMONIA: ICD-10-CM

## 2019-01-01 DIAGNOSIS — R31.0 HEMATURIA, GROSS: ICD-10-CM

## 2019-01-01 DIAGNOSIS — G40.909 SEIZURE DISORDER: Chronic | ICD-10-CM

## 2019-01-01 DIAGNOSIS — N18.6 ESRD (END STAGE RENAL DISEASE) ON DIALYSIS: ICD-10-CM

## 2019-01-01 DIAGNOSIS — D69.6 THROMBOCYTOPENIA: ICD-10-CM

## 2019-01-01 DIAGNOSIS — D63.1 ANEMIA IN CHRONIC KIDNEY DISEASE, ON CHRONIC DIALYSIS: ICD-10-CM

## 2019-01-01 DIAGNOSIS — T82.858D ARTERIOVENOUS FISTULA STENOSIS, SUBSEQUENT ENCOUNTER: Primary | ICD-10-CM

## 2019-01-01 DIAGNOSIS — N18.6 ESRD (END STAGE RENAL DISEASE): ICD-10-CM

## 2019-01-01 DIAGNOSIS — I15.1 HYPERTENSION SECONDARY TO OTHER RENAL DISORDERS: ICD-10-CM

## 2019-01-01 DIAGNOSIS — C61 PROSTATE CANCER: Primary | ICD-10-CM

## 2019-01-01 DIAGNOSIS — N18.6 TYPE 2 DIABETES MELLITUS WITH END-STAGE RENAL DISEASE: ICD-10-CM

## 2019-01-01 DIAGNOSIS — N18.9 ANEMIA SECONDARY TO RENAL FAILURE: Primary | ICD-10-CM

## 2019-01-01 DIAGNOSIS — Z99.2 ESRD (END STAGE RENAL DISEASE) ON DIALYSIS: ICD-10-CM

## 2019-01-01 DIAGNOSIS — R31.0 GROSS HEMATURIA: Primary | ICD-10-CM

## 2019-01-01 DIAGNOSIS — R06.02 SOB (SHORTNESS OF BREATH): ICD-10-CM

## 2019-01-01 DIAGNOSIS — D64.9 ANEMIA, UNSPECIFIED TYPE: ICD-10-CM

## 2019-01-01 DIAGNOSIS — R55 SYNCOPE, UNSPECIFIED SYNCOPE TYPE: Primary | ICD-10-CM

## 2019-01-01 DIAGNOSIS — I15.0 RENOVASCULAR HYPERTENSION: ICD-10-CM

## 2019-01-01 DIAGNOSIS — K59.00 CONSTIPATION: Primary | ICD-10-CM

## 2019-01-01 DIAGNOSIS — Z99.2 ANEMIA IN CHRONIC KIDNEY DISEASE, ON CHRONIC DIALYSIS: ICD-10-CM

## 2019-01-01 DIAGNOSIS — D63.1 ANEMIA SECONDARY TO RENAL FAILURE: Primary | ICD-10-CM

## 2019-01-01 DIAGNOSIS — Z01.818 PRE-OP EVALUATION: ICD-10-CM

## 2019-01-01 DIAGNOSIS — E78.5 DYSLIPIDEMIA: ICD-10-CM

## 2019-01-01 DIAGNOSIS — H54.40 BLINDNESS OF RIGHT EYE: ICD-10-CM

## 2019-01-01 DIAGNOSIS — E11.22 TYPE 2 DIABETES MELLITUS WITH END-STAGE RENAL DISEASE: ICD-10-CM

## 2019-01-01 DIAGNOSIS — N48.89 PENILE BLEEDING: Primary | ICD-10-CM

## 2019-01-01 DIAGNOSIS — N18.6 ESRD (END STAGE RENAL DISEASE) ON DIALYSIS: Primary | ICD-10-CM

## 2019-01-01 DIAGNOSIS — I50.42 CHRONIC COMBINED SYSTOLIC AND DIASTOLIC HEART FAILURE: Chronic | ICD-10-CM

## 2019-01-01 DIAGNOSIS — J18.9 SEPSIS DUE TO PNEUMONIA: ICD-10-CM

## 2019-01-01 DIAGNOSIS — R41.82 ALTERED MENTAL STATUS, UNSPECIFIED ALTERED MENTAL STATUS TYPE: ICD-10-CM

## 2019-01-01 DIAGNOSIS — C61 PROSTATE CANCER: ICD-10-CM

## 2019-01-01 DIAGNOSIS — R55 SYNCOPE: ICD-10-CM

## 2019-01-01 DIAGNOSIS — Z99.2 ESRD (END STAGE RENAL DISEASE) ON DIALYSIS: Primary | ICD-10-CM

## 2019-01-01 DIAGNOSIS — I63.9 STROKE: ICD-10-CM

## 2019-01-01 DIAGNOSIS — N18.6 ANEMIA IN CHRONIC KIDNEY DISEASE, ON CHRONIC DIALYSIS: ICD-10-CM

## 2019-01-01 LAB
ABO + RH BLD: NORMAL
ABO GROUP BLD: NORMAL
ALBUMIN SERPL BCP-MCNC: 2 G/DL
ALBUMIN SERPL BCP-MCNC: 2.1 G/DL
ALBUMIN SERPL BCP-MCNC: 2.2 G/DL
ALBUMIN SERPL BCP-MCNC: 2.3 G/DL
ALBUMIN SERPL BCP-MCNC: 2.4 G/DL
ALBUMIN SERPL BCP-MCNC: 2.5 G/DL
ALBUMIN SERPL BCP-MCNC: 2.8 G/DL
ALLENS TEST: ABNORMAL
ALP SERPL-CCNC: 100 U/L
ALP SERPL-CCNC: 100 U/L
ALP SERPL-CCNC: 104 U/L
ALP SERPL-CCNC: 110 U/L
ALP SERPL-CCNC: 121 U/L
ALP SERPL-CCNC: 123 U/L
ALP SERPL-CCNC: 126 U/L
ALP SERPL-CCNC: 219 U/L
ALP SERPL-CCNC: 83 U/L
ALP SERPL-CCNC: 90 U/L
ALP SERPL-CCNC: 96 U/L
ALP SERPL-CCNC: 97 U/L
ALP SERPL-CCNC: 98 U/L
ALP SERPL-CCNC: 99 U/L
ALT SERPL W/O P-5'-P-CCNC: 14 U/L
ALT SERPL W/O P-5'-P-CCNC: 14 U/L
ALT SERPL W/O P-5'-P-CCNC: 15 U/L
ALT SERPL W/O P-5'-P-CCNC: 15 U/L
ALT SERPL W/O P-5'-P-CCNC: 17 U/L
ALT SERPL W/O P-5'-P-CCNC: 27 U/L
ALT SERPL W/O P-5'-P-CCNC: 36 U/L
ALT SERPL W/O P-5'-P-CCNC: 5 U/L
ALT SERPL W/O P-5'-P-CCNC: <5 U/L
ANION GAP SERPL CALC-SCNC: 10 MMOL/L
ANION GAP SERPL CALC-SCNC: 10 MMOL/L
ANION GAP SERPL CALC-SCNC: 11 MMOL/L
ANION GAP SERPL CALC-SCNC: 12 MMOL/L
ANION GAP SERPL CALC-SCNC: 12 MMOL/L
ANION GAP SERPL CALC-SCNC: 13 MMOL/L
ANION GAP SERPL CALC-SCNC: 13 MMOL/L
ANION GAP SERPL CALC-SCNC: 7 MMOL/L
ANION GAP SERPL CALC-SCNC: 7 MMOL/L
ANION GAP SERPL CALC-SCNC: 8 MMOL/L
ANION GAP SERPL CALC-SCNC: 9 MMOL/L
ANION GAP SERPL CALC-SCNC: 9 MMOL/L
ANION GAP SERPL CALC-SCNC: 9 MMOL/L (ref 8–16)
ANISOCYTOSIS BLD QL SMEAR: SLIGHT
ASCENDING AORTA: 3.07 CM
AST SERPL-CCNC: 21 U/L
AST SERPL-CCNC: 25 U/L
AST SERPL-CCNC: 27 U/L
AST SERPL-CCNC: 27 U/L
AST SERPL-CCNC: 35 U/L
AST SERPL-CCNC: 38 U/L
AST SERPL-CCNC: 38 U/L
AST SERPL-CCNC: 40 U/L
AST SERPL-CCNC: 43 U/L
AST SERPL-CCNC: 94 U/L
AV INDEX (PROSTH): 0.63
AV MEAN GRADIENT: 2.11 MMHG
AV PEAK GRADIENT: 3.69 MMHG
AV VALVE AREA: 2.24 CM2
BACTERIA #/AREA URNS AUTO: ABNORMAL /HPF
BACTERIA BLD CULT: NORMAL
BACTERIA BLD CULT: NORMAL
BACTERIA UR CULT: NO GROWTH
BACTERIA UR CULT: NORMAL
BASOPHILS # BLD AUTO: 0.01 K/UL
BASOPHILS # BLD AUTO: 0.02 K/UL
BASOPHILS # BLD AUTO: 0.02 K/UL (ref 0–0.2)
BASOPHILS # BLD AUTO: 0.03 K/UL
BASOPHILS # BLD AUTO: 0.03 K/UL
BASOPHILS NFR BLD: 0.1 %
BASOPHILS NFR BLD: 0.2 %
BASOPHILS NFR BLD: 0.3 %
BASOPHILS NFR BLD: 0.3 % (ref 0–1.9)
BASOPHILS NFR BLD: 0.4 %
BILIRUB SERPL-MCNC: 0.3 MG/DL
BILIRUB SERPL-MCNC: 0.4 MG/DL
BILIRUB SERPL-MCNC: 0.5 MG/DL
BILIRUB SERPL-MCNC: 0.6 MG/DL
BILIRUB UR QL STRIP: NEGATIVE
BLD GP AB SCN CELLS X3 SERPL QL: NORMAL
BLD PROD TYP BPU: NORMAL
BLOOD UNIT EXPIRATION DATE: NORMAL
BLOOD UNIT TYPE CODE: 5100
BLOOD UNIT TYPE CODE: 9500
BLOOD UNIT TYPE: NORMAL
BNP SERPL-MCNC: 1049 PG/ML
BNP SERPL-MCNC: 1937 PG/ML (ref 0–99)
BSA FOR ECHO PROCEDURE: 2.01 M2
BUN SERPL-MCNC: 13 MG/DL (ref 8–23)
BUN SERPL-MCNC: 15 MG/DL
BUN SERPL-MCNC: 18 MG/DL
BUN SERPL-MCNC: 22 MG/DL
BUN SERPL-MCNC: 23 MG/DL
BUN SERPL-MCNC: 23 MG/DL
BUN SERPL-MCNC: 25 MG/DL
BUN SERPL-MCNC: 25 MG/DL
BUN SERPL-MCNC: 26 MG/DL
BUN SERPL-MCNC: 29 MG/DL
BUN SERPL-MCNC: 35 MG/DL
BUN SERPL-MCNC: 36 MG/DL
BUN SERPL-MCNC: 44 MG/DL
BUN SERPL-MCNC: 45 MG/DL
BUN SERPL-MCNC: 45 MG/DL
BUN SERPL-MCNC: 49 MG/DL
BUN SERPL-MCNC: 49 MG/DL (ref 6–30)
BUN SERPL-MCNC: 53 MG/DL
BUN SERPL-MCNC: 57 MG/DL
BUN SERPL-MCNC: 58 MG/DL
BUN SERPL-MCNC: 66 MG/DL
BURR CELLS BLD QL SMEAR: ABNORMAL
CALCIUM SERPL-MCNC: 8 MG/DL
CALCIUM SERPL-MCNC: 8.1 MG/DL
CALCIUM SERPL-MCNC: 8.2 MG/DL
CALCIUM SERPL-MCNC: 8.4 MG/DL
CALCIUM SERPL-MCNC: 8.4 MG/DL
CALCIUM SERPL-MCNC: 8.6 MG/DL
CALCIUM SERPL-MCNC: 8.6 MG/DL
CALCIUM SERPL-MCNC: 8.7 MG/DL (ref 8.7–10.5)
CALCIUM SERPL-MCNC: 8.8 MG/DL
CALCIUM SERPL-MCNC: 8.8 MG/DL
CALCIUM SERPL-MCNC: 8.9 MG/DL
CALCIUM SERPL-MCNC: 9.2 MG/DL
CALCIUM SERPL-MCNC: 9.3 MG/DL
CHLORIDE SERPL-SCNC: 100 MMOL/L
CHLORIDE SERPL-SCNC: 101 MMOL/L
CHLORIDE SERPL-SCNC: 101 MMOL/L (ref 95–110)
CHLORIDE SERPL-SCNC: 102 MMOL/L
CHLORIDE SERPL-SCNC: 102 MMOL/L (ref 95–110)
CHLORIDE SERPL-SCNC: 105 MMOL/L
CHLORIDE SERPL-SCNC: 107 MMOL/L
CHLORIDE SERPL-SCNC: 95 MMOL/L
CHLORIDE SERPL-SCNC: 97 MMOL/L
CHLORIDE SERPL-SCNC: 98 MMOL/L
CHLORIDE SERPL-SCNC: 99 MMOL/L
CHOLEST SERPL-MCNC: 81 MG/DL
CHOLEST/HDLC SERPL: 2.6 {RATIO}
CLARITY UR REFRACT.AUTO: ABNORMAL
CO2 SERPL-SCNC: 18 MMOL/L
CO2 SERPL-SCNC: 20 MMOL/L
CO2 SERPL-SCNC: 21 MMOL/L
CO2 SERPL-SCNC: 22 MMOL/L
CO2 SERPL-SCNC: 23 MMOL/L
CO2 SERPL-SCNC: 23 MMOL/L (ref 23–29)
CO2 SERPL-SCNC: 24 MMOL/L
CO2 SERPL-SCNC: 25 MMOL/L
CO2 SERPL-SCNC: 25 MMOL/L
CO2 SERPL-SCNC: 26 MMOL/L
CO2 SERPL-SCNC: 27 MMOL/L
CO2 SERPL-SCNC: 29 MMOL/L
CO2 SERPL-SCNC: 32 MMOL/L
CODING SYSTEM: NORMAL
COLOR UR AUTO: ABNORMAL
CREAT SERPL-MCNC: 2.4 MG/DL (ref 0.5–1.4)
CREAT SERPL-MCNC: 2.8 MG/DL
CREAT SERPL-MCNC: 2.8 MG/DL
CREAT SERPL-MCNC: 3.8 MG/DL
CREAT SERPL-MCNC: 4.1 MG/DL
CREAT SERPL-MCNC: 4.2 MG/DL
CREAT SERPL-MCNC: 4.5 MG/DL
CREAT SERPL-MCNC: 4.8 MG/DL
CREAT SERPL-MCNC: 5 MG/DL
CREAT SERPL-MCNC: 5.3 MG/DL
CREAT SERPL-MCNC: 5.3 MG/DL (ref 0.5–1.4)
CREAT SERPL-MCNC: 5.9 MG/DL
CREAT SERPL-MCNC: 5.9 MG/DL
CREAT SERPL-MCNC: 6.3 MG/DL
CREAT SERPL-MCNC: 6.5 MG/DL
CREAT SERPL-MCNC: 7.1 MG/DL
CREAT SERPL-MCNC: 7.2 MG/DL
CREAT SERPL-MCNC: 7.9 MG/DL
CREAT SERPL-MCNC: 7.9 MG/DL
CREAT SERPL-MCNC: 8 MG/DL
CREAT SERPL-MCNC: 8.1 MG/DL
CTP QC/QA: YES
CV ECHO LV RWT: 0.4 CM
DIFFERENTIAL METHOD: ABNORMAL
DISPENSE STATUS: NORMAL
DOP CALC AO PEAK VEL: 0.96 M/S
DOP CALC AO VTI: 23.99 CM
DOP CALC LVOT AREA: 3.56 CM2
DOP CALC LVOT DIAMETER: 2.13 CM
DOP CALC LVOT STROKE VOLUME: 53.64 CM3
DOP CALCLVOT PEAK VEL VTI: 15.06 CM
E WAVE DECELERATION TIME: 271.64 MSEC
E/A RATIO: 0.72
E/E' RATIO: 13.45
ECHO LV POSTERIOR WALL: 1.13 CM (ref 0.6–1.1)
EOSINOPHIL # BLD AUTO: 0 K/UL
EOSINOPHIL # BLD AUTO: 0 K/UL (ref 0–0.5)
EOSINOPHIL # BLD AUTO: 0.1 K/UL
EOSINOPHIL NFR BLD: 0.2 %
EOSINOPHIL NFR BLD: 0.3 %
EOSINOPHIL NFR BLD: 0.4 %
EOSINOPHIL NFR BLD: 0.4 %
EOSINOPHIL NFR BLD: 0.5 %
EOSINOPHIL NFR BLD: 0.6 %
EOSINOPHIL NFR BLD: 0.6 %
EOSINOPHIL NFR BLD: 0.6 % (ref 0–8)
EOSINOPHIL NFR BLD: 0.7 %
EOSINOPHIL NFR BLD: 0.8 %
EOSINOPHIL NFR BLD: 0.8 %
EOSINOPHIL NFR BLD: 0.9 %
EOSINOPHIL NFR BLD: 1.1 %
EOSINOPHIL NFR BLD: 1.2 %
EOSINOPHIL NFR BLD: 1.3 %
EPO SERPL-ACNC: 292.8 MIU/ML
ERYTHROCYTE [DISTWIDTH] IN BLOOD BY AUTOMATED COUNT: 17.8 %
ERYTHROCYTE [DISTWIDTH] IN BLOOD BY AUTOMATED COUNT: 18 %
ERYTHROCYTE [DISTWIDTH] IN BLOOD BY AUTOMATED COUNT: 18 %
ERYTHROCYTE [DISTWIDTH] IN BLOOD BY AUTOMATED COUNT: 18.1 %
ERYTHROCYTE [DISTWIDTH] IN BLOOD BY AUTOMATED COUNT: 18.2 %
ERYTHROCYTE [DISTWIDTH] IN BLOOD BY AUTOMATED COUNT: 18.2 %
ERYTHROCYTE [DISTWIDTH] IN BLOOD BY AUTOMATED COUNT: 18.4 %
ERYTHROCYTE [DISTWIDTH] IN BLOOD BY AUTOMATED COUNT: 18.4 %
ERYTHROCYTE [DISTWIDTH] IN BLOOD BY AUTOMATED COUNT: 18.5 %
ERYTHROCYTE [DISTWIDTH] IN BLOOD BY AUTOMATED COUNT: 18.6 %
ERYTHROCYTE [DISTWIDTH] IN BLOOD BY AUTOMATED COUNT: 18.6 %
ERYTHROCYTE [DISTWIDTH] IN BLOOD BY AUTOMATED COUNT: 18.7 %
ERYTHROCYTE [DISTWIDTH] IN BLOOD BY AUTOMATED COUNT: 19 %
ERYTHROCYTE [DISTWIDTH] IN BLOOD BY AUTOMATED COUNT: 20 %
ERYTHROCYTE [DISTWIDTH] IN BLOOD BY AUTOMATED COUNT: 20.7 %
ERYTHROCYTE [DISTWIDTH] IN BLOOD BY AUTOMATED COUNT: 22.5 % (ref 11.5–14.5)
ERYTHROCYTE [DISTWIDTH] IN BLOOD BY AUTOMATED COUNT: 23.9 %
EST. GFR  (AFRICAN AMERICAN): 10.2 ML/MIN/1.73 M^2
EST. GFR  (AFRICAN AMERICAN): 11 ML/MIN/1.73 M^2
EST. GFR  (AFRICAN AMERICAN): 11.5 ML/MIN/1.73 M^2
EST. GFR  (AFRICAN AMERICAN): 12.5 ML/MIN/1.73 M^2
EST. GFR  (AFRICAN AMERICAN): 13.5 ML/MIN/1.73 M^2
EST. GFR  (AFRICAN AMERICAN): 13.9 ML/MIN/1.73 M^2
EST. GFR  (AFRICAN AMERICAN): 15.3 ML/MIN/1.73 M^2
EST. GFR  (AFRICAN AMERICAN): 22 ML/MIN/1.73 M^2
EST. GFR  (AFRICAN AMERICAN): 22 ML/MIN/1.73 M^2
EST. GFR  (AFRICAN AMERICAN): 26.5 ML/MIN/1.73 M^2
EST. GFR  (AFRICAN AMERICAN): 6.1 ML/MIN/1.73 M^2
EST. GFR  (AFRICAN AMERICAN): 6.2 ML/MIN/1.73 M^2
EST. GFR  (AFRICAN AMERICAN): 6.3 ML/MIN/1.73 M^2
EST. GFR  (AFRICAN AMERICAN): 6.3 ML/MIN/1.73 M^2
EST. GFR  (AFRICAN AMERICAN): 7.1 ML/MIN/1.73 M^2
EST. GFR  (AFRICAN AMERICAN): 7.2 ML/MIN/1.73 M^2
EST. GFR  (AFRICAN AMERICAN): 8 ML/MIN/1.73 M^2
EST. GFR  (AFRICAN AMERICAN): 8.3 ML/MIN/1.73 M^2
EST. GFR  (AFRICAN AMERICAN): 9 ML/MIN/1.73 M^2
EST. GFR  (AFRICAN AMERICAN): 9 ML/MIN/1.73 M^2
EST. GFR  (NON AFRICAN AMERICAN): 10 ML/MIN/1.73 M^2
EST. GFR  (NON AFRICAN AMERICAN): 10.8 ML/MIN/1.73 M^2
EST. GFR  (NON AFRICAN AMERICAN): 11.7 ML/MIN/1.73 M^2
EST. GFR  (NON AFRICAN AMERICAN): 12.1 ML/MIN/1.73 M^2
EST. GFR  (NON AFRICAN AMERICAN): 13.2 ML/MIN/1.73 M^2
EST. GFR  (NON AFRICAN AMERICAN): 19 ML/MIN/1.73 M^2
EST. GFR  (NON AFRICAN AMERICAN): 19 ML/MIN/1.73 M^2
EST. GFR  (NON AFRICAN AMERICAN): 22.9 ML/MIN/1.73 M^2
EST. GFR  (NON AFRICAN AMERICAN): 5.3 ML/MIN/1.73 M^2
EST. GFR  (NON AFRICAN AMERICAN): 5.4 ML/MIN/1.73 M^2
EST. GFR  (NON AFRICAN AMERICAN): 5.5 ML/MIN/1.73 M^2
EST. GFR  (NON AFRICAN AMERICAN): 5.5 ML/MIN/1.73 M^2
EST. GFR  (NON AFRICAN AMERICAN): 6.1 ML/MIN/1.73 M^2
EST. GFR  (NON AFRICAN AMERICAN): 6.2 ML/MIN/1.73 M^2
EST. GFR  (NON AFRICAN AMERICAN): 6.9 ML/MIN/1.73 M^2
EST. GFR  (NON AFRICAN AMERICAN): 7.2 ML/MIN/1.73 M^2
EST. GFR  (NON AFRICAN AMERICAN): 7.8 ML/MIN/1.73 M^2
EST. GFR  (NON AFRICAN AMERICAN): 7.8 ML/MIN/1.73 M^2
EST. GFR  (NON AFRICAN AMERICAN): 8.8 ML/MIN/1.73 M^2
EST. GFR  (NON AFRICAN AMERICAN): 9.5 ML/MIN/1.73 M^2
ESTIMATED AVG GLUCOSE: 134 MG/DL
FERRITIN SERPL-MCNC: 3688 NG/ML
FOLATE SERPL-MCNC: 13.1 NG/ML
FRACTIONAL SHORTENING: 12 % (ref 28–44)
GLUCOSE SERPL-MCNC: 103 MG/DL
GLUCOSE SERPL-MCNC: 108 MG/DL
GLUCOSE SERPL-MCNC: 110 MG/DL
GLUCOSE SERPL-MCNC: 114 MG/DL
GLUCOSE SERPL-MCNC: 115 MG/DL
GLUCOSE SERPL-MCNC: 122 MG/DL
GLUCOSE SERPL-MCNC: 123 MG/DL
GLUCOSE SERPL-MCNC: 124 MG/DL
GLUCOSE SERPL-MCNC: 125 MG/DL
GLUCOSE SERPL-MCNC: 125 MG/DL
GLUCOSE SERPL-MCNC: 126 MG/DL
GLUCOSE SERPL-MCNC: 134 MG/DL
GLUCOSE SERPL-MCNC: 160 MG/DL
GLUCOSE SERPL-MCNC: 167 MG/DL
GLUCOSE SERPL-MCNC: 167 MG/DL
GLUCOSE SERPL-MCNC: 188 MG/DL
GLUCOSE SERPL-MCNC: 192 MG/DL (ref 70–110)
GLUCOSE SERPL-MCNC: 229 MG/DL
GLUCOSE SERPL-MCNC: 89 MG/DL (ref 70–110)
GLUCOSE SERPL-MCNC: 94 MG/DL
GLUCOSE SERPL-MCNC: 97 MG/DL
GLUCOSE UR QL STRIP: ABNORMAL
GLUCOSE UR QL STRIP: NEGATIVE
GLUCOSE UR QL STRIP: NEGATIVE
GRAM STN SPEC: NORMAL
GRAM STN SPEC: NORMAL
HAPTOGLOB SERPL-MCNC: 216 MG/DL
HAV IGM SERPL QL IA: NEGATIVE
HBA1C MFR BLD HPLC: 6.3 %
HBV CORE IGM SERPL QL IA: NEGATIVE
HBV SURFACE AB SER-ACNC: POSITIVE M[IU]/ML
HBV SURFACE AG SERPL QL IA: NEGATIVE
HBV SURFACE AG SERPL QL IA: NEGATIVE
HCO3 UR-SCNC: 24.4 MMOL/L (ref 24–28)
HCT VFR BLD AUTO: 20.7 %
HCT VFR BLD AUTO: 21 %
HCT VFR BLD AUTO: 21.5 %
HCT VFR BLD AUTO: 23.1 %
HCT VFR BLD AUTO: 23.3 %
HCT VFR BLD AUTO: 24.6 %
HCT VFR BLD AUTO: 24.6 %
HCT VFR BLD AUTO: 24.8 %
HCT VFR BLD AUTO: 25 %
HCT VFR BLD AUTO: 25.2 %
HCT VFR BLD AUTO: 25.3 %
HCT VFR BLD AUTO: 25.6 %
HCT VFR BLD AUTO: 26.3 %
HCT VFR BLD AUTO: 26.4 %
HCT VFR BLD AUTO: 26.5 %
HCT VFR BLD AUTO: 26.6 %
HCT VFR BLD AUTO: 26.9 %
HCT VFR BLD AUTO: 27.3 %
HCT VFR BLD AUTO: 28.4 %
HCT VFR BLD AUTO: 29.8 % (ref 40–54)
HCT VFR BLD AUTO: 29.9 %
HCT VFR BLD AUTO: 32.5 %
HCT VFR BLD CALC: 29 %PCV (ref 36–54)
HCV AB SERPL QL IA: NEGATIVE
HDLC SERPL-MCNC: 31 MG/DL
HDLC SERPL: 38.3 %
HGB BLD-MCNC: 10.3 G/DL
HGB BLD-MCNC: 6.5 G/DL
HGB BLD-MCNC: 6.7 G/DL
HGB BLD-MCNC: 6.9 G/DL
HGB BLD-MCNC: 7.3 G/DL
HGB BLD-MCNC: 7.9 G/DL
HGB BLD-MCNC: 7.9 G/DL
HGB BLD-MCNC: 8 G/DL
HGB BLD-MCNC: 8 G/DL
HGB BLD-MCNC: 8.1 G/DL
HGB BLD-MCNC: 8.1 G/DL
HGB BLD-MCNC: 8.2 G/DL
HGB BLD-MCNC: 8.5 G/DL
HGB BLD-MCNC: 8.6 G/DL
HGB BLD-MCNC: 8.6 G/DL
HGB BLD-MCNC: 8.7 G/DL
HGB BLD-MCNC: 8.7 G/DL
HGB BLD-MCNC: 8.8 G/DL
HGB BLD-MCNC: 8.8 G/DL
HGB BLD-MCNC: 9.2 G/DL
HGB BLD-MCNC: 9.5 G/DL (ref 14–18)
HGB UR QL STRIP: ABNORMAL
HYALINE CASTS UR QL AUTO: 0 /LPF
HYPOCHROMIA BLD QL SMEAR: ABNORMAL
IMM GRANULOCYTES # BLD AUTO: 0.02 K/UL
IMM GRANULOCYTES # BLD AUTO: 0.03 K/UL
IMM GRANULOCYTES # BLD AUTO: 0.04 K/UL
IMM GRANULOCYTES # BLD AUTO: 0.05 K/UL
IMM GRANULOCYTES # BLD AUTO: 0.05 K/UL
IMM GRANULOCYTES # BLD AUTO: 0.06 K/UL
IMM GRANULOCYTES # BLD AUTO: 0.07 K/UL
IMM GRANULOCYTES # BLD AUTO: 0.07 K/UL
IMM GRANULOCYTES # BLD AUTO: 0.08 K/UL
IMM GRANULOCYTES # BLD AUTO: 0.12 K/UL
IMM GRANULOCYTES # BLD AUTO: 0.24 K/UL (ref 0–0.04)
IMM GRANULOCYTES NFR BLD AUTO: 0.3 %
IMM GRANULOCYTES NFR BLD AUTO: 0.4 %
IMM GRANULOCYTES NFR BLD AUTO: 0.4 %
IMM GRANULOCYTES NFR BLD AUTO: 0.5 %
IMM GRANULOCYTES NFR BLD AUTO: 0.5 %
IMM GRANULOCYTES NFR BLD AUTO: 0.7 %
IMM GRANULOCYTES NFR BLD AUTO: 0.8 %
IMM GRANULOCYTES NFR BLD AUTO: 0.9 %
IMM GRANULOCYTES NFR BLD AUTO: 1.1 %
IMM GRANULOCYTES NFR BLD AUTO: 1.1 %
IMM GRANULOCYTES NFR BLD AUTO: 1.4 %
IMM GRANULOCYTES NFR BLD AUTO: 3.4 % (ref 0–0.5)
INR PPP: 1.1
INR PPP: 1.1 (ref 0.8–1.2)
INTERVENTRICULAR SEPTUM: 0.99 CM (ref 0.6–1.1)
IRON SERPL-MCNC: 33 UG/DL
KETONES UR QL STRIP: ABNORMAL
KETONES UR QL STRIP: ABNORMAL
KETONES UR QL STRIP: NEGATIVE
LA MAJOR: 7.08 CM
LA MINOR: 5.98 CM
LA WIDTH: 4.64 CM
LACTATE SERPL-SCNC: 0.7 MMOL/L (ref 0.5–2.2)
LACTATE SERPL-SCNC: 0.8 MMOL/L
LACTATE SERPL-SCNC: 0.8 MMOL/L
LACTATE SERPL-SCNC: 1.1 MMOL/L
LDH SERPL L TO P-CCNC: 203 U/L
LDLC SERPL CALC-MCNC: 42.8 MG/DL
LEFT ATRIUM SIZE: 3.66 CM
LEFT ATRIUM VOLUME INDEX: 46.5 ML/M2
LEFT ATRIUM VOLUME: 93.59 CM3
LEFT INTERNAL DIMENSION IN SYSTOLE: 5 CM (ref 2.1–4)
LEFT VENTRICLE DIASTOLIC VOLUME INDEX: 59.25 ML/M2
LEFT VENTRICLE DIASTOLIC VOLUME: 119.27 ML
LEFT VENTRICLE MASS INDEX: 121.4 G/M2
LEFT VENTRICLE SYSTOLIC VOLUME INDEX: 42.6 ML/M2
LEFT VENTRICLE SYSTOLIC VOLUME: 85.79 ML
LEFT VENTRICULAR INTERNAL DIMENSION IN DIASTOLE: 5.7 CM (ref 3.5–6)
LEFT VENTRICULAR MASS: 244.39 G
LEUKOCYTE ESTERASE UR QL STRIP: ABNORMAL
LEUKOCYTE ESTERASE UR QL STRIP: ABNORMAL
LEUKOCYTE ESTERASE UR QL STRIP: NEGATIVE
LV LATERAL E/E' RATIO: 12.33
LV SEPTAL E/E' RATIO: 14.8
LYMPHOCYTES # BLD AUTO: 0.6 K/UL
LYMPHOCYTES # BLD AUTO: 0.8 K/UL
LYMPHOCYTES # BLD AUTO: 0.9 K/UL
LYMPHOCYTES # BLD AUTO: 0.9 K/UL
LYMPHOCYTES # BLD AUTO: 1 K/UL
LYMPHOCYTES # BLD AUTO: 1 K/UL
LYMPHOCYTES # BLD AUTO: 1.1 K/UL
LYMPHOCYTES # BLD AUTO: 1.2 K/UL
LYMPHOCYTES # BLD AUTO: 1.3 K/UL
LYMPHOCYTES # BLD AUTO: 1.3 K/UL (ref 1–4.8)
LYMPHOCYTES NFR BLD: 10.4 %
LYMPHOCYTES NFR BLD: 11.2 %
LYMPHOCYTES NFR BLD: 11.2 %
LYMPHOCYTES NFR BLD: 12.5 %
LYMPHOCYTES NFR BLD: 12.5 %
LYMPHOCYTES NFR BLD: 12.7 %
LYMPHOCYTES NFR BLD: 13.6 %
LYMPHOCYTES NFR BLD: 13.7 %
LYMPHOCYTES NFR BLD: 14 %
LYMPHOCYTES NFR BLD: 14.3 %
LYMPHOCYTES NFR BLD: 14.6 %
LYMPHOCYTES NFR BLD: 15.1 %
LYMPHOCYTES NFR BLD: 16.8 %
LYMPHOCYTES NFR BLD: 17.4 %
LYMPHOCYTES NFR BLD: 17.4 %
LYMPHOCYTES NFR BLD: 17.8 % (ref 18–48)
LYMPHOCYTES NFR BLD: 18.4 %
LYMPHOCYTES NFR BLD: 19.3 %
LYMPHOCYTES NFR BLD: 8.9 %
LYMPHOCYTES NFR BLD: 9.7 %
LYMPHOCYTES NFR BLD: 9.8 %
MAGNESIUM SERPL-MCNC: 2 MG/DL (ref 1.6–2.6)
MAGNESIUM SERPL-MCNC: 2.1 MG/DL
MAGNESIUM SERPL-MCNC: 2.1 MG/DL
MAGNESIUM SERPL-MCNC: 2.4 MG/DL
MCH RBC QN AUTO: 25.2 PG
MCH RBC QN AUTO: 25.5 PG
MCH RBC QN AUTO: 26 PG (ref 27–31)
MCH RBC QN AUTO: 26.5 PG
MCH RBC QN AUTO: 26.7 PG
MCH RBC QN AUTO: 26.8 PG
MCH RBC QN AUTO: 27.1 PG
MCH RBC QN AUTO: 27.1 PG
MCH RBC QN AUTO: 27.2 PG
MCH RBC QN AUTO: 27.4 PG
MCH RBC QN AUTO: 27.4 PG
MCH RBC QN AUTO: 27.5 PG
MCH RBC QN AUTO: 27.5 PG
MCH RBC QN AUTO: 27.6 PG
MCH RBC QN AUTO: 27.7 PG
MCH RBC QN AUTO: 27.7 PG
MCH RBC QN AUTO: 27.8 PG
MCH RBC QN AUTO: 28 PG
MCH RBC QN AUTO: 28.2 PG
MCHC RBC AUTO-ENTMCNC: 30.8 G/DL
MCHC RBC AUTO-ENTMCNC: 30.8 G/DL
MCHC RBC AUTO-ENTMCNC: 31 G/DL
MCHC RBC AUTO-ENTMCNC: 31.2 G/DL
MCHC RBC AUTO-ENTMCNC: 31.4 G/DL
MCHC RBC AUTO-ENTMCNC: 31.5 G/DL
MCHC RBC AUTO-ENTMCNC: 31.6 G/DL
MCHC RBC AUTO-ENTMCNC: 31.7 G/DL
MCHC RBC AUTO-ENTMCNC: 31.9 G/DL (ref 32–36)
MCHC RBC AUTO-ENTMCNC: 32 G/DL
MCHC RBC AUTO-ENTMCNC: 32.1 G/DL
MCHC RBC AUTO-ENTMCNC: 32.3 G/DL
MCHC RBC AUTO-ENTMCNC: 32.5 G/DL
MCHC RBC AUTO-ENTMCNC: 32.6 G/DL
MCHC RBC AUTO-ENTMCNC: 32.8 G/DL
MCHC RBC AUTO-ENTMCNC: 32.9 G/DL
MCHC RBC AUTO-ENTMCNC: 33.9 G/DL
MCV RBC AUTO: 80 FL
MCV RBC AUTO: 81 FL (ref 82–98)
MCV RBC AUTO: 83 FL
MCV RBC AUTO: 84 FL
MCV RBC AUTO: 84 FL
MCV RBC AUTO: 85 FL
MCV RBC AUTO: 86 FL
MCV RBC AUTO: 87 FL
MCV RBC AUTO: 87 FL
MCV RBC AUTO: 88 FL
MCV RBC AUTO: 88 FL
MICROSCOPIC COMMENT: ABNORMAL
MONOCYTES # BLD AUTO: 0.6 K/UL
MONOCYTES # BLD AUTO: 0.7 K/UL
MONOCYTES # BLD AUTO: 0.8 K/UL
MONOCYTES # BLD AUTO: 0.8 K/UL (ref 0.3–1)
MONOCYTES # BLD AUTO: 0.9 K/UL
MONOCYTES # BLD AUTO: 1 K/UL
MONOCYTES # BLD AUTO: 1.1 K/UL
MONOCYTES NFR BLD: 10.1 %
MONOCYTES NFR BLD: 10.3 %
MONOCYTES NFR BLD: 10.4 %
MONOCYTES NFR BLD: 10.5 %
MONOCYTES NFR BLD: 11 %
MONOCYTES NFR BLD: 11.2 % (ref 4–15)
MONOCYTES NFR BLD: 11.8 %
MONOCYTES NFR BLD: 11.9 %
MONOCYTES NFR BLD: 12.3 %
MONOCYTES NFR BLD: 12.5 %
MONOCYTES NFR BLD: 13 %
MONOCYTES NFR BLD: 13.3 %
MONOCYTES NFR BLD: 14 %
MONOCYTES NFR BLD: 14.6 %
MONOCYTES NFR BLD: 6.5 %
MONOCYTES NFR BLD: 7.8 %
MONOCYTES NFR BLD: 8.6 %
MONOCYTES NFR BLD: 9.4 %
MONOCYTES NFR BLD: 9.6 %
MONOCYTES NFR BLD: 9.9 %
MONOCYTES NFR BLD: 9.9 %
MV PEAK A VEL: 1.03 M/S
MV PEAK E VEL: 0.74 M/S
NEUTROPHILS # BLD AUTO: 3.4 K/UL
NEUTROPHILS # BLD AUTO: 4 K/UL
NEUTROPHILS # BLD AUTO: 4.2 K/UL
NEUTROPHILS # BLD AUTO: 4.4 K/UL
NEUTROPHILS # BLD AUTO: 4.7 K/UL
NEUTROPHILS # BLD AUTO: 4.7 K/UL (ref 1.8–7.7)
NEUTROPHILS # BLD AUTO: 5 K/UL
NEUTROPHILS # BLD AUTO: 5 K/UL
NEUTROPHILS # BLD AUTO: 5.9 K/UL
NEUTROPHILS # BLD AUTO: 6.3 K/UL
NEUTROPHILS # BLD AUTO: 6.4 K/UL
NEUTROPHILS # BLD AUTO: 6.4 K/UL
NEUTROPHILS # BLD AUTO: 6.7 K/UL
NEUTROPHILS # BLD AUTO: 6.9 K/UL
NEUTROPHILS # BLD AUTO: 7 K/UL
NEUTROPHILS # BLD AUTO: 7.6 K/UL
NEUTROPHILS # BLD AUTO: 8.4 K/UL
NEUTROPHILS # BLD AUTO: 8.5 K/UL
NEUTROPHILS NFR BLD: 64.1 %
NEUTROPHILS NFR BLD: 66.4 %
NEUTROPHILS NFR BLD: 66.5 %
NEUTROPHILS NFR BLD: 66.7 % (ref 38–73)
NEUTROPHILS NFR BLD: 68.2 %
NEUTROPHILS NFR BLD: 70.4 %
NEUTROPHILS NFR BLD: 71.8 %
NEUTROPHILS NFR BLD: 72.5 %
NEUTROPHILS NFR BLD: 72.6 %
NEUTROPHILS NFR BLD: 73.4 %
NEUTROPHILS NFR BLD: 73.5 %
NEUTROPHILS NFR BLD: 74.6 %
NEUTROPHILS NFR BLD: 75.7 %
NEUTROPHILS NFR BLD: 76.2 %
NEUTROPHILS NFR BLD: 76.8 %
NEUTROPHILS NFR BLD: 77.4 %
NEUTROPHILS NFR BLD: 78.8 %
NEUTROPHILS NFR BLD: 79 %
NEUTROPHILS NFR BLD: 79.5 %
NEUTROPHILS NFR BLD: 79.5 %
NEUTROPHILS NFR BLD: 80 %
NITRITE UR QL STRIP: NEGATIVE
NONHDLC SERPL-MCNC: 50 MG/DL
NRBC BLD-RTO: 0 /100 WBC
NRBC BLD-RTO: 1 /100 WBC
NRBC BLD-RTO: 2 /100 WBC
NUM UNITS TRANS PACKED RBC: NORMAL
OVALOCYTES BLD QL SMEAR: ABNORMAL
PCO2 BLDA: 49.4 MMHG (ref 35–45)
PH SMN: 7.3 [PH] (ref 7.35–7.45)
PH UR STRIP: 7 [PH] (ref 5–8)
PH UR STRIP: 8 [PH] (ref 5–8)
PH UR STRIP: 8 [PH] (ref 5–8)
PHOSPHATE SERPL-MCNC: 2.1 MG/DL
PHOSPHATE SERPL-MCNC: 2.5 MG/DL (ref 2.7–4.5)
PHOSPHATE SERPL-MCNC: 2.6 MG/DL
PHOSPHATE SERPL-MCNC: 3.8 MG/DL
PHOSPHATE SERPL-MCNC: 3.9 MG/DL
PHOSPHATE SERPL-MCNC: 3.9 MG/DL
PHOSPHATE SERPL-MCNC: 5.1 MG/DL
PLATELET # BLD AUTO: 122 K/UL
PLATELET # BLD AUTO: 123 K/UL
PLATELET # BLD AUTO: 138 K/UL
PLATELET # BLD AUTO: 138 K/UL
PLATELET # BLD AUTO: 139 K/UL
PLATELET # BLD AUTO: 141 K/UL
PLATELET # BLD AUTO: 144 K/UL
PLATELET # BLD AUTO: 144 K/UL
PLATELET # BLD AUTO: 147 K/UL
PLATELET # BLD AUTO: 154 K/UL
PLATELET # BLD AUTO: 157 K/UL
PLATELET # BLD AUTO: 162 K/UL
PLATELET # BLD AUTO: 163 K/UL
PLATELET # BLD AUTO: 164 K/UL
PLATELET # BLD AUTO: 164 K/UL
PLATELET # BLD AUTO: 167 K/UL
PLATELET # BLD AUTO: 173 K/UL
PLATELET # BLD AUTO: 176 K/UL
PLATELET # BLD AUTO: 177 K/UL
PLATELET # BLD AUTO: 192 K/UL
PLATELET # BLD AUTO: 94 K/UL (ref 150–350)
PMV BLD AUTO: 11.6 FL
PMV BLD AUTO: 11.7 FL
PMV BLD AUTO: 11.8 FL
PMV BLD AUTO: 11.9 FL
PMV BLD AUTO: 12 FL
PMV BLD AUTO: 12.3 FL
PMV BLD AUTO: 12.5 FL
PMV BLD AUTO: 12.7 FL
PMV BLD AUTO: 12.8 FL
PMV BLD AUTO: 12.8 FL
PMV BLD AUTO: 12.9 FL
PMV BLD AUTO: 13 FL
PMV BLD AUTO: 13.3 FL
PMV BLD AUTO: 13.8 FL
PMV BLD AUTO: 13.8 FL
PMV BLD AUTO: 14 FL
PMV BLD AUTO: ABNORMAL FL
PMV BLD AUTO: ABNORMAL FL
PMV BLD AUTO: ABNORMAL FL (ref 9.2–12.9)
PO2 BLDA: 37 MMHG (ref 40–60)
POC BE: -2 MMOL/L
POC IONIZED CALCIUM: 1.02 MMOL/L (ref 1.06–1.42)
POC MOLECULAR INFLUENZA A AGN: NEGATIVE
POC MOLECULAR INFLUENZA B AGN: NEGATIVE
POC SATURATED O2: 63 % (ref 95–100)
POC TCO2 (MEASURED): 30 MMOL/L (ref 23–29)
POC TCO2: 26 MMOL/L (ref 24–29)
POCT GLUCOSE: 101 MG/DL (ref 70–110)
POCT GLUCOSE: 105 MG/DL (ref 70–110)
POCT GLUCOSE: 106 MG/DL (ref 70–110)
POCT GLUCOSE: 107 MG/DL (ref 70–110)
POCT GLUCOSE: 119 MG/DL (ref 70–110)
POCT GLUCOSE: 122 MG/DL (ref 70–110)
POCT GLUCOSE: 130 MG/DL (ref 70–110)
POCT GLUCOSE: 131 MG/DL (ref 70–110)
POCT GLUCOSE: 132 MG/DL (ref 70–110)
POCT GLUCOSE: 136 MG/DL (ref 70–110)
POCT GLUCOSE: 144 MG/DL (ref 70–110)
POCT GLUCOSE: 150 MG/DL (ref 70–110)
POCT GLUCOSE: 163 MG/DL (ref 70–110)
POCT GLUCOSE: 169 MG/DL (ref 70–110)
POCT GLUCOSE: 172 MG/DL (ref 70–110)
POCT GLUCOSE: 173 MG/DL (ref 70–110)
POCT GLUCOSE: 175 MG/DL (ref 70–110)
POCT GLUCOSE: 181 MG/DL (ref 70–110)
POCT GLUCOSE: 183 MG/DL (ref 70–110)
POCT GLUCOSE: 189 MG/DL (ref 70–110)
POCT GLUCOSE: 200 MG/DL (ref 70–110)
POCT GLUCOSE: 203 MG/DL (ref 70–110)
POCT GLUCOSE: 206 MG/DL (ref 70–110)
POCT GLUCOSE: 223 MG/DL (ref 70–110)
POCT GLUCOSE: 252 MG/DL (ref 70–110)
POCT GLUCOSE: 289 MG/DL (ref 70–110)
POCT GLUCOSE: 73 MG/DL (ref 70–110)
POCT GLUCOSE: 89 MG/DL (ref 70–110)
POCT GLUCOSE: 90 MG/DL (ref 70–110)
POCT GLUCOSE: 93 MG/DL (ref 70–110)
POCT GLUCOSE: 94 MG/DL (ref 70–110)
POCT GLUCOSE: 95 MG/DL (ref 70–110)
POCT GLUCOSE: 97 MG/DL (ref 70–110)
POCT GLUCOSE: 98 MG/DL (ref 70–110)
POIKILOCYTOSIS BLD QL SMEAR: SLIGHT
POLYCHROMASIA BLD QL SMEAR: ABNORMAL
POTASSIUM BLD-SCNC: 7.3 MMOL/L (ref 3.5–5.1)
POTASSIUM SERPL-SCNC: 3.3 MMOL/L
POTASSIUM SERPL-SCNC: 3.4 MMOL/L (ref 3.5–5.1)
POTASSIUM SERPL-SCNC: 3.6 MMOL/L
POTASSIUM SERPL-SCNC: 3.6 MMOL/L
POTASSIUM SERPL-SCNC: 3.7 MMOL/L
POTASSIUM SERPL-SCNC: 3.9 MMOL/L
POTASSIUM SERPL-SCNC: 3.9 MMOL/L
POTASSIUM SERPL-SCNC: 4 MMOL/L
POTASSIUM SERPL-SCNC: 4.3 MMOL/L
POTASSIUM SERPL-SCNC: 4.4 MMOL/L
POTASSIUM SERPL-SCNC: 4.6 MMOL/L
POTASSIUM SERPL-SCNC: 4.7 MMOL/L
POTASSIUM SERPL-SCNC: 4.7 MMOL/L
POTASSIUM SERPL-SCNC: 4.8 MMOL/L
POTASSIUM SERPL-SCNC: 4.8 MMOL/L
POTASSIUM SERPL-SCNC: 5.5 MMOL/L
PROCALCITONIN SERPL IA-MCNC: 0.43 NG/ML
PROT SERPL-MCNC: 6.8 G/DL
PROT SERPL-MCNC: 6.9 G/DL
PROT SERPL-MCNC: 7 G/DL
PROT SERPL-MCNC: 7.2 G/DL
PROT SERPL-MCNC: 7.4 G/DL
PROT SERPL-MCNC: 7.8 G/DL
PROT SERPL-MCNC: 7.9 G/DL
PROT SERPL-MCNC: 8.1 G/DL
PROT UR QL STRIP: ABNORMAL
PROTHROMBIN TIME: 11.1 SEC (ref 9–12.5)
PROTHROMBIN TIME: 11.7 SEC
RA MAJOR: 4.81 CM
RA PRESSURE: 3 MMHG
RA WIDTH: 3.21 CM
RBC # BLD AUTO: 2.45 M/UL
RBC # BLD AUTO: 2.51 M/UL
RBC # BLD AUTO: 2.8 M/UL
RBC # BLD AUTO: 2.87 M/UL
RBC # BLD AUTO: 2.9 M/UL
RBC # BLD AUTO: 2.91 M/UL
RBC # BLD AUTO: 2.92 M/UL
RBC # BLD AUTO: 2.92 M/UL
RBC # BLD AUTO: 2.98 M/UL
RBC # BLD AUTO: 2.99 M/UL
RBC # BLD AUTO: 3.01 M/UL
RBC # BLD AUTO: 3.07 M/UL
RBC # BLD AUTO: 3.1 M/UL
RBC # BLD AUTO: 3.14 M/UL
RBC # BLD AUTO: 3.17 M/UL
RBC # BLD AUTO: 3.2 M/UL
RBC # BLD AUTO: 3.22 M/UL
RBC # BLD AUTO: 3.23 M/UL
RBC # BLD AUTO: 3.39 M/UL
RBC # BLD AUTO: 3.66 M/UL (ref 4.6–6.2)
RBC # BLD AUTO: 3.73 M/UL
RBC #/AREA URNS AUTO: >100 /HPF (ref 0–4)
RETICS/RBC NFR AUTO: 2.8 %
RH BLD: NORMAL
RIGHT VENTRICULAR END-DIASTOLIC DIMENSION: 3.8 CM
SAMPLE: ABNORMAL
SAMPLE: ABNORMAL
SATURATED IRON: 20 %
SCHISTOCYTES BLD QL SMEAR: ABNORMAL
SINUS: 3.29 CM
SITE: ABNORMAL
SODIUM BLD-SCNC: 136 MMOL/L (ref 136–145)
SODIUM SERPL-SCNC: 129 MMOL/L
SODIUM SERPL-SCNC: 131 MMOL/L
SODIUM SERPL-SCNC: 133 MMOL/L
SODIUM SERPL-SCNC: 133 MMOL/L (ref 136–145)
SODIUM SERPL-SCNC: 134 MMOL/L
SODIUM SERPL-SCNC: 135 MMOL/L
SODIUM SERPL-SCNC: 136 MMOL/L
SODIUM SERPL-SCNC: 137 MMOL/L
SODIUM SERPL-SCNC: 137 MMOL/L
SODIUM SERPL-SCNC: 138 MMOL/L
SP GR UR STRIP: 1.02 (ref 1–1.03)
SP GR UR STRIP: 1.02 (ref 1–1.03)
SP GR UR STRIP: >=1.03 (ref 1–1.03)
SPHEROCYTES BLD QL SMEAR: ABNORMAL
SQUAMOUS #/AREA URNS AUTO: 12 /HPF
STJ: 3 CM
TARGETS BLD QL SMEAR: ABNORMAL
TDI LATERAL: 0.06
TDI SEPTAL: 0.05
TDI: 0.06
TOTAL IRON BINDING CAPACITY: 161 UG/DL
TRANS ERYTHROCYTES VOL PATIENT: NORMAL ML
TRANSFERRIN SERPL-MCNC: 109 MG/DL
TRICUSPID ANNULAR PLANE SYSTOLIC EXCURSION: 2.53 CM
TRIGL SERPL-MCNC: 36 MG/DL
TROPONIN I SERPL DL<=0.01 NG/ML-MCNC: 0.03 NG/ML (ref 0–0.03)
TROPONIN I SERPL DL<=0.01 NG/ML-MCNC: 0.04 NG/ML
TROPONIN I SERPL DL<=0.01 NG/ML-MCNC: 0.05 NG/ML
TSH SERPL DL<=0.005 MIU/L-ACNC: 1.89 UIU/ML
URN SPEC COLLECT METH UR: ABNORMAL
VIT B12 SERPL-MCNC: 1283 PG/ML
WBC # BLD AUTO: 10.6 K/UL
WBC # BLD AUTO: 10.66 K/UL
WBC # BLD AUTO: 5.12 K/UL
WBC # BLD AUTO: 5.53 K/UL
WBC # BLD AUTO: 5.65 K/UL
WBC # BLD AUTO: 5.72 K/UL
WBC # BLD AUTO: 6.16 K/UL
WBC # BLD AUTO: 6.18 K/UL
WBC # BLD AUTO: 6.48 K/UL
WBC # BLD AUTO: 6.62 K/UL
WBC # BLD AUTO: 6.85 K/UL
WBC # BLD AUTO: 7.08 K/UL (ref 3.9–12.7)
WBC # BLD AUTO: 7.1 K/UL
WBC # BLD AUTO: 7.61 K/UL
WBC # BLD AUTO: 7.87 K/UL
WBC # BLD AUTO: 8.27 K/UL
WBC # BLD AUTO: 8.51 K/UL
WBC # BLD AUTO: 8.75 K/UL
WBC # BLD AUTO: 9.2 K/UL
WBC # BLD AUTO: 9.28 K/UL
WBC # BLD AUTO: 9.44 K/UL
WBC #/AREA URNS AUTO: 0 /HPF (ref 0–5)
WBC #/AREA URNS AUTO: >100 /HPF (ref 0–5)
WBC #/AREA URNS AUTO: >100 /HPF (ref 0–5)
WBC CLUMPS UR QL AUTO: ABNORMAL
WBC CLUMPS UR QL AUTO: ABNORMAL

## 2019-01-01 PROCEDURE — 25000003 PHARM REV CODE 250: Performed by: INTERNAL MEDICINE

## 2019-01-01 PROCEDURE — 84443 ASSAY THYROID STIM HORMONE: CPT

## 2019-01-01 PROCEDURE — 25000003 PHARM REV CODE 250: Performed by: PHYSICIAN ASSISTANT

## 2019-01-01 PROCEDURE — 90935 PR HEMODIALYSIS, ONE EVALUATION: ICD-10-PCS | Mod: ,,, | Performed by: NURSE PRACTITIONER

## 2019-01-01 PROCEDURE — 99232 SBSQ HOSP IP/OBS MODERATE 35: CPT | Mod: GC,,, | Performed by: STUDENT IN AN ORGANIZED HEALTH CARE EDUCATION/TRAINING PROGRAM

## 2019-01-01 PROCEDURE — 82803 BLOOD GASES ANY COMBINATION: CPT

## 2019-01-01 PROCEDURE — 25000003 PHARM REV CODE 250: Performed by: NURSE PRACTITIONER

## 2019-01-01 PROCEDURE — 85025 COMPLETE CBC W/AUTO DIFF WBC: CPT

## 2019-01-01 PROCEDURE — 86901 BLOOD TYPING SEROLOGIC RH(D): CPT

## 2019-01-01 PROCEDURE — 81001 URINALYSIS AUTO W/SCOPE: CPT

## 2019-01-01 PROCEDURE — 80069 RENAL FUNCTION PANEL: CPT

## 2019-01-01 PROCEDURE — 86920 COMPATIBILITY TEST SPIN: CPT

## 2019-01-01 PROCEDURE — 11000001 HC ACUTE MED/SURG PRIVATE ROOM

## 2019-01-01 PROCEDURE — 80053 COMPREHEN METABOLIC PANEL: CPT

## 2019-01-01 PROCEDURE — 99232 SBSQ HOSP IP/OBS MODERATE 35: CPT | Mod: ,,, | Performed by: INTERNAL MEDICINE

## 2019-01-01 PROCEDURE — 83036 HEMOGLOBIN GLYCOSYLATED A1C: CPT

## 2019-01-01 PROCEDURE — 27201247 HC HEMODIALYSIS, SET-UP & CANCEL

## 2019-01-01 PROCEDURE — 84145 PROCALCITONIN (PCT): CPT

## 2019-01-01 PROCEDURE — 87086 URINE CULTURE/COLONY COUNT: CPT

## 2019-01-01 PROCEDURE — P9022 WASHED RED BLOOD CELLS UNIT: HCPCS

## 2019-01-01 PROCEDURE — 88342 TISSUE SPECIMEN TO PATHOLOGY - SURGERY: ICD-10-PCS | Mod: 26,,, | Performed by: PATHOLOGY

## 2019-01-01 PROCEDURE — 82962 GLUCOSE BLOOD TEST: CPT | Performed by: UROLOGY

## 2019-01-01 PROCEDURE — 94664 DEMO&/EVAL PT USE INHALER: CPT

## 2019-01-01 PROCEDURE — 99285 EMERGENCY DEPT VISIT HI MDM: CPT | Mod: 25

## 2019-01-01 PROCEDURE — 36430 TRANSFUSION BLD/BLD COMPNT: CPT

## 2019-01-01 PROCEDURE — 85045 AUTOMATED RETICULOCYTE COUNT: CPT

## 2019-01-01 PROCEDURE — D9220A PRA ANESTHESIA: ICD-10-PCS | Mod: ANES,,, | Performed by: ANESTHESIOLOGY

## 2019-01-01 PROCEDURE — 99239 HOSP IP/OBS DSCHRG MGMT >30: CPT | Mod: GC,,, | Performed by: STUDENT IN AN ORGANIZED HEALTH CARE EDUCATION/TRAINING PROGRAM

## 2019-01-01 PROCEDURE — 25000003 PHARM REV CODE 250: Performed by: NURSE ANESTHETIST, CERTIFIED REGISTERED

## 2019-01-01 PROCEDURE — 86850 RBC ANTIBODY SCREEN: CPT

## 2019-01-01 PROCEDURE — P9021 RED BLOOD CELLS UNIT: HCPCS

## 2019-01-01 PROCEDURE — 99232 PR SUBSEQUENT HOSPITAL CARE,LEVL II: ICD-10-PCS | Mod: ,,, | Performed by: HOSPITALIST

## 2019-01-01 PROCEDURE — 83605 ASSAY OF LACTIC ACID: CPT

## 2019-01-01 PROCEDURE — 51702 INSERT TEMP BLADDER CATH: CPT | Mod: 59

## 2019-01-01 PROCEDURE — 99232 SBSQ HOSP IP/OBS MODERATE 35: CPT | Mod: ,,, | Performed by: HOSPITALIST

## 2019-01-01 PROCEDURE — 36415 COLL VENOUS BLD VENIPUNCTURE: CPT

## 2019-01-01 PROCEDURE — 84484 ASSAY OF TROPONIN QUANT: CPT

## 2019-01-01 PROCEDURE — 90935 HEMODIALYSIS ONE EVALUATION: CPT | Mod: ,,, | Performed by: NURSE PRACTITIONER

## 2019-01-01 PROCEDURE — 90935 HEMODIALYSIS ONE EVALUATION: CPT | Mod: ,,, | Performed by: INTERNAL MEDICINE

## 2019-01-01 PROCEDURE — 93990 DOPPLER FLOW TESTING: CPT | Mod: 26,,, | Performed by: RADIOLOGY

## 2019-01-01 PROCEDURE — 87040 BLOOD CULTURE FOR BACTERIA: CPT | Mod: 59

## 2019-01-01 PROCEDURE — 83540 ASSAY OF IRON: CPT

## 2019-01-01 PROCEDURE — 25000003 PHARM REV CODE 250: Performed by: STUDENT IN AN ORGANIZED HEALTH CARE EDUCATION/TRAINING PROGRAM

## 2019-01-01 PROCEDURE — 99285 PR EMERGENCY DEPT VISIT,LEVEL V: ICD-10-PCS | Mod: ,,, | Performed by: EMERGENCY MEDICINE

## 2019-01-01 PROCEDURE — 93005 ELECTROCARDIOGRAM TRACING: CPT

## 2019-01-01 PROCEDURE — 94761 N-INVAS EAR/PLS OXIMETRY MLT: CPT

## 2019-01-01 PROCEDURE — 99499 UNLISTED E&M SERVICE: CPT | Mod: S$GLB,,, | Performed by: SURGERY

## 2019-01-01 PROCEDURE — 97116 GAIT TRAINING THERAPY: CPT

## 2019-01-01 PROCEDURE — 99232 SBSQ HOSP IP/OBS MODERATE 35: CPT | Mod: ,,, | Performed by: NURSE PRACTITIONER

## 2019-01-01 PROCEDURE — 85018 HEMOGLOBIN: CPT

## 2019-01-01 PROCEDURE — 85014 HEMATOCRIT: CPT

## 2019-01-01 PROCEDURE — 51798 US URINE CAPACITY MEASURE: CPT

## 2019-01-01 PROCEDURE — 71000033 HC RECOVERY, INTIAL HOUR: Performed by: UROLOGY

## 2019-01-01 PROCEDURE — 84100 ASSAY OF PHOSPHORUS: CPT

## 2019-01-01 PROCEDURE — 93010 EKG 12-LEAD: ICD-10-PCS | Mod: ,,, | Performed by: INTERNAL MEDICINE

## 2019-01-01 PROCEDURE — 88341 IMHCHEM/IMCYTCHM EA ADD ANTB: CPT | Mod: 26,,, | Performed by: PATHOLOGY

## 2019-01-01 PROCEDURE — 99212 PR OFFICE/OUTPT VISIT, EST, LEVL II, 10-19 MIN: ICD-10-PCS | Mod: S$GLB,,, | Performed by: SURGERY

## 2019-01-01 PROCEDURE — 52601 PR TRANSURETHRAL ELEC-SURG PROSTATECTOM: ICD-10-PCS | Mod: ,,, | Performed by: UROLOGY

## 2019-01-01 PROCEDURE — 90935 PR HEMODIALYSIS, ONE EVALUATION: ICD-10-PCS | Mod: ,,, | Performed by: INTERNAL MEDICINE

## 2019-01-01 PROCEDURE — 80048 BASIC METABOLIC PNL TOTAL CA: CPT

## 2019-01-01 PROCEDURE — 88305 TISSUE EXAM BY PATHOLOGIST: CPT | Mod: 26,,, | Performed by: PATHOLOGY

## 2019-01-01 PROCEDURE — 27000221 HC OXYGEN, UP TO 24 HOURS

## 2019-01-01 PROCEDURE — D9220A PRA ANESTHESIA: Mod: ANES,,, | Performed by: ANESTHESIOLOGY

## 2019-01-01 PROCEDURE — D9220A PRA ANESTHESIA: Mod: CRNA,,, | Performed by: NURSE ANESTHETIST, CERTIFIED REGISTERED

## 2019-01-01 PROCEDURE — 83735 ASSAY OF MAGNESIUM: CPT

## 2019-01-01 PROCEDURE — 99232 PR SUBSEQUENT HOSPITAL CARE,LEVL II: ICD-10-PCS | Mod: ,,, | Performed by: NURSE PRACTITIONER

## 2019-01-01 PROCEDURE — 96365 THER/PROPH/DIAG IV INF INIT: CPT

## 2019-01-01 PROCEDURE — D9220A PRA ANESTHESIA: ICD-10-PCS | Mod: CRNA,,, | Performed by: NURSE ANESTHETIST, CERTIFIED REGISTERED

## 2019-01-01 PROCEDURE — 93990 US HEMODIALYSIS ACCESS: ICD-10-PCS | Mod: 26,,, | Performed by: RADIOLOGY

## 2019-01-01 PROCEDURE — 97530 THERAPEUTIC ACTIVITIES: CPT

## 2019-01-01 PROCEDURE — 99285 EMERGENCY DEPT VISIT HI MDM: CPT | Mod: ,,, | Performed by: EMERGENCY MEDICINE

## 2019-01-01 PROCEDURE — 83010 ASSAY OF HAPTOGLOBIN QUANT: CPT

## 2019-01-01 PROCEDURE — 99900035 HC TECH TIME PER 15 MIN (STAT)

## 2019-01-01 PROCEDURE — 37000009 HC ANESTHESIA EA ADD 15 MINS: Performed by: UROLOGY

## 2019-01-01 PROCEDURE — 93010 ELECTROCARDIOGRAM REPORT: CPT | Mod: ,,, | Performed by: INTERNAL MEDICINE

## 2019-01-01 PROCEDURE — 90935 HEMODIALYSIS ONE EVALUATION: CPT

## 2019-01-01 PROCEDURE — 99239 PR HOSPITAL DISCHARGE DAY,>30 MIN: ICD-10-PCS | Mod: ,,, | Performed by: NURSE PRACTITIONER

## 2019-01-01 PROCEDURE — 99233 SBSQ HOSP IP/OBS HIGH 50: CPT | Mod: ,,, | Performed by: HOSPITALIST

## 2019-01-01 PROCEDURE — 85610 PROTHROMBIN TIME: CPT

## 2019-01-01 PROCEDURE — 85025 COMPLETE CBC W/AUTO DIFF WBC: CPT | Mod: 91

## 2019-01-01 PROCEDURE — 99239 HOSP IP/OBS DSCHRG MGMT >30: CPT | Mod: ,,, | Performed by: NURSE PRACTITIONER

## 2019-01-01 PROCEDURE — 87205 SMEAR GRAM STAIN: CPT

## 2019-01-01 PROCEDURE — 96367 TX/PROPH/DG ADDL SEQ IV INF: CPT

## 2019-01-01 PROCEDURE — 94640 AIRWAY INHALATION TREATMENT: CPT

## 2019-01-01 PROCEDURE — 99223 PR INITIAL HOSPITAL CARE,LEVL III: ICD-10-PCS | Mod: GC,,, | Performed by: STUDENT IN AN ORGANIZED HEALTH CARE EDUCATION/TRAINING PROGRAM

## 2019-01-01 PROCEDURE — 63600175 PHARM REV CODE 636 W HCPCS: Performed by: NURSE PRACTITIONER

## 2019-01-01 PROCEDURE — 88305 TISSUE SPECIMEN TO PATHOLOGY - SURGERY: ICD-10-PCS | Mod: 26,,, | Performed by: PATHOLOGY

## 2019-01-01 PROCEDURE — 97167 OT EVAL HIGH COMPLEX 60 MIN: CPT

## 2019-01-01 PROCEDURE — 92610 EVALUATE SWALLOWING FUNCTION: CPT

## 2019-01-01 PROCEDURE — 97535 SELF CARE MNGMENT TRAINING: CPT

## 2019-01-01 PROCEDURE — 99231 SBSQ HOSP IP/OBS SF/LOW 25: CPT | Mod: ,,, | Performed by: NURSE PRACTITIONER

## 2019-01-01 PROCEDURE — 51702 INSERT TEMP BLADDER CATH: CPT

## 2019-01-01 PROCEDURE — 99239 PR HOSPITAL DISCHARGE DAY,>30 MIN: ICD-10-PCS | Mod: GC,,, | Performed by: STUDENT IN AN ORGANIZED HEALTH CARE EDUCATION/TRAINING PROGRAM

## 2019-01-01 PROCEDURE — 99999 PR PBB SHADOW E&M-EST. PATIENT-LVL IV: CPT | Mod: PBBFAC,,, | Performed by: SURGERY

## 2019-01-01 PROCEDURE — 99232 PR SUBSEQUENT HOSPITAL CARE,LEVL II: ICD-10-PCS | Mod: ,,, | Performed by: INTERNAL MEDICINE

## 2019-01-01 PROCEDURE — 99285 PR EMERGENCY DEPT VISIT,LEVEL V: ICD-10-PCS | Mod: ,,, | Performed by: PHYSICIAN ASSISTANT

## 2019-01-01 PROCEDURE — 83880 ASSAY OF NATRIURETIC PEPTIDE: CPT

## 2019-01-01 PROCEDURE — 99233 PR SUBSEQUENT HOSPITAL CARE,LEVL III: ICD-10-PCS | Mod: ,,, | Performed by: HOSPITALIST

## 2019-01-01 PROCEDURE — 63600175 PHARM REV CODE 636 W HCPCS: Mod: JG | Performed by: NURSE PRACTITIONER

## 2019-01-01 PROCEDURE — 63600175 PHARM REV CODE 636 W HCPCS: Performed by: NURSE ANESTHETIST, CERTIFIED REGISTERED

## 2019-01-01 PROCEDURE — 99233 PR SUBSEQUENT HOSPITAL CARE,LEVL III: ICD-10-PCS | Mod: ,,, | Performed by: PSYCHIATRY & NEUROLOGY

## 2019-01-01 PROCEDURE — 99285 EMERGENCY DEPT VISIT HI MDM: CPT | Mod: ,,, | Performed by: PHYSICIAN ASSISTANT

## 2019-01-01 PROCEDURE — 83615 LACTATE (LD) (LDH) ENZYME: CPT

## 2019-01-01 PROCEDURE — 25000242 PHARM REV CODE 250 ALT 637 W/ HCPCS: Performed by: STUDENT IN AN ORGANIZED HEALTH CARE EDUCATION/TRAINING PROGRAM

## 2019-01-01 PROCEDURE — 63600175 PHARM REV CODE 636 W HCPCS: Performed by: STUDENT IN AN ORGANIZED HEALTH CARE EDUCATION/TRAINING PROGRAM

## 2019-01-01 PROCEDURE — 63600175 PHARM REV CODE 636 W HCPCS: Performed by: HOSPITALIST

## 2019-01-01 PROCEDURE — 99214 PR OFFICE/OUTPT VISIT, EST, LEVL IV, 30-39 MIN: ICD-10-PCS | Mod: 25,S$GLB,, | Performed by: PHYSICIAN ASSISTANT

## 2019-01-01 PROCEDURE — 27000646 HC AEROBIKA DEVICE

## 2019-01-01 PROCEDURE — 99232 PR SUBSEQUENT HOSPITAL CARE,LEVL II: ICD-10-PCS | Mod: GC,,, | Performed by: STUDENT IN AN ORGANIZED HEALTH CARE EDUCATION/TRAINING PROGRAM

## 2019-01-01 PROCEDURE — 93990 DOPPLER FLOW TESTING: CPT | Mod: TC

## 2019-01-01 PROCEDURE — 27201423 OPTIME MED/SURG SUP & DEVICES STERILE SUPPLY: Performed by: UROLOGY

## 2019-01-01 PROCEDURE — 88342 IMHCHEM/IMCYTCHM 1ST ANTB: CPT | Performed by: PATHOLOGY

## 2019-01-01 PROCEDURE — 87502 INFLUENZA DNA AMP PROBE: CPT

## 2019-01-01 PROCEDURE — 99283 EMERGENCY DEPT VISIT LOW MDM: CPT

## 2019-01-01 PROCEDURE — 25000003 PHARM REV CODE 250: Performed by: EMERGENCY MEDICINE

## 2019-01-01 PROCEDURE — 52601 PROSTATECTOMY (TURP): CPT | Mod: ,,, | Performed by: UROLOGY

## 2019-01-01 PROCEDURE — 97161 PT EVAL LOW COMPLEX 20 MIN: CPT

## 2019-01-01 PROCEDURE — 99999 PR PBB SHADOW E&M-EST. PATIENT-LVL IV: ICD-10-PCS | Mod: PBBFAC,,, | Performed by: SURGERY

## 2019-01-01 PROCEDURE — 99999 PR PBB SHADOW E&M-EST. PATIENT-LVL V: CPT | Mod: PBBFAC,,, | Performed by: PHYSICIAN ASSISTANT

## 2019-01-01 PROCEDURE — 99223 1ST HOSP IP/OBS HIGH 75: CPT | Mod: GC,,, | Performed by: PSYCHIATRY & NEUROLOGY

## 2019-01-01 PROCEDURE — 96402 CHEMO HORMON ANTINEOPL SQ/IM: CPT | Mod: S$GLB,,, | Performed by: PHYSICIAN ASSISTANT

## 2019-01-01 PROCEDURE — 80061 LIPID PANEL: CPT

## 2019-01-01 PROCEDURE — 88341 IMHCHEM/IMCYTCHM EA ADD ANTB: CPT | Performed by: PATHOLOGY

## 2019-01-01 PROCEDURE — 99223 1ST HOSP IP/OBS HIGH 75: CPT | Mod: GC,,, | Performed by: STUDENT IN AN ORGANIZED HEALTH CARE EDUCATION/TRAINING PROGRAM

## 2019-01-01 PROCEDURE — 12000002 HC ACUTE/MED SURGE SEMI-PRIVATE ROOM

## 2019-01-01 PROCEDURE — 99223 1ST HOSP IP/OBS HIGH 75: CPT | Mod: GC,,, | Performed by: INTERNAL MEDICINE

## 2019-01-01 PROCEDURE — 99223 1ST HOSP IP/OBS HIGH 75: CPT | Mod: ,,, | Performed by: INTERNAL MEDICINE

## 2019-01-01 PROCEDURE — 99214 OFFICE O/P EST MOD 30 MIN: CPT | Mod: 25,S$GLB,, | Performed by: PHYSICIAN ASSISTANT

## 2019-01-01 PROCEDURE — 99223 PR INITIAL HOSPITAL CARE,LEVL III: ICD-10-PCS | Mod: ,,, | Performed by: INTERNAL MEDICINE

## 2019-01-01 PROCEDURE — 99285 EMERGENCY DEPT VISIT HI MDM: CPT

## 2019-01-01 PROCEDURE — 36000707: Performed by: UROLOGY

## 2019-01-01 PROCEDURE — 96402 PR CHEMOTHER HORMON ANTINEOPL SUB-Q/IM: ICD-10-PCS | Mod: S$GLB,,, | Performed by: PHYSICIAN ASSISTANT

## 2019-01-01 PROCEDURE — 99223 PR INITIAL HOSPITAL CARE,LEVL III: ICD-10-PCS | Mod: GC,,, | Performed by: PSYCHIATRY & NEUROLOGY

## 2019-01-01 PROCEDURE — 86706 HEP B SURFACE ANTIBODY: CPT

## 2019-01-01 PROCEDURE — 37000008 HC ANESTHESIA 1ST 15 MINUTES: Performed by: UROLOGY

## 2019-01-01 PROCEDURE — 82607 VITAMIN B-12: CPT

## 2019-01-01 PROCEDURE — 25500020 PHARM REV CODE 255: Performed by: EMERGENCY MEDICINE

## 2019-01-01 PROCEDURE — 88342 IMHCHEM/IMCYTCHM 1ST ANTB: CPT | Mod: 26,,, | Performed by: PATHOLOGY

## 2019-01-01 PROCEDURE — 82728 ASSAY OF FERRITIN: CPT

## 2019-01-01 PROCEDURE — 97165 OT EVAL LOW COMPLEX 30 MIN: CPT

## 2019-01-01 PROCEDURE — 99282 EMERGENCY DEPT VISIT SF MDM: CPT | Mod: ,,, | Performed by: PHYSICIAN ASSISTANT

## 2019-01-01 PROCEDURE — 99282 PR EMERGENCY DEPT VISIT,LEVEL II: ICD-10-PCS | Mod: ,,, | Performed by: PHYSICIAN ASSISTANT

## 2019-01-01 PROCEDURE — 99291 CRITICAL CARE FIRST HOUR: CPT | Mod: 25

## 2019-01-01 PROCEDURE — 86900 BLOOD TYPING SEROLOGIC ABO: CPT

## 2019-01-01 PROCEDURE — 80074 ACUTE HEPATITIS PANEL: CPT

## 2019-01-01 PROCEDURE — 99499 RISK ADDL DX/OHS AUDIT: ICD-10-PCS | Mod: S$GLB,,, | Performed by: SURGERY

## 2019-01-01 PROCEDURE — 99291 CRITICAL CARE FIRST HOUR: CPT | Mod: ,,, | Performed by: EMERGENCY MEDICINE

## 2019-01-01 PROCEDURE — 82668 ASSAY OF ERYTHROPOIETIN: CPT

## 2019-01-01 PROCEDURE — 99212 OFFICE O/P EST SF 10 MIN: CPT | Mod: S$GLB,,, | Performed by: SURGERY

## 2019-01-01 PROCEDURE — A4216 STERILE WATER/SALINE, 10 ML: HCPCS | Performed by: PHYSICIAN ASSISTANT

## 2019-01-01 PROCEDURE — 99999 PR PBB SHADOW E&M-EST. PATIENT-LVL V: ICD-10-PCS | Mod: PBBFAC,,, | Performed by: PHYSICIAN ASSISTANT

## 2019-01-01 PROCEDURE — 99223 PR INITIAL HOSPITAL CARE,LEVL III: ICD-10-PCS | Mod: GC,,, | Performed by: INTERNAL MEDICINE

## 2019-01-01 PROCEDURE — 88305 TISSUE EXAM BY PATHOLOGIST: CPT | Performed by: PATHOLOGY

## 2019-01-01 PROCEDURE — 36000706: Performed by: UROLOGY

## 2019-01-01 PROCEDURE — 82746 ASSAY OF FOLIC ACID SERUM: CPT

## 2019-01-01 PROCEDURE — 99231 PR SUBSEQUENT HOSPITAL CARE,LEVL I: ICD-10-PCS | Mod: ,,, | Performed by: NURSE PRACTITIONER

## 2019-01-01 PROCEDURE — 25000003 PHARM REV CODE 250: Performed by: HOSPITALIST

## 2019-01-01 PROCEDURE — 82962 GLUCOSE BLOOD TEST: CPT

## 2019-01-01 PROCEDURE — 99233 SBSQ HOSP IP/OBS HIGH 50: CPT | Mod: ,,, | Performed by: PSYCHIATRY & NEUROLOGY

## 2019-01-01 PROCEDURE — 88341 TISSUE SPECIMEN TO PATHOLOGY - SURGERY: ICD-10-PCS | Mod: 26,,, | Performed by: PATHOLOGY

## 2019-01-01 PROCEDURE — 96375 TX/PRO/DX INJ NEW DRUG ADDON: CPT

## 2019-01-01 PROCEDURE — 99291 PR CRITICAL CARE, E/M 30-74 MINUTES: ICD-10-PCS | Mod: ,,, | Performed by: EMERGENCY MEDICINE

## 2019-01-01 RX ORDER — SEVELAMER CARBONATE 800 MG/1
800 TABLET, FILM COATED ORAL
Status: DISCONTINUED | OUTPATIENT
Start: 2019-01-01 | End: 2019-01-01 | Stop reason: HOSPADM

## 2019-01-01 RX ORDER — SODIUM CHLORIDE 9 MG/ML
INJECTION, SOLUTION INTRAVENOUS ONCE
Status: COMPLETED | OUTPATIENT
Start: 2019-01-01 | End: 2019-01-01

## 2019-01-01 RX ORDER — ATORVASTATIN CALCIUM 20 MG/1
40 TABLET, FILM COATED ORAL NIGHTLY
Status: DISCONTINUED | OUTPATIENT
Start: 2019-01-01 | End: 2019-01-01 | Stop reason: HOSPADM

## 2019-01-01 RX ORDER — FENTANYL CITRATE 50 UG/ML
INJECTION, SOLUTION INTRAMUSCULAR; INTRAVENOUS
Status: DISCONTINUED | OUTPATIENT
Start: 2019-01-01 | End: 2019-01-01

## 2019-01-01 RX ORDER — AMLODIPINE BESYLATE 10 MG/1
10 TABLET ORAL DAILY
Qty: 30 TABLET | Refills: 0 | Status: SHIPPED | OUTPATIENT
Start: 2019-01-01 | End: 2019-01-01 | Stop reason: HOSPADM

## 2019-01-01 RX ORDER — IBUPROFEN 200 MG
16 TABLET ORAL
Status: DISCONTINUED | OUTPATIENT
Start: 2019-01-01 | End: 2019-01-01 | Stop reason: HOSPADM

## 2019-01-01 RX ORDER — POLYETHYLENE GLYCOL 3350 17 G/17G
17 POWDER, FOR SOLUTION ORAL DAILY
Status: DISCONTINUED | OUTPATIENT
Start: 2019-01-01 | End: 2019-01-01 | Stop reason: HOSPADM

## 2019-01-01 RX ORDER — AMLODIPINE BESYLATE 5 MG/1
5 TABLET ORAL DAILY
Status: DISCONTINUED | OUTPATIENT
Start: 2019-01-01 | End: 2019-01-01

## 2019-01-01 RX ORDER — LEVETIRACETAM 500 MG/1
500 TABLET ORAL 2 TIMES DAILY
Status: DISCONTINUED | OUTPATIENT
Start: 2019-01-01 | End: 2019-01-01 | Stop reason: HOSPADM

## 2019-01-01 RX ORDER — TAMSULOSIN HYDROCHLORIDE 0.4 MG/1
0.4 CAPSULE ORAL DAILY
Status: DISCONTINUED | OUTPATIENT
Start: 2019-01-01 | End: 2019-01-01 | Stop reason: HOSPADM

## 2019-01-01 RX ORDER — NAPROXEN SODIUM 220 MG/1
81 TABLET, FILM COATED ORAL DAILY
Status: DISCONTINUED | OUTPATIENT
Start: 2019-01-01 | End: 2019-01-01 | Stop reason: HOSPADM

## 2019-01-01 RX ORDER — SODIUM CHLORIDE 9 MG/ML
INJECTION, SOLUTION INTRAVENOUS
Status: DISCONTINUED | OUTPATIENT
Start: 2019-01-01 | End: 2019-01-01 | Stop reason: HOSPADM

## 2019-01-01 RX ORDER — ACETAMINOPHEN 650 MG/1
650 SUPPOSITORY RECTAL EVERY 8 HOURS PRN
Status: DISCONTINUED | OUTPATIENT
Start: 2019-01-01 | End: 2019-01-01 | Stop reason: HOSPADM

## 2019-01-01 RX ORDER — INSULIN ASPART 100 [IU]/ML
0-5 INJECTION, SOLUTION INTRAVENOUS; SUBCUTANEOUS
Status: DISCONTINUED | OUTPATIENT
Start: 2019-01-01 | End: 2019-01-01 | Stop reason: HOSPADM

## 2019-01-01 RX ORDER — HEPARIN SODIUM 5000 [USP'U]/ML
5000 INJECTION, SOLUTION INTRAVENOUS; SUBCUTANEOUS EVERY 8 HOURS
Status: DISCONTINUED | OUTPATIENT
Start: 2019-01-01 | End: 2019-01-01 | Stop reason: HOSPADM

## 2019-01-01 RX ORDER — SODIUM CHLORIDE 0.9 % (FLUSH) 0.9 %
10 SYRINGE (ML) INJECTION
Status: DISCONTINUED | OUTPATIENT
Start: 2019-01-01 | End: 2019-01-01 | Stop reason: HOSPADM

## 2019-01-01 RX ORDER — TRAMADOL HYDROCHLORIDE 50 MG/1
50 TABLET ORAL EVERY 6 HOURS PRN
Qty: 12 TABLET | Refills: 0 | Status: SHIPPED | OUTPATIENT
Start: 2019-01-01 | End: 2019-01-01 | Stop reason: SDUPTHER

## 2019-01-01 RX ORDER — PROPOFOL 10 MG/ML
VIAL (ML) INTRAVENOUS CONTINUOUS PRN
Status: DISCONTINUED | OUTPATIENT
Start: 2019-01-01 | End: 2019-01-01

## 2019-01-01 RX ORDER — TRAMADOL HYDROCHLORIDE 50 MG/1
50 TABLET ORAL
Status: COMPLETED | OUTPATIENT
Start: 2019-01-01 | End: 2019-01-01

## 2019-01-01 RX ORDER — DUTASTERIDE 0.5 MG/1
0.5 CAPSULE, LIQUID FILLED ORAL DAILY
Status: DISCONTINUED | OUTPATIENT
Start: 2019-01-01 | End: 2019-01-01 | Stop reason: HOSPADM

## 2019-01-01 RX ORDER — KETAMINE HCL IN 0.9 % NACL 50 MG/5 ML
SYRINGE (ML) INTRAVENOUS
Status: DISCONTINUED | OUTPATIENT
Start: 2019-01-01 | End: 2019-01-01

## 2019-01-01 RX ORDER — ETOMIDATE 2 MG/ML
INJECTION INTRAVENOUS
Status: DISCONTINUED | OUTPATIENT
Start: 2019-01-01 | End: 2019-01-01

## 2019-01-01 RX ORDER — TRAMADOL HYDROCHLORIDE 50 MG/1
50 TABLET ORAL EVERY 6 HOURS PRN
Qty: 12 TABLET | Refills: 0 | Status: ON HOLD | OUTPATIENT
Start: 2019-01-01 | End: 2019-01-01 | Stop reason: HOSPADM

## 2019-01-01 RX ORDER — TRAMADOL HYDROCHLORIDE 50 MG/1
50 TABLET ORAL EVERY 6 HOURS PRN
Status: DISCONTINUED | OUTPATIENT
Start: 2019-01-01 | End: 2019-01-01 | Stop reason: HOSPADM

## 2019-01-01 RX ORDER — GLUCAGON 1 MG
1 KIT INJECTION
Status: DISCONTINUED | OUTPATIENT
Start: 2019-01-01 | End: 2019-01-01 | Stop reason: HOSPADM

## 2019-01-01 RX ORDER — HYDROCODONE BITARTRATE AND ACETAMINOPHEN 500; 5 MG/1; MG/1
TABLET ORAL
Status: DISCONTINUED | OUTPATIENT
Start: 2019-01-01 | End: 2019-01-01 | Stop reason: HOSPADM

## 2019-01-01 RX ORDER — PROPOFOL 10 MG/ML
VIAL (ML) INTRAVENOUS
Status: DISCONTINUED | OUTPATIENT
Start: 2019-01-01 | End: 2019-01-01

## 2019-01-01 RX ORDER — SODIUM CHLORIDE 9 MG/ML
INJECTION, SOLUTION INTRAVENOUS ONCE
Status: DISCONTINUED | OUTPATIENT
Start: 2019-01-01 | End: 2019-01-01 | Stop reason: HOSPADM

## 2019-01-01 RX ORDER — BICALUTAMIDE 50 MG/1
50 TABLET, FILM COATED ORAL DAILY
Qty: 30 TABLET | Refills: 11 | Status: SHIPPED | OUTPATIENT
Start: 2019-01-01 | End: 2019-01-01 | Stop reason: HOSPADM

## 2019-01-01 RX ORDER — TRAVOPROST OPHTHALMIC SOLUTION 0.04 MG/ML
1 SOLUTION OPHTHALMIC NIGHTLY
Status: DISCONTINUED | OUTPATIENT
Start: 2019-01-01 | End: 2019-01-01 | Stop reason: HOSPADM

## 2019-01-01 RX ORDER — HYDROCODONE BITARTRATE AND ACETAMINOPHEN 500; 5 MG/1; MG/1
TABLET ORAL
Status: DISCONTINUED | OUTPATIENT
Start: 2019-01-01 | End: 2019-01-01

## 2019-01-01 RX ORDER — HEPARIN SODIUM 5000 [USP'U]/ML
5000 INJECTION, SOLUTION INTRAVENOUS; SUBCUTANEOUS EVERY 12 HOURS
Status: DISCONTINUED | OUTPATIENT
Start: 2019-01-01 | End: 2019-01-01 | Stop reason: HOSPADM

## 2019-01-01 RX ORDER — DOXYCYCLINE HYCLATE 100 MG
100 TABLET ORAL ONCE
Status: DISCONTINUED | OUTPATIENT
Start: 2019-01-01 | End: 2019-01-01 | Stop reason: HOSPADM

## 2019-01-01 RX ORDER — SYRING-NEEDL,DISP,INSUL,0.3 ML 29 G X1/2"
296 SYRINGE, EMPTY DISPOSABLE MISCELLANEOUS
Status: COMPLETED | OUTPATIENT
Start: 2019-01-01 | End: 2019-01-01

## 2019-01-01 RX ORDER — PSEUDOEPHEDRINE/ACETAMINOPHEN 30MG-500MG
100 TABLET ORAL
Status: COMPLETED | OUTPATIENT
Start: 2019-01-01 | End: 2019-01-01

## 2019-01-01 RX ORDER — POLYETHYLENE GLYCOL 3350 17 G/17G
17 POWDER, FOR SOLUTION ORAL DAILY PRN
COMMUNITY
End: 2019-01-01 | Stop reason: HOSPADM

## 2019-01-01 RX ORDER — CARVEDILOL 25 MG/1
25 TABLET ORAL 2 TIMES DAILY
Status: DISCONTINUED | OUTPATIENT
Start: 2019-01-01 | End: 2019-01-01 | Stop reason: HOSPADM

## 2019-01-01 RX ORDER — RAMELTEON 8 MG/1
8 TABLET ORAL NIGHTLY PRN
Status: DISCONTINUED | OUTPATIENT
Start: 2019-01-01 | End: 2019-01-01 | Stop reason: HOSPADM

## 2019-01-01 RX ORDER — INSULIN ASPART 100 [IU]/ML
0-5 INJECTION, SOLUTION INTRAVENOUS; SUBCUTANEOUS EVERY 6 HOURS PRN
Status: DISCONTINUED | OUTPATIENT
Start: 2019-01-01 | End: 2019-01-01 | Stop reason: HOSPADM

## 2019-01-01 RX ORDER — IBUPROFEN 200 MG
16 TABLET ORAL
Status: DISCONTINUED | OUTPATIENT
Start: 2019-01-01 | End: 2019-01-01

## 2019-01-01 RX ORDER — IBUPROFEN 200 MG
24 TABLET ORAL
Status: DISCONTINUED | OUTPATIENT
Start: 2019-01-01 | End: 2019-01-01 | Stop reason: HOSPADM

## 2019-01-01 RX ORDER — SODIUM CHLORIDE 9 MG/ML
INJECTION, SOLUTION INTRAVENOUS
Status: DISCONTINUED | OUTPATIENT
Start: 2019-01-01 | End: 2019-01-01

## 2019-01-01 RX ORDER — BRINZOLAMIDE 10 MG/ML
1 SUSPENSION/ DROPS OPHTHALMIC 3 TIMES DAILY
Status: DISCONTINUED | OUTPATIENT
Start: 2019-01-01 | End: 2019-01-01 | Stop reason: HOSPADM

## 2019-01-01 RX ORDER — BRIMONIDINE TARTRATE 1.5 MG/ML
1 SOLUTION/ DROPS OPHTHALMIC 2 TIMES DAILY
Status: DISCONTINUED | OUTPATIENT
Start: 2019-01-01 | End: 2019-01-01 | Stop reason: HOSPADM

## 2019-01-01 RX ORDER — TIMOLOL MALEATE 5 MG/ML
1 SOLUTION/ DROPS OPHTHALMIC 2 TIMES DAILY
Status: DISCONTINUED | OUTPATIENT
Start: 2019-01-01 | End: 2019-01-01 | Stop reason: HOSPADM

## 2019-01-01 RX ORDER — LIDOCAINE HCL/PF 100 MG/5ML
SYRINGE (ML) INTRAVENOUS
Status: DISCONTINUED | OUTPATIENT
Start: 2019-01-01 | End: 2019-01-01

## 2019-01-01 RX ORDER — CALCIUM ACETATE 667 MG/1
1334 CAPSULE ORAL
Status: DISCONTINUED | OUTPATIENT
Start: 2019-01-01 | End: 2019-01-01

## 2019-01-01 RX ORDER — SODIUM CHLORIDE 0.9 % (FLUSH) 0.9 %
5 SYRINGE (ML) INJECTION
Status: CANCELLED | OUTPATIENT
Start: 2019-01-01

## 2019-01-01 RX ORDER — LEVETIRACETAM 5 MG/ML
500 INJECTION INTRAVASCULAR EVERY 12 HOURS
Status: DISCONTINUED | OUTPATIENT
Start: 2019-01-01 | End: 2019-01-01 | Stop reason: HOSPADM

## 2019-01-01 RX ORDER — FUROSEMIDE 10 MG/ML
40 INJECTION INTRAMUSCULAR; INTRAVENOUS
Status: DISCONTINUED | OUTPATIENT
Start: 2019-01-01 | End: 2019-01-01

## 2019-01-01 RX ORDER — AMLODIPINE BESYLATE 5 MG/1
5 TABLET ORAL DAILY
Status: DISCONTINUED | OUTPATIENT
Start: 2019-01-01 | End: 2019-01-01 | Stop reason: HOSPADM

## 2019-01-01 RX ORDER — SODIUM CHLORIDE 0.9 % (FLUSH) 0.9 %
5 SYRINGE (ML) INJECTION
Status: DISCONTINUED | OUTPATIENT
Start: 2019-01-01 | End: 2019-01-01 | Stop reason: HOSPADM

## 2019-01-01 RX ORDER — ACETAMINOPHEN 500 MG
1000 TABLET ORAL
Status: COMPLETED | OUTPATIENT
Start: 2019-01-01 | End: 2019-01-01

## 2019-01-01 RX ORDER — AMLODIPINE BESYLATE 10 MG/1
10 TABLET ORAL DAILY
Status: DISCONTINUED | OUTPATIENT
Start: 2019-01-01 | End: 2019-01-01

## 2019-01-01 RX ORDER — ACETAMINOPHEN 650 MG/1
650 SUPPOSITORY RECTAL EVERY 6 HOURS PRN
Status: DISCONTINUED | OUTPATIENT
Start: 2019-01-01 | End: 2019-01-01 | Stop reason: HOSPADM

## 2019-01-01 RX ORDER — ERGOCALCIFEROL 1.25 MG/1
CAPSULE ORAL
COMMUNITY
End: 2019-01-01 | Stop reason: HOSPADM

## 2019-01-01 RX ORDER — IBUPROFEN 200 MG
24 TABLET ORAL
Status: DISCONTINUED | OUTPATIENT
Start: 2019-01-01 | End: 2019-01-01

## 2019-01-01 RX ORDER — IPRATROPIUM BROMIDE AND ALBUTEROL SULFATE 2.5; .5 MG/3ML; MG/3ML
3 SOLUTION RESPIRATORY (INHALATION)
Status: DISCONTINUED | OUTPATIENT
Start: 2019-01-01 | End: 2019-01-01 | Stop reason: HOSPADM

## 2019-01-01 RX ORDER — BICALUTAMIDE 50 MG/1
50 TABLET, FILM COATED ORAL DAILY
Status: DISCONTINUED | OUTPATIENT
Start: 2019-01-01 | End: 2019-01-01 | Stop reason: HOSPADM

## 2019-01-01 RX ORDER — SODIUM CHLORIDE 0.9 % (FLUSH) 0.9 %
3 SYRINGE (ML) INJECTION
Status: DISCONTINUED | OUTPATIENT
Start: 2019-01-01 | End: 2019-01-01 | Stop reason: HOSPADM

## 2019-01-01 RX ORDER — PHENYLEPHRINE HYDROCHLORIDE 10 MG/ML
INJECTION INTRAVENOUS
Status: DISCONTINUED | OUTPATIENT
Start: 2019-01-01 | End: 2019-01-01

## 2019-01-01 RX ORDER — CARVEDILOL 25 MG/1
25 TABLET ORAL 2 TIMES DAILY
Qty: 60 TABLET | Refills: 2 | Status: ON HOLD | OUTPATIENT
Start: 2019-01-01 | End: 2019-01-01 | Stop reason: HOSPADM

## 2019-01-01 RX ORDER — GLUCAGON 1 MG
1 KIT INJECTION
Status: DISCONTINUED | OUTPATIENT
Start: 2019-01-01 | End: 2019-01-01

## 2019-01-01 RX ORDER — CALCIUM ACETATE 667 MG/1
667 CAPSULE ORAL
Status: DISCONTINUED | OUTPATIENT
Start: 2019-01-01 | End: 2019-01-01 | Stop reason: HOSPADM

## 2019-01-01 RX ORDER — LIDOCAINE HYDROCHLORIDE 20 MG/ML
JELLY TOPICAL ONCE
Status: DISCONTINUED | OUTPATIENT
Start: 2019-01-01 | End: 2019-01-01 | Stop reason: HOSPADM

## 2019-01-01 RX ORDER — ACETAMINOPHEN, DIPHENHYDRAMINE HCL, PHENYLEPHRINE HCL 325; 25; 5 MG/1; MG/1; MG/1
1 TABLET ORAL DAILY
COMMUNITY
End: 2019-01-01 | Stop reason: HOSPADM

## 2019-01-01 RX ORDER — SODIUM CHLORIDE 9 MG/ML
INJECTION, SOLUTION INTRAVENOUS CONTINUOUS PRN
Status: DISCONTINUED | OUTPATIENT
Start: 2019-01-01 | End: 2019-01-01

## 2019-01-01 RX ORDER — INSULIN ASPART 100 [IU]/ML
0-5 INJECTION, SOLUTION INTRAVENOUS; SUBCUTANEOUS EVERY 6 HOURS PRN
Status: DISCONTINUED | OUTPATIENT
Start: 2019-01-01 | End: 2019-01-01

## 2019-01-01 RX ADMIN — GELATIN ABSORBABLE SPONGE 12-7 MM 1 APPLICATOR: 12-7 MISC at 06:01

## 2019-01-01 RX ADMIN — SODIUM CHLORIDE 100 ML/HR: 0.9 INJECTION, SOLUTION INTRAVENOUS at 10:02

## 2019-01-01 RX ADMIN — DUTASTERIDE 0.5 MG: 0.5 CAPSULE, LIQUID FILLED ORAL at 09:01

## 2019-01-01 RX ADMIN — ETOMIDATE 6 MG: 2 INJECTION, SOLUTION INTRAVENOUS at 04:01

## 2019-01-01 RX ADMIN — Medication 10 MG: at 04:01

## 2019-01-01 RX ADMIN — CEFTRIAXONE 2 G: 2 INJECTION, SOLUTION INTRAVENOUS at 05:03

## 2019-01-01 RX ADMIN — BRINZOLAMIDE 1 DROP: 10 SUSPENSION/ DROPS OPHTHALMIC at 08:01

## 2019-01-01 RX ADMIN — CARVEDILOL 25 MG: 25 TABLET, FILM COATED ORAL at 09:01

## 2019-01-01 RX ADMIN — BRINZOLAMIDE 1 DROP: 10 SUSPENSION/ DROPS OPHTHALMIC at 05:01

## 2019-01-01 RX ADMIN — CALCIUM ACETATE 667 MG: 667 CAPSULE ORAL at 11:01

## 2019-01-01 RX ADMIN — FENTANYL CITRATE 25 MCG: 50 INJECTION, SOLUTION INTRAMUSCULAR; INTRAVENOUS at 05:01

## 2019-01-01 RX ADMIN — PROPOFOL 30 MG: 10 INJECTION, EMULSION INTRAVENOUS at 04:01

## 2019-01-01 RX ADMIN — CALCIUM ACETATE 667 MG: 667 CAPSULE ORAL at 06:01

## 2019-01-01 RX ADMIN — ASPIRIN 81 MG CHEWABLE TABLET 81 MG: 81 TABLET CHEWABLE at 08:01

## 2019-01-01 RX ADMIN — FENTANYL CITRATE 50 MCG: 50 INJECTION, SOLUTION INTRAMUSCULAR; INTRAVENOUS at 04:01

## 2019-01-01 RX ADMIN — POLYETHYLENE GLYCOL 3350 17 G: 17 POWDER, FOR SOLUTION ORAL at 08:01

## 2019-01-01 RX ADMIN — ASPIRIN 81 MG CHEWABLE TABLET 81 MG: 81 TABLET CHEWABLE at 10:01

## 2019-01-01 RX ADMIN — SODIUM CHLORIDE 350 ML: 0.9 INJECTION, SOLUTION INTRAVENOUS at 09:01

## 2019-01-01 RX ADMIN — CALCIUM ACETATE 667 MG: 667 CAPSULE ORAL at 09:01

## 2019-01-01 RX ADMIN — LEVETIRACETAM 500 MG: 500 TABLET ORAL at 09:01

## 2019-01-01 RX ADMIN — LIDOCAINE HYDROCHLORIDE 50 MG: 20 INJECTION, SOLUTION INTRAVENOUS at 04:01

## 2019-01-01 RX ADMIN — CALCIUM ACETATE 667 MG: 667 CAPSULE ORAL at 01:01

## 2019-01-01 RX ADMIN — LEVETIRACETAM 500 MG: 500 TABLET ORAL at 08:01

## 2019-01-01 RX ADMIN — SODIUM CHLORIDE: 9 INJECTION, SOLUTION INTRAVENOUS at 04:01

## 2019-01-01 RX ADMIN — CALCIUM ACETATE 667 MG: 667 CAPSULE ORAL at 07:01

## 2019-01-01 RX ADMIN — AMLODIPINE BESYLATE 5 MG: 5 TABLET ORAL at 08:01

## 2019-01-01 RX ADMIN — CALCIUM ACETATE 667 MG: 667 CAPSULE ORAL at 02:01

## 2019-01-01 RX ADMIN — BRINZOLAMIDE 1 DROP: 10 SUSPENSION/ DROPS OPHTHALMIC at 09:01

## 2019-01-01 RX ADMIN — TRAVOPROST 1 DROP: 0.04 SOLUTION/ DROPS OPHTHALMIC at 09:01

## 2019-01-01 RX ADMIN — CARVEDILOL 25 MG: 25 TABLET, FILM COATED ORAL at 10:01

## 2019-01-01 RX ADMIN — AMLODIPINE BESYLATE 5 MG: 5 TABLET ORAL at 09:01

## 2019-01-01 RX ADMIN — CALCIUM ACETATE 667 MG: 667 CAPSULE ORAL at 08:01

## 2019-01-01 RX ADMIN — LEVETIRACETAM 500 MG: 500 TABLET ORAL at 10:01

## 2019-01-01 RX ADMIN — Medication 5 ML: at 12:01

## 2019-01-01 RX ADMIN — BICALUTAMIDE 50 MG: 50 TABLET, FILM COATED ORAL at 02:01

## 2019-01-01 RX ADMIN — DUTASTERIDE 0.5 MG: 0.5 CAPSULE, LIQUID FILLED ORAL at 08:01

## 2019-01-01 RX ADMIN — CALCIUM ACETATE 667 MG: 667 CAPSULE ORAL at 04:01

## 2019-01-01 RX ADMIN — INSULIN ASPART 3 UNITS: 100 INJECTION, SOLUTION INTRAVENOUS; SUBCUTANEOUS at 11:01

## 2019-01-01 RX ADMIN — BRINZOLAMIDE 1 DROP: 10 SUSPENSION/ DROPS OPHTHALMIC at 03:01

## 2019-01-01 RX ADMIN — SODIUM CHLORIDE 300 ML: 0.9 INJECTION, SOLUTION INTRAVENOUS at 02:01

## 2019-01-01 RX ADMIN — LEVETIRACETAM 500 MG: 500 TABLET, FILM COATED ORAL at 09:01

## 2019-01-01 RX ADMIN — DUTASTERIDE 0.5 MG: 0.5 CAPSULE, LIQUID FILLED ORAL at 10:01

## 2019-01-01 RX ADMIN — SODIUM CHLORIDE: 0.9 INJECTION, SOLUTION INTRAVENOUS at 07:01

## 2019-01-01 RX ADMIN — CARVEDILOL 25 MG: 25 TABLET, FILM COATED ORAL at 12:01

## 2019-01-01 RX ADMIN — INSULIN ASPART 2 UNITS: 100 INJECTION, SOLUTION INTRAVENOUS; SUBCUTANEOUS at 11:01

## 2019-01-01 RX ADMIN — BICALUTAMIDE 50 MG: 50 TABLET, FILM COATED ORAL at 08:01

## 2019-01-01 RX ADMIN — TRAVOPROST 1 DROP: 0.04 SOLUTION/ DROPS OPHTHALMIC at 08:01

## 2019-01-01 RX ADMIN — LEVETIRACETAM 500 MG: 500 TABLET ORAL at 12:01

## 2019-01-01 RX ADMIN — ATORVASTATIN CALCIUM 40 MG: 20 TABLET, FILM COATED ORAL at 08:02

## 2019-01-01 RX ADMIN — TAMSULOSIN HYDROCHLORIDE 0.4 MG: 0.4 CAPSULE ORAL at 08:01

## 2019-01-01 RX ADMIN — BRINZOLAMIDE 1 DROP: 10 SUSPENSION/ DROPS OPHTHALMIC at 02:01

## 2019-01-01 RX ADMIN — SODIUM CHLORIDE: 0.9 INJECTION, SOLUTION INTRAVENOUS at 01:03

## 2019-01-01 RX ADMIN — SODIUM CHLORIDE 500 ML: 0.9 INJECTION, SOLUTION INTRAVENOUS at 12:03

## 2019-01-01 RX ADMIN — ATORVASTATIN CALCIUM 40 MG: 20 TABLET, FILM COATED ORAL at 09:01

## 2019-01-01 RX ADMIN — ASPIRIN 81 MG CHEWABLE TABLET 81 MG: 81 TABLET CHEWABLE at 09:01

## 2019-01-01 RX ADMIN — CARVEDILOL 25 MG: 25 TABLET, FILM COATED ORAL at 08:01

## 2019-01-01 RX ADMIN — SODIUM CHLORIDE 300 ML: 0.9 INJECTION, SOLUTION INTRAVENOUS at 03:01

## 2019-01-01 RX ADMIN — CALCIUM ACETATE 667 MG: 667 CAPSULE ORAL at 12:01

## 2019-01-01 RX ADMIN — CALCIUM ACETATE 667 MG: 667 CAPSULE ORAL at 05:01

## 2019-01-01 RX ADMIN — HEPARIN SODIUM 5000 UNITS: 5000 INJECTION, SOLUTION INTRAVENOUS; SUBCUTANEOUS at 10:02

## 2019-01-01 RX ADMIN — BRINZOLAMIDE 1 DROP: 10 SUSPENSION/ DROPS OPHTHALMIC at 04:01

## 2019-01-01 RX ADMIN — IPRATROPIUM BROMIDE AND ALBUTEROL SULFATE 3 ML: .5; 3 SOLUTION RESPIRATORY (INHALATION) at 12:03

## 2019-01-01 RX ADMIN — POLYETHYLENE GLYCOL 3350 17 G: 17 POWDER, FOR SOLUTION ORAL at 12:01

## 2019-01-01 RX ADMIN — VANCOMYCIN HYDROCHLORIDE 500 MG: 500 INJECTION, POWDER, LYOPHILIZED, FOR SOLUTION INTRAVENOUS at 08:03

## 2019-01-01 RX ADMIN — AMLODIPINE BESYLATE 5 MG: 5 TABLET ORAL at 12:01

## 2019-01-01 RX ADMIN — SEVELAMER CARBONATE 800 MG: 800 TABLET, FILM COATED ORAL at 01:02

## 2019-01-01 RX ADMIN — DUTASTERIDE 0.5 MG: 0.5 CAPSULE, LIQUID FILLED ORAL at 01:02

## 2019-01-01 RX ADMIN — PHENYLEPHRINE HYDROCHLORIDE 200 MCG: 10 INJECTION INTRAVENOUS at 04:01

## 2019-01-01 RX ADMIN — ERYTHROPOIETIN 8000 UNITS: 4000 INJECTION, SOLUTION INTRAVENOUS; SUBCUTANEOUS at 11:01

## 2019-01-01 RX ADMIN — DUTASTERIDE 0.5 MG: 0.5 CAPSULE, LIQUID FILLED ORAL at 12:01

## 2019-01-01 RX ADMIN — PROPOFOL 20 MCG/KG/MIN: 10 INJECTION, EMULSION INTRAVENOUS at 04:01

## 2019-01-01 RX ADMIN — BRINZOLAMIDE 1 DROP: 10 SUSPENSION/ DROPS OPHTHALMIC at 12:01

## 2019-01-01 RX ADMIN — LEVETIRACETAM 500 MG: 500 TABLET, FILM COATED ORAL at 08:01

## 2019-01-01 RX ADMIN — MAGNESIUM CITRATE 296 ML: 1.75 LIQUID ORAL at 12:03

## 2019-01-01 RX ADMIN — TAMSULOSIN HYDROCHLORIDE 0.4 MG: 0.4 CAPSULE ORAL at 01:02

## 2019-01-01 RX ADMIN — TRAMADOL HYDROCHLORIDE 50 MG: 50 TABLET, FILM COATED ORAL at 06:01

## 2019-01-01 RX ADMIN — ASPIRIN 81 MG CHEWABLE TABLET 81 MG: 81 TABLET CHEWABLE at 12:01

## 2019-01-01 RX ADMIN — SODIUM POLYSTYRENE SULFONATE 30 G: 15 SUSPENSION ORAL; RECTAL at 11:01

## 2019-01-01 RX ADMIN — Medication 100 ML: at 12:03

## 2019-01-01 RX ADMIN — DUTASTERIDE 0.5 MG: 0.5 CAPSULE, LIQUID FILLED ORAL at 10:02

## 2019-01-01 RX ADMIN — AMLODIPINE BESYLATE 5 MG: 5 TABLET ORAL at 01:02

## 2019-01-01 RX ADMIN — RAMELTEON 8 MG: 8 TABLET, FILM COATED ORAL at 08:02

## 2019-01-01 RX ADMIN — IPRATROPIUM BROMIDE AND ALBUTEROL SULFATE 3 ML: .5; 3 SOLUTION RESPIRATORY (INHALATION) at 08:03

## 2019-01-01 RX ADMIN — BRINZOLAMIDE 1 DROP: 10 SUSPENSION/ DROPS OPHTHALMIC at 10:01

## 2019-01-01 RX ADMIN — ERYTHROPOIETIN 8000 UNITS: 4000 INJECTION, SOLUTION INTRAVENOUS; SUBCUTANEOUS at 09:01

## 2019-01-01 RX ADMIN — LEVETIRACETAM 500 MG: 500 TABLET, FILM COATED ORAL at 10:02

## 2019-01-01 RX ADMIN — TRAVOPROST 1 DROP: 0.04 SOLUTION/ DROPS OPHTHALMIC at 10:01

## 2019-01-01 RX ADMIN — LEVETIRACETAM 500 MG: 500 TABLET, FILM COATED ORAL at 01:02

## 2019-01-01 RX ADMIN — ACETAMINOPHEN 1000 MG: 500 TABLET ORAL at 03:01

## 2019-01-01 RX ADMIN — ETOMIDATE 4 MG: 2 INJECTION, SOLUTION INTRAVENOUS at 04:01

## 2019-01-01 RX ADMIN — AMLODIPINE BESYLATE 5 MG: 5 TABLET ORAL at 10:02

## 2019-01-01 RX ADMIN — PIPERACILLIN AND TAZOBACTAM 4.5 G: 4; .5 INJECTION, POWDER, LYOPHILIZED, FOR SOLUTION INTRAVENOUS; PARENTERAL at 10:03

## 2019-01-01 RX ADMIN — HEPARIN SODIUM 5000 UNITS: 5000 INJECTION, SOLUTION INTRAVENOUS; SUBCUTANEOUS at 08:02

## 2019-01-01 RX ADMIN — AMLODIPINE BESYLATE 5 MG: 5 TABLET ORAL at 03:01

## 2019-01-01 RX ADMIN — Medication 10 MG: at 05:01

## 2019-01-01 RX ADMIN — Medication 20 MG: at 04:01

## 2019-01-01 RX ADMIN — SEVELAMER CARBONATE 800 MG: 800 TABLET, FILM COATED ORAL at 05:02

## 2019-01-01 RX ADMIN — IOHEXOL 100 ML: 350 INJECTION, SOLUTION INTRAVENOUS at 10:01

## 2019-01-01 RX ADMIN — PHENYLEPHRINE HYDROCHLORIDE 0.5 MCG/KG/MIN: 10 INJECTION INTRAVENOUS at 04:01

## 2019-01-01 RX ADMIN — LEVETIRACETAM 500 MG: 5 INJECTION INTRAVENOUS at 09:03

## 2019-01-01 RX ADMIN — PROPOFOL 20 MG: 10 INJECTION, EMULSION INTRAVENOUS at 04:01

## 2019-01-01 RX ADMIN — TAMSULOSIN HYDROCHLORIDE 0.4 MG: 0.4 CAPSULE ORAL at 10:02

## 2019-01-01 RX ADMIN — ASPIRIN 81 MG CHEWABLE TABLET 81 MG: 81 TABLET CHEWABLE at 03:01

## 2019-01-01 RX ADMIN — TRAMADOL HYDROCHLORIDE 50 MG: 50 TABLET, FILM COATED ORAL at 07:01

## 2019-01-01 RX ADMIN — LEVETIRACETAM 500 MG: 500 TABLET, FILM COATED ORAL at 08:02

## 2019-01-03 NOTE — ED NOTES
Received report from OLIVIA Luna.    Pt identifiers for Ronald Campbell 89 y.o. male checked and correct.    Patient lying in stretcher, appears to be resting comfortably and in no acute distress. Patient is clean and well groomed and clothing is properly fastened.    NEUROLOGICAL: The patient is drowsy but oriented to person, time, place, and situation and speaking clearly and appropriately. Eyes open spontaneously, behavior appropriate to situation, follows commands, facial expression symmetrical, purposeful motor response noted, normal sensation in all extremities when touched with a finger.  CARDIOVASCULAR: Patient has a normal rate and regular rhythm, SR on cardiac monitor. Pulses intact. No peripheral edema noted, capillary refill < 3 seconds.   RESPIRATORY: Airway is open, respirations are spontaneous, patient has a normal effort and rate, no accessory muscle use noted.   GASTROINTESTINAL: Abdomen is soft and non-tender to palpation, no distention noted.   GENITOURINARY:Serosanguineous drainage and medium size clots noted coming out from penis. Dialysis fistula to LUE with + thrill/ bruit.  MUSCULOSKELETAL: Patient moving all extremities spontaneously, no obvious swelling or deformities noted. Generalized weakness reported.  INTEGUMENTARY: The skin is warm and dry, color consistent with ethnicity, patient has normal skin turgor and moist mucus membranes. Skin is intact, no breakdown or bruising noted.    PSYCHOSOCIAL: Calm, pleasant, and cooperative. Daughter present at bedside.

## 2019-01-03 NOTE — ED NOTES
Pt resting and repositioned in stretcher with bed locked in lowest position for safety.  Reports hematuria, observed bleeding from penis   Family at bedside       Patient legally blind  Call lights within reach  Cardiac/BP and oxygen saturation continuous

## 2019-01-03 NOTE — ED PROVIDER NOTES
Encounter Date: 1/3/2019       History     Chief Complaint   Patient presents with    Penile Discharge     Pt reports bleeding from penis since November, worsened over the last two days. Painful urination.     89-year-old male presents to the ER for evaluation of bleeding from the penis.  The patient has had this problem intermittently for the past 2 months and has been seen here and admitted here for multiple times.  Urology believes that the bleeding is coming from the prostate.  Last time the patient was here he was in urinary retention.  A Barrett catheter was placed and the patient was admitted.  He underwent continuous bladder irrigation and symptoms improved.  Of note the patient has ESRD and is dialyzed Monday Wednesday Friday.  Last dialysis was Wednesday January 2nd.  The patient does make urine usually twice a day.          Review of patient's allergies indicates:   Allergen Reactions    Lisinopril Swelling     Past Medical History:   Diagnosis Date    Anemia     Blind     bilaterally    CHF (congestive heart failure)     Chronic kidney disease     Dementia 01/2017    Diabetes mellitus type II     General anesthetics causing adverse effect in therapeutic use     april / may 2014     Glaucoma (increased eye pressure)     Hypertension     Hypothermia 12/6/2017    Seizures      Past Surgical History:   Procedure Laterality Date    ANGIOPLASTY WITH STENT PLACEMENT Left 11/18/2016    [facility] 2ND [facility]    AV FISTULA PLACEMENT Left 4/2014    AV fistula to Left upper arm    [id number]-[id number]-XH left RC vs BC AVF Left 8/8/2014    [facility] OR 2ND [facility]    CYSTOSCOPY N/A 11/29/2018    Performed by [facility]o [facility]jee [facility] at [facility] [place] 1ST [place]    EYE SURGERY      FISTULOGRAM Left 11/18/2016    [facility] OR 2ND [facility]    FISTULOGRAM Left 4/15/2016    [facility] at [facility] OR [address] [place] [address]     FISTULOGRAM Left 1/29/2016    [facility] CATH [facility]    FISTULOGRAM Room 11 case Left 3/27/2015    [facility] at [facility] OR 2ND [facility]    HYDROCELECTOMY Bilateral 11/22/2017    Per[facility] by [facility] at [facility] OR 2ND [facility]LR    INSERTION-CATHETER-DIALYSIS-PERITONEAL-LAPAROSCOPIC N/A 5/8/2014    Per[facility] Js Ladd [facility] at [facility] OR 2ND [facility]    INSERTION-CATHETER-DIALYSIS-PERITONEAL-LAPAROSCOPIC N/A 5/5/2014    Per[facility] at [facility] OR 2ND [facility]    INSERTION-CATHETER-HEMODIALYSIS N/A 5/21/2014    Per[facility] by [facility] at [facility] OR 2ND [facility]    PERMCATH INSERTION-TUNNELED CVC N/A 5/13/2014    Performed by Llois Jackson [facility] at [facility] CATH LAB    REMOVAL, BLOOD CLOT N/A 11/29/2018    Performed by [facility]jee [facility] at [facility] OR 1ST [facility]    REMOVAL-CATHETER-PERITONEAL DIALYSIS N/A 7/30/2014    Performed by Js [facility]add [facility] at [facility] OR 2ND [facility]    SCROTUM EXPLORATION Bilateral 11/2017     Family History   Problem Relation Age of Onset    Heart disease Sister         mi    Heart disease Brother         mi    Cancer Daughter         BREAST X 2     Social History     Tobacco Use    Smoking status: Never Smoker    Smokeless tobacco: Never Used   Substance Use Topics    Alcohol use: No    Drug use: No     Review of Systems   Constitutional: Negative for fever.   HENT: Negative for sore throat.    Respiratory: Negative for shortness of breath.    Cardiovascular: Negative for chest pain.   Gastrointestinal: Negative for nausea.   Genitourinary: Negative for dysuria.        Penile bleeding   Musculoskeletal: Negative for back pain.   Skin: Negative for rash.   Neurological: Negative for weakness.   Hematological: Does not bruise/bleed easily.       Physical Exam     Initial Vitals [01/03/19 0305]   BP Pulse Resp Temp SpO2   (!) 163/80 85 16 -- 98 %      MAP       --         Physical Exam    Constitutional: Vital signs are  normal. He appears well-developed and well-nourished. He is not diaphoretic. No distress.   HENT:   Head: Normocephalic and atraumatic.   Right Ear: External ear normal.   Left Ear: External ear normal.   Eyes: Conjunctivae are normal.   Cardiovascular: Normal rate. Exam reveals no gallop and no friction rub.    No murmur heard.  Pulmonary/Chest: No respiratory distress. He has no wheezes. He has no rhonchi. He has no rales. He exhibits no tenderness.   Abdominal: Soft. Normal appearance and bowel sounds are normal.   Genitourinary:   Genitourinary Comments: Dark red blood with blood clots from the urethral meatus.  Minimal bleeding   Musculoskeletal: Normal range of motion.   Neurological: He is alert and oriented to person, place, and time.   Skin: Skin is warm and intact.   Psychiatric: He has a normal mood and affect. His speech is normal and behavior is normal. Cognition and memory are normal.         ED Course   Procedures  Labs Reviewed   CBC W/ AUTO DIFFERENTIAL - Abnormal; Notable for the following components:       Result Value    RBC 3.22 (*)     Hemoglobin 8.2 (*)     Hematocrit 26.6 (*)     MCH 25.5 (*)     MCHC 30.8 (*)     RDW 20.7 (*)     MPV 13.8 (*)     Immature Granulocytes 0.7 (*)     Immature Grans (Abs) 0.06 (*)     nRBC 1 (*)     Gran% 76.8 (*)     Lymph% 12.5 (*)     All other components within normal limits   ISTAT PROCEDURE - Abnormal; Notable for the following components:    POC Glucose 192 (*)     POC BUN 49 (*)     POC Creatinine 5.3 (*)     POC Potassium 7.3 (*)     POC TCO2 (MEASURED) 30 (*)     POC Ionized Calcium 1.02 (*)     POC Hematocrit 29 (*)     All other components within normal limits   URINALYSIS, REFLEX TO URINE CULTURE   BASIC METABOLIC PANEL   ISTAT CHEM8          Imaging Results    None          Medical Decision Making:   ED Management:  89-year-old male with penile bleeding.   Plan:  Bedside bladder scan reveals 300 cc of urine,   I will get basic labs and discuss with  Urology  Labs reveal no acute findings, Hb 8.2, up from 7.8  Discussed with urology, there recommendations were to DC home. Pt is not a surgical candidate. They advised against placement of tejeda catheter as it will worsen bleeding and 300cc was not enough fluid in bladder to warrant placement of tejeda, along with the pt not having urinary urgency or suprapubic pain consistent with retention.   Pt is on palliative care, nurse is coming to the house later today and he has an appt with PCP today.   I discussed with pt and with daughter the plan. Daughter is concerned about taking pt home with the active bleeding, but we discussed at [facility] there is no acute intervention at this time. She was advised bleeding may continue. I advised her to keep appt with PCP and have CBC rechecked. Pt has dialysis appt Friday, tomorrow.                         Clinical Impression:   The encounter diagnosis was Penile bleeding.      Disposition:   Disposition: Discharged  Condition: Stable                        Rashawn Hays PA-C  01/03/19 3019

## 2019-01-03 NOTE — ED TRIAGE NOTES
"Roanld Campbell, a 89 y.o. male presents to the ED w/ complaint of profuse bleeding from penis. Pt daughter reports bleeding initially was hematuria, but is now constant bleeding from penis since Monday. "He's passing major clots and in a lot of pain." Mild bleeding noted through sweat pants at this time. Pt denies CP, SOB, fever/chills, n/v/d. No blood thinner use. Previously d/c Dec 13th, 2018, for similar episode.    Triage note:  Chief Complaint   Patient presents with    Penile Discharge     Pt reports bleeding from penis since November, worsened over the last two days. Painful urination.     Review of patient's allergies indicates:   Allergen Reactions    Lisinopril Swelling     Past Medical History:   Diagnosis Date    Anemia     Blind     bilaterally    CHF (congestive heart failure)     Chronic kidney disease     Dementia 01/2017    Diabetes mellitus type II     General anesthetics causing adverse effect in therapeutic use     april / may 2014     Glaucoma (increased eye pressure)     Hypertension     Hypothermia 12/6/2017    Seizures        "

## 2019-01-04 PROBLEM — D64.9 ANEMIA: Status: ACTIVE | Noted: 2019-01-01

## 2019-01-04 NOTE — MEDICAL/APP STUDENT
Hospital Medicine  History and Physical Exam    Team: [facility] MED B Freeman Patton  Admit Date: 1/4/2019  JAMISON   Principal Problem:  <principal problem not specified>   Patient information was obtained from patient and ER records.   Primary care Physician: Bart Shaw MD  Code status: Full Code    HPI:   89-year-old male presents to the ER for evaluation of bleeding from the penis.  The patient has had this problem intermittently for the past 2 months and has been seen here and admitted here for multiple times.  Urology believes that the bleeding is coming from the prostate.  Last time the patient was here he was in urinary retention.  A Barrett catheter was placed and the patient was admitted.  He underwent continuous bladder irrigation and symptoms improved.  Of note the patient has ESRD and is dialyzed Monday Wednesday Friday.  Last dialysis was Wednesday January 2nd.  The patient does make urine usually twice a day.      Past Medical History: Patient has a past medical history of Anemia, Blind, CHF (congestive heart failure), Chronic kidney disease, Dementia (01/2017), Diabetes mellitus type II, General anesthetics causing adverse effect in therapeutic use, Glaucoma (increased eye pressure), Hypertension, Hypothermia (12/6/2017), and Seizures.    Past Surgical History: Patient has a past surgical history that includes Eye surgery; AV fistula placement (Left, 4/2014); Scrotum exploration (Bilateral, 11/2017); cystoscopy (N/A, 11/29/2018); and removal of blood clot (N/A, 11/29/2018).    Social History: Patient reports that  has never smoked. he has never used smokeless tobacco. He reports that he does not drink alcohol or use drugs.    Family History: family history includes Cancer in his daughter; Heart disease in his brother and sister.    Medications:   Prior to Admission medications    Medication Sig Start Date End Date Taking? Authorizing Provider   amLODIPine (NORVASC) 5 MG tablet Take 1 tablet (5 mg total) by  mouth once daily. 12/13/18 12/13/19 Yes Heather Fernando PA-C   aspirin 81 MG Chew Take 1 tablet (81 mg total) by mouth once daily. 11/4/12  Yes Jacqueline Volpi-Abadie, MD   atorvastatin (LIPITOR) 40 MG tablet Take 1 tablet (40 mg total) by mouth once daily. 8/14/14  Yes Jac Cosby Jr., MD   B complex-vitamin C-folic acid (NEPHRO-JESSICA) 0.8 mg Tab Take 1 tablet by mouth every morning.    Yes Historical Provider, MD   blood sugar diagnostic (BLOOD GLUCOSE TEST) Strp 1 strip by Misc.(Non-Drug; Combo Route) route daily as needed.    Yes Historical Provider, MD   brimonidine-timolol (COMBIGAN) 0.2-0.5 % Drop Place 1 drop into the left eye 2 (two) times daily.   Yes Historical Provider, MD   brinzolamide (AZOPT) 1 % ophthalmic suspension Place 1 drop into the left eye 3 (three) times daily.    Yes Historical Provider, MD   calcium acetate (PHOSLO) 667 mg capsule Take 2 capsules by mouth every day in the morning, then take 1 capsule with lunch and 2 capsules in the evening with meals   Yes Historical Provider, MD   dutasteride (AVODART) 0.5 mg capsule Take 1 capsule (0.5 mg total) by mouth once daily. 12/13/18  Yes Heather Fernando PA-C   insulin aspart (NOVOLOG FLEXPEN) 100 unit/mL InPn  Insulin Pen Subcutaneous As directed.  -200 take 1 unitBS 201-250 take 2 unitsBS 251-300 take 3 unitsBS 301-350 take 4 unitsBS > 351 take 5 units and call MD   Yes Historical Provider, MD   ketoconazole (NIZORAL) 2 % cream Apply topically once daily. feet 1/31/17  Yes Joaquin Juarez DPM   levetiracetam (KEPPRA) 500 MG Tab Take 1 tablet (500 mg total) by mouth 2 (two) times daily. 5/21/14 6/30/20 Yes Aubrey Durbin MD   tamsulosin (FLOMAX) 0.4 mg Cap Take 1 capsule (0.4 mg total) by mouth once daily. 11/30/18 11/30/19 Yes Maddie Toure MD   traMADol (ULTRAM) 50 mg tablet Take 1 tablet (50 mg total) by mouth every 6 (six) hours as needed for Pain. 1/3/19  Yes Rashawn Hays PA-C   TRAVOPROST (TRAVATAN Z OPHT)  Place 1 drop into the left eye every evening. 1 Drops Left eye Every evening   Yes Historical Provider, MD   carvedilol (COREG) 25 MG tablet Take 1 tablet (25 mg total) by mouth 2 (two) times daily. Do not take on dialysis days. Hold for SBP < 110 or HR < 55  Patient taking differently: Take 25 mg by mouth 2 (two) times daily. Hold for SBP < 110 or HR < 55 12/18/17 12/18/18  Katie Jules, NP   doxazosin (CARDURA) 4 MG tablet Take 1 tablet (4 mg total) by mouth once daily. 12/18/17 12/18/18  Katie Jules NP       Allergies: Patient is allergic to lisinopril.    ROS  Constitutional: Negative for fever.   HENT: Negative for sore throat.    Respiratory: Negative for shortness of breath.    Cardiovascular: Negative for chest pain.   Gastrointestinal: Negative for nausea.   Genitourinary: Negative for dysuria.        Penile bleeding   Musculoskeletal: Negative for back pain.   Skin: Negative for rash.   Neurological: Negative for weakness.   Hematological: Does not bruise/bleed easily.    PEx  Temp:  [97.3 °F (36.3 °C)-98.4 °F (36.9 °C)]   Pulse:  [62-72]   Resp:  [15-23]   BP: (110-154)/(51-70)   SpO2:  [99 %-100 %]   There is no height or weight on file to calculate BMI.     Constitutional: Negative for fever.   HENT: Negative for sore throat.    Respiratory: Negative for shortness of breath.    Cardiovascular: Negative for chest pain.   Gastrointestinal: Negative for nausea.   Genitourinary: Negative for dysuria.        Penile bleeding   Musculoskeletal: Negative for back pain.   Skin: Negative for rash.   Neurological: Negative for weakness.   Hematological: Does not bruise/bleed easily.          No intake or output data in the 24 hours ending 01/04/19 1736  Recent Labs   Lab 01/03/19  0447 01/03/19  0448 01/04/19  1202   WBC  --  8.27 9.44   HGB  --  8.2* 6.5*   HCT 29* 26.6* 20.7*   PLT  --  192 157     Recent Labs   Lab 01/03/19  0559 01/04/19  1202    135*   K 3.9 4.6    101   CO2 22* 23   BUN  36* 58*   CREATININE 4.8* 7.2*   * 167*   CALCIUM 8.2* 8.9     Recent Labs   Lab 01/04/19  1202   ALKPHOS 98   ALT 27   AST 35   ALBUMIN 2.8*   PROT 7.9   BILITOT 0.4      No results for input(s): POCTGLUCOSE in the last 168 hours.  Recent Labs     01/04/19  1202   TROPONINI 0.036*       Recent Labs     01/04/19  1202   LACTATE 1.1        Active Hospital Problems    Diagnosis  POA    Anemia [D64.9]  Yes    Gross hematuria [R31.0]  Yes    ESRD (end stage renal disease) [N18.6]  Unknown      Resolved Hospital Problems   No resolved problems to display.       Overview  89-year-old male presents to the ER for evaluation of bleeding from the penis.  The patient has had this problem intermittently for the past 2 months and has been seen here and admitted here for multiple times.  Urology believes that the bleeding is coming from the prostate.     Assessment and Plan for Problems addressed today:    Hematuria     - urology consulted  - UA pending  - cystoscopy last admission unrevealing  - Low H/H, type and screen with transfusion of 1U pRBCs  - HDS  - CT 11/21/2018:  1. Irregular contour about the superolateral aspect of the right kidney, mass cannot be excluded.  There is a tiny non-obstructing calcification within that region.  Right renal cyst.  4.  Several enlarged para-aortic lymph nodes extending along the left ileopsoas with some obliteration of the fat planes with an additional enlarged left external iliac lymph node.  These findings are new since CT pelvis 11/14/2017.      Anemia in chronic kidney disease, on chronic dialysis     - Hgb 6.5  - Type and screen  - Transfuse 1U pRBCs      Blindness     - home gtts      Dementia with behavioral disturbance     - delirium precautions  - zyprexa PRN      Renovascular hypertension      - continue home medications      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     - diet controlled, start low dose SSI      ESRD (end stage renal disease) on dialysis      - HD MWF  - nephrology consulted  - BP and lytes non-emergent      Benign prostatic hyperplasia with urinary retention     - continue home medications      Chronic combined systolic and diastolic heart failure     - compensated, continue home medications      Seizure disorder     - continue home keppra       DVT PPx:     Provider    I personally scribed for Jimmy Wilson MD on 01/04/2019 at 5:44 PM. Electronically signed by janis Patton III on 01/04/2019 at 5:44 PM

## 2019-01-04 NOTE — ED NOTES
Pt. Pulled up in bed, linens changed, positioned on left side for comfort with blankets and pillow. On cardiac and o2 monitor, call light within reach, will continue to monitor.

## 2019-01-04 NOTE — ED NOTES
CBI ongoing at this time per urology verbal orders. Pt. Tolerating well. Intake and output maintained frequently per this RN. Output red in color with minimal clots at this time. Will continue to monitor frequently.

## 2019-01-04 NOTE — SUBJECTIVE & OBJECTIVE
Past Medical History:   Diagnosis Date    Anemia     Blind     bilaterally    CHF (congestive heart failure)     Chronic kidney disease     Dementia 01/2017    Diabetes mellitus type II     General anesthetics causing adverse effect in therapeutic use     april / may 2014     Glaucoma (increased eye pressure)     Hypertension     Hypothermia 12/6/2017    Seizures        Past Surgical History:   Procedure Laterality Date    ANGIOPLASTY WITH STENT PLACEMENT Left 11/18/2016    [facility]formed [facility] 2ND FLR    AV FISTULA PLACEMENT Left 4/2014    AV fistula to Left upper arm    [id number] left RC vs BC AVF Left 8/8/2014    Performed [facility] at [facility] OR 2ND FLR    CYSTOSCOPY N/A 11/29/2018    Performed by Temo Banerjee MD at [facility] OR 1ST FLR    EYE SURGERY      FISTULOGRAM Left 11/18/2016    Performed by [facility] at [facility] OR 2ND FLR    FISTULOGRAM Left 4/15/2016    Performed by [facility] at [facility] OR 2ND FLR    FISTULOGRAM Left 1/29/2016    Performed [facility] at [facility] CATH LAB    FISTULOGRAM Room 11 case Left 3/27/2015    Performed by [facility] at [facility] OR 2ND FLR    HYDROCELECTOMY Bilateral 11/22/2017    Performed by [facility]o [facility]jee [facility] at [facility] OR 2ND FLR    INSERTION-CATHETER-DIALYSIS-PERITONEAL-LAPAROSCOPIC N/A 5/8/2014    Performed by Js Ladd MD at [facility] OR 2ND FLR    INSERTION-CATHETER-DIALYSIS-PERITONEAL-LAPAROSCOPIC N/A 5/5/2014    Performed by Js Ladd MD at [facility] OR 2ND FLR    INSERTION-CATHETER-HEMODIALYSIS N/A 5/21/2014    Performed by [facility] at [facility] OR 2ND FLR    PERMCATH INSERTION-TUNNELED CVC N/A 5/13/2014    Performed by Lolis Jackson MD at [facility] CATH LAB    REMOVAL, BLOOD CLOT N/A 11/29/2018    Performed by Temo Banerjee MD at [facility] OR 1ST FLR    REMOVAL-CATHETER-PERITONEAL DIALYSIS N/A 7/30/2014    Performed by Js Ladd MD at [facility] OR  2ND FLR    SCROTUM EXPLORATION Bilateral 11/2017       Review of patient's allergies indicates:   Allergen Reactions    Lisinopril Swelling     Current Facility-Administered Medications   Medication Frequency    0.9%  NaCl infusion (for blood administration) Q24H PRN    amLODIPine tablet 5 mg Daily    aspirin chewable tablet 81 mg Daily    brinzolamide 1 % ophthalmic suspension 1 drop TID    calcium acetate capsule 667 mg TID WM    carvedilol tablet 25 mg BID    dutasteride capsule 0.5 mg Daily    levETIRAcetam tablet 500 mg BID    sodium chloride 0.9% flush 5 mL PRN    traMADol tablet 50 mg Q6H PRN    travoprost 0.004 % ophthalmic solution 1 drop QHS     Current Outpatient Medications   Medication    amLODIPine (NORVASC) 5 MG tablet    aspirin 81 MG Chew    atorvastatin (LIPITOR) 40 MG tablet    B complex-vitamin C-folic acid (NEPHRO-JESSICA) 0.8 mg Tab    blood sugar diagnostic (BLOOD GLUCOSE TEST) Strp    brimonidine-timolol (COMBIGAN) 0.2-0.5 % Drop    brinzolamide (AZOPT) 1 % ophthalmic suspension    calcium acetate (PHOSLO) 667 mg capsule    dutasteride (AVODART) 0.5 mg capsule    insulin aspart (NOVOLOG FLEXPEN) 100 unit/mL InPn    ketoconazole (NIZORAL) 2 % cream    levetiracetam (KEPPRA) 500 MG Tab    tamsulosin (FLOMAX) 0.4 mg Cap    traMADol (ULTRAM) 50 mg tablet    TRAVOPROST (TRAVATAN Z OPHT)    carvedilol (COREG) 25 MG tablet    doxazosin (CARDURA) 4 MG tablet     Family History     Problem Relation (Age of Onset)    Cancer Daughter    Heart disease Sister, Brother        Tobacco Use    Smoking status: Never Smoker    Smokeless tobacco: Never Used   Substance and Sexual Activity    Alcohol use: No    Drug use: No    Sexual activity: No     Review of Systems   Unable to perform ROS: Mental status change     Objective:     Vital Signs (Most Recent):  Temp: 97.6 °F (36.4 °C) (01/04/19 1656)  Pulse: 67 (01/04/19 1656)  Resp: 18 (01/04/19 1656)  BP: (!) 148/67 (01/04/19  1656)  SpO2: 100 % (01/04/19 1656)  O2 Device (Oxygen Therapy): room air (01/04/19 1656) Vital Signs (24h Range):  Temp:  [97.3 °F (36.3 °C)-98.4 °F (36.9 °C)] 97.6 °F (36.4 °C)  Pulse:  [62-72] 67  Resp:  [15-23] 18  SpO2:  [99 %-100 %] 100 %  BP: (110-154)/(51-70) 148/67        There is no height or weight on file to calculate BMI.  There is no height or weight on file to calculate BSA.    No intake/output data recorded.    Physical Exam   Constitutional: He appears well-developed. He has a sickly appearance. No distress.   HENT:   Head: Normocephalic and atraumatic.   Neck: Normal range of motion. Neck supple.   Cardiovascular: Normal rate and regular rhythm. Exam reveals no gallop and no friction rub.   No murmur heard.  Pulmonary/Chest: Effort normal and breath sounds normal. No respiratory distress. He has no wheezes. He has no rales.   Abdominal: Soft. Bowel sounds are normal. He exhibits no distension. There is no tenderness.   Musculoskeletal: He exhibits no edema or deformity.   Neurological: He is alert.   Skin: Skin is warm and dry. He is not diaphoretic.       Significant Labs:  CBC:   Recent Labs   Lab 01/04/19  1202   WBC 9.44   RBC 2.45*   HGB 6.5*   HCT 20.7*      MCV 85   MCH 26.5*   MCHC 31.4*     CMP:   Recent Labs   Lab 01/04/19  1202   *   CALCIUM 8.9   ALBUMIN 2.8*   PROT 7.9   *   K 4.6   CO2 23      BUN 58*   CREATININE 7.2*   ALKPHOS 98   ALT 27   AST 35   BILITOT 0.4

## 2019-01-04 NOTE — ED PROVIDER NOTES
"Encounter Date: 1/4/2019       History     Chief Complaint   Patient presents with    Fatigue     from home.      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  Patient's family reports that the CNA taking care of him called them to reports that he was fatigued and slumped" down while she was helping up this morning.  Family states that upon their arrival he was not responding, drooling, unable to get up and his left arm was shaking.  At baseline, family reports that the patient able to walk with assistance, is somewhat confused but generally communicative.  At present, the patient denies any complaints. He reports chronic numbness in the right fingers which is chronic, denies headache, pain, shortness of breath dysuria or change in bowel movements.  Family endorses gross hematuria with passage of clots, seen in this ED yesterday for this complaint.  Patient last dialyzed Wednesday.          Review of patient's allergies indicates:   Allergen Reactions    Lisinopril Swelling     Past Medical History:   Diagnosis Date    Anemia     Blind     bilaterally    CHF (congestive heart failure)     Chronic kidney disease     Dementia 01/2017    Diabetes mellitus type II     General anesthetics causing adverse effect in therapeutic use     april / may 2014     Glaucoma (increased eye pressure)     Hypertension     Hypothermia 12/6/2017    Seizures      Past Surgical History:   Procedure Laterality Date    ANGIOPLASTY WITH STENT PLACEMENT Left 11/18/2016    [facility] by [facility] at [facility] 2ND FLR    AV FISTULA PLACEMENT Left 4/2014    AV fistula to Left upper arm    YALNMOCUOSPP-YUJLDMR-CE left RC vs BC AVF Left 8/8/2014    [facility]formed by [facility] at [facility] OR 2ND FLR    CYSTOSCOPY N/A 11/29/2018    Performed by [facility]o [facility]jee [facility] at [facility] OR " 1ST FLR    EYE SURGERY      FISTULOGRAM Left 11/18/2016    Performed by [facility] at [facility] OR 2ND FLR    FISTULOGRAM Left 4/15/2016    Performed by [facility] at [facility] OR 2ND FLR    FISTULOGRAM Left 1/29/2016    Performed by [facility] MD at [facility] CATH LAB    FISTULOGRAM Room 11 case Left 3/27/2015    Performed by [facility] at [facility] OR 2ND FLR    HYDROCELECTOMY Bilateral 11/22/2017    Performed by [facility] at [facility] OR 2ND FLR    INSERTION-CATHETER-DIALYSIS-PERITONEAL-LAPAROSCOPIC N/A 5/8/2014    Performed by Js Ladd MD at [facility] OR 2ND FLR    INSERTION-CATHETER-DIALYSIS-PERITONEAL-LAPAROSCOPIC N/A 5/5/2014    Performed by Js Ladd MD at [facility] OR 2ND FLR    INSERTION-CATHETER-HEMODIALYSIS N/A 5/21/2014    Performed by [facility]s MD at [facility] OR 2ND FLR    PERMCATH INSERTION-TUNNELED CVC N/A 5/13/2014    Performed by Lolis Jackson MD at [facility] CATH LAB    REMOVAL, BLOOD CLOT N/A 11/29/2018    Performed by Temo [facility]jee MD at [facility] OR 1ST FLR    REMOVAL-CATHETER-PERITONEAL DIALYSIS N/A 7/30/2014    Performed by Js Ladd MD at [facility] OR 2ND FLR    SCROTUM EXPLORATION Bilateral 11/2017     Family History   Problem Relation Age of Onset    Heart disease Sister         mi    Heart disease Brother         mi    Cancer Daughter         BREAST X 2     Social History     Tobacco Use    Smoking status: Never Smoker    Smokeless tobacco: Never Used   Substance Use Topics    Alcohol use: No    Drug use: No     Review of Systems   Constitutional: Negative for appetite change, chills, diaphoresis, fatigue and fever.   HENT: Positive for drooling.    Respiratory: Negative for shortness of breath.    Cardiovascular: Negative for chest pain and palpitations.   Gastrointestinal: Negative for abdominal pain, blood in stool, constipation, diarrhea, nausea and vomiting.   Endocrine: Negative for polyuria.   Genitourinary: Positive for  hematuria. Negative for difficulty urinating, dysuria, flank pain and urgency.   Musculoskeletal: Positive for gait problem. Negative for back pain.   Skin: Negative for color change and pallor.   Neurological: Positive for speech difficulty, weakness and numbness. Negative for dizziness, tremors, seizures, syncope, facial asymmetry, light-headedness and headaches.   Psychiatric/Behavioral: Positive for confusion.       Physical Exam     Initial Vitals [01/04/19 0954]   BP Pulse Resp Temp SpO2   (!) 110/51 72 18 98.4 °F (36.9 °C) 99 %      MAP       --         Vitals:    01/04/19 1656   BP: (!) 148/67   Pulse: 67   Resp: 18   Temp: 97.6 °F (36.4 °C)       Physical Exam    Nursing note and vitals reviewed.  Constitutional: He appears well-developed and well-nourished. He is not diaphoretic. No distress.   HENT:   Head: Normocephalic and atraumatic.   Eyes: Conjunctivae and EOM are normal. Pupils are equal, round, and reactive to light.   Neck: Normal range of motion. Neck supple.   Cardiovascular: Normal rate, regular rhythm, normal heart sounds and intact distal pulses. Exam reveals no gallop and no friction rub.    No murmur heard.  Pulmonary/Chest: Breath sounds normal. No respiratory distress. He has no wheezes. He has no rhonchi. He has no rales. He exhibits no tenderness.   Abdominal: Soft. Bowel sounds are normal. He exhibits no distension and no mass. There is no tenderness. There is no rebound and no guarding.   Genitourinary: Rectal exam shows guaiac positive stool. Guaiac positive stool. : Acceptable.  Genitourinary Comments: RN present as chaperone for rectal exam.  No sidney blood or melena, guaiac positive. Sidney blood noted at the tip of the penis.   Musculoskeletal: Normal range of motion.   Neurological: He is alert and oriented to person, place, and time. No cranial nerve deficit or sensory deficit.   Generalized weakness. No focal weakness.   Skin: Skin is warm and dry.    Psychiatric: He has a normal mood and affect.         ED Course   Procedures  Labs Reviewed   CBC W/ AUTO DIFFERENTIAL - Abnormal; Notable for the following components:       Result Value    RBC 2.45 (*)     Hemoglobin 6.5 (*)     Hematocrit 20.7 (*)     MCH 26.5 (*)     MCHC 31.4 (*)     RDW 20.0 (*)     Immature Granulocytes 0.8 (*)     Immature Grans (Abs) 0.08 (*)     Lymph # 0.8 (*)     Gran% 80.0 (*)     Lymph% 8.9 (*)     All other components within normal limits   COMPREHENSIVE METABOLIC PANEL - Abnormal; Notable for the following components:    Sodium 135 (*)     Glucose 167 (*)     BUN, Bld 58 (*)     Creatinine 7.2 (*)     Albumin 2.8 (*)     eGFR if  7.1 (*)     eGFR if non  6.1 (*)     All other components within normal limits   TROPONIN I - Abnormal; Notable for the following components:    Troponin I 0.036 (*)     All other components within normal limits   B-TYPE NATRIURETIC PEPTIDE - Abnormal; Notable for the following components:    BNP 1,049 (*)     All other components within normal limits   CULTURE, URINE   GRAM STAIN   LACTIC ACID, PLASMA   URINALYSIS, REFLEX TO URINE CULTURE   TYPE & SCREEN   PREPARE RBC SOFT   PREPARE RBC SOFT          Imaging Results          CT Head Without Contrast (Final result)  Result time 01/04/19 14:01:04    Final result by Logan Villalobos MD (01/04/19 14:01:04)                 Impression:      No acute large vascular territory infarct or intracranial hemorrhage identified.  Further evaluation/follow-up as warranted.    Right occipital lobe encephalomalacia, unchanged.    Grossly stable generalized cerebral volume loss and supratentorial white matter chronic small vessel ischemic change.      Electronically signed by: Logan Villalobos MD  Date:    01/04/2019  Time:    14:01             Narrative:    EXAMINATION:  CT HEAD WITHOUT CONTRAST    CLINICAL HISTORY:  Confusion/delirium, altered LOC, unexplained;    TECHNIQUE:  Low dose axial CT  images obtained throughout the head without intravenous contrast. Sagittal and coronal reconstructions were performed.    COMPARISON:  MRI brain 07/28/2016; head CT 04/22/2015    FINDINGS:  Patient is rotated and tilted within the scanner.  Beam hardening with streak artifact limits evaluation on the initial images but resolved with rescanning.    Intracranial compartment:    Age-appropriate generalized cerebral volume loss.  Ventricles are stable in size and configuration and midline without distortion by mass effect or acute hydrocephalus noting cavum septum pellucidum.  No extra-axial blood or fluid collections.    Moderate-sized right occipital lobe encephalomalacia grossly similar to prior.  Grossly stable distribution of periventricular white matter chronic small vessel ischemic change.  No definite new focal areas of abnormal parenchymal attenuation.  No parenchymal mass, hemorrhage, edema or acute major vascular distribution infarct.  Skull base vascular calcifications noted.    Skull/extracranial contents (limited evaluation): No fracture. Mastoid air cells and paranasal sinuses are essentially clear.                                 Medical Decision Making:   History:   Old Medical Records: I decided to obtain old medical records.  Clinical Tests:   Lab Tests: Ordered and Reviewed  Radiological Study: Ordered and Reviewed  Medical Tests: Ordered and Reviewed  Other:   I have discussed this case with another health care provider.       APC / Resident Notes:   89-year-old male presenting for generalized weakness and questionable loss of consciousness.  Seen yesterday in this ED for hematuria.  On my exam, the patient is alert and oriented, responds appropriately to most questions.  Generalized weakness without focal neuro deficits.  Guaiac is trace positive without sidney blood or melena. DDX includes seizure, syncope, arrhythmia, anemia, dehydration, electrolyte derangement.  Will check labs, do CT head and  reassess.    Labs notable for normocytic anemia with hemoglobin of 6.5.  Decreased from 8.2 yesterday.  Troponin is mildly elevated at 0.036.  Head CT without any acute findings.  I suspect the patient's symptoms are due to symptomatic anemia.  Source of blood loss likely hematuria, patient's daughter has shown me photos of diapers which have a large amount of blood clots in them.  Will consent for blood and admit to Medicine.  Patient and family members comfortable with admission.  I discussed this patient with my supervising physician.    Irish Evans PA-C                  ED Course as of Jan 04 1716 Fri Jan 04, 2019   1247 This is the attending provider  89-year-old man comes here with complaints of fatigue nurse practitioner did the initial evaluation sounds like he had an episode where he kind got weak and slumped over he does have a history of prostate problems has been followed by Urology the written note for him yesterday physical exam was pretty noncontributory workup showed that he had a low hemoglobin and hematocrit rectal exam was positive for blood we will consult the hospitalist service for transfusion and further workup    [SA]      ED Course User Index  [SA] Win Warren MD     Clinical Impression:   The primary encounter diagnosis was Anemia, unspecified type. Diagnoses of ESRD (end stage renal disease) on dialysis, Altered mental status, unspecified altered mental status type, and ESRD (end stage renal disease) were also pertinent to this visit.      Disposition:   Disposition: Admitted  Condition: Fair                        Irish Evans PA-C  01/04/19 1041

## 2019-01-04 NOTE — ASSESSMENT & PLAN NOTE
ES[facility] MWF  [facility]  3:30    88 yo AAM who presented to the hospital for AMS and weakness noted by his home health CNA.  Imaging negative for CVA and symptoms resolved while in the ED.  Daughter concerned about continued hematuria (being followed by urology).  Nephrology was consulted for ESRD management while in-patient.    Recommendations:  No urgent need for RRT at this time  Discussed with primary team, will schedule for dialysis in the AM     Anemia of ESRD  Complicated by hematuria.    Transfusions per primary team  ROWAN tomorrow with dailysis

## 2019-01-04 NOTE — HPI
"Mr. Campbell is an 88 yo AAM with T2DM, CHF, dementia, BPH, HTN, blindness, seizure disorder, chronic episodes of hematuria and ESRD on iHD MWF who presented to the hospital on 1/4 with altered mental status. Family reports that the CNA taking care of him called them to reports that he was fatigued and slumped" down while she was helping up this morning.  Family states that upon their arrival he was not responding, drooling, unable to get up and his left arm was shaking.  At baseline, family reports that the patient able to walk with assistance, is somewhat confused but generally communicative.  At present, the patient denies any complaints. He reports chronic numbness in the right fingers which is chronic, denies headache, pain, shortness of breath dysuria or change in bowel movements.  Family endorses gross hematuria with passage of clots for weeks now.  Of note, he was recently admitted last month for hematuria. Labs in the ED pertinent for a drop in H/H to 6.5/20.7, and he was transfused 1 unit of PRBC in the ED.      Chart reviewed, and noted to have seen urology as an OP on 11/13 for hematuria evaluation.  retropertioneal US and CT renal was completed revealing numerous cysts in the R kidney (1 probably hemorrhagic cyst) and 1 simple cyst to L kidney.  During his admission in December, Urology was consulted and placed tejeda cath with bladder irrigations.  Per hospital medicine discharge summary at the time, "discussed case extensively with urology- patient is high risk for surgical intervention. Not necessary to further work up renal masses".     [facility] under the care of Dr. Vang.  Treatment time of 3:30 on a MWF schedule.                 "

## 2019-01-04 NOTE — HPI
Ronald Campbell is a 89 y.o. male with HTN, Type 2 DM, ESRD on HD MWF, CHF, dementia, and blindness familiar to the urology service for hematuria. He was discharged on 12/13 and his hematuria had mostly resolved. This began again Thursday. Per his daughter, there is no urine, only blood and clots coming from the penis. He was seen in the ED yesterday for this as well however was not symptomatic and given his prior discussion with palliative care, decision was made not to intervene. His hemoglobin is 6.5 from 8.1 yesterday. Today he was also feeling lightheaded with possible neurologic symptoms and thus presented to the ED.    He usually urinates twice daily. He does not feel bladder fullness or the urge to urinate. Bladder scan in the ED showed > 400 cc.    Of note he underwent cystoscopy on 11/30 which showed no obvious tumors and bleeding from the prostate however visualization was limited due to bleeding. Recent CT noncontrast of abdomen pelvis demonstrates a very enlarged prostate, as well as a small right renal mass and several enlarged para-aortic lymph nodes.     Underwent TURP and fulguration on 1/8/19.

## 2019-01-04 NOTE — CONSULTS
"[facility]  Nephrology  Consult Note    Patient Name: Ronald Campbell  MRN: [id number]  Admission Date: 1/4/2019  Hospital Length of Stay: 0 days  Attending Provider: Jimmy Wilson MD   Primary Care Physician: Bart Shaw MD  Principal Problem:<principal problem not specified>    Inpatient consult to Nephrology  Consult performed by: Rashawn Dueñas, GEORGES  Consult ordered by: Jimmy Wilson MD  Reason for consult: ESRD        Subjective:     HPI: Mr. Campbell is an 88 yo AAM with T2DM, CHF, dementia, BPH, HTN, blindness, seizure disorder, chronic episodes of hematuria and ESRD on iHD MWF who presented to the hospital on 1/4 with altered mental status. Family reports that the CNA taking care of him called them to reports that he was fatigued and slumped" down while she was helping up this morning.  Family states that upon their arrival he was not responding, drooling, unable to get up and his left arm was shaking.  At baseline, family reports that the patient able to walk with assistance, is somewhat confused but generally communicative.  At present, the patient denies any complaints. He reports chronic numbness in the right fingers which is chronic, denies headache, pain, shortness of breath dysuria or change in bowel movements.  Family endorses gross hematuria with passage of clots for weeks now.  Of note, he was recently admitted last month for hematuria. Labs in the ED pertinent for a drop in H/H to 6.5/20.7, and he was transfused 1 unit of PRBC in the ED.      Chart reviewed, and noted to have seen urology as an OP on 11/13 for hematuria evaluation.  retropertioneal US and CT renal was completed revealing numerous cysts in the R kidney (1 probably hemorrhagic cyst) and 1 simple cyst to L kidney.  During his admission in December, Urology was consulted and placed tejeda cath with bladder irrigations.  Per hospital medicine discharge summary at the time, "discussed case extensively with urology- " "patient is high risk for surgical intervention. Not necessary to further work up renal masses".     He dialyzes at [facility] under the care of Dr. Vang.  Treatment time of 3:30 on a MWF schedule.                   Past Medical History:   Diagnosis Date    Anemia     Blind     bilaterally    CHF (congestive heart failure)     Chronic kidney disease     Dementia 01/2017    Diabetes mellitus type II     General anesthetics causing adverse effect in therapeutic use     april / may 2014     Glaucoma (increased eye pressure)     Hypertension     Hypothermia 12/6/2017    Seizures        Past Surgical History:   Procedure Laterality Date    ANGIOPLASTY WITH STENT PLACEMENT Left 11/18/2016    Performed by [facility] at [facility] OR 2ND FLR    AV FISTULA PLACEMENT Left 4/2014    AV fistula to Left upper arm    [id number] left RC vs BC AVF Left 8/8/2014    Performed by [facility]in [facility] MD at [facility] OR 2ND FLR    CYSTOSCOPY N/A 11/29/2018    Performed by Temo Banerjee MD at [facility] OR 1ST FLR    EYE SURGERY      FISTULOGRAM Left 11/18/2016    Performed by [facility]in [facility]s MD at [facility] OR 2ND FLR    FISTULOGRAM Left 4/15/2016    Performed by [facility]in [facility]s MD at [facility] OR 2ND FLR    FISTULOGRAM Left 1/29/2016    Performed by [facility]in [facility]s MD at [facility] CATH LAB    FISTULOGRAM Room 11 case Left 3/27/2015    Performed by Orin Sims MD at [facility] OR 2ND FLR    HYDROCELECTOMY Bilateral 11/22/2017    Performed by Temo Banerjee MD at [facility] OR 2ND FLR    INSERTION-CATHETER-DIALYSIS-PERITONEAL-LAPAROSCOPIC N/A 5/8/2014    Performed by Js Ladd MD at [facility] OR 2ND FLR    INSERTION-CATHETER-DIALYSIS-PERITONEAL-LAPAROSCOPIC N/A 5/5/2014    Performed by Js Ladd MD at [facility] OR 2ND FLR    INSERTION-CATHETER-HEMODIALYSIS N/A 5/21/2014    [facility]    PERMCATH INSERTION-TUNNELED CVC N/A 5/13/2014    Performed by Lolis Jackson, " MD at [facility] CATH LAB    REMOVAL, BLOOD CLOT N/A 11/29/2018    Performed by [facility] at [facility] OR 1ST FLR    REMOVAL-CATHETER-PERITONEAL DIALYSIS N/A 7/30/2014    Performed by Js Ladd MD at [facility] OR 2ND FLR    SCROTUM EXPLORATION Bilateral 11/2017       Review of patient's allergies indicates:   Allergen Reactions    Lisinopril Swelling     Current Facility-Administered Medications   Medication Frequency    0.9%  NaCl infusion (for blood administration) Q24H PRN    amLODIPine tablet 5 mg Daily    aspirin chewable tablet 81 mg Daily    brinzolamide 1 % ophthalmic suspension 1 drop TID    calcium acetate capsule 667 mg TID WM    carvedilol tablet 25 mg BID    dutasteride capsule 0.5 mg Daily    levETIRAcetam tablet 500 mg BID    sodium chloride 0.9% flush 5 mL PRN    traMADol tablet 50 mg Q6H PRN    travoprost 0.004 % ophthalmic solution 1 drop QHS     Current Outpatient Medications   Medication    amLODIPine (NORVASC) 5 MG tablet    aspirin 81 MG Chew    atorvastatin (LIPITOR) 40 MG tablet    B complex-vitamin C-folic acid (NEPHRO-JESSICA) 0.8 mg Tab    blood sugar diagnostic (BLOOD GLUCOSE TEST) Strp    brimonidine-timolol (COMBIGAN) 0.2-0.5 % Drop    brinzolamide (AZOPT) 1 % ophthalmic suspension    calcium acetate (PHOSLO) 667 mg capsule    dutasteride (AVODART) 0.5 mg capsule    insulin aspart (NOVOLOG FLEXPEN) 100 unit/mL InPn    ketoconazole (NIZORAL) 2 % cream    levetiracetam (KEPPRA) 500 MG Tab    tamsulosin (FLOMAX) 0.4 mg Cap    traMADol (ULTRAM) 50 mg tablet    TRAVOPROST (TRAVATAN Z OPHT)    carvedilol (COREG) 25 MG tablet    doxazosin (CARDURA) 4 MG tablet     Family History     Problem Relation (Age of Onset)    Cancer Daughter    Heart disease Sister, Brother        Tobacco Use    Smoking status: Never Smoker    Smokeless tobacco: Never Used   Substance and Sexual Activity    Alcohol use: No    Drug use: No    Sexual activity: No     Review of  Systems   Unable to perform ROS: Mental status change     Objective:     Vital Signs (Most Recent):  Temp: 97.6 °F (36.4 °C) (01/04/19 1656)  Pulse: 67 (01/04/19 1656)  Resp: 18 (01/04/19 1656)  BP: (!) 148/67 (01/04/19 1656)  SpO2: 100 % (01/04/19 1656)  O2 Device (Oxygen Therapy): room air (01/04/19 1656) Vital Signs (24h Range):  Temp:  [97.3 °F (36.3 °C)-98.4 °F (36.9 °C)] 97.6 °F (36.4 °C)  Pulse:  [62-72] 67  Resp:  [15-23] 18  SpO2:  [99 %-100 %] 100 %  BP: (110-154)/(51-70) 148/67        There is no height or weight on file to calculate BMI.  There is no height or weight on file to calculate BSA.    No intake/output data recorded.    Physical Exam   Constitutional: He appears well-developed. He has a sickly appearance. No distress.   HENT:   Head: Normocephalic and atraumatic.   Neck: Normal range of motion. Neck supple.   Cardiovascular: Normal rate and regular rhythm. Exam reveals no gallop and no friction rub.   No murmur heard.  Pulmonary/Chest: Effort normal and breath sounds normal. No respiratory distress. He has no wheezes. He has no rales.   Abdominal: Soft. Bowel sounds are normal. He exhibits no distension. There is no tenderness.   Musculoskeletal: He exhibits no edema or deformity.   Neurological: He is alert.   Skin: Skin is warm and dry. He is not diaphoretic.       Significant Labs:  CBC:   Recent Labs   Lab 01/04/19  1202   WBC 9.44   RBC 2.45*   HGB 6.5*   HCT 20.7*      MCV 85   MCH 26.5*   MCHC 31.4*     CMP:   Recent Labs   Lab 01/04/19  1202   *   CALCIUM 8.9   ALBUMIN 2.8*   PROT 7.9   *   K 4.6   CO2 23      BUN 58*   CREATININE 7.2*   ALKPHOS 98   ALT 27   AST 35   BILITOT 0.4           Assessment/Plan:     ESRD (end stage renal disease)    ESRD on iHD MWF  [facility]  3:30    88 yo AAM who presented to the hospital for AMS and weakness noted by his home health CNA.  Imaging negative for CVA and symptoms resolved while in the ED.  Daughter concerned  about continued hematuria (being followed by urology).  Nephrology was consulted for ESRD management while in-patient.    Recommendations:  No urgent need for RRT at this time  Discussed with primary team, will schedule for dialysis in the AM     Anemia of ESRD  Complicated by hematuria.    Transfusions per primary team  ROWAN tomorrow with dailysis         Gross hematuria    Management by urology        Rashawn Call, GEORGES  Nephrology  [facility]

## 2019-01-04 NOTE — ED NOTES
"LOC: The patient is awake, alert, and oriented to place. Speech is slow and clear. Flat affect. Pt is lethargic. Per family, lethargy is baseline. Pt follows commands.     APPEARANCE: Patient resting comfortably in no acute distress.  Patient is clean and well groomed.    SKIN: The skin is warm and dry; color consistent with ethnicity.  Patient has normal skin turgor and moist mucus membranes.  Skin intact; no breakdown or bruising noted.     MUSCULOSKELETAL: Pt had increased weakness starting around 9:00am. Pt reports R neck pain at base of skull.    RESPIRATORY: Airway is open and patent. Respirations spontaneous, even, easy, and non-labored.  Patient has a normal effort and rate.  No accessory muscle use noted. Denies cough.     CARDIAC:  Normal rhythm and rate noted.  No peripheral edema noted. No complaints of chest pain.     ABDOMEN: Soft and non tender to palpation.  No distention noted. Denies n/v/d.     URINARY: Pt reports penile pain and bleeding. Was seen in ER yesterday.     NEUROLOGIC: Eyes open spontaneously. Pt is legally blind. Pt denies headache. Reports chronic numbness in R hand/fingers. Has hx of seizures. Family reports pt "slumped down" witnessed by CNA. CNA prevented pt from falling. Reports fall was not "siezure-like", but family reports pt was confused after. Pt has R sided facial droop.       "

## 2019-01-05 NOTE — SUBJECTIVE & OBJECTIVE
Interval History:   CBI titrated to light pink on slow drip this AM. HD today. Transfused 2 U. CBC pending this AM.    Review of Systems  Objective:     Temp:  [93.6 °F (34.2 °C)-99.7 °F (37.6 °C)] 99.7 °F (37.6 °C)  Pulse:  [55-77] 76  Resp:  [14-23] 17  SpO2:  [99 %-100 %] 100 %  BP: (110-154)/(51-74) 130/60     Body mass index is 23.09 kg/m².            Drains     Drain                 Hemodialysis AV Fistula 08/08/14 0800 Left upper arm 1611 days         Urethral Catheter 01/04/19 1732 Triple-lumen 36 Fr. less than 1 day                Physical Exam   Nursing note and vitals reviewed.  Constitutional: He appears well-developed and well-nourished. No distress.   HENT:   Head: Normocephalic and atraumatic.   Eyes: Right eye exhibits no discharge. Left eye exhibits no discharge.   Cardiovascular: Normal rate.    Pulmonary/Chest: Effort normal. No respiratory distress.   Abdominal: Soft. There is no rebound and no guarding.   Suprapubic tenderness improved   Genitourinary:   Genitourinary Comments: CBI light pink on slow drip   Musculoskeletal: He exhibits no edema or deformity.   Neurological: No cranial nerve deficit.   Skin: Skin is warm and dry. No erythema. No pallor.       Significant Labs:    BMP:  Recent Labs   Lab 01/03/19  0559 01/04/19  1202    135*   K 3.9 4.6    101   CO2 22* 23   BUN 36* 58*   CREATININE 4.8* 7.2*   CALCIUM 8.2* 8.9       CBC:   Recent Labs   Lab 01/03/19  0448 01/04/19  1202 01/04/19  2307   WBC 8.27 9.44  --    HGB 8.2* 6.5* 6.9*   HCT 26.6* 20.7* 21.0*    157  --        All pertinent labs results from the past 24 hours have been reviewed.    Significant Imaging:  All pertinent imaging results/findings from the past 24 hours have been reviewed.

## 2019-01-05 NOTE — PROGRESS NOTES
Hospital Medicine  Progress note    I personally performed the history, physical exam and medical decision making: and confirmed the accuracy of the information in the transcribed note.  Jimmy Wilson M.D.  Pager: [phone]  Cell Phone: [phone]    Team: [facility] MED B Jimmy Wilson MD  Admit Date: 1/4/2019  JAMISON 1/6/2019  Code status: Full Code    Principal Problem:  <principal problem not specified>    Interval hx: Urology saw patient and ideally recommends irrigate tejeda PRN and transfuse PRN. Will plan to add 1% alum irrigation for hematuria to start tomorrow. May need endoscopic intervention in near future but currently feel risks of invasive intervention would be overly morbid. Hemodialysis done.      ROS   Constitutional: Negative for fever.   HENT: Negative for sore throat.    Respiratory: Negative for shortness of breath.    Cardiovascular: Negative for chest pain.   Gastrointestinal: Negative for nausea.   Genitourinary: Negative for dysuria.        Hematuria   Musculoskeletal: Negative for back pain.   Skin: Negative for rash.   Neurological: Negative for weakness.   Hematological: Does not bruise/bleed easily.      PEx  Temp:  [93.6 °F (34.2 °C)-99.7 °F (37.6 °C)]   Pulse:  [55-88]   Resp:  [14-23]   BP: (104-154)/(51-74)   SpO2:  [99 %-100 %]     Intake/Output Summary (Last 24 hours) at 1/5/2019 1507  Last data filed at 1/5/2019 1300  Gross per 24 hour   Intake 605 ml   Output 44803 ml   Net -34776 ml       Constitutional: Negative for fever.   HENT: Negative for sore throat.    Respiratory: Negative for shortness of breath.    Cardiovascular: Negative for chest pain.   Gastrointestinal: Negative for nausea.   Genitourinary: Negative for dysuria.        Penile bleeding as has been in the past  Musculoskeletal: Negative for back pain.   Skin: Negative for rash.   Neurological: Negative for weakness.   Hematological: Does not bruise/bleed easily.    Recent Labs   Lab 01/03/19  0448 01/04/19  1202  01/04/19  2307 01/05/19  0844   WBC 8.27 9.44  --  7.87   HGB 8.2* 6.5* 6.9* 7.9*   HCT 26.6* 20.7* 21.0* 23.3*    157  --  141*     Recent Labs   Lab 01/03/19  0559 01/04/19  1202 01/05/19  0844    135* 136   K 3.9 4.6 4.8    101 102   CO2 22* 23 26   BUN 36* 58* 66*   CREATININE 4.8* 7.2* 8.1*   * 167* 122*   CALCIUM 8.2* 8.9 8.4*   PHOS  --   --  3.9     Recent Labs   Lab 01/04/19  1202 01/05/19  0844   ALKPHOS 98  --    ALT 27  --    AST 35  --    ALBUMIN 2.8* 2.2*   PROT 7.9  --    BILITOT 0.4  --       No results for input(s): POCTGLUCOSE in the last 168 hours.  Recent Labs     01/04/19  1202   TROPONINI 0.036*       Scheduled Meds:   sodium chloride 0.9%   Intravenous Once    amLODIPine  5 mg Oral Daily    aspirin  81 mg Oral Daily    brinzolamide  1 drop Left Eye TID    calcium acetate  667 mg Oral TID WM    carvedilol  25 mg Oral BID    dutasteride  0.5 mg Oral Daily    levETIRAcetam  500 mg Oral BID    travoprost  1 drop Left Eye QHS     Continuous Infusions:  As Needed:  sodium chloride, sodium chloride, sodium chloride 0.9%, traMADol    Active Hospital Problems    Diagnosis  POA    Anemia [D64.9]  Yes    Gross hematuria [R31.0]  Yes    ESRD (end stage renal disease) [N18.6]  Unknown      Resolved Hospital Problems   No resolved problems to display.       Overview  89-year-old male presents to the ER for evaluation of bleeding from the penis.  The patient has had this problem intermittently for the past 2 months and has been seen here and admitted here for multiple times.  Urology believes that the bleeding is coming from the prostate. Urology saw patient and ideally recommends irrigate tejeda PRN and transfuse PRN. Will plan to add 1% alum irrigation for hematuria to start tomorrow. May need endoscopic intervention in near future but currently feel risks of invasive intervention would be overly morbid. Hemodialysis done.        Assessment and Plan for Problems  addressed today:         Hematuria     - urology consulted  - UA pending  - cystoscopy last admission unrevealing  - Low H/H, type and screen with transfusion of 1U pRBCs  - HDS  - CT 11/21/2018:  1. Irregular contour about the superolateral aspect of the right kidney, mass cannot be excluded.  There is a tiny non-obstructing calcification within that region.  Right renal cyst.  4.  Several enlarged para-aortic lymph nodes extending along the left ileopsoas with some obliteration of the fat planes with an additional enlarged left external iliac lymph node.  These findings are new since CT pelvis 11/14/2017.      Anemia in chronic kidney disease, on chronic dialysis     - Hgb 6.5  - Type and screen  - Transfuse 1U pRBCs      Blindness     - home gtts      Dementia with behavioral disturbance     - delirium precautions  - zyprexa PRN      Renovascular hypertension      - continue home medications      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     - diet controlled, start low dose SSI      ESRD (end stage renal disease) on dialysis     - HD MWF  - nephrology consulted  - BP and lytes non-emergent      Benign prostatic hyperplasia with urinary retention     - continue home medications      Chronic combined systolic and diastolic heart failure     - compensated, continue home medications      Seizure disorder     - continue home keppra         DVT PPx:      Provider    I personally scribed for Jimmy Wilson MD on 01/05/2019 at 11:36 AM. Electronically signed by janis Patton III on 01/05/2019 at 11:36 AM

## 2019-01-05 NOTE — NURSING
Report giving to nurse Ibrahim on patient and  And patient is to  receive another unit of blood and bladder irrigation . Large volume of fluid are in the room for bladder irrigation and new tubing for blood .Vs was obtained only at this time .

## 2019-01-05 NOTE — SUBJECTIVE & OBJECTIVE
Past Medical History:   Diagnosis Date    Anemia     Blind     bilaterally    CHF (congestive heart failure)     Chronic kidney disease     Dementia 01/2017    Diabetes mellitus type II     General anesthetics causing adverse effect in therapeutic use     april / may 2014     Glaucoma (increased eye pressure)     Hypertension     Hypothermia 12/6/2017    Seizures        Past Surgical History:   Procedure Laterality Date    ANGIOPLASTY WITH STENT PLACEMENT Left 11/18/2016    [facility]formed [facility] at [facility] 2ND FLR    AV FISTULA PLACEMENT Left 4/2014    AV fistula to Left upper arm    [id number] left RC vs BC AVF Left 8/8/2014    [facility]formed [facility] at [facility] OR 2ND FLR    CYSTOSCOPY N/A 11/29/2018    Performed by [facility]erjee [facility] at [facility] OR 1ST FLR    EYE SURGERY      FISTULOGRAM Left 11/18/2016    [facility]formed by [facility] at [facility] 2ND FLR    FISTULOGRAM Left 4/15/2016    [facility]formed by [facility] at [facility] 2ND FLR    FISTULOGRAM Left 1/29/2016    Performed by [facility] at [facility] CATH LAB    FISTULOGRAM Room 11 case Left 3/27/2015    Performed by [facility] at [facility] OR 2ND FLR    HYDROCELECTOMY Bilateral 11/22/2017    Performed by [facility] at [facility] OR 2ND FLR    INSERTION-CATHETER-DIALYSIS-PERITONEAL-LAPAROSCOPIC N/A 5/8/2014    Performed by Js Ladd [facility] at [facility] OR 2ND FLR    INSERTION-CATHETER-DIALYSIS-PERITONEAL-LAPAROSCOPIC N/A 5/5/2014    Performed by Js [facility]add [facility] at [facility] OR 2ND FLR    INSERTION-CATHETER-HEMODIALYSIS N/A 5/21/2014    Performed by [facility] at [facility] OR 2ND FLR    PERMCATH INSERTION-TUNNELED CVC N/A 5/13/2014    Performed by Lolis Jackson MD at [facility] CATH LAB    REMOVAL, BLOOD CLOT N/A 11/29/2018    Performed by [facility]jee [facility] at [facility] OR 1ST FLR    REMOVAL-CATHETER-PERITONEAL DIALYSIS N/A 7/30/2014    Performed by Js Ladd [facility] at [facility] OR  2ND FLR    SCROTUM EXPLORATION Bilateral 11/2017       Review of patient's allergies indicates:   Allergen Reactions    Lisinopril Swelling       Family History     Problem Relation (Age of Onset)    Cancer Daughter    Heart disease Sister, Brother          Tobacco Use    Smoking status: Never Smoker    Smokeless tobacco: Never Used   Substance and Sexual Activity    Alcohol use: No    Drug use: No    Sexual activity: No       Review of Systems   Unable to perform ROS: Dementia       Objective:     Temp:  [97.3 °F (36.3 °C)-98.4 °F (36.9 °C)] 97.6 °F (36.4 °C)  Pulse:  [62-72] 62  Resp:  [15-23] 20  SpO2:  [99 %-100 %] 100 %  BP: (110-154)/(51-70) 146/63     There is no height or weight on file to calculate BMI.    Date 01/04/19 0700 - 01/05/19 0659   Shift 8565-2113 5811-6140 2161-1831 24 Hour Total   INTAKE   Shift Total       OUTPUT   Urine  3000  3000   Shift Total  3000  3000   Weight (kg)              Drains     Drain                 Hemodialysis AV Fistula 08/08/14 0800 Left upper arm 1610 days         Urethral Catheter 01/04/19 1732 Triple-lumen 36 Fr. less than 1 day                Physical Exam   Nursing note and vitals reviewed.  Constitutional: He appears well-developed and well-nourished. No distress.   HENT:   Head: Normocephalic and atraumatic.   Eyes: Right eye exhibits no discharge. Left eye exhibits no discharge.   Cardiovascular: Normal rate.    Pulmonary/Chest: Effort normal. No respiratory distress.   Abdominal: Soft. There is no rebound and no guarding.   Suprapubic tenderness with palpable bladder   Genitourinary:   Genitourinary Comments: Penis with clots at the meatus   Musculoskeletal: He exhibits no edema or deformity.   Neurological: No cranial nerve deficit.   Skin: Skin is warm and dry. No erythema. No pallor.       Significant Labs:    BMP:  Recent Labs   Lab 01/03/19  0559 01/04/19  1202    135*   K 3.9 4.6    101   CO2 22* 23   BUN 36* 58*   CREATININE 4.8* 7.2*    CALCIUM 8.2* 8.9       CBC:  Recent Labs   Lab 01/03/19  0447 01/03/19  0448 01/04/19  1202   WBC  --  8.27 9.44   HGB  --  8.2* 6.5*   HCT 29* 26.6* 20.7*   PLT  --  192 157       All pertinent labs results from the past 24 hours have been reviewed.    Significant Imaging:  All pertinent imaging results/findings from the past 24 hours have been reviewed.

## 2019-01-05 NOTE — CONSULTS
[facility]  Urology  Consult Note    Patient Name: Ronald Campbell  MRN: [id number]  Admission Date: 1/4/2019  Hospital Length of Stay: 0   Code Status: Full Code   Attending Provider: Jimmy Wilson MD   Consulting Provider: Jeremy Clayton MD  Primary Care Physician: Bart Shaw MD  Principal Problem:<principal problem not specified>    Inpatient consult to Urology  Consult performed by: Jeremy Clayton MD  Consult ordered by: Jimmy Wilson MD          Subjective:     HPI:  Ronald Campbell is a 89 y.o. male with HTN, Type 2 DM, ESRD on HD MWF, CHF, dementia, and blindness familiar to the urology service for hematuria. He was discharged on 12/13 and his hematuria had mostly resolved. This began again Thursday. Per his daughter, there is no urine, only blood and clots coming from the penis. He was seen in the ED yesterday for this as well however was not symptomatic and given his prior discussion with palliative care, decision was made not to intervene. His hemoglobin is 6.5 from 8.1 yesterday. Today he was also feeling lightheaded with possible neurologic symptoms and thus presented to the ED.    He usually urinates twice daily. He does not feel bladder fullness or the urge to urinate. Bladder scan in the ED showed > 400 cc.    Of note he underwent cystoscopy on 11/30 which showed no obvious tumors and bleeding from the prostate however visualization was limited due to bleeding. Recent CT noncontrast of abdomen pelvis demonstrates a very enlarged prostate, as well as a small right renal mass and several enlarged para-aortic lymph nodes.     Past Medical History:   Diagnosis Date    Anemia     Blind     bilaterally    CHF (congestive heart failure)     Chronic kidney disease     Dementia 01/2017    Diabetes mellitus type II     General anesthetics causing adverse effect in therapeutic use     april / may 2014     Glaucoma (increased eye pressure)     Hypertension     Hypothermia 12/6/2017     Seizures        Past Surgical History:   Procedure Laterality Date    ANGIOPLASTY WITH STENT PLACEMENT Left 11/18/2016    [facility]formed [facility] OR 2ND FLR    AV FISTULA PLACEMENT Left 4/2014    AV fistula to Left upper arm    [id number] left RC vs BC AVF Left 8/8/2014    Performed by [facility] at [facility] OR 2ND FLR    CYSTOSCOPY N/A 11/29/2018    Performed by [facility]o [facility]jee [facility] at [facility] OR 1ST FLR    EYE SURGERY      FISTULOGRAM Left 11/18/2016    [facility]formed by [facility] at [facility] OR 2ND FLR    FISTULOGRAM Left 4/15/2016    Performed by [facility] at [facility] OR 2ND FLR    FISTULOGRAM Left 1/29/2016    [facility]formed by [facility] at [facility] CATH LAB    FISTULOGRAM Room 11 case Left 3/27/2015    Performed by [facility] at [facility] OR 2ND FLR    HYDROCELECTOMY Bilateral 11/22/2017    Per[facility] by [facility] at [facility] OR 2ND FLR    INSERTION-CATHETER-DIALYSIS-PERITONEAL-LAPAROSCOPIC N/A 5/8/2014    Performed by Js Ladd [facility] at [facility] OR 2ND FLR    INSERTION-CATHETER-DIALYSIS-PERITONEAL-LAPAROSCOPIC N/A 5/5/2014    Performed by Js [facility]add [facility] at [facility] OR 2ND FLR    INSERTION-CATHETER-HEMODIALYSIS N/A 5/21/2014    Performed by [facility] at [facility] OR 2ND FLR    PERMCATH INSERTION-TUNNELED CVC N/A 5/13/2014    Performed by Lolis Jackson MD at [facility] CATH LAB    REMOVAL, BLOOD CLOT N/A 11/29/2018    Performed by [facility] at [facility] OR 1ST FLR    REMOVAL-CATHETER-PERITONEAL DIALYSIS N/A 7/30/2014    Performed by Js Ladd [facility] at [facility] OR 2ND FLR    SCROTUM EXPLORATION Bilateral 11/2017       Review of patient's allergies indicates:   Allergen Reactions    Lisinopril Swelling       Family History     Problem Relation (Age of Onset)    Cancer Daughter    Heart disease Sister, Brother          Tobacco Use    Smoking status: Never Smoker    Smokeless tobacco: Never Used   Substance and Sexual  Activity    Alcohol use: No    Drug use: No    Sexual activity: No       Review of Systems   Unable to perform ROS: Dementia       Objective:     Temp:  [97.3 °F (36.3 °C)-98.4 °F (36.9 °C)] 97.6 °F (36.4 °C)  Pulse:  [62-72] 62  Resp:  [15-23] 20  SpO2:  [99 %-100 %] 100 %  BP: (110-154)/(51-70) 146/63     There is no height or weight on file to calculate BMI.    Date 01/04/19 0700 - 01/05/19 0659   Shift 0327-8417 2857-6723 1913-3493 24 Hour Total   INTAKE   Shift Total       OUTPUT   Urine  3000  3000   Shift Total  3000  3000   Weight (kg)              Drains     Drain                 Hemodialysis AV Fistula 08/08/14 0800 Left upper arm 1610 days         Urethral Catheter 01/04/19 1732 Triple-lumen 36 Fr. less than 1 day                Physical Exam   Nursing note and vitals reviewed.  Constitutional: He appears well-developed and well-nourished. No distress.   HENT:   Head: Normocephalic and atraumatic.   Eyes: Right eye exhibits no discharge. Left eye exhibits no discharge.   Cardiovascular: Normal rate.    Pulmonary/Chest: Effort normal. No respiratory distress.   Abdominal: Soft. There is no rebound and no guarding.   Suprapubic tenderness with palpable bladder   Genitourinary:   Genitourinary Comments: Penis with clots at the meatus   Musculoskeletal: He exhibits no edema or deformity.   Neurological: No cranial nerve deficit.   Skin: Skin is warm and dry. No erythema. No pallor.       Significant Labs:    BMP:  Recent Labs   Lab 01/03/19  0559 01/04/19  1202    135*   K 3.9 4.6    101   CO2 22* 23   BUN 36* 58*   CREATININE 4.8* 7.2*   CALCIUM 8.2* 8.9       CBC:  Recent Labs   Lab 01/03/19  0447 01/03/19  0448 01/04/19  1202   WBC  --  8.27 9.44   HGB  --  8.2* 6.5*   HCT 29* 26.6* 20.7*   PLT  --  192 157       All pertinent labs results from the past 24 hours have been reviewed.    Significant Imaging:  All pertinent imaging results/findings from the past 24 hours have been  reviewed.      Assessment and Plan:     Gross hematuria    89M with recurrent gross hematuria now with symptomatic clot retention and likely symptomatic anemia    24 3-way catheter placed with return of >400 cc of clot and urine. Signifcant amount of clot irrigated. Cleared but still with active bleeding. Placed on traction and started on CBI    - UA, with urine culture and gram stain  - CBI - titrate to light pink  - nursing to irrigate tejeda PRN  - irrigation supplies to bedside  - transfuse PRN  - ideally HD with heparin free bath if possible to limit further bleeding  - may require some form of endoscopic intervention in the near future however currently feel that risks of invasive intervention would be overly morbid         VTE Risk Mitigation (From admission, onward)        Ordered     IP VTE HIGH RISK PATIENT  Once      01/04/19 1456     Place MORGAN hose  Until discontinued      01/04/19 1456        Thank you for your consult. I will follow-up with patient. Please contact us if you have any additional questions.    Jeremy Clayton MD  Urology  [facility]

## 2019-01-05 NOTE — PROGRESS NOTES
Pt arrived from accompanied by his daughter. Maintenance dialysis initiated via ANGLE fistula without difficulty.

## 2019-01-05 NOTE — ASSESSMENT & PLAN NOTE
89M with recurrent gross hematuria now with symptomatic clot retention and likely symptomatic anemia. Suspect prostate as a source of bleeding, less likely due to small renal mass.    - CBI - titrate to light pink  - nursing to irrigate tejeda PRN  - irrigation supplies to bedside  - transfuse PRN  - will plan to add 1% alum irrigation for hematuria to start tomorrow if bleeding persists

## 2019-01-05 NOTE — MEDICAL/APP STUDENT
Hospital Medicine  Progress note    I personally performed the history, physical exam and medical decision making: and confirmed the accuracy of the information in the transcribed note.  Jimmy Wilson M.D.  Pager: [phone]  Cell Phone: [phone]    Team: [facility]OS[facility] MED B Jimmy Wilson MD  Admit Date: 1/4/2019  JAMISON 1/6/2019  Code status: Full Code    Principal Problem:  <principal problem not specified>    Interval hx: Urology saw patient and ideally recommends irrigate tejeda PRN and transfuse PRN. Will plan to add 1% alum irrigation for hematuria to start tomorrow. May need endoscopic intervention in near future but currently feel risks of invasive intervention would be overly morbid. Hemodialysis done.      ROS   Constitutional: Negative for fever.   HENT: Negative for sore throat.    Respiratory: Negative for shortness of breath.    Cardiovascular: Negative for chest pain.   Gastrointestinal: Negative for nausea.   Genitourinary: Negative for dysuria.        Hematuria   Musculoskeletal: Negative for back pain.   Skin: Negative for rash.   Neurological: Negative for weakness.   Hematological: Does not bruise/bleed easily.      PEx  Temp:  [93.6 °F (34.2 °C)-99.7 °F (37.6 °C)]   Pulse:  [55-88]   Resp:  [14-23]   BP: (104-154)/(51-74)   SpO2:  [99 %-100 %]     Intake/Output Summary (Last 24 hours) at 1/5/2019 1507  Last data filed at 1/5/2019 1300  Gross per 24 hour   Intake 605 ml   Output 57765 ml   Net -74951 ml       Constitutional: Negative for fever.   HENT: Negative for sore throat.    Respiratory: Negative for shortness of breath.    Cardiovascular: Negative for chest pain.   Gastrointestinal: Negative for nausea.   Genitourinary: Negative for dysuria.        Penile bleeding as has been in the past  Musculoskeletal: Negative for back pain.   Skin: Negative for rash.   Neurological: Negative for weakness.   Hematological: Does not bruise/bleed easily.    Recent Labs   Lab 01/03/19  0448 01/04/19  1202  01/04/19  2307 01/05/19  0844   WBC 8.27 9.44  --  7.87   HGB 8.2* 6.5* 6.9* 7.9*   HCT 26.6* 20.7* 21.0* 23.3*    157  --  141*     Recent Labs   Lab 01/03/19  0559 01/04/19  1202 01/05/19  0844    135* 136   K 3.9 4.6 4.8    101 102   CO2 22* 23 26   BUN 36* 58* 66*   CREATININE 4.8* 7.2* 8.1*   * 167* 122*   CALCIUM 8.2* 8.9 8.4*   PHOS  --   --  3.9     Recent Labs   Lab 01/04/19  1202 01/05/19  0844   ALKPHOS 98  --    ALT 27  --    AST 35  --    ALBUMIN 2.8* 2.2*   PROT 7.9  --    BILITOT 0.4  --       No results for input(s): POCTGLUCOSE in the last 168 hours.  Recent Labs     01/04/19  1202   TROPONINI 0.036*       Scheduled Meds:   sodium chloride 0.9%   Intravenous Once    amLODIPine  5 mg Oral Daily    aspirin  81 mg Oral Daily    brinzolamide  1 drop Left Eye TID    calcium acetate  667 mg Oral TID WM    carvedilol  25 mg Oral BID    dutasteride  0.5 mg Oral Daily    levETIRAcetam  500 mg Oral BID    travoprost  1 drop Left Eye QHS     Continuous Infusions:  As Needed:  sodium chloride, sodium chloride, sodium chloride 0.9%, traMADol    Active Hospital Problems    Diagnosis  POA    Anemia [D64.9]  Yes    Gross hematuria [R31.0]  Yes    ESRD (end stage renal disease) [N18.6]  Unknown      Resolved Hospital Problems   No resolved problems to display.       Overview  89-year-old male presents to the ER for evaluation of bleeding from the penis.  The patient has had this problem intermittently for the past 2 months and has been seen here and admitted here for multiple times.  Urology believes that the bleeding is coming from the prostate. Urology saw patient and ideally recommends irrigate tejeda PRN and transfuse PRN. Will plan to add 1% alum irrigation for hematuria to start tomorrow. May need endoscopic intervention in near future but currently feel risks of invasive intervention would be overly morbid. Hemodialysis done.        Assessment and Plan for Problems  addressed today:         Hematuria     - urology consulted  - UA pending  - cystoscopy last admission unrevealing  - Low H/H, type and screen with transfusion of 1U pRBCs  - HDS  - CT 11/21/2018:  1. Irregular contour about the superolateral aspect of the right kidney, mass cannot be excluded.  There is a tiny non-obstructing calcification within that region.  Right renal cyst.  4.  Several enlarged para-aortic lymph nodes extending along the left ileopsoas with some obliteration of the fat planes with an additional enlarged left external iliac lymph node.  These findings are new since CT pelvis 11/14/2017.      Anemia in chronic kidney disease, on chronic dialysis     - Hgb 6.5  - Type and screen  - Transfuse 1U pRBCs      Blindness     - home gtts      Dementia with behavioral disturbance     - delirium precautions  - zyprexa PRN      Renovascular hypertension      - continue home medications      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     - diet controlled, start low dose SSI      ESRD (end stage renal disease) on dialysis     - HD MWF  - nephrology consulted  - BP and lytes non-emergent      Benign prostatic hyperplasia with urinary retention     - continue home medications      Chronic combined systolic and diastolic heart failure     - compensated, continue home medications      Seizure disorder     - continue home keppra         DVT PPx:      Provider    I personally scribed for Jimmy Wilson MD on 01/05/2019 at 11:36 AM. Electronically signed by janis Patton III on 01/05/2019 at 11:36 AM

## 2019-01-05 NOTE — MEDICAL/APP STUDENT
Hospital Medicine  Progress note    Team: [facility] HOSP MED B Freeman Patton  Admit Date: 1/4/2019  JAMISON 1/6/2019  Code status: Full Code    Principal Problem:  <principal problem not specified>    Interval hx: Urology saw patient and ideally recommends irrigate tejeda PRN and transfuse PRN. Will plan to add 1% alum irrigation for hematuria to start tomorrow. May need endoscopic intervention in near future but currently feel risks of invasive intervention would be overly morbid. Hemodialysis done.      ROS   Constitutional: Negative for fever.   HENT: Negative for sore throat.    Respiratory: Negative for shortness of breath.    Cardiovascular: Negative for chest pain.   Gastrointestinal: Negative for nausea.   Genitourinary: Negative for dysuria.        Hematuria   Musculoskeletal: Negative for back pain.   Skin: Negative for rash.   Neurological: Negative for weakness.   Hematological: Does not bruise/bleed easily.      PEx  Temp:  [93.6 °F (34.2 °C)-99.7 °F (37.6 °C)]   Pulse:  [55-77]   Resp:  [14-23]   BP: (110-154)/(51-74)   SpO2:  [99 %-100 %]     Intake/Output Summary (Last 24 hours) at 1/5/2019 0820  Last data filed at 1/5/2019 0600  Gross per 24 hour   Intake 425 ml   Output 57756 ml   Net -24842 ml       Constitutional: Negative for fever.   HENT: Negative for sore throat.    Respiratory: Negative for shortness of breath.    Cardiovascular: Negative for chest pain.   Gastrointestinal: Negative for nausea.   Genitourinary: Negative for dysuria.        Penile bleeding as has been in the past  Musculoskeletal: Negative for back pain.   Skin: Negative for rash.   Neurological: Negative for weakness.   Hematological: Does not bruise/bleed easily.    Recent Labs   Lab 01/03/19  0448 01/04/19  1202 01/04/19  2307   WBC 8.27 9.44  --    HGB 8.2* 6.5* 6.9*   HCT 26.6* 20.7* 21.0*    157  --      Recent Labs   Lab 01/03/19  0559 01/04/19  1202    135*   K 3.9 4.6    101   CO2 22* 23   BUN 36* 58*    CREATININE 4.8* 7.2*   * 167*   CALCIUM 8.2* 8.9     Recent Labs   Lab 01/04/19  1202   ALKPHOS 98   ALT 27   AST 35   ALBUMIN 2.8*   PROT 7.9   BILITOT 0.4      No results for input(s): POCTGLUCOSE in the last 168 hours.  Recent Labs     01/04/19  1202   TROPONINI 0.036*       Scheduled Meds:   sodium chloride 0.9%   Intravenous Once    amLODIPine  5 mg Oral Daily    aspirin  81 mg Oral Daily    brinzolamide  1 drop Left Eye TID    calcium acetate  667 mg Oral TID WM    carvedilol  25 mg Oral BID    dutasteride  0.5 mg Oral Daily    levETIRAcetam  500 mg Oral BID    travoprost  1 drop Left Eye QHS     Continuous Infusions:  As Needed:  sodium chloride, sodium chloride, sodium chloride 0.9%, traMADol    Active Hospital Problems    Diagnosis  POA    Anemia [D64.9]  Yes    Gross hematuria [R31.0]  Yes    ESRD (end stage renal disease) [N18.6]  Unknown      Resolved Hospital Problems   No resolved problems to display.       Overview  89-year-old male presents to the ER for evaluation of bleeding from the penis.  The patient has had this problem intermittently for the past 2 months and has been seen here and admitted here for multiple times.  Urology believes that the bleeding is coming from the prostate. Urology saw patient and ideally recommends irrigate tejeda PRN and transfuse PRN. Will plan to add 1% alum irrigation for hematuria to start tomorrow. May need endoscopic intervention in near future but currently feel risks of invasive intervention would be overly morbid. Hemodialysis done.        Assessment and Plan for Problems addressed today:         Hematuria     - urology consulted  - UA pending  - cystoscopy last admission unrevealing  - Low H/H, type and screen with transfusion of 1U pRBCs  - HDS  - CT 11/21/2018:  1. Irregular contour about the superolateral aspect of the right kidney, mass cannot be excluded.  There is a tiny non-obstructing calcification within that region.  Right renal  cyst.  4.  Several enlarged para-aortic lymph nodes extending along the left ileopsoas with some obliteration of the fat planes with an additional enlarged left external iliac lymph node.  These findings are new since CT pelvis 11/14/2017.      Anemia in chronic kidney disease, on chronic dialysis     - Hgb 6.5  - Type and screen  - Transfuse 1U pRBCs      Blindness     - home gtts      Dementia with behavioral disturbance     - delirium precautions  - zyprexa PRN      Renovascular hypertension      - continue home medications      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     - diet controlled, start low dose SSI      ESRD (end stage renal disease) on dialysis     - HD MWF  - nephrology consulted  - BP and lytes non-emergent      Benign prostatic hyperplasia with urinary retention     - continue home medications      Chronic combined systolic and diastolic heart failure     - compensated, continue home medications      Seizure disorder     - continue home keppra         DVT PPx:      Provider    I personally scribed for Jimmy Wilson MD on 01/05/2019 at 11:36 AM. Electronically signed by janis Patton III on 01/05/2019 at 11:36 AM

## 2019-01-05 NOTE — PROGRESS NOTES
[facility]  Urology  Progress Note    Patient Name: Ronald Campbell  MRN: [id number]  Admission Date: 1/4/2019  Hospital Length of Stay: 1 days  Code Status: Full Code   Attending Provider: Jimmy Wilson MD   Primary Care Physician: Bart Shaw MD    Subjective:     HPI:  Ronald Campbell is a 89 y.o. male with HTN, Type 2 DM, ESRD on HD MWF, CHF, dementia, and blindness familiar to the urology service for hematuria. He was discharged on 12/13 and his hematuria had mostly resolved. This began again Thursday. Per his daughter, there is no urine, only blood and clots coming from the penis. He was seen in the ED yesterday for this as well however was not symptomatic and given his prior discussion with palliative care, decision was made not to intervene. His hemoglobin is 6.5 from 8.1 yesterday. Today he was also feeling lightheaded with possible neurologic symptoms and thus presented to the ED.    He usually urinates twice daily. He does not feel bladder fullness or the urge to urinate. Bladder scan in the ED showed > 400 cc.    Of note he underwent cystoscopy on 11/30 which showed no obvious tumors and bleeding from the prostate however visualization was limited due to bleeding. Recent CT noncontrast of abdomen pelvis demonstrates a very enlarged prostate, as well as a small right renal mass and several enlarged para-aortic lymph nodes.     Interval History:   CBI titrated to light pink on slow drip this AM. HD today. Transfused 2 U. CBC pending this AM.    Review of Systems  Objective:     Temp:  [93.6 °F (34.2 °C)-99.7 °F (37.6 °C)] 99.7 °F (37.6 °C)  Pulse:  [55-77] 76  Resp:  [14-23] 17  SpO2:  [99 %-100 %] 100 %  BP: (110-154)/(51-74) 130/60     Body mass index is 23.09 kg/m².            Drains     Drain                 Hemodialysis AV Fistula 08/08/14 0800 Left upper arm 1611 days         Urethral Catheter 01/04/19 1732 Triple-lumen 36 Fr. less than 1 day                Physical Exam   Nursing  note and vitals reviewed.  Constitutional: He appears well-developed and well-nourished. No distress.   HENT:   Head: Normocephalic and atraumatic.   Eyes: Right eye exhibits no discharge. Left eye exhibits no discharge.   Cardiovascular: Normal rate.    Pulmonary/Chest: Effort normal. No respiratory distress.   Abdominal: Soft. There is no rebound and no guarding.   Suprapubic tenderness improved   Genitourinary:   Genitourinary Comments: CBI light pink on slow drip   Musculoskeletal: He exhibits no edema or deformity.   Neurological: No cranial nerve deficit.   Skin: Skin is warm and dry. No erythema. No pallor.       Significant Labs:    BMP:  Recent Labs   Lab 01/03/19  0559 01/04/19  1202    135*   K 3.9 4.6    101   CO2 22* 23   BUN 36* 58*   CREATININE 4.8* 7.2*   CALCIUM 8.2* 8.9       CBC:   Recent Labs   Lab 01/03/19  0448 01/04/19  1202 01/04/19  2307   WBC 8.27 9.44  --    HGB 8.2* 6.5* 6.9*   HCT 26.6* 20.7* 21.0*    157  --        All pertinent labs results from the past 24 hours have been reviewed.    Significant Imaging:  All pertinent imaging results/findings from the past 24 hours have been reviewed.      Assessment/Plan:     Gross hematuria    89M with recurrent gross hematuria now with symptomatic clot retention and likely symptomatic anemia. Suspect prostate as a source of bleeding, less likely due to small renal mass.    - CBI - titrate to light pink  - nursing to irrigate tejeda PRN  - irrigation supplies to bedside  - transfuse PRN  - will plan to add 1% alum irrigation for hematuria to start tomorrow if bleeding persists         VTE Risk Mitigation (From admission, onward)        Ordered     IP VTE HIGH RISK PATIENT  Once      01/04/19 1456     Place MORGAN hose  Until discontinued      01/04/19 1456          Jeremy Clayton MD  Urology  [facility]

## 2019-01-05 NOTE — ASSESSMENT & PLAN NOTE
89M with recurrent gross hematuria now with symptomatic clot retention and likely symptomatic anemia    24 3-way catheter placed with return of >400 cc of clot and urine. Signifcant amount of clot irrigated. Cleared but still with active bleeding. Placed on traction and started on CBI    - UA, with urine culture and gram stain  - CBI - titrate to light pink  - nursing to irrigate tejeda PRN  - irrigation supplies to bedside  - transfuse PRN  - ideally HD with heparin free bath if possible to limit further bleeding  - may require some form of endoscopic intervention in the near future however currently feel that risks of invasive intervention would be overly morbid

## 2019-01-05 NOTE — PROGRESS NOTES
[facility] HEMODIALYSIS NOTE     Patient currently on hemodialysis for removal of uremic toxins and volume.    Patient seen and evaluated on hemodialysis, tolerating treatment, see HD flowsheet for vitals and assessments.      Ultrafiltration goal is 1-2L     Labs have been reviewed and the dialysate bath has been adjusted.     Assessment/Plan: ESRD, HD today for removal of uremic toxins and volume, continue TTS while in-house.

## 2019-01-05 NOTE — H&P
Hospital Medicine  History and Physical Exam    I personally performed the history, physical exam and medical decision making: and confirmed the accuracy of the information in the transcribed note.  Jimmy Wilson M.D.  Pager: [phone]  Cell Phone: [phone]    Team: [facility] MED B Jimmy Wilson MD  Admit Date: 1/4/2019  JAMISON   Principal Problem:  <principal problem not specified>   Patient information was obtained from patient and ER records.   Primary care Physician: Bart Shaw MD  Code status: Full Code    HPI:   89-year-old male presents to the ER for evaluation of bleeding from the penis.  The patient has had this problem intermittently for the past 2 months and has been seen here and admitted here for multiple times.  Urology believes that the bleeding is coming from the prostate.  Last time the patient was here he was in urinary retention.  A Barrett catheter was placed and the patient was admitted.  He underwent continuous bladder irrigation and symptoms improved.  Of note the patient has ESRD and is dialyzed Monday Wednesday Friday.  Last dialysis was Wednesday January 2nd.  The patient does make urine usually twice a day.    Has pallaiative care, has been seen seeral times in ED recently for same condition.      Past Medical History: Patient has a past medical history of Anemia, Blind, CHF (congestive heart failure), Chronic kidney disease, Dementia (01/2017), Diabetes mellitus type II, General anesthetics causing adverse effect in therapeutic use, Glaucoma (increased eye pressure), Hypertension, Hypothermia (12/6/2017), and Seizures.    Past Surgical History: Patient has a past surgical history that includes Eye surgery; AV fistula placement (Left, 4/2014); Scrotum exploration (Bilateral, 11/2017); cystoscopy (N/A, 11/29/2018); and removal of blood clot (N/A, 11/29/2018).    Social History: Patient reports that  has never smoked. he has never used smokeless tobacco. He reports that he does not drink  alcohol or use drugs.    Family History: family history includes Cancer in his daughter; Heart disease in his brother and sister.    Medications:   Prior to Admission medications    Medication Sig Start Date End Date Taking? Authorizing Provider   amLODIPine (NORVASC) 5 MG tablet Take 1 tablet (5 mg total) by mouth once daily. 12/13/18 12/13/19 Yes Heather Fernando PA-C   aspirin 81 MG Chew Take 1 tablet (81 mg total) by mouth once daily. 11/4/12  Yes Jacqueline Volpi-Abadie, MD   atorvastatin (LIPITOR) 40 MG tablet Take 1 tablet (40 mg total) by mouth once daily. 8/14/14  Yes Jac Cosby Jr., MD   B complex-vitamin C-folic acid (NEPHRO-JESSICA) 0.8 mg Tab Take 1 tablet by mouth every morning.    Yes Historical Provider, MD   blood sugar diagnostic (BLOOD GLUCOSE TEST) Strp 1 strip by Misc.(Non-Drug; Combo Route) route daily as needed.    Yes Historical Provider, MD   brimonidine-timolol (COMBIGAN) 0.2-0.5 % Drop Place 1 drop into the left eye 2 (two) times daily.   Yes Historical Provider, MD   brinzolamide (AZOPT) 1 % ophthalmic suspension Place 1 drop into the left eye 3 (three) times daily.    Yes Historical Provider, MD   calcium acetate (PHOSLO) 667 mg capsule Take 2 capsules by mouth every day in the morning, then take 1 capsule with lunch and 2 capsules in the evening with meals   Yes Historical Provider, MD   dutasteride (AVODART) 0.5 mg capsule Take 1 capsule (0.5 mg total) by mouth once daily. 12/13/18  Yes Heather Fernando PA-C   insulin aspart (NOVOLOG FLEXPEN) 100 unit/mL InPn  Insulin Pen Subcutaneous As directed.  -200 take 1 unitBS 201-250 take 2 unitsBS 251-300 take 3 unitsBS 301-350 take 4 unitsBS > 351 take 5 units and call MD   Yes Historical Provider, MD   ketoconazole (NIZORAL) 2 % cream Apply topically once daily. feet 1/31/17  Yes Joaquin Juarez DPM   levetiracetam (KEPPRA) 500 MG Tab Take 1 tablet (500 mg total) by mouth 2 (two) times daily. 5/21/14 6/30/20 Yes Aubrey Durbin,  MD   tamsulosin (FLOMAX) 0.4 mg Cap Take 1 capsule (0.4 mg total) by mouth once daily. 11/30/18 11/30/19 Yes Maddie Toure MD   traMADol (ULTRAM) 50 mg tablet Take 1 tablet (50 mg total) by mouth every 6 (six) hours as needed for Pain. 1/3/19  Yes Rashawn Hays PA-C   TRAVOPROST (TRAVATAN Z OPHT) Place 1 drop into the left eye every evening. 1 Drops Left eye Every evening   Yes Historical Provider, MD   carvedilol (COREG) 25 MG tablet Take 1 tablet (25 mg total) by mouth 2 (two) times daily. Do not take on dialysis days. Hold for SBP < 110 or HR < 55  Patient taking differently: Take 25 mg by mouth 2 (two) times daily. Hold for SBP < 110 or HR < 55 12/18/17 12/18/18  Katie Jules NP   doxazosin (CARDURA) 4 MG tablet Take 1 tablet (4 mg total) by mouth once daily. 12/18/17 12/18/18  Katie Jules NP       Allergies: Patient is allergic to lisinopril.    ROS  Constitutional: Negative for fever.   HENT: Negative for sore throat.    Respiratory: Negative for shortness of breath.    Cardiovascular: Negative for chest pain.   Gastrointestinal: Negative for nausea.   Genitourinary: Negative for dysuria.        Hematuria   Musculoskeletal: Negative for back pain.   Skin: Negative for rash.   Neurological: Negative for weakness.   Hematological: Does not bruise/bleed easily.    PEx  Temp:  [97.3 °F (36.3 °C)-98.4 °F (36.9 °C)]   Pulse:  [58-72]   Resp:  [14-23]   BP: (110-154)/(51-70)   SpO2:  [99 %-100 %]   There is no height or weight on file to calculate BMI.     Constitutional: Negative for fever.   HENT: Negative for sore throat.    Respiratory: Negative for shortness of breath.    Cardiovascular: Negative for chest pain.   Gastrointestinal: Negative for nausea.   Genitourinary: Negative for dysuria.        Penile bleeding as has been in the past  Musculoskeletal: Negative for back pain.   Skin: Negative for rash.   Neurological: Negative for weakness.   Hematological: Does not bruise/bleed  easily.            Intake/Output Summary (Last 24 hours) at 1/4/2019 1928  Last data filed at 1/4/2019 1839  Gross per 24 hour   Intake --   Output 6500 ml   Net -6500 ml     Recent Labs   Lab 01/03/19  0447 01/03/19  0448 01/04/19  1202   WBC  --  8.27 9.44   HGB  --  8.2* 6.5*   HCT 29* 26.6* 20.7*   PLT  --  192 157     Recent Labs   Lab 01/03/19  0559 01/04/19  1202    135*   K 3.9 4.6    101   CO2 22* 23   BUN 36* 58*   CREATININE 4.8* 7.2*   * 167*   CALCIUM 8.2* 8.9     Recent Labs   Lab 01/04/19  1202   ALKPHOS 98   ALT 27   AST 35   ALBUMIN 2.8*   PROT 7.9   BILITOT 0.4      No results for input(s): POCTGLUCOSE in the last 168 hours.  Recent Labs     01/04/19  1202   TROPONINI 0.036*       Recent Labs     01/04/19  1202   LACTATE 1.1        Active Hospital Problems    Diagnosis  POA    Anemia [D64.9]  Yes    Gross hematuria [R31.0]  Yes    ESRD (end stage renal disease) [N18.6]  Unknown      Resolved Hospital Problems   No resolved problems to display.       Overview  89-year-old male presents to the ER for evaluation of bleeding from the penis.  The patient has had this problem intermittently for the past 2 months and has been seen here and admitted here for multiple times.  Urology believes that the bleeding is coming from the prostate.     Assessment and Plan for Problems addressed today:    Hematuria     - urology consulted  - UA pending  - cystoscopy last admission unrevealing  - Low H/H, type and screen with transfusion of 1U pRBCs  - HDS  - CT 11/21/2018:  1. Irregular contour about the superolateral aspect of the right kidney, mass cannot be excluded.  There is a tiny non-obstructing calcification within that region.  Right renal cyst.  4.  Several enlarged para-aortic lymph nodes extending along the left ileopsoas with some obliteration of the fat planes with an additional enlarged left external iliac lymph node.  These findings are new since CT pelvis 11/14/2017.      Anemia  in chronic kidney disease, on chronic dialysis     - Hgb 6.5  - Type and screen  - Transfuse 1U pRBCs      Blindness     - home gtts      Dementia with behavioral disturbance     - delirium precautions  - zyprexa PRN      Renovascular hypertension      - continue home medications      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     - diet controlled, start low dose SSI      ESRD (end stage renal disease) on dialysis     - HD MWF  - nephrology consulted  - BP and lytes non-emergent      Benign prostatic hyperplasia with urinary retention     - continue home medications      Chronic combined systolic and diastolic heart failure     - compensated, continue home medications      Seizure disorder     - continue home keppra       DVT PPx:     Provider    I personally scribed for Jimmy Wilson MD on 01/04/2019 at 5:44 PM. Electronically signed by janis Patton III on 01/04/2019 at 5:44 PM

## 2019-01-06 NOTE — SUBJECTIVE & OBJECTIVE
Interval History:     - CBI clotted off this AM-- CBI had been titrated too low and pt developed clots, CBI backed up (still plenty of fluid in saline bags, and tejeda bag empty)   - cleared easily with manual irrigation  - Hemoglobin responded adequately to 2 u PRBC transfusions given small amount of ongoing bleeding       Review of Systems  Objective:     Temp:  [96.2 °F (35.7 °C)-98.3 °F (36.8 °C)] 97.5 °F (36.4 °C)  Pulse:  [64-88] 83  Resp:  [16-20] 17  SpO2:  [98 %-100 %] 100 %  BP: (104-165)/(51-77) 165/77     Body mass index is 23.09 kg/m².            Drains     Drain                 Hemodialysis AV Fistula 08/08/14 0800 Left upper arm 1612 days         Urethral Catheter 01/04/19 1732 Triple-lumen 36 Fr. 1 day                Physical Exam   Nursing note and vitals reviewed.  Constitutional: He appears well-developed and well-nourished. No distress.   HENT:   Head: Normocephalic and atraumatic.   Eyes: Right eye exhibits no discharge. Left eye exhibits no discharge.   Cardiovascular: Normal rate.    Pulmonary/Chest: Effort normal. No respiratory distress.   Abdominal: Soft. There is no rebound and no guarding.   Suprapubic tenderness improved after manual irrigation    Genitourinary:   Genitourinary Comments: CBI clear on medium drip after manual irrigation this AM   Musculoskeletal: He exhibits no edema or deformity.   Neurological: No cranial nerve deficit.   Skin: Skin is warm and dry. No erythema. No pallor.         Significant Labs:    BMP:  Recent Labs   Lab 01/03/19  0559 01/04/19  1202 01/05/19  0844    135* 136   K 3.9 4.6 4.8    101 102   CO2 22* 23 26   BUN 36* 58* 66*   CREATININE 4.8* 7.2* 8.1*   CALCIUM 8.2* 8.9 8.4*       CBC:   Recent Labs   Lab 01/03/19  0448 01/04/19  1202 01/04/19  2307 01/05/19  0844   WBC 8.27 9.44  --  7.87   HGB 8.2* 6.5* 6.9* 7.9*   HCT 26.6* 20.7* 21.0* 23.3*    157  --  141*       All pertinent labs results from the past 24 hours have been  reviewed.    Significant Imaging:  All pertinent imaging results/findings from the past 24 hours have been reviewed.

## 2019-01-06 NOTE — PROGRESS NOTES
Hospital Medicine  Progress note    I personally performed the history, physical exam and medical decision making: and confirmed the accuracy of the information in the transcribed note.  Jimmy Wilson M.D.  Pager: [phone]  Cell Phone: [phone]    Team: [facility] [place] HOSP MED B Jimmy Wilson MD  Admit Date: 1/4/2019  JAMISON 1/6/2019  Code status: Full Code    Principal Problem:  Gross hematuria    Interval hx: Continue to irrigate tejeda PRN and transfuse PRN. Will plan to add 1% alum irrigation for hematuria to start tomorrow.     ROS   Constitutional: Negative for fever.   HENT: Negative for sore throat.    Respiratory: Negative for shortness of breath.    Cardiovascular: Negative for chest pain.   Gastrointestinal: Negative for nausea.   Genitourinary: Negative for dysuria.        Hematuria   Musculoskeletal: Negative for back pain.   Skin: Negative for rash.   Neurological: Negative for weakness.   Hematological: Does not bruise/bleed easily.      PEx  Temp:  [96.6 °F (35.9 °C)-97.6 °F (36.4 °C)]   Pulse:  [59-87]   Resp:  [17-18]   BP: (123-165)/(56-77)   SpO2:  [98 %-100 %]     Intake/Output Summary (Last 24 hours) at 1/6/2019 1616  Last data filed at 1/6/2019 1400  Gross per 24 hour   Intake 40462 ml   Output 95565 ml   Net -6450 ml       Constitutional: Negative for fever.   HENT: Negative for sore throat.    Respiratory: Negative for shortness of breath.    Cardiovascular: Negative for chest pain.   Gastrointestinal: Negative for nausea.   Genitourinary: Negative for dysuria.        Penile bleeding as has been in the past  Musculoskeletal: Negative for back pain.   Skin: Negative for rash.   Neurological: Negative for weakness.   Hematological: Does not bruise/bleed easily.    Recent Labs   Lab 01/04/19  1202 01/04/19  2307 01/05/19  0844 01/06/19  1015   WBC 9.44  --  7.87 10.60   HGB 6.5* 6.9* 7.9* 8.1*   HCT 20.7* 21.0* 23.3* 25.3*     --  141* 139*     Recent Labs   Lab 01/03/19  0559 01/04/19  1202  01/05/19  0844    135* 136   K 3.9 4.6 4.8    101 102   CO2 22* 23 26   BUN 36* 58* 66*   CREATININE 4.8* 7.2* 8.1*   * 167* 122*   CALCIUM 8.2* 8.9 8.4*   PHOS  --   --  3.9     Recent Labs   Lab 01/04/19  1202 01/05/19  0844   ALKPHOS 98  --    ALT 27  --    AST 35  --    ALBUMIN 2.8* 2.2*   PROT 7.9  --    BILITOT 0.4  --       No results for input(s): POCTGLUCOSE in the last 168 hours.  Recent Labs     01/04/19  1202   TROPONINI 0.036*       Scheduled Meds:   sodium chloride 0.9%   Intravenous Once    amLODIPine  5 mg Oral Daily    aspirin  81 mg Oral Daily    brinzolamide  1 drop Left Eye TID    calcium acetate  667 mg Oral TID WM    carvedilol  25 mg Oral BID    dutasteride  0.5 mg Oral Daily    levETIRAcetam  500 mg Oral BID    travoprost  1 drop Left Eye QHS     Continuous Infusions:  As Needed:  sodium chloride, sodium chloride, sodium chloride 0.9%, traMADol    Active Hospital Problems    Diagnosis  POA    *Gross hematuria [R31.0]  Yes    Anemia [D64.9]  Yes    ESRD (end stage renal disease) [N18.6]  Unknown      Resolved Hospital Problems   No resolved problems to display.       Overview  89-year-old male presents to the ER for evaluation of bleeding from the penis.  The patient has had this problem intermittently for the past 2 months and has been seen here and admitted here for multiple times.  Urology believes that the bleeding is coming from the prostate. Urology saw patient and ideally recommends irrigate tejeda PRN and transfuse PRN. Will plan to add 1% alum irrigation for hematuria to start tomorrow. May need endoscopic intervention in near future but currently feel risks of invasive intervention would be overly morbid. Hemodialysis done.        Assessment and Plan for Problems addressed today:         Hematuria     - urology consulted  - UA pending  - cystoscopy last admission unrevealing  - Low H/H, type and screen with transfusion of 1U pRBCs  - HDS  - CT  11/21/2018:  1. Irregular contour about the superolateral aspect of the right kidney, mass cannot be excluded.  There is a tiny non-obstructing calcification within that region.  Right renal cyst.  4.  Several enlarged para-aortic lymph nodes extending along the left ileopsoas with some obliteration of the fat planes with an additional enlarged left external iliac lymph node.  These findings are new since CT pelvis 11/14/2017.      Anemia in chronic kidney disease, on chronic dialysis     - Hgb 6.5  - Type and screen  - Transfuse 1U pRBCs      Blindness     - home gtts      Dementia with behavioral disturbance     - delirium precautions  - zyprexa PRN      Renovascular hypertension      - continue home medications      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     - diet controlled, start low dose SSI      ESRD (end stage renal disease) on dialysis     - HD MWF  - nephrology consulted  - BP and lytes non-emergent      Benign prostatic hyperplasia with urinary retention     - continue home medications      Chronic combined systolic and diastolic heart failure     - compensated, continue home medications      Seizure disorder     - continue home keppra         DVT PPx:      Provider    I personally scribed for Jimmy Wilson MD on 01/06/2019 at 2:04 PM. Electronically signed by janis Patton III on 01/06/2019 at 2:04 PM

## 2019-01-06 NOTE — ASSESSMENT & PLAN NOTE
89M with recurrent gross hematuria now with symptomatic clot retention and likely symptomatic anemia. Suspect prostate as a source of bleeding, unlikely due to small renal mass.    - CBI - titrate to clear-- Nursing staff:  Do not titrate CBI off completely-- even if clear, keep running at least at slow rate   - nursing to irrigate tejeda PRN  - irrigation supplies to bedside  - transfuse PRN  - per discussion with pharmacy, alum unavailable at this institution  - bleeding is actually improving despite CBI clotting off overnight; will continue CBI for now, make NPO p MN in case of need for procedure tomorrow  - please optimize and evaluate for medical clearance for surgery, cardiology clearance as well

## 2019-01-06 NOTE — PLAN OF CARE
Problem: Adult Inpatient Plan of Care  Goal: Plan of Care Review  Outcome: Ongoing (interventions implemented as appropriate)  Pt AAOx4 and VSS. Pt is progressing with plan of care. Free of skin breakdown as the pt positioned/repositioned well independently. SCDs in place at all times. Incentive spirometer at bedside and pt instructed on its use. No complain of pain at this time. Frequent rounds made to assess pain and safety and no complaints at this time noted. Side rails up x 2. Bed locked. Call light within reach. No falls noted. Will continue to monitor.

## 2019-01-06 NOTE — PROGRESS NOTES
[facility]  Urology  Progress Note    Patient Name: Ronald Campbell  MRN: [id number]  Admission Date: 1/4/2019  Hospital Length of Stay: 2 days  Code Status: Full Code   Attending Provider: Jimmy Wilson MD   Primary Care Physician: Bart Shaw MD    Subjective:     HPI:  Ronald Campbell is a 89 y.o. male with HTN, Type 2 DM, ESRD on HD MWF, CHF, dementia, and blindness familiar to the urology service for hematuria. He was discharged on 12/13 and his hematuria had mostly resolved. This began again Thursday. Per his daughter, there is no urine, only blood and clots coming from the penis. He was seen in the ED yesterday for this as well however was not symptomatic and given his prior discussion with palliative care, decision was made not to intervene. His hemoglobin is 6.5 from 8.1 yesterday. Today he was also feeling lightheaded with possible neurologic symptoms and thus presented to the ED.    He usually urinates twice daily. He does not feel bladder fullness or the urge to urinate. Bladder scan in the ED showed > 400 cc.    Of note he underwent cystoscopy on 11/30 which showed no obvious tumors and bleeding from the prostate however visualization was limited due to bleeding. Recent CT noncontrast of abdomen pelvis demonstrates a very enlarged prostate, as well as a small right renal mass and several enlarged para-aortic lymph nodes.     Interval History:     - CBI clotted off this AM-- CBI had been titrated too low and pt developed clots, CBI backed up (still plenty of fluid in saline bags, and tejeda bag empty)   - cleared easily with manual irrigation  - Hemoglobin responded adequately to 2 u PRBC transfusions given small amount of ongoing bleeding       Review of Systems  Objective:     Temp:  [96.2 °F (35.7 °C)-98.3 °F (36.8 °C)] 97.5 °F (36.4 °C)  Pulse:  [64-88] 83  Resp:  [16-20] 17  SpO2:  [98 %-100 %] 100 %  BP: (104-165)/(51-77) 165/77     Body mass index is 23.09 kg/m².            Drains      Drain                 Hemodialysis AV Fistula 08/08/14 0800 Left upper arm 1612 days         Urethral Catheter 01/04/19 1732 Triple-lumen 36 Fr. 1 day                Physical Exam   Nursing note and vitals reviewed.  Constitutional: He appears well-developed and well-nourished. No distress.   HENT:   Head: Normocephalic and atraumatic.   Eyes: Right eye exhibits no discharge. Left eye exhibits no discharge.   Cardiovascular: Normal rate.    Pulmonary/Chest: Effort normal. No respiratory distress.   Abdominal: Soft. There is no rebound and no guarding.   Suprapubic tenderness improved after manual irrigation    Genitourinary:   Genitourinary Comments: CBI clear on medium drip after manual irrigation this AM   Musculoskeletal: He exhibits no edema or deformity.   Neurological: No cranial nerve deficit.   Skin: Skin is warm and dry. No erythema. No pallor.         Significant Labs:    BMP:  Recent Labs   Lab 01/03/19  0559 01/04/19  1202 01/05/19  0844    135* 136   K 3.9 4.6 4.8    101 102   CO2 22* 23 26   BUN 36* 58* 66*   CREATININE 4.8* 7.2* 8.1*   CALCIUM 8.2* 8.9 8.4*       CBC:   Recent Labs   Lab 01/03/19  0448 01/04/19  1202 01/04/19  2307 01/05/19  0844   WBC 8.27 9.44  --  7.87   HGB 8.2* 6.5* 6.9* 7.9*   HCT 26.6* 20.7* 21.0* 23.3*    157  --  141*       All pertinent labs results from the past 24 hours have been reviewed.    Significant Imaging:  All pertinent imaging results/findings from the past 24 hours have been reviewed.                  Assessment/Plan:     Gross hematuria    89M with recurrent gross hematuria now with symptomatic clot retention and likely symptomatic anemia. Suspect prostate as a source of bleeding, unlikely due to small renal mass.    - CBI - titrate to clear-- Nursing staff:  Do not titrate CBI off completely-- even if clear, keep running at least at slow rate   - nursing to irrigate tejeda PRN  - irrigation supplies to bedside  - transfuse PRN  - per  discussion with pharmacy, alum unavailable at this institution  - bleeding is actually improving despite CBI clotting off overnight; will continue CBI for now, make NPO p MN in case of need for procedure tomorrow  - please optimize and evaluate for medical clearance for surgery, cardiology clearance as well            Jose Reeves MD  Urology  [facility]

## 2019-01-06 NOTE — MEDICAL/APP STUDENT
[facility] Medicine  Progress note    Team: [facility] B Freeman Patton  Admit Date: 1/4/2019  JAMISON 1/6/2019  Code status: Full Code    Principal Problem:  <principal problem not specified>    Interval hx: Continue to irrigate tejeda PRN and transfuse PRN. Will plan to add 1% alum irrigation for hematuria to start tomorrow.     ROS   Constitutional: Negative for fever.   HENT: Negative for sore throat.    Respiratory: Negative for shortness of breath.    Cardiovascular: Negative for chest pain.   Gastrointestinal: Negative for nausea.   Genitourinary: Negative for dysuria.        Hematuria   Musculoskeletal: Negative for back pain.   Skin: Negative for rash.   Neurological: Negative for weakness.   Hematological: Does not bruise/bleed easily.      PEx  Temp:  [96.2 °F (35.7 °C)-98.3 °F (36.8 °C)]   Pulse:  [64-88]   Resp:  [16-20]   BP: (104-165)/(51-77)   SpO2:  [98 %-100 %]     Intake/Output Summary (Last 24 hours) at 1/6/2019 0825  Last data filed at 1/6/2019 0500  Gross per 24 hour   Intake 8330 ml   Output 46065 ml   Net -1970 ml       Constitutional: Negative for fever.   HENT: Negative for sore throat.    Respiratory: Negative for shortness of breath.    Cardiovascular: Negative for chest pain.   Gastrointestinal: Negative for nausea.   Genitourinary: Negative for dysuria.        Penile bleeding as has been in the past  Musculoskeletal: Negative for back pain.   Skin: Negative for rash.   Neurological: Negative for weakness.   Hematological: Does not bruise/bleed easily.    Recent Labs   Lab 01/03/19  0448 01/04/19  1202 01/04/19  2307 01/05/19  0844   WBC 8.27 9.44  --  7.87   HGB 8.2* 6.5* 6.9* 7.9*   HCT 26.6* 20.7* 21.0* 23.3*    157  --  141*     Recent Labs   Lab 01/03/19  0559 01/04/19  1202 01/05/19  0844    135* 136   K 3.9 4.6 4.8    101 102   CO2 22* 23 26   BUN 36* 58* 66*   CREATININE 4.8* 7.2* 8.1*   * 167* 122*   CALCIUM 8.2* 8.9 8.4*   PHOS  --   --  3.9     Recent Labs    Lab 01/04/19  1202 01/05/19  0844   ALKPHOS 98  --    ALT 27  --    AST 35  --    ALBUMIN 2.8* 2.2*   PROT 7.9  --    BILITOT 0.4  --       No results for input(s): POCTGLUCOSE in the last 168 hours.  Recent Labs     01/04/19  1202   TROPONINI 0.036*       Scheduled Meds:   sodium chloride 0.9%   Intravenous Once    amLODIPine  5 mg Oral Daily    aspirin  81 mg Oral Daily    brinzolamide  1 drop Left Eye TID    calcium acetate  667 mg Oral TID WM    carvedilol  25 mg Oral BID    dutasteride  0.5 mg Oral Daily    levETIRAcetam  500 mg Oral BID    travoprost  1 drop Left Eye QHS     Continuous Infusions:  As Needed:  sodium chloride, sodium chloride, sodium chloride 0.9%, traMADol    Active Hospital Problems    Diagnosis  POA    Anemia [D64.9]  Yes    Gross hematuria [R31.0]  Yes    ESRD (end stage renal disease) [N18.6]  Unknown      Resolved Hospital Problems   No resolved problems to display.       Overview  89-year-old male presents to the ER for evaluation of bleeding from the penis.  The patient has had this problem intermittently for the past 2 months and has been seen here and admitted here for multiple times.  Urology believes that the bleeding is coming from the prostate. Urology saw patient and ideally recommends irrigate tejeda PRN and transfuse PRN. Will plan to add 1% alum irrigation for hematuria to start tomorrow. May need endoscopic intervention in near future but currently feel risks of invasive intervention would be overly morbid. Hemodialysis done.        Assessment and Plan for Problems addressed today:         Hematuria     - urology consulted  - UA pending  - cystoscopy last admission unrevealing  - Low H/H, type and screen with transfusion of 1U pRBCs  - HDS  - CT 11/21/2018:  1. Irregular contour about the superolateral aspect of the right kidney, mass cannot be excluded.  There is a tiny non-obstructing calcification within that region.  Right renal cyst.  4.  Several enlarged  para-aortic lymph nodes extending along the left ileopsoas with some obliteration of the fat planes with an additional enlarged left external iliac lymph node.  These findings are new since CT pelvis 11/14/2017.      Anemia in chronic kidney disease, on chronic dialysis     - Hgb 6.5  - Type and screen  - Transfuse 1U pRBCs      Blindness     - home gtts      Dementia with behavioral disturbance     - delirium precautions  - zyprexa PRN      Renovascular hypertension      - continue home medications      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     - diet controlled, start low dose SSI      ESRD (end stage renal disease) on dialysis     - HD MWF  - nephrology consulted  - BP and lytes non-emergent      Benign prostatic hyperplasia with urinary retention     - continue home medications      Chronic combined systolic and diastolic heart failure     - compensated, continue home medications      Seizure disorder     - continue home keppra         DVT PPx:      Provider    I personally scribed for Jimmy Wilson MD on 01/06/2019 at 2:04 PM. Electronically signed by janis Patton III on 01/06/2019 at 2:04 PM

## 2019-01-06 NOTE — PLAN OF CARE
Problem: Adult Inpatient Plan of Care  Goal: Plan of Care Review  Outcome: Ongoing (interventions implemented as appropriate)  Patient resting in bed comfortably. IV intact and free of infection and irration, CBI in place urine light pink CBI remains unclamped, fall precautions maintained no falls noted. Call light in reach bed locked and in lowest position. Non skid socks on while out of bed. Patient instructed to call for assistance. Skin integrity maintained as patient is assisted with positioning, C/o pain managed with PRN meds, No other complaints or concerns. Will continue to monitor and follow careplan of care.

## 2019-01-07 NOTE — PROGRESS NOTES
[facility]  Urology  Progress Note    Patient Name: Ronald Campbell  MRN: [id number]  Admission Date: 1/4/2019  Hospital Length of Stay: 3 days  Code Status: Full Code   Attending Provider: Jimmy Wilson MD   Primary Care Physician: Bart Shaw MD    Subjective:     HPI:  Ronald Campbell is a 89 y.o. male with HTN, Type 2 DM, ESRD on HD MWF, CHF, dementia, and blindness familiar to the urology service for hematuria. He was discharged on 12/13 and his hematuria had mostly resolved. This began again Thursday. Per his daughter, there is no urine, only blood and clots coming from the penis. He was seen in the ED yesterday for this as well however was not symptomatic and given his prior discussion with palliative care, decision was made not to intervene. His hemoglobin is 6.5 from 8.1 yesterday. Today he was also feeling lightheaded with possible neurologic symptoms and thus presented to the ED.    He usually urinates twice daily. He does not feel bladder fullness or the urge to urinate. Bladder scan in the ED showed > 400 cc.    Of note he underwent cystoscopy on 11/30 which showed no obvious tumors and bleeding from the prostate however visualization was limited due to bleeding. Recent CT noncontrast of abdomen pelvis demonstrates a very enlarged prostate, as well as a small right renal mass and several enlarged para-aortic lymph nodes.     Interval History:     No acute events overnight  CBI running clear currently  NPO this AM    Review of Systems    Objective:     Temp:  [97.1 °F (36.2 °C)-97.5 °F (36.4 °C)] 97.2 °F (36.2 °C)  Pulse:  [58-87] 65  Resp:  [17-19] 19  SpO2:  [96 %-100 %] 96 %  BP: (134-165)/(63-77) 142/69     Body mass index is 23.09 kg/m².            Drains     Drain                 Hemodialysis AV Fistula 08/08/14 0800 Left upper arm 1612 days         Urethral Catheter 01/04/19 1732 Triple-lumen 36 Fr. 1 day                Physical Exam   Nursing note and vitals  reviewed.  Constitutional: He appears well-developed and well-nourished. No distress.   HENT:   Head: Normocephalic and atraumatic.   Eyes: Right eye exhibits no discharge. Left eye exhibits no discharge.   Cardiovascular: Normal rate.    Pulmonary/Chest: Effort normal. No respiratory distress.   Abdominal: Soft. There is no rebound and no guarding.   Suprapubic tenderness improved after manual irrigation    Genitourinary:   Genitourinary Comments: CBI clear on medium drip after manual irrigation this AM   Musculoskeletal: He exhibits no edema or deformity.   Neurological: No cranial nerve deficit.   Skin: Skin is warm and dry. No erythema. No pallor.         Significant Labs:    BMP:  Recent Labs   Lab 01/03/19  0559 01/04/19  1202 01/05/19  0844    135* 136   K 3.9 4.6 4.8    101 102   CO2 22* 23 26   BUN 36* 58* 66*   CREATININE 4.8* 7.2* 8.1*   CALCIUM 8.2* 8.9 8.4*       CBC:   Recent Labs   Lab 01/04/19  1202 01/04/19  2307 01/05/19  0844 01/06/19  1015   WBC 9.44  --  7.87 10.60   HGB 6.5* 6.9* 7.9* 8.1*   HCT 20.7* 21.0* 23.3* 25.3*     --  141* 139*       All pertinent labs results from the past 24 hours have been reviewed.    Significant Imaging:  All pertinent imaging results/findings from the past 24 hours have been reviewed.                  Assessment/Plan:     * Gross hematuria    89M with recurrent gross hematuria now with symptomatic clot retention and likely symptomatic anemia. Suspect prostate as a source of bleeding, unlikely due to small renal mass.    - Clamp CBI today  - nursing to irrigate tejeda PRN  - irrigation supplies to bedside  - transfuse PRN, H&H stable yesterday, AM draw pending  - per discussion with pharmacy, alum unavailable at this institution  - Okay for diet today, if he continues to bleed later today, may pursue cystoscopy with possible intervention tomorrow.  - again request medical clearance or surgical risk assessment         VTE Risk Mitigation (From  admission, onward)        Ordered     IP VTE HIGH RISK PATIENT  Once      01/04/19 1456     Place MORGAN hose  Until discontinued      01/04/19 1456          Jae Ashton MD  Urology  [facility]

## 2019-01-07 NOTE — ASSESSMENT & PLAN NOTE
89M with recurrent gross hematuria now with symptomatic clot retention and likely symptomatic anemia. Suspect prostate as a source of bleeding, unlikely due to small renal mass.    - CBI - titrate to clear-- Nursing staff:  Do not titrate CBI off completely-- even if clear, keep running at least at slow rate   - nursing to irrigate tejeda PRN  - irrigation supplies to bedside  - transfuse PRN, H&H stable yesterday, AM draw pending  - per discussion with pharmacy, alum unavailable at this institution  - continue CBI for now, keep NPO in case of need for procedure today vs tomorrow  - Please optimize and evaluate for medical clearance for surgery, cardiology clearance as well, we will await clearance before proceeding with intervention

## 2019-01-07 NOTE — SUBJECTIVE & OBJECTIVE
Interval History:     No acute events overnight  CBI running clear currently  NPO this AM    Review of Systems    Objective:     Temp:  [97.1 °F (36.2 °C)-97.5 °F (36.4 °C)] 97.2 °F (36.2 °C)  Pulse:  [58-87] 65  Resp:  [17-19] 19  SpO2:  [96 %-100 %] 96 %  BP: (134-165)/(63-77) 142/69     Body mass index is 23.09 kg/m².            Drains     Drain                 Hemodialysis AV Fistula 08/08/14 0800 Left upper arm 1612 days         Urethral Catheter 01/04/19 1732 Triple-lumen 36 Fr. 1 day                Physical Exam   Nursing note and vitals reviewed.  Constitutional: He appears well-developed and well-nourished. No distress.   HENT:   Head: Normocephalic and atraumatic.   Eyes: Right eye exhibits no discharge. Left eye exhibits no discharge.   Cardiovascular: Normal rate.    Pulmonary/Chest: Effort normal. No respiratory distress.   Abdominal: Soft. There is no rebound and no guarding.   Suprapubic tenderness improved after manual irrigation    Genitourinary:   Genitourinary Comments: CBI clear on medium drip after manual irrigation this AM   Musculoskeletal: He exhibits no edema or deformity.   Neurological: No cranial nerve deficit.   Skin: Skin is warm and dry. No erythema. No pallor.         Significant Labs:    BMP:  Recent Labs   Lab 01/03/19  0559 01/04/19  1202 01/05/19  0844    135* 136   K 3.9 4.6 4.8    101 102   CO2 22* 23 26   BUN 36* 58* 66*   CREATININE 4.8* 7.2* 8.1*   CALCIUM 8.2* 8.9 8.4*       CBC:   Recent Labs   Lab 01/04/19  1202 01/04/19  2307 01/05/19  0844 01/06/19  1015   WBC 9.44  --  7.87 10.60   HGB 6.5* 6.9* 7.9* 8.1*   HCT 20.7* 21.0* 23.3* 25.3*     --  141* 139*       All pertinent labs results from the past 24 hours have been reviewed.    Significant Imaging:  All pertinent imaging results/findings from the past 24 hours have been reviewed.

## 2019-01-07 NOTE — PROGRESS NOTES
Hospital Medicine  Progress note    I personally performed the history, physical exam and medical decision making: and confirmed the accuracy of the information in the transcribed note.  Jimmy Wilson M.D.  Pager: [phone]  Cell Phone: [phone]    [facility] [facility] Jimmy Wilson MD  Admit Date: 1/4/2019  JAMISON 1/9/2019  Code status: Full Code    Principal Problem:  Gross hematuria    Interval hx: CBI clamped yesterday and turned off. Hgb has been stable for a couple days. If patient continues to bleed today Urology may pursue cystoscopy with possible intervention. Urology requests medical clearance or surgical risk assessment. Check 2d echo today, clear for procedure in am.    ROS   Constitutional: Negative for fever.   HENT: Negative for sore throat.    Respiratory: Negative for shortness of breath.    Cardiovascular: Negative for chest pain.   Gastrointestinal: Negative for nausea.   Genitourinary: Negative for dysuria.        Hematuria   Musculoskeletal: Negative for back pain.   Skin: Negative for rash.   Neurological: Negative for weakness.   Hematological: Does not bruise/bleed easily.      PEx  Temp:  [97.1 °F (36.2 °C)-97.4 °F (36.3 °C)]   Pulse:  [56-65]   Resp:  [16-19]   BP: (134-169)/(63-78)   SpO2:  [96 %-100 %]     Intake/Output Summary (Last 24 hours) at 1/7/2019 1546  Last data filed at 1/7/2019 0652  Gross per 24 hour   Intake 6330 ml   Output 9200 ml   Net -2870 ml       Constitutional: Negative for fever.   HENT: Negative for sore throat.    Respiratory: Negative for shortness of breath.    Cardiovascular: Negative for chest pain.   Gastrointestinal: Negative for nausea.   Genitourinary: Negative for dysuria.        Penile bleeding as has been in the past  Musculoskeletal: Negative for back pain.   Skin: Negative for rash.   Neurological: Negative for weakness.   Hematological: Does not bruise/bleed easily.    Recent Labs   Lab 01/05/19  0844 01/06/19  1015 01/07/19  0537   WBC 7.87 10.60  9.20   HGB 7.9* 8.1* 8.0*   HCT 23.3* 25.3* 25.0*   * 139* 138*     Recent Labs   Lab 01/03/19  0559 01/04/19  1202 01/05/19  0844    135* 136   K 3.9 4.6 4.8    101 102   CO2 22* 23 26   BUN 36* 58* 66*   CREATININE 4.8* 7.2* 8.1*   * 167* 122*   CALCIUM 8.2* 8.9 8.4*   PHOS  --   --  3.9     Recent Labs   Lab 01/04/19  1202 01/05/19  0844   ALKPHOS 98  --    ALT 27  --    AST 35  --    ALBUMIN 2.8* 2.2*   PROT 7.9  --    BILITOT 0.4  --       No results for input(s): POCTGLUCOSE in the last 168 hours.  No results for input(s): CPK, CPKMB, MB, TROPONINI in the last 72 hours.    Scheduled Meds:   sodium chloride 0.9%   Intravenous Once    amLODIPine  5 mg Oral Daily    aspirin  81 mg Oral Daily    brinzolamide  1 drop Left Eye TID    calcium acetate  667 mg Oral TID WM    carvedilol  25 mg Oral BID    dutasteride  0.5 mg Oral Daily    levETIRAcetam  500 mg Oral BID    [START ON 1/8/2019] polyethylene glycol  17 g Oral Daily    travoprost  1 drop Left Eye QHS     Continuous Infusions:  As Needed:  sodium chloride, sodium chloride, sodium chloride 0.9%, traMADol    Active Hospital Problems    Diagnosis  POA    *Gross hematuria [R31.0]  Yes    Anemia [D64.9]  Yes    ESRD (end stage renal disease) [N18.6]  Unknown      Resolved Hospital Problems   No resolved problems to display.       Overview  89-year-old male presents to the ER for evaluation of bleeding from the penis.  The patient has had this problem intermittently for the past 2 months and has been seen here and admitted here for multiple times.  Urology believes that the bleeding is coming from the prostate. Urology saw patient and ideally recommends irrigate tejeda PRN and transfuse PRN. Will plan to add 1% alum irrigation for hematuria to start tomorrow. May need endoscopic intervention in near future but currently feel risks of invasive intervention would be overly morbid. Hemodialysis done.        Assessment and Plan for  Problems addressed today:         Hematuria     - urology consulted  - UA pending  - cystoscopy last admission unrevealing  - Low H/H, type and screen with transfusion of 1U pRBCs  - HDS  - CT 11/21/2018:  1. Irregular contour about the superolateral aspect of the right kidney, mass cannot be excluded.  There is a tiny non-obstructing calcification within that region.  Right renal cyst.  4.  Several enlarged para-aortic lymph nodes extending along the left ileopsoas with some obliteration of the fat planes with an additional enlarged left external iliac lymph node.  These findings are new since CT pelvis 11/14/2017.      Anemia in chronic kidney disease, on chronic dialysis     - Hgb 6.5  - Type and screen  - Transfuse 1U pRBCs      Blindness     - home gtts      Dementia with behavioral disturbance     - delirium precautions  - zyprexa PRN      Renovascular hypertension      - continue home medications      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     - diet controlled, start low dose SSI      ESRD (end stage renal disease) on dialysis     - HD MWF  - nephrology consulted  - BP and lytes non-emergent      Benign prostatic hyperplasia with urinary retention     - continue home medications      Chronic combined systolic and diastolic heart failure     - compensated, continue home medications      Seizure disorder     - continue home keppra         DVT PPx:      Provider    I personally scribed for Jimmy Wilson MD on 01/07/2019 at 1:56 PM. Electronically signed by janis Patton III on 01/07/2019 at 1:56 PM

## 2019-01-07 NOTE — MEDICAL/APP STUDENT
Hospital Medicine  Progress note    Team: [facility] B Freeman Patton  Admit Date: 1/4/2019  JAMISON 1/6/2019  Code status: Full Code    Principal Problem:  Gross hematuria    Interval hx: CBI clamped yesterday and turned off. Hgb has been stable for a couple days. If patient continues to bleed today Urology may pursue cystoscopy with possible intervention. Urology requests medical clearance or surgical risk assessment.     ROS   Constitutional: Negative for fever.   HENT: Negative for sore throat.    Respiratory: Negative for shortness of breath.    Cardiovascular: Negative for chest pain.   Gastrointestinal: Negative for nausea.   Genitourinary: Negative for dysuria.        Hematuria   Musculoskeletal: Negative for back pain.   Skin: Negative for rash.   Neurological: Negative for weakness.   Hematological: Does not bruise/bleed easily.      PEx  Temp:  [97.1 °F (36.2 °C)-97.5 °F (36.4 °C)]   Pulse:  [58-83]   Resp:  [17-19]   BP: (134-165)/(63-77)   SpO2:  [96 %-100 %]     Intake/Output Summary (Last 24 hours) at 1/7/2019 0746  Last data filed at 1/7/2019 0652  Gross per 24 hour   Intake 82810 ml   Output 14030 ml   Net -76480 ml       Constitutional: Negative for fever.   HENT: Negative for sore throat.    Respiratory: Negative for shortness of breath.    Cardiovascular: Negative for chest pain.   Gastrointestinal: Negative for nausea.   Genitourinary: Negative for dysuria.        Penile bleeding as has been in the past  Musculoskeletal: Negative for back pain.   Skin: Negative for rash.   Neurological: Negative for weakness.   Hematological: Does not bruise/bleed easily.    Recent Labs   Lab 01/05/19  0844 01/06/19  1015 01/07/19  0537   WBC 7.87 10.60 9.20   HGB 7.9* 8.1* 8.0*   HCT 23.3* 25.3* 25.0*   * 139* 138*     Recent Labs   Lab 01/03/19  0559 01/04/19  1202 01/05/19  0844    135* 136   K 3.9 4.6 4.8    101 102   CO2 22* 23 26   BUN 36* 58* 66*   CREATININE 4.8* 7.2* 8.1*   * 167*  122*   CALCIUM 8.2* 8.9 8.4*   PHOS  --   --  3.9     Recent Labs   Lab 01/04/19  1202 01/05/19  0844   ALKPHOS 98  --    ALT 27  --    AST 35  --    ALBUMIN 2.8* 2.2*   PROT 7.9  --    BILITOT 0.4  --       No results for input(s): POCTGLUCOSE in the last 168 hours.  Recent Labs     01/04/19  1202   TROPONINI 0.036*       Scheduled Meds:   sodium chloride 0.9%   Intravenous Once    amLODIPine  5 mg Oral Daily    aspirin  81 mg Oral Daily    brinzolamide  1 drop Left Eye TID    calcium acetate  667 mg Oral TID WM    carvedilol  25 mg Oral BID    dutasteride  0.5 mg Oral Daily    levETIRAcetam  500 mg Oral BID    travoprost  1 drop Left Eye QHS     Continuous Infusions:  As Needed:  sodium chloride, sodium chloride, sodium chloride 0.9%, traMADol    Active Hospital Problems    Diagnosis  POA    *Gross hematuria [R31.0]  Yes    Anemia [D64.9]  Yes    ESRD (end stage renal disease) [N18.6]  Unknown      Resolved Hospital Problems   No resolved problems to display.       Overview  89-year-old male presents to the ER for evaluation of bleeding from the penis.  The patient has had this problem intermittently for the past 2 months and has been seen here and admitted here for multiple times.  Urology believes that the bleeding is coming from the prostate. Urology saw patient and ideally recommends irrigate tejeda PRN and transfuse PRN. Will plan to add 1% alum irrigation for hematuria to start tomorrow. May need endoscopic intervention in near future but currently feel risks of invasive intervention would be overly morbid. Hemodialysis done.        Assessment and Plan for Problems addressed today:         Hematuria     - urology consulted  - UA pending  - cystoscopy last admission unrevealing  - Low H/H, type and screen with transfusion of 1U pRBCs  - HDS  - CT 11/21/2018:  1. Irregular contour about the superolateral aspect of the right kidney, mass cannot be excluded.  There is a tiny non-obstructing  calcification within that region.  Right renal cyst.  4.  Several enlarged para-aortic lymph nodes extending along the left ileopsoas with some obliteration of the fat planes with an additional enlarged left external iliac lymph node.  These findings are new since CT pelvis 11/14/2017.      Anemia in chronic kidney disease, on chronic dialysis     - Hgb 6.5  - Type and screen  - Transfuse 1U pRBCs      Blindness     - home gtts      Dementia with behavioral disturbance     - delirium precautions  - zyprexa PRN      Renovascular hypertension      - continue home medications      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     - diet controlled, start low dose SSI      ESRD (end stage renal disease) on dialysis     - HD MWF  - nephrology consulted  - BP and lytes non-emergent      Benign prostatic hyperplasia with urinary retention     - continue home medications      Chronic combined systolic and diastolic heart failure     - compensated, continue home medications      Seizure disorder     - continue home keppra         DVT PPx:      Provider    I personally scribed for Jimmy Wilson MD on 01/07/2019 at 1:56 PM. Electronically signed by janis Ptaton III on 01/07/2019 at 1:56 PM

## 2019-01-07 NOTE — PLAN OF CARE
Problem: Adult Inpatient Plan of Care  Goal: Plan of Care Review  Outcome: Ongoing (interventions implemented as appropriate)  Patient AAOX4. VSS. Barrett care provided. Skin intact. No falls noted. Fall precautions remain. Patient instructed to call for assistance with getting up. Pain assessment, no pain noted. Patient comfortable. Frequent rounds made for safety and patient care. Call light within reach, SCD's in place, wheels locked, bed in low position, side rails up x2, safety maintained. NADN, will continue to monitor.

## 2019-01-07 NOTE — MEDICAL/APP STUDENT
Hospital Medicine  Progress note    Team: [facility] Jimmy Wilson MD  Admit Date: 1/4/2019  JAMISON 1/9/2019  Code status: Full Code    Principal Problem:  Gross hematuria    Interval hx: CBI clamped yesterday and turned off. Hgb has been stable for a couple days. If patient continues to bleed today Urology may pursue cystoscopy with possible intervention. Urology requests medical clearance or surgical risk assessment. Check 2d echo today, clear for procedure in am.    ROS   Constitutional: Negative for fever.   HENT: Negative for sore throat.    Respiratory: Negative for shortness of breath.    Cardiovascular: Negative for chest pain.   Gastrointestinal: Negative for nausea.   Genitourinary: Negative for dysuria.        Hematuria   Musculoskeletal: Negative for back pain.   Skin: Negative for rash.   Neurological: Negative for weakness.   Hematological: Does not bruise/bleed easily.      PEx  Temp:  [97.1 °F (36.2 °C)-97.4 °F (36.3 °C)]   Pulse:  [56-65]   Resp:  [16-19]   BP: (134-169)/(63-78)   SpO2:  [96 %-100 %]     Intake/Output Summary (Last 24 hours) at 1/7/2019 1546  Last data filed at 1/7/2019 0652  Gross per 24 hour   Intake 6330 ml   Output 9200 ml   Net -2870 ml       Constitutional: Negative for fever.   HENT: Negative for sore throat.    Respiratory: Negative for shortness of breath.    Cardiovascular: Negative for chest pain.   Gastrointestinal: Negative for nausea.   Genitourinary: Negative for dysuria.        Penile bleeding as has been in the past  Musculoskeletal: Negative for back pain.   Skin: Negative for rash.   Neurological: Negative for weakness.   Hematological: Does not bruise/bleed easily.    Recent Labs   Lab 01/05/19  0844 01/06/19  1015 01/07/19  0537   WBC 7.87 10.60 9.20   HGB 7.9* 8.1* 8.0*   HCT 23.3* 25.3* 25.0*   * 139* 138*     Recent Labs   Lab 01/03/19  0559 01/04/19  1202 01/05/19  0844    135* 136   K 3.9 4.6 4.8    101 102   CO2 22* 23 26   BUN 36*  58* 66*   CREATININE 4.8* 7.2* 8.1*   * 167* 122*   CALCIUM 8.2* 8.9 8.4*   PHOS  --   --  3.9     Recent Labs   Lab 01/04/19  1202 01/05/19  0844   ALKPHOS 98  --    ALT 27  --    AST 35  --    ALBUMIN 2.8* 2.2*   PROT 7.9  --    BILITOT 0.4  --       No results for input(s): POCTGLUCOSE in the last 168 hours.  No results for input(s): CPK, CPKMB, MB, TROPONINI in the last 72 hours.    Scheduled Meds:   sodium chloride 0.9%   Intravenous Once    amLODIPine  5 mg Oral Daily    aspirin  81 mg Oral Daily    brinzolamide  1 drop Left Eye TID    calcium acetate  667 mg Oral TID WM    carvedilol  25 mg Oral BID    dutasteride  0.5 mg Oral Daily    levETIRAcetam  500 mg Oral BID    [START ON 1/8/2019] polyethylene glycol  17 g Oral Daily    travoprost  1 drop Left Eye QHS     Continuous Infusions:  As Needed:  sodium chloride, sodium chloride, sodium chloride 0.9%, traMADol    Active Hospital Problems    Diagnosis  POA    *Gross hematuria [R31.0]  Yes    Anemia [D64.9]  Yes    ESRD (end stage renal disease) [N18.6]  Unknown      Resolved Hospital Problems   No resolved problems to display.       Overview  89-year-old male presents to the ER for evaluation of bleeding from the penis.  The patient has had this problem intermittently for the past 2 months and has been seen here and admitted here for multiple times.  Urology believes that the bleeding is coming from the prostate. Urology saw patient and ideally recommends irrigate tejeda PRN and transfuse PRN. Will plan to add 1% alum irrigation for hematuria to start tomorrow. May need endoscopic intervention in near future but currently feel risks of invasive intervention would be overly morbid. Hemodialysis done.        Assessment and Plan for Problems addressed today:         Hematuria     - urology consulted  - UA pending  - cystoscopy last admission unrevealing  - Low H/H, type and screen with transfusion of 1U pRBCs  - HDS  - CT 11/21/2018:  1.  Irregular contour about the superolateral aspect of the right kidney, mass cannot be excluded.  There is a tiny non-obstructing calcification within that region.  Right renal cyst.  4.  Several enlarged para-aortic lymph nodes extending along the left ileopsoas with some obliteration of the fat planes with an additional enlarged left external iliac lymph node.  These findings are new since CT pelvis 11/14/2017.      Anemia in chronic kidney disease, on chronic dialysis     - Hgb 6.5  - Type and screen  - Transfuse 1U pRBCs      Blindness     - home gtts      Dementia with behavioral disturbance     - delirium precautions  - zyprexa PRN      Renovascular hypertension      - continue home medications      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     - diet controlled, start low dose SSI      ESRD (end stage renal disease) on dialysis     - HD MWF  - nephrology consulted  - BP and lytes non-emergent      Benign prostatic hyperplasia with urinary retention     - continue home medications      Chronic combined systolic and diastolic heart failure     - compensated, continue home medications      Seizure disorder     - continue home keppra         DVT PPx:      Provider    I personally scribed for Jimmy Wilson MD on 01/07/2019 at 1:56 PM. Electronically signed by janis Patton III on 01/07/2019 at 1:56 PM

## 2019-01-08 NOTE — PROGRESS NOTES
[facility]  Urology  Progress Note    Patient Name: Ronald Campbell  MRN: [id number]  Admission Date: 1/4/2019  Hospital Length of Stay: 4 days  Code Status: Full Code   Attending Provider: Temo Banerjee MD  Primary Care Physician: Bart Shaw MD    Subjective:     HPI:  Ronald Campbell is a 89 y.o. male with HTN, Type 2 DM, ESRD on HD MWF, CHF, dementia, and blindness familiar to the urology service for hematuria. He was discharged on 12/13 and his hematuria had mostly resolved. This began again Thursday. Per his daughter, there is no urine, only blood and clots coming from the penis. He was seen in the ED yesterday for this as well however was not symptomatic and given his prior discussion with palliative care, decision was made not to intervene. His hemoglobin is 6.5 from 8.1 yesterday. Today he was also feeling lightheaded with possible neurologic symptoms and thus presented to the ED.    He usually urinates twice daily. He does not feel bladder fullness or the urge to urinate. Bladder scan in the ED showed > 400 cc.    Of note he underwent cystoscopy on 11/30 which showed no obvious tumors and bleeding from the prostate however visualization was limited due to bleeding. Recent CT noncontrast of abdomen pelvis demonstrates a very enlarged prostate, as well as a small right renal mass and several enlarged para-aortic lymph nodes.     Interval History:   CBI appeared to clot off this morning, however he cleared very easily with 150cc saline.  No additional complaints.     Review of Systems  Objective:     Temp:  [96.9 °F (36.1 °C)-97.4 °F (36.3 °C)] 97.3 °F (36.3 °C)  Pulse:  [51-65] 58  Resp:  [16-19] 17  SpO2:  [95 %-100 %] 97 %  BP: (138-169)/(67-78) 138/71     Body mass index is 23.73 kg/m².            Drains     Drain                 Hemodialysis AV Fistula 08/08/14 0800 Left upper arm 1613 days         Urethral Catheter 01/04/19 1732 Triple-lumen 36 Fr. 3 days                Physical Exam    Nursing note and vitals reviewed.  Constitutional: He appears well-developed and well-nourished. No distress.   HENT:   Head: Normocephalic and atraumatic.   Eyes: Right eye exhibits no discharge. Left eye exhibits no discharge.   Cardiovascular: Normal rate.    Pulmonary/Chest: Effort normal. No respiratory distress.   Abdominal: Soft. There is no rebound and no guarding.       Genitourinary:   Genitourinary Comments: No urine in catheter tubing, irrigated to clear easily   Musculoskeletal: He exhibits no edema or deformity.   Neurological: No cranial nerve deficit.   Skin: Skin is warm and dry. No erythema. No pallor.         Significant Labs:    BMP:  Recent Labs   Lab 01/03/19  0559 01/04/19  1202 01/05/19  0844    135* 136   K 3.9 4.6 4.8    101 102   CO2 22* 23 26   BUN 36* 58* 66*   CREATININE 4.8* 7.2* 8.1*   CALCIUM 8.2* 8.9 8.4*       CBC:   Recent Labs   Lab 01/06/19  1015 01/07/19  0537 01/08/19  0411   WBC 10.60 9.20 6.85   HGB 8.1* 8.0* 8.8*   HCT 25.3* 25.0* 28.4*   * 138* 122*       All pertinent labs results from the past 24 hours have been reviewed.    Significant Imaging:  All pertinent imaging results/findings from the past 24 hours have been reviewed.                  Assessment/Plan:     * Gross hematuria    89M with recurrent gross hematuria now with symptomatic clot retention and likely symptomatic anemia. Suspect prostate as a source of bleeding, unlikely due to small renal mass.    - CBI appeared to clot off however it cleared easily with no clots irrigated out which suggests what little urine he makes daily is not sufficient to clear residual bladder/prostatic bleeding.   - nursing to irrigate tejeda PRN  - irrigation supplies to bedside  - transfuse PRN, H&H stable again today  - per discussion with pharmacy, alum unavailable at this institution  - keep NPO in case of need for procedure today, will discuss with staff possible add-on for today  - Appears he has been  cleared for procedure         VTE Risk Mitigation (From admission, onward)        Ordered     IP VTE HIGH RISK PATIENT  Once      01/04/19 1456     Place MORGAN hose  Until discontinued      01/04/19 1451          Jae Oswald MD  Urology  [facility]

## 2019-01-08 NOTE — ASSESSMENT & PLAN NOTE
89M with recurrent gross hematuria now with symptomatic clot retention and likely symptomatic anemia. Suspect prostate as a source of bleeding, unlikely due to small renal mass.    - CBI appeared to clot off however it cleared easily with no clots irrigated out which suggests what little urine he makes daily is not sufficient to clear residual bladder/prostatic bleeding.   - nursing to irrigate tejeda PRN  - irrigation supplies to bedside  - transfuse PRN, H&H stable again today  - per discussion with pharmacy, alum unavailable at this institution  - keep NPO in case of need for procedure today, will discuss with staff possible add-on for today  - Appears he has been cleared for procedure

## 2019-01-08 NOTE — OP NOTE
[facility]  Operative Note    Date: 01/08/2019    Pre-Op Diagnosis:   1. Refractory gross hematuria  2. ESRD  3. Prostatic enlargement    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    Edema of both legs    Effusion of left knee    Glaucoma    Syncope and collapse    Renovascular hypertension    AV fistula stenosis    Vein stenosis    Insulin long-term use    Tinea pedis of both feet    Dermatophytosis, nail    Hemodialysis patient    Scrotal mass    Acute cystitis with hematuria    Weakness    Blind in both eyes    Dementia with behavioral disturbance    Hypothermia    Phimosis    History of unilateral orchiectomy    HCAP (healthcare-associated pneumonia)    Volume overload    Agitation    Gross hematuria    Metabolic encephalopathy    Discharge planning issues    Metabolic acidosis    Blindness    Hematuria    Acute post-hemorrhagic anemia    Palliative care encounter    Anemia       Post-Op Diagnosis: same    Procedure(s) Performed:   TURP    Specimen(s): prostate chips    Staff Surgeon: Temo Banerjee MD; Jeremy Clayton MD    Assistant Surgeon: Susan Castle MD    Anesthesia: General endotracheal anesthesia    Indications: Ronald Campbell is a 89 y.o. male with ESRD, enlarged prostate and continued gross hematuria refractory to conservative management.     Findings:   - prostate severely enlarged and very suspicious for malignancy  - left lateral lobe with growth into bladder floor  - diffuse oozing noted from  prostate  - unable to identify either ureteral orifice  - no bladder tumors noted     Estimated Blood Loss: min    Drains: 24 Fr 3-way tejeda catheter    Procedure in detail:  After the risks and benefits of the procedure were explained, consent was obtained, and the patient was taken to the operating suite and placed in the supine position.  Anesthesia was administered.  When adequately sedated, the patient was placed in dorsal lithotomy position and prepped and draped in normal sterile fashion.  Timeout was performed and preoperative ABx were confirmed.      A 28-Cymro sheath resectoscope was placed into the bladder using a visual obturator. Please see findings as above. The visual obturator was exchanged for the resecting mechanism. Bleeding surfaces and friable tissue of the prostate were resected circumferentially until the tissue was able to be fulgurated. The entire prostatic fossa was fulgurated.     The verumontanum and sphincter were intact. The Wine in [employer] evacuator was used to remove all prostate chips and again hemostasis was obtained.     Once the bladder was drained, we could see that he had an open channel with no active bleeding and the scope was removed.  A 24 Fr 3-way catheter was placed in the bladder with 40 mL of water in the balloon. The patient was placed on traction and on CBI. The patient was transferred to recovery in stable condition.     Disposition: The patient will remain on the medicine service and CBI will be titrated.    Susan Castle MD

## 2019-01-08 NOTE — PROGRESS NOTES
[facility] H[facility] NOTE     Patient currently on hemodialysis for removal of uremic toxins and volume.     Patient seen and evaluated on hemodialysis, tolerating treatment, see HD flowsheet for vitals and assessments.      Ultrafiltration goal is 1L     Labs have been reviewed and the dialysate bath has been adjusted.     Assessment/Plan:  Seen on dialysis this morning, tolerating well, resting quietly.  Will continue iHD while in-patient.    Anemia of ESRD  ROWAN with dialyiss    BMM  Continue renal diet  Continue phos binders    ANURADHA Gudino, FNP-BC  Nephrology  Pager:  [phone]

## 2019-01-08 NOTE — ANESTHESIA PREPROCEDURE EVALUATION
[facility]  Anesthesia Pre-Operative Evaluation         Patient Name: Ronald Campbell  YOB: 1929  MRN: [id number]    SUBJECTIVE:     Pre-operative evaluation for Procedure(s) (LRB):   CYSTOSCOPY, WITH BLADDER BIOPSY, WITH FULGURATION IF INDICATED (N/A ) TURP (TRANSURETHRAL RESECTION OF PROSTATE) (N/A Perineum)      01/08/2019    Ronald Campbell is a 89 y.o. male     LDA:   Prev airway: Moderately difficult mask ventilation with oral airway. Grade I view with Bermudez, ETT placed without complication. 1 attempt.     Drips: None documented.      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    Edema of both legs    Effusion of left knee    Glaucoma    Syncope and collapse    Renovascular hypertension    AV fistula stenosis    Vein stenosis    Insulin long-term use    Tinea pedis of both feet    Dermatophytosis, nail    Hemodialysis patient    Scrotal mass    Acute cystitis with hematuria    Weakness    Blind in both eyes    Dementia with behavioral disturbance    Hypothermia    Phimosis    History of unilateral orchiectomy    HCAP (healthcare-associated pneumonia)    Volume overload    Agitation    Gross hematuria    Metabolic encephalopathy    Discharge planning issues    Metabolic acidosis    Blindness    Hematuria    Acute post-hemorrhagic anemia    Palliative care encounter    Anemia       Review of patient's allergies indicates:   Allergen Reactions    Lisinopril Swelling       Current Inpatient  Medications:      Current Facility-Administered Medications on File Prior to Visit   Medication Dose Route Frequency Provider Last Rate Last Dose    0.9%  NaCl infusion (for blood administration)   Intravenous Q24H PRN Jimmy Wilson MD        0.9%  NaCl infusion (for blood administration)   Intravenous Q24H PRN Rose Mary Trejo PA-C        0.9%  NaCl infusion   Intravenous Once Rashawn Dueñas NP        amLODIPine tablet 5 mg  5 mg Oral Daily Jimmy Wilson MD   5 mg at 01/08/19 0900    aspirin chewable tablet 81 mg  81 mg Oral Daily Jimmy Wilson MD   81 mg at 01/08/19 1250    brinzolamide 1 % ophthalmic suspension 1 drop  1 drop Left Eye TID Jimmy Wilson MD   1 drop at 01/07/19 2218    calcium acetate capsule 667 mg  667 mg Oral TID WM Jimmy Wilson MD   Stopped at 01/08/19 1200    carvedilol tablet 25 mg  25 mg Oral BID Jimmy Wilson MD   25 mg at 01/08/19 1251    dutasteride capsule 0.5 mg  0.5 mg Oral Daily Jimmy Wilson MD   0.5 mg at 01/08/19 1251    [START ON 1/10/2019] epoetin napoleon injection 8,000 Units  8,000 Units Intravenous Every Tues, Thurs, Sat Rashawn Dueñas, GEOR[place]ES        levETIRAcetam tablet 500 mg  500 mg Oral BID Jimmy Wilson MD   500 mg at 01/08/19 1252    polyethylene glycol packet 17 g  17 g Oral Daily Jimmy Wilson MD   17 g at 01/08/19 1251    sodium chloride 0.9% flush 5 mL  5 mL Intravenous PRN Irish Evans PA-C   5 mL at 01/05/19 0049    traMADol tablet 50 mg  50 mg Oral Q6H PRN Jimmy Wilson MD        travoprost 0.004 % ophthalmic solution 1 drop  1 drop Left Eye QHS Jimmy Wilson MD   1 drop at 01/07/19 2218     Current Outpatient Medications on File Prior to Visit   Medication Sig Dispense Refill    amLODIPine (NORVASC) 5 MG tablet Take 1 tablet (5 mg total) by mouth once daily. 30 tablet 3    aspirin 81 MG Chew Take 1 tablet (81 mg total) by mouth once daily. 30 tablet 3    atorvastatin (LIPITOR) 40 MG tablet Take 1 tablet  (40 mg total) by mouth once daily. 30 tablet 9    B complex-vitamin C-folic acid (NEPHRO-JESSICA) 0.8 mg Tab Take 1 tablet by mouth every morning.       blood sugar diagnostic (BLOOD GLUCOSE TEST) Strp 1 strip by Misc.(Non-Drug; Combo Route) route daily as needed.       brimonidine-timolol (COMBIGAN) 0.2-0.5 % Drop Place 1 drop into the left eye 2 (two) times daily.      brinzolamide (AZOPT) 1 % ophthalmic suspension Place 1 drop into the left eye 3 (three) times daily.       calcium acetate (PHOSLO) 667 mg capsule Take 2 capsules by mouth every day in the morning, then take 1 capsule with lunch and 2 capsules in the evening with meals      carvedilol (COREG) 25 MG tablet Take 1 tablet (25 mg total) by mouth 2 (two) times daily. Do not take on dialysis days. Hold for SBP < 110 or HR < 55 (Patient taking differently: Take 25 mg by mouth 2 (two) times daily. Hold for SBP < 110 or HR < 55) 60 tablet 1    doxazosin (CARDURA) 4 MG tablet Take 1 tablet (4 mg total) by mouth once daily. 30 tablet 1    dutasteride (AVODART) 0.5 mg capsule Take 1 capsule (0.5 mg total) by mouth once daily. 30 capsule 1    insulin aspart (NOVOLOG FLEXPEN) 100 unit/mL InPn  Insulin Pen Subcutaneous As directed.  -200 take 1 unitBS 201-250 take 2 unitsBS 251-300 take 3 unitsBS 301-350 take 4 unitsBS > 351 take 5 units and call MD      ketoconazole (NIZORAL) 2 % cream Apply topically once daily. feet 30 g 3    levetiracetam (KEPPRA) 500 MG Tab Take 1 tablet (500 mg total) by mouth 2 (two) times daily. 60 tablet 11    tamsulosin (FLOMAX) 0.4 mg Cap Take 1 capsule (0.4 mg total) by mouth once daily. 30 capsule 11    traMADol (ULTRAM) 50 mg tablet Take 1 tablet (50 mg total) by mouth every 6 (six) hours as needed for Pain. 12 tablet 0    TRAVOPROST (TRAVATAN Z OPHT) Place 1 drop into the left eye every evening. 1 Drops Left eye Every evening         Past Surgical History:   Procedure Laterality Date    ANGIOPLASTY WITH STENT  PLACEMENT Left 11/18/2016    Performed by [facility]in [facility]s [facility] at [facility] OR 2ND FLR    AV FISTULA PLACEMENT Left 4/2014    AV fistula to Left upper arm    [id number] left RC vs BC AVF Left 8/8/2014    Performed by [facility]in [facility]s [facility] at [facility] OR 2ND FLR    CYSTOSCOPY N/A 11/29/2018    Performed by Temo Banerjee MD at [facility] OR 1ST FLR    EYE SURGERY      FISTULOGRAM Left 11/18/2016    Performed by Orin [facility] at [facility] OR 2ND FLR    FISTULOGRAM Left 4/15/2016    Performed by Orin Sims MD at [facility] OR 2ND FLR    FISTULOGRAM Left 1/29/2016    Performed by Orin [facility] at [facility] CATH LAB    FISTULOGRAM Room 11 case Left 3/27/2015    Performed by Orin Sims MD at [facility] OR 2ND FLR    HYDROCELECTOMY Bilateral 11/22/2017    Performed by Temo Banerjee MD at [facility] OR 2ND FLR    INSERTION-CATHETER-DIALYSIS-PERITONEAL-LAPAROSCOPIC N/A 5/8/2014    Performed by Js Ladd MD at [facility] OR 2ND FLR    INSERTION-CATHETER-DIALYSIS-PERITONEAL-LAPAROSCOPIC N/A 5/5/2014    Performed by Js Ladd MD at [facility] OR 2ND FLR    INSERTION-CATHETER-HEMODIALYSIS N/A 5/21/2014    Performed by Orin Sims MD at [facility] OR 2ND FLR    PERMCATH INSERTION-TUNNELED CVC N/A 5/13/2014    Performed by Lolis Jackson MD at [facility] CATH LAB    REMOVAL, BLOOD CLOT N/A 11/29/2018    Performed by Temo Banerjee MD at [facility] OR 1ST FLR    REMOVAL-CATHETER-PERITONEAL DIALYSIS N/A 7/30/2014    Performed by Js Ladd MD at [facility] OR 2ND FLR    SCROTUM EXPLORATION Bilateral 11/2017       Social History     Socioeconomic History    Marital status:      Spouse name: Not on file    Number of children: Not on file    Years of education: Not on file    Highest education level: Not on file   Social Needs    Financial resource strain: Not on file    Food insecurity - worry: Not on file    Food insecurity - inability: Not on file    Transportation needs - medical: Not on file     Transportation needs - non-medical: Not on file   Occupational History    Not on file   Tobacco Use    Smoking status: Never Smoker    Smokeless tobacco: Never Used   Substance and Sexual Activity    Alcohol use: No    Drug use: No    Sexual activity: No   Other Topics Concern    Not on file   Social History Narrative    Not on file       OBJECTIVE:     Vital Signs Range (Last 24H):  Temp:  [18 °C (64.4 °F)-36.3 °C (97.4 °F)]   Pulse:  [51-74]   Resp:  [17-20]   BP: (119-169)/(56-79)   SpO2:  [95 %-100 %]       Significant Labs:  Lab Results   Component Value Date    WBC 6.85 01/08/2019    HGB 8.8 (L) 01/08/2019    HCT 28.4 (L) 01/08/2019     (L) 01/08/2019    CHOL 95 (L) 05/26/2016    TRIG 61 05/26/2016    HDL 30 (L) 05/26/2016    ALT 27 01/04/2019    AST 35 01/04/2019     (L) 01/08/2019    K 4.8 01/08/2019     01/08/2019    CREATININE 7.9 (H) 01/08/2019    BUN 57 (H) 01/08/2019    CO2 21 (L) 01/08/2019    TSH 1.263 08/03/2018    PSA 28.3 (H) 05/01/2014    INR 1.1 11/21/2018    HGBA1C 6.3 (H) 11/23/2018     Diagnostic Studies: No relevant studies.    EKG:   *Baseline Artifact* probable afib  Nonspecific ST and T wave abnormality  Abnormal ECG  When compared with ECG of 05-DEC-2018 17:29,  No significant change was found    2D ECHO:  · Eccentric left ventricular hypertrophy.  · Moderately decreased left ventricular systolic function - worse in the anterolateral wall. The estimated ejection fraction is 35%.  · Normal right ventricular systolic function.  · Grade I (mild) left ventricular diastolic dysfunction consistent with impaired relaxation.  · Moderate left atrial enlargement.  · Normal central venous pressure (3 mm Hg).           ASSESSMENT/PLAN:       Pre-op Assessment         Review of Systems  Anesthesia Hx:  History of prior surgery of interest to airway management or planning: Previous anesthesia: MAC  11/2016: Fistulagram with MAC.  Denies Family Hx of Anesthesia complications.   "Personal Hx of Anesthesia complications (Versed: confusion)   Social:  Denies Tobacco Use. Denies Alcohol Use.   Hematology/Oncology:     Oncology Normal    -- Anemia:  -- Thrombocytopenia (plt. count 11/7/17: 116):   -- Transfusion: -- Transfusion Hx (no complications) (before 2013):  Hematology Comments: thrombocytopenia   Anemia 2/2 renal disease    EENT/Dental:  EENT/Dental Normal Eyes: Visual Impairment , Is Legally Blind Eye Disease: Glaucoma:    Denies Throat Symptoms  Denies Jaw Problems   Cardiovascular:   Hypertension CHF  Functional Capacity PT twice weekly: + SOB/denies CP  Congestive Heart Failure (CHF)  Denies Deep Venous Thrombosis (DVT)  Hypertension    Pulmonary:  Denies Asthma.  Denies Chronic Obstructive Pulmonary Disease (COPD).  Possible Obstructive Sleep Apnea    Renal/:   Chronic Renal Disease, ESRD  Kidney Function/Disease, Chronic Kidney Disease (CKD) , CKD Stage V (ESRD) (GFR <15 or on Dialysis)  Dialysis Dependent, M-W-F, chronic hemodialysis , dry weight of 175 lb. [facility] in [place]   Hepatic/GI:  Denies Esophageal / Stomach Disorders  Denies Liver Disease    Musculoskeletal:  Joint Disease:  Arthritis bilateral knee   Neurological:   Seizures  Neuro Symptoms of numbness Bilateral handDementia  Seizure Disorder , Most recent seizure occurred >1 year ago Has not had a seizure since starting on Keppra- "years" Denies CVA - Cerebrovasular Accident  TIA - Transient Ischemic Attack , transient deficits are no residual deficit.   Endocrine:   Diabetes, using insulin  Diabetes, Type 2 Diabetes for 20 years , controlled by insulin. Typical AM glucose range: 180-185  Denies Thyroid Disease  Metabolic Disorders  Psych:   Psychiatric History          Physical Exam  General:  Well nourished    Airway/Jaw/Neck:  Airway Findings: Mouth Opening: Normal Tongue: Normal  General Airway Assessment: Adult  Mallampati: II  Improves to II with phonation.  TM Distance: Normal, at least 6 cm  " Jaw/Neck Findings:     Neck ROM: Normal ROM      Dental:  Dental Findings: Lower Dentures, Upper Dentures   Chest/Lungs:  Chest/Lungs Findings: Clear to auscultation     Heart/Vascular:  Heart Findings: Rate: Normal  Rhythm: Regular Rhythm  Sounds: Normal        Mental Status:  Mental Status Findings:  Cooperative, Alert and Oriented         Anesthesia Plan  Type of Anesthesia, risks & benefits discussed:  Anesthesia Type:  general, MAC  Patient's Preference:   Intra-op Monitoring Plan: standard ASA monitors  Intra-op Monitoring Plan Comments:   Post Op Pain Control Plan: per primary service following discharge from PACU  Post Op Pain Control Plan Comments:   Induction:   IV  Beta Blocker:  Patient is on a Beta-Blocker and has received one dose within the past 24 hours (No further documentation required).       Informed Consent: Patient representative understands risks and agrees with Anesthesia plan.  Questions answered. Anesthesia consent signed with patient representative.  ASA Score: 4     Day of Surgery Review of History & Physical:    H&P update referred to the surgeon.     Anesthesia Plan Notes: Discussed risks and benefits with patient's daughter.  Concern about effects of anesthetics in the context of dementia.         Ready For Surgery From Anesthesia Perspective.

## 2019-01-08 NOTE — PROGRESS NOTES
Hospital Medicine  Progress note      Team: [facility] HOSP MED B Terry Deleon MD  Admit Date: 1/4/2019  JAMISON 1/9/2019  Code status: Full Code    Principal Problem:  Gross hematuria    Interval hx: Hgb has been stable for a couple days.s/p HD today (1/8).  2d echo- Moderately decreased left ventricular systolic function - worse in the anterolateral wall. The estimated ejection fraction is 35%. Grade I (mild) left ventricular diastolic dysfunction consistent with impaired relaxation. CBI appeared to clot off however it cleared easily with no clots irrigated out which suggests what little urine he makes daily is not sufficient to clear residual bladder/prostatic bleeding.,alum unavailable at this institution. If patient continues to bleed today Urology to pursue cystoscopy with possible intervention.(1/8).     ROS   Constitutional: Negative for fever.   HENT: Negative for sore throat.  Blindness  Respiratory: Negative for shortness of breath.    Cardiovascular: Negative for chest pain.   Gastrointestinal: Negative for nausea.   Genitourinary: Negative for dysuria.        Hematuria   Musculoskeletal: Negative for back pain.   Skin: Negative for rash.   Neurological: Negative for weakness.   Hematological: Does not bruise/bleed easily.      PEx  Temp:  [96.9 °F (36.1 °C)-97.4 °F (36.3 °C)]   Pulse:  [51-68]   Resp:  [17-20]   BP: (119-165)/(56-79)   SpO2:  [95 %-99 %]     Intake/Output Summary (Last 24 hours) at 1/8/2019 1148  Last data filed at 1/8/2019 1109  Gross per 24 hour   Intake 650 ml   Output 2050 ml   Net -1400 ml       Constitutional: Negative for fever. AAOX 2  HENT: Negative for sore throat.  right corneal opacity   Respiratory: Negative for shortness of breath.    Cardiovascular: Negative for chest pain.   Gastrointestinal: Negative for nausea.   Genitourinary: Negative for dysuria.        Penile bleeding as has been in the past. tejeda with bloody urine  Musculoskeletal: Negative for back pain.   Skin:  Negative for rash.   Neurological: Negative for weakness.   Hematological: Does not bruise/bleed easily.    Recent Labs   Lab 01/06/19  1015 01/07/19  0537 01/08/19  0411   WBC 10.60 9.20 6.85   HGB 8.1* 8.0* 8.8*   HCT 25.3* 25.0* 28.4*   * 138* 122*     Recent Labs   Lab 01/04/19  1202 01/05/19  0844 01/08/19  0725   * 136 133*   K 4.6 4.8 4.8    102 100   CO2 23 26 21*   BUN 58* 66* 57*   CREATININE 7.2* 8.1* 7.9*   * 122* 123*   CALCIUM 8.9 8.4* 8.6*   PHOS  --  3.9 5.1*     Recent Labs   Lab 01/04/19  1202 01/05/19  0844 01/08/19  0725   ALKPHOS 98  --   --    ALT 27  --   --    AST 35  --   --    ALBUMIN 2.8* 2.2* 2.3*   PROT 7.9  --   --    BILITOT 0.4  --   --       No results for input(s): POCTGLUCOSE in the last 168 hours.  No results for input(s): CPK, CPKMB, MB, TROPONINI in the last 72 hours.    Scheduled Meds:   sodium chloride 0.9%   Intravenous Once    amLODIPine  5 mg Oral Daily    aspirin  81 mg Oral Daily    brinzolamide  1 drop Left Eye TID    calcium acetate  667 mg Oral TID WM    carvedilol  25 mg Oral BID    dutasteride  0.5 mg Oral Daily    levETIRAcetam  500 mg Oral BID    polyethylene glycol  17 g Oral Daily    travoprost  1 drop Left Eye QHS     Continuous Infusions:  As Needed:  sodium chloride, sodium chloride, sodium chloride 0.9%, traMADol    Active Hospital Problems    Diagnosis  POA    *Gross hematuria [R31.0]  Yes    Anemia [D64.9]  Yes    ESRD (end stage renal disease) [N18.6]  Unknown      Resolved Hospital Problems   No resolved problems to display.     Surgical Risk Assessment:     Active Cardiac Issues:  Active decompensated heart failure? No   Unstable angina?  No   Significant uncontrolled arrhythmias? No   Severe valvular heart disease-Aortic or Mitral Stenosis? No   Recent MI or coronary revascularization < 30 days? No      Cardiac Risk Factors:  High risk surgery? No   History of CAD/ischemic heart disease? No   History of  cerebrovascular disease? No   History of compensated heart failure? yes   Type 2 diabetes requiring insulin? No   Serum Creatinine > 2? yes   Total cardiac risk factors       Functional mets >4     < 4 METs -unable to walk > 2 blocks on level ground without stopping due to symptoms  - eating, dressing, toileting, walking indoors, light housework. POOR           Perioperative Risk Assessment:  Patient is at acceptable risk for perioperative cardiopulmonary complications.  Recommend proceed to surgery as planned. Please contact us for any medical management questions after surgery if necessary.           Overview  89-year-old male presents to the ER for evaluation of bleeding from the penis.  The patient has had this problem intermittently for the past 2 months and has been seen here and admitted here for multiple times.  Urology believes that the bleeding is coming from the prostate. Urology saw patient and ideally recommends irrigate tejeda PRN and transfuse PRN. Will plan to add 1% alum irrigation for hematuria to start tomorrow. May need endoscopic intervention in near future but currently feel risks of invasive intervention would be overly morbid. Hemodialysis done.        Assessment and Plan for Problems addressed today:         Hematuria     - urology consulted  - Urine culture - NGTD   - cystoscopy last admission unrevealing  - Low H/H, type and screen with transfusion of 1U pRBCs  - Hgb has been stable for a couple days.s/p HD today (1/8).  2d echo- Moderately decreased left ventricular systolic function - worse in the anterolateral wall. The estimated ejection fraction is 35%. Grade I (mild) left ventricular diastolic dysfunction consistent with impaired relaxation. CBI appeared to clot off however it cleared easily with no clots irrigated out which suggests what little urine he makes daily is not sufficient to clear residual bladder/prostatic bleeding.,alum unavailable at this institution. If patient  continues to bleed today Urology to pursue cystoscopy with possible intervention.(1/8).   - CT 11/21/2018:  1. Irregular contour about the superolateral aspect of the right kidney, mass cannot be excluded.  There is a tiny non-obstructing calcification within that region.  Right renal cyst.  4.  Several enlarged para-aortic lymph nodes extending along the left ileopsoas with some obliteration of the fat planes with an additional enlarged left external iliac lymph node.  These findings are new since CT pelvis 11/14/2017.      Anemia in chronic kidney disease, on chronic dialysis     - Hgb 6.5 -> 8.8  - Transfused 2 U pRBCs (1/4)      Blindness     - home gtts      Dementia with behavioral disturbance     - AAOX2 -oriented to month and year   - zyprexa PRN      Renovascular hypertension      - continue home medications. 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 Glucose controlled . 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 Anterior and anterolateral hypokinesis. Previously discussed stress test to evaluate for regional ischemia (stress test in 2011 negative) but risk vs benefit (angiogram, etc) and with patients/families  decided not to pursue any further testing.     compensated, continue home medications.  2d echo- Moderately decreased left ventricular systolic function - worse in the anterolateral wall. The estimated ejection fraction is 35%. Grade I (mild) left ventricular diastolic dysfunction consistent with impaired relaxation      Seizure disorder     - continue home keppra          DVT PPx:      Provider  Terry Deleon MD  Attending Staff Physician  [facility] Medicine  pager- [phone]  [facility] - [phone]

## 2019-01-08 NOTE — PROGRESS NOTES
Maintenance hemodialysis tx started via ANGLE AVF, tolerated well, flows good. Labs for this morning obtained

## 2019-01-08 NOTE — SUBJECTIVE & OBJECTIVE
Interval History:   CBI appeared to clot off this morning, however he cleared very easily with 150cc saline.  No additional complaints.     Review of Systems  Objective:     Temp:  [96.9 °F (36.1 °C)-97.4 °F (36.3 °C)] 97.3 °F (36.3 °C)  Pulse:  [51-65] 58  Resp:  [16-19] 17  SpO2:  [95 %-100 %] 97 %  BP: (138-169)/(67-78) 138/71     Body mass index is 23.73 kg/m².            Drains     Drain                 Hemodialysis AV Fistula 08/08/14 0800 Left upper arm 1613 days         Urethral Catheter 01/04/19 1732 Triple-lumen 36 Fr. 3 days                Physical Exam   Nursing note and vitals reviewed.  Constitutional: He appears well-developed and well-nourished. No distress.   HENT:   Head: Normocephalic and atraumatic.   Eyes: Right eye exhibits no discharge. Left eye exhibits no discharge.   Cardiovascular: Normal rate.    Pulmonary/Chest: Effort normal. No respiratory distress.   Abdominal: Soft. There is no rebound and no guarding.       Genitourinary:   Genitourinary Comments: No urine in catheter tubing, irrigated to clear easily   Musculoskeletal: He exhibits no edema or deformity.   Neurological: No cranial nerve deficit.   Skin: Skin is warm and dry. No erythema. No pallor.         Significant Labs:    BMP:  Recent Labs   Lab 01/03/19  0559 01/04/19  1202 01/05/19  0844    135* 136   K 3.9 4.6 4.8    101 102   CO2 22* 23 26   BUN 36* 58* 66*   CREATININE 4.8* 7.2* 8.1*   CALCIUM 8.2* 8.9 8.4*       CBC:   Recent Labs   Lab 01/06/19  1015 01/07/19  0537 01/08/19  0411   WBC 10.60 9.20 6.85   HGB 8.1* 8.0* 8.8*   HCT 25.3* 25.0* 28.4*   * 138* 122*       All pertinent labs results from the past 24 hours have been reviewed.    Significant Imaging:  All pertinent imaging results/findings from the past 24 hours have been reviewed.

## 2019-01-08 NOTE — PLAN OF CARE
Problem: Adult Inpatient Plan of Care  Goal: Plan of Care Review  Outcome: Ongoing (interventions implemented as appropriate)  Hemodialysis tx complete, 1 L removed in 3.5 hour tx, tolerated well. Blood returned via ANGLE AVF, 15g needles removed x2, gauze and tape applied, pressure held for 10 minutes, hemostasis achieved

## 2019-01-09 NOTE — ASSESSMENT & PLAN NOTE
89M with recurrent gross hematuria now with symptomatic clot retention and likely symptomatic anemia. Suspect prostate as a source of bleeding, unlikely due to small renal mass. S/P TURP and fulguration of prostate on 1/8/18.    - Clamp CBI. Will recheck in a few hours and possibly do a fill and pull voiding trial.   - nursing to irrigate tejeda PRN  - irrigation supplies to bedside  - per discussion with pharmacy, alum unavailable at this institution

## 2019-01-09 NOTE — PROGRESS NOTES
Hospital Medicine  Progress note      Team: [facility] Terry Deleon MD  Admit Date: 1/4/2019  JAMISON 1/10/2019  Code status: Full Code    Principal Problem:  Gross hematuria    Interval hx: S/P TURP and fulguration of prostate on 1/8/18. Suspected prostate as a source of bleeding, unlikely due to small renal mass. Appeared to clot off this morning,CBI restarted  this aM on slow drip with clamp again in am.  Hb 8.8--> 7.9 with hematuria. PRBC 1 unit transfusion today         ROS   Constitutional: Negative for fever.   HENT: Negative for sore throat.  Blindness  Respiratory: Negative for shortness of breath.    Cardiovascular: Negative for chest pain.   Gastrointestinal: Negative for nausea.   Genitourinary: Negative for dysuria.        Hematuria   Musculoskeletal: Negative for back pain.   Skin: Negative for rash.   Neurological: Negative for weakness.   Hematological: Does not bruise/bleed easily.      PEx  Temp:  [96 °F (35.6 °C)-97.9 °F (36.6 °C)]   Pulse:  [57-69]   Resp:  [16-22]   BP: (129-165)/(61-74)   SpO2:  [96 %-100 %]     Intake/Output Summary (Last 24 hours) at 1/9/2019 1711  Last data filed at 1/9/2019 1547  Gross per 24 hour   Intake 900 ml   Output 82908 ml   Net -51712 ml       Constitutional: Negative for fever. AAOX 2  HENT: Negative for sore throat.  right corneal opacity   Respiratory: Negative for shortness of breath.    Cardiovascular: Negative for chest pain.   Gastrointestinal: Negative for nausea.   Genitourinary: Negative for dysuria.        Penile bleeding as has been in the past. tejeda with bloody urine  Musculoskeletal: Negative for back pain.   Skin: Negative for rash.   Neurological: Negative for weakness.   Hematological: Does not bruise/bleed easily.    Recent Labs   Lab 01/07/19  0537 01/08/19  0411 01/09/19  0320   WBC 9.20 6.85 7.61   HGB 8.0* 8.8* 7.9*   HCT 25.0* 28.4* 24.6*   * 122* 154     Recent Labs   Lab 01/05/19  0844 01/08/19  0725 01/09/19  0804     133* 136   K 4.8 4.8 4.3    100 101   CO2 26 21* 27   BUN 66* 57* 29*   CREATININE 8.1* 7.9* 5.0*   * 123* 134*   CALCIUM 8.4* 8.6* 8.1*   PHOS 3.9 5.1*  --      Recent Labs   Lab 01/04/19  1202 01/05/19  0844 01/08/19  0725 01/09/19  0804   ALKPHOS 98  --   --  83   ALT 27  --   --  14   AST 35  --   --  25   ALBUMIN 2.8* 2.2* 2.3* 2.2*   PROT 7.9  --   --  6.8   BILITOT 0.4  --   --  0.3      Recent Labs   Lab 01/08/19  1455 01/08/19  1753 01/09/19  1707   POCTGLUCOSE 90 73 200*     No results for input(s): CPK, CPKMB, MB, TROPONINI in the last 72 hours.    Scheduled Meds:   sodium chloride 0.9%   Intravenous Once    amLODIPine  5 mg Oral Daily    aspirin  81 mg Oral Daily    brinzolamide  1 drop Left Eye TID    calcium acetate  667 mg Oral TID WM    carvedilol  25 mg Oral BID    dutasteride  0.5 mg Oral Daily    [START ON 1/10/2019] epoetin napoleon (PROCRIT) injection  8,000 Units Intravenous Every Tues, Thurs, Sat    levETIRAcetam  500 mg Oral BID    polyethylene glycol  17 g Oral Daily    travoprost  1 drop Left Eye QHS     Continuous Infusions:  As Needed:  sodium chloride, sodium chloride, dextrose 50%, dextrose 50%, glucagon (human recombinant), glucose, glucose, insulin aspart U-100, sodium chloride 0.9%, sodium chloride 0.9%, traMADol    Active Hospital Problems    Diagnosis  POA    *Gross hematuria [R31.0]  Yes    Pre-op evaluation [Z01.818]  Not Applicable    Anemia [D64.9]  Yes    HTN (hypertension) [I10]  Yes    Dyslipidemia [E78.5]  Yes    ESRD (end stage renal disease) on dialysis [N18.6, Z99.2]  Not Applicable    Blindness of right eye [H54.40]  Yes    Anemia in chronic kidney disease, on chronic dialysis [N18.6, D63.1, Z99.2]  Not Applicable    Chronic combined systolic and diastolic heart failure [I50.42]  Yes     Chronic    Seizure disorder [G40.909]  Yes     Chronic    Altered mental status [R41.82]  No      Resolved Hospital Problems   No resolved problems to  display.       Overview  89-year-old male presents to the ER for evaluation of bleeding from the penis.  The patient has had this problem intermittently for the past 2 months and has been seen here and admitted here for multiple times.  Urology believes that the bleeding is coming from the prostate. Urology saw patient and ideally recommends irrigate tejeda PRN and transfuse PRN. Will plan to add 1% alum irrigation for hematuria to start tomorrow. May need endoscopic intervention in near future but currently feel risks of invasive intervention would be overly morbid. Hemodialysis done.        Assessment and Plan for Problems addressed today:         Hematuria     - urology consulted  - Urine culture - NGTD   - cystoscopy last admission unrevealing  - Low H/H, type and screen with transfusion of 1U pRBCs  - Hgb has been stable for a couple days.s/p HD today (1/8).  2d echo- Moderately decreased left ventricular systolic function - worse in the anterolateral wall. The estimated ejection fraction is 35%. Grade I (mild) left ventricular diastolic dysfunction consistent with impaired relaxation. CBI appeared to clot off however it cleared easily with no clots irrigated out which suggests what little urine he makes daily is not sufficient to clear residual bladder/prostatic bleeding.,alum unavailable at this institution. If patient continues to bleed today Urology to pursue cystoscopy with possible intervention.(1/8).   - CT 11/21/2018:   1. Irregular contour about the superolateral aspect of the right kidney, mass cannot be excluded.  There is a tiny non-obstructing calcification within that region.  Right renal cyst.  4.  Several enlarged para-aortic lymph nodes extending along the left ileopsoas with some obliteration of the fat planes with an additional enlarged left external iliac lymph node.  These findings are new since CT pelvis 11/14/2017.    S/P TURP and fulguration of prostate on 1/8/18. Suspected prostate as a  source of bleeding, unlikely due to small renal mass. Appeared to clot off this morning,CBI restarted  this AM on slow drip with clamp again in am.        Anemia in chronic kidney disease, on chronic dialysis     - Hgb 6.5 -> 8.8--> 7.9 with hematuria. PRBC 1 unit transfusion today   - Transfused 2 U pRBCs (1/4)      Blindness     - home gtts      Dementia with behavioral disturbance     - AAOX2 -oriented to month and year   - zyprexa PRN      Renovascular hypertension      - continue home medications. 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 Glucose controlled . 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 Anterior and anterolateral hypokinesis. Previously discussed stress test to evaluate for regional ischemia (stress test in 2011 negative) but risk vs benefit (angiogram, etc) and with patients/families  decided not to pursue any further testing.     compensated, continue home medications.  2d echo- Moderately decreased left ventricular systolic function - worse in the anterolateral wall. The estimated ejection fraction is 35%. Grade I (mild) left ventricular diastolic dysfunction consistent with impaired relaxation      Seizure disorder     - continue home keppra         DVT PPx:      Provider  Terry Deleon MD  Attending Staff Physician  [facility]  pager- [phone] [facility] - [phone]

## 2019-01-09 NOTE — PROGRESS NOTES
Urology Progress Note    Barrett rechecked and urine bloody with CBI clamped.     CBI restarted on slow drip. Will continue to monitor. Will clamp again in am.     Please call with questions.     Susan Castle MD  Urology, PGY- 4  Pager# [phone]

## 2019-01-09 NOTE — TRANSFER OF CARE
Anesthesia Transfer of Care Note    Patient: Ronald Campbell    Procedure(s) Performed: Procedure(s) (LRB):  CYSTOSCOPY, WITH BLADDER BIOPSY, WITH FULGURATION IF INDICATED (N/A)  TURP (TRANSURETHRAL RESECTION OF PROSTATE) (N/A)    Patient location: PACU    Anesthesia Type: general    Transport from OR: Transported from OR on 6-10 L/min O2 by face mask with adequate spontaneous ventilation    Post pain: adequate analgesia    Post assessment: no apparent anesthetic complications and tolerated procedure well    Post vital signs: stable    Level of consciousness: awake    Nausea/Vomiting: no nausea/vomiting    Complications: none    Transfer of care protocol was followed      Last vitals:   Visit Vitals  /61   Pulse 60   Temp 36.6 °C (97.9 °F) (Temporal)   Resp (!) 22   Ht 6' (1.829 m)   Wt 79.4 kg (175 lb)   SpO2 100%   BMI 23.73 kg/m²

## 2019-01-09 NOTE — ASSESSMENT & PLAN NOTE
89M with recurrent gross hematuria now with symptomatic clot retention and likely symptomatic anemia. Suspect prostate as a source of bleeding, unlikely due to small renal mass. S/P TURP and fulguration of prostate on 1/8/18.    - CBI clamped, catheter draining this AM, as patient makes minimal urine would recommend leaving Tejeda catheter in place and having patient follow up as an outpatient for voiding trial on Monday.  - Would recommend sending home with a 60cc catheter tipped syringe and irrigation supplies, if family member or caretaker is amenable to learning how to irrigate his catheter in the event it should clog, it would likely circumvent an emergency department visit. Recommend nursing teaching family how to irrigate catheter if possible.  - nursing to irrigate tejeda PRN  - irrigation supplies to bedside  - per discussion with pharmacy, alum unavailable at this institution

## 2019-01-09 NOTE — PHYSICIAN QUERY
"PT Name: Ronald Campbell  MR #: [id number]    Physician Query Form - Hematology Clarification      CDS/: Cecy Denney RN               Contact information: [phone]    This form is a permanent document in the medical record.      Query Date: January 9, 2019    By submitting this query, we are merely seeking further clarification of documentation. Please utilize your independent clinical judgment when addressing the question(s) below.    The Medical record contains the following:   Indicators  Supporting Clinical Findings Location in Medical Record   x "Anemia" documented Symptomatic anemia   1/9 Hosp med note   x H & H = H/H= 6.5/ 20.7  H/H= 6.9/ 21.0    1/4 Lab   x BP =                     HR= BP= 110/51  HR= 72 1/4 Admit flowsheet    "GI bleeding" documented     x Acute bleeding (Non GI site) Gross hematuria  recurrent gross hematuria now with symptomatic clot retention and likely symptomatic anemia. Suspect prostate as a source of bleeding     1/9 Hosp med note   x Transfusion(s) Transfused 2 units PRBC 1/4 Lab   x Treatment: Daily CBC  Transfuse 2 units PRBC  Cardiac monitoring  Urology consult  TURP 1/4- 1/9 NSG orders    Other:        Provider, please specify diagnosis or diagnoses associated with above clinical findings.    [x  ] Acute blood loss anemia   [  ] Chronic blood loss anemia     [  ] Other Hematological Diagnosis (please specify):     [  ] Clinically Undetermined       Please document in your progress notes daily for the duration of treatment, until resolved, and include in your discharge summary.                                                                                                      "

## 2019-01-09 NOTE — SUBJECTIVE & OBJECTIVE
Interval History:   CBI appeared to clot off this morning, however he cleared very easily with 150cc saline.  No additional complaints.     Review of Systems    Objective:     Temp:  [64.4 °F (18 °C)-97.9 °F (36.6 °C)] 97 °F (36.1 °C)  Pulse:  [57-74] 65  Resp:  [17-22] 17  SpO2:  [96 %-100 %] 98 %  BP: (119-169)/(56-79) 143/67     Body mass index is 23.73 kg/m².            Drains     Drain                 Hemodialysis AV Fistula 08/08/14 0800 Left upper arm 1613 days         Urethral Catheter 01/04/19 1732 Triple-lumen 36 Fr. 3 days                Physical Exam   Nursing note and vitals reviewed.  Constitutional: He appears well-developed and well-nourished. No distress.   HENT:   Head: Normocephalic and atraumatic.   Eyes: Right eye exhibits no discharge. Left eye exhibits no discharge.   Cardiovascular: Normal rate.    Pulmonary/Chest: Effort normal. No respiratory distress.   Abdominal: Soft. There is no rebound and no guarding.       Genitourinary:   Genitourinary Comments: CBI turned off. Output clear.   Musculoskeletal: He exhibits no edema or deformity.   Neurological: No cranial nerve deficit.   Skin: Skin is warm and dry. No erythema. No pallor.         Significant Labs:    BMP:  Recent Labs   Lab 01/04/19  1202 01/05/19  0844 01/08/19  0725   * 136 133*   K 4.6 4.8 4.8    102 100   CO2 23 26 21*   BUN 58* 66* 57*   CREATININE 7.2* 8.1* 7.9*   CALCIUM 8.9 8.4* 8.6*       CBC:   Recent Labs   Lab 01/07/19  0537 01/08/19  0411 01/09/19  0320   WBC 9.20 6.85 7.61   HGB 8.0* 8.8* 7.9*   HCT 25.0* 28.4* 24.6*   * 122* 154       All pertinent labs results from the past 24 hours have been reviewed.    Significant Imaging:  All pertinent imaging results/findings from the past 24 hours have been reviewed.

## 2019-01-09 NOTE — PROGRESS NOTES
[facility]  Urology  Progress Note    Patient Name: Ronald Campbell  MRN: [id number]  Admission Date: 1/4/2019  Hospital Length of Stay: 5 days  Code Status: Full Code   Attending Provider: Temo Banerjee MD  Primary Care Physician: Bart Shaw MD    Subjective:     HPI:  Ronald Campbell is a 89 y.o. male with HTN, Type 2 DM, ESRD on HD MWF, CHF, dementia, and blindness familiar to the urology service for hematuria. He was discharged on 12/13 and his hematuria had mostly resolved. This began again Thursday. Per his daughter, there is no urine, only blood and clots coming from the penis. He was seen in the ED yesterday for this as well however was not symptomatic and given his prior discussion with palliative care, decision was made not to intervene. His hemoglobin is 6.5 from 8.1 yesterday. Today he was also feeling lightheaded with possible neurologic symptoms and thus presented to the ED.    He usually urinates twice daily. He does not feel bladder fullness or the urge to urinate. Bladder scan in the ED showed > 400 cc.    Of note he underwent cystoscopy on 11/30 which showed no obvious tumors and bleeding from the prostate however visualization was limited due to bleeding. Recent CT noncontrast of abdomen pelvis demonstrates a very enlarged prostate, as well as a small right renal mass and several enlarged para-aortic lymph nodes.     Underwent TURP and fulguration on 1/8/18     Interval History:   CBI appeared to clot off this morning, however he cleared very easily with 150cc saline.  No additional complaints.     Review of Systems    Objective:     Temp:  [64.4 °F (18 °C)-97.9 °F (36.6 °C)] 97 °F (36.1 °C)  Pulse:  [57-74] 65  Resp:  [17-22] 17  SpO2:  [96 %-100 %] 98 %  BP: (119-169)/(56-79) 143/67     Body mass index is 23.73 kg/m².            Drains     Drain                 Hemodialysis AV Fistula 08/08/14 0800 Left upper arm 1613 days         Urethral Catheter 01/04/19 1732 Triple-lumen 36 Fr. 3  days                Physical Exam   Nursing note and vitals reviewed.  Constitutional: He appears well-developed and well-nourished. No distress.   HENT:   Head: Normocephalic and atraumatic.   Eyes: Right eye exhibits no discharge. Left eye exhibits no discharge.   Cardiovascular: Normal rate.    Pulmonary/Chest: Effort normal. No respiratory distress.   Abdominal: Soft. There is no rebound and no guarding.       Genitourinary:   Genitourinary Comments: CBI turned off. Output clear.   Musculoskeletal: He exhibits no edema or deformity.   Neurological: No cranial nerve deficit.   Skin: Skin is warm and dry. No erythema. No pallor.         Significant Labs:    BMP:  Recent Labs   Lab 01/04/19  1202 01/05/19  0844 01/08/19  0725   * 136 133*   K 4.6 4.8 4.8    102 100   CO2 23 26 21*   BUN 58* 66* 57*   CREATININE 7.2* 8.1* 7.9*   CALCIUM 8.9 8.4* 8.6*       CBC:   Recent Labs   Lab 01/07/19  0537 01/08/19  0411 01/09/19  0320   WBC 9.20 6.85 7.61   HGB 8.0* 8.8* 7.9*   HCT 25.0* 28.4* 24.6*   * 122* 154       All pertinent labs results from the past 24 hours have been reviewed.    Significant Imaging:  All pertinent imaging results/findings from the past 24 hours have been reviewed.                  Assessment/Plan:     * Gross hematuria    89M with recurrent gross hematuria now with symptomatic clot retention and likely symptomatic anemia. Suspect prostate as a source of bleeding, unlikely due to small renal mass. S/P TURP and fulguration of prostate on 1/8/18.    - consider transfusion today with H/H down 1 point from yesterday and his comorbidities.   - Clamp CBI. Will recheck in a few hours and possibly do a fill and pull voiding trial.   - nursing to irrigate tejeda PRN  - irrigation supplies to bedside  - per discussion with pharmacy, alum unavailable at this institution           VTE Risk Mitigation (From admission, onward)        Ordered     IP VTE HIGH RISK PATIENT  Once      01/04/19 8778      Place MORGAN hose  Until discontinued      01/04/19 2755          Devang Andino MD  Urology  [facility]

## 2019-01-09 NOTE — NURSING TRANSFER
Nursing Transfer Note      1/8/2019     Transfer To: 557a    Transfer via bed    Transfer with cardiac monitoring and dentures    Transported by PCTx2    Medicines sent: none    Chart send with patient: Yes    Notified: daughter    Patient reassessed at: 1/8/19 see flowsheets

## 2019-01-09 NOTE — PLAN OF CARE
Problem: Adult Inpatient Plan of Care  Goal: Plan of Care Review  Outcome: Ongoing (interventions implemented as appropriate)  Patient AAOX4. VSS. Barrett care provided. Skin intact. No falls noted. Fall precautions remain. Patient instructed to call for assistance with getting up. Pain assessment, Patient comfortable. Frequent rounds made for safety and patient care. Call light within reach, SCD's in place, wheels locked, bed in low position, side rails up x2, safety maintained. NADN, will continue to monitor.

## 2019-01-10 PROBLEM — R31.0 GROSS HEMATURIA: Status: RESOLVED | Noted: 2018-01-01 | Resolved: 2019-01-01

## 2019-01-10 NOTE — TELEPHONE ENCOUNTER
----- Message from Jae Ashton MD sent at 1/10/2019  6:47 AM CST -----  Please set up for voiding trial with DWAINE on Monday next week.    Thanks,    Jae Ashton

## 2019-01-10 NOTE — PLAN OF CARE
Discharge planning: Met with patient's daughter to discuss d/c to home with tejeda catheter. Discussed with her the need for irrigation prn and teaching to be done with her prior to discharge. She is not willing to consider discharge to home with the need to irrigate tejeda as needed. She is isnterested in snf placement. Called Keyona Cobb at [facility] [phone] to discuss d/c needs and options. She states that patient is snf appropriate. Referrals to be sent to in network [facility] facilities. First choice [facility].

## 2019-01-10 NOTE — TELEPHONE ENCOUNTER
Spoke with daughter, he is currently in dialysis and a little delirious , she is unsure of what is going on and will call me back. I told her I would schedule him for Monday at 8am

## 2019-01-10 NOTE — PROGRESS NOTES
[facility]  Urology  Progress Note    Patient Name: Ronald Campbell  MRN: [id number]  Admission Date: 1/4/2019  Hospital Length of Stay: 6 days  Code Status: Full Code   Attending Provider: Terry Deleon MD   Primary Care Physician: Bart Shaw MD    Subjective:     HPI:  Ronald Campbell is a 89 y.o. male with HTN, Type 2 DM, ESRD on HD MWF, CHF, dementia, and blindness familiar to the urology service for hematuria. He was discharged on 12/13 and his hematuria had mostly resolved. This began again Thursday. Per his daughter, there is no urine, only blood and clots coming from the penis. He was seen in the ED yesterday for this as well however was not symptomatic and given his prior discussion with palliative care, decision was made not to intervene. His hemoglobin is 6.5 from 8.1 yesterday. Today he was also feeling lightheaded with possible neurologic symptoms and thus presented to the ED.    He usually urinates twice daily. He does not feel bladder fullness or the urge to urinate. Bladder scan in the ED showed > 400 cc.    Of note he underwent cystoscopy on 11/30 which showed no obvious tumors and bleeding from the prostate however visualization was limited due to bleeding. Recent CT noncontrast of abdomen pelvis demonstrates a very enlarged prostate, as well as a small right renal mass and several enlarged para-aortic lymph nodes.     Underwent TURP and fulguration on 1/8/19.     Interval History:   CBI pink this AM, clamped, catheter plugged  No additional complaints.   Refused AM labs    Review of Systems    Objective:     Temp:  [96 °F (35.6 °C)-97.3 °F (36.3 °C)] 97.1 °F (36.2 °C)  Pulse:  [60-71] 68  Resp:  [16-20] 16  SpO2:  [98 %-100 %] 100 %  BP: (129-175)/(61-77) 138/73     Body mass index is 23.73 kg/m².            Drains     Drain                 Hemodialysis AV Fistula 08/08/14 0800 Left upper arm 1613 days         Urethral Catheter 01/04/19 1732 Triple-lumen 36 Fr. 3 days                 Physical Exam   Nursing note and vitals reviewed.  Constitutional: He appears well-developed and well-nourished. No distress.   HENT:   Head: Normocephalic and atraumatic.   Eyes: Right eye exhibits no discharge. Left eye exhibits no discharge.   Cardiovascular: Normal rate.    Pulmonary/Chest: Effort normal. No respiratory distress.   Abdominal: Soft. There is no rebound and no guarding.       Genitourinary:   Genitourinary Comments: CBI turned off. Output clear.   Musculoskeletal: He exhibits no edema or deformity.   Neurological: No cranial nerve deficit.   Skin: Skin is warm and dry. No erythema. No pallor.         Significant Labs:    BMP:  Recent Labs   Lab 01/05/19  0844 01/08/19  0725 01/09/19  0804    133* 136   K 4.8 4.8 4.3    100 101   CO2 26 21* 27   BUN 66* 57* 29*   CREATININE 8.1* 7.9* 5.0*   CALCIUM 8.4* 8.6* 8.1*       CBC:   Recent Labs   Lab 01/07/19  0537 01/08/19  0411 01/09/19  0320   WBC 9.20 6.85 7.61   HGB 8.0* 8.8* 7.9*   HCT 25.0* 28.4* 24.6*   * 122* 154       All pertinent labs results from the past 24 hours have been reviewed.    Significant Imaging:  All pertinent imaging results/findings from the past 24 hours have been reviewed.                  Assessment/Plan:     * Gross hematuria    89M with recurrent gross hematuria now with symptomatic clot retention and likely symptomatic anemia. Suspect prostate as a source of bleeding, unlikely due to small renal mass. S/P TURP and fulguration of prostate on 1/8/18.    - CBI clamped, catheter draining this AM, as patient makes minimal urine would recommend leaving Barrett catheter in place and having patient follow up as an outpatient for voiding trial on Monday.  - Would recommend sending home with a 60cc catheter tipped syringe and irrigation supplies, if family member or caretaker is amenable to learning how to irrigate his catheter in the event it should clog, it would likely circumvent an emergency department  visit. Recommend nursing teaching family how to irrigate catheter if possible.  - nursing to irrigate tejeda PRN  - irrigation supplies to bedside  - per discussion with pharmacy, alum unavailable at this institution           VTE Risk Mitigation (From admission, onward)        Ordered     IP VTE HIGH RISK PATIENT  Once      01/04/19 1456     Place MOGRAN hose  Until discontinued      01/04/19 1456          Jae Ashton MD  Urology  [facility]

## 2019-01-10 NOTE — PT/OT/SLP EVAL
Occupational Therapy   Evaluation and Treatment    Name: Ronald Campbell  MRN: [id number]  Admitting Diagnosis:  Gross hematuria 2 Days Post-Op    Recommendations:     Discharge Recommendations: ( with 24/7 supervision )  Discharge Equipment Recommendations:  none  Barriers to discharge:   Home alone at times; Decreased caregiver support     Assessment:     Ronald Campbell is a 89 y.o. male with a medical diagnosis of Gross hematuria.  He presents with performance deficits affecting function: weakness, gait instability, impaired functional mobilty, visual deficits, impaired balance, decreased safety awareness. Pt tolerated session well. Pt with impaired vision limiting independence with functional mobility and self-care tasks. Pt reports he is able to navigate within his home (I)'d to find necessary items. Pt would benefit from  with 24/7 supervision for pt's safety. Pt will continue to benefit from skilled OT in order to maintain functional (I)ce during hospitalization.     Rehab Prognosis: Good; patient would benefit from acute skilled OT services to address these deficits and reach maximum level of function.       Plan:     Patient to be seen 2 x/week to address the above listed problems via self-care/home management, therapeutic exercises, therapeutic activities  · Plan of Care Expires: 02/08/19  · Plan of Care Reviewed with: patient    Subjective     Chief Complaint: needing to use the bathroom   Patient/Family Comments/goals: to return home     Occupational Profile:  Living Environment: Pt reports he lives with his daughter in a [facility]; pt did not state if there were stairs to enter but per previous therapy notes pt may have steps to enter home. Pt reports he navigated within the home via furniture walking and feeling around. Pt is legally blind. Pt reports he has a shower chair and grab bar with a tub/shower combo. Pt reports he's home alone at time but his daughter is the primary caregiver.   Previous level of  "function: PTA, pt was mod (I) for functional mobility navigating around the home. Pt reports he has a lady that comes to the home to bathe pt on Thursdays. Pt requires assistance for dressing and fairly (I) with grooming with set-up A  Equipment Used at Home:  walker, rolling, wheelchair, bedside commode, cane, straight  Assistance upon Discharge: pt's family is able to assist pt upon d/c.     Pain/Comfort:  · Pain Rating 1: 0/10  · Pain Rating Post-Intervention 1: 0/10    Patients cultural, spiritual, Amish conflicts given the current situation: no    Objective:     Communicated with: RN prior to session.  Patient found sitting EOB with telemetry, tejeda catheter upon OT entry to room. Pt agreeable to therapy session.   Pt stated, " I need to get to the toilet."     General Precautions: Standard, fall   Orthopedic Precautions:N/A   Braces: N/A     Occupational Performance:    Bed Mobility:    · Not performed as pt found seated EOB upon OT arrival     Functional Mobility/Transfers:  · Patient completed x 2 reps Sit <> Stand Transfer (EOB and BSC) with moderate assistance  with  hand-held assist; [place] assist provided to guide hands onto BSC   · Patient completed Toilet Transfer functional ambulation with step transfer technique with moderate assistance with  hand-held assist, rolling walker and grab bars.   Functional Mobility: Pt engaged in functional mobility simulating household/community mobility with mod A using HHA from BSC to bathroom and min A using RW from bathroom to bedside chair.   · Pt required max verbal cues for navigating around obstacles and within room due to vision impairment.     Activities of Daily Living:  · Grooming: minimum assistance pt required [place] to physically guide hand to toothbrush and sink faucets. Pt was able to complete oral care with overall min A. Pt noted to rest B forearms on sink countertop   · Upper Body Dressing: maximal assistance to don gown like robe while seated " EOB  · Toileting: pt with unsucessful attempt with toileting; Pt required max A for clothing management. Barrett catheter in place      Cognitive/Visual Perceptual:  Cognitive/Psychosocial Skills:     -       Oriented to: Person and pt able to state that he is in the hospital    -       Follows Commands/attention:Follows one-step commands and Follows two-step commands  -       Communication: clear/fluent  -       Memory: Impaired STM and Poor immediate recall  -       Safety awareness/insight to disability: impaired   -       Mood/Affect/Coping skills/emotional control: Appropriate to situation  Visual/Perceptual:      -legally blind     Physical Exam:  Balance:    - Static sit: good  -  Dynamic sit: good  - Static standing: fair   - Dynamic Standing: fair     Postural examination/scapula alignment:    -       Rounded shoulders  -       Forward head  Sensation:    -       Intact  light/touch for B UEs  Dominant hand:    -       right  Upper Extremity Range of Motion:     -       Right Upper Extremity: WFL   -       Left Upper Extremity: WFL  Upper Extremity Strength:    -       Right Upper Extremity: grossly 3/5  -       Left Upper Extremity: grossly 3+/5     AMPAC 6 Click ADL:  AMPAC Total Score: 12    Treatment & Education:  Pt educated by therapist on:   - Pt educated on role of OT, POC, and goals for therapy.    - Daily orientation provided   - Pt oriented to call bell button to call for assistance with OOB mobility  - dynamic standing balance at the sink with CGA while performing self-care tasks  - Educated pt on being appropriate to transfer with nsg and PCT with mod A   - Time provided for therapeutic counseling and discussion of health disposition.   - Importance of OOB ax's with staff member assistance and sitting OOB majority of day.   - Pt completed ADLs and functional mobility for treatment session as noted above   - Pt verbalized understanding. Pt expressed no further concerns/questions.  - whiteboard  updated   Education:    Patient left up in chair with all lines intact, call button in reach and RN notified    GOALS:   Multidisciplinary Problems     Occupational Therapy Goals        Problem: Occupational Therapy Goal    Goal Priority Disciplines Outcome Interventions   Occupational Therapy Goal     OT, PT/OT Ongoing (interventions implemented as appropriate)    Description:  Goals to be met by: 1/24/19     Patient will increase functional independence with ADLs by performing:    Grooming while standing at sink with Set-up Assistance with [place] assistance as needed  Toileting from toilet with Minimal Assistance for hygiene and clothing management.   Toilet transfer to toilet with Minimal Assistance.                      History:     Past Medical History:   Diagnosis Date    Anemia     Blind     bilaterally    CHF (congestive heart failure)     Chronic kidney disease     Dementia 01/2017    Diabetes mellitus type II     General anesthetics causing adverse effect in therapeutic use     april / may 2014     Glaucoma (increased eye pressure)     Hypertension     Hypothermia 12/6/2017    Seizures        Past Surgical History:   Procedure Laterality Date    ANGIOPLASTY WITH STENT PLACEMENT Left 11/18/2016    [facility]formed [facility] OR 2ND FLR    AV FISTULA PLACEMENT Left 4/2014    AV fistula to Left upper arm    CLOT EVACUATION FROM BLADDER  1/8/2019    Performed by [facility]o [facility]erjee [facility] at [facility] OR 1ST FLR    MJVYBWCPLUBY-QOOLDAL-PU left RC vs BC AVF Left 8/8/2014    Performed by [facility] at [facility] OR 2ND FLR    CYSTOSCOPY N/A 11/29/2018    Performed by [facility]jee [facility] at [facility] OR 1ST FLR    CYSTOSCOPY  WITH FULGURATION  1/8/2019    Performed by [facility]jee [facility] at [facility] OR 1ST FLR    CYSTOSCOPY, WITH BLADDER BIOPSY, WITH FULGURATION IF INDICATED N/A 1/8/2019    Performed by [facility] at [facility] OR 1ST FLR    EYE SURGERY      FISTULOGRAM Left 11/18/2016    Performed by  "[facility]in [facility] at [facility] OR 2ND FLR    FISTULOGRAM Left 4/15/2016    Performed by [facility]in [facility]s MD at [facility] OR 2ND FLR    FISTULOGRAM Left 2016    Performed by Orin Sims MD at [facility] CATH LAB    FISTULOGRAM Room 11 case Left 3/27/2015    Performed by Orin [facility]s MD at [facility] OR 2ND FLR    HYDROCELECTOMY Bilateral 2017    Performed by [facility]o [facility]erjee MD at [facility] OR 2ND FLR    INSERTION-CATHETER-DIALYSIS-PERITONEAL-LAPAROSCOPIC N/A 2014    Performed by Js Ladd MD at [facility] OR 2ND FLR    INSERTION-CATHETER-DIALYSIS-PERITONEAL-LAPAROSCOPIC N/A 2014    Performed by Js Ladd MD at [facility] OR 2ND FLR    INSERTION-CATHETER-HEMODIALYSIS N/A 2014    Performed by Orin Sims MD at [facility] OR 2ND FLR    PERMCATH INSERTION-TUNNELED CVC N/A 2014    Performed by Lolis Jackson MD at [facility] CATH LAB    REMOVAL, BLOOD CLOT N/A 2018    Performed by Temo Banerjee MD at [facility] OR 1ST FLR    REMOVAL-CATHETER-PERITONEAL DIALYSIS N/A 2014    Performed by Js Ladd MD at [facility] OR [address] [place] [address]    SCROTUM EXPLORATION Bilateral 2017    TURP (TRANSURETHRAL RESECTION OF PROSTATE) N/A 2019    Performed by Temo Banerjee MD at [facility] OR [address] [place] [address]    TURP (TRANSURETHRAL RESECTION OF PROSTATE)  2019    Performed by Temo Banerjee MD at [facility] OR [address] [place] [address]       Clinical Decision Makin.  OT Low:  "Pt evaluation falls under low complexity for evaluation coding due to performance deficits noted in 1-3 areas as stated above and 0 co-morbities affecting current functional status. Data obtained from problem focused assessments. No modifications or assistance was required for completion of evaluation. Only brief occupational profile and history review completed."     Time Tracking:     OT Date of Treatment: 01/10/19  OT Start Time: 1005  OT Stop Time: 1037  OT Total Time (min): 32 min (co-eval with PT)    Billable Minutes:Evaluation 17  Self " Care/Home Management 15    Pia Rodas, OT  1/10/2019

## 2019-01-10 NOTE — PROGRESS NOTES
Hospital Medicine  Progress note      Team: [facility] HOSP MED B Terry Deleon MD  Admit Date: 1/4/2019  JAMISON 1/10/2019  Code status: Full Code    Principal Problem:  Gross hematuria    Interval hx: Urology OK sending patient  home with a 60cc catheter tipped syringe and irrigation supplies, if family member or caretaker is amenable to learning how to irrigate his catheter in the event it should clog, it would likely circumvent an emergency department visit.Daughter not willing to irrigate tejeda per urology instructions. refused dialysis today         ROS   Constitutional: Negative for fever.   HENT: Negative for sore throat.  Blindness  Respiratory: Negative for shortness of breath.    Cardiovascular: Negative for chest pain.   Gastrointestinal: Negative for nausea.   Genitourinary: Negative for dysuria.        Hematuria   Musculoskeletal: Negative for back pain.   Skin: Negative for rash.   Neurological: Negative for weakness.   Hematological: Does not bruise/bleed easily.      PEx  Temp:  [96 °F (35.6 °C)-97.3 °F (36.3 °C)]   Pulse:  [60-75]   Resp:  [16-20]   BP: (129-175)/(61-77)   SpO2:  [99 %-100 %]     Intake/Output Summary (Last 24 hours) at 1/10/2019 1430  Last data filed at 1/10/2019 0300  Gross per 24 hour   Intake 251.25 ml   Output 3400 ml   Net -3148.75 ml       Constitutional: Negative for fever. AAOX 2  HENT: Negative for sore throat.  right corneal opacity   Respiratory: Negative for shortness of breath.    Cardiovascular: Negative for chest pain.   Gastrointestinal: Negative for nausea.   Genitourinary: Negative for dysuria.        Penile bleeding as has been in the past. tejeda with bloody urine  Musculoskeletal: Negative for back pain.   Skin: Negative for rash.   Neurological: Negative for weakness.   Hematological: Does not bruise/bleed easily.    Recent Labs   Lab 01/07/19  0537 01/08/19  0411 01/09/19  0320   WBC 9.20 6.85 7.61   HGB 8.0* 8.8* 7.9*   HCT 25.0* 28.4* 24.6*   * 122* 154      Recent Labs   Lab 01/05/19  0844 01/08/19  0725 01/09/19  0804    133* 136   K 4.8 4.8 4.3    100 101   CO2 26 21* 27   BUN 66* 57* 29*   CREATININE 8.1* 7.9* 5.0*   * 123* 134*   CALCIUM 8.4* 8.6* 8.1*   PHOS 3.9 5.1*  --      Recent Labs   Lab 01/04/19  1202 01/05/19  0844 01/08/19  0725 01/09/19  0804   ALKPHOS 98  --   --  83   ALT 27  --   --  14   AST 35  --   --  25   ALBUMIN 2.8* 2.2* 2.3* 2.2*   PROT 7.9  --   --  6.8   BILITOT 0.4  --   --  0.3      Recent Labs   Lab 01/08/19  1455 01/08/19  1753 01/09/19  1707 01/09/19  2112   POCTGLUCOSE 90 73 200* 203*     No results for input(s): CPK, CPKMB, MB, TROPONINI in the last 72 hours.    Scheduled Meds:   sodium chloride 0.9%   Intravenous Once    amLODIPine  5 mg Oral Daily    aspirin  81 mg Oral Daily    brinzolamide  1 drop Left Eye TID    calcium acetate  667 mg Oral TID WM    carvedilol  25 mg Oral BID    dutasteride  0.5 mg Oral Daily    epoetin napoleon (PROCRIT) injection  8,000 Units Intravenous Every Tues, Thurs, Sat    levETIRAcetam  500 mg Oral BID    polyethylene glycol  17 g Oral Daily    travoprost  1 drop Left Eye QHS     Continuous Infusions:  As Needed:  sodium chloride, sodium chloride, sodium chloride, dextrose 50%, dextrose 50%, glucagon (human recombinant), glucose, glucose, insulin aspart U-100, sodium chloride 0.9%, sodium chloride 0.9%, traMADol    Active Hospital Problems    Diagnosis  POA    Pre-op evaluation [Z01.818]  Not Applicable    Anemia [D64.9]  Yes    HTN (hypertension) [I10]  Yes    Dyslipidemia [E78.5]  Yes    ESRD (end stage renal disease) on dialysis [N18.6, Z99.2]  Not Applicable    Blindness of right eye [H54.40]  Yes    Anemia in chronic kidney disease, on chronic dialysis [N18.6, D63.1, Z99.2]  Not Applicable    Chronic combined systolic and diastolic heart failure [I50.42]  Yes     Chronic    Seizure disorder [G40.909]  Yes     Chronic      Resolved Hospital Problems     Diagnosis Date Resolved POA    *Gross hematuria [R31.0] 01/10/2019 Yes    Altered mental status [R41.82] 01/10/2019 No       Overview  89-year-old male presents to the ER for evaluation of bleeding from the penis.  The patient has had this problem intermittently for the past 2 months and has been seen here and admitted here for multiple times.  Urology believes that the bleeding is coming from the prostate. Urology saw patient and ideally recommends irrigate tejeda PRN and transfuse PRN. Will plan to add 1% alum irrigation for hematuria to start tomorrow. May need endoscopic intervention in near future but currently feel risks of invasive intervention would be overly morbid. Hemodialysis done.        Assessment and Plan for Problems addressed today:         Hematuria     - urology consulted  - Urine culture - NGTD   - cystoscopy last admission unrevealing  - Low H/H, type and screen with transfusion of 1U pRBCs  - Hgb has been stable for a couple days.s/p HD today (1/8).  2d echo- Moderately decreased left ventricular systolic function - worse in the anterolateral wall. The estimated ejection fraction is 35%. Grade I (mild) left ventricular diastolic dysfunction consistent with impaired relaxation. CBI appeared to clot off however it cleared easily with no clots irrigated out which suggests what little urine he makes daily is not sufficient to clear residual bladder/prostatic bleeding.,alum unavailable at this institution. If patient continues to bleed today Urology to pursue cystoscopy with possible intervention.(1/8).   - CT 11/21/2018:   1. Irregular contour about the superolateral aspect of the right kidney, mass cannot be excluded.  There is a tiny non-obstructing calcification within that region.  Right renal cyst.  4.  Several enlarged para-aortic lymph nodes extending along the left ileopsoas with some obliteration of the fat planes with an additional enlarged left external iliac lymph node.  These findings  are new since CT pelvis 11/14/2017.    S/P TURP and fulguration of prostate on 1/8/18. Suspected prostate as a source of bleeding, unlikely due to small renal mass. Appeared to clot off this morning,CBI restarted  this AM on slow drip with clamp again in am.       Urology OK sending patient  home with a 60cc catheter tipped syringe and irrigation supplies, if family member or caretaker is amenable to learning how to irrigate his catheter in the event it should clog, it would likely circumvent an emergency department visit.Daughter not willing to irrigate tejeda per urology instructions. refused dialysis today       Anemia in chronic kidney disease, on chronic dialysis     - Hgb 6.5 -> 8.8--> 7.9 with hematuria. PRBC 1 unit transfusion today   - Transfused 2 U pRBCs (1/4)      Blindness     - home gtts      Dementia with behavioral disturbance     - AAOX2 -oriented to month and year   - zyprexa PRN      Renovascular hypertension      - continue home medications. 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 Glucose controlled . HbA1c at 6.3      ESRD (end stage renal disease) on dialysis     - HD MWF  - nephrology consulted  - BP and lytes non-emergent   refused HD today. 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 Anterior and anterolateral hypokinesis. Previously discussed stress test to evaluate for regional ischemia (stress test in 2011 negative) but risk vs benefit (angiogram, etc) and with patients/families  decided not to pursue any further testing.     compensated, continue home medications.  2d echo- Moderately decreased left ventricular systolic function - worse in the anterolateral wall. The  estimated ejection fraction is 35%. Grade I (mild) left ventricular diastolic dysfunction consistent with impaired relaxation      Seizure disorder     - continue home keppra       Disposition - home with PACE program      DVT PPx:      Provider  Terry Deleon MD  Attending Staff Physician  [facility] Medicine  pager- [phone] Rbyjigdwmyj - [phone]

## 2019-01-10 NOTE — PROGRESS NOTES
"Patient refused to have dialysis this morning, so was sent back to his room from the dialysis unit.   Examined later in the morning and patient oxygenating well on RA and no signs of volume overload on examination.  Discussed with patient and he refused because he was "tired".  He agree to have labs done for evaluation, labs resulted w/o need for urgent dialysis.  Will hold RRT today and plan for HD tomorrow, as this is also patients OP scheduled dialysis day.  Will f/u in AM.    ANURADHA Gudino, [facility]  Nephrology  Pager:  [phone]      "

## 2019-01-10 NOTE — PLAN OF CARE
Problem: Physical Therapy Goal  Goal: Physical Therapy Goal  PT goals until 1/17/19    1. Pt supine to sit with SBA-not met  2. Pt sit to supine with SBA-not met  3. Pt sit to stand with RW with minimal assist-not met  4. Pt to perform gait 100ft with RW with minimal assist.-not met  5. Pt to go up/down curb step with RW with minimal assist.-not met  6. Pt to transfer bed to/from bedside chair with CGA-not met    Outcome: Ongoing (interventions implemented as appropriate)  Pt's goals set and pt will benefit from skilled PT services to work towards improved functional mobility including: bed mobility, transfers, up/down steps, and gait.   Danuta Strong, PT  1/10/2019

## 2019-01-10 NOTE — PLAN OF CARE
Problem: Occupational Therapy Goal  Goal: Occupational Therapy Goal  Goals to be met by: 1/24/19     Patient will increase functional independence with ADLs by performing:    Grooming while standing at sink with Set-up Assistance with [place] assistance as needed  Toileting from toilet with Minimal Assistance for hygiene and clothing management.   Toilet transfer to toilet with Minimal Assistance.    Outcome: Ongoing (interventions implemented as appropriate)  Initial eval completed   POC implemented and goals established     Pia Rodas, OTR/L  [phone]  1/10/2019

## 2019-01-10 NOTE — PLAN OF CARE
REFERRAL SENT TO         [facility] ? E-Referral  ?     [facility] ? E-Referral  ?     [facility] ? E-Referral  ?

## 2019-01-10 NOTE — PROGRESS NOTES
Refusing dialysis.  Patient spoke with daughter, Gabriela, on the phone and agreed to proceed with tx.  Refused again once we hung up phone.  EARL Dueñas NP notified.  Will attempt again this afternoon.

## 2019-01-10 NOTE — SUBJECTIVE & OBJECTIVE
Interval History:   CBI pink this AM, clamped, catheter plugged  No additional complaints.   Refused AM labs    Review of Systems    Objective:     Temp:  [96 °F (35.6 °C)-97.3 °F (36.3 °C)] 97.1 °F (36.2 °C)  Pulse:  [60-71] 68  Resp:  [16-20] 16  SpO2:  [98 %-100 %] 100 %  BP: (129-175)/(61-77) 138/73     Body mass index is 23.73 kg/m².            Drains     Drain                 Hemodialysis AV Fistula 08/08/14 0800 Left upper arm 1613 days         Urethral Catheter 01/04/19 1732 Triple-lumen 36 Fr. 3 days                Physical Exam   Nursing note and vitals reviewed.  Constitutional: He appears well-developed and well-nourished. No distress.   HENT:   Head: Normocephalic and atraumatic.   Eyes: Right eye exhibits no discharge. Left eye exhibits no discharge.   Cardiovascular: Normal rate.    Pulmonary/Chest: Effort normal. No respiratory distress.   Abdominal: Soft. There is no rebound and no guarding.       Genitourinary:   Genitourinary Comments: CBI turned off. Output clear.   Musculoskeletal: He exhibits no edema or deformity.   Neurological: No cranial nerve deficit.   Skin: Skin is warm and dry. No erythema. No pallor.         Significant Labs:    BMP:  Recent Labs   Lab 01/05/19  0844 01/08/19  0725 01/09/19  0804    133* 136   K 4.8 4.8 4.3    100 101   CO2 26 21* 27   BUN 66* 57* 29*   CREATININE 8.1* 7.9* 5.0*   CALCIUM 8.4* 8.6* 8.1*       CBC:   Recent Labs   Lab 01/07/19  0537 01/08/19  0411 01/09/19  0320   WBC 9.20 6.85 7.61   HGB 8.0* 8.8* 7.9*   HCT 25.0* 28.4* 24.6*   * 122* 154       All pertinent labs results from the past 24 hours have been reviewed.    Significant Imaging:  All pertinent imaging results/findings from the past 24 hours have been reviewed.

## 2019-01-10 NOTE — PT/OT/SLP EVAL
"Physical Therapy Evaluation    Patient Name:  Ronald Campbell   MRN:  [id number]    Recommendations:     Discharge Recommendations:  (HHPT with 24 hour assist )   Discharge Equipment Recommendations: none   Barriers to discharge: Inaccessible home (per previous admits, pt has 7-12 DAVID home)    Assessment:     Ronald Campbell is a 89 y.o. male admitted with a medical diagnosis of Gross hematuria.  He presents with the following impairments/functional limitations:  weakness, gait instability, impaired functional mobilty, visual deficits, impaired balance . Pt is motivated to go to the bathroom to brush his teeth.    Rehab Prognosis: Good; patient would benefit from acute skilled PT services to address these deficits and reach maximum level of function.    Recent Surgery: Procedure(s) (LRB):  CYSTOSCOPY, WITH BLADDER BIOPSY, WITH FULGURATION IF INDICATED (N/A)  TURP (TRANSURETHRAL RESECTION OF PROSTATE) (N/A) 2 Days Post-Op    Plan:     During this hospitalization, patient to be seen 3 x/week to address the identified rehab impairments via gait training, therapeutic activities, therapeutic exercises, neuromuscular re-education and progress toward the following goals:    · Plan of Care Expires:  02/09/19    Subjective   "I need to go to the bathroom, I need to brush my teeth"  Pain/Comfort:  · Pain Rating 1: 0/10  · Pain Rating Post-Intervention 1: 0/10    Patients cultural, spiritual, Jehovah's witness conflicts given the current situation: no    Living Environment:  Pt lives with daughter in a home with 7-12 DAVID (per previous admit, pt is a poor historian)  Prior to admission, patients level of function was required assist for mobility and gait.  Equipment used at home: walker, rolling, wheelchair, bedside commode, cane, straight.  Upon discharge, patient will have assistance from family and pt states he has a lady that comes to bathe him in the tub on Thursdays (pt states he has a shower chair).    Objective:     Communicated " with nurse prior to session.  Patient found all lines intact tejeda catheter, telemetry  upon PT entry to room.    General Precautions: Standard, fall   Orthopedic Precautions:N/A   Braces: N/A     Exams:  · Sensation:    · -       Intact  light/touch B LE  · RLE ROM: WFL except knee ext limited due to tight hamstrings in sitting  · RLE Strength: Deficits: hip flex 3-/5; knee flex/ext 3-/5; ankle DF 3-/5  · LLE ROM: WFL except knee ext limited due to tight hamstrings in sitting  · LLE Strength: Deficits: hip flex 3-/5; knee flex/ext 3-/5; ankle DF 3-/5    Functional Mobility:  · Transfers:     · Sit to Stand:  moderate assistance with hand-held assist and rolling walker  · Gait: 6ft then 8ft with HHA with moderate assist then 12ft with RW with minimal assist and manual cues to direct the walker . Pt performed gait with decreased step length, flexed trunk, decreased clearance of B feet during swing phase, knees/hips remain minimally flexed, and required manual cues for direction due to blindness.    Therapeutic Activities and Exercises:   pt transferred to bedside commode, with no success. Pt requested to go into the bathroom. Pt then ambulated to the bathroom as above and was transferred to the commode, no success. Pt stood at the sink with flexed trunk, hips and knees with RW in front with minimal to moderate assist for ~ 5 min to brush his teeth and wash his face.     AM-PAC 6 CLICK MOBILITY  Total Score:14     Patient left up in chair with all lines intact, call button in reach, chair alarm on and nurse notified.    GOALS:   Multidisciplinary Problems     Physical Therapy Goals        Problem: Physical Therapy Goal    Goal Priority Disciplines Outcome Goal Variances Interventions   Physical Therapy Goal     PT, PT/OT Ongoing (interventions implemented as appropriate)     Description:  PT goals until 1/17/19    1. Pt supine to sit with SBA-not met  2. Pt sit to supine with SBA-not met  3. Pt sit to stand with RW  with minimal assist-not met  4. Pt to perform gait 100ft with RW with minimal assist.-not met  5. Pt to go up/down curb step with RW with minimal assist.-not met  6. Pt to transfer bed to/from bedside chair with CGA-not met  7. Pt up/down 4 steps with R UE rail with minimal assist -not met                      History:     Past Medical History:   Diagnosis Date    Anemia     Blind     bilaterally    CHF (congestive heart failure)     Chronic kidney disease     Dementia 01/2017    Diabetes mellitus type II     General anesthetics causing adverse effect in therapeutic use     april / may 2014     Glaucoma (increased eye pressure)     Hypertension     Hypothermia 12/6/2017    Seizures        Past Surgical History:   Procedure Laterality Date    ANGIOPLASTY WITH STENT PLACEMENT Left 11/18/2016    [facility] OR 2ND FLR    AV FISTULA PLACEMENT Left 4/2014    AV fistula to Left upper arm    CLOT EVACUATION FROM BLADDER  1/8/2019    Performed by [facility]o [facility]jee [facility] at [facility] OR [address] [place] [address]    [id number] left RC vs BC AVF Left 8/8/2014    Per[facility] at [facility] OR 2ND FLR    CYSTOSCOPY N/A 11/29/2018    Performed by [facility] at [facility] OR [address] [place] [address]    CYSTOSCOPY  WITH FULGURATION  1/8/2019    Performed by [facility] at [facility] OR [address] [place] [address]    CYSTOSCOPY, WITH BLADDER BIOPSY, WITH FULGURATION IF INDICATED N/A 1/8/2019    Performed by [facility] at [facility] OR 1ST FLR    EYE SURGERY      FISTULOGRAM Left 11/18/2016    Performed by [facility] at [facility] OR 2ND FLR    FISTULOGRAM Left 4/15/2016    Per[facility] at [facility] 2ND [facility]LR    FISTULOGRAM Left 1/29/2016    [facility] at [facility] CATH LAB    FISTULOGRAM Room 11 case Left 3/27/2015    Performed [facility] at [facility] OR 2ND FLR    HYDROCELECTOMY Bilateral 11/22/2017    Performed by [facility] at [facility] OR [address] [place] [address]     INSERTION-CATHETER-DIALYSIS-PERITONEAL-LAPAROSCOPIC N/A 5/8/2014    Performed by Js Ladd [facility] at [facility] OR 2ND FLR    INSERTION-CATHETER-DIALYSIS-PERITONEAL-LAPAROSCOPIC N/A 5/5/2014    Performed by Js [facility]add [facility] at [facility] OR 2ND FLR    INSERTION-CATHETER-HEMODIALYSIS N/A 5/21/2014    Performed by [facility]s [facility] at [facility] OR 2ND FLR    PERMCATH INSERTION-TUNNELED CVC N/A 5/13/2014    Performed by Lolis Jackson MD at [facility] CATH LAB    REMOVAL, BLOOD CLOT N/A 11/29/2018    Performed by [facility]jee [facility] at [facility] OR 1ST FLR    REMOVAL-CATHETER-PERITONEAL DIALYSIS N/A 7/30/2014    Performed by Js Ladd MD at [facility] OR 2ND FLR    SCROTUM EXPLORATION Bilateral 11/2017    TURP (TRANSURETHRAL RESECTION OF PROSTATE) N/A 1/8/2019    Performed by [facility]jee [facility] at [facility] OR 1ST FLR    TURP (TRANSURETHRAL RESECTION OF PROSTATE)  1/8/2019    Performed by Temo [facility]jee [facility] at [facility] OR 1ST FLR       Clinical Decision Making:     History  Co-morbidities and personal factors that may impact the plan of care Examination  Body Structures and Functions, activity limitations and participation restrictions that may impact the plan of care Clinical Presentation   Decision Making/ Complexity Score   Co-morbidities:   [] Time since onset of injury / illness / exacerbation  [x] Status of current condition  [x]Patient's cognitive status and safety concerns    [] Multiple Medical Problems (see med hx)  Personal Factors:   [x] Patient's age  [x] Prior Level of function   [x] Patient's home situation (environment and family support)  [] Patient's level of motivation  [] Expected progression of patient      HISTORY:(criteria)    [] 54488 - no personal factors/history    [] 16793 - has 1-2 personal factor/comorbidity     [x] 61947 - has >3 personal factor/comorbidity     Body Regions:  [] Objective examination findings  [] Head     []  Neck  [] Trunk   [] Upper Extremity  [] Lower Extremity    Body  Systems:  [x] For communication ability, affect, cognition, language, and learning style: the assessment of the ability to make needs known, consciousness, orientation (person, place, and time), expected emotional /behavioral responses, and learning preferences (eg, learning barriers, education  needs)  [x] For the neuromuscular system: a general assessment of gross coordinated movement (eg, balance, gait, locomotion, transfers, and transitions) and motor function  (motor control and motor learning)  [] For the musculoskeletal system: the assessment of gross symmetry, gross range of motion, gross strength, height, and weight  [] For the integumentary system: the assessment of pliability(texture), presence of scar formation, skin color, and skin integrity  [] For cardiovascular/pulmonary system: the assessment of heart rate, respiratory rate, blood pressure, and edema     Activity limitations:    [x] Patient's cognitive status and saf ety concerns          [x] Status of current condition      [] Weight bearing restriction  [] Cardiopulmunary Restriction    Participation Restrictions:   [] Goals and goal agreement with the patient     [] Rehab potential (prognosis) and probable outcome      Examination of Body System: (criteria)    [x] 38539 - addressing 1-2 elements    [] 42138 - addressing a total of 3 or more elements     [] 19823 -  Addressing a total of 4 or more elements         Clinical Presentation: (criteria)  Stable - 18959     On examination of body system using standardized tests and measures patient presents with (CHOOSE ONE) elements from any of the following: body structures and functions, activity limitations, and/or participation restrictions.  Leading to a clinical presentation that is considered (CHOOSE ONE)                              Clinical Decision Making  (Eval Complexity):  Low- 55672     Time Tracking:     PT Received On: 01/10/19  PT Start Time: 1005     PT Stop Time: 1044  PT Total Time  (min): 39 min     Billable Minutes: Evaluation 20 and Gait Training 19      Danuta Strong, PT  01/10/2019

## 2019-01-10 NOTE — NURSING
Pt refused 4:00am lab draw. [facility] can come back later in the morning and draw lab.

## 2019-01-11 PROBLEM — C61 PROSTATE CANCER: Status: ACTIVE | Noted: 2019-01-01

## 2019-01-11 NOTE — PROGRESS NOTES
[facility] Medicine  Progress note      Team: [facility] Terry Deleon MD  Admit Date: 1/4/2019  JAMISON 1/14/2019  Code status: Full Code    Principal Problem:  Gross hematuria    Interval hx:agreed for HD today      ROS   Constitutional: Negative for fever.   HENT: Negative for sore throat.  Blindness  Respiratory: Negative for shortness of breath.    Cardiovascular: Negative for chest pain.   Gastrointestinal: Negative for nausea.   Genitourinary: Negative for dysuria.        Hematuria   Musculoskeletal: Negative for back pain.   Skin: Negative for rash.   Neurological: Negative for weakness.   Hematological: Does not bruise/bleed easily.      PEx  Temp:  [97.2 °F (36.2 °C)-98.2 °F (36.8 °C)]   Pulse:  [63-82]   Resp:  [17-18]   BP: (111-159)/(54-77)   SpO2:  [95 %-99 %]     Intake/Output Summary (Last 24 hours) at 1/11/2019 1526  Last data filed at 1/11/2019 0400  Gross per 24 hour   Intake 480 ml   Output 580 ml   Net -100 ml       Constitutional: Negative for fever. AAOX 2  HENT: Negative for sore throat.  right corneal opacity   Respiratory: Negative for shortness of breath.    Cardiovascular: Negative for chest pain.   Gastrointestinal: Negative for nausea.   Genitourinary: Negative for dysuria.        Penile bleeding as has been in the past. tejeda with bloody urine  Musculoskeletal: Negative for back pain.   Skin: Negative for rash.   Neurological: Negative for weakness.   Hematological: Does not bruise/bleed easily.    Recent Labs   Lab 01/08/19  0411 01/09/19  0320 01/11/19  0317   WBC 6.85 7.61 9.28   HGB 8.8* 7.9* 8.7*   HCT 28.4* 24.6* 26.9*   * 154 162     Recent Labs   Lab 01/05/19  0844 01/08/19  0725 01/09/19  0804 01/10/19  1350 01/11/19  0317    133* 136 134* 136   K 4.8 4.8 4.3 4.6 4.7    100 101 99 100   CO2 26 21* 27 24 23   BUN 66* 57* 29* 45* 49*   CREATININE 8.1* 7.9* 5.0* 7.1* 8.0*   * 123* 134* 188* 125*   CALCIUM 8.4* 8.6* 8.1* 8.2* 8.0*   PHOS 3.9 5.1*  --   3.9  --      Recent Labs   Lab 01/09/19  0804 01/10/19  1350 01/11/19  0317   ALKPHOS 83  --  99   ALT 14  --  <5*   AST 25  --  25   ALBUMIN 2.2* 2.4* 2.3*   PROT 6.8  --  7.2   BILITOT 0.3  --  0.3      Recent Labs   Lab 01/08/19  1455 01/08/19  1753 01/09/19  1707 01/09/19  2112 01/10/19  2146   POCTGLUCOSE 90 73 200* 203* 206*     No results for input(s): CPK, CPKMB, MB, TROPONINI in the last 72 hours.    Scheduled Meds:   sodium chloride 0.9%   Intravenous Once    amLODIPine  5 mg Oral Daily    aspirin  81 mg Oral Daily    brinzolamide  1 drop Left Eye TID    calcium acetate  667 mg Oral TID WM    carvedilol  25 mg Oral BID    dutasteride  0.5 mg Oral Daily    epoetin napoleon (PROCRIT) injection  8,000 Units Intravenous Every Tues, Thurs, Sat    levETIRAcetam  500 mg Oral BID    polyethylene glycol  17 g Oral Daily    travoprost  1 drop Left Eye QHS     Continuous Infusions:  As Needed:  sodium chloride, sodium chloride, sodium chloride, dextrose 50%, dextrose 50%, glucagon (human recombinant), glucose, glucose, insulin aspart U-100, sodium chloride 0.9%, sodium chloride 0.9%, traMADol    Active Hospital Problems    Diagnosis  POA    Pre-op evaluation [Z01.818]  Not Applicable    Anemia [D64.9]  Yes    Hematuria [R31.9]  Yes    HTN (hypertension) [I10]  Yes    Dyslipidemia [E78.5]  Yes    ESRD (end stage renal disease) on dialysis [N18.6, Z99.2]  Not Applicable    Blindness of right eye [H54.40]  Yes    Anemia in chronic kidney disease, on chronic dialysis [N18.6, D63.1, Z99.2]  Not Applicable    Chronic combined systolic and diastolic heart failure [I50.42]  Yes     Chronic    Seizure disorder [G40.909]  Yes     Chronic      Resolved Hospital Problems    Diagnosis Date Resolved POA    *Gross hematuria [R31.0] 01/10/2019 Yes    Altered mental status [R41.82] 01/10/2019 No       Overview  89-year-old male presents to the ER for evaluation of bleeding from the penis.  The patient has had this  problem intermittently for the past 2 months and has been seen here and admitted here for multiple times.  Urology believes that the bleeding is coming from the prostate. Urology saw patient and ideally recommends irrigate tejeda PRN and transfuse PRN. Will plan to add 1% alum irrigation for hematuria to start tomorrow. May need endoscopic intervention in near future but currently feel risks of invasive intervention would be overly morbid. Hemodialysis done.        Assessment and Plan for Problems addressed today:         Hematuria     - urology consulted  - Urine culture - NGTD   - cystoscopy last admission unrevealing  - Low H/H, type and screen with transfusion of 1U pRBCs  - Hgb has been stable for a couple days.s/p HD today (1/8).  2d echo- Moderately decreased left ventricular systolic function - worse in the anterolateral wall. The estimated ejection fraction is 35%. Grade I (mild) left ventricular diastolic dysfunction consistent with impaired relaxation. CBI appeared to clot off however it cleared easily with no clots irrigated out which suggests what little urine he makes daily is not sufficient to clear residual bladder/prostatic bleeding.,alum unavailable at this institution. If patient continues to bleed today Urology to pursue cystoscopy with possible intervention.(1/8).   - CT 11/21/2018:   1. Irregular contour about the superolateral aspect of the right kidney, mass cannot be excluded.  There is a tiny non-obstructing calcification within that region.  Right renal cyst.  4.  Several enlarged para-aortic lymph nodes extending along the left ileopsoas with some obliteration of the fat planes with an additional enlarged left external iliac lymph node.  These findings are new since CT pelvis 11/14/2017.    S/P TURP and fulguration of prostate on 1/8/18. Suspected prostate as a source of bleeding, unlikely due to small renal mass. Appeared to clot off this morning,CBI restarted  this AM on slow drip with  clamp again in am.       Urology OK sending patient  home with a 60cc catheter tipped syringe and irrigation supplies, if family member or caretaker is amenable to learning how to irrigate his catheter in the event it should clog, it would likely circumvent an emergency department visit.Daughter not willing to irrigate tejeda per urology instructions. agreed for HD today      Anemia in chronic kidney disease, on chronic dialysis     - Hgb 6.5 -> 8.8--> 8.7 with hematuria. PRBC 1 unit transfusion today   - Transfused 2 U pRBCs (1/4)      Blindness     - home gtts      Dementia with behavioral disturbance     - AAOX2 -oriented to month and year   - zyprexa PRN      Renovascular hypertension      - continue home medications. 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 Glucose controlled . HbA1c at 6.3      ESRD (end stage renal disease) on dialysis     - HD MWF  - nephrology consulted  - BP and lytes non-emergent   refused HD today. 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 Anterior and anterolateral hypokinesis. Previously discussed stress test to evaluate for regional ischemia (stress test in 2011 negative) but risk vs benefit (angiogram, etc) and with patients/families  decided not to pursue any further testing.     compensated, continue home medications.  2d echo- Moderately decreased left ventricular systolic function - worse in the anterolateral wall. The estimated ejection fraction is 35%. Grade I (mild) left ventricular diastolic dysfunction consistent with impaired relaxation      Seizure disorder     - continue home keppra       Disposition - home with PACE program      DVT PPx:       Provider  Terry Deleon MD  Attending Staff Physician  [facility] Medicine  pager- [phone]  [employer] - [phone]

## 2019-01-11 NOTE — PROGRESS NOTES
Hemorrhage of prostate  -     leuprolide (6 month) SyKt 45 mg  -     Prior Authorization Order    Prostate cancer  -     leuprolide (6 month) SyKt 45 mg  -     Prior Authorization Order    he is coming on 1/14/19 for voiding trial after TURP of bleeding prostate tissues.  Suspect possible prostate cancer with bleeding, but no pathology available yet.  Regardless the pathology, I think that Lupron injection will shrink the size of prostate and possibly minimize its bleeding as well.

## 2019-01-11 NOTE — PLAN OF CARE
SW completed Passr and locet, waiting on 142.    Tacos Marshall, LAINA  [facility]  [id number]

## 2019-01-11 NOTE — ASSESSMENT & PLAN NOTE
89M with recurrent gross hematuria now with symptomatic clot retention and likely symptomatic anemia. Suspect prostate as a source of bleeding, unlikely due to small renal mass. S/P TURP and fulguration of prostate on 1/8/18.     - CBI clamped, catheter draining urine that is continuing to lighten in color   - Would recommend sending home with a 60cc catheter tipped syringe and irrigation supplies, if family member or caretaker is amenable to learning how to irrigate his catheter in the event it should clog, it would likely circumvent an emergency department visit. Recommend nursing teaching family how to irrigate catheter if possible.  - nursing to irrigate tejeda PRN until DC   - irrigation supplies to bedside  - per discussion with pharmacy, alum unavailable at this institution  - urology will sign off  - as patient makes minimal urine would recommend leaving Tejeda catheter in place and having patient follow up as an outpatient for voiding trial on Monday

## 2019-01-11 NOTE — SUBJECTIVE & OBJECTIVE
Interval History:   Urine light pink this AM.   No additional complaints.     Objective:     Temp:  [97.2 °F (36.2 °C)-98.2 °F (36.8 °C)] 98.2 °F (36.8 °C)  Pulse:  [63-82] 76  Resp:  [18] 18  SpO2:  [95 %-100 %] 95 %  BP: (126-148)/(59-68) 129/63     Body mass index is 23.73 kg/m².            Drains     Drain                 Hemodialysis AV Fistula 08/08/14 0800 Left upper arm 1613 days         Urethral Catheter 01/04/19 1732 Triple-lumen 36 Fr. 3 days                Physical Exam   Nursing note and vitals reviewed.  Constitutional: He appears well-developed and well-nourished. No distress.   HENT:   Head: Normocephalic and atraumatic.   Eyes: Right eye exhibits no discharge. Left eye exhibits no discharge.   Cardiovascular: Normal rate.    Pulmonary/Chest: Effort normal. No respiratory distress.   Abdominal: Soft. There is no rebound and no guarding.       Genitourinary:   Genitourinary Comments: CBI turned off. Output clear.   Musculoskeletal: He exhibits no edema or deformity.   Neurological: No cranial nerve deficit.   Skin: Skin is warm and dry. No erythema. No pallor.         Significant Labs:    BMP:  Recent Labs   Lab 01/09/19  0804 01/10/19  1350 01/11/19  0317    134* 136   K 4.3 4.6 4.7    99 100   CO2 27 24 23   BUN 29* 45* 49*   CREATININE 5.0* 7.1* 8.0*   CALCIUM 8.1* 8.2* 8.0*       CBC:   Recent Labs   Lab 01/08/19  0411 01/09/19  0320 01/11/19  0317   WBC 6.85 7.61 9.28   HGB 8.8* 7.9* 8.7*   HCT 28.4* 24.6* 26.9*   * 154 162       All pertinent labs results from the past 24 hours have been reviewed.    Significant Imaging:  All pertinent imaging results/findings from the past 24 hours have been reviewed.

## 2019-01-11 NOTE — PROGRESS NOTES
[facility] HEMODIALYSIS NOTE     Patient currently on hemodialysis for removal of uremic toxins and volume.    Patient seen and evaluated on hemodialysis, tolerating treatment, see HD flowsheet for vitals and assessments.      Ultrafiltration goal is 1-2 L as tolerated     Labs have been reviewed and the dialysate bath has been adjusted.     Assessment/Plan:     HD today for removal of uremic toxins and volume.

## 2019-01-11 NOTE — PROGRESS NOTES
[facility]  Urology  Progress Note    Patient Name: Ronald Campbell  MRN: [id number]  Admission Date: 1/4/2019  Hospital Length of Stay: 7 days  Code Status: Full Code   Attending Provider: Terry Deleon MD   Primary Care Physician: Bart Shaw MD    Subjective:     HPI:  Ronald Campbell is a 89 y.o. male with HTN, Type 2 DM, ESRD on HD MWF, CHF, dementia, and blindness familiar to the urology service for hematuria. He was discharged on 12/13 and his hematuria had mostly resolved. This began again Thursday. Per his daughter, there is no urine, only blood and clots coming from the penis. He was seen in the ED yesterday for this as well however was not symptomatic and given his prior discussion with palliative care, decision was made not to intervene. His hemoglobin is 6.5 from 8.1 yesterday. Today he was also feeling lightheaded with possible neurologic symptoms and thus presented to the ED.    He usually urinates twice daily. He does not feel bladder fullness or the urge to urinate. Bladder scan in the ED showed > 400 cc.    Of note he underwent cystoscopy on 11/30 which showed no obvious tumors and bleeding from the prostate however visualization was limited due to bleeding. Recent CT noncontrast of abdomen pelvis demonstrates a very enlarged prostate, as well as a small right renal mass and several enlarged para-aortic lymph nodes.     Underwent TURP and fulguration on 1/8/19.     Interval History:   Urine light pink this AM.   No additional complaints.     Objective:     Temp:  [97.2 °F (36.2 °C)-98.2 °F (36.8 °C)] 98.2 °F (36.8 °C)  Pulse:  [63-82] 76  Resp:  [18] 18  SpO2:  [95 %-100 %] 95 %  BP: (126-148)/(59-68) 129/63     Body mass index is 23.73 kg/m².            Drains     Drain                 Hemodialysis AV Fistula 08/08/14 0800 Left upper arm 1613 days         Urethral Catheter 01/04/19 1732 Triple-lumen 36 Fr. 3 days                Physical Exam   Nursing note and vitals  reviewed.  Constitutional: He appears well-developed and well-nourished. No distress.   HENT:   Head: Normocephalic and atraumatic.   Eyes: Right eye exhibits no discharge. Left eye exhibits no discharge.   Cardiovascular: Normal rate.    Pulmonary/Chest: Effort normal. No respiratory distress.   Abdominal: Soft. There is no rebound and no guarding.       Genitourinary:   Genitourinary Comments: CBI turned off. Output clear.   Musculoskeletal: He exhibits no edema or deformity.   Neurological: No cranial nerve deficit.   Skin: Skin is warm and dry. No erythema. No pallor.         Significant Labs:    BMP:  Recent Labs   Lab 01/09/19  0804 01/10/19  1350 01/11/19  0317    134* 136   K 4.3 4.6 4.7    99 100   CO2 27 24 23   BUN 29* 45* 49*   CREATININE 5.0* 7.1* 8.0*   CALCIUM 8.1* 8.2* 8.0*       CBC:   Recent Labs   Lab 01/08/19  0411 01/09/19  0320 01/11/19  0317   WBC 6.85 7.61 9.28   HGB 8.8* 7.9* 8.7*   HCT 28.4* 24.6* 26.9*   * 154 162       All pertinent labs results from the past 24 hours have been reviewed.    Significant Imaging:  All pertinent imaging results/findings from the past 24 hours have been reviewed.                  Assessment/Plan:     Hematuria    89M with recurrent gross hematuria now with symptomatic clot retention and likely symptomatic anemia. Suspect prostate as a source of bleeding, unlikely due to small renal mass. S/P TURP and fulguration of prostate on 1/8/18.     - CBI clamped, catheter draining urine that is continuing to lighten in color   - Would recommend sending home with a 60cc catheter tipped syringe and irrigation supplies, if family member or caretaker is amenable to learning how to irrigate his catheter in the event it should clog, it would likely circumvent an emergency department visit. Recommend nursing teaching family how to irrigate catheter if possible.  - nursing to irrigate tjeeda PRN until DC   - irrigation supplies to bedside  - per discussion  with pharmacy, alum unavailable at this institution  - urology will sign off  - as patient makes minimal urine would recommend leaving Barrett catheter in place and having patient follow up as an outpatient for voiding trial on Monday         VTE Risk Mitigation (From admission, onward)        Ordered     IP VTE HIGH RISK PATIENT  Once      01/04/19 1456     Place MORGAN hose  Until discontinued      01/04/19 1456          Apryl Mckoy MD  Urology  [facility]

## 2019-01-12 NOTE — PLAN OF CARE
Problem: Physical Therapy Goal  Goal: Physical Therapy Goal  PT goals until 1/17/19    1. Pt supine to sit with SBA-not met  2. Pt sit to supine with SBA-not met  3. Pt sit to stand with RW with minimal assist-not met  4. Pt to perform gait 100ft with RW with minimal assist.-not met  5. Pt to go up/down curb step with RW with minimal assist.-not met  6. Pt to transfer bed to/from bedside chair with CGA-not met  7. Pt up/down 4 steps with R UE rail with minimal assist -not met     Outcome: Ongoing (interventions implemented as appropriate)  Improved B knee pain facilitated progress with walking range and tolerance to activities in standing position.

## 2019-01-12 NOTE — PLAN OF CARE
Problem: Adult Inpatient Plan of Care  Goal: Plan of Care Review  Outcome: Ongoing (interventions implemented as appropriate)  Pt AAOx3 and VSS. Pt is progressing with plan of care. Free of skin breakdown as the pt positioned/repositioned well independently. SCDs in place at all times. Incentive spirometer at bedside and pt instructed on its use. No complain of pain at this time. Frequent rounds made to assess pain and safety and no complaints at this time noted. Side rails up x 2. Bed locked. Call light within reach. No falls noted. Will continue to monitor.

## 2019-01-12 NOTE — PROGRESS NOTES
[facility]  Progress note      Team: [facility] MED B Terry Deleon MD  Admit Date: 1/4/2019  JAMISON 1/14/2019  Code status: Full Code    Principal Problem:  Gross hematuria    Hb stable at 8.7       ROS   Constitutional: Negative for fever.   HENT: Negative for sore throat.  Blindness  Respiratory: Negative for shortness of breath.    Cardiovascular: Negative for chest pain.   Gastrointestinal: Negative for nausea.   Genitourinary: Negative for dysuria.        Hematuria   Musculoskeletal: Negative for back pain.   Skin: Negative for rash.   Neurological: Negative for weakness.   Hematological: Does not bruise/bleed easily.      PEx  Temp:  [96.3 °F (35.7 °C)-98 °F (36.7 °C)]   Pulse:  [59-77]   Resp:  [17-18]   BP: (111-165)/(54-72)   SpO2:  [97 %-99 %]     Intake/Output Summary (Last 24 hours) at 1/12/2019 1248  Last data filed at 1/12/2019 0430  Gross per 24 hour   Intake 600 ml   Output 2250 ml   Net -1650 ml       Constitutional: Negative for fever. AAOX 2  HENT: Negative for sore throat.  right corneal opacity   Respiratory: Negative for shortness of breath.    Cardiovascular: Negative for chest pain.   Gastrointestinal: Negative for nausea.   Genitourinary: Negative for dysuria.        Penile bleeding as has been in the past. tejeda with bloody urine  Musculoskeletal: Negative for back pain.   Skin: Negative for rash.   Neurological: Negative for weakness.   Hematological: Does not bruise/bleed easily.    Recent Labs   Lab 01/09/19  0320 01/11/19  0317 01/12/19  0341   WBC 7.61 9.28 8.75   HGB 7.9* 8.7* 8.7*   HCT 24.6* 26.9* 26.5*    162 163     Recent Labs   Lab 01/08/19  0725  01/10/19  1350 01/11/19  0317 01/12/19  0341   *   < > 134* 136 135*   K 4.8   < > 4.6 4.7 4.3      < > 99 100 101   CO2 21*   < > 24 23 25   BUN 57*   < > 45* 49* 25*   CREATININE 7.9*   < > 7.1* 8.0* 5.3*   *   < > 188* 125* 114*   CALCIUM 8.6*   < > 8.2* 8.0* 8.2*   PHOS 5.1*  --  3.9  --   --     <  > = values in this interval not displayed.     Recent Labs   Lab 01/09/19  0804 01/10/19  1350 01/11/19  0317 01/12/19  0341   ALKPHOS 83  --  99 110   ALT 14  --  <5* <5*   AST 25  --  25 35   ALBUMIN 2.2* 2.4* 2.3* 2.2*   PROT 6.8  --  7.2 7.4   BILITOT 0.3  --  0.3 0.4      Recent Labs   Lab 01/08/19  1753 01/09/19  1707 01/09/19  2112 01/10/19  2146 01/11/19 2018 01/12/19  1137   POCTGLUCOSE 73 200* 203* 206* 183* 223*     No results for input(s): CPK, CPKMB, MB, TROPONINI in the last 72 hours.    Scheduled Meds:   sodium chloride 0.9%   Intravenous Once    amLODIPine  5 mg Oral Daily    aspirin  81 mg Oral Daily    brinzolamide  1 drop Left Eye TID    calcium acetate  667 mg Oral TID WM    carvedilol  25 mg Oral BID    dutasteride  0.5 mg Oral Daily    epoetin napoleon (PROCRIT) injection  8,000 Units Intravenous Every Tues, Thurs, Sat    levETIRAcetam  500 mg Oral BID    polyethylene glycol  17 g Oral Daily    travoprost  1 drop Left Eye QHS     Continuous Infusions:  As Needed:  sodium chloride, sodium chloride, sodium chloride, dextrose 50%, dextrose 50%, glucagon (human recombinant), glucose, glucose, insulin aspart U-100, sodium chloride 0.9%, sodium chloride 0.9%, traMADol    Active Hospital Problems    Diagnosis  POA    Pre-op evaluation [Z01.818]  Not Applicable    Anemia [D64.9]  Yes    Hematuria [R31.9]  Yes    HTN (hypertension) [I10]  Yes    Dyslipidemia [E78.5]  Yes    ESRD (end stage renal disease) on dialysis [N18.6, Z99.2]  Not Applicable    Blindness of right eye [H54.40]  Yes    Anemia in chronic kidney disease, on chronic dialysis [N18.6, D63.1, Z99.2]  Not Applicable    Chronic combined systolic and diastolic heart failure [I50.42]  Yes     Chronic    Seizure disorder [G40.909]  Yes     Chronic      Resolved Hospital Problems    Diagnosis Date Resolved POA    *Gross hematuria [R31.0] 01/10/2019 Yes    Altered mental status [R41.82] 01/10/2019 No        Overview  89-year-old male presents to the ER for evaluation of bleeding from the penis.  The patient has had this problem intermittently for the past 2 months and has been seen here and admitted here for multiple times.  Urology believes that the bleeding is coming from the prostate. Urology saw patient and ideally recommends irrigate tejeda PRN and transfuse PRN. Will plan to add 1% alum irrigation for hematuria to start tomorrow. May need endoscopic intervention in near future but currently feel risks of invasive intervention would be overly morbid. Hemodialysis done.        Assessment and Plan for Problems addressed today:         Hematuria     - urology consulted  - Urine culture - NGTD   - cystoscopy last admission unrevealing  - Low H/H, type and screen with transfusion of 1U pRBCs  - Hgb has been stable for a couple days.s/p HD today (1/8).  2d echo- Moderately decreased left ventricular systolic function - worse in the anterolateral wall. The estimated ejection fraction is 35%. Grade I (mild) left ventricular diastolic dysfunction consistent with impaired relaxation. CBI appeared to clot off however it cleared easily with no clots irrigated out which suggests what little urine he makes daily is not sufficient to clear residual bladder/prostatic bleeding.,alum unavailable at this institution. If patient continues to bleed today Urology to pursue cystoscopy with possible intervention.(1/8).   - CT 11/21/2018:   1. Irregular contour about the superolateral aspect of the right kidney, mass cannot be excluded.  There is a tiny non-obstructing calcification within that region.  Right renal cyst.  4.  Several enlarged para-aortic lymph nodes extending along the left ileopsoas with some obliteration of the fat planes with an additional enlarged left external iliac lymph node.  These findings are new since CT pelvis 11/14/2017.    S/P TURP and fulguration of prostate on 1/8/18. Suspected prostate as a source of  bleeding, unlikely due to small renal mass. Appeared to clot off this morning,CBI restarted  this AM on slow drip with clamp again in am.       Urology OK sending patient  home with a 60cc catheter tipped syringe and irrigation supplies, if family member or caretaker is amenable to learning how to irrigate his catheter in the event it should clog, it would likely circumvent an emergency department visit.Daughter not willing to irrigate tejeda per urology instructions. agreed for HD today. possible voiding trial on monday       Anemia in chronic kidney disease, on chronic dialysis     - Hgb 6.5 -> 8.8--> 8.7 with hematuria. PRBC 1 unit transfusion today   - Transfused 2 U pRBCs (1/4)      Blindness     - home gtts      Dementia with behavioral disturbance     - AAOX2 -oriented to month and year   - zyprexa PRN      Renovascular hypertension      - continue home medications. 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 Glucose controlled . HbA1c at 6.3      ESRD (end stage renal disease) on dialysis     - HD MWF  - nephrology consulted  - BP and lytes non-emergent   refused HD today. 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 Anterior and anterolateral hypokinesis. Previously discussed stress test to evaluate for regional ischemia (stress test in 2011 negative) but risk vs benefit (angiogram, etc) and with patients/families  decided not to pursue any further testing.     compensated, continue home medications.  2d echo- Moderately decreased left ventricular systolic function - worse in the anterolateral wall. The estimated ejection fraction is 35%. Grade I (mild) left ventricular diastolic dysfunction  consistent with impaired relaxation      Seizure disorder     - continue home keppra       Disposition - home with PACE program      DVT PPx:      Provider  Terry Deleon MD  Attending Staff Physician  Hospital Medicine  pager- [phone] [id number] - [phone]

## 2019-01-12 NOTE — PT/OT/SLP PROGRESS
Physical Therapy Treatment    Patient Name:  Ronald Campbell   MRN:  [id number]    Recommendations:     Discharge Recommendations:  (HHPT with 24 HR assistance.)   Discharge Equipment Recommendations: none   Barriers to discharge: Inaccessible home    Assessment:     Ronald Campbell is a 89 y.o. male admitted with a medical diagnosis of Gross hematuria.  He presents with the following impairments/functional limitations:  weakness, impaired endurance, impaired self care skills, impaired functional mobilty, gait instability, impaired balance, visual deficits, decreased coordination, decreased lower extremity function, decreased ROM, decreased safety awareness . Patient required constant cues to transfer and to maneuver RW due to visual deficit. Patient ambulates with decreased step length, CELIA and min anterior lean..    Rehab Prognosis: Good; patient would benefit from acute skilled PT services to address these deficits and reach maximum level of function.    Recent Surgery: Procedure(s) (LRB):  CYSTOSCOPY, WITH BLADDER BIOPSY, WITH FULGURATION IF INDICATED (N/A)  TURP (TRANSURETHRAL RESECTION OF PROSTATE) (N/A) 4 Days Post-Op    Plan:     During this hospitalization, patient to be seen 3 x/week to address the identified rehab impairments via gait training, therapeutic activities, therapeutic exercises, neuromuscular re-education and progress toward the following goals:    · Plan of Care Expires:  02/09/19    Subjective     Chief Complaint: B arthritic knees  Patient/Family Comments/goals: to get stronger  Pain/Comfort:  · Pain Rating 1: 0/10  · Pain Rating Post-Intervention 1: 0/10      Objective:     Communicated with nsg prior to session.  Patient found all lines intact and call button in reach tejeda catheter, telemetry  upon PT entry to room.     General Precautions: Standard, fall   Orthopedic Precautions:N/A   Braces: N/A     Functional Mobility:  · Transfers:     · Sit to Stand:  moderate assistance with rolling  walker  · Bed to Chair: minimum assistance with  rolling walker  using  Stand Pivot  · Gait: 10 ft, 24 ft and 14 ft with RW and min to mod assistance with chair follow and assistance to maneuver RW due to visual deficit.      AM-PAC 6 CLICK MOBILITY  Turning over in bed (including adjusting bedclothes, sheets and blankets)?: 3  Sitting down on and standing up from a chair with arms (e.g., wheelchair, bedside commode, etc.): 2  Moving from lying on back to sitting on the side of the bed?: 3  Moving to and from a bed to a chair (including a wheelchair)?: 3  Need to walk in hospital room?: 2  Climbing 3-5 steps with a railing?: 2  Basic Mobility Total Score: 15       Therapeutic Activities and Exercises:   Donned a second gown. Static standing balance with RW for support to test safety prior to gait training and to correct posture.    Patient left up in chair with all lines intact, call button in reach and RN notified..    GOALS:   Multidisciplinary Problems     Physical Therapy Goals        Problem: Physical Therapy Goal    Goal Priority Disciplines Outcome Goal Variances Interventions   Physical Therapy Goal     PT, PT/OT Ongoing (interventions implemented as appropriate)     Description:  PT goals until 1/17/19    1. Pt supine to sit with SBA-not met  2. Pt sit to supine with SBA-not met  3. Pt sit to stand with RW with minimal assist-not met  4. Pt to perform gait 100ft with RW with minimal assist.-not met  5. Pt to go up/down curb step with RW with minimal assist.-not met  6. Pt to transfer bed to/from bedside chair with CGA-not met  7. Pt up/down 4 steps with R UE rail with minimal assist -not met                      Time Tracking:     PT Received On: 01/12/19  PT Start Time: 1545     PT Stop Time: 1610  PT Total Time (min): 25 min     Billable Minutes: Gait Training 15 and Therapeutic Activity 10    Treatment Type: Treatment  PT/PTA: PTA     PTA Visit Number: Osvaldo     Sergey Cates, EVELIA  01/12/2019

## 2019-01-12 NOTE — PROGRESS NOTES
3hr HD treatment completed 1.5L of fluid removed pt tolerated well. Both needles of a ANGLE graft removed and pressure held for 15 minutes to venous side. Gelfoam applied as ordered. Report given to primary nurse.

## 2019-01-13 NOTE — PLAN OF CARE
"Problem: Adult Inpatient Plan of Care  Goal: Plan of Care Review  Outcome: Ongoing (interventions implemented as appropriate)  Patient doing well today and is exactly the same as yesterday with no changes.  No complaints of pain and appears at his "baseline."  Unsure of when PACE program will begin or when discharged orders will take place.  BS have been mostly good with slight elevation depending on patients intake of juice.  Will continue to monitor I/O at this time.      "

## 2019-01-13 NOTE — PROGRESS NOTES
[facility] Medicine  Progress note      Team: [facility] HOSP MED B Terry Deleon MD  Admit Date: 1/4/2019  JAMISON 1/14/2019  Code status: Full Code    Principal Problem:  Gross hematuria    Hb stable at 8.7 For voiding trial in AM. blood stained urine in tejeda but draining well. For voiding trial per urology tomorrow       ROS   Constitutional: Negative for fever.   HENT: Negative for sore throat.  Blindness  Respiratory: Negative for shortness of breath.    Cardiovascular: Negative for chest pain.   Gastrointestinal: Negative for nausea.   Genitourinary: Negative for dysuria.        Hematuria   Musculoskeletal: Negative for back pain.   Skin: Negative for rash.   Neurological: Negative for weakness.   Hematological: Does not bruise/bleed easily.      PEx  Temp:  [96 °F (35.6 °C)-98 °F (36.7 °C)]   Pulse:  [56-72]   Resp:  [17-20]   BP: (145-158)/(65-75)   SpO2:  [98 %-100 %]     Intake/Output Summary (Last 24 hours) at 1/13/2019 0529  Last data filed at 1/12/2019 2200  Gross per 24 hour   Intake --   Output 70 ml   Net -70 ml       Constitutional: Negative for fever. AAOX 2  HENT: Negative for sore throat.  right corneal opacity   Respiratory: Negative for shortness of breath.    Cardiovascular: Negative for chest pain.   Gastrointestinal: Negative for nausea.   Genitourinary: Negative for dysuria.        Penile bleeding as has been in the past. tejeda with bloody urine  Musculoskeletal: Negative for back pain.   Skin: Negative for rash.   Neurological: Negative for weakness.   Hematological: Does not bruise/bleed easily.    Recent Labs   Lab 01/09/19  0320 01/11/19  0317 01/12/19  0341   WBC 7.61 9.28 8.75   HGB 7.9* 8.7* 8.7*   HCT 24.6* 26.9* 26.5*    162 163     Recent Labs   Lab 01/08/19  0725  01/10/19  1350 01/11/19  0317 01/12/19  0341   *   < > 134* 136 135*   K 4.8   < > 4.6 4.7 4.3      < > 99 100 101   CO2 21*   < > 24 23 25   BUN 57*   < > 45* 49* 25*   CREATININE 7.9*   < > 7.1* 8.0* 5.3*    *   < > 188* 125* 114*   CALCIUM 8.6*   < > 8.2* 8.0* 8.2*   PHOS 5.1*  --  3.9  --   --     < > = values in this interval not displayed.     Recent Labs   Lab 01/09/19  0804 01/10/19  1350 01/11/19  0317 01/12/19  0341   ALKPHOS 83  --  99 110   ALT 14  --  <5* <5*   AST 25  --  25 35   ALBUMIN 2.2* 2.4* 2.3* 2.2*   PROT 6.8  --  7.2 7.4   BILITOT 0.3  --  0.3 0.4      Recent Labs   Lab 01/10/19  2146 01/11/19  2018 01/12/19  0828 01/12/19  1137 01/12/19  1627 01/12/19 2036   POCTGLUCOSE 206* 183* 106 223* 130* 173*     No results for input(s): CPK, CPKMB, MB, TROPONINI in the last 72 hours.    Scheduled Meds:   sodium chloride 0.9%   Intravenous Once    amLODIPine  5 mg Oral Daily    aspirin  81 mg Oral Daily    brinzolamide  1 drop Left Eye TID    calcium acetate  667 mg Oral TID WM    carvedilol  25 mg Oral BID    dutasteride  0.5 mg Oral Daily    epoetin napoleon (PROCRIT) injection  8,000 Units Intravenous Every Tues, Thurs, Sat    levETIRAcetam  500 mg Oral BID    polyethylene glycol  17 g Oral Daily    travoprost  1 drop Left Eye QHS     Continuous Infusions:  As Needed:  sodium chloride, sodium chloride, sodium chloride, dextrose 50%, dextrose 50%, glucagon (human recombinant), glucose, glucose, insulin aspart U-100, sodium chloride 0.9%, sodium chloride 0.9%, traMADol    Active Hospital Problems    Diagnosis  POA    Pre-op evaluation [Z01.818]  Not Applicable    Anemia [D64.9]  Yes    Hematuria [R31.9]  Yes    HTN (hypertension) [I10]  Yes    Dyslipidemia [E78.5]  Yes    ESRD (end stage renal disease) on dialysis [N18.6, Z99.2]  Not Applicable    Blindness of right eye [H54.40]  Yes    Anemia in chronic kidney disease, on chronic dialysis [N18.6, D63.1, Z99.2]  Not Applicable    Chronic combined systolic and diastolic heart failure [I50.42]  Yes     Chronic    Seizure disorder [G40.909]  Yes     Chronic      Resolved Hospital Problems    Diagnosis Date Resolved POA    *Gross  hematuria [R31.0] 01/10/2019 Yes    Altered mental status [R41.82] 01/10/2019 No       Overview  89-year-old male presents to the ER for evaluation of bleeding from the penis.  The patient has had this problem intermittently for the past 2 months and has been seen here and admitted here for multiple times.  Urology believes that the bleeding is coming from the prostate. Urology saw patient and ideally recommends irrigate tejeda PRN and transfuse PRN. Will plan to add 1% alum irrigation for hematuria to start tomorrow. May need endoscopic intervention in near future but currently feel risks of invasive intervention would be overly morbid. Hemodialysis done.        Assessment and Plan for Problems addressed today:         Hematuria     - urology consulted  - Urine culture - NGTD   - cystoscopy last admission unrevealing  - Low H/H, type and screen with transfusion of 1U pRBCs  - Hgb has been stable for a couple days.s/p HD today (1/8).  2d echo- Moderately decreased left ventricular systolic function - worse in the anterolateral wall. The estimated ejection fraction is 35%. Grade I (mild) left ventricular diastolic dysfunction consistent with impaired relaxation. CBI appeared to clot off however it cleared easily with no clots irrigated out which suggests what little urine he makes daily is not sufficient to clear residual bladder/prostatic bleeding.,alum unavailable at this institution. If patient continues to bleed today Urology to pursue cystoscopy with possible intervention.(1/8).   - CT 11/21/2018:   1. Irregular contour about the superolateral aspect of the right kidney, mass cannot be excluded.  There is a tiny non-obstructing calcification within that region.  Right renal cyst.  4.  Several enlarged para-aortic lymph nodes extending along the left ileopsoas with some obliteration of the fat planes with an additional enlarged left external iliac lymph node.  These findings are new since CT pelvis  11/14/2017.    S/P TURP and fulguration of prostate on 1/8/18. Suspected prostate as a source of bleeding, unlikely due to small renal mass. Appeared to clot off this morning,CBI restarted  this AM on slow drip with clamp again in am.       Urology OK sending patient  home with a 60cc catheter tipped syringe and irrigation supplies, if family member or caretaker is amenable to learning how to irrigate his catheter in the event it should clog, it would likely circumvent an emergency department visit.Daughter not willing to irrigate tejeda per urology instructions. agreed for HD today. possible voiding trial on monday       Anemia in chronic kidney disease, on chronic dialysis     - Hgb 6.5 -> 8.8--> 8.7 with hematuria. PRBC 1 unit transfusion today   - Transfused 2 U pRBCs (1/4)      Blindness     - home gtts      Dementia with behavioral disturbance     - AAOX2 -oriented to month and year   - zyprexa PRN      Renovascular hypertension      - continue home medications. 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 Glucose controlled . HbA1c at 6.3      ESRD (end stage renal disease) on dialysis     - HD MWF  - nephrology consulted  - BP and lytes non-emergent   refused HD today. 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 Anterior and anterolateral hypokinesis. Previously discussed stress test to evaluate for regional ischemia (stress test in 2011 negative) but risk vs benefit (angiogram, etc) and with patients/families  decided not to pursue any further testing.     compensated, continue home medications.  2d echo- Moderately decreased left ventricular systolic function - worse in the anterolateral  wall. The estimated ejection fraction is 35%. Grade I (mild) left ventricular diastolic dysfunction consistent with impaired relaxation      Seizure disorder     - continue home keppra       Disposition - home with [facility]      DVT PPx:      Provider  Terry Deleon MD  Attending Staff Physician  [facility]  pager- [phone] Ykltqskjllt - [phone]

## 2019-01-14 NOTE — PLAN OF CARE
Problem: Hemodynamic Instability (Hemodialysis)  Goal: Vital Signs Remain in Desired Range  Outcome: Ongoing (interventions implemented as appropriate)  Vitals q 15 minutes throughout IHD

## 2019-01-14 NOTE — PLAN OF CARE
MAIRA followed up with Shannan at [facility] to see if pt would be accepted. Shannan informed SW that the pt can go home with home health based on PT recs. MAIRA also followed up with corey Spann, and was informed that the application was still under review. MAIRA will continue to follow.    Tacos Marshall LMSW  [facility]  [phone]

## 2019-01-14 NOTE — PLAN OF CARE
Problem: Adult Inpatient Plan of Care  Goal: Plan of Care Review  Outcome: Ongoing (interventions implemented as appropriate)  Pt lying in bed comfortably, AAOx4, VSS, IV intact and sl,ac/hs, no tele, room air, skin integrity maintained, pt independent with frequent position changes, no complaints of pain , fall precautions maintained with no falls noted during shift, bed in low locked position, call light within reach, ID band on, personal items in reach, will continue to monitor and follow plan of care.

## 2019-01-14 NOTE — PLAN OF CARE
Discharge planning: Barrett catheter removed this am. Daughter states that she wants snf placement. Called Keyona at [facility] [phone] to discuss approval and left VM reqesting return call.

## 2019-01-14 NOTE — CONSULTS
[facility] consult PT recommends home with home health. Seems to be at prior level of function. Recommend home with home health.

## 2019-01-14 NOTE — PROGRESS NOTES
Pt arrived to unit via bed. Pt A&O X1 stable,maintenance pt. Thrill/bruit noted, accessed LT AVF X2 15G needles without difficulty. Duration 4.0hrs, Qb 400ml/min, Qd 800ml/min, planned UFG 2.0L, orders reviewed.

## 2019-01-14 NOTE — PLAN OF CARE
Problem: Adult Inpatient Plan of Care  Goal: Plan of Care Review  Outcome: Ongoing (interventions implemented as appropriate)  Patient AAOX4. VSS. Barrett care provided.  No falls noted. Fall precautions remain. Patient instructed to call for assistance with getting up. Pain assessment, Patient comfortable. Frequent rounds made for safety and patient care. Call light within reach, SCD's in place, wheels locked, bed in low position, side rails up x2, safety maintained. NADN, will continue to monitor.

## 2019-01-15 NOTE — PLAN OF CARE
Update 3:58 PM  MAIRA met with representatives from the therapy department.  SW informed therapist (Antony) about pt daughter concerns.  SW informed that therapy would re-asses the pt in the am.      SW spoke with the pt daughter and informed her that based on the PT/OT recs the pt does not qualify for SNF placement at this time. SW explained SNF requirements and why the pt was declined by [facility]. Pt daughter was not happy about this. Pt daughter feels that the PT/OT information is incorrect and would like to speak with the PT/OT supervisor regarding HH recommendation. Pt daughter refusing pt d/c at this time. SW will request that the PT/OT supervisor contact pt daughter to assist with explaining their recommendation for the pt.     Update 2:12 PM  SW sent a referral to [facility] to resume HH services for this pt.       Update 1:39 PM  MAIRA spoke with May at [facility] about pt d/c plan.  Pt to d/c home with HH services.  MAIRA contacted pt daughter about this.  SW informed pt daughter that pt to d/c home with HH services.  Pt daughter questioned this plan. Pt initially to d/c to SNF.  Pt accepted by [facility]. Pt denied by [facility] and [facility].  Pt does not meet SNF criteria at this time. SW informed pt daughter that PT/OT recs indicate d/c home with HH and 24 hour supervision. Pt daughter informed SW that [facility] did not provide the pt with 24 hour supervision.  SW informed pt daughter that [facility] did not qualify the pt for a SNF stay at this time and pt to d/c home with HH.  SW discussed the possibility of snf placement for the pt.  Pt daughter informed the SW that she would contact the [facility].  SW acknowledged this.  Pt to d/c home with HH services ([facility]). Pt currently receiving services with [facility].         1:20pm  MAIRA contacted [facility] about pt d/c plan.  No answer.  MAIRA left a message [facility] Graciela [phone] for a return call.           Shanice Downing, MSW, LCSW [facility]  [id number]

## 2019-01-15 NOTE — DISCHARGE SUMMARY
[facility] Hospital Medicine  Discharge Summary      Patient Name: Ronald Campbell  MRN: [id number]  Admission Date: 1/4/2019  Hospital Length of Stay: 13 days  Discharge Date and Time: No discharge date for patient encounter.  Attending Physician: Alix Beckford MD   Discharging Provider: Katie Jules NP  Primary Care Provider: Bart Shaw MD  [facility] Medicine Team: [facility] Katie Jules NP    HPI:   89-year-old male presents to the ER for evaluation of bleeding from the penis.  The patient has had this problem intermittently for the past 2 months and has been seen here and admitted here for multiple times.  Urology believes that the bleeding is coming from the prostate.  Last time the patient was here he was in urinary retention.  A Tjeeda catheter was placed and the patient was admitted.  He underwent continuous bladder irrigation and symptoms improved.  Of note the patient has ESRD and is dialyzed Monday Wednesday Friday.  Last dialysis was Wednesday January 2nd.  The patient does make urine usually twice a day.     Has pallaiative care, has been seen seeral times in ED recently for same condition.      Procedure(s) (LRB):  CYSTOSCOPY, WITH BLADDER BIOPSY, WITH FULGURATION IF INDICATED (N/A)  TURP (TRANSURETHRAL RESECTION OF PROSTATE) (N/A)      Hospital Course:   89-year-old male presents to the ER for evaluation of bleeding from the penis.  The patient has had this problem intermittently for the past 2 months and has been seen here and admitted here for multiple times.  Urology believes that the bleeding is coming from the prostate. Urology saw patient and ideally recommends irrigate tejeda PRN and transfuse PRN. Will plan to add 1% alum irrigation for hematuria to start tomorrow. May need endoscopic intervention in near future but currently feel risks of invasive intervention would be overly morbid. S/P TURP and fulguration of prostate on 1/8/18. Urology recommended home with  3way tejeda and irrigation syringe. Daughter did not agree and a voiding trail was done. Daughter wishing for patient to be discharge to SNF but patient was denies and recommended home with home health services. Patient discharge home with  [facility] program. Patient urinating minimal and is on HD M-W-F. Patient will need to f/u urology upon discharge and PCP.     Patient without complaints today, did have urine in diaper with with scant blood, patient to f/u with Dr. Banerjee, daughter aware. Patient discharging home with [facility].    Physical Exam   Constitutional: He appears well-developed and well-nourished. No distress.   Eyes:   Right corneal opacity   Cardiovascular: Normal rate, regular rhythm and normal heart sounds. Exam reveals no gallop and no friction rub.   No murmur heard.  Pulmonary/Chest: Effort normal and breath sounds normal. No respiratory distress. He has no wheezes. He has no rales.   Abdominal: Soft. Bowel sounds are normal. He exhibits no distension. There is no tenderness.   Musculoskeletal: Normal range of motion. He exhibits no edema or tenderness.   Neurological: He is alert.   Oriented to self and situation, generalized weakness to x4 extremities    Skin: Skin is warm and dry. No rash noted. He is not diaphoretic. No cyanosis. Nails show no clubbing.   Psychiatric: He has a normal mood and affect. His behavior is normal        Consults:   Consults (From admission, onward)        Status Ordering Provider     Inpatient consult to Nephrology  Once     Provider:  (Not yet assigned)    Completed RADHA MORALES     Inpatient consult to [facility]  Once     Provider:  (Not yet assigned)    Completed MARGOT RAM     Inpatient consult to Urology  Once     Provider:  (Not yet assigned)    Completed RADHA MORALES          Hematuria      - urology consulted  - Urine culture - NGTD   - cystoscopy last admission unrevealing  - Low H/H, type and screen with transfusion of 1U pRBCs  - Hgb has been  stable for a couple days.s/p HD today (1/8).  2d echo- Moderately decreased left ventricular systolic function - worse in the anterolateral wall. The estimated ejection fraction is 35%. Grade I (mild) left ventricular diastolic dysfunction consistent with impaired relaxation. CBI appeared to clot off however it cleared easily with no clots irrigated out which suggests what little urine he makes daily is not sufficient to clear residual bladder/prostatic bleeding.,alum unavailable at this institution. If patient continues to bleed today Urology to pursue cystoscopy with possible intervention.(1/8).   - CT 11/21/2018:   1. Irregular contour about the superolateral aspect of the right kidney, mass cannot be excluded.  There is a tiny non-obstructing calcification within that region.  Right renal cyst.  4.  Several enlarged para-aortic lymph nodes extending along the left ileopsoas with some obliteration of the fat planes with an additional enlarged left external iliac lymph node.  These findings are new since CT pelvis 11/14/2017.     S/P TURP and fulguration of prostate on 1/8/18. Suspected prostate as a source of bleeding, unlikely due to small renal mass. Appeared to clot off this morning,CBI restarted  this AM on slow drip with clamp again in am.        Urology OK sending patient  home with a 60cc catheter tipped syringe and irrigation supplies, if family member or caretaker is amenable to learning how to irrigate his catheter in the event it should clog, it would likely circumvent an emergency department visit.Daughter not willing to irrigate tejeda per urology instructions.  Tejeda Catheter removed for voiding trial.     Dementia with behavioral disturbance    -AAOX2 -oriented to month and year   - zyprexa PRN     Renovascular hypertension    -continue home medications. controlled for now      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    -diet controlled, start low dose SSI. Glucose controlled . HbA1c  at 6.3     ESRD (end stage renal disease)    -HD MWF  - nephrology consulted  - BP and lytes non-emergent      Benign prostatic hyperplasia with urinary retention    -continue Dutasteride      Blindness of right eye    -home gtts     Anemia in chronic kidney disease, on chronic dialysis    -Hgb 6.5 -> 8.8--> 8.7 with hematuria. 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 Anterior and anterolateral hypokinesis. Previously discussed stress test to evaluate for regional ischemia (stress test in 2011 negative) but risk vs benefit (angiogram, etc) and with patients/families  decided not to pursue any further testing.      compensated, continue home medications.  2d echo- Moderately decreased left ventricular systolic function - worse in the anterolateral wall. The estimated ejection fraction is 35%. Grade I (mild) left ventricular diastolic dysfunction consistent with impaired relaxation       Seizure disorder    -continue home keppra       Final Active Diagnoses:    Diagnosis Date Noted POA    Hematuria [R31.9] 12/05/2018 Yes    Dementia with behavioral disturbance [F03.91] 12/05/2017 Yes    Renovascular hypertension [I15.0] 12/13/2015 Yes    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 [E11.22, N18.6]  Yes    ESRD (end stage renal disease) [N18.6]  Yes    Benign prostatic hyperplasia with urinary retention [N40.1, R33.8] 05/06/2014 Yes    Blindness of right eye [H54.40]  Yes    Anemia in chronic kidney disease, on chronic dialysis [N18.6, D63.1, Z99.2] 11/28/2012 Not Applicable    Chronic combined systolic and diastolic heart failure [I50.42] 11/04/2012 Yes     Chronic    Seizure disorder [G40.909] 11/04/2012 Yes     Chronic      Problems Resolved During this Admission:    Diagnosis Date Noted Date  Resolved POA    PRINCIPAL PROBLEM:  Gross hematuria [R31.0] 11/21/2018 01/10/2019 Yes    Pre-op evaluation [Z01.818]  01/15/2019 Not Applicable    Altered mental status [R41.82] 11/04/2012 01/10/2019 No       Discharged Condition: stable    Disposition: Home-Health Care Svc    Follow Up:  Follow-up Information     [facility] - Urology 4th Floor On 1/14/2019.    Specialty:  Urology  Why:  for voiding trial with an DWAINE  Contact information:  [address]  [facility] [address] [phone]  Additional information:  [facility] - 4th Floor           Bart Shaw MD. Schedule an appointment as soon as possible for a visit in 2 weeks.    Specialties:  Internal Medicine, Geriatric Medicine  Contact information:  [address] [place]  [facility] [address]  [phone]                 Patient Instructions:      Diet renal     No driving until:     Notify your health care provider if you experience any of the following:  temperature >100.4     Notify your health care provider if you experience any of the following:  persistent nausea and vomiting or diarrhea     Notify your health care provider if you experience any of the following:  severe uncontrolled pain     Notify your health care provider if you experience any of the following:  difficulty breathing or increased cough     Notify your health care provider if you experience any of the following:  severe persistent headache     Notify your health care provider if you experience any of the following:  worsening rash     Notify your health care provider if you experience any of the following:  increased confusion or weakness     Notify your health care provider if you experience any of the following:  persistent dizziness, light-headedness, or visual disturbances     Notify your health care provider if you experience any of the following:   Order Comments: For lower ABD pain or has not urinated in 6hrs     Activity as tolerated       Significant Diagnostic  Studies: Labs:   CMP   Recent Labs   Lab 01/14/19  0420 01/15/19  0342   * 136   K 5.5* 3.9   CL 98 97   CO2 18* 32*   * 97   BUN 45* 15   CREATININE 7.9* 4.2*   CALCIUM 8.1* 8.1*   PROT 7.8 7.0   ALBUMIN 2.3* 2.2*   BILITOT 0.3 0.4   ALKPHOS 100 96   AST 38 25   ALT <5* <5*   ANIONGAP 13 7*   ESTGFRAFRICA 6.3* 13.5*   EGFRNONAA 5.5* 11.7*   , CBC   Recent Labs   Lab 01/14/19  0420 01/15/19  0342   WBC 8.51 6.16   HGB 10.3* 8.2*   HCT 32.5* 25.6*    173    and All labs within the past 24 hours have been reviewed    Pending Diagnostic Studies:     None         Medications:  Reconciled Home Medications:      Medication List      CHANGE how you take these medications    carvedilol 25 MG tablet  Commonly known as:  COREG  Take 1 tablet (25 mg total) by mouth 2 (two) times daily. Do not take on dialysis days. Hold for SBP < 110 or HR < 55  What changed:  additional instructions        CONTINUE taking these medications    amLODIPine 5 MG tablet  Commonly known as:  NORVASC  Take 1 tablet (5 mg total) by mouth once daily.     aspirin 81 MG Chew  Take 1 tablet (81 mg total) by mouth once daily.     atorvastatin 40 MG tablet  Commonly known as:  LIPITOR  Take 1 tablet (40 mg total) by mouth once daily.     BLOOD GLUCOSE TEST Strp  Generic drug:  blood sugar diagnostic  1 strip by Misc.(Non-Drug; Combo Route) route daily as needed.     brinzolamide 1 % ophthalmic suspension  Commonly known as:  AZOPT  Place 1 drop into the left eye 3 (three) times daily.     calcium acetate 667 mg capsule  Commonly known as:  PHOSLO  Take 2 capsules by mouth every day in the morning, then take 1 capsule with lunch and 2 capsules in the evening with meals     COMBIGAN 0.2-0.5 % Drop  Generic drug:  brimonidine-timolol  Place 1 drop into the left eye 2 (two) times daily.     dutasteride 0.5 mg capsule  Commonly known as:  AVODART  Take 1 capsule (0.5 mg total) by mouth once daily.     ketoconazole 2 % cream  Commonly known  as:  NIZORAL  Apply topically once daily. feet     levETIRAcetam 500 MG Tab  Commonly known as:  KEPPRA  Take 1 tablet (500 mg total) by mouth 2 (two) times daily.     NEPHRO-JESSICA 0.8 mg Tab  Generic drug:  B complex-vitamin C-folic acid  Take 1 tablet by mouth every morning.     NovoLOG Flexpen U-100 Insulin 100 unit/mL Inpn pen  Generic drug:  insulin aspart U-100  Insulin Pen Subcutaneous As directed.  -200 take 1 unitBS 201-250 take 2 unitsBS 251-300 take 3 unitsBS 301-350 take 4 unitsBS > 351 take 5 units and call MD     tamsulosin 0.4 mg Cap  Commonly known as:  FLOMAX  Take 1 capsule (0.4 mg total) by mouth once daily.     TRAVATAN Z OPHT  Place 1 drop into the left eye every evening. 1 Drops Left eye Every evening        STOP taking these medications    doxazosin 4 MG tablet  Commonly known as:  CARDURA     traMADol 50 mg tablet  Commonly known as:  ULTRAM            Indwelling Lines/Drains at time of discharge:   Lines/Drains/Airways     Drain                 Hemodialysis AV Fistula 08/08/14 0800 Left upper arm 1621 days                Time spent on the discharge of patient: 35 minutes  Patient was seen and examined on the date of discharge and determined to be suitable for discharge.         Katie Jules NP  Department of Hospital Medicine  [facility]

## 2019-01-15 NOTE — PLAN OF CARE
[facility]    HOME HEALTH ORDERS  FACE TO FACE ENCOUNTER    Patient Name: Ronald Campbell  YOB: 1929    PCP: Bart Shaw MD   PCP Address: [address] [facility] [address]  PCP Phone Number: [phone]  PCP Fax: [phone]    Encounter Date: 01/15/2019    Admit to Home Health    Diagnoses:  Active Hospital Problems    Diagnosis  POA    Anemia [D64.9]  Yes    Hematuria [R31.9]  Yes    HTN (hypertension) [I10]  Yes    Dyslipidemia [E78.5]  Yes    ESRD (end stage renal disease) [N18.6]  Yes    Blindness of right eye [H54.40]  Yes    Anemia in chronic kidney disease, on chronic dialysis [N18.6, D63.1, Z99.2]  Not Applicable    Chronic combined systolic and diastolic heart failure [I50.42]  Yes     Chronic    Seizure disorder [G40.909]  Yes     Chronic      Resolved Hospital Problems    Diagnosis Date Resolved POA    *Gross hematuria [R31.0] 01/10/2019 Yes    Pre-op evaluation [Z01.818] 01/15/2019 Not Applicable    Altered mental status [R41.82] 01/10/2019 No       Future Appointments   Date Time Provider Department Center   1/29/2019 10:20 AM Dara Germain PA-C [facility] UROLOGY [facility]     Follow-up Information     [facility] - Urology 4th Floor On 1/14/2019.    Specialty:  Urology  Why:  for voiding trial with an DWAINE  Contact information:  [address]  [facility] [address] [phone]  Additional information:  [facility] - 4th Floor           Bart Shaw MD. Schedule an appointment as soon as possible for a visit in 2 weeks.    Specialties:  Internal Medicine, Geriatric Medicine  Contact information:  [address] [facility] [address]  [phone]                 I have seen and examined this patient face to face today. My clinical findings that support the need for the home health skilled services and home bound status are the following:  Weakness/numbness causing balance and gait disturbance due to Weakness/Debility and Hematuria making it  taxing to leave home.    Allergies:  Review of patient's allergies indicates:   Allergen Reactions    Lisinopril Swelling       Diet: renal diet    Activities: activity as tolerated and no lifting, Driving, or Strenuous exercise    Nursing:   SN to complete comprehensive assessment including routine vital signs. Instruct on disease process and s/s of complications to report to MD. Review/verify medication list sent home with the patient at time of discharge  and instruct patient/caregiver as needed. Frequency may be adjusted depending on start of care date.    Notify MD if SBP > 160 or < 90; DBP > 90 or < 50; HR > 120 or < 50; Temp > 101      CONSULTS:    Physical Therapy to evaluate and treat. Evaluate for home safety and equipment needs; Establish/upgrade home exercise program. Perform / instruct on therapeutic exercises, gait training, transfer training, and Range of Motion.  Occupational Therapy to evaluate and treat. Evaluate home environment for safety and equipment needs. Perform/Instruct on transfers, ADL training, ROM, and therapeutic exercises.  Aide to provide assistance with personal care, ADLs, and vital signs.    MISCELLANEOUS CARE:  Diabetic Care:   SN to perform and educate Diabetic management with blood glucose monitoring:, Fingerstick blood sugar AC and HS and Report CBG < 60 or > 350 to physician.    WOUND CARE ORDERS  n/a      Medications: Review discharge medications with patient and family and provide education.      Current Discharge Medication List      CONTINUE these medications which have CHANGED    Details   carvedilol (COREG) 25 MG tablet Take 1 tablet (25 mg total) by mouth 2 (two) times daily. Do not take on dialysis days. Hold for SBP < 110 or HR < 55  Qty: 60 tablet, Refills: 2         CONTINUE these medications which have NOT CHANGED    Details   amLODIPine (NORVASC) 5 MG tablet Take 1 tablet (5 mg total) by mouth once daily.  Qty: 30 tablet, Refills: 3      aspirin 81 MG Chew Take  1 tablet (81 mg total) by mouth once daily.  Qty: 30 tablet, Refills: 3      atorvastatin (LIPITOR) 40 MG tablet Take 1 tablet (40 mg total) by mouth once daily.  Qty: 30 tablet, Refills: 9      B complex-vitamin C-folic acid (NEPHRO-JESSICA) 0.8 mg Tab Take 1 tablet by mouth every morning.       blood sugar diagnostic (BLOOD GLUCOSE TEST) Strp 1 strip by Misc.(Non-Drug; Combo Route) route daily as needed.       brimonidine-timolol (COMBIGAN) 0.2-0.5 % Drop Place 1 drop into the left eye 2 (two) times daily.      brinzolamide (AZOPT) 1 % ophthalmic suspension Place 1 drop into the left eye 3 (three) times daily.       calcium acetate (PHOSLO) 667 mg capsule Take 2 capsules by mouth every day in the morning, then take 1 capsule with lunch and 2 capsules in the evening with meals      dutasteride (AVODART) 0.5 mg capsule Take 1 capsule (0.5 mg total) by mouth once daily.  Qty: 30 capsule, Refills: 1      insulin aspart (NOVOLOG FLEXPEN) 100 unit/mL InPn  Insulin Pen Subcutaneous As directed.  -200 take 1 unitBS 201-250 take 2 unitsBS 251-300 take 3 unitsBS 301-350 take 4 unitsBS > 351 take 5 units and call MD      ketoconazole (NIZORAL) 2 % cream Apply topically once daily. feet  Qty: 30 g, Refills: 3    Comments: Any alternate antifungal cream OK  Associated Diagnoses: Tinea pedis of both feet      levetiracetam (KEPPRA) 500 MG Tab Take 1 tablet (500 mg total) by mouth 2 (two) times daily.  Qty: 60 tablet, Refills: 11      tamsulosin (FLOMAX) 0.4 mg Cap Take 1 capsule (0.4 mg total) by mouth once daily.  Qty: 30 capsule, Refills: 11      TRAVOPROST (TRAVATAN Z OPHT) Place 1 drop into the left eye every evening. 1 Drops Left eye Every evening         STOP taking these medications       traMADol (ULTRAM) 50 mg tablet Comments:   Reason for Stopping:         doxazosin (CARDURA) 4 MG tablet Comments:   Reason for Stopping:               I certify that this patient is confined to his home and needs intermittent skilled  nursing care, physical therapy and occupational therapy.

## 2019-01-15 NOTE — ASSESSMENT & PLAN NOTE
- urology consulted  - Urine culture - NGTD   - cystoscopy last admission unrevealing  - Low H/H, type and screen with transfusion of 1U pRBCs  - Hgb has been stable for a couple days.s/p HD today (1/8).  2d echo- Moderately decreased left ventricular systolic function - worse in the anterolateral wall. The estimated ejection fraction is 35%. Grade I (mild) left ventricular diastolic dysfunction consistent with impaired relaxation. CBI appeared to clot off however it cleared easily with no clots irrigated out which suggests what little urine he makes daily is not sufficient to clear residual bladder/prostatic bleeding.,alum unavailable at this institution. If patient continues to bleed today Urology to pursue cystoscopy with possible intervention.(1/8).   - CT 11/21/2018:   1. Irregular contour about the superolateral aspect of the right kidney, mass cannot be excluded.  There is a tiny non-obstructing calcification within that region.  Right renal cyst.  4.  Several enlarged para-aortic lymph nodes extending along the left ileopsoas with some obliteration of the fat planes with an additional enlarged left external iliac lymph node.  These findings are new since CT pelvis 11/14/2017.     S/P TURP and fulguration of prostate on 1/8/18. Suspected prostate as a source of bleeding, unlikely due to small renal mass. Appeared to clot off this morning,CBI restarted  this AM on slow drip with clamp again in am.        Urology OK sending patient  home with a 60cc catheter tipped syringe and irrigation supplies, if family member or caretaker is amenable to learning how to irrigate his catheter in the event it should clog, it would likely circumvent an emergency department visit.Daughter not willing to irrigate tejeda per urology instructions.  Tejeda Catheter removed for voiding trial.

## 2019-01-15 NOTE — PROGRESS NOTES
[facility]    HOME HEALTH ORDERS  FACE TO FACE ENCOUNTER    Patient Name: Ronald Campbell  YOB: 1929    PCP: Bart Shaw MD   PCP Address: [address] [facility] [address]  PCP Phone Number: [phone]  PCP Fax: [phone]    Encounter Date: 01/15/2019    Admit to Home Health    Diagnoses:  Active Hospital Problems    Diagnosis  POA    Anemia [D64.9]  Yes    Hematuria [R31.9]  Yes    HTN (hypertension) [I10]  Yes    Dyslipidemia [E78.5]  Yes    ESRD (end stage renal disease) [N18.6]  Yes    Blindness of right eye [H54.40]  Yes    Anemia in chronic kidney disease, on chronic dialysis [N18.6, D63.1, Z99.2]  Not Applicable    Chronic combined systolic and diastolic heart failure [I50.42]  Yes     Chronic    Seizure disorder [G40.909]  Yes     Chronic      Resolved Hospital Problems    Diagnosis Date Resolved POA    *Gross hematuria [R31.0] 01/10/2019 Yes    Pre-op evaluation [Z01.818] 01/15/2019 Not Applicable    Altered mental status [R41.82] 01/10/2019 No       Future Appointments   Date Time Provider Department Center   1/29/2019 10:20 AM Dara Germain PA-C [facility] UROLOGY [facility]     Follow-up Information     [facility] - Urology 4th Floor On 1/14/2019.    Specialty:  Urology  Why:  for voiding trial with an DWAINE  Contact information:  [address]  [facility] [address] [phone]  Additional information:  [facility] - 4th Floor           Bart Shaw MD. Schedule an appointment as soon as possible for a visit in 2 weeks.    Specialties:  Internal Medicine, Geriatric Medicine  Contact information:  [address] [facility] [address]  [phone]                 I have seen and examined this patient face to face today. My clinical findings that support the need for the home health skilled services and home bound status are the following:  Weakness/numbness causing balance and gait disturbance due to Weakness/Debility and Hematuria making it  taxing to leave home.    Allergies:  Review of patient's allergies indicates:   Allergen Reactions    Lisinopril Swelling       Diet: renal diet    Activities: activity as tolerated and no lifting, Driving, or Strenuous exercise    Nursing:   SN to complete comprehensive assessment including routine vital signs. Instruct on disease process and s/s of complications to report to MD. Review/verify medication list sent home with the patient at time of discharge  and instruct patient/caregiver as needed. Frequency may be adjusted depending on start of care date.    Notify MD if SBP > 160 or < 90; DBP > 90 or < 50; HR > 120 or < 50; Temp > 101      CONSULTS:    Physical Therapy to evaluate and treat. Evaluate for home safety and equipment needs; Establish/upgrade home exercise program. Perform / instruct on therapeutic exercises, gait training, transfer training, and Range of Motion.  Occupational Therapy to evaluate and treat. Evaluate home environment for safety and equipment needs. Perform/Instruct on transfers, ADL training, ROM, and therapeutic exercises.  Aide to provide assistance with personal care, ADLs, and vital signs.    MISCELLANEOUS CARE:  Diabetic Care:   SN to perform and educate Diabetic management with blood glucose monitoring:, Fingerstick blood sugar AC and HS and Report CBG < 60 or > 350 to physician.    WOUND CARE ORDERS  n/a      Medications: Review discharge medications with patient and family and provide education.      Current Discharge Medication List      CONTINUE these medications which have CHANGED    Details   carvedilol (COREG) 25 MG tablet Take 1 tablet (25 mg total) by mouth 2 (two) times daily. Do not take on dialysis days. Hold for SBP < 110 or HR < 55  Qty: 60 tablet, Refills: 2         CONTINUE these medications which have NOT CHANGED    Details   amLODIPine (NORVASC) 5 MG tablet Take 1 tablet (5 mg total) by mouth once daily.  Qty: 30 tablet, Refills: 3      aspirin 81 MG Chew Take  1 tablet (81 mg total) by mouth once daily.  Qty: 30 tablet, Refills: 3      atorvastatin (LIPITOR) 40 MG tablet Take 1 tablet (40 mg total) by mouth once daily.  Qty: 30 tablet, Refills: 9      B complex-vitamin C-folic acid (NEPHRO-JESSICA) 0.8 mg Tab Take 1 tablet by mouth every morning.       blood sugar diagnostic (BLOOD GLUCOSE TEST) Strp 1 strip by Misc.(Non-Drug; Combo Route) route daily as needed.       brimonidine-timolol (COMBIGAN) 0.2-0.5 % Drop Place 1 drop into the left eye 2 (two) times daily.      brinzolamide (AZOPT) 1 % ophthalmic suspension Place 1 drop into the left eye 3 (three) times daily.       calcium acetate (PHOSLO) 667 mg capsule Take 2 capsules by mouth every day in the morning, then take 1 capsule with lunch and 2 capsules in the evening with meals      dutasteride (AVODART) 0.5 mg capsule Take 1 capsule (0.5 mg total) by mouth once daily.  Qty: 30 capsule, Refills: 1      insulin aspart (NOVOLOG FLEXPEN) 100 unit/mL InPn  Insulin Pen Subcutaneous As directed.  -200 take 1 unitBS 201-250 take 2 unitsBS 251-300 take 3 unitsBS 301-350 take 4 unitsBS > 351 take 5 units and call MD      ketoconazole (NIZORAL) 2 % cream Apply topically once daily. feet  Qty: 30 g, Refills: 3    Comments: Any alternate antifungal cream OK  Associated Diagnoses: Tinea pedis of both feet      levetiracetam (KEPPRA) 500 MG Tab Take 1 tablet (500 mg total) by mouth 2 (two) times daily.  Qty: 60 tablet, Refills: 11      tamsulosin (FLOMAX) 0.4 mg Cap Take 1 capsule (0.4 mg total) by mouth once daily.  Qty: 30 capsule, Refills: 11      TRAVOPROST (TRAVATAN Z OPHT) Place 1 drop into the left eye every evening. 1 Drops Left eye Every evening         STOP taking these medications       traMADol (ULTRAM) 50 mg tablet Comments:   Reason for Stopping:         doxazosin (CARDURA) 4 MG tablet Comments:   Reason for Stopping:               I certify that this patient is confined to his home and needs intermittent skilled  nursing care, physical therapy and occupational therapy.

## 2019-01-15 NOTE — PLAN OF CARE
Problem: Adult Inpatient Plan of Care  Goal: Plan of Care Review  Outcome: Ongoing (interventions implemented as appropriate)  Pt AAOx4, VSS, No falls noted, fall precautions remain. Pain assessed, no pain noted, pain controlled with PRN regimen, pt comfortable, frequent rounds made for safety and patient care. SCD's in place. Call light within reach, bed wheels locked, bed in lowest position, side rails ^x2, safety maintained. NADN, Will continue monitor.

## 2019-01-15 NOTE — ASSESSMENT & PLAN NOTE
-Hgb 6.5 -> 8.8--> 8.7 with hematuria. PRBC 1 unit transfusion today   - Transfused 2 U pRBCs (1/4)

## 2019-01-15 NOTE — ASSESSMENT & PLAN NOTE
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 Anterior and anterolateral hypokinesis. Previously discussed stress test to evaluate for regional ischemia (stress test in 2011 negative) but risk vs benefit (angiogram, etc) and with patients/families  decided not to pursue any further testing.      compensated, continue home medications.  2d echo- Moderately decreased left ventricular systolic function - worse in the anterolateral wall. The estimated ejection fraction is 35%. Grade I (mild) left ventricular diastolic dysfunction consistent with impaired relaxation

## 2019-01-15 NOTE — HOSPITAL COURSE
89-year-old male presents to the ER for evaluation of bleeding from the penis.  The patient has had this problem intermittently for the past 2 months and has been seen here and admitted here for multiple times.  Urology believes that the bleeding is coming from the prostate. Urology saw patient and ideally recommends irrigate tejeda PRN and transfuse PRN. Will plan to add 1% alum irrigation for hematuria to start tomorrow. May need endoscopic intervention in near future but currently feel risks of invasive intervention would be overly morbid. S/P TURP and fulguration of prostate on 1/8/18. Urology recommended home with 3way tejeda and irrigation syringe. Daughter did not agree and a voiding trail was done. Daughter wishing for patient to be discharge to SNF but patient was denies and recommended home with home health services. Patient discharge home with  PACE program. Patient urinating minimal and is on HD M-W-F. Patient will need to f/u urology upon discharge and PCP. Patient did not leave, daughter refused to take him home and wanted additional therapy services, Patient was recommended for SNF placements-submitted referral, denied by community skilled. Patient's daughter appealed discharge.

## 2019-01-15 NOTE — TELEPHONE ENCOUNTER
Pathology 1/8/19    SPECIMEN  1) Prostate chips.  FINAL PATHOLOGIC DIAGNOSIS  Prostate chips, transurethral resection of prostate:  Positive for prostatic adenocarcinoma, John pattern (4+ 5); score 9, with focal small cell features.  Tumor involves 90% of submitted tissue.    I informed the result of the above pathology to pt's daughter Gabriela Campbell.  As I explained to her before, will plan to give him Lupron or  Trelstar injection.  May start him on casodex 50 mg daily.  Will get a baseline bone scan.

## 2019-01-15 NOTE — PLAN OF CARE
Problem: Adult Inpatient Plan of Care  Goal: Optimal Comfort and Wellbeing    Intervention: Provide Person-Centered Care  Assisted with feeding 50% lunch tolerated. No complaints of pain, relation station playing, call light in reach. Will continue to monitor and answer call light in person.

## 2019-01-15 NOTE — PLAN OF CARE
Problem: Fall Injury Risk  Goal: Absence of Fall and Fall-Related Injury    Intervention: Promote Injury-Free Environment  Keep care area uncluttered and needed items within reach. Pt legally blind continue to answer call light in person and frequent rounding.

## 2019-01-15 NOTE — PROGRESS NOTES
Pathology 1/8/19    SPECIMEN  1) Prostate chips.  FINAL PATHOLOGIC DIAGNOSIS  Prostate chips, transurethral resection of prostate:  Positive for prostatic adenocarcinoma, John pattern (4+ 5); score 9, with focal small cell features.  Tumor involves 90% of submitted tissue.    I informed the result of the above pathology to pt's daughter Gabriela Campbell.  As I explained to her before, will plan to give him Lupron or  Trelstar injection on 1/29/19 on his urology clinic visit with Dara Germain NP .  May start him on casodex 50 mg daily.  Recommend vit D and Calcium supplement ( citracal )  Recommend to get a bone scan to rule out metastatic bone disease.

## 2019-01-16 NOTE — PLAN OF CARE
SW provided pt daughter with IMM.  Pt daughter is appealing pt d/c through [facility]. Pt daughter requested that SW resubmit request for SNF placement to OSNF.  SW will resubmit as requested.  SW will continue to follow.         Shanice Downing, MSW, [facility]  [facility]  [id number]

## 2019-01-16 NOTE — PROGRESS NOTES
[facility] H[facility]ODIALYSIS NOTE    Ronald Campbell is a 89 y.o. male currently on hemodialysis for removal of uremic toxins and volume.    Labs have been reviewed and the dialysate bath has been adjusted.    There are no symptoms of hypotension, chest pain, dyspnea, nausea or vomiting.    Labs:      Recent Labs   Lab 01/10/19  1350  01/14/19  0420 01/15/19  0342 01/16/19  0635   *   < > 129* 136 133*   K 4.6   < > 5.5* 3.9 3.6   CL 99   < > 98 97 95   CO2 24   < > 18* 32* 27   BUN 45*   < > 45* 15 26*   CREATININE 7.1*   < > 7.9* 4.2* 5.9*   CALCIUM 8.2*   < > 8.1* 8.1* 8.0*   PHOS 3.9  --   --   --   --     < > = values in this interval not displayed.       Recent Labs   Lab 01/14/19  0420 01/15/19  0342 01/16/19  0635   WBC 8.51 6.16 6.48   HGB 10.3* 8.2* 8.2*   HCT 32.5* 25.6* 25.2*    173 176       Assessment/Plan:    HD today for removal of uremic toxins and volume.

## 2019-01-16 NOTE — PLAN OF CARE
Problem: Occupational Therapy Goal  Goal: Occupational Therapy Goal  Goals to be met by: 1/24/19     Patient will increase functional independence with ADLs by performing:    Grooming while standing at sink with Set-up Assistance with [place] assistance as needed  Toileting from toilet with Minimal Assistance for hygiene and clothing management.   Toilet transfer to toilet with Minimal Assistance.     Outcome: Ongoing (interventions implemented as appropriate)  Goals remain appropriate     Comments: Continue OT POC     Archana Jeff OT  1/16/2019

## 2019-01-16 NOTE — PROGRESS NOTES
[facility] Hospital Medicine  Progress Note    Patient Name: Ronald Campbell  MRN: [id number]  Patient Class: IP- Inpatient   Admission Date: 1/4/2019  Length of Stay: 12 days  Attending Physician: Alix Beckford MD  Primary Care Provider: Bart Shaw MD    [facility] Medicine Team: [facility] HOSP MED  Katie Jules NP    Subjective:     Principal Problem:Gross hematuria    HPI:  89-year-old male presents to the ER for evaluation of bleeding from the penis.  The patient has had this problem intermittently for the past 2 months and has been seen here and admitted here for multiple times.  Urology believes that the bleeding is coming from the prostate.  Last time the patient was here he was in urinary retention.  A Tejeda catheter was placed and the patient was admitted.  He underwent continuous bladder irrigation and symptoms improved.  Of note the patient has ESRD and is dialyzed Monday Wednesday Friday.  Last dialysis was Wednesday January 2nd.  The patient does make urine usually twice a day.     Has pallaiative care, has been seen seeral times in ED recently for same condition.    Hospital Course:  89-year-old male presents to the ER for evaluation of bleeding from the penis.  The patient has had this problem intermittently for the past 2 months and has been seen here and admitted here for multiple times.  Urology believes that the bleeding is coming from the prostate. Urology saw patient and ideally recommends irrigate tejeda PRN and transfuse PRN. Will plan to add 1% alum irrigation for hematuria to start tomorrow. May need endoscopic intervention in near future but currently feel risks of invasive intervention would be overly morbid. S/P TURP and fulguration of prostate on 1/8/18. Urology recommended home with 3way tejeda and irrigation syringe. Daughter did not agree and a voiding trail was done. Daughter wishing for patient to be discharge to SNF but patient was denies and recommended home with  home health services. Patient discharge home with  PACE program. Patient urinating minimal and is on HD M-W-F. Patient will need to f/u urology upon discharge and PCP. Patient did not leave, daughter refused to take him home and wanted additional therapy services, Patient was recommended for SNF placements-submitted referral, denied by community skilled. Patient's daughter appealed discharge.    Interval History: Patient seen at bedside, nurse reports good night. Discussed care and given update with daughter, Chantale at bedside. She appealed discharge, OSNF consult placed. Daughter questioning hematuria discussed with nurse denies any farther events of hematuria.     Review of Systems   Constitutional: Positive for fatigue. Negative for appetite change, chills and fever.   HENT: Negative for trouble swallowing.    Respiratory: Negative for cough, chest tightness, shortness of breath and wheezing.    Cardiovascular: Negative for chest pain, palpitations and leg swelling.   Gastrointestinal: Negative for abdominal pain, constipation, diarrhea and nausea.   Genitourinary: Negative for difficulty urinating, frequency and urgency.   Musculoskeletal: Negative for arthralgias and myalgias.   Skin: Negative for rash.   Neurological: Positive for weakness. Negative for dizziness, light-headedness and headaches.   Psychiatric/Behavioral: Negative for sleep disturbance.     Scheduled Meds:   sodium chloride 0.9%   Intravenous Once    amLODIPine  5 mg Oral Daily    aspirin  81 mg Oral Daily    bicalutamide  50 mg Oral Daily    brinzolamide  1 drop Left Eye TID    calcium acetate  667 mg Oral TID WM    carvedilol  25 mg Oral BID    dutasteride  0.5 mg Oral Daily    epoetin napoleon (PROCRIT) injection  8,000 Units Intravenous Every Tues, Thurs, Sat    levETIRAcetam  500 mg Oral BID    polyethylene glycol  17 g Oral Daily    travoprost  1 drop Left Eye QHS     Continuous Infusions:  PRN Meds:.sodium chloride, sodium  chloride, sodium chloride, sodium chloride 0.9%, sodium chloride 0.9%, dextrose 50%, dextrose 50%, glucagon (human recombinant), glucose, glucose, insulin aspart U-100, sodium chloride 0.9%, sodium chloride 0.9%, traMADol    Objective:     Vital Signs (Most Recent):  Temp: 98 °F (36.7 °C) (01/16/19 1251)  Pulse: 71 (01/16/19 1251)  Resp: 18 (01/16/19 1251)  BP: (!) 161/70 (01/16/19 1251)  SpO2: 98 % (01/16/19 1251) Vital Signs (24h Range):  Temp:  [96.6 °F (35.9 °C)-98.1 °F (36.7 °C)] 98 °F (36.7 °C)  Pulse:  [59-77] 71  Resp:  [16-18] 18  SpO2:  [97 %-100 %] 98 %  BP: (106-161)/(47-83) 161/70     Weight: 79.4 kg (175 lb)  Body mass index is 23.73 kg/m².    Intake/Output Summary (Last 24 hours) at 1/16/2019 1621  Last data filed at 1/16/2019 0524  Gross per 24 hour   Intake 402 ml   Output 0 ml   Net 402 ml      Physical Exam   Constitutional: He appears well-developed and well-nourished. No distress.   Eyes:   Right corneal opacity   Cardiovascular: Normal rate, regular rhythm and normal heart sounds. Exam reveals no gallop and no friction rub.   No murmur heard.  Pulmonary/Chest: Effort normal and breath sounds normal. No respiratory distress. He has no wheezes. He has no rales.   Abdominal: Soft. Bowel sounds are normal. He exhibits no distension. There is no tenderness.   Musculoskeletal: Normal range of motion. He exhibits no edema or tenderness.   Neurological: He is alert.   Oriented to self and situation, generalized weakness to x4 extremities    Skin: Skin is warm and dry. No rash noted. He is not diaphoretic. No cyanosis. Nails show no clubbing.   Psychiatric: He has a normal mood and affect. His behavior is normal.       Significant Labs:   Recent Labs   Lab 01/14/19  0420 01/15/19  0342 01/16/19  0635   WBC 8.51 6.16 6.48   HGB 10.3* 8.2* 8.2*   HCT 32.5* 25.6* 25.2*    173 176     Recent Labs   Lab 01/14/19  0420 01/15/19  0342 01/16/19  0635   * 136 133*   K 5.5* 3.9 3.6   CL 98 97 95    CO2 18* 32* 27   BUN 45* 15 26*   CREATININE 7.9* 4.2* 5.9*   CALCIUM 8.1* 8.1* 8.0*   PROT 7.8 7.0 7.0   BILITOT 0.3 0.4 0.4   ALKPHOS 100 96 90   ALT <5* <5* <5*   AST 38 25 21     Lab Results   Component Value Date    LABPROT 11.4 11/21/2018    ALBUMIN 2.2 (L) 01/16/2019     Lab Results   Component Value Date    CALCIUM 8.0 (L) 01/16/2019    PHOS 3.9 01/10/2019     POCT Glucose   Date Value Ref Range Status   01/16/2019 95 70 - 110 mg/dL Final   01/16/2019 119 (H) 70 - 110 mg/dL Final   01/16/2019 93 70 - 110 mg/dL Final   01/15/2019 189 (H) 70 - 110 mg/dL Final   01/15/2019 169 (H) 70 - 110 mg/dL Final   01/15/2019 144 (H) 70 - 110 mg/dL Final   01/15/2019 89 70 - 110 mg/dL Final   01/14/2019 107 70 - 110 mg/dL Final   01/14/2019 97 70 - 110 mg/dL Final   01/14/2019 175 (H) 70 - 110 mg/dL Final   01/14/2019 94 70 - 110 mg/dL Final   01/13/2019 181 (H) 70 - 110 mg/dL Final       Significant Imaging: na    Assessment/Plan:      Hematuria    - urology consulted  - Urine culture - NGTD   - cystoscopy last admission unrevealing  - Low H/H, type and screen with transfusion of 1U pRBCs  - Hgb has been stable for a couple days.s/p HD today (1/8).  2d echo- Moderately decreased left ventricular systolic function - worse in the anterolateral wall. The estimated ejection fraction is 35%. Grade I (mild) left ventricular diastolic dysfunction consistent with impaired relaxation. CBI appeared to clot off however it cleared easily with no clots irrigated out which suggests what little urine he makes daily is not sufficient to clear residual bladder/prostatic bleeding.,alum unavailable at this institution. If patient continues to bleed today Urology to pursue cystoscopy with possible intervention.(1/8).   - CT 11/21/2018:   1. Irregular contour about the superolateral aspect of the right kidney, mass cannot be excluded.  There is a tiny non-obstructing calcification within that region.  Right renal cyst.  4.  Several enlarged  para-aortic lymph nodes extending along the left ileopsoas with some obliteration of the fat planes with an additional enlarged left external iliac lymph node.  These findings are new since CT pelvis 11/14/2017.     S/P TURP and fulguration of prostate on 1/8/18. Suspected prostate as a source of bleeding, unlikely due to small renal mass. Appeared to clot off this morning,CBI restarted  this AM on slow drip with clamp again in am.        Urology OK sending patient  home with a 60cc catheter tipped syringe and irrigation supplies, if family member or caretaker is amenable to learning how to irrigate his catheter in the event it should clog, it would likely circumvent an emergency department visit.Daughter not willing to irrigate tejeda per urology instructions.  Tejeda Catheter removed for voiding trial.    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    · diet controlled  · Continue low dose hyperglycemia  · Glucose controlled  · Continue Ac and Hs     ESRD (end stage renal disease)    · HD MWF  · nephrology following     Benign prostatic hyperplasia with urinary retention    -continue Dutasteride and bicalutamide     Blindness of right eye    · Continue brinzolamide and travoprost      Anemia in chronic kidney disease, on chronic dialysis    · Transfused 2 U pRBCs (1/4) for Hgb 6.5  · Hgb today 8.2  · Continue epoetin injections   · Continue to monitor      Chronic combined systolic and diastolic heart failure    -last seen by cardiology 05/16 -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 Anterior and anterolateral hypokinesis. Previously discussed stress test to evaluate for regional ischemia (stress test in 2011 negative) but risk vs benefit (angiogram, etc) and with patients/families  decided not to pursue any further testing.      compensated, continue home medications.  2d echo- Moderately decreased left ventricular systolic function - worse in the anterolateral wall. The estimated ejection fraction is 35%. Grade I (mild) left ventricular diastolic dysfunction consistent with impaired relaxation     Seizure disorder    · continue home keppra       VTE Risk Mitigation (From admission, onward)        Ordered     IP VTE HIGH RISK PATIENT  Once      01/04/19 1456     Place MORGAN hose  Until discontinued      01/04/19 1456            Katie Jules NP  Department of Hospital Medicine   [facility]

## 2019-01-16 NOTE — PT/OT/SLP PROGRESS
"Occupational Therapy   Treatment    Name: Ronald Campbell  MRN: [id number]  Admitting Diagnosis:  Gross hematuria  8 Days Post-Op    Recommendations:     Discharge Recommendations: nursing facility, skilled  Discharge Equipment Recommendations:  none  Barriers to discharge:       Assessment:     Ronald Campbell is a 89 y.o. male with a medical diagnosis of Gross hematuria.  He presents with vision impairments and limited endurance impairing self care and mobility. Performance deficits affecting function are weakness, impaired endurance, impaired self care skills, impaired functional mobilty, visual deficits, impaired balance, gait instability, decreased safety awareness.     Daughter present for session, concerned for safety upon return home and requesting SNF placement. Following session, OT/PT spoke with daughter regarding pt's current level of assist. PTA, daughter reporting pt required 1 person assist for ambulation (50 ft to front door) and to ascend/descend 12 stairs at home. Pt goes to dialysis MWF and CNA from dialysis is available to assist pt with stairs on those days. OT/PT explained to daughter that pt requires min A for bed mobility, t/f, and stairs and was CGA to ambulate with use of RW. Daughter concerned regarding therapy's use of both OT and PT when practicing stairs. OT/PT stated that when ascending OT was only assisting with holding brief in place, and when descending PT was standing in front of pt in case of LOB forward; PT stated stairs could have been completed with min A of 1 person. Daughter concerned that d/c plan would rely on "semantics" of OT/PT notes and definitions of assist levels. PT explained that varying levels of assist with OT/PT while in the hospital could be a result of time of day or dialysis treatment schedule rather than a true representation of pt's current function. OT/PT explained that pt required assist of 1 person at baseline and pt's current functioning was not far from " baseline except for pt has decreased gait distance and requires assist for stairs. SW arrived at end of conversation and was made aware that pt ambulating 35 ft + 20 ft and completed 10 stairs with min A.     Rehab Prognosis:  Fair; patient would benefit from acute skilled OT services to address these deficits and reach maximum level of function.       Plan:     Patient to be seen 2 x/week to address the above listed problems via self-care/home management, therapeutic activities, therapeutic exercises  · Plan of Care Expires: 02/08/19  · Plan of Care Reviewed with: patient, daughter    Subjective     Pain/Comfort:  · Pain Rating 1: 0/10  · Pain Addressed 1: Reposition, Distraction, Cessation of Activity  · Pain Rating Post-Intervention 1: 0/10    Objective:     Communicated with: RN prior to session.  Patient found HOB elevated with no lines connected upon OT entry to room.    General Precautions: Standard, fall   Orthopedic Precautions:N/A   Braces: N/A     Occupational Performance:     Bed Mobility:    · Patient completed Scooting/Bridging with contact guard assistance  · Patient completed Supine to Sit with minimum assistance     Functional Mobility/Transfers:  · Patient completed Sit <> Stand Transfer with minimum assistance  with rolling walker  · Patient completed Bed <> Chair Transfer using Step Transfer technique with contact guard assistance with rolling walker  · Functional Mobility: Pt ambulate 35 ft + 20 ft with CGA using RW; assist for RW management 2/2 vision impairment and 1 seated rest break between trials. Pt ascend/descend 10 stairs with min A using 1 HR.    Activities of Daily Living:  · Upper Body Dressing: stand by assistance to don backward gown while seated EOB      AMPAC 6 Click ADL: 16    Treatment & Education:  Pt educated on role of OT/POC  Pt educated on importance of ambulation/UIC  White board/communication board updated    Patient left up in chair with all lines intact, call button in  reach and daughter presentEducation:      GOALS:   Multidisciplinary Problems     Occupational Therapy Goals        Problem: Occupational Therapy Goal    Goal Priority Disciplines Outcome Interventions   Occupational Therapy Goal     OT, PT/OT Ongoing (interventions implemented as appropriate)    Description:  Goals to be met by: 1/24/19     Patient will increase functional independence with ADLs by performing:    Grooming while standing at sink with Set-up Assistance with [place] assistance as needed  Toileting from toilet with Minimal Assistance for hygiene and clothing management.   Toilet transfer to toilet with Minimal Assistance.                      Time Tracking:     OT Date of Treatment: 01/16/19  OT Start Time: 1332  OT Stop Time: 1403  OT Total Time (min): 31 min    Billable Minutes:Therapeutic Activity 31    Archana Jeff OT  1/16/2019

## 2019-01-16 NOTE — ASSESSMENT & PLAN NOTE
· Transfused 2 U pRBCs (1/4) for Hgb 6.5  · Hgb today 8.2  · Continue epoetin injections   · Continue to monitor

## 2019-01-16 NOTE — ASSESSMENT & PLAN NOTE
- urology consulted  - Urine culture - NGTD   - cystoscopy last admission unrevealing  - Low H/H, type and screen with transfusion of 1U pRBCs  - Hgb has been stable for a couple days.s/p HD today (1/8).  2d echo- Moderately decreased left ventricular systolic function - worse in the anterolateral wall. The estimated ejection fraction is 35%. Grade I (mild) left ventricular diastolic dysfunction consistent with impaired relaxation. CBI appeared to clot off however it cleared easily with no clots irrigated out which suggests what little urine he makes daily is not sufficient to clear residual bladder/prostatic bleeding.,alum unavailable at this institution. If patient continues to bleed today Urology to pursue cystoscopy with possible intervention.(1/8).   - CT 11/21/2018:   1. Irregular contour about the superolateral aspect of the right kidney, mass cannot be excluded.  There is a tiny non-obstructing calcification within that region.  Right renal cyst.  4.  Several enlarged para-aortic lymph nodes extending along the left ileopsoas with some obliteration of the fat planes with an additional enlarged left external iliac lymph node.  These findings are new since CT pelvis 11/14/2017.     S/P TURP and fulguration of prostate on 1/8/18. Suspected prostate as a source of bleeding, unlikely due to small renal mass. Appeared to clot off this morning,CBI restarted  this AM on slow drip with clamp again in am.        Urology OK sending patient  home with a 60cc catheter tipped syringe and irrigation supplies, if family member or caretaker is amenable to learning how to irrigate his catheter in the event it should clog, it would likely circumvent an emergency department visit.Daughter not willing to irrigate tejeda per urology instructions.  Tejeda Catheter removed for voiding trial.    · Tejeda was removed on 1/14, passed voiding trial, without hematuria, daughter concerned for hematuria discussed with nurse and denies blood  noted in diapers  · hgb stable  · Monitor labs in am

## 2019-01-16 NOTE — PT/OT/SLP PROGRESS
"Physical Therapy Treatment    Patient Name:  Ronald Campbell   MRN:  [id number]    Recommendations:     Discharge Recommendations:  nursing facility, skilled(pt has 12 DAVID)   Discharge Equipment Recommendations: none   Barriers to discharge: Inaccessible home and Decreased caregiver support 12 DAVID    Assessment:     Ronald Campbell is a 89 y.o. male admitted with a medical diagnosis of Gross hematuria.  He presents with the following impairments/functional limitations:  weakness, gait instability, impaired balance, visual deficits, decreased safety awareness, impaired functional mobilty . PT co treated pt with OT due to pt had gone to dialysis in the am. Daughter present for session, concerned for safety upon return home and requesting SNF placement. Following session, OT/PT spoke with daughter regarding pt's current level of assist. Prior to admit, daughter reporting pt required 1 person assist for ambulation (50 ft to front door) and to ascend/descend 12 stairs at home. Pt goes to dialysis MWF and CNA from dialysis is available to assist pt with stairs on those days. OT/PT explained to daughter that pt requires min A for bed mobility, t/f, and stairs and was CGA to ambulate with use of RW. Daughter concerned regarding therapy's use of both OT and PT when practicing stairs. OT/PT stated that when ascending OT was only assisting with holding brief in place, and when descending PT was standing in front of pt in case of LOB forward; PT stated stairs could have been completed with min A of 1 person. Daughter concerned that d/c plan would rely on "semantics" of OT/PT notes and definitions of assist levels. PT explained that varying levels of assist with OT/PT while in the hospital could be a result of time of day or dialysis treatment schedule rather than a true representation of pt's current function. OT/PT explained that pt required assist of 1 person at baseline and pt's current functioning was not far from baseline except " "for pt has decreased gait distance and requires assist for stairs. SW arrived at end of conversation and was made aware that pt ambulating 35 ft + 20 ft and completed 10 stairs with min A.    Rehab Prognosis: Good; patient would benefit from acute skilled PT services to address these deficits and reach maximum level of function.    Recent Surgery: Procedure(s) (LRB):  CYSTOSCOPY, WITH BLADDER BIOPSY, WITH FULGURATION IF INDICATED (N/A)  TURP (TRANSURETHRAL RESECTION OF PROSTATE) (N/A) 8 Days Post-Op    Plan:     During this hospitalization, patient to be seen 3 x/week to address the identified rehab impairments via gait training, therapeutic activities, therapeutic exercises, neuromuscular re-education and progress toward the following goals:    · Plan of Care Expires:  02/09/19    Subjective   Pt states "no" when asked if he is in pain  Pain/Comfort:  · Pain Rating 1: 0/10  · Pain Rating Post-Intervention 1: 0/10      Objective:     Communicated with nurse prior to session.  Patient found call button in reach, nurse notified and daughter present (none)  upon PT entry to room.     General Precautions: Standard, fall   Orthopedic Precautions:N/A   Braces: N/A     Functional Mobility:  · Bed Mobility:     · Rolling Right: minimum assistance  · Supine to Sit: minimum assistance  · Transfers:     · Sit to Stand:  minimum assistance with rolling walker  · Gait: 35ft then 20ft with RW with CGA with manual cues to direct the walker due to pt is blind and unable to avoid obstacles without assist. Pt performed gait with flexed trunk, decreased step length, decreased clearance of B feet during swing phase, and at slow pace.  · Stairs:  Pt ascended/descended 10 stair(s) with No Assistive Device with right handrail with Minimal Assistance (1 person present for safety due to first trial of going up/down steps)    AM-PAC 6 CLICK MOBILITY  Turning over in bed (including adjusting bedclothes, sheets and blankets)?: 3  Sitting down " on and standing up from a chair with arms (e.g., wheelchair, bedside commode, etc.): 3  Moving from lying on back to sitting on the side of the bed?: 3  Moving to and from a bed to a chair (including a wheelchair)?: 3  Need to walk in hospital room?: 3  Climbing 3-5 steps with a railing?: 3  Basic Mobility Total Score: 18     Patient left up in chair with call button in reach, nurse notified and daughter present..    GOALS:   Multidisciplinary Problems     Physical Therapy Goals        Problem: Physical Therapy Goal    Goal Priority Disciplines Outcome Goal Variances Interventions   Physical Therapy Goal     PT, PT/OT Ongoing (interventions implemented as appropriate)     Description:  PT goals until 1/22/19    1. Pt supine to sit with SBA-not met  2. Pt sit to supine with SBA-not met  3. Pt sit to stand with RW with minimal assist- met  Revised goal: sit to stand with RW with CGA-not met  4. Pt to perform gait 100ft with RW with minimal assist.-not met  5. Pt to go up/down curb step with RW with minimal assist.-not met  6. Pt to transfer bed to/from bedside chair with CGA-not met  7. Pt up/down 4 steps with R UE rail with minimal assist - met  Revised goal: up/down 10 steps with R UE rail with CGA-not met                       Time Tracking:     PT Received On: 01/16/19  PT Start Time: 1332     PT Stop Time: 1404  PT Total Time (min): 32 min     Billable Minutes: Gait Training 32    Treatment Type: Treatment  PT/PTA: PT     PTA Visit Number: 1     Danuta Strong, PT  01/16/2019

## 2019-01-16 NOTE — PLAN OF CARE
Problem: Physical Therapy Goal  Goal: Physical Therapy Goal  PT goals until 1/22/19    1. Pt supine to sit with SBA-not met  2. Pt sit to supine with SBA-not met  3. Pt sit to stand with RW with minimal assist- met  Revised goal: sit to stand with RW with CGA-not met  4. Pt to perform gait 100ft with RW with minimal assist.-not met  5. Pt to go up/down curb step with RW with minimal assist.-not met  6. Pt to transfer bed to/from bedside chair with CGA-not met  7. Pt up/down 4 steps with R UE rail with minimal assist - met  Revised goal: up/down 10 steps with R UE rail with CGA-not met     Outcome: Ongoing (interventions implemented as appropriate)  Pt's goals revised as needed and pt will continue to benefit from skilled PT services to work towards improved functional mobility including: bed mobility, transfers, up/down steps, and gait.   Danuta Strong, PT  1/16/2019

## 2019-01-16 NOTE — SUBJECTIVE & OBJECTIVE
Interval History: Patient seen at bedside, nurse reports good night. Discussed care and given update with daughter, Chantale at bedside. She appealed discharge, OSNF consult placed. Daughter questioning hematuria discussed with nurse denies any farther events of hematuria.     Review of Systems   Constitutional: Positive for fatigue. Negative for appetite change, chills and fever.   HENT: Negative for trouble swallowing.    Respiratory: Negative for cough, chest tightness, shortness of breath and wheezing.    Cardiovascular: Negative for chest pain, palpitations and leg swelling.   Gastrointestinal: Negative for abdominal pain, constipation, diarrhea and nausea.   Genitourinary: Negative for difficulty urinating, frequency and urgency.   Musculoskeletal: Negative for arthralgias and myalgias.   Skin: Negative for rash.   Neurological: Positive for weakness. Negative for dizziness, light-headedness and headaches.   Psychiatric/Behavioral: Negative for sleep disturbance.     Scheduled Meds:   sodium chloride 0.9%   Intravenous Once    amLODIPine  5 mg Oral Daily    aspirin  81 mg Oral Daily    bicalutamide  50 mg Oral Daily    brinzolamide  1 drop Left Eye TID    calcium acetate  667 mg Oral TID WM    carvedilol  25 mg Oral BID    dutasteride  0.5 mg Oral Daily    epoetin napoleon (PROCRIT) injection  8,000 Units Intravenous Every Tues, Thurs, Sat    levETIRAcetam  500 mg Oral BID    polyethylene glycol  17 g Oral Daily    travoprost  1 drop Left Eye QHS     Continuous Infusions:  PRN Meds:.sodium chloride, sodium chloride, sodium chloride, sodium chloride 0.9%, sodium chloride 0.9%, dextrose 50%, dextrose 50%, glucagon (human recombinant), glucose, glucose, insulin aspart U-100, sodium chloride 0.9%, sodium chloride 0.9%, traMADol    Objective:     Vital Signs (Most Recent):  Temp: 98 °F (36.7 °C) (01/16/19 1251)  Pulse: 71 (01/16/19 1251)  Resp: 18 (01/16/19 1251)  BP: (!) 161/70 (01/16/19 1251)  SpO2: 98 %  (01/16/19 1251) Vital Signs (24h Range):  Temp:  [96.6 °F (35.9 °C)-98.1 °F (36.7 °C)] 98 °F (36.7 °C)  Pulse:  [59-77] 71  Resp:  [16-18] 18  SpO2:  [97 %-100 %] 98 %  BP: (106-161)/(47-83) 161/70     Weight: 79.4 kg (175 lb)  Body mass index is 23.73 kg/m².    Intake/Output Summary (Last 24 hours) at 1/16/2019 1621  Last data filed at 1/16/2019 0524  Gross per 24 hour   Intake 402 ml   Output 0 ml   Net 402 ml      Physical Exam   Constitutional: He appears well-developed and well-nourished. No distress.   Eyes:   Right corneal opacity   Cardiovascular: Normal rate, regular rhythm and normal heart sounds. Exam reveals no gallop and no friction rub.   No murmur heard.  Pulmonary/Chest: Effort normal and breath sounds normal. No respiratory distress. He has no wheezes. He has no rales.   Abdominal: Soft. Bowel sounds are normal. He exhibits no distension. There is no tenderness.   Musculoskeletal: Normal range of motion. He exhibits no edema or tenderness.   Neurological: He is alert.   Oriented to self and situation, generalized weakness to x4 extremities    Skin: Skin is warm and dry. No rash noted. He is not diaphoretic. No cyanosis. Nails show no clubbing.   Psychiatric: He has a normal mood and affect. His behavior is normal.       Significant Labs:   Recent Labs   Lab 01/14/19  0420 01/15/19  0342 01/16/19  0635   WBC 8.51 6.16 6.48   HGB 10.3* 8.2* 8.2*   HCT 32.5* 25.6* 25.2*    173 176     Recent Labs   Lab 01/14/19  0420 01/15/19  0342 01/16/19  0635   * 136 133*   K 5.5* 3.9 3.6   CL 98 97 95   CO2 18* 32* 27   BUN 45* 15 26*   CREATININE 7.9* 4.2* 5.9*   CALCIUM 8.1* 8.1* 8.0*   PROT 7.8 7.0 7.0   BILITOT 0.3 0.4 0.4   ALKPHOS 100 96 90   ALT <5* <5* <5*   AST 38 25 21     Lab Results   Component Value Date    LABPROT 11.4 11/21/2018    ALBUMIN 2.2 (L) 01/16/2019     Lab Results   Component Value Date    CALCIUM 8.0 (L) 01/16/2019    PHOS 3.9 01/10/2019     POCT Glucose   Date Value Ref  Range Status   01/16/2019 95 70 - 110 mg/dL Final   01/16/2019 119 (H) 70 - 110 mg/dL Final   01/16/2019 93 70 - 110 mg/dL Final   01/15/2019 189 (H) 70 - 110 mg/dL Final   01/15/2019 169 (H) 70 - 110 mg/dL Final   01/15/2019 144 (H) 70 - 110 mg/dL Final   01/15/2019 89 70 - 110 mg/dL Final   01/14/2019 107 70 - 110 mg/dL Final   01/14/2019 97 70 - 110 mg/dL Final   01/14/2019 175 (H) 70 - 110 mg/dL Final   01/14/2019 94 70 - 110 mg/dL Final   01/13/2019 181 (H) 70 - 110 mg/dL Final       Significant Imaging: na

## 2019-01-16 NOTE — PROGRESS NOTES
Arrived to dialysis via bed. AAO x 3. NAD noted. Agreed to 3,5 hour Tx.  Successful cannulation x 2 needles x 1 attempt to Lt. Upper arm AVF.

## 2019-01-16 NOTE — HPI
89-year-old male presents to the ER for evaluation of bleeding from the penis.  The patient has had this problem intermittently for the past 2 months and has been seen here and admitted here for multiple times.  Urology believes that the bleeding is coming from the prostate.  Last time the patient was here he was in urinary retention.  A Barrett catheter was placed and the patient was admitted.  He underwent continuous bladder irrigation and symptoms improved.  Of note the patient has ESRD and is dialyzed Monday Wednesday Friday.  Last dialysis was Wednesday January 2nd.  The patient does make urine usually twice a day.     Has pallaiative care, has been seen seeral times in ED recently for same condition.

## 2019-01-16 NOTE — PLAN OF CARE
Problem: Adult Inpatient Plan of Care  Goal: Plan of Care Review  Outcome: Ongoing (interventions implemented as appropriate)  Pt AAOx4, VSS, No falls noted, fall precautions remain. Skin intact, Pain assessed, no pain noted, pain controlled with PRN regimen, pt comfortable, frequent rounds made for safety and patient care. SCD's in place. Call light within reach, bed wheels locked, bed in lowest position, side rails ^x2, safety maintained. NADN, Will continue monitor.

## 2019-01-17 NOTE — PROGRESS NOTES
Tx complete. Tolerated. Removed 2L. NAD noted. Removed 2 needles from Lt. Arm AVF. Pressure applied until hemostasis. Laura applied.

## 2019-01-17 NOTE — NURSING
Pt and daughter received dc instructions, daughter verbalized understanding of instructions, iv removed, assisted pt with dressing, awaiting transportation pt is going home with daughter

## 2019-01-17 NOTE — PLAN OF CARE
MAIRA spoke with Shannan MORRISON about pt referral.  MAIRA informed by Shannan that pt at baseline and OSNF could not accept the pt at this time.  MAIRA will inform the family.            Shanice Downing, MSW, LCSW  [facility]  [id number]

## 2019-01-17 NOTE — CONSULTS
OSNF consult patient at [place]. Did 10 stairs with CGA/ min assist. Recommend home with home health or intermediate placement.

## 2019-01-17 NOTE — SUBJECTIVE & OBJECTIVE
Interval History: Patient seen at bedside, nurse reports good night. No complains.     Review of Systems   Constitutional: Positive for fatigue. Negative for appetite change, chills and fever.   HENT: Negative for trouble swallowing.    Respiratory: Negative for cough, chest tightness, shortness of breath and wheezing.    Cardiovascular: Negative for chest pain, palpitations and leg swelling.   Gastrointestinal: Negative for abdominal pain, constipation, diarrhea and nausea.   Genitourinary: Negative for difficulty urinating, frequency and urgency.   Musculoskeletal: Negative for arthralgias and myalgias.   Skin: Negative for rash.   Neurological: Positive for weakness. Negative for dizziness, light-headedness and headaches.   Psychiatric/Behavioral: Negative for sleep disturbance.     Scheduled Meds:   sodium chloride 0.9%   Intravenous Once    [START ON 1/18/2019] sodium chloride 0.9%   Intravenous Once    amLODIPine  5 mg Oral Daily    aspirin  81 mg Oral Daily    bicalutamide  50 mg Oral Daily    brinzolamide  1 drop Left Eye TID    calcium acetate  667 mg Oral TID WM    carvedilol  25 mg Oral BID    dutasteride  0.5 mg Oral Daily    epoetin napoleon (PROCRIT) injection  8,000 Units Intravenous Every Tues, Thurs, Sat    levETIRAcetam  500 mg Oral BID    polyethylene glycol  17 g Oral Daily    travoprost  1 drop Left Eye QHS     Continuous Infusions:  PRN Meds:.sodium chloride, sodium chloride, sodium chloride, sodium chloride 0.9%, sodium chloride 0.9%, [START ON 1/18/2019] sodium chloride 0.9%, dextrose 50%, dextrose 50%, glucagon (human recombinant), glucose, glucose, insulin aspart U-100, sodium chloride 0.9%, sodium chloride 0.9%, traMADol    Objective:     Vital Signs (Most Recent):  Temp: 96.8 °F (36 °C) (01/17/19 1211)  Pulse: (!) 57 (01/17/19 1211)  Resp: 20 (01/17/19 1211)  BP: (!) 157/72 (01/17/19 1211)  SpO2: 97 % (01/17/19 1211) Vital Signs (24h Range):  Temp:  [96.5 °F (35.8 °C)-98.4 °F  (36.9 °C)] 96.8 °F (36 °C)  Pulse:  [57-69] 57  Resp:  [16-20] 20  SpO2:  [95 %-99 %] 97 %  BP: (147-172)/(65-82) 157/72     Weight: 79.4 kg (175 lb)  Body mass index is 23.73 kg/m².    Intake/Output Summary (Last 24 hours) at 1/17/2019 1558  Last data filed at 1/17/2019 0900  Gross per 24 hour   Intake 400 ml   Output --   Net 400 ml      Physical Exam   Constitutional: He appears well-developed and well-nourished. No distress.   Eyes:   Right corneal opacity   Cardiovascular: Normal rate, regular rhythm and normal heart sounds. Exam reveals no gallop and no friction rub.   No murmur heard.  Pulmonary/Chest: Effort normal and breath sounds normal. No respiratory distress. He has no wheezes. He has no rales.   Abdominal: Soft. Bowel sounds are normal. He exhibits no distension. There is no tenderness.   Musculoskeletal: Normal range of motion. He exhibits no edema or tenderness.   Neurological: He is alert.   Oriented to self and situation, generalized weakness to x4 extremities    Skin: Skin is warm and dry. No rash noted. He is not diaphoretic. No cyanosis. Nails show no clubbing.   Psychiatric: He has a normal mood and affect. His behavior is normal.       Significant Labs:   Recent Labs   Lab 01/15/19  0342 01/16/19  0635 01/17/19  0309   WBC 6.16 6.48 6.62   HGB 8.2* 8.2* 8.1*   HCT 25.6* 25.2* 24.6*    176 177     Recent Labs   Lab 01/15/19  0342 01/16/19  0635 01/17/19  0309    133* 133*   K 3.9 3.6 3.6   CL 97 95 99   CO2 32* 27 25   BUN 15 26* 18   CREATININE 4.2* 5.9* 4.5*   CALCIUM 8.1* 8.0* 8.2*   PROT 7.0 7.0 7.2   BILITOT 0.4 0.4 0.3   ALKPHOS 96 90 97   ALT <5* <5* 5*   AST 25 21 27     Lab Results   Component Value Date    LABPROT 11.4 11/21/2018    ALBUMIN 2.3 (L) 01/17/2019     Lab Results   Component Value Date    CALCIUM 8.2 (L) 01/17/2019    PHOS 3.9 01/10/2019     POCT Glucose   Date Value Ref Range Status   01/17/2019 150 (H) 70 - 110 mg/dL Final   01/17/2019 98 70 - 110 mg/dL  Final   01/16/2019 172 (H) 70 - 110 mg/dL Final   01/16/2019 163 (H) 70 - 110 mg/dL Final   01/16/2019 95 70 - 110 mg/dL Final   01/16/2019 119 (H) 70 - 110 mg/dL Final   01/16/2019 93 70 - 110 mg/dL Final   01/15/2019 189 (H) 70 - 110 mg/dL Final   01/15/2019 169 (H) 70 - 110 mg/dL Final   01/15/2019 144 (H) 70 - 110 mg/dL Final   01/15/2019 89 70 - 110 mg/dL Final   01/14/2019 107 70 - 110 mg/dL Final   01/14/2019 97 70 - 110 mg/dL Final       Significant Imaging: na

## 2019-01-17 NOTE — PLAN OF CARE
01/17/19 0830   Medicare Message   Important Message from Medicare regarding Discharge Appeal Rights Given to patient/caregiver;Explained to patient/caregiver;Signed/date by patient/caregiver   Date IMM was signed 01/16/19   Time IMM was signed 1859

## 2019-01-17 NOTE — PROGRESS NOTES
[facility] Hospital Medicine  Progress Note    Patient Name: Ronald Campbell  MRN: [id number]  Patient Class: IP- Inpatient   Admission Date: 1/4/2019  Length of Stay: 11 days  Attending Physician: No att. providers found  Primary Care Provider: Bart Shaw MD    [facility] Medicine Team: [facility] MED Z Katie Jules NP    Subjective:     Principal Problem:Gross hematuria    HPI:  89-year-old male presents to the ER for evaluation of bleeding from the penis.  The patient has had this problem intermittently for the past 2 months and has been seen here and admitted here for multiple times.  Urology believes that the bleeding is coming from the prostate.  Last time the patient was here he was in urinary retention.  A Tejeda catheter was placed and the patient was admitted.  He underwent continuous bladder irrigation and symptoms improved.  Of note the patient has ESRD and is dialyzed Monday Wednesday Friday.  Last dialysis was Wednesday January 2nd.  The patient does make urine usually twice a day.     Has pallaiative care, has been seen seeral times in ED recently for same condition.    Hospital Course:  89-year-old male presents to the ER for evaluation of bleeding from the penis.  The patient has had this problem intermittently for the past 2 months and has been seen here and admitted here for multiple times.  Urology believes that the bleeding is coming from the prostate. Urology saw patient and ideally recommends irrigate tejeda PRN and transfuse PRN. Will plan to add 1% alum irrigation for hematuria to start tomorrow. May need endoscopic intervention in near future but currently feel risks of invasive intervention would be overly morbid. S/P TURP and fulguration of prostate on 1/8/18. Urology recommended home with 3way tejeda and irrigation syringe. Daughter did not agree and a voiding trail was done. Daughter wishing for patient to be discharge to SNF but patient was denies and recommended home  with home health services. Patient discharge home with  PACE program. Patient urinating minimal and is on HD M-W-F. Patient will need to f/u urology upon discharge and PCP. Patient did not leave, daughter refused to take him home and wanted additional therapy services, Patient was recommended for SNF placements-submitted referral, denied by community skilled. Patient's daughter appealed discharge.    Interval History: Patient seen at bedside, nurse reports good night. No complains.     Review of Systems   Constitutional: Positive for fatigue. Negative for appetite change, chills and fever.   HENT: Negative for trouble swallowing.    Respiratory: Negative for cough, chest tightness, shortness of breath and wheezing.    Cardiovascular: Negative for chest pain, palpitations and leg swelling.   Gastrointestinal: Negative for abdominal pain, constipation, diarrhea and nausea.   Genitourinary: Negative for difficulty urinating, frequency and urgency.   Musculoskeletal: Negative for arthralgias and myalgias.   Skin: Negative for rash.   Neurological: Positive for weakness. Negative for dizziness, light-headedness and headaches.   Psychiatric/Behavioral: Negative for sleep disturbance.     Scheduled Meds:   sodium chloride 0.9%   Intravenous Once    [START ON 1/18/2019] sodium chloride 0.9%   Intravenous Once    amLODIPine  5 mg Oral Daily    aspirin  81 mg Oral Daily    bicalutamide  50 mg Oral Daily    brinzolamide  1 drop Left Eye TID    calcium acetate  667 mg Oral TID WM    carvedilol  25 mg Oral BID    dutasteride  0.5 mg Oral Daily    epoetin napoleon (PROCRIT) injection  8,000 Units Intravenous Every Tues, Thurs, Sat    levETIRAcetam  500 mg Oral BID    polyethylene glycol  17 g Oral Daily    travoprost  1 drop Left Eye QHS     Continuous Infusions:  PRN Meds:.sodium chloride, sodium chloride, sodium chloride, sodium chloride 0.9%, sodium chloride 0.9%, [START ON 1/18/2019] sodium chloride 0.9%, dextrose  50%, dextrose 50%, glucagon (human recombinant), glucose, glucose, insulin aspart U-100, sodium chloride 0.9%, sodium chloride 0.9%, traMADol    Objective:     Vital Signs (Most Recent):  Temp: 96.8 °F (36 °C) (01/17/19 1211)  Pulse: (!) 57 (01/17/19 1211)  Resp: 20 (01/17/19 1211)  BP: (!) 157/72 (01/17/19 1211)  SpO2: 97 % (01/17/19 1211) Vital Signs (24h Range):  Temp:  [96.5 °F (35.8 °C)-98.4 °F (36.9 °C)] 96.8 °F (36 °C)  Pulse:  [57-69] 57  Resp:  [16-20] 20  SpO2:  [95 %-99 %] 97 %  BP: (147-172)/(65-82) 157/72     Weight: 79.4 kg (175 lb)  Body mass index is 23.73 kg/m².    Intake/Output Summary (Last 24 hours) at 1/17/2019 1558  Last data filed at 1/17/2019 0900  Gross per 24 hour   Intake 400 ml   Output --   Net 400 ml      Physical Exam   Constitutional: He appears well-developed and well-nourished. No distress.   Eyes:   Right corneal opacity   Cardiovascular: Normal rate, regular rhythm and normal heart sounds. Exam reveals no gallop and no friction rub.   No murmur heard.  Pulmonary/Chest: Effort normal and breath sounds normal. No respiratory distress. He has no wheezes. He has no rales.   Abdominal: Soft. Bowel sounds are normal. He exhibits no distension. There is no tenderness.   Musculoskeletal: Normal range of motion. He exhibits no edema or tenderness.   Neurological: He is alert.   Oriented to self and situation, generalized weakness to x4 extremities    Skin: Skin is warm and dry. No rash noted. He is not diaphoretic. No cyanosis. Nails show no clubbing.   Psychiatric: He has a normal mood and affect. His behavior is normal.       Significant Labs:   Recent Labs   Lab 01/15/19  0342 01/16/19  0635 01/17/19  0309   WBC 6.16 6.48 6.62   HGB 8.2* 8.2* 8.1*   HCT 25.6* 25.2* 24.6*    176 177     Recent Labs   Lab 01/15/19  0342 01/16/19  0635 01/17/19  0309    133* 133*   K 3.9 3.6 3.6   CL 97 95 99   CO2 32* 27 25   BUN 15 26* 18   CREATININE 4.2* 5.9* 4.5*   CALCIUM 8.1* 8.0* 8.2*    PROT 7.0 7.0 7.2   BILITOT 0.4 0.4 0.3   ALKPHOS 96 90 97   ALT <5* <5* 5*   AST 25 21 27     Lab Results   Component Value Date    LABPROT 11.4 11/21/2018    ALBUMIN 2.3 (L) 01/17/2019     Lab Results   Component Value Date    CALCIUM 8.2 (L) 01/17/2019    PHOS 3.9 01/10/2019     POCT Glucose   Date Value Ref Range Status   01/17/2019 150 (H) 70 - 110 mg/dL Final   01/17/2019 98 70 - 110 mg/dL Final   01/16/2019 172 (H) 70 - 110 mg/dL Final   01/16/2019 163 (H) 70 - 110 mg/dL Final   01/16/2019 95 70 - 110 mg/dL Final   01/16/2019 119 (H) 70 - 110 mg/dL Final   01/16/2019 93 70 - 110 mg/dL Final   01/15/2019 189 (H) 70 - 110 mg/dL Final   01/15/2019 169 (H) 70 - 110 mg/dL Final   01/15/2019 144 (H) 70 - 110 mg/dL Final   01/15/2019 89 70 - 110 mg/dL Final   01/14/2019 107 70 - 110 mg/dL Final   01/14/2019 97 70 - 110 mg/dL Final       Significant Imaging: na    Assessment/Plan:      Hematuria    - urology consulted  - Urine culture - NGTD   - cystoscopy last admission unrevealing  - Low H/H, type and screen with transfusion of 1U pRBCs  - Hgb has been stable for a couple days.s/p HD today (1/8).  2d echo- Moderately decreased left ventricular systolic function - worse in the anterolateral wall. The estimated ejection fraction is 35%. Grade I (mild) left ventricular diastolic dysfunction consistent with impaired relaxation. CBI appeared to clot off however it cleared easily with no clots irrigated out which suggests what little urine he makes daily is not sufficient to clear residual bladder/prostatic bleeding.,alum unavailable at this institution. If patient continues to bleed today Urology to pursue cystoscopy with possible intervention.(1/8).   - CT 11/21/2018:   1. Irregular contour about the superolateral aspect of the right kidney, mass cannot be excluded.  There is a tiny non-obstructing calcification within that region.  Right renal cyst.  4.  Several enlarged para-aortic lymph nodes extending along the  left ileopsoas with some obliteration of the fat planes with an additional enlarged left external iliac lymph node.  These findings are new since CT pelvis 11/14/2017.     S/P TURP and fulguration of prostate on 1/8/18. Suspected prostate as a source of bleeding, unlikely due to small renal mass. Appeared to clot off this morning,CBI restarted  this AM on slow drip with clamp again in am.        Urology OK sending patient  home with a 60cc catheter tipped syringe and irrigation supplies, if family member or caretaker is amenable to learning how to irrigate his catheter in the event it should clog, it would likely circumvent an emergency department visit.Daughter not willing to irrigate tejeda per urology instructions.  Tejeda Catheter removed for voiding trial.    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    · diet controlled  · Continue low dose hyperglycemia  · Glucose controlled  · Continue Ac and Hs     ESRD (end stage renal disease)    · HD MWF  · nephrology following     Benign prostatic hyperplasia with urinary retention    -continue Dutasteride and bicalutamide     Blindness of right eye    · Continue brinzolamide and travoprost      Anemia in chronic kidney disease, on chronic dialysis    · Transfused 2 U pRBCs (1/4) for Hgb 6.5  · Hgb today 8.2  · Continue epoetin injections   · Continue to monitor      Chronic combined systolic and diastolic heart failure    -last seen by cardiology 05/16 -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 Anterior  and anterolateral hypokinesis. Previously discussed stress test to evaluate for regional ischemia (stress test in 2011 negative) but risk vs benefit (angiogram, etc) and with patients/families  decided not to pursue any further testing.      compensated, continue home medications.  2d echo- Moderately decreased left ventricular systolic function - worse in the anterolateral wall. The estimated ejection fraction is 35%. Grade I (mild) left ventricular diastolic dysfunction consistent with impaired relaxation     Seizure disorder    · continue home keppra       VTE Risk Mitigation (From admission, onward)        Ordered     IP VTE HIGH RISK PATIENT  Once      01/04/19 1456     Place MORGAN hose  Until discontinued      01/04/19 1456              Katie Jules NP  Department of Hospital Medicine   [facility]

## 2019-01-17 NOTE — PLAN OF CARE
Update 12:25 PM  SW scheduled pt transport home via [facility] for 2pm. Pt will d/c home with HH services through [facility].      12:06pm  MAIRA met with pt daughter and provided her with a notice of discharge form for her records.  Pt daughter informed MAIRA that she would be taking the pt home today and requested that the SW arrange ambulance transport.  SW will contact [facility] for transport authorization.  SW will schedule transport.             Shanice Downing, MSW, [facility]  [facility]  [id number]

## 2019-01-18 NOTE — PLAN OF CARE
Problem: Occupational Therapy Goal  Goal: Occupational Therapy Goal  Goals to be met by: 1/24/19     Patient will increase functional independence with ADLs by performing:    Grooming while standing at sink with Set-up Assistance with [place] assistance as needed  Toileting from toilet with Minimal Assistance for hygiene and clothing management.   Toilet transfer to toilet with Minimal Assistance.     Outcome: Outcome(s) achieved Date Met: 01/18/19  Goals not met; pt d/c from hospital

## 2019-01-18 NOTE — PT/OT/SLP DISCHARGE
Occupational Therapy Discharge Summary    Ronald Campbell  MRN: [id number]   Principal Problem: Gross hematuria      Patient Discharged from acute Occupational Therapy on 1/17/19.  Please refer to prior OT note dated 1/16/19 for functional status.    Assessment:      Patient has not met goals.    Objective:     GOALS:   Multidisciplinary Problems     Occupational Therapy Goals     Not on file          Multidisciplinary Problems (Resolved)        Problem: Occupational Therapy Goal    Goal Priority Disciplines Outcome Interventions   Occupational Therapy Goal   (Resolved)     OT, PT/OT Outcome(s) achieved    Description:  Goals to be met by: 1/24/19     Patient will increase functional independence with ADLs by performing:    Grooming while standing at sink with Set-up Assistance with [place] assistance as needed  Toileting from toilet with Minimal Assistance for hygiene and clothing management.   Toilet transfer to toilet with Minimal Assistance.                       Reasons for Discontinuation of Therapy Services  Transfer to alternate level of care.      Plan:     Patient Discharged to: Home with Home Health Service    Archana Jeff, OT  1/18/2019

## 2019-01-20 NOTE — PROGRESS NOTES
Physical Therapy Discharge Summary    Name: Ronald Campbell  MRN: [id number]   Principal Problem: Gross hematuria     Patient Discharged from acute Physical Therapy on 1/17/19.  Please refer to prior PT noted date on 1/16/19 for functional status.     Assessment:     Patient appropriate for care in another setting.    Objective:     GOALS:   Multidisciplinary Problems     Physical Therapy Goals     Not on file          Multidisciplinary Problems (Resolved)        Problem: Physical Therapy Goal    Goal Priority Disciplines Outcome Goal Variances Interventions   Physical Therapy Goal   (Resolved)     PT, PT/OT Outcome(s) achieved     Description:  PT goals until 1/22/19    1. Pt supine to sit with SBA-not met  2. Pt sit to supine with SBA-not met  3. Pt sit to stand with RW with minimal assist- met  Revised goal: sit to stand with RW with CGA-not met  4. Pt to perform gait 100ft with RW with minimal assist.-not met  5. Pt to go up/down curb step with RW with minimal assist.-not met  6. Pt to transfer bed to/from bedside chair with CGA-not met  7. Pt up/down 4 steps with R UE rail with minimal assist - met  Revised goal: up/down 10 steps with R UE rail with CGA-not met                       Reasons for Discontinuation of Therapy Services  Transfer to alternate level of care.      Plan:     Patient Discharged to: Home with Home Health Service.    Danuta Strong, PT  1/20/2019

## 2019-01-29 NOTE — PROGRESS NOTES
CHIEF COMPLAINT:    Mr. Campbell is a 89 y.o. male presenting for lupron injection.  PRESENTING ILLNESS:    Ronald Campbell is a 89 y.o. male with a PMH of HTN, DM II, ESRD, blindness, who presents for lupron injection.  He lives with his daughter.  He is here today with his caregiver from [place]. Patient has been seen multiple times for gross hematuria.      He underwent cystoscopy on 11/30 which showed no obvious tumors and bleeding from the prostate however visualization was limited due to bleeding. Recent CT noncontrast of abdomen pelvis demonstrates a very enlarged prostate, as well as a small right renal mass and several enlarged para-aortic lymph nodes.    Underwent TURP and fulguration on 1/8/19.    SPECIMEN  1) Prostate chips.  FINAL PATHOLOGIC DIAGNOSIS  Prostate chips, transurethral resection of prostate:  Positive for prostatic adenocarcinoma, John pattern (4+ 5); score 9, with focal small cell features.  Tumor involves 90% of submitted tissue.    He goes to dialysis on M-W-F and normally voids twice a day.      Spoke with daughter on the phone while patient was being seen in clinic.  Daughter states that his urine has been clear of blood lately.  She states that he has not had any other urinary complaints.  He is taking avodart and flomax.  He has a history of dementia and she denies him having a change in mental status.  Per caregiver, patient woke up with his right hand swollen.  His daughter states that she is aware of his hand swelling and that it swells if he sleeps on it.    Daughter reports that lately it has been hard for patient to walk and thinks it could be due to his arthritis.        REVIEW OF SYSTEMS :    Constitutional: Negative for fever and chills.   HENT: Negative for hearing loss.   Eyes: Positive for visual disturbance. (blind)  Respiratory: Negative for shortness of breath.   Gastrointestinal: Negative for vomiting.   Genitourinary: See HPI  Hematological: Does not bruise/bleed easily.    Psychiatric/Behavioral: Negative for confusion.       PATIENT HISTORY:    Past Medical History:   Diagnosis Date    Anemia     Blind     bilaterally    CHF (congestive heart failure)     Chronic kidney disease     Dementia 01/2017    Diabetes mellitus type II     General anesthetics causing adverse effect in therapeutic use     april / may 2014     Glaucoma (increased eye pressure)     Hypertension     Hypothermia 12/6/2017    Prostate cancer 1/11/2019    Seizures        Past Surgical History:   Procedure Laterality Date    ANGIOPLASTY WITH STENT PLACEMENT Left 11/18/2016    [facility]    AV FISTULA PLACEMENT Left 4/2014    AV fistula to Left upper arm    CLOT EVACUATION FROM BLADDER  1/8/2019    [facility]formed by [facility]o [facility]anereje [facility] at [facility]    [id number] left RC vs BC AVF Left 8/8/2014    [facility] at [facility]    CYSTOSCOPY N/A 11/29/2018    [facility]    CYSTOSCOPY  WITH FULGURATION  1/8/2019    [facility]    CYSTOSCOPY, WITH BLADDER BIOPSY, WITH FULGURATION IF INDICATED N/A 1/8/2019    [facility] at [facility]    EYE SURGERY      FISTULOGRAM Left 11/18/2016    [facility] at [facility]    FISTULOGRAM Left 4/15/2016    [facility] at [facility]    FISTULOGRAM Left 1/29/2016    [facility] at [facility] CATH LAB    FISTULOGRAM Room 11 case Left 3/27/2015    [facility] at [facility]    HYDROCELECTOMY Bilateral 11/22/2017    [facility] by [facility] at [facility]    INSERTION-CATHETER-DIALYSIS-PERITONEAL-LAPAROSCOPIC N/A 5/8/2014    Performed by Js Ladd MD at [facility] OR [facility]ND [facility]    INSERTION-CATHETER-DIALYSIS-PERITONEAL-LAPAROSCOPIC N/A 5/5/2014    Per[facility]  at [facility]    INSERTION-CATHETER-HEMODIALYSIS N/A 5/21/2014    [facility]    PERMCATH INSERTION-TUNNELED CVC N/A 5/13/2014    Performed by Lolis Jackson MD at [facility] CATH LAB    REMOVAL, BLOOD CLOT N/A 11/29/2018    [facility] by [facility] at [facility]    REMOVAL-CATHETER-PERITONEAL DIALYSIS N/A 7/30/2014    [facility]add [facility] at [facility]    SCROTUM EXPLORATION Bilateral 11/2017    TURP (TRANSURETHRAL RESECTION OF PROSTATE) N/A 1/8/2019    [facility]    TURP (TRANSURETHRAL RESECTION OF PROSTATE)  1/8/2019    [facility] at [facility]       Family History   Problem Relation Age of Onset    Heart disease Sister         mi    Heart disease Brother         mi    Cancer Daughter         BREAST X 2       Social History     Socioeconomic History    Marital status:      Spouse name: Not on file    Number of children: Not on file    Years of education: Not on file    Highest education level: Not on file   Social Needs    Financial resource strain: Not on file    Food insecurity - worry: Not on file    Food insecurity - inability: Not on file    Transportation needs - medical: Not on file    Transportation needs - non-medical: Not on file   Occupational History    Not on file   Tobacco Use    Smoking status: Never Smoker    Smokeless tobacco: Never Used   Substance and Sexual Activity    Alcohol use: No    Drug use: No    Sexual activity: No   Other Topics Concern    Not on file   Social History Narrative    Not on file       Allergies:  Lisinopril    Medications:    Current Outpatient Medications:     amLODIPine (NORVASC) 5 MG tablet, Take 1 tablet (5 mg total) by mouth once daily., Disp: 30 tablet, Rfl: 3    aspirin 81 MG Chew, Take 1 tablet (81 mg total) by mouth once daily., Disp: 30 tablet, Rfl: 3    atorvastatin (LIPITOR) 40 MG  tablet, Take 1 tablet (40 mg total) by mouth once daily., Disp: 30 tablet, Rfl: 9    B complex-vitamin C-folic acid (NEPHRO-JESSICA) 0.8 mg Tab, Take 1 tablet by mouth every morning. , Disp: , Rfl:     blood sugar diagnostic (BLOOD GLUCOSE TEST) Strp, 1 strip by Misc.(Non-Drug; Combo Route) route daily as needed. , Disp: , Rfl:     brimonidine-timolol (COMBIGAN) 0.2-0.5 % Drop, Place 1 drop into the left eye 2 (two) times daily., Disp: , Rfl:     brinzolamide (AZOPT) 1 % ophthalmic suspension, Place 1 drop into the left eye 3 (three) times daily. , Disp: , Rfl:     calcium acetate (PHOSLO) 667 mg capsule, Take 2 capsules by mouth every day in the morning, then take 1 capsule with lunch and 2 capsules in the evening with meals, Disp: , Rfl:     carvedilol (COREG) 25 MG tablet, Take 1 tablet (25 mg total) by mouth 2 (two) times daily. Do not take on dialysis days. Hold for SBP < 110 or HR < 55, Disp: 60 tablet, Rfl: 2    dutasteride (AVODART) 0.5 mg capsule, Take 1 capsule (0.5 mg total) by mouth once daily., Disp: 30 capsule, Rfl: 1    insulin aspart (NOVOLOG FLEXPEN) 100 unit/mL InPn,  Insulin Pen Subcutaneous As directed.  -200 take 1 unitBS 201-250 take 2 unitsBS 251-300 take 3 unitsBS 301-350 take 4 unitsBS > 351 take 5 units and call MD, Disp: , Rfl:     ketoconazole (NIZORAL) 2 % cream, Apply topically once daily. feet, Disp: 30 g, Rfl: 3    levetiracetam (KEPPRA) 500 MG Tab, Take 1 tablet (500 mg total) by mouth 2 (two) times daily., Disp: 60 tablet, Rfl: 11    tamsulosin (FLOMAX) 0.4 mg Cap, Take 1 capsule (0.4 mg total) by mouth once daily., Disp: 30 capsule, Rfl: 11    TRAVOPROST (TRAVATAN Z OPHT), Place 1 drop into the left eye every evening. 1 Drops Left eye Every evening, Disp: , Rfl:     Current Facility-Administered Medications:     doxycycline tablet 100 mg, 100 mg, Oral, Once, Dara Germain PA-C    leuprolide (6 month) SyKt 45 mg, 45 mg, Intramuscular, Q6 Months, Dara GORDON  AYO Germain, 45 mg at 01/29/19 1159    lidocaine HCl 2% urojet, , Urethral, Once, Dara Germain PA-C    PHYSICAL EXAMINATION:    Constitutional: He appears well-developed and well-nourished.  He is in no apparent distress.    Eyes: No scleral icterus noted bilaterally. No discharge bilaterally.    Nose: No rhinorrhea    Cardiovascular: Normal rate.  No pitting edema noted in lower extremities bilaterally    Pulmonary/Chest: Effort normal. No respiratory distress.     Abdominal:  He exhibits no distension.  There is no CVA tenderness.     Lymphadenopathy:        Right: No supraclavicular adenopathy present.        Left: No supraclavicular adenopathy present.     Neurological: He is alert and oriented to person, place, and time.     Skin: Skin is warm and dry.     Psych: Cooperative with normal affect.    Physical Exam   Musculoskeletal:        Right hand: He exhibits swelling.        Hands:        LABS:    Lab Results   Component Value Date    PSA 28.3 (H) 05/01/2014    PSA 9.4 (H) 10/18/2010    PSA 8.9 (H) 04/27/2004       IMPRESSION:    Encounter Diagnoses   Name Primary?    Prostate cancer Yes    Neoplasm of prostate          PLAN:    Lupron injection given by Nurse Rama  Educational material given about lupron.  Will order Bone scan per Dr. Banerjee  Will also start casodex per Dr. Banerjee.   Will check CMP in 6 months to follow up on liver enzymes after starting casodex.   Recommend citracal daily for vitD and calcium supplement.   Daughter was advised to mention his hand swelling to his pcp at his follow up appointment.     Follow up in 6 months for lupron injection, psa, cmp.        Dara Germain PA-C

## 2019-01-29 NOTE — LETTER
February 1, 2019      Temo Banerjee MD  [address]  [place] [facility] [address]           [facility] - Urology 4th Floor  [address]  [place] [facility] [address]  Phone: [phone]          Patient: Ronald Campbell   MR Number: [id number]   YOB: 1929   Date of Visit: 1/29/2019       Dear Dr. Temo Banerjee:    Thank you for referring Ronald Campbell to me for evaluation. Attached you will find relevant portions of my assessment and plan of care.    If you have questions, please do not hesitate to call me. I look forward to following Ronald Campbell along with you.    Sincerely,    Dara Germain PA-C    Enclosure  CC:  No Recipients    If you would like to receive this communication electronically, please contact [email or web address] or [phone] to request more information on [facility] access.    For providers and/or their staff who would like to refer a patient to [facility], please contact us through our one-stop-shop provider referral line, [facility] , at [phone].    If you feel you have received this communication in error or would no longer like to receive these types of communications, please e-mail [email or web address]

## 2019-01-30 PROBLEM — R55 SYNCOPE: Status: ACTIVE | Noted: 2019-01-01

## 2019-01-30 NOTE — ASSESSMENT & PLAN NOTE
Stroke RF  - BP management per primary team   - Normotensive in the ED  - HD on M/W/F  - Goal is normotension for this patient

## 2019-01-30 NOTE — ASSESSMENT & PLAN NOTE
- continue home dutasteride and flomax  - Receives outpatient 6 monthly leuprolide  - will be starting bacalutamide soon as outpatient

## 2019-01-30 NOTE — ASSESSMENT & PLAN NOTE
ESRD on HD  -Will plan for HD today  -maintain MWF while in-house  -obtain OP HD medical record  -Hep B sAg negative 1/9/19  -Ph    Dialysis Information: [facility]  -Outpatient HD unit: [facility]  -Nephrologist: Dr. Vang  -HD tx days: MWF  -HD tx time: 4 hours  -HD access: AVF to L upper arm  -Last HD: Mon, Jan 28th  -EDW: pt's daughter unsure  -RRF: yes    Anemia  -Hgb 6.4  -to receive 1 unit PRBC  -managed by Emergency Medicine    Na 134  K 4  CO2   Ca 8.8  Albumin 2.0    Hypercalemia  -Corrected Ca 10.4  -Ca to be adjusted in dialysate bath    HTN  -/67  -managed by Emergency Medicine    Renal diet  1 liter fluid restriction per day    - Daily standing weights and chart.  - Strict I/O and chart  - Avoid nephrotoxic medications  - Maintain MAP >65  - Keep target Bp <140/90  - Hb > 7 gm/dL  - Medications to renal parameters

## 2019-01-30 NOTE — PROGRESS NOTES
GEORGES Michel rounding, aware of patient's temperature, primary physician notified per GEORGES Michel, primary physician to see patient, will continue to monitor.

## 2019-01-30 NOTE — ASSESSMENT & PLAN NOTE
Stroke risk factor  - Lipid Panel   - Continue current home medication  - If LDL >70, consider increasing the high dose statin

## 2019-01-30 NOTE — PROGRESS NOTES
Patient arrived to floor via stretcher.  Eyes open, respirations even and unlabored, no distress observed, no concerns voiced.  Bradycardic, asymptomatic. Denies pain at this time.  Treatment scheduled for 3.5hrs. UF Goal set for 1L. Unable to obtain temperature on patient at this time, will continue to try, dentures were removed to obtain temp, still unsuccessful. Old dressing removed from fistula located on upper left extremity.  Bruit, thrill present.  Access cleansed with chlorprep [id number], allowed to air dry.  Cannulated with 15g needles successfully [id number].

## 2019-01-30 NOTE — ASSESSMENT & PLAN NOTE
- patient on amlodipine and carvedilol at home  - currently HDS, but will hold and re-evaluate for need for anti-hypertensives

## 2019-01-30 NOTE — SUBJECTIVE & OBJECTIVE
Past Medical History:   Diagnosis Date    Anemia     Blind     bilaterally    CHF (congestive heart failure)     Chronic kidney disease     Dementia 01/2017    Diabetes mellitus type II     General anesthetics causing adverse effect in therapeutic use     april / may 2014     Glaucoma (increased eye pressure)     Hypertension     Hypothermia 12/6/2017    Prostate cancer 1/11/2019    Seizures        Past Surgical History:   Procedure Laterality Date    ANGIOPLASTY WITH STENT PLACEMENT Left 11/18/2016    [facility]    AV FISTULA PLACEMENT Left 4/2014    AV fistula to Left upper arm    CLOT EVACUATION FROM BLADDER  1/8/2019    [facility] at [facility]    [id number] left RC vs BC AVF Left 8/8/2014    Performed by [facility] at [facility] OR [facility]    CYSTOSCOPY N/A 11/29/2018    [facility] by [facility] at [facility]    CYSTOSCOPY  WITH FULGURATION  1/8/2019    [facility]formed by [facility] at [facility] OR [address] [place] [address]    CYSTOSCOPY, WITH BLADDER BIOPSY, WITH FULGURATION IF INDICATED N/A 1/8/2019    Performed by [facility] at [facility]    EYE SURGERY      FISTULOGRAM Left 11/18/2016    Performed by [facility] at [facility]    FISTULOGRAM Left 4/15/2016    Performed by [facility] at [facility]    FISTULOGRAM Left 1/29/2016    Performed by [facility] at [facility] CATH LAB    FISTULOGRAM Room 11 case Left 3/27/2015    Performed by [facility] at [facility] OR [facility]    HYDROCELECTOMY Bilateral 11/22/2017    Performed by [facility] at [facility] OR [facility]    INSERTION-CATHETER-DIALYSIS-PERITONEAL-LAPAROSCOPIC N/A 5/8/2014    Performed by Js Ladd MD at [facility] OR [facility]ND [facility]    INSERTION-CATHETER-DIALYSIS-PERITONEAL-LAPAROSCOPIC N/A 5/5/2014    Performed by Js Ladd MD at [facility] OR [facility]    INSERTION-CATHETER-HEMODIALYSIS N/A 5/21/2014     [facility] at [facility]    PERMCATH INSERTION-TUNNELED CVC N/A 5/13/2014    Performed by Lolis Jackson MD at [facility] CATH LAB    REMOVAL, BLOOD CLOT N/A 11/29/2018    [facility] at [facility]    REMOVAL-CATHETER-PERITONEAL DIALYSIS N/A 7/30/2014    [facility]formed by [facility]s [facility]add [facility] at [facility]    SCROTUM EXPLORATION Bilateral 11/2017    TURP (TRANSURETHRAL RESECTION OF PROSTATE) N/A 1/8/2019    [facility] at [facility]    TURP (TRANSURETHRAL RESECTION OF PROSTATE)  1/8/2019    [facility] by [facility] at [facility] OR [facility]       Review of patient's allergies indicates:   Allergen Reactions    Lisinopril Swelling     Current Facility-Administered Medications   Medication Frequency    0.9%  NaCl infusion (for blood administration) Q24H PRN    doxycycline tablet 100 mg Once    furosemide injection 40 mg ED 1 Time    leuprolide (6 month) SyKt 45 mg Q6 Months    lidocaine HCl 2% urojet Once     Current Outpatient Medications   Medication    amLODIPine (NORVASC) 5 MG tablet    aspirin 81 MG Chew    atorvastatin (LIPITOR) 40 MG tablet    B complex-vitamin C-folic acid (NEPHRO-JESSICA) 0.8 mg Tab    blood sugar diagnostic (BLOOD GLUCOSE TEST) Strp    brimonidine-timolol (COMBIGAN) 0.2-0.5 % Drop    brinzolamide (AZOPT) 1 % ophthalmic suspension    calcium acetate (PHOSLO) 667 mg capsule    carvedilol (COREG) 25 MG tablet    dutasteride (AVODART) 0.5 mg capsule    insulin aspart (NOVOLOG FLEXPEN) 100 unit/mL InPn    ketoconazole (NIZORAL) 2 % cream    levetiracetam (KEPPRA) 500 MG Tab    tamsulosin (FLOMAX) 0.4 mg Cap    TRAVOPROST (TRAVATAN Z OPHT)     Family History     Problem Relation (Age of Onset)    Cancer Daughter    Heart disease Sister, Brother        Tobacco Use    Smoking status: Never Smoker    Smokeless tobacco: Never Used   Substance and Sexual Activity    Alcohol use: No    Drug use: No  "   Sexual activity: No     Review of Systems   Constitutional: Negative.    HENT: Negative.         Blind to eyes   Eyes: Negative.    Respiratory: Negative.    Cardiovascular: Negative.    Gastrointestinal: Negative.    Endocrine: Negative.    Genitourinary:        Per patient's daughter "After dialysis, he has this orangy, grainy, sand-like, like rust. Now I didn't notice it here but at home for sure."   Musculoskeletal: Positive for arthralgias.        Patient reports knee pain relieved with Doc's Vaporub, Bag [employer], and an unknown prescription cream   Skin: Negative.         Patient's daughter reports "I think it's irritation from the diaper. No broken skin."   Allergic/Immunologic: Negative.    Neurological: Negative.    Hematological: Negative.    Psychiatric/Behavioral: Negative.      Objective:     Vital Signs (Most Recent):  Pulse: (!) 52 (01/30/19 1203)  Resp: (!) 26 (01/30/19 1203)  BP: (!) 145/67 (01/30/19 1115)  SpO2: 95 % (01/30/19 1203)  O2 Device (Oxygen Therapy): room air (01/30/19 1045) Vital Signs (24h Range):  Pulse:  [52-72] 52  Resp:  [15-27] 26  SpO2:  [95 %-100 %] 95 %  BP: (145-156)/(67-72) 145/67        There is no height or weight on file to calculate BMI.  There is no height or weight on file to calculate BSA.    No intake/output data recorded.    Physical Exam   Constitutional: He is oriented to person, place, and time. He appears well-developed and well-nourished.   HENT:   Head: Normocephalic and atraumatic.   Blind   Eyes: Conjunctivae and EOM are normal. Pupils are equal, round, and reactive to light.   Neck: Normal range of motion. Neck supple.   Cardiovascular: Normal rate, regular rhythm, normal heart sounds and intact distal pulses.   Pulmonary/Chest: Effort normal and breath sounds normal.   Abdominal: Soft. Bowel sounds are normal.   Musculoskeletal: Normal range of motion.   Neurological: He is alert and oriented to person, place, and time.   Skin: Skin is warm and dry. " Capillary refill takes less than 2 seconds.   AVF to L upper arm with + thrill/+ bruit; PIV to R ac; dry skin to feet, oncychomycosis   Psychiatric: He has a normal mood and affect. His behavior is normal. Judgment and thought content normal.   Nursing note and vitals reviewed.      Significant Labs:  All labs within the past 24 hours have been reviewed.    Significant Imaging:  Labs: Reviewed  EKG: Reviewed  Xray: Reviewed  Echo: Reviewed

## 2019-01-30 NOTE — CONSULTS
[facility]  Nephrology  Consult Note    Patient Name: Ronald Campbell  MRN: [id number]  Admission Date: 1/30/2019  Hospital Length of Stay: 0 days  Attending Provider: Bhavin Sosa MD   Primary Care Physician: Bart Shaw MD  Principal Problem:<principal problem not specified>    Inpatient consult to Nephrology  Consult performed by: MOHSEN Boyd  Consult ordered by: Mesha Drake MD  Reason for consult: ESRD Management        Subjective:     HPI: Cc: Patient presented to ED due to acute onset slurred speech, R-sided facial droop, and L arm weakness after a brief syncopal episode    History obtained from the patient, the patient's daughter (who is at the bedside), and the medical chart    PMH: Recent hospitalization due to penile bleeding; ESRD on HD, 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 Vang  -HD tx days: MWF  -HD tx time: 4 hours  -HD access: AVF to L upper arm  -Last HD: Mon, Jan 28th  -EDW: pt's daughter unsure  -RRF: Patient's daughter reports unable to quantify                      Past Medical History:   Diagnosis Date    Anemia     Blind     bilaterally    CHF (congestive heart failure)     Chronic kidney disease     Dementia 01/2017    Diabetes mellitus type II     General anesthetics causing adverse effect in therapeutic use     april / may 2014     Glaucoma (increased eye pressure)     Hypertension     Hypothermia 12/6/2017    Prostate cancer 1/11/2019    Seizures        Past Surgical History:   Procedure Laterality Date    ANGIOPLASTY WITH STENT PLACEMENT Left 11/18/2016    Performed by [facility] at [facility] OR 2ND FLR    AV FISTULA PLACEMENT Left 4/2014    AV fistula to Left  upper arm    CLOT EVACUATION FROM BLADDER  1/8/2019    Performed by [facility] at [facility] OR 1ST FLR    [id number]-BS left RC vs BC AVF Left 8/8/2014    Performed by [facility]in [facility] at [facility] OR 2ND FLR    CYSTOSCOPY N/A 11/29/2018    [facility]    CYSTOSCOPY  WITH FULGURATION  1/8/2019    Performed by [facility]o [facility] at [facility] OR [address] [place] [address]    CYSTOSCOPY, WITH BLADDER BIOPSY, WITH FULGURATION IF INDICATED N/A 1/8/2019    Performed by [facility] at [facility] OR 1ST [facility]    EYE SURGERY      FISTULOGRAM Left 11/18/2016    Performed by [facility]in [facility] MD at [facility] OR 2ND FLR    FISTULOGRAM Left 4/15/2016    Performed by [facility] MD at [facility] OR 2ND FLR    FISTULOGRAM Left 1/29/2016    Performed by [facility] MD at [facility] CATH LAB    FISTULOGRAM Room 11 case Left 3/27/2015    Performed by [facility] MD at [facility] OR 2ND FLR    HYDROCELECTOMY Bilateral 11/22/2017    Performed by [facility]o [facility]jee MD at [facility] OR 2ND FLR    INSERTION-CATHETER-DIALYSIS-PERITONEAL-LAPAROSCOPIC N/A 5/8/2014    Performed by Js Ladd MD at [facility] OR 2ND FLR    INSERTION-CATHETER-DIALYSIS-PERITONEAL-LAPAROSCOPIC N/A 5/5/2014    Performed by Js Ladd MD at [facility] OR 2ND FLR    INSERTION-CATHETER-HEMODIALYSIS N/A 5/21/2014    Performed by [facility]in [facility] at [facility] OR 2ND FLR    PERMCATH INSERTION-TUNNELED CVC N/A 5/13/2014    Performed by Lolis Jackson MD at [facility] CATH LAB    REMOVAL, BLOOD CLOT N/A 11/29/2018    [facility]    REMOVAL-CATHETER-PERITONEAL DIALYSIS N/A 7/30/2014    Performed by Js Ladd MD at [facility] OR 2ND FLR    SCROTUM EXPLORATION Bilateral 11/2017    TURP (TRANSURETHRAL RESECTION OF PROSTATE) N/A 1/8/2019    Performed by Temo [facility]jee MD at [facility] OR 1ST FLR    TURP (TRANSURETHRAL RESECTION OF PROSTATE)  1/8/2019    Performed by [facility]o [facility]jee [facility] at [facility] OR 1ST FLR  "      Review of patient's allergies indicates:   Allergen Reactions    Lisinopril Swelling     Current Facility-Administered Medications   Medication Frequency    0.9%  NaCl infusion (for blood administration) Q24H PRN    doxycycline tablet 100 mg Once    furosemide injection 40 mg ED 1 Time    leuprolide (6 month) SyKt 45 mg Q6 Months    lidocaine HCl 2% urojet Once     Current Outpatient Medications   Medication    amLODIPine (NORVASC) 5 MG tablet    aspirin 81 MG Chew    atorvastatin (LIPITOR) 40 MG tablet    B complex-vitamin C-folic acid (NEPHRO-JESSICA) 0.8 mg Tab    blood sugar diagnostic (BLOOD GLUCOSE TEST) Strp    brimonidine-timolol (COMBIGAN) 0.2-0.5 % Drop    brinzolamide (AZOPT) 1 % ophthalmic suspension    calcium acetate (PHOSLO) 667 mg capsule    carvedilol (COREG) 25 MG tablet    dutasteride (AVODART) 0.5 mg capsule    insulin aspart (NOVOLOG FLEXPEN) 100 unit/mL InPn    ketoconazole (NIZORAL) 2 % cream    levetiracetam (KEPPRA) 500 MG Tab    tamsulosin (FLOMAX) 0.4 mg Cap    TRAVOPROST (TRAVATAN Z OPHT)     Family History     Problem Relation (Age of Onset)    Cancer Daughter    Heart disease Sister, Brother        Tobacco Use    Smoking status: Never Smoker    Smokeless tobacco: Never Used   Substance and Sexual Activity    Alcohol use: No    Drug use: No    Sexual activity: No     Review of Systems   Constitutional: Negative.    HENT: Negative.         Blind to eyes   Eyes: Negative.    Respiratory: Negative.    Cardiovascular: Negative.    Gastrointestinal: Negative.    Endocrine: Negative.    Genitourinary:        Per patient's daughter "After dialysis, he has this orangy, grainy, sand-like, like rust. Now I didn't notice it here but at home for sure."   Musculoskeletal: Positive for arthralgias.        Patient reports knee pain relieved with Doc's Vaporub, Bag [place], and an unknown prescription cream   Skin: Negative.         Patient's daughter reports "I think it's " "irritation from the diaper. No broken skin."   Allergic/Immunologic: Negative.    Neurological: Negative.    Hematological: Negative.    Psychiatric/Behavioral: Negative.      Objective:     Vital Signs (Most Recent):  Pulse: (!) 52 (01/30/19 1203)  Resp: (!) 26 (01/30/19 1203)  BP: (!) 145/67 (01/30/19 1115)  SpO2: 95 % (01/30/19 1203)  O2 Device (Oxygen Therapy): room air (01/30/19 1045) Vital Signs (24h Range):  Pulse:  [52-72] 52  Resp:  [15-27] 26  SpO2:  [95 %-100 %] 95 %  BP: (145-156)/(67-72) 145/67        There is no height or weight on file to calculate BMI.  There is no height or weight on file to calculate BSA.    No intake/output data recorded.    Physical Exam   Constitutional: He is oriented to person, place, and time. He appears well-developed and well-nourished.   HENT:   Head: Normocephalic and atraumatic.   Blind   Eyes: Conjunctivae and EOM are normal. Pupils are equal, round, and reactive to light.   Neck: Normal range of motion. Neck supple.   Cardiovascular: Normal rate, regular rhythm, normal heart sounds and intact distal pulses.   Pulmonary/Chest: Effort normal and breath sounds normal.   Abdominal: Soft. Bowel sounds are normal.   Musculoskeletal: Normal range of motion.   Neurological: He is alert and oriented to person, place, and time.   Skin: Skin is warm and dry. Capillary refill takes less than 2 seconds.   AVF to L upper arm with + thrill/+ bruit; PIV to R ac; dry skin to feet, oncychomycosis   Psychiatric: He has a normal mood and affect. His behavior is normal. Judgment and thought content normal.   Nursing note and vitals reviewed.      Significant Labs:  All labs within the past 24 hours have been reviewed.    Significant Imaging:  Labs: Reviewed  EKG: Reviewed  Xray: Reviewed  Echo: Reviewed        Assessment/Plan:     CVA  -managed by Emergency Medicine    ESRD (end stage renal disease)    ESRD on HD  -Will plan for HD today  -maintain MWF while in-house  -obtain OP HD medical " record  -Hep B sAg negative 1/9/19  -Ph    Dialysis Information: iHD  -Outpatient HD unit: [facility]  -Nephrologist: Dr. Vang  -HD tx days: MWF  -HD tx time: 4 hours  -HD access: AVF to L upper arm  -Last HD: Mon, Jan 28th  -EDW: pt's daughter unsure  -RRF: yes    Anemia  -Hgb 6.4  -to receive 1 unit PRBC  -managed by Emergency Medicine    Na 134  K 4  CO2   Ca 8.8  Albumin 2.0    Hypercalemia  -Corrected Ca 10.4  -Ca to be adjusted in dialysate bath    HTN  -/67  -managed by Emergency Medicine    Renal diet  1 liter fluid restriction per day    - Daily standing weights and chart.  - Strict I/O and chart  - Avoid nephrotoxic medications  - Maintain MAP >65  - Keep target Bp <140/90  - Hb > 7 gm/dL  - Medications to renal parameters         Thank you for your consult. I will follow-up with patient. Please contact us if you have any additional questions.    MOHSEN Caceres  Nephrology  [facility]

## 2019-01-30 NOTE — SUBJECTIVE & OBJECTIVE
Past Medical History:   Diagnosis Date    Anemia     Blind     bilaterally    CHF (congestive heart failure)     Chronic kidney disease     Dementia 01/2017    Diabetes mellitus type II     General anesthetics causing adverse effect in therapeutic use     april / may 2014     Glaucoma (increased eye pressure)     Hypertension     Hypothermia 12/6/2017    Prostate cancer 1/11/2019    Seizures      Past Surgical History:   Procedure Laterality Date    ANGIOPLASTY WITH STENT PLACEMENT Left 11/18/2016    Performed by [facility] OR 2ND FLR    AV FISTULA PLACEMENT Left 4/2014    AV fistula to Left upper arm    CLOT EVACUATION FROM BLADDER  1/8/2019    [facility] by [facility] at [facility]    [id number] left RC vs BC AVF Left 8/8/2014    Performed by [facility]in Sims MD at [facility] OR 2ND FLR    CYSTOSCOPY N/A 11/29/2018    Performed by [facility]jee MD at [facility] OR 1ST FLR    CYSTOSCOPY  WITH FULGURATION  1/8/2019    Performed by [facility]jee MD at [facility] OR [address] [place] [address]    CYSTOSCOPY, WITH BLADDER BIOPSY, WITH FULGURATION IF INDICATED N/A 1/8/2019    Performed by [facility]jee MD at [facility] OR 1ST FLR    EYE SURGERY      FISTULOGRAM Left 11/18/2016    Performed by [facility]in Sims MD at [facility] OR 2ND FLR    FISTULOGRAM Left 4/15/2016    Performed by [facility]in [facility]s MD at [facility] OR 2ND FLR    FISTULOGRAM Left 1/29/2016    Performed by [facility]in [facility]s [facility] at [facility] CATH LAB    FISTULOGRAM Room 11 case Left 3/27/2015    Performed by Oirn Sims MD at [facility] OR 2ND FLR    HYDROCELECTOMY Bilateral 11/22/2017    Performed by [facility]erjee MD at [facility] OR 2ND FLR    INSERTION-CATHETER-DIALYSIS-PERITONEAL-LAPAROSCOPIC N/A 5/8/2014    Performed by Js Ladd MD at [facility] OR 2ND FLR    INSERTION-CATHETER-DIALYSIS-PERITONEAL-LAPAROSCOPIC N/A 5/5/2014    Performed by Js Ladd MD at [facility] OR 2ND FLR    INSERTION-CATHETER-HEMODIALYSIS N/A 5/21/2014     [facility] by [facility] at [facility] 2ND FLR    PERMCATH INSERTION-TUNNELED CVC N/A 5/13/2014    Performed by Lolis Jackson MD at [facility] CATH LAB    REMOVAL, BLOOD CLOT N/A 11/29/2018    Performed by [facility]jee MD at [facility] OR 1ST FLR    REMOVAL-CATHETER-PERITONEAL DIALYSIS N/A 7/30/2014    Performed by Js Ladd MD at [facility] OR 2ND FLR    SCROTUM EXPLORATION Bilateral 11/2017    TURP (TRANSURETHRAL RESECTION OF PROSTATE) N/A 1/8/2019    Performed by [facility] at [facility] OR 1ST FLR    TURP (TRANSURETHRAL RESECTION OF PROSTATE)  1/8/2019    Performed by Temo [facility]jee MD at [facility] OR 1ST FLR     Family History   Problem Relation Age of Onset    Heart disease Sister         mi    Heart disease Brother         mi    Cancer Daughter         BREAST X 2     Social History     Tobacco Use    Smoking status: Never Smoker    Smokeless tobacco: Never Used   Substance Use Topics    Alcohol use: No    Drug use: No     Review of patient's allergies indicates:   Allergen Reactions    Lisinopril Swelling       Medications: I have reviewed the current medication administration record.      (Not in a hospital admission)    Review of Systems   Constitutional: Positive for activity change and fatigue.   HENT: Negative.    Eyes: Positive for visual disturbance (chronic).   Respiratory: Positive for shortness of breath and wheezing.    Cardiovascular: Positive for leg swelling.   Gastrointestinal: Negative for nausea and vomiting.   Endocrine: Negative.    Genitourinary: Negative.    Musculoskeletal: Positive for gait problem.   Skin: Negative.    Neurological: Positive for syncope, speech difficulty, weakness and light-headedness.   Hematological: Negative.    Psychiatric/Behavioral: Positive for decreased concentration.     Objective:     Vital Signs (Most Recent):  Pulse: (!) 51 (01/30/19 1311)  Resp: (!) 26 (01/30/19 1203)  BP: 131/61 (01/30/19 1311)  SpO2: (!) 94 % (01/30/19 1311)    Vital  Signs Range (Last 24H):  Pulse:  [51-72]   Resp:  [15-27]   BP: (131-156)/(61-72)   SpO2:  [94 %-100 %]     Physical Exam   Constitutional: He is oriented to person, place, and time. He appears well-developed.   Drowsy, elderly, chronically ill appearing male    HENT:   Head: Normocephalic and atraumatic.   Eyes: Conjunctivae and EOM are normal.   Complete blindness, noted whiteness of R iris   Neck: Normal range of motion.   Cardiovascular: Normal rate and regular rhythm.   Pulmonary/Chest:   Abdominal breathing, tachypnea    Abdominal: Soft.   Musculoskeletal: Normal range of motion. He exhibits edema. He exhibits no tenderness or deformity.   Neurological: He is alert and oriented to person, place, and time.       Neurological Exam:   LOC: drowsy  Attention Span: Good   Language: No aphasia  Articulation: Dysarthria  Orientation: Person, Place, Time   Visual Fields: Bilateral visual loss/blindness  EOM (CN III, IV, VI): Full/intact, although patient initially w/ R sided gaze preference   Pupils (CN II, III): PERRL, sluggish   Facial Sensation (CN V): Normal  Facial Movement (CN VII): Symmetric facial expression    Motor: Arm left  Paresis: 4/5  Leg left  Paresis: 4/5  Arm right  Paresis: 4/5  Leg right Paresis: 4/5  Cebellar: No evidence of appendicular or axial ataxia  Sensation: Intact to light touch, temperature and vibration  Tone: Normal tone throughout      Laboratory:  CMP:   Recent Labs   Lab 01/30/19  1110   CALCIUM 8.8   ALBUMIN 2.0*   PROT 6.9   *   K 4.0   CO2 23      BUN 53*   CREATININE 6.3*   ALKPHOS 104   ALT 14   AST 35   BILITOT 0.4     CBC:   Recent Labs   Lab 01/30/19  1110   WBC 6.18   RBC 2.51*   HGB 6.7*   HCT 21.5*   *   MCV 86   MCH 26.7*   MCHC 31.2*     Lipid Panel:   Recent Labs   Lab 01/30/19  1110   CHOL 81*   LDLCALC 42.8*   HDL 31*   TRIG 36     Coagulation:   Recent Labs   Lab 01/30/19  1110   INR 1.1     Hgb A1C: No results for input(s): HGBA1C in the last 168  hours.  TSH:   Recent Labs   Lab 01/30/19  1110   TSH 1.886       Diagnostic Results:      Brain imaging:  CTH   No evidence of acute hemorrhage or major vascular distribution infarct.  Generalized cerebral volume loss.  Chronic microvascular ischemic change with remote right occipital infarct.  MRI pending   Vessel Imaging:  MRA pending     Cardiac Evaluation:   TTE 01/07/2019  · Eccentric left ventricular hypertrophy.  · Moderately decreased left ventricular systolic function - worse in the anterolateral wall. The estimated ejection fraction is 35%.  · Normal right ventricular systolic function.  · Grade I (mild) left ventricular diastolic dysfunction consistent with impaired relaxation.  · Moderate left atrial enlargement.  · Normal central venous pressure (3 mm Hg).

## 2019-01-30 NOTE — HPI
"Patient is an 89 yo male w/ past medical history significant for ESRD on HD ( MWF), DM, CHF, HTN, HLD, legally blind, Hx of previous CVA w/ no reported deficits per daughter and recent dx of prostate Ca s/p Leuprolide injection X 1 on 1/29/2019 presents to the ED via EMS after new onset weakness 1 hour PTA. Per daughter's report, the aid called the EMS after the patient became significantly weak, reportedly generalized symptoms, and had an episode of unresponsiveness where his eyes rolled back into his head. Per EMS report, there was noted RS facial droop, slurred speech and RUE weakness.   Per daughter, while patient was in the ambulance, she did note slurred speech, worse so than usual, saying he had a "heavy tongue". At baseline, he is communicative, able to reply to his daughter, who he lives with , but does spend most of his day resting.   Patient had a previous generalized weakness episode on January 4th, although w.o reported eyes rolling into the back of his head. Per chart review, patient was admitted for gross hematuria on that day.  CTH w/o any acute ischemic changes and no evidence of acute hemorrhage, does have evidence of encephalomalacia from previous infarcts. Unable to get a CTA 2/2 improper contrast timing.   Vascular Neurology consulted due to a concern for acute ischemic event.   "

## 2019-01-30 NOTE — ED PROVIDER NOTES
Encounter Date: 1/30/2019    SCRIBE #1 NOTE: I, Nic Lim, am scribing for, and in the presence of,  Dr. Drake. I have scribed the following portions of the note - the EKG reading. Other sections scribed: HPI, ROS, PE.       History     Chief Complaint   Patient presents with    Cerebrovascular Accident     Pt was on his way to dialysis at approximately 0926 and began having acute onset slurred speech, Right sided facial droop, left arm weakness after a brief syncopal episode.     Time patient was seen by the provider: 10:43 AM      The patient is a 89 y.o. male with co-morbidities including: HTN, DM type 2, ESRD, seizures, CHF, glaucoma, dementia, prostate cancer, and bilateral blindness, who presents to the ED with a complaint of slurred speech, right sided facial droop, and left arm weakness. Per EMS patient was going to dialysis around 9:30AM when after getting in the car the patient had a brief syncopal episode. The patient's family then noticed he had slurred speech, right sided facial droop, and left upper extremity weakness. The patient did not make it to dialysis. On EMS arrival patient still exhibited these symptoms. He had blood pressure of 150/80 and finger stick of 152. Patient was last dialyzed 2 days ago (1/28/2019).     Per patient's daughter at bedside, patient had CNA with him to get him ready and bring him to dialysis. Patient became weak and CNA and had to place him on the ground. Once he was placed in the transport van his eyes rolled back and patient had a syncopal episode. His symptoms were noted on awakening. Patient has been weak for the past few days particularly in his lower extremities. He was recently diagnosed with prostate cancer. Patient denies any pain at this time.       The history is provided by the EMS personnel, a relative and the patient.     Review of patient's allergies indicates:   Allergen Reactions    Lisinopril Swelling     Past Medical History:   Diagnosis Date     Anemia     Blind     bilaterally    CHF (congestive heart failure)     Chronic kidney disease     Dementia 01/2017    Diabetes mellitus type II     General anesthetics causing adverse effect in therapeutic use     april / may 2014     Glaucoma (increased eye pressure)     Hypertension     Hypothermia 12/6/2017    Prostate cancer 1/11/2019    Seizures      Past Surgical History:   Procedure Laterality Date    ANGIOPLASTY WITH STENT PLACEMENT Left 11/18/2016    [facility]formed by [facility] at [facility] OR [facility]ND [facility]LR    AV FISTULA PLACEMENT Left 4/2014    AV fistula to Left upper arm    CLOT EVACUATION FROM BLADDER  1/8/2019    Performed by [facility]erjee [facility] at [facility] OR [facility]ST FLR    TOVATQCUIKAC-[id number] left RC vs BC AVF Left 8/8/2014    Performed by [facility] at [facility] OR 2ND FLR    CYSTOSCOPY N/A 11/29/2018    Performed by [facility] at [facility] OR [facility]ST [facility]LR    CYSTOSCOPY  WITH FULGURATION  1/8/2019    Performed by [facility] at [facility] OR [facility]ST FLR    CYSTOSCOPY, WITH BLADDER BIOPSY, WITH FULGURATION IF INDICATED N/A 1/8/2019    Performed by [facility] at [facility] OR 1ST FLR    EYE SURGERY      FISTULOGRAM Left 11/18/2016    Performed by [facility] at [facility] OR 2ND FLR    FISTULOGRAM Left 4/15/2016    Performed by [facility] at [facility] OR 2ND FLR    FISTULOGRAM Left 1/29/2016    Performed by [facility] at [facility] CATH LAB    FISTULOGRAM Room 11 case Left 3/27/2015    Performed by [facility] at [facility] OR 2ND FLR    HYDROCELECTOMY Bilateral 11/22/2017    Performed by [facility] at [facility] OR 2ND FLR    INSERTION-CATHETER-DIALYSIS-PERITONEAL-LAPAROSCOPIC N/A 5/8/2014    Performed by Js Ladd MD at [facility] OR 2ND FLR    INSERTION-CATHETER-DIALYSIS-PERITONEAL-LAPAROSCOPIC N/A 5/5/2014    Performed by Js Ladd [facility] at [facility] OR 2ND FLR    INSERTION-CATHETER-HEMODIALYSIS N/A 5/21/2014    Performed by [facility]in [facility]s [facility] at [facility] OR  2ND FLR    PERMCATH INSERTION-TUNNELED CVC N/A 5/13/2014    Performed by Lolis Jackson [facility] at [facility] CATH LAB    REMOVAL, BLOOD CLOT N/A 11/29/2018    Performed by [facility] at [facility] OR 1ST FLR    REMOVAL-CATHETER-PERITONEAL DIALYSIS N/A 7/30/2014    Performed by Js Ladd [facility] at [facility] OR 2ND FLR    SCROTUM EXPLORATION Bilateral 11/2017    TURP (TRANSURETHRAL RESECTION OF PROSTATE) N/A 1/8/2019    Performed by [facility] at [facility] OR 1ST FLR    TURP (TRANSURETHRAL RESECTION OF PROSTATE)  1/8/2019    Performed by [facility] at [facility] OR 1ST FLR     Family History   Problem Relation Age of Onset    Heart disease Sister         mi    Heart disease Brother         mi    Cancer Daughter         BREAST X 2     Social History     Tobacco Use    Smoking status: Never Smoker    Smokeless tobacco: Never Used   Substance Use Topics    Alcohol use: No    Drug use: No     Review of Systems   Constitutional: Negative for fever.   HENT: Negative for sore throat.    Eyes: Negative for pain.   Respiratory: Negative for shortness of breath.    Cardiovascular: Negative for chest pain.   Gastrointestinal: Negative for nausea.   Genitourinary: Negative for dysuria.   Musculoskeletal: Negative for back pain.   Skin: Negative for rash.   Neurological: Positive for syncope, facial asymmetry (right sided facial droop), speech difficulty (slurred) and weakness (left upper extremity).       Physical Exam     Initial Vitals   BP Pulse Resp Temp SpO2   01/30/19 1045 01/30/19 1045 01/30/19 1045 01/30/19 1600 01/30/19 1045   (!) 156/72 72 15 (!) 92.2 °F (33.4 °C) 99 %      MAP       --                Physical Exam    Nursing note and vitals reviewed.  Constitutional: He is not diaphoretic. He is cooperative. He appears ill.   Patient appears frail, chronically ill, weak, and mildly lethargic.    HENT:   Daughter reports patient is blind at baseline. Dry mucous membranes.    Eyes:   blind   Neck: Neck  supple.   Cardiovascular: Normal rate, regular rhythm and normal heart sounds. Exam reveals no gallop and no friction rub.    No murmur heard.  Pulmonary/Chest: Breath sounds normal. No respiratory distress. He has no wheezes. He has no rhonchi. He has no rales.   Abdominal: Soft. He exhibits no distension. There is no tenderness.   Genitourinary: Rectal exam shows guaiac negative stool. Guaiac negative stool. : Acceptable.  Musculoskeletal: He exhibits edema (1+ edema to bilateral lower extremities).   Neurological: No sensory deficit.   Patient is mildly lethargic but able to answer most questions appropriately. No obvious cranial nerve deficit. Strength 5/5 in bilateral upper extremities. Patient exhibits fluent speech, no aphasia appreciated.    Skin: No rash noted.         ED Course   Procedures  Labs Reviewed   CBC W/ AUTO DIFFERENTIAL - Abnormal; Notable for the following components:       Result Value    RBC 2.51 (*)     Hemoglobin 6.7 (*)     Hematocrit 21.5 (*)     MCH 26.7 (*)     MCHC 31.2 (*)     RDW 19.0 (*)     Platelets 138 (*)     MPV 14.0 (*)     Immature Granulocytes 0.8 (*)     Immature Grans (Abs) 0.05 (*)     Lymph # 0.6 (*)     nRBC 1 (*)     Gran% 76.2 (*)     Lymph% 10.4 (*)     All other components within normal limits   COMPREHENSIVE METABOLIC PANEL - Abnormal; Notable for the following components:    Sodium 134 (*)     Glucose 229 (*)     BUN, Bld 53 (*)     Creatinine 6.3 (*)     Albumin 2.0 (*)     eGFR if  8.3 (*)     eGFR if non  7.2 (*)     All other components within normal limits   LIPID PANEL - Abnormal; Notable for the following components:    Cholesterol 81 (*)     HDL 31 (*)     LDL Cholesterol 42.8 (*)     All other components within normal limits   TROPONIN I - Abnormal; Notable for the following components:    Troponin I 0.054 (*)     All other components within normal limits   IRON AND TIBC - Abnormal; Notable for the  following components:    Iron 33 (*)     Transferrin 109 (*)     TIBC 161 (*)     All other components within normal limits    Narrative:     Please draw prior to blood transfusion   FERRITIN - Abnormal; Notable for the following components:    Ferritin 3,688 (*)     All other components within normal limits    Narrative:     Please draw prior to blood transfusion   VITAMIN B12 - Abnormal; Notable for the following components:    Vitamin B-12 1283 (*)     All other components within normal limits    Narrative:     Please draw prior to blood transfusion   RETICULOCYTES - Abnormal; Notable for the following components:    Retic 2.8 (*)     All other components within normal limits    Narrative:     Please draw prior to blood transfusion   CBC W/ AUTO DIFFERENTIAL - Abnormal; Notable for the following components:    RBC 2.92 (*)     Hemoglobin 8.0 (*)     Hematocrit 24.8 (*)     RDW 17.8 (*)     Platelets 123 (*)     Immature Granulocytes 0.7 (*)     Lymph # 0.8 (*)     nRBC 1 (*)     Gran% 73.4 (*)     Lymph% 14.0 (*)     All other components within normal limits   POCT GLUCOSE - Abnormal; Notable for the following components:    POCT Glucose 252 (*)     All other components within normal limits   ISTAT PROCEDURE - Abnormal; Notable for the following components:    POC PH 7.302 (*)     POC PCO2 49.4 (*)     POC PO2 37 (*)     POC SATURATED O2 63 (*)     All other components within normal limits   PROTIME-INR   TSH   LACTIC ACID, PLASMA   FOLATE    Narrative:     Please draw prior to blood transfusion   LACTATE DEHYDROGENASE    Narrative:     Please draw prior to blood transfusion   HAPTOGLOBIN    Narrative:     Please draw prior to blood transfusion   URINALYSIS, REFLEX TO URINE CULTURE   POCT GLUCOSE, HAND-HELD DEVICE   TYPE & SCREEN   ISTAT CHEM8   PREPARE RBC SOFT     EKG Readings: (Independently Interpreted)   Heart Rate: 59.   Normal sinus rhythm vs junctional rhythm. QRS 90. No widened QRS or peaked T waves.       ECG Results          ECG 12 lead (Final result)  Result time 01/30/19 14:57:19    Final result by Interface, Lab In [facility] (01/30/19 14:57:19)                 Narrative:    Test Reason : (Not Selected)    Vent. Rate : 059 BPM     Atrial Rate : 059 BPM     P-R Int : 200 ms          QRS Dur : 090 ms      QT Int : 488 ms       P-R-T Axes : 000 004 059 degrees     QTc Int : 483 ms    Sinus bradycardia  Nonspecific T wave abnormality  Prolonged QT  Abnormal ECG  When compared with ECG of 04-JAN-2019 11:57,  sinus bradycardia is now present  Confirmed by Priscilla Hinkle MD ([id number]) on 1/30/2019 2:57:11 PM    Referred By: [email or web address]   SELF           Confirmed By:Priscilla Hinkle MD                            Imaging Results          X-Ray Chest AP Portable (Final result)  Result time 01/30/19 11:22:52    Final result by Murphy Kruse MD (01/30/19 11:22:52)                 Impression:      See above      Electronically signed by: Murphy Kruse MD  Date:    01/30/2019  Time:    11:22             Narrative:    EXAMINATION:  XR CHEST AP PORTABLE    CLINICAL HISTORY:  Stroke;    TECHNIQUE:  Single frontal view of the chest was performed.    COMPARISON:  No 01/05/2019 ne    FINDINGS:  Cardiomegaly and pulmonary vascular congestion.  Ill-defined alveolar filling bilaterally more at the lung bases possible edema or airspace consolidation.  Blunted costophrenic angle laterally possible pleural effusion.                               CT Head W WO Contrast (Final result)  Result time 01/30/19 11:33:18   Procedure changed from CTA STROKE MULTI-PHASE     Final result by Ramiro Mary MD (01/30/19 11:33:18)                 Impression:      No evidence of acute hemorrhage or major vascular distribution infarct.    Generalized cerebral volume loss.  Chronic microvascular ischemic change with remote right occipital infarct.    Electronically signed by resident: Orin Rubi  Date:    01/30/2019  Time:    11:04    Electronically signed  by: Ramiro Mary MD  Date:    01/30/2019  Time:    11:33             Narrative:    EXAMINATION:  CT HEAD WITH AND WITHOUT    CLINICAL HISTORY:  Neuro deficit(s), subacute;    TECHNIQUE:  Low-dose axial images were obtained though the head before and after the administration of Omnipaque contrast.  Exam ordered as a CT angiogram, however suboptimal bolus timing.  Axial, sagittal and coronal reconstructions and maximum intensity projection reconstructions were performed.    COMPARISON:  CT head without contrast 01/04/2019    FINDINGS:  Intracranial Compartment:    Prominence of the ventricles and sulci compatible with mild generalized cerebral volume loss.  No hydrocephalus.    No extra-axial blood or fluid collections.    Large area of encephalomalacia in the right occipital lobe, similar when compared to prior, compatible with remote infarct.  Chronic microvascular ischemic change in the supratentorial white matter.  No new parenchymal mass, hemorrhage, edema, or major vascular distribution infarct.  No abnormal enhancement on postcontrast images.  Limited evaluation intracranial vasculature demonstrates no evidence of large vessel occlusion.  Extensive atherosclerotic calcification about the cavernous ICAs bilaterally.    Skull/Extracranial Contents (limited evaluation):    No calvarial fracture.  Mastoid air cells and paranasal sinuses are essentially clear.                                 Medical Decision Making:   History:   Old Medical Records: I decided to obtain old medical records.  Old Records Summarized: records from clinic visits.       <> Summary of Records: Patient was seen by urology yesterday for Lupron injection. He had a CT scan in November 2018 that showed enlarged lymph nodes. Patient had a biopsy and was found to have prostate cancer. Cancer is possibly metastatic, the whole work up has not been completed.   Initial Assessment:   89 yo m, extensive PMH (ESRD on HD, prostate CA, dementia, CHF  (EF 35%) s/p witnessed syncope episode today at home, prior to HD    ? Facial droop and left UE weakness after event and on EMS arrival though in the ED, pt mildly lethargic but with no motor or sensory deficits, speech slow but no aphasia, seems to be at baseline    Stroke code initiated on arrival.  CT head/CTA done.  No acute bleed.  CTA not ideal study so initial plan for MRI brain ischemia protocol. On further assessment, though, pt with moderate tachypnea, CXR shows severe pulmonary edema.  Think given low suspicion for CVA and lack of deficits at this time, risks of MRI given tenuous resp status, outweigh potential benefits.  Stroke team in agreement  Differential Diagnosis:   Syncope - arrhythmia, hypovolemia, anemia, infection, electrolyte disturbance  -EKG shows no STEMI or overt signs of hyperkalemia    ? TIA sx vs seizure  Independently Interpreted Test(s):   I have ordered and independently interpreted EKG Reading(s) - see prior notes  Clinical Tests:   Lab Tests: Ordered and Reviewed  Radiological Study: Ordered and Reviewed  Medical Tests: Ordered and Reviewed  ED Management:  12:50 PM  Hgb 6.7 which is slightly lower than baseline.  Rectal exam done, yellow stool, guaiac negative.  Pt recently admitted w severe hematuria, ? Etiology of worsening anemia.  Will transfuse 1 U PRBC  Daughter reports that pt makes urine - will give lasix 40 mg IV 2/2 pulmonary edema  Nephrology consulted for HD, awaiting call back    12:59 PM  Electrolytes normal  Will admit to medicine  Other:   I have discussed this case with another health care provider.            Scribe Attestation:   Scribe #1: I performed the above scribed service and the documentation accurately describes the services I performed. I attest to the accuracy of the note.    Attending Attestation:         Attending Critical Care:   Critical Care Times:   ==============================================================  · Total Critical Care Time -  exclusive of procedural time: 35 minutes.  ==============================================================  Critical care was necessary to treat or prevent imminent or life-threatening deterioration of the following conditions: CNS failure, respiratory failure and renal failure.   Critical care was time spent personally by me on the following activities: obtaining history from patient or relative, examination of patient, review of x-rays / CT sent with the patient, review of old charts, ordering lab, x-rays, and/or EKG, development of treatment plan with patient or relative, ordering and performing treatments and interventions, evaluation of patient's response to treatment, discussion with consultants and re-evaluation of patient's conition.       I, Dr. Mesha Drake, personally performed the services described in this documentation. All medical record entries made by the scribe were at my direction and in my presence.  I have reviewed the chart and agree that the record reflects my personal performance and is accurate and complete. Mesha Drake MD.  12:51 PM 01/31/2019           Clinical Impression:   The primary encounter diagnosis was Syncope, unspecified syncope type. Diagnoses of Stroke, Syncope, and ESRD (end stage renal disease) on dialysis were also pertinent to this visit.      Disposition:   Disposition: Admitted                        Mesha Drake MD  01/31/19 0912

## 2019-01-30 NOTE — ASSESSMENT & PLAN NOTE
Hx off as observed w/ encephalomalacia or R parietale lobe evident on CTH   - Secondary stroke prevention measures in place w/ ASA and Atorvastatin

## 2019-01-30 NOTE — ED NOTES
Bed: 01  Expected date: 1/30/19  Expected time: 9:34 AM  Means of arrival:   Comments:  TPA Transfer

## 2019-01-30 NOTE — SUBJECTIVE & OBJECTIVE
Past Medical History:   Diagnosis Date    Anemia     Blind     bilaterally    CHF (congestive heart failure)     Chronic kidney disease     Dementia 01/2017    Diabetes mellitus type II     General anesthetics causing adverse effect in therapeutic use     april / may 2014     Glaucoma (increased eye pressure)     Hypertension     Hypothermia 12/6/2017    Prostate cancer 1/11/2019    Seizures        Past Surgical History:   Procedure Laterality Date    ANGIOPLASTY WITH STENT PLACEMENT Left 11/18/2016    [facility]ND [facility]    AV FISTULA PLACEMENT Left 4/2014    AV fistula to Left upper arm    CLOT EVACUATION FROM BLADDER  1/8/2019    [facility] by [facility] at [facility]    [id number] left RC vs BC AVF Left 8/8/2014    [facility] by [facility] OR [facility]ND [facility]    CYSTOSCOPY N/A 11/29/2018    [facility] at [facility]    CYSTOSCOPY  WITH FULGURATION  1/8/2019    [facility] by [facility] at [facility] OR [address] [place] [address]    CYSTOSCOPY, WITH BLADDER BIOPSY, WITH FULGURATION IF INDICATED N/A 1/8/2019    [facility] by [facility] at [facility] OR [facility]    EYE SURGERY      FISTULOGRAM Left 11/18/2016    [facility]    FISTULOGRAM Left 4/15/2016    [facility] OR [facility]    FISTULOGRAM Left 1/29/2016    [facility] CATH LAB    FISTULOGRAM Room 11 case Left 3/27/2015    [facility] by [facility] at [facility] OR [facility]ND [facility]    HYDROCELECTOMY Bilateral 11/22/2017    [facility] by [facility] at [facility] OR [facility]ND [facility]    INSERTION-CATHETER-DIALYSIS-PERITONEAL-LAPAROSCOPIC N/A 5/8/2014    Performed by Js Ladd MD at [facility] OR 2ND [facility]    INSERTION-CATHETER-DIALYSIS-PERITONEAL-LAPAROSCOPIC N/A 5/5/2014    Performed by Js Ladd [facility] at [facility] OR 2ND [facility]    INSERTION-CATHETER-HEMODIALYSIS N/A 5/21/2014     [facility] at [facility] OR 2ND [facility]    PERMCATH INSERTION-TUNNELED CVC N/A 5/13/2014    Performed by Lolis Jackson MD at [facility] CATH LAB    REMOVAL, BLOOD CLOT N/A 11/29/2018    Performed by [facility] at [facility] OR [facility]ST [facility]    REMOVAL-CATHETER-PERITONEAL DIALYSIS N/A 7/30/2014    Performed by Js Ladd MD at [facility] OR 2ND [facility]    SCROTUM EXPLORATION Bilateral 11/2017    TURP (TRANSURETHRAL RESECTION OF PROSTATE) N/A 1/8/2019    Performed by [facility] at [facility] OR [facility]    TURP (TRANSURETHRAL RESECTION OF PROSTATE)  1/8/2019    Performed by [facility]o [facility] at [facility] OR 1ST [facility]       Review of patient's allergies indicates:   Allergen Reactions    Lisinopril Swelling       Current Facility-Administered Medications on File Prior to Encounter   Medication    doxycycline tablet 100 mg    leuprolide (6 month) SyKt 45 mg    lidocaine HCl 2% urojet     Current Outpatient Medications on File Prior to Encounter   Medication Sig    amLODIPine (NORVASC) 5 MG tablet Take 1 tablet (5 mg total) by mouth once daily. (Patient taking differently: Take 5 mg by mouth every evening. )    aspirin 81 MG Chew Take 1 tablet (81 mg total) by mouth once daily.    atorvastatin (LIPITOR) 40 MG tablet Take 1 tablet (40 mg total) by mouth once daily. (Patient taking differently: Take 40 mg by mouth every evening. )    B complex-vitamin C-folic acid (NEPHRO-JESSICA) 0.8 mg Tab Take 1 tablet by mouth every morning.     brimonidine-timolol (COMBIGAN) 0.2-0.5 % Drop Place 1 drop into the left eye 2 (two) times daily.    brinzolamide (AZOPT) 1 % ophthalmic suspension Place 1 drop into the left eye 3 (three) times daily.     calcium acetate (PHOSLO) 667 mg capsule Take 2 capsules by mouth every day in the morning, then take 1 capsule with lunch and 2 capsules in the evening with meals    carvedilol (COREG) 25 MG tablet Take 1 tablet (25 mg total) by mouth 2 (two) times daily. Do not take on  dialysis days. Hold for SBP < 110 or HR < 55    dutasteride (AVODART) 0.5 mg capsule Take 1 capsule (0.5 mg total) by mouth once daily.    ketoconazole (NIZORAL) 2 % cream Apply topically once daily. feet    levetiracetam (KEPPRA) 500 MG Tab Take 1 tablet (500 mg total) by mouth 2 (two) times daily.    tamsulosin (FLOMAX) 0.4 mg Cap Take 1 capsule (0.4 mg total) by mouth once daily.    TRAVOPROST (TRAVATAN Z OPHT) Place 1 drop into the left eye every evening. 1 Drops Left eye Every evening    blood sugar diagnostic (BLOOD GLUCOSE TEST) Strp 1 strip by Misc.(Non-Drug; Combo Route) route daily as needed.     insulin aspart (NOVOLOG FLEXPEN) 100 unit/mL InPn  Insulin Pen Subcutaneous As directed.  -200 take 1 unitBS 201-250 take 2 unitsBS 251-300 take 3 unitsBS 301-350 take 4 unitsBS > 351 take 5 units and call MD     Family History     Problem Relation (Age of Onset)    Cancer Daughter    Heart disease Sister, Brother        Tobacco Use    Smoking status: Never Smoker    Smokeless tobacco: Never Used   Substance and Sexual Activity    Alcohol use: No    Drug use: No    Sexual activity: No     Review of Systems   Unable to perform ROS: Dementia     Objective:     Vital Signs (Most Recent):  Pulse: (!) 55 (01/30/19 1530)  Resp: 20 (01/30/19 1445)  BP: 134/64 (01/30/19 1530)  SpO2: (!) 94 % (01/30/19 1311) Vital Signs (24h Range):  Pulse:  [51-72] 55  Resp:  [15-27] 20  SpO2:  [94 %-100 %] 94 %  BP: (131-156)/(61-72) 134/64        There is no height or weight on file to calculate BMI.    Physical Exam   Constitutional: No distress.   Frail older AA male   HENT:   Head: Normocephalic and atraumatic.   Eyes:   Opacification of R cornea. Pale conjunctival rims.   Cardiovascular: Regular rhythm, normal heart sounds and intact distal pulses.   Bradycardia, rate in 50s   Pulmonary/Chest: Breath sounds normal. No respiratory distress.   Decreased inspiratory effort   Abdominal: He exhibits no distension. There  is no tenderness.   Firm abdomen, but not tense   Musculoskeletal: He exhibits edema (bilateral LL).   L arm fistula with palpable thrill and bruit   Neurological:   Patient is somnolent and fatigued    Skin: Capillary refill takes 2 to 3 seconds. He is not diaphoretic.   Nursing note and vitals reviewed.          Significant Labs:   CBC:   Recent Labs   Lab 01/30/19  1110   WBC 6.18   HGB 6.7*   HCT 21.5*   *       Significant Imaging: I have reviewed all pertinent imaging results/findings within the past 24 hours.

## 2019-01-30 NOTE — CONSULTS
[facility]  Vascular Neurology  Comprehensive Stroke Center  Consult Note    Inpatient consult to Vascular (Stroke) Neurology  Consult performed by: Ave Fernandez MD  Consult ordered by: Mesha Drake MD        Assessment/Plan:     Patient is a 89 y.o. year old male with:    * Syncope    Patient is an 88 yo male w/ complicated medical history presents to the ED from home after an episode of generalized weakness and possible syncope at home as he was about to leave for his scheduled HD session. Patient presents to the ED w/ generalized weakness and a non-focal neurological exam. CTH w/o evidence of acute ischemic changes and no evidence of intracranial hemorrhage. CTA was non-contributory as the timing of contrast was poor, although very low suspicion for LVO after arrival to the ED.  Patient found to have significant pulmonary edema on CXR likely as he is due for HD today and also was noted to have abdominal breathing and tachypnea.     Recommendations  - MRI/MRA ischemic protocol to r/o acute infarct in this patient w/ low suspicion for cardiovascular event based on exam and presentation; patient w/ generalized weakness which does not correlate w/ any particular vascular territory.   - Continue ASA and Statin at this time for secondary stroke prevention as patient w/ Hx of TIAs and observed R parietal encephalomalacia on CTH.   - Could repeat Lipid panel and HbA1c  - Episode likely triggered 2/2 metabolic vs infectious etiology  - Recommend admission to Hospital Medicine service w/ HD today  - Will follow up scan, if no acute ischemia noted, will sign off  - Appreciate consult          Dyslipidemia    Stroke risk factor  - Lipid Panel   - Continue current home medication  - If LDL >70, consider increasing the high dose statin      HTN (hypertension)    Stroke RF  - BP management per primary team   - Normotensive in the ED  - HD on M/W/F  - Goal is normotension for this patient      ESRD  (end stage renal disease)    - Management per primary team   - Lasix 40 mg IV ordered in ED by ED team   - HD today  - Volume overloaded  On CXR      Cerebrovascular disease    Hx off as observed w/ encephalomalacia or R parietale lobe evident on CTH   - Secondary stroke prevention measures in place w/ ASA and Atorvastatin          STROKE DOCUMENTATION          NIH Scale:  1a. Level of Consciousness: 1-->Not alert, but arousable by minor stimulation to obey, answer, or respond  1b. LOC Questions: 0-->Answers both questions correctly  1c. LOC Commands: 0-->Performs both tasks correctly  2. Best Gaze: 1-->Partial gaze palsy, gaze is abnormal in one or both eyes, but forced deviation or total gaze paresis is not present  3. Visual: 3-->Bilateral hemianopia (blind including cortical blindness)  4. Facial Palsy: 0-->Normal symmetrical movements  5a. Motor Arm, Left: 1-->Drift, limb holds 90 (or 45) degrees, but drifts down before full 10 seconds, does not hit bed or other support  5b. Motor Arm, Right: 1-->Drift, limb holds 90 (or 45) degrees, but drifts down before full 10 secs, does not hit bed or other support  6a. Motor Leg, Left: 1-->Drift, leg falls by the end of the 5-sec period but does not hit bed  6b. Motor Leg, Right: 1-->Drift, leg falls by the end of the 5-sec period but does not hit bed  7. Limb Ataxia: 0-->Absent  8. Sensory: 0-->Normal, no sensory loss  9. Best Language: 0-->No aphasia, normal  10. Dysarthria: 1-->Mild-to-moderate dysarthria, patient slurs at least some words and, at worst, can be understood with some difficulty  11. Extinction and Inattention (formerly Neglect): 0-->No abnormality  Total (NIH Stroke Scale): 10    Modified Eric    [place] Coma Scale:    ABCD2 Score:    [id number] Score:   HAS -BLED Score:   ICH Score:   Hunt & Mcpherson Classification:       Thrombolysis Candidate? No, Strong suspicion for stroke mimic or alternative diagnosis       Interventional Revascularization  "Candidate?   Is the patient eligible for mechanical endovascular reperfusion (BRIDGER)?  No; No large vessel occlusion      Hemorrhagic change of an Ischemic Stroke: Does this patient have an ischemic stroke with hemorrhagic changes? No     Subjective:     History of Present Illness:  Patient is an 89 yo male w/ past medical history significant for ESRD on HD ( MWF), DM, CHF, HTN, HLD, legally blind, Hx of previous CVA w/ no reported deficits per daughter and recent dx of prostate Ca s/p Leuprolide injection X 1 on 1/29/2019 presents to the ED via EMS after new onset weakness 1 hour PTA. Per daughter's report, the aid called the EMS after the patient became significantly weak, reportedly generalized symptoms, and had an episode of unresponsiveness where his eyes rolled back into his head. Per EMS report, there was noted RS facial droop, slurred speech and RUE weakness.   Per daughter, while patient was in the ambulance, she did note slurred speech, worse so than usual, saying he had a "heavy tongue". At baseline, he is communicative, able to reply to his daughter, who he lives with , but does spend most of his day resting.   Patient had a previous generalized weakness episode on January 4th, although w.o reported eyes rolling into the back of his head. Per chart review, patient was admitted for gross hematuria on that day.  CTH w/o any acute ischemic changes and no evidence of acute hemorrhage, does have evidence of encephalomalacia from previous infarcts. Unable to get a CTA 2/2 improper contrast timing.   Vascular Neurology consulted due to a concern for acute ischemic event.         Past Medical History:   Diagnosis Date    Anemia     Blind     bilaterally    CHF (congestive heart failure)     Chronic kidney disease     Dementia 01/2017    Diabetes mellitus type II     General anesthetics causing adverse effect in therapeutic use     april / may 2014     Glaucoma (increased eye pressure)     Hypertension  "    Hypothermia 12/6/2017    Prostate cancer 1/11/2019    Seizures      Past Surgical History:   Procedure Laterality Date    ANGIOPLASTY WITH STENT PLACEMENT Left 11/18/2016    [facility]formed [facility] OR 2ND [facility]    AV FISTULA PLACEMENT Left 4/2014    AV fistula to Left upper arm    CLOT EVACUATION FROM BLADDER  1/8/2019    [facility] at [facility]    [id number] left RC vs BC AVF Left 8/8/2014    [facility]formed by [facility] at [facility] OR 2ND [facility]LR    CYSTOSCOPY N/A 11/29/2018    [facility]    CYSTOSCOPY  WITH FULGURATION  1/8/2019    Performed by [facility]jee [facility] at [facility] OR [address] [place] [address]    CYSTOSCOPY, WITH BLADDER BIOPSY, WITH FULGURATION IF INDICATED N/A 1/8/2019    Performed by [facility] at [facility] OR 1ST [facility]LR    EYE SURGERY      FISTULOGRAM Left 11/18/2016    [facility]formed by [facility] at [facility] OR 2ND [facility]LR    FISTULOGRAM Left 4/15/2016    [facility] by [facility] at [facility] OR 2[facility]    FISTULOGRAM Left 1/29/2016    [facility]formed by [facility] at [facility] CATH LAB    FISTULOGRAM Room 11 case Left 3/27/2015    [facility]formed by [facility] at [facility] OR 2ND [facility]LR    HYDROCELECTOMY Bilateral 11/22/2017    Performed by [facility] at [facility] OR 2ND [facility]LR    INSERTION-CATHETER-DIALYSIS-PERITONEAL-LAPAROSCOPIC N/A 5/8/2014    Performed by Js Ladd MD at [facility] OR 2ND [facility]LR    INSERTION-CATHETER-DIALYSIS-PERITONEAL-LAPAROSCOPIC N/A 5/5/2014    Performed by Js Ladd MD at [facility] OR 2ND [facility]LR    INSERTION-CATHETER-HEMODIALYSIS N/A 5/21/2014    Performed by [facility] at [facility] OR 2ND [facility]LR    PERMCATH INSERTION-TUNNELED CVC N/A 5/13/2014    Performed by Lolis Jackson MD at [facility] CATH LAB    REMOVAL, BLOOD CLOT N/A 11/29/2018    Performed by [facility] at [facility] 1ST [facility]LR    REMOVAL-CATHETER-PERITONEAL DIALYSIS N/A 7/30/2014    Performed by Js [facility]add  [facility] at [facility] OR 2ND FLR    SCROTUM EXPLORATION Bilateral 11/2017    TURP (TRANSURETHRAL RESECTION OF PROSTATE) N/A 1/8/2019    Performed by [facility] at [facility] OR 1ST [facility]LR    TURP (TRANSURETHRAL RESECTION OF PROSTATE)  1/8/2019    Performed by [facility] at [facility] OR 1ST [facility]LR     Family History   Problem Relation Age of Onset    Heart disease Sister         mi    Heart disease Brother         mi    Cancer Daughter         BREAST X 2     Social History     Tobacco Use    Smoking status: Never Smoker    Smokeless tobacco: Never Used   Substance Use Topics    Alcohol use: No    Drug use: No     Review of patient's allergies indicates:   Allergen Reactions    Lisinopril Swelling       Medications: I have reviewed the current medication administration record.      (Not in a hospital admission)    Review of Systems   Constitutional: Positive for activity change and fatigue.   HENT: Negative.    Eyes: Positive for visual disturbance (chronic).   Respiratory: Positive for shortness of breath and wheezing.    Cardiovascular: Positive for leg swelling.   Gastrointestinal: Negative for nausea and vomiting.   Endocrine: Negative.    Genitourinary: Negative.    Musculoskeletal: Positive for gait problem.   Skin: Negative.    Neurological: Positive for syncope, speech difficulty, weakness and light-headedness.   Hematological: Negative.    Psychiatric/Behavioral: Positive for decreased concentration.     Objective:     Vital Signs (Most Recent):  Pulse: (!) 51 (01/30/19 1311)  Resp: (!) 26 (01/30/19 1203)  BP: 131/61 (01/30/19 1311)  SpO2: (!) 94 % (01/30/19 1311)    Vital Signs Range (Last 24H):  Pulse:  [51-72]   Resp:  [15-27]   BP: (131-156)/(61-72)   SpO2:  [94 %-100 %]     Physical Exam   Constitutional: He is oriented to person, place, and time. He appears well-developed.   Drowsy, elderly, chronically ill appearing male    HENT:   Head: Normocephalic and atraumatic.   Eyes: Conjunctivae and EOM are  normal.   Complete blindness, noted whiteness of R iris   Neck: Normal range of motion.   Cardiovascular: Normal rate and regular rhythm.   Pulmonary/Chest:   Abdominal breathing, tachypnea    Abdominal: Soft.   Musculoskeletal: Normal range of motion. He exhibits edema. He exhibits no tenderness or deformity.   Neurological: He is alert and oriented to person, place, and time.       Neurological Exam:   LOC: drowsy  Attention Span: Good   Language: No aphasia  Articulation: Dysarthria  Orientation: Person, Place, Time   Visual Fields: Bilateral visual loss/blindness  EOM (CN III, IV, VI): Full/intact, although patient initially w/ R sided gaze preference   Pupils (CN II, III): PERRL, sluggish   Facial Sensation (CN V): Normal  Facial Movement (CN VII): Symmetric facial expression    Motor: Arm left  Paresis: 4/5  Leg left  Paresis: 4/5  Arm right  Paresis: 4/5  Leg right Paresis: 4/5  Cebellar: No evidence of appendicular or axial ataxia  Sensation: Intact to light touch, temperature and vibration  Tone: Normal tone throughout      Laboratory:  CMP:   Recent Labs   Lab 01/30/19  1110   CALCIUM 8.8   ALBUMIN 2.0*   PROT 6.9   *   K 4.0   CO2 23      BUN 53*   CREATININE 6.3*   ALKPHOS 104   ALT 14   AST 35   BILITOT 0.4     CBC:   Recent Labs   Lab 01/30/19  1110   WBC 6.18   RBC 2.51*   HGB 6.7*   HCT 21.5*   *   MCV 86   MCH 26.7*   MCHC 31.2*     Lipid Panel:   Recent Labs   Lab 01/30/19  1110   CHOL 81*   LDLCALC 42.8*   HDL 31*   TRIG 36     Coagulation:   Recent Labs   Lab 01/30/19  1110   INR 1.1     Hgb A1C: No results for input(s): HGBA1C in the last 168 hours.  TSH:   Recent Labs   Lab 01/30/19  1110   TSH 1.886       Diagnostic Results:      Brain imaging:  CTH   No evidence of acute hemorrhage or major vascular distribution infarct.  Generalized cerebral volume loss.  Chronic microvascular ischemic change with remote right occipital infarct.  MRI pending   Vessel Imaging:  MRA pending      Cardiac Evaluation:   TTE 01/07/2019  · Eccentric left ventricular hypertrophy.  · Moderately decreased left ventricular systolic function - worse in the anterolateral wall. The estimated ejection fraction is 35%.  · Normal right ventricular systolic function.  · Grade I (mild) left ventricular diastolic dysfunction consistent with impaired relaxation.  · Moderate left atrial enlargement.  · Normal central venous pressure (3 mm Hg).           Ave Fernandez MD  [facility]  Department of Vascular Neurology   [facility]

## 2019-01-30 NOTE — H&P
"[facility] Hospital Medicine  History & Physical    Patient Name: Ronald Campbell  MRN: [id number]  Admission Date: 1/30/2019  Attending Physician: Bhavin Sosa MD   Primary Care Provider: Bart Shaw MD    [facility] Medicine Team: [facility] HOSP MED 5 Mathieu Cobian MD     Patient information was obtained from relative(s) and ER records.     Subjective:     Principal Problem:Syncope    Chief Complaint:   Chief Complaint   Patient presents with    Cerebrovascular Accident     Pt was on his way to dialysis at approximately 0926 and began having acute onset slurred speech, Right sided facial droop, left arm weakness after a brief syncopal episode.        HPI: 88 yo M with background of ESRD on HD MWF, HFrEF (EF 35%), IDDM2, dementia, bilateral blindness, seizure disorder, recent diagnosis of prostate cancer S/P TURP and fulguration of prostate on 1/8/19 presenting for weakness and syncopal episode with slurring of speech. Earlier today patient was with his nurse preparing to go to dialysis and he felt weak and slumped over. Patient did not fall or hit his head on anything. Wife reported pt had slurring of speech during this time. She reports that he may have had a single episode of dark stool "a few days ago", but it was not tarry, sticky or malodorous. Pt had a similar presentation earlier this month with gross hematuria, including sidney blood from the urethral meatus, for which he was admitted and received blood transfusions. Daughter denies patient having any hematuria similar to previous admission since discharge, but daughter reports that pt has orange/rust/debi coloured urine after each dialysis session and patient is usually asymptomatic. Pt did not receive his HD as he came to ED, last HD was 1/28. According to daughter, is reportedly compliant with his medications and took his AM medications today. Pt last moved his bowels 1/28, but this is normal for him and he often has hard stools. " Pt is not experiencing any chest pain, SOB, nausea or vomiting at this time.         Past Medical History:   Diagnosis Date    Anemia     Blind     bilaterally    CHF (congestive heart failure)     Chronic kidney disease     Dementia 01/2017    Diabetes mellitus type II     General anesthetics causing adverse effect in therapeutic use     april / may 2014     Glaucoma (increased eye pressure)     Hypertension     Hypothermia 12/6/2017    Prostate cancer 1/11/2019    Seizures        Past Surgical History:   Procedure Laterality Date    ANGIOPLASTY WITH STENT PLACEMENT Left 11/18/2016    Performed by [facility] at [facility] OR 2ND FLR    AV FISTULA PLACEMENT Left 4/2014    AV fistula to Left upper arm    CLOT EVACUATION FROM BLADDER  1/8/2019    Performed by Temo Banerjee MD at [facility] OR 1ST FLR    [id number]-KM left RC vs BC AVF Left 8/8/2014    Performed by [facility]in Sims MD at [facility] OR 2ND FLR    CYSTOSCOPY N/A 11/29/2018    Performed by [facility]o Banerjee MD at [facility] OR 1ST FLR    CYSTOSCOPY  WITH FULGURATION  1/8/2019    Performed by Temo Banerjee MD at [facility] OR 1ST FLR    CYSTOSCOPY, WITH BLADDER BIOPSY, WITH FULGURATION IF INDICATED N/A 1/8/2019    Performed by Temo Banerjee MD at [facility] OR 1ST FLR    EYE SURGERY      FISTULOGRAM Left 11/18/2016    Performed by [facility]in Sims MD at [facility] OR 2ND FLR    FISTULOGRAM Left 4/15/2016    Performed by [facility]in Sims MD at [facility] OR 2ND FLR    FISTULOGRAM Left 1/29/2016    Performed by [facility]in Sims MD at [facility] CATH LAB    FISTULOGRAM Room 11 case Left 3/27/2015    Performed by Orin Sims MD at [facility] OR 2ND FLR    HYDROCELECTOMY Bilateral 11/22/2017    Performed by Temo Banerjee MD at [facility] OR 2ND FLR    INSERTION-CATHETER-DIALYSIS-PERITONEAL-LAPAROSCOPIC N/A 5/8/2014    Performed by Js Ladd MD at [facility] OR 2ND FLR    INSERTION-CATHETER-DIALYSIS-PERITONEAL-LAPAROSCOPIC N/A 5/5/2014    Performed by Js PIRES  MD Wilberto at [facility] OR 2ND FLR    INSERTION-CATHETER-HEMODIALYSIS N/A 5/21/2014    Performed by Orin Sims MD at [facility] OR 2ND FLR    PERMCATH INSERTION-TUNNELED CVC N/A 5/13/2014    Performed by Lolis Jackson MD at [facility] CATH LAB    REMOVAL, BLOOD CLOT N/A 11/29/2018    Performed by Temo Banerjee MD at [facility] OR 1ST FLR    REMOVAL-CATHETER-PERITONEAL DIALYSIS N/A 7/30/2014    Performed by Js Ladd MD at [facility] OR 2ND FLR    SCROTUM EXPLORATION Bilateral 11/2017    TURP (TRANSURETHRAL RESECTION OF PROSTATE) N/A 1/8/2019    Performed by Temo Banerjee MD at [facility] OR 1ST FLR    TURP (TRANSURETHRAL RESECTION OF PROSTATE)  1/8/2019    Performed by Temo Banerjee MD at [facility] OR 1ST FLR       Review of patient's allergies indicates:   Allergen Reactions    Lisinopril Swelling       Current Facility-Administered Medications on File Prior to Encounter   Medication    doxycycline tablet 100 mg    leuprolide (6 month) SyKt 45 mg    lidocaine HCl 2% urojet     Current Outpatient Medications on File Prior to Encounter   Medication Sig    amLODIPine (NORVASC) 5 MG tablet Take 1 tablet (5 mg total) by mouth once daily. (Patient taking differently: Take 5 mg by mouth every evening. )    aspirin 81 MG Chew Take 1 tablet (81 mg total) by mouth once daily.    atorvastatin (LIPITOR) 40 MG tablet Take 1 tablet (40 mg total) by mouth once daily. (Patient taking differently: Take 40 mg by mouth every evening. )    B complex-vitamin C-folic acid (NEPHRO-JESSICA) 0.8 mg Tab Take 1 tablet by mouth every morning.     brimonidine-timolol (COMBIGAN) 0.2-0.5 % Drop Place 1 drop into the left eye 2 (two) times daily.    brinzolamide (AZOPT) 1 % ophthalmic suspension Place 1 drop into the left eye 3 (three) times daily.     calcium acetate (PHOSLO) 667 mg capsule Take 2 capsules by mouth every day in the morning, then take 1 capsule with lunch and 2 capsules in the evening with meals    carvedilol (COREG) 25  MG tablet Take 1 tablet (25 mg total) by mouth 2 (two) times daily. Do not take on dialysis days. Hold for SBP < 110 or HR < 55    dutasteride (AVODART) 0.5 mg capsule Take 1 capsule (0.5 mg total) by mouth once daily.    ketoconazole (NIZORAL) 2 % cream Apply topically once daily. feet    levetiracetam (KEPPRA) 500 MG Tab Take 1 tablet (500 mg total) by mouth 2 (two) times daily.    tamsulosin (FLOMAX) 0.4 mg Cap Take 1 capsule (0.4 mg total) by mouth once daily.    TRAVOPROST (TRAVATAN Z OPHT) Place 1 drop into the left eye every evening. 1 Drops Left eye Every evening    blood sugar diagnostic (BLOOD GLUCOSE TEST) Strp 1 strip by Misc.(Non-Drug; Combo Route) route daily as needed.     insulin aspart (NOVOLOG FLEXPEN) 100 unit/mL InPn  Insulin Pen Subcutaneous As directed.  -200 take 1 unitBS 201-250 take 2 unitsBS 251-300 take 3 unitsBS 301-350 take 4 unitsBS > 351 take 5 units and call MD     Family History     Problem Relation (Age of Onset)    Cancer Daughter    Heart disease Sister, Brother        Tobacco Use    Smoking status: Never Smoker    Smokeless tobacco: Never Used   Substance and Sexual Activity    Alcohol use: No    Drug use: No    Sexual activity: No     Review of Systems   Unable to perform ROS: Dementia     Objective:     Vital Signs (Most Recent):  Pulse: (!) 55 (01/30/19 1530)  Resp: 20 (01/30/19 1445)  BP: 134/64 (01/30/19 1530)  SpO2: (!) 94 % (01/30/19 1311) Vital Signs (24h Range):  Pulse:  [51-72] 55  Resp:  [15-27] 20  SpO2:  [94 %-100 %] 94 %  BP: (131-156)/(61-72) 134/64        There is no height or weight on file to calculate BMI.    Physical Exam   Constitutional: No distress.   Frail older AA male   HENT:   Head: Normocephalic and atraumatic.   Eyes:   Opacification of R cornea. Pale conjunctival rims.   Cardiovascular: Regular rhythm, normal heart sounds and intact distal pulses.   Bradycardia, rate in 50s   Pulmonary/Chest: Breath sounds normal. No respiratory  distress.   Decreased inspiratory effort   Abdominal: He exhibits no distension. There is no tenderness.   Firm abdomen, but not tense   Musculoskeletal: He exhibits edema (bilateral LL).   L arm fistula with palpable thrill and bruit   Neurological:   Patient is somnolent and fatigued    Skin: Capillary refill takes 2 to 3 seconds. He is not diaphoretic.   Nursing note and vitals reviewed.          Significant Labs:   CBC:   Recent Labs   Lab 01/30/19  1110   WBC 6.18   HGB 6.7*   HCT 21.5*   *       Significant Imaging: I have reviewed all pertinent imaging results/findings within the past 24 hours.    Assessment/Plan:     * Syncope    Pt with syncopal episode and Hb 6.7, no reported hematuria since last admission.  - Type and screen, administer 1U RBCs, repeat CBC   - Holding antihypertensives and aspirin  - Iron studies, LDH, Reticulocyte, B12  - Obtain urinalysis to assess for hematuria         Anemia    See syncope       ESRD (end stage renal disease)    On dialysis MWF, last received dialysis 1/28  - nephrology referral for continued dialysis as inpatient       Chronic combined systolic and diastolic heart failure    EF 35% on last ECHO 1/7/2019  On carvedilol and amlodipine at home, holding in setting of anemia and syncope     HTN (hypertension)    - patient on amlodipine and carvedilol at home  - currently HDS, but will hold and re-evaluate for need for anti-hypertensives       Prostate cancer    - continue home dutasteride and flomax  - Receives outpatient 6 monthly leuprolide  - will be starting bacalutamide soon as outpatient       Seizure disorder    - continue home levertiracetam        Edema of both legs           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    Pt on sliding scale at home  Order LDSS       Dyslipidemia    Continue home atorvastatin       VTE Risk Mitigation (From admission, onward)        Ordered     IP VTE HIGH RISK PATIENT  Once      01/30/19 6530              Mathieu Cobian MD  Department of Hospital Medicine   [facility]

## 2019-01-30 NOTE — ASSESSMENT & PLAN NOTE
- Management per primary team   - Lasix 40 mg IV ordered in ED by ED team   - HD today  - Volume overloaded  On CXR

## 2019-01-30 NOTE — HPI
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 Vang  -HD tx days: MWF  -HD tx time: 4 hours  -HD access: AVF to L upper arm  -Last HD: Mon, Jan 28th  -EDW: pt's daughter unsure  -RRF: Patient's daughter reports unable to quantify

## 2019-01-30 NOTE — PROGRESS NOTES
HEMODIALYSIS NOTE  Patient evaluated while undergoing hemodialysis indicated for ESRD. Tolerating session with current UFR, no complications.         HTN  -stable    UF goal 1L as tolerated    Anemia  -Hgb 6.4  -to receive 1 unit PRBC  -managed by Hospital Medicine  -rectal temp on HD 93.6  -may benefit from bear hugger   -discussed with Hospital Medicine     Na 134  K 4  CO2   Ca 8.8  Albumin 2.0     Hypercalemia  -Corrected Ca 10.4  -Ca adjusted in dialysate bath  -discontinued OP Ca acetate    Hyperphosphatemia  -discontinued OP Ca acetate  -start sevelamer carbonate  -Ph     HTN  -/67  -managed by Emergency Medicine

## 2019-01-30 NOTE — HPI
"88 yo M with background of ESRD on HD MWF, HFrEF (EF 35%), IDDM2, dementia, bilateral blindness, seizure disorder, recent diagnosis of prostate cancer S/P TURP and fulguration of prostate on 1/8/19 presenting for weakness and syncopal episode with slurring of speech. Earlier today patient was with his nurse preparing to go to dialysis and he felt weak and slumped over. Patient did not fall or hit his head on anything. Wife reported pt had slurring of speech during this time. She reports that he may have had a single episode of dark stool "a few days ago", but it was not tarry, sticky or malodorous. Pt had a similar presentation earlier this month with gross hematuria, including sidney blood from the urethral meatus, for which he was admitted and received blood transfusions. Daughter denies patient having any hematuria similar to previous admission since discharge, but daughter reports that pt has orange/rust/debi coloured urine after each dialysis session and patient is usually asymptomatic. Pt did not receive his HD as he came to ED, last HD was 1/28. According to daughter, is reportedly compliant with his medications and took his AM medications today. Pt last moved his bowels 1/28, but this is normal for him and he often has hard stools. Pt is not experiencing any chest pain, SOB, nausea or vomiting at this time.       "

## 2019-01-30 NOTE — ASSESSMENT & PLAN NOTE
EF 35% on last ECHO 1/7/2019  On carvedilol and amlodipine at home, holding in setting of anemia and syncope

## 2019-01-30 NOTE — ASSESSMENT & PLAN NOTE
Pt with syncopal episode and Hb 6.7, no reported hematuria since last admission.  - Type and screen, administer 1U RBCs, repeat CBC   - Holding antihypertensives and aspirin  - Iron studies, LDH, Reticulocyte, B12  - Obtain urinalysis to assess for hematuria

## 2019-01-30 NOTE — PROGRESS NOTES
"Dr. Bhandari came to see patient regarding abnormal temp, labs ordered and drawn, verbal order for bear hugger, stated, "Ok to give blood".  "

## 2019-01-31 NOTE — ASSESSMENT & PLAN NOTE
- patient on amlodipine and carvedilol at home  - currently normotensive and HDS, but will hold and re-evaluate for need for anti-hypertensives

## 2019-01-31 NOTE — PROGRESS NOTES
Patient arrived, on oxygen: 4 L/min N/C two patient identifiers used, allergies reviewed, Pt.Transferred to MRI scanning table,centralized Telemetry notified of portable monitoring removed for scan,  portable telemetry monitoring removed, pt. placed on MRI compatible telemetry monitoring system, no S/S of discomfort, will continue to monitor throughout scan, O2: 4 L/min applied,  WAR room will be notified once patient is placed back on portable telemetry.

## 2019-01-31 NOTE — PROGRESS NOTES
[facility]  Vascular Neurology  Comprehensive Stroke Center  Progress Note    Assessment/Plan:     * Syncope    Patient is an 88 yo male w/ complicated medical history presents to the ED from home after an episode of generalized weakness and possible syncope at home as he was about to leave for his scheduled HD session. Patient presents to the ED w/ generalized weakness and a non-focal neurological exam. CTH w/o evidence of acute ischemic changes and no evidence of intracranial hemorrhage. CTA was non-contributory as the timing of contrast was poor, although very low suspicion for LVO after arrival to the ED.  Patient found to have significant pulmonary edema on CXR likely as he is due for HD today and also was noted to have abdominal breathing and tachypnea.     Recommendations  - MRI negative for any acute infarct or hemorrhage.   - low suspicion for cardiovascular event based on exam and presentation; patient w/ generalized weakness which does not correlate w/ any particular vascular territory.   - Continue ASA and Statin at this time for secondary stroke prevention as patient w/ Hx of TIAs and observed R parietal encephalomalacia on CTH.   - Could repeat Lipid panel and HbA1c  - Episode likely triggered 2/2 metabolic vs infectious etiology  - No acute ischemia noted, will sign off  - Appreciate consult      Dyslipidemia    Stroke risk factor  - Lipid Panel   - Continue current Atorvastatin 40mg qhs  - LDL 43     HTN (hypertension)    Stroke RF  - BP management per primary team   - Normotensive in the ED  - HD on M/W/F  - Goal is normotension for this patient      ESRD (end stage renal disease)    - Management per primary team   - Lasix 40 mg IV ordered in ED by ED team   - HD on 1/30  - Still volume overloaded on CXR      Cerebrovascular disease    Hx off as observed w/ encephalomalacia or R parietal lobe evident on CTH   - Secondary stroke prevention measures in place w/ ASA and Atorvastatin            Admitted to hospital medicine. Pt was reportedly agitated overnight. Daughter didn't want him to have MRI in that state, so scheduled for MRI today. Having some dysarthria but otherwise nonfocal neurologic exam.     STROKE DOCUMENTATION        NIH Scale:  1a. Level of Consciousness: 1-->Not alert, but arousable by minor stimulation to obey, answer, or respond  1b. LOC Questions: 0-->Answers both questions correctly  1c. LOC Commands: 0-->Performs both tasks correctly  2. Best Gaze: 1-->Partial gaze palsy, gaze is abnormal in one or both eyes, but forced deviation or total gaze paresis is not present  3. Visual: 3-->Bilateral hemianopia (blind including cortical blindness)  4. Facial Palsy: 0-->Normal symmetrical movements  5a. Motor Arm, Left: 0-->No drift, limb holds 90 (or 45) degrees for full 10 secs  5b. Motor Arm, Right: 1-->Drift, limb holds 90 (or 45) degrees, but drifts down before full 10 secs, does not hit bed or other support  6a. Motor Leg, Left: 1-->Drift, leg falls by the end of the 5-sec period but does not hit bed  6b. Motor Leg, Right: 1-->Drift, leg falls by the end of the 5-sec period but does not hit bed  7. Limb Ataxia: 1-->Present in one limb  8. Sensory: 0-->Normal, no sensory loss  9. Best Language: 0-->No aphasia, normal  10. Dysarthria: 1-->Mild-to-moderate dysarthria, patient slurs at least some words and, at worst, can be understood with some difficulty  11. Extinction and Inattention (formerly Neglect): 0-->No abnormality  Total (NIH Stroke Scale): 10       Modified [place]    Yulissa Coma Scale:14   ABCD2 Score:    [id number] Score:   HAS -BLED Score:   ICH Score:   Hunt & Mcpherson Classification:      Hemorrhagic change of an Ischemic Stroke: Does this patient have an ischemic stroke with hemorrhagic changes? No     Neurologic Chief Complaint: AMS, dysarthria    Subjective:     Interval History: Patient is seen for follow-up neurological assessment and treatment recommendations:  Pt denies any focal weakness. Daughter states his speech is more mumbled currently compared to baseline.     HPI, Past Medical, Family, and Social History remains the same as documented in the initial encounter.     Review of Systems   Constitutional: Positive for activity change and fatigue.   HENT: Negative.    Eyes: Positive for visual disturbance (chronic).   Respiratory: Negative for shortness of breath.    Cardiovascular: Negative for chest pain.   Gastrointestinal: Negative for nausea and vomiting.   Endocrine: Negative.    Genitourinary: Negative.    Musculoskeletal: Positive for gait problem.   Skin: Negative.    Neurological: Positive for speech difficulty and weakness.   Hematological: Negative.    Psychiatric/Behavioral: Positive for decreased concentration.     Scheduled Meds:   [START ON 2/1/2019] sodium chloride 0.9%   Intravenous Once    atorvastatin  40 mg Oral QHS    brimonidine 0.15 % OPTH DROP  1 drop Left Eye BID    brinzolamide  1 drop Left Eye TID    dutasteride  0.5 mg Oral Daily    levETIRAcetam  500 mg Oral BID    sevelamer carbonate  800 mg Oral TID WM    tamsulosin  0.4 mg Oral Daily    timolol maleate 0.5%  1 drop Left Eye BID    travoprost  1 drop Left Eye QHS     Continuous Infusions:  PRN Meds:sodium chloride, sodium chloride 0.9%, [START ON 2/1/2019] sodium chloride 0.9%, dextrose 50%, dextrose 50%, glucagon (human recombinant), glucose, glucose, insulin aspart U-100, ramelteon, sodium chloride 0.9%    Objective:     Vital Signs (Most Recent):  Temp: 96.6 °F (35.9 °C) (01/31/19 1122)  Pulse: 71 (01/31/19 1354)  Resp: 20 (01/31/19 1300)  BP: (!) 165/75 (01/31/19 1122)  SpO2: 100 % (01/31/19 1354)  BP Location: Left arm    Vital Signs Range (Last 24H):  Temp:  [92.2 °F (33.4 °C)-98 °F (36.7 °C)]   Pulse:  [52-76]   Resp:  [18-26]   BP: (119-165)/(58-75)   SpO2:  [95 %-100 %]   BP Location: Left arm    Physical Exam   Constitutional: He is oriented to person, place, and time.  He appears well-developed.   Drowsy, elderly, chronically ill appearing male    HENT:   Head: Normocephalic and atraumatic.   Eyes: Conjunctivae and EOM are normal.   Complete blindness, noted whiteness of R iris   Neck: Normal range of motion.   Cardiovascular: Normal rate and regular rhythm.   Pulmonary/Chest:   Tachypnea   Abdominal: Soft.   Musculoskeletal: Normal range of motion. He exhibits edema. He exhibits no tenderness or deformity.   Neurological: He is alert and oriented to person, place, and time.       Neurological Exam:      LOC: drowsy  Attention Span: Good   Language: No aphasia  Articulation: Dysarthria  Orientation: Person, Place, Time   Visual Fields: Bilateral visual loss/blindness  EOM (CN III, IV, VI): Full/intact, although patient initially w/ R sided gaze preference   Pupils (CN II, III): PERRL, sluggish   Facial Sensation (CN V): Normal  Facial Movement (CN VII): Symmetric facial expression    Motor: Arm left  Paresis: 4/5  Leg left  Paresis: 4/5  Arm right  Paresis: 4/5  Leg right Paresis: 4/5  Cebellar: No evidence of appendicular or axial ataxia  Sensation: Intact to light touch, temperature and vibration  Tone: Normal tone throughout    Laboratory:  CMP:   Recent Labs   Lab 01/31/19 0512   CALCIUM 8.4*   ALBUMIN 2.4*   PROT 8.1   *   K 4.3   CO2 22*      BUN 25*   CREATININE 4.1*   ALKPHOS 121   ALT 17   AST 43*   BILITOT 0.6     BMP:   Recent Labs   Lab 01/31/19 0512   *   K 4.3      CO2 22*   BUN 25*   CREATININE 4.1*   CALCIUM 8.4*     CBC:   Recent Labs   Lab 01/31/19 0512   WBC 5.72   RBC 3.17*   HGB 8.6*   HCT 27.3*   *   MCV 86   MCH 27.1   MCHC 31.5*     Lipid Panel:   Recent Labs   Lab 01/30/19  1110   CHOL 81*   LDLCALC 42.8*   HDL 31*   TRIG 36     Hgb A1C: No results for input(s): HGBA1C in the last 168 hours.    Diagnostic Results     Brain Imaging:      CTH   No evidence of acute hemorrhage or major vascular distribution  infarct.  Generalized cerebral volume loss.  Chronic microvascular ischemic change with remote right occipital infarct.  MRI pending       Cardiac Imaging   TTE 01/07/2019  · Eccentric left ventricular hypertrophy.  · Moderately decreased left ventricular systolic function - worse in the anterolateral wall. The estimated ejection fraction is 35%.  · Normal right ventricular systolic function.  · Grade I (mild) left ventricular diastolic dysfunction consistent with impaired relaxation.  · Moderate left atrial enlargement.  · Normal central venous pressure (3 mm Hg).      Rupert Cat MD  Comprehensive Stroke Center  Department of Vascular Neurology   [facility]

## 2019-01-31 NOTE — SUBJECTIVE & OBJECTIVE
Interval History: Refer to Hospital Course    Review of Systems  Objective:     Vital Signs (Most Recent):  Temp: 98 °F (36.7 °C) (01/31/19 0823)  Pulse: 76 (01/31/19 0823)  Resp: 18 (01/31/19 0823)  BP: 130/60 (01/31/19 0823)  SpO2: 98 % (01/31/19 0823) Vital Signs (24h Range):  Temp:  [92.2 °F (33.4 °C)-98 °F (36.7 °C)] 98 °F (36.7 °C)  Pulse:  [51-76] 76  Resp:  [15-27] 18  SpO2:  [94 %-100 %] 98 %  BP: (119-156)/(58-73) 130/60     Weight: 75.6 kg (166 lb 10.7 oz)  Body mass index is 21.99 kg/m².    Intake/Output Summary (Last 24 hours) at 1/31/2019 1039  Last data filed at 1/31/2019 0600  Gross per 24 hour   Intake 300 ml   Output 1950 ml   Net -1650 ml      Physical Exam   Constitutional: He appears well-developed. No distress.   Frail appearing elderly male   HENT:   Head: Normocephalic and atraumatic.   Eyes:   Strabismus R eye and lens opacification bilaterally   Cardiovascular: Normal rate, regular rhythm and normal heart sounds.   Pulmonary/Chest: No respiratory distress.   Decreased inspiratory effort, decreased air entry at lung based and crackles in lower lung regions bilaterally   Abdominal: Soft. He exhibits no distension.   Musculoskeletal:   L arm fistula with palpable thrill and bruit   Neurological: He is alert.   Oriented to person, place and year   Skin: Capillary refill takes 2 to 3 seconds. He is not diaphoretic.   Nursing note and vitals reviewed.      Significant Labs:   CBC:   Recent Labs   Lab 01/30/19  1110 01/30/19  1945 01/31/19  0512   WBC 6.18 5.65 5.72   HGB 6.7* 8.0* 8.6*   HCT 21.5* 24.8* 27.3*   * 123* 144*     CMP:   Recent Labs   Lab 01/30/19  1110 01/31/19  0512   * 135*   K 4.0 4.3    102   CO2 23 22*   * 110   BUN 53* 25*   CREATININE 6.3* 4.1*   CALCIUM 8.8 8.4*   PROT 6.9 8.1   ALBUMIN 2.0* 2.4*   BILITOT 0.4 0.6   ALKPHOS 104 121   AST 35 43*   ALT 14 17   ANIONGAP 10 11   EGFRNONAA 7.2* 12.1*       Significant Imaging: I have reviewed all  pertinent imaging results/findings within the past 24 hours.

## 2019-01-31 NOTE — ED NOTES
Pt transported back from dialysis and no report given to RN.  Pt rectal temp 95.3 and bear hugger applied.  RN called dialysis to get report and was told that the day staff had left and that the current staff had no information on this pt.

## 2019-01-31 NOTE — PLAN OF CARE
PCP LOBNA ALA   PHARMAC[facility]  DAUGHTER HELLEN [phone]  [address]  DIALYSIS LEIGHANN [facility]     01/31/19 1040   Discharge Assessment   Assessment Type Discharge Planning Assessment   Confirmed/corrected address and phone number on facesheet? Yes   Assessment information obtained from? Caregiver   Expected Length of Stay (days) 3   Communicated expected length of stay with patient/caregiver no   Prior to hospitilization cognitive status: Unable to Assess   Prior to hospitalization functional status: Assistive Equipment   Current cognitive status: Unable to Assess   Current Functional Status: Assistive Equipment   Lives With child(erin), adult   Able to Return to Prior Arrangements yes   Is patient able to care for self after discharge? Unable to determine at this time (comments)   Patient's perception of discharge disposition home or selfcare   Readmission Within the Last 30 Days previous discharge plan unsuccessful   Patient currently being followed by outpatient case management? Yes   If yes, name of outpatient case management following: other (comments)   Patient currently receives any other outside agency services? Yes   Equipment Currently Used at Home cane, straight;walker, rolling;bath bench;bedside commode   Do you have any problems affording any of your prescribed medications? No   Is the patient taking medications as prescribed? yes   Does the patient have transportation home? Yes   Transportation Anticipated family or friend will provide;health plan transportation   Does the patient receive services at the [facility]? No   Discharge Plan A Home;Home with family;Home Health   Discharge Plan B Home;Home with family;Home Health   DME Needed Upon Discharge  none   Patient/Family in Agreement with Plan unable to assess   Readmission Questionnaire   At the time of your discharge, did someone talk to you about what your health problems were? Yes   At the time of discharge, did  someone talk to you about what to watch out for regarding worsening of your health problem? Yes   At the time of discharge, did someone talk to you about what to do if you experienced worsening of your health problem? Yes   At the time of discharge, did someone talk to you about which medication to take when you left the hospital and which ones to stop taking? Yes   At the time of discharge, did someone talk to you about when and where to follow up with a doctor after you left the hospital? Yes   How often do you need to have someone help you when you read instructions, pamphlets, or other written material from your doctor or pharmacy? Always

## 2019-01-31 NOTE — HOSPITAL COURSE
Admitted to hospital medicine. Pt was reportedly agitated overnight. Daughter didn't want him to have MRI in that state, so scheduled for MRI today. Having some dysarthria but otherwise nonfocal neurologic exam.   MRI negative for any acute infarct or hemorrhage.

## 2019-01-31 NOTE — ASSESSMENT & PLAN NOTE
On dialysis MWF, last received dialysis 1/28  [facility] outpatient dialysis center  Inpatient MWF dialysis, per nephrology  Sevelamer 800mg TID

## 2019-01-31 NOTE — PROGRESS NOTES
The nurse wheeled patient down for MRI the daughter of the patient refused imaging at this time due to the time off MRI and said she would like to be present when performing MRI

## 2019-01-31 NOTE — ASSESSMENT & PLAN NOTE
Patient is an 88 yo male w/ complicated medical history presents to the ED from home after an episode of generalized weakness and possible syncope at home as he was about to leave for his scheduled HD session. Patient presents to the ED w/ generalized weakness and a non-focal neurological exam. CTH w/o evidence of acute ischemic changes and no evidence of intracranial hemorrhage. CTA was non-contributory as the timing of contrast was poor, although very low suspicion for LVO after arrival to the ED.  Patient found to have significant pulmonary edema on CXR likely as he is due for HD today and also was noted to have abdominal breathing and tachypnea.     Recommendations  - MRI/MRA ischemic protocol to r/o acute infarct in this patient w/ low suspicion for cardiovascular event based on exam and presentation; patient w/ generalized weakness which does not correlate w/ any particular vascular territory.   - Continue ASA and Statin at this time for secondary stroke prevention as patient w/ Hx of TIAs and observed R parietal encephalomalacia on CTH.   - Could repeat Lipid panel and HbA1c  - Episode likely triggered 2/2 metabolic vs infectious etiology  - Will follow up scan, if no acute ischemia noted, will sign off  - Appreciate consult

## 2019-01-31 NOTE — SUBJECTIVE & OBJECTIVE
Neurologic Chief Complaint: AMS, dysarthria    Subjective:     Interval History: Patient is seen for follow-up neurological assessment and treatment recommendations: Pt denies any focal weakness. Daughter states his speech is more mumbled currently compared to baseline.     HPI, Past Medical, Family, and Social History remains the same as documented in the initial encounter.     Review of Systems   Constitutional: Positive for activity change and fatigue.   HENT: Negative.    Eyes: Positive for visual disturbance (chronic).   Respiratory: Negative for shortness of breath.    Cardiovascular: Negative for chest pain.   Gastrointestinal: Negative for nausea and vomiting.   Endocrine: Negative.    Genitourinary: Negative.    Musculoskeletal: Positive for gait problem.   Skin: Negative.    Neurological: Positive for speech difficulty and weakness.   Hematological: Negative.    Psychiatric/Behavioral: Positive for decreased concentration.     Scheduled Meds:   [START ON 2/1/2019] sodium chloride 0.9%   Intravenous Once    atorvastatin  40 mg Oral QHS    brimonidine 0.15 % OPTH DROP  1 drop Left Eye BID    brinzolamide  1 drop Left Eye TID    dutasteride  0.5 mg Oral Daily    levETIRAcetam  500 mg Oral BID    sevelamer carbonate  800 mg Oral TID WM    tamsulosin  0.4 mg Oral Daily    timolol maleate 0.5%  1 drop Left Eye BID    travoprost  1 drop Left Eye QHS     Continuous Infusions:  PRN Meds:sodium chloride, sodium chloride 0.9%, [START ON 2/1/2019] sodium chloride 0.9%, dextrose 50%, dextrose 50%, glucagon (human recombinant), glucose, glucose, insulin aspart U-100, ramelteon, sodium chloride 0.9%    Objective:     Vital Signs (Most Recent):  Temp: 96.6 °F (35.9 °C) (01/31/19 1122)  Pulse: 71 (01/31/19 1354)  Resp: 20 (01/31/19 1300)  BP: (!) 165/75 (01/31/19 1122)  SpO2: 100 % (01/31/19 1354)  BP Location: Left arm    Vital Signs Range (Last 24H):  Temp:  [92.2 °F (33.4 °C)-98 °F (36.7 °C)]   Pulse:  [52-76]    Resp:  [18-26]   BP: (119-165)/(58-75)   SpO2:  [95 %-100 %]   BP Location: Left arm    Physical Exam   Constitutional: He is oriented to person, place, and time. He appears well-developed.   Drowsy, elderly, chronically ill appearing male    HENT:   Head: Normocephalic and atraumatic.   Eyes: Conjunctivae and EOM are normal.   Complete blindness, noted whiteness of R iris   Neck: Normal range of motion.   Cardiovascular: Normal rate and regular rhythm.   Pulmonary/Chest:   Tachypnea   Abdominal: Soft.   Musculoskeletal: Normal range of motion. He exhibits edema. He exhibits no tenderness or deformity.   Neurological: He is alert and oriented to person, place, and time.       Neurological Exam:      LOC: drowsy  Attention Span: Good   Language: No aphasia  Articulation: Dysarthria  Orientation: Person, Place, Time   Visual Fields: Bilateral visual loss/blindness  EOM (CN III, IV, VI): Full/intact, although patient initially w/ R sided gaze preference   Pupils (CN II, III): PERRL, sluggish   Facial Sensation (CN V): Normal  Facial Movement (CN VII): Symmetric facial expression    Motor: Arm left  Paresis: 4/5  Leg left  Paresis: 4/5  Arm right  Paresis: 4/5  Leg right Paresis: 4/5  Cebellar: No evidence of appendicular or axial ataxia  Sensation: Intact to light touch, temperature and vibration  Tone: Normal tone throughout    Laboratory:  CMP:   Recent Labs   Lab 01/31/19 0512   CALCIUM 8.4*   ALBUMIN 2.4*   PROT 8.1   *   K 4.3   CO2 22*      BUN 25*   CREATININE 4.1*   ALKPHOS 121   ALT 17   AST 43*   BILITOT 0.6     BMP:   Recent Labs   Lab 01/31/19 0512   *   K 4.3      CO2 22*   BUN 25*   CREATININE 4.1*   CALCIUM 8.4*     CBC:   Recent Labs   Lab 01/31/19 0512   WBC 5.72   RBC 3.17*   HGB 8.6*   HCT 27.3*   *   MCV 86   MCH 27.1   MCHC 31.5*     Lipid Panel:   Recent Labs   Lab 01/30/19  1110   CHOL 81*   LDLCALC 42.8*   HDL 31*   TRIG 36     Hgb A1C: No results for  input(s): HGBA1C in the last 168 hours.    Diagnostic Results     Brain Imaging:      CTH   No evidence of acute hemorrhage or major vascular distribution infarct.  Generalized cerebral volume loss.  Chronic microvascular ischemic change with remote right occipital infarct.  MRI pending       Cardiac Imaging   TTE 01/07/2019  · Eccentric left ventricular hypertrophy.  · Moderately decreased left ventricular systolic function - worse in the anterolateral wall. The estimated ejection fraction is 35%.  · Normal right ventricular systolic function.  · Grade I (mild) left ventricular diastolic dysfunction consistent with impaired relaxation.  · Moderate left atrial enlargement.  · Normal central venous pressure (3 mm Hg).

## 2019-01-31 NOTE — PT/OT/SLP PROGRESS
"Physical Therapy      Patient Name:  Ronald Campbell   MRN:  [id number]    Attempted to visit pt in AM for initial evaluation - pt refused therapy despite max encouragement stating "I don't want to do therapy."  Will follow-up next scheduled date.    Diogo Waters, PT   1/31/2019      "

## 2019-01-31 NOTE — PROGRESS NOTES
Patient woke up anxious this morning pulled out 3 IV's pulled his telemtry box off took off warming blanket, will continue to monitor.

## 2019-01-31 NOTE — ASSESSMENT & PLAN NOTE
Hx off as observed w/ encephalomalacia or R parietal lobe evident on CTH   - Secondary stroke prevention measures in place w/ ASA and Atorvastatin

## 2019-01-31 NOTE — PLAN OF CARE
Problem: Adult Inpatient Plan of Care  Goal: Plan of Care Review  Outcome: Outcome(s) achieved Date Met: 01/30/19  Patient completed 3hrs 15min of treatment related to late arrival from ER.  1.3L removed, tolerated well.  Hemostasis achieved less than 15min, pressure, gauze, and tape applied.  Transfused 1unit RBCs without difficulty.

## 2019-01-31 NOTE — PLAN OF CARE
01/31/19 1040   Readmission Questionnaire   At the time of your discharge, did someone talk to you about what your health problems were? Yes   At the time of discharge, did someone talk to you about what to watch out for regarding worsening of your health problem? Yes   At the time of discharge, did someone talk to you about what to do if you experienced worsening of your health problem? Yes   At the time of discharge, did someone talk to you about which medication to take when you left the hospital and which ones to stop taking? Yes   At the time of discharge, did someone talk to you about when and where to follow up with a doctor after you left the hospital? Yes   How often do you need to have someone help you when you read instructions, pamphlets, or other written material from your doctor or pharmacy? Always

## 2019-01-31 NOTE — ASSESSMENT & PLAN NOTE
Pt with syncopal episode and Hb 6.7, no reported hematuria since last admission.  - Type and screen, administer 1U RBCs, repeat CBC with more than appropriate source  - Holding antihypertensives and aspirin  - Iron studies, LDH, Reticulocyte, B12  - Obtain urinalysis to assess for hematuria  - MRI/MRA brain

## 2019-01-31 NOTE — ASSESSMENT & PLAN NOTE
EF 35% on last ECHO 1/7/2019  On carvedilol and amlodipine at home, holding in setting of anemia and syncope  Currently normotensive

## 2019-01-31 NOTE — ASSESSMENT & PLAN NOTE
- Management per primary team   - Lasix 40 mg IV ordered in ED by ED team   - HD on 1/30  - Still volume overloaded on CXR

## 2019-01-31 NOTE — PT/OT/SLP PROGRESS
"Occupational Therapy      Patient Name:  Ronald Campbell   MRN:  [id number]    Patient not seen today secondary to Pt adamant refusal of services.  Pt daughter reported on arrival that he had a: "Bad night"  . Will follow-up next scheduled visit.    Carlin Sanchez, OT  1/31/2019  "

## 2019-01-31 NOTE — PROGRESS NOTES
"[facility] Hospital Medicine  Progress Note    Patient Name: Ronald Campbell  MRN: [id number]  Patient Class: IP- Inpatient   Admission Date: 1/30/2019  Length of Stay: 1 days  Attending Physician: Bhavin Sosa MD  Primary Care Provider: Bart Shaw MD    [facility] Medicine Team: [facility] HOSP MED 5 Mathieu Cobian MD    Subjective:     Principal Problem:Syncope    HPI:  88 yo M with background of ESRD on HD MWF, HFrEF (EF 35%), IDDM2, dementia, bilateral blindness, seizure disorder, recent diagnosis of prostate cancer S/P TURP and fulguration of prostate on 1/8/19 presenting for weakness and syncopal episode with slurring of speech. Earlier today patient was with his nurse preparing to go to dialysis and he felt weak and slumped over. Patient did not fall or hit his head on anything. Wife reported pt had slurring of speech during this time. She reports that he may have had a single episode of dark stool "a few days ago", but it was not tarry, sticky or malodorous. Pt had a similar presentation earlier this month with gross hematuria, including sidney blood from the urethral meatus, for which he was admitted and received blood transfusions. Daughter denies patient having any hematuria similar to previous admission since discharge, but daughter reports that pt has orange/rust/debi coloured urine after each dialysis session and patient is usually asymptomatic. Pt did not receive his HD as he came to ED, last HD was 1/28. According to daughter, is reportedly compliant with his medications and took his AM medications today. Pt last moved his bowels 1/28, but this is normal for him and he often has hard stools. Pt is not experiencing any chest pain, SOB, nausea or vomiting at this time.         Hospital Course:  01/31/2019 Patient was agitated overnight, removing his lines. Pt was reoriented and calmed down. Pt was on the way to MRI, but daughter refused due to timing of imaging. Will attempt to " obtain MRI today during day time.     Interval History: Refer to Hospital Course    Review of Systems  Objective:     Vital Signs (Most Recent):  Temp: 98 °F (36.7 °C) (01/31/19 0823)  Pulse: 76 (01/31/19 0823)  Resp: 18 (01/31/19 0823)  BP: 130/60 (01/31/19 0823)  SpO2: 98 % (01/31/19 0823) Vital Signs (24h Range):  Temp:  [92.2 °F (33.4 °C)-98 °F (36.7 °C)] 98 °F (36.7 °C)  Pulse:  [51-76] 76  Resp:  [15-27] 18  SpO2:  [94 %-100 %] 98 %  BP: (119-156)/(58-73) 130/60     Weight: 75.6 kg (166 lb 10.7 oz)  Body mass index is 21.99 kg/m².    Intake/Output Summary (Last 24 hours) at 1/31/2019 1039  Last data filed at 1/31/2019 0600  Gross per 24 hour   Intake 300 ml   Output 1950 ml   Net -1650 ml      Physical Exam   Constitutional: He appears well-developed. No distress.   Frail appearing elderly male   HENT:   Head: Normocephalic and atraumatic.   Eyes:   Strabismus R eye and lens opacification bilaterally   Cardiovascular: Normal rate, regular rhythm and normal heart sounds.   Pulmonary/Chest: No respiratory distress.   Decreased inspiratory effort, decreased air entry at lung based and crackles in lower lung regions bilaterally   Abdominal: Soft. He exhibits no distension.   Musculoskeletal:   L arm fistula with palpable thrill and bruit   Neurological: He is alert.   Oriented to person, place and year   Skin: Capillary refill takes 2 to 3 seconds. He is not diaphoretic.   Nursing note and vitals reviewed.      Significant Labs:   CBC:   Recent Labs   Lab 01/30/19  1110 01/30/19  1945 01/31/19  0512   WBC 6.18 5.65 5.72   HGB 6.7* 8.0* 8.6*   HCT 21.5* 24.8* 27.3*   * 123* 144*     CMP:   Recent Labs   Lab 01/30/19  1110 01/31/19  0512   * 135*   K 4.0 4.3    102   CO2 23 22*   * 110   BUN 53* 25*   CREATININE 6.3* 4.1*   CALCIUM 8.8 8.4*   PROT 6.9 8.1   ALBUMIN 2.0* 2.4*   BILITOT 0.4 0.6   ALKPHOS 104 121   AST 35 43*   ALT 14 17   ANIONGAP 10 11   EGFRNONAA 7.2* 12.1*        Significant Imaging: I have reviewed all pertinent imaging results/findings within the past 24 hours.    Assessment/Plan:      * Syncope    Pt with syncopal episode and Hb 6.7, no reported hematuria since last admission.  - Type and screen, administer 1U RBCs, repeat CBC with more than appropriate source  - Holding antihypertensives and aspirin  - Iron studies, LDH, Reticulocyte, B12  - Obtain urinalysis to assess for hematuria  - MRI/MRA brain         Anemia    See syncope       ESRD (end stage renal disease)    On dialysis MWF, last received dialysis 1/28  [facility] outpatient dialysis center  Inpatient MWF dialysis, per nephrology  Sevelamer 800mg TID       Chronic combined systolic and diastolic heart failure    EF 35% on last ECHO 1/7/2019  On carvedilol and amlodipine at home, holding in setting of anemia and syncope  Currently normotensive     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    Pt on sliding scale at home  LDSS as inpatient       HTN (hypertension)    - patient on amlodipine and carvedilol at home  - currently normotensive and HDS, but will hold and re-evaluate for need for anti-hypertensives       Prostate cancer    - continue home dutasteride and flomax  - Receives outpatient 6 monthly leuprolide  - will be starting bacalutamide soon as outpatient       Seizure disorder    - continue home levertiracetam        Hypothermia    92.2F on admission, normothermia achieved   Continue Wendi hugger to warm patient       Edema of both legs           Dyslipidemia    Continue home atorvastatin     Cerebrovascular disease             VTE Risk Mitigation (From admission, onward)        Ordered     IP VTE HIGH RISK PATIENT  Once      01/30/19 9070              Mathieu Cobian MD  Department of Hospital Medicine   [facility]

## 2019-01-31 NOTE — HOSPITAL COURSE
01/31/2019 Patient was agitated overnight, removing his lines. Pt was reoriented and calmed down. Pt was on the way to MRI, but daughter refused due to timing of imaging. Will attempt to obtain MRI today during day time.   02/01/2019 Hemoglobin stable. HD today. Will attempt to determine whether patient receiving epo at dialysis center.

## 2019-02-01 NOTE — PHARMACY MED REC
"Admission Medication Reconciliation - Pharmacy Consult Note    The home medication history was taken by Suzie De, Pharmacy Technician.  Based on information gathered and subsequent review by the clinical pharmacist, the items below may need attention.     You may go to "Admission" then "Reconcile Home Medications" tabs to review and/or act upon these items.     Potentially problematic discrepancies with current MAR  o Patient IS taking the following which was not ordered upon admit  o B complex-vitamin C-folic acid (nephro-hayde) 0.8 mg: Take 1 tablet by mouth every morning  o Calcium acetate 667 mg: Take 2 capsules by mouth everyday in the morning, then take 1 capsule with lunch and 2 capsules in the evening with meals  - Phosphorus 3.8, at goal of 2.7-4.5  o Vitamin D 50,000 units: 1 capsule po twice a month   o Miralax 17 g po daily  - No documentation of bowel movement since admission      Please address this information as you see fit.  Feel free to contact us if you have any questions or require assistance.    Santi Johns  [phone]                      .    .            "

## 2019-02-01 NOTE — PT/OT/SLP EVAL
Physical Therapy Evaluation    Patient Name:  Ronald Campbell   MRN:  [id number]    Recommendations:     Discharge Recommendations:  nursing facility, skilled   Discharge Equipment Recommendations: none   Barriers to discharge: Inaccessible home    Assessment:     Ronald Campbell is a 89 y.o. male admitted with a medical diagnosis of Syncope.  He presents with the following impairments/functional limitations:  weakness, impaired self care skills, impaired balance, decreased safety awareness, impaired endurance, impaired functional mobilty, gait instability. Pt would benefit from continued skilled acute PT 3x/wk to improve functional mobility.  Recommending pt receive PT services in SNF setting following d/c from hospital once medically cleared.      Rehab Prognosis: Fair; patient would benefit from acute skilled PT services to address these deficits and reach maximum level of function.    Recent Surgery: * No surgery found *      Plan:     During this hospitalization, patient to be seen 4 x/week to address the identified rehab impairments via gait training, therapeutic activities, therapeutic exercises, neuromuscular re-education and progress toward the following goals:    · Plan of Care Expires:  03/08/19    Subjective     Chief Complaint: none  Patient/Family Comments/goals: pt to return home  Pain/Comfort:  · Pain Rating 1: 0/10    Patients cultural, spiritual, [employer] conflicts given the current situation:      Living Environment:  Pt lives with daughter in a home with 7-12 DAVID using RW for support      Prior to admission, patients level of function was mostly Ind. Using RW for support occasionally.  Equipment used at home: cane, straight, walker, rolling, bath bench, commode.  DME owned (not currently used): none.  Upon discharge, patient will have assistance from daughter.    Objective:     Communicated with RN prior to session.  Patient found call button in reach and daughter present    upon PT entry to  "room.    General Precautions: Standard, fall   Orthopedic Precautions:N/A   Braces: N/A     Exams:  · RLE ROM: WFL  · RLE Strength: Deficits: 3+/5 grossly  · LLE ROM: WFL  · LLE Strength: Deficits: 3+/5 grossly    Functional Mobility:  · Transfers:     · Sit to Stand:  minimum assistance with hand-held assist  · Balance: Standing; Fair -      Therapeutic Activities and Exercises:   -Repositioning while seated in bedside chair, scooting forward/back with VC   - pt performed static standing for ~30" with CGA/Min A for stability  - Standing marches in place x10 with Min A    AM-PAC 6 CLICK MOBILITY  Total Score:12     Patient left up in chair with call button in reach and daughter present.    GOALS:   Multidisciplinary Problems     Physical Therapy Goals        Problem: Physical Therapy Goal    Goal Priority Disciplines Outcome Goal Variances Interventions   Physical Therapy Goal     PT, PT/OT      Description:  Goals to be met by: 02/15/2019     Patient will increase functional independence with mobility by performin. Supine to sit with Contact Guard Assistance  2. Sit to supine with Contact Guard Assistance  3. Sit to stand transfer with Contact Guard Assistance  4. Gait  x 25 feet with Contact Guard Assistance using Rolling Walker.   5. Lower extremity exercise program x15 reps per handout, with independence                      History:     Past Medical History:   Diagnosis Date    Anemia     Blind     bilaterally    CHF (congestive heart failure)     Chronic kidney disease     Dementia 2017    Diabetes mellitus type II     General anesthetics causing adverse effect in therapeutic use     april / may 2014     Glaucoma (increased eye pressure)     Hypertension     Hypothermia 2017    Prostate cancer 2019    Seizures        Past Surgical History:   Procedure Laterality Date    ANGIOPLASTY WITH STENT PLACEMENT Left 2016    [facility]    " AV FISTULA PLACEMENT Left 4/2014    AV fistula to Left upper arm    CLOT EVACUATION FROM BLADDER  1/8/2019    Performed by [facility] at [facility]LR    [id number] left RC vs BC AVF Left 8/8/2014    Performed by [facility] at [facility] OR [facility]LR    CYSTOSCOPY N/A 11/29/2018    Per[facility] by [facility] at [facility]    CYSTOSCOPY  WITH FULGURATION  1/8/2019    Performed by [facility] at [facility]LR    CYSTOSCOPY, WITH BLADDER BIOPSY, WITH FULGURATION IF INDICATED N/A 1/8/2019    Performed by [facility] at [facility]LR    EYE SURGERY      FISTULOGRAM Left 11/18/2016    Performed [facility] at [facility]    FISTULOGRAM Left 4/15/2016    Performed [facility] at [facility]    FISTULOGRAM Left 1/29/2016    Performed by [facility] at [facility] CATH LAB    FISTULOGRAM Room 11 case Left 3/27/2015    Performed by [facility] at [facility]    HYDROCELECTOMY Bilateral 11/22/2017    Performed by [facility]o [facility] MD at [facility] OR [facility]LR    INSERTION-CATHETER-DIALYSIS-PERITONEAL-LAPAROSCOPIC N/A 5/8/2014    Performed by Js Ladd MD at [facility] OR [facility]LR    INSERTION-CATHETER-DIALYSIS-PERITONEAL-LAPAROSCOPIC N/A 5/5/2014    Performed by Js Ladd MD at [facility] OR [facility]LR    INSERTION-CATHETER-HEMODIALYSIS N/A 5/21/2014    Performed by [facility] at [facility]    PERMCATH INSERTION-TUNNELED CVC N/A 5/13/2014    Performed by Lolis Jackson MD at [facility] CATH LAB    REMOVAL, BLOOD CLOT N/A 11/29/2018    Performed by [facility] at [facility] OR [facility]LR    REMOVAL-CATHETER-PERITONEAL DIALYSIS N/A 7/30/2014    Performed by Js Ladd MD at [facility] OR [facility]LR    SCROTUM EXPLORATION Bilateral 11/2017    TURP (TRANSURETHRAL RESECTION OF PROSTATE) N/A 1/8/2019    Performed by [facility]o [facility] MD at [facility] OR [facility]LR    TURP (TRANSURETHRAL RESECTION OF PROSTATE)  1/8/2019     Performed by [facility] at [facility] OR [address] [place] [address]       Clinical Decision Making:     History  Co-morbidities and personal factors that may impact the plan of care Examination  Body Structures and Functions, activity limitations and participation restrictions that may impact the plan of care Clinical Presentation   Decision Making/ Complexity Score   Co-morbidities:   [] Time since onset of injury / illness / exacerbation  [] Status of current condition  []Patient's cognitive status and safety concerns    [] Multiple Medical Problems (see med hx)  Personal Factors:   [] Patient's age  [] Prior Level of function   [] Patient's home situation (environment and family support)  [] Patient's level of motivation  [] Expected progression of patient      HISTORY:(criteria)    [] 69216 - no personal factors/history    [] 78628 - has 1-2 personal factor/comorbidity     [] 83456 - has >3 personal factor/comorbidity     Body Regions:  [] Objective examination findings  [] Head     []  Neck  [] Trunk   [] Upper Extremity  [] Lower Extremity    Body Systems:  [] For communication ability, affect, cognition, language, and learning style: the assessment of the ability to make needs known, consciousness, orientation (person, place, and time), expected emotional /behavioral responses, and learning preferences (eg, learning barriers, education  needs)  [] For the neuromuscular system: a general assessment of gross coordinated movement (eg, balance, gait, locomotion, transfers, and transitions) and motor function  (motor control and motor learning)  [] For the musculoskeletal system: the assessment of gross symmetry, gross range of motion, gross strength, height, and weight  [] For the integumentary system: the assessment of pliability(texture), presence of scar formation, skin color, and skin integrity  [] For cardiovascular/pulmonary system: the assessment of heart rate, respiratory rate, blood pressure, and edema     Activity  limitations:    [] Patient's cognitive status and saf ety concerns          [] Status of current condition      [] Weight bearing restriction  [] Cardiopulmunary Restriction    Participation Restrictions:   [] Goals and goal agreement with the patient     [] Rehab potential (prognosis) and probable outcome      Examination of Body System: (criteria)    [] 25155 - addressing 1-2 elements    [] 94202 - addressing a total of 3 or more elements     [] 54488 -  Addressing a total of 4 or more elements         Clinical Presentation: (criteria)  Choose one     On examination of body system using standardized tests and measures patient presents with (CHOOSE ONE) elements from any of the following: body structures and functions, activity limitations, and/or participation restrictions.  Leading to a clinical presentation that is considered (CHOOSE ONE)                              Clinical Decision Making  (Eval Complexity):  Choose One     Time Tracking:     PT Received On: 02/01/19  PT Start Time: 1336     PT Stop Time: 1350  PT Total Time (min): 14 min     Billable Minutes: Evaluation 14      Huan Ravi, PT  02/01/2019

## 2019-02-01 NOTE — PROGRESS NOTES
Patient arrived on unit via stretcher. Patient pulling off heart monitor leads. Family at bedside. Daughter stated patient has been confused since last night. Patient VSS. Unable to obtain oral or axillary temp. MD notified. Tx initiated via left arm AVF with 15g needles x 2 without difficulty. Patient no longer pulling at leads. Patient resting with no complaints. Orders and machine settings verified. Tx started.

## 2019-02-01 NOTE — PROGRESS NOTES
HEMODIALYSIS NOTE  Patient evaluated while undergoing hemodialysis indicated for ESRD. Tolerating session with current UFR, no complications.     HD RN unable to obtain temp prior to HD  -rectal temp obtained during last HD prior to blood transfusion  -discussed with Hospital Medicine        HTN  -stable    UF goal 2L as tolerated    Anemia  -Hgb 8.5  -start epogen    Hyperphosphatemia  -Ph 3.8  -cont sevelamer carbonate    Hypercalcemia  -Ca adjusted in the dialysate bath

## 2019-02-01 NOTE — PT/OT/SLP EVAL
Speech Language Pathology Evaluation & Discharge  Bedside Swallow    Patient Name:  Ronald Campbell   MRN:  [id number]  Admitting Diagnosis: Syncope    Recommendations:                 General Recommendations:  Follow-up not indicated  Diet recommendations:  Mechanical soft, Thin   Aspiration Precautions: 1 bite/sip at a time, Assistance with meals, HOB to 90 degrees, Monitor for s/s of aspiration, Small bites/sips and Standard aspiration precautions   General Precautions: Standard, fall  Communication strategies:  provide increased time to answer and go to room if call light pushed    History:     Past Medical History:   Diagnosis Date    Anemia     Blind     bilaterally    CHF (congestive heart failure)     Chronic kidney disease     Dementia 01/2017    Diabetes mellitus type II     General anesthetics causing adverse effect in therapeutic use     april / may 2014     Glaucoma (increased eye pressure)     Hypertension     Hypothermia 12/6/2017    Prostate cancer 1/11/2019    Seizures        Past Surgical History:   Procedure Laterality Date    ANGIOPLASTY WITH STENT PLACEMENT Left 11/18/2016    [facility]LR    AV FISTULA PLACEMENT Left 4/2014    AV fistula to Left upper arm    CLOT EVACUATION FROM BLADDER  1/8/2019    Performed by [facility] at [facility]LR    [id number]-HD left RC vs BC AVF Left 8/8/2014    Performed by [facility] at [facility]    CYSTOSCOPY N/A 11/29/2018    Performed by [facility] at [facility]    CYSTOSCOPY  WITH FULGURATION  1/8/2019    Performed by [facility] at [facility]LR    CYSTOSCOPY, WITH BLADDER BIOPSY, WITH FULGURATION IF INDICATED N/A 1/8/2019    Performed by [facility] at [facility]LR    EYE SURGERY      FISTULOGRAM Left 11/18/2016    Performed by [facility] at [facility]LR    FISTULOGRAM Left 4/15/2016    Performed by [facility] MD at [facility] OR  [facility] FLR    FISTULOGRAM Left 1/29/2016    Performed [facility] at [facility] CATH LAB    FISTULOGRAM Room 11 case Left 3/27/2015    Performed by [facility] MD at [facility]LR    HYDROCELECTOMY Bilateral 11/22/2017    Performed by [facility] at [facility]LR    INSERTION-CATHETER-DIALYSIS-PERITONEAL-LAPAROSCOPIC N/A 5/8/2014    Performed by Js Ladd MD at [facility] OR [facility]LR    INSERTION-CATHETER-DIALYSIS-PERITONEAL-LAPAROSCOPIC N/A 5/5/2014    Performed by Js Ladd MD at [facility] OR [facility]LR    INSERTION-CATHETER-HEMODIALYSIS N/A 5/21/2014    Performed by [facility] MD at [facility]LR    PERMCATH INSERTION-TUNNELED CVC N/A 5/13/2014    Performed by Lolis Jackson MD at [facility] CATH LAB    REMOVAL, BLOOD CLOT N/A 11/29/2018    Performed by [facility] MD at [facility] OR [facility]LR    REMOVAL-CATHETER-PERITONEAL DIALYSIS N/A 7/30/2014    Performed by Js Ladd MD at [facility] OR [facility]LR    SCROTUM EXPLORATION Bilateral 11/2017    TURP (TRANSURETHRAL RESECTION OF PROSTATE) N/A 1/8/2019    Performed by Tem[facility] MD at [facility] OR [facility]LR    TURP (TRANSURETHRAL RESECTION OF PROSTATE)  1/8/2019    Performed by Temo [facility]jee MD at [facility] OR [facility]LR     Prior Intubation HX:  None this admission    Modified Barium Swallow: None on file    Chest X-Rays 1/31/19: There is borderline cardiomegaly.  There is moderate-severe edema aortic plaque and no change.    Prior diet: regular/thin.    Subjective     Patient awake and alert during session  Communicated with nurse prior to session     Pain/Comfort:  · Pain Rating 1: 0/10  · Pain Rating Post-Intervention 1: 0/10    Objective:     Oral Musculature Evaluation  · Oral Musculature: WFL  · Dentition: upper and lower dentures, uses dentures to eat  · Secretion Management: adequate  · Mucosal Quality: good  · Mandibular Strength and Mobility: WFL  · Oral Labial Strength and Mobility: WFL  · Lingual Strength and  Mobility: WFL  · Buccal Strength and Mobility: WFL  · Volitional Cough: (elicited)  · Volitional Swallow: (timely; good laryngeal elevation)  · Voice Prior to PO Intake: (clear; good intensity)    Bedside Swallow Eval:   Consistencies Assessed:  · Thin liquids x6 (via straw)  · Solids x4 (kavita crackers)     Oral Phase:   · Prolonged mastication with solids    Pharyngeal Phase:   · no overt clinical signs/symptoms of aspiration  · no overt clinical signs/symptoms of pharyngeal dysphagia  · multiple spontaneous swallows with thin liquid, but no indication of aspiration    Compensatory Strategies  · None    Treatment: Patient seen for bedside swallow eval. Patient sitting up in chair with daughter in room. Daughter reported that patient was exhibiting some prolonged mastication, but did not have a problem swallowing. Patient tolerated all consistencies with no overt signs of aspiration. He did exhibit prolonged mastication with solid trials, but demonstrated good oral clearance on swallow. SLP educated patient and daughter regarding dangers/warning signs of aspiration. No further questions. Patient left in chair with call light within reach and daughter in room.    Assessment:     Ronald Campbell is a 89 y.o. male with a safe oropharyngeal swallow at this time.    Goals:   Multidisciplinary Problems     SLP Goals     Not on file          Multidisciplinary Problems (Resolved)        Problem: SLP Goal    Goal Priority Disciplines Outcome   SLP Goal   (Resolved)     SLP Outcome(s) achieved                   Plan:     · Plan of Care reviewed with:  patient, daughter   · SLP Follow-Up:  No       Discharge recommendations:  (no further ST recommended)   Barriers to Discharge:  None    Time Tracking:     SLP Treatment Date:   02/01/19  Speech Start Time:  1446  Speech Stop Time:  1500     Speech Total Time (min):  14 min    Billable Minutes: Eval Swallow and Oral Function 14    NII Moss-SLP  Speech-Language  Pathology  Pager: [phone]   02/01/2019

## 2019-02-01 NOTE — PLAN OF CARE
Problem: Adult Inpatient Plan of Care  Goal: Plan of Care Review  Outcome: Ongoing (interventions implemented as appropriate)  Pt remains free of falls and injury. Pt refused to have VS done or to take medications. PT moves in bed, refused reposition assistance. Notified hoang VALLECILLO. Bed low and locked, Call light within reach.

## 2019-02-01 NOTE — PT/OT/SLP EVAL
"Occupational Therapy   Evaluation    Name: Ronald Campblel  MRN: [id number]  Admitting Diagnosis:  Syncope      Recommendations:     Discharge Recommendations: nursing facility, skilled  Discharge Equipment Recommendations:  none  Barriers to discharge:  Decreased caregiver support    Assessment:     Ronald Campbell is a 89 y.o. male with a medical diagnosis of Syncope.  He presents with decreased motivation, . Performance deficits affecting function: weakness, impaired endurance, impaired self care skills, impaired functional mobilty, impaired balance, visual deficits, impaired cognition, decreased coordination, decreased upper extremity function, decreased lower extremity function, decreased safety awareness, decreased ROM, impaired fine motor, impaired cardiopulmonary response to activity.      Rehab Prognosis: Fair; patient would benefit from acute skilled OT services to address these deficits and reach maximum level of function.       Plan:     Patient to be seen 3 x/week to address the above listed problems via self-care/home management, therapeutic activities, therapeutic exercises  · Plan of Care Expires: 03/03/19  · Plan of Care Reviewed with: patient, daughter    Subjective     Chief Complaint: Disorientation  Patient/Family Comments/goals: "He's normally not like this."      Occupational Profile:  Living Environment: Pt lives with daughter in a home with 7-12 DAVID using RW for support  Previous level of function: Independent w/ ADLs/IADLs  Roles and Routines: Father, Grandfather, Person w/ recent total loss of vision.   Equipment Used at Home:  cane, straight, walker, rolling, bath bench, commode  Assistance upon Discharge: Yes from daughter.     Pain/Comfort:  · Pain Rating 1: 0/10  · Pain Rating Post-Intervention 1: 0/10    Patients cultural, spiritual, Taoist conflicts given the current situation: no    Objective:     Communicated with: RN prior to session.  Patient found up in chair with telemetry upon OT " entry to room.    General Precautions: Standard, fall, blind, hearing impaired   Orthopedic Precautions:N/A   Braces: N/A     Occupational Performance:    Bed Mobility:    · Not assessed    Functional Mobility/Transfers:  · Patient completed Sit <> Stand Transfer with minimum assistance  with  hand-held assist   · Functional Mobility: Able to march in place  Min A for balance and safety w/ HHA.     Activities of Daily Living:   Unable to assess 2* command following issues.   Cognitive/Visual Perceptual:  Cognitive/Psychosocial Skills:     -       Oriented to: Person   -       Follows Commands/attention:Inattentive  -       Communication: dysarthria  -       Memory: Impaired STM and Impaired LTM  -       Safety awareness/insight to disability: impaired   -       Mood/Affect/Coping skills/emotional control: Agitated  Visual/Perceptual:      -Impaired  vision impaired 2* diabetes    Physical Exam:  Balance:    -       Standing Fair -   Dominant hand:    -       RH  Upper Extremity Range of Motion:     -       Right Upper Extremity: WFL  -       Left Upper Extremity: WFL  Upper Extremity Strength:    -       Right Upper Extremity: WFL  -       Left Upper Extremity: WFL   Strength:    -       Right Upper Extremity: WFL  -       Left Upper Extremity: WFL  Fine Motor Coordination:    -       Impaired  2* command following   Gross motor coordination:   Poor command following difficult to assess.    AMPAC 6 Click ADL:  AMPAC Total Score: 13    Treatment & Education:  -Pt edu on OT role/POC  -Importance of OOB activity with staff assistance  -Safety during functional t/f and mobility  - White board updated  - All questions/concerns answered within OT scope of practice    Patient left up in chair with all lines intact, call button in reach, RN notified and Daughter present    GOALS:   Multidisciplinary Problems     Occupational Therapy Goals        Problem: Occupational Therapy Goal    Goal Priority Disciplines Outcome  Interventions   Occupational Therapy Goal     OT, PT/OT     Description:  Goals to be met by: 3/3/2019     Patient will increase functional independence with ADLs by performing:    UE Dressing with Set-up Assistance.  LE Dressing with Set-up Assistance.  Toileting from toilet with Stand-by Assistance for hygiene and clothing management.   Bathing from  standing at sink with Contact Guard Assistance.  Toilet transfer to toilet with Contact Guard Assistance.                      History:     Past Medical History:   Diagnosis Date    Anemia     Blind     bilaterally    CHF (congestive heart failure)     Chronic kidney disease     Dementia 01/2017    Diabetes mellitus type II     General anesthetics causing adverse effect in therapeutic use     april / may 2014     Glaucoma (increased eye pressure)     Hypertension     Hypothermia 12/6/2017    Prostate cancer 1/11/2019    Seizures        Past Surgical History:   Procedure Laterality Date    ANGIOPLASTY WITH STENT PLACEMENT Left 11/18/2016    [facility]formed by [facility] OR [facility]    AV FISTULA PLACEMENT Left 4/2014    AV fistula to Left upper arm    CLOT EVACUATION FROM BLADDER  1/8/2019    Performed by [facility]o [facility]erjee [facility] at [facility] OR [facility]    [id number]-[id number]-TA left RC vs BC AVF Left 8/8/2014    Performed by [facility] at [facility] OR [facility]    CYSTOSCOPY N/A 11/29/2018    Performed by [facility] at [facility] OR [facility]    CYSTOSCOPY  WITH FULGURATION  1/8/2019    Performed by [facility] at [facility] OR [facility]    CYSTOSCOPY, WITH BLADDER BIOPSY, WITH FULGURATION IF INDICATED N/A 1/8/2019    Performed by [facility] at [facility] OR [facility]ST [facility]    EYE SURGERY      FISTULOGRAM Left 11/18/2016    Performed by [facility] at [facility] OR [facility]    FISTULOGRAM Left 4/15/2016    Performed by [facility] at [facility] OR [facility]    FISTULOGRAM Left 1/29/2016    Performed by [facility] at [facility] CATH LAB     "FISTULOGRAM Room 11 case Left 3/27/2015    Performed by [facility] at [facility] OR 2ND [facility]    HYDROCELECTOMY Bilateral 11/22/2017    Performed by [facility] at [facility] OR 2ND [facility]    INSERTION-CATHETER-DIALYSIS-PERITONEAL-LAPAROSCOPIC N/A 5/8/2014    Performed by sJ Ladd MD at [facility] OR 2ND [facility]LR    INSERTION-CATHETER-DIALYSIS-PERITONEAL-LAPAROSCOPIC N/A 5/5/2014    Performed by Js Ladd MD at [facility] OR 2ND [facility]LR    INSERTION-CATHETER-HEMODIALYSIS N/A 5/21/2014    Performed by [facility] at [facility] OR [facility]ND [facility]    PERMCATH INSERTION-TUNNELED CVC N/A 5/13/2014    Performed by Lolis Jackson MD at [facility] CATH LAB    REMOVAL, BLOOD CLOT N/A 11/29/2018    Performed by [facility] at [facility] OR [facility]ST [facility]LR    REMOVAL-CATHETER-PERITONEAL DIALYSIS N/A 7/30/2014    Performed by Js Ladd MD at [facility] OR 2ND [facility]LR    SCROTUM EXPLORATION Bilateral 11/2017    TURP (TRANSURETHRAL RESECTION OF PROSTATE) N/A 1/8/2019    Performed [facility] [place]    TURP (TRANSURETHRAL RESECTION OF PROSTATE)  1/8/2019    Performed by Tem[facility] MD at [facility] [place] [facility]ST [place]       Clinical Decision Making:     3.  OT High:  "Pt evaluation falls under high complexity for evaluation category due to 5+ performance deficits identified with comprehensive assessments and significant modifications/assistance required. An expanded review of history and occupational profile completed in addition to expanded review of physical, cognitive and psychosocial history. Several treatment options considered in care."     Time Tracking:     OT Date of Treatment: 02/01/19  OT Start Time: 1335  OT Stop Time: 1402  OT Total Time (min): 27 min    Billable Minutes:Evaluation 10 mins  Self Care/Home Management 17 mins    Carlin Sanchez, OT  2/1/2019    "

## 2019-02-01 NOTE — PLAN OF CARE
Problem: Physical Therapy Goal  Goal: Physical Therapy Goal  Goals to be met by: 02/15/2019     Patient will increase functional independence with mobility by performin. Supine to sit with Contact Guard Assistance  2. Sit to supine with Contact Guard Assistance  3. Sit to stand transfer with Contact Guard Assistance  4. Gait  x 25 feet with Contact Guard Assistance using Rolling Walker.   5. Lower extremity exercise program x15 reps per handout, with independence    Eval completed and POC established     Tacos Ravi, PT, DPT  2019

## 2019-02-01 NOTE — PLAN OF CARE
Problem: Adult Inpatient Plan of Care  Goal: Plan of Care Review  Outcome: Ongoing (interventions implemented as appropriate)  POC reviewed with pt & daughter. AAO x1. Telemetry monitoring. HD today- 2L removed.   Pt remained free from falls. Questions and concerns addressed. Pt progressing towards goals. Will continue to monitor. See flow sheets for full assessment and VS

## 2019-02-01 NOTE — PROGRESS NOTES
Dialysis tx completed. 4 hours. 2 liters removed. Needles pulled from left arm AVF. Hemostasis achieved. Gauze and tape to sites. Tolerated tx well. Pt reamined calm during tx. Became restless at end of tx. Report called to Veronica BAKER.

## 2019-02-01 NOTE — PLAN OF CARE
Problem: SLP Goal  Goal: SLP Goal  Outcome: Outcome(s) achieved Date Met: 02/01/19  Patient seen for bedside swallow eval. SLP recommending continued MECHANICAL SOFT DIET/THIN LIQUIDS.     Jaspreet Garcia -SLP  Speech-Language Pathology  Pager: [phone]

## 2019-02-02 NOTE — NURSING
IV d/c cath intact, site WDL, gauze and coban applied.  Reviewed discharge, medications and follow up with pt daughter, verbal understanding given with satisfactory teach back.  Copy iof AVS Awaiting transport home.  Will monitor until transport arrives.

## 2019-02-02 NOTE — ASSESSMENT & PLAN NOTE
- patient on amlodipine and carvedilol at home  - re-evaluate for need for anti-hypertensives  Resumed home norvasc  Holding coreg 2/2 bradycardia

## 2019-02-02 NOTE — SUBJECTIVE & OBJECTIVE
Interval History: No acute overnight events. Hemoglobin stable. HD today. Will attempt to determine whether patient receiving epo at dialysis center.    Review of Systems  Objective:     Vital Signs (Most Recent):  Temp: 96.9 °F (36.1 °C) (02/01/19 1532)  Pulse: 63 (02/01/19 1532)  Resp: 20 (02/01/19 1532)  BP: (!) 164/81 (02/01/19 1532)  SpO2: 97 % (02/01/19 1532) Vital Signs (24h Range):  Temp:  [96.1 °F (35.6 °C)-97 °F (36.1 °C)] 96.9 °F (36.1 °C)  Pulse:  [55-72] 63  Resp:  [20] 20  SpO2:  [93 %-97 %] 97 %  BP: (139-174)/(56-81) 164/81     Weight: 75.6 kg (166 lb 10.7 oz)  Body mass index is 21.99 kg/m².    Intake/Output Summary (Last 24 hours) at 2/1/2019 1809  Last data filed at 2/1/2019 1215  Gross per 24 hour   Intake 600 ml   Output 2600 ml   Net -2000 ml      Physical Exam   Constitutional: He appears well-developed. No distress.   Frail appearing elderly male   HENT:   Head: Normocephalic and atraumatic.   Eyes: Conjunctivae are normal. No scleral icterus.   Strabismus R eye and lens opacification bilaterally   Neck: Neck supple.   Cardiovascular: Normal rate, regular rhythm and normal heart sounds.   Pulmonary/Chest: No respiratory distress. He has no wheezes. He has rales (bibasilar).   Decreased inspiratory effort, decreased air entry at lung based and crackles in lower lung regions bilaterally   Abdominal: Soft. He exhibits no distension. There is no tenderness. There is no guarding.   Musculoskeletal:   L arm fistula with palpable thrill and bruit   Neurological: He is alert.   Oriented to person, place and year   Skin: Skin is warm. Capillary refill takes 2 to 3 seconds. He is not diaphoretic. No pallor.   Nursing note and vitals reviewed.      Significant Labs:   CBC:   Recent Labs   Lab 01/30/19  1945 01/31/19  0512 02/01/19  0858   WBC 5.65 5.72 5.53   HGB 8.0* 8.6* 8.5*   HCT 24.8* 27.3* 26.3*   * 144* 147*     CMP:   Recent Labs   Lab 01/31/19  0512 02/01/19  0858   * 134*   K 4.3  4.4    101   CO2 22* 22*    160*   BUN 25* 44*   CREATININE 4.1* 5.9*   CALCIUM 8.4* 8.6*   PROT 8.1 8.1   ALBUMIN 2.4* 2.5*   BILITOT 0.6 0.6   ALKPHOS 121 126   AST 43* 40   ALT 17 15   ANIONGAP 11 11   EGFRNONAA 12.1* 7.8*       Significant Imaging: I have reviewed all pertinent imaging results/findings within the past 24 hours.

## 2019-02-02 NOTE — DISCHARGE INSTRUCTIONS
Take lasix once a day for 7 days and follow up with your PCP to determine whether he needs to continue taking this medication.

## 2019-02-02 NOTE — PLAN OF CARE
CM received call from MD - pt needs stretcher transport home. CM spoke w/ Missy, on-call w/ [facility] [phone] to notify of transportation need - she approved transport and requested that CM call back if pt has any meds changes. CM setup stretcher transport vis [facility] to pt's home - requested pickup time for 11am. CM relayed info to pt's nurse.    Patient's ride request has been placed thru the [facility] (patient flow center). Requested ride time is not guaranteed - pt's nurse, unit charge or unit secretary can follow up w/ [facility] [phone] (or [id number] and [phone]) to confirm ride time.

## 2019-02-02 NOTE — PLAN OF CARE
[facility]    HOME HEALTH ORDERS  FACE TO FACE ENCOUNTER    Patient Name: Ronald Campbell  YOB: 1929    PCP: Bart Shaw MD   PCP Address: [address] [facility] [address]  PCP Phone Number: [phone]  PCP Fax: [phone]    Encounter Date: 02/02/2019    Admit to Home Health    Diagnoses:  Active Hospital Problems    Diagnosis  POA    *Syncope [R55]  Yes     Priority: 1 - High    Anemia [D64.9]  Yes     Priority: 2     ESRD (end stage renal disease) [N18.6]  Yes     Priority: 4      CVA  -managed by Emergency Medicine      Chronic combined systolic and diastolic heart failure [I50.42]  Yes     Priority: 6      Chronic    HTN (hypertension) [I10]  Yes     Priority: 8     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       No future appointments.  Follow-up Information     Bart Shaw MD. Schedule an appointment as soon as possible for a visit in 1 week.    Specialties:  Internal Medicine, Geriatric Medicine  Contact information:  [address] [facility] [address]  [phone]                     I have seen and examined this patient face to face today. My clinical findings that support the need for the home health skilled services and home bound status are the following:  Weakness/numbness causing balance and gait disturbance due to Stroke making it taxing to leave home.  Requiring assistive device to leave home due to unsteady gait caused by  Stroke.  Mental confusion  making it unsafe for patient to leave home alone due to  Dementia.    Allergies:  Review of patient's allergies indicates:   Allergen Reactions    Lisinopril Swelling       Diet: diabetic diet: 2000 calorie    Activities: activity as tolerated    Nursing:   SN to complete comprehensive assessment including routine vital signs. Instruct on disease process and s/s of complications to report to MD. Review/verify medication list sent home with the patient at time of discharge  and instruct patient/caregiver as needed. Frequency may be adjusted depending on start of care date.    Notify MD if SBP > 160 or < 90; DBP > 90 or < 50; HR > 120 or < 50; Temp > 101; Other:         CONSULTS:    Physical Therapy to evaluate and treat. Evaluate for home safety and equipment needs; Establish/upgrade home exercise program. Perform / instruct on therapeutic exercises, gait training, transfer training, and Range of Motion.  Occupational Therapy to evaluate and treat. Evaluate home environment for safety and equipment needs. Perform/Instruct on transfers, ADL training, ROM, and therapeutic exercises.  Speech Therapy  to evaluate and treat for  Swallowing and Cognition.    MISCELLANEOUS CARE:  CHECK CBC EVERY OTHER WEEK STARTING 2/8/2019    WOUND CARE ORDERS  n/a      Medications: Review discharge medications with patient and family and provide education.      Discharge Medication List as of 2/2/2019 11:16 AM      CONTINUE these medications which have CHANGED    Details   amLODIPine (NORVASC) 10 MG tablet Take 1 tablet (10 mg total) by mouth once daily., Starting Sat 2/2/2019, Until Sun 2/2/2020, Normal         CONTINUE these medications which have NOT CHANGED    Details   aspirin 81 MG Chew Take 1 tablet (81 mg total) by mouth once daily., Starting 11/4/2012, Until Discontinued, Normal      atorvastatin (LIPITOR) 40 MG tablet Take 1 tablet (40 mg total) by mouth once daily., Starting 8/14/2014, Until Discontinued, Normal      B  complex-vitamin C-folic acid (NEPHRO-JESSICA) 0.8 mg Tab Take 1 tablet by mouth every morning. , Until Discontinued, Historical Med      brimonidine-timolol (COMBIGAN) 0.2-0.5 % Drop Place 1 drop into the left eye 2 (two) times daily., Historical Med      brinzolamide (AZOPT) 1 % ophthalmic suspension Place 1 drop into the left eye 3 (three) times daily. , Until Discontinued, Historical Med      calcium acetate (PHOSLO) 667 mg capsule Take 2 capsules by mouth every day in the morning, then take 1 capsule with lunch and 2 capsules in the evening with meals, Historical Med      dutasteride (AVODART) 0.5 mg capsule Take 1 capsule (0.5 mg total) by mouth once daily., Starting Thu 12/13/2018, Normal      ergocalciferol (VITAMIN D2) 50,000 unit Cap Take 1 tablet by mouth 2 times per month, Historical Med      levetiracetam (KEPPRA) 500 MG Tab Take 1 tablet (500 mg total) by mouth 2 (two) times daily., Starting 5/21/2014, Until Tue 6/30/20, No Print      MELATONIN ORAL Take 1 tablet by mouth once daily., Historical Med      polyethylene glycol (GLYCOLAX) 17 gram/dose powder Take 17 g by mouth daily as needed., Historical Med      tamsulosin (FLOMAX) 0.4 mg Cap Take 1 capsule (0.4 mg total) by mouth once daily., Starting Fri 11/30/2018, Until Sat 11/30/2019, Normal      TRAVOPROST (TRAVATAN Z OPHT) Place 1 drop into the left eye every evening. 1 Drops Left eye Every evening, Until Discontinued, Historical Med      bicalutamide (CASODEX) 50 MG Tab Take 1 tablet (50 mg total) by mouth once daily., Starting Fri 2/1/2019, Until Sat 2/1/2020, Print      blood sugar diagnostic (BLOOD GLUCOSE TEST) Strp 1 strip by Misc.(Non-Drug; Combo Route) route daily as needed. , Historical Med      insulin aspart (NOVOLOG FLEXPEN) 100 unit/mL InPn  Insulin Pen Subcutaneous As directed.  -200 take 1 unitBS 201-250 take 2 unitsBS 251-300 take 3 unitsBS 301-350 take 4 unitsBS > 351 take 5 units and call MD, Until Discontinued, Historical Med          STOP taking these medications       carvedilol (COREG) 25 MG tablet Comments:   Reason for Stopping:               I certify that this patient is confined to his home and needs intermittent skilled nursing care, physical therapy, speech therapy and occupational therapy.

## 2019-02-02 NOTE — ASSESSMENT & PLAN NOTE
EF 35% on last ECHO 1/7/2019  On carvedilol and amlodipine at home, initially held in setting of syncope & normotension  Norvasc resumed today due to elevated BP, holding coreg due to bradycardia

## 2019-02-02 NOTE — ASSESSMENT & PLAN NOTE
On dialysis MWF, last received dialysis 1/28  [facility] outpatient dialysis center  Inpatient MWF dialysis, per nephrology  Sevelamer 800mg TID

## 2019-02-02 NOTE — ASSESSMENT & PLAN NOTE
- patient on amlodipine and carvedilol at home  - re-evaluate for need for anti-hypertensives  Start norvasc at 10mg  Holding coreg 2/2 bradycardia

## 2019-02-02 NOTE — ASSESSMENT & PLAN NOTE
Pt with syncopal episode and Hb 6.7, no reported hematuria since last admission.  - Type and screen, administer 1U RBCs, repeat CBC with more than appropriate   - Initial Hb may have been inacccurate  - No evidence of bleeding  - Holding antihypertensives and aspirin  - Iron studies (consistent w/ anemia of chronic disease), LDH wnl, haptoglobin wnl, reticulocyte count inappropriate, B12 wnl, folate wnl  - Urinalysis positive for microscopic hematuria, however, also obtained w/ straight cath; unlikely to be cause of anemia  - MRI/MRA brain no acute findings

## 2019-02-02 NOTE — DISCHARGE SUMMARY
"[facility] Hospital Medicine  Discharge Summary      Patient Name: Ronald Campbell  MRN: [id number]  Admission Date: 1/30/2019  Hospital Length of Stay: 3 days  Discharge Date and Time:  02/02/2019 2:18 PM  Attending Physician: No att. providers found   Discharging Provider: Mathieu Cobian MD  Primary Care Provider: Bart Shaw MD  Hospital Medicine Team: [facility] HOSP MED 5 Mathieu Cobian MD    HPI:   88 yo M with background of ESRD on HD MWF, HFrEF (EF 35%), IDDM2, dementia, bilateral blindness, seizure disorder, recent diagnosis of prostate cancer S/P TURP and fulguration of prostate on 1/8/19 presenting for weakness and syncopal episode with slurring of speech. Earlier today patient was with his nurse preparing to go to dialysis and he felt weak and slumped over. Patient did not fall or hit his head on anything. Wife reported pt had slurring of speech during this time. She reports that he may have had a single episode of dark stool "a few days ago", but it was not tarry, sticky or malodorous. Pt had a similar presentation earlier this month with gross hematuria, including sidney blood from the urethral meatus, for which he was admitted and received blood transfusions. Daughter denies patient having any hematuria similar to previous admission since discharge, but daughter reports that pt has orange/rust/debi coloured urine after each dialysis session and patient is usually asymptomatic. Pt did not receive his HD as he came to ED, last HD was 1/28. According to daughter, is reportedly compliant with his medications and took his AM medications today. Pt last moved his bowels 1/28, but this is normal for him and he often has hard stools. Pt is not experiencing any chest pain, SOB, nausea or vomiting at this time.         * No surgery found *      Hospital Course:   01/31/2019 Patient was agitated overnight, removing his lines. Pt was reoriented and calmed down. Pt was on the way to MRI, but " daughter refused due to timing of imaging. Will attempt to obtain MRI today during day time.   02/01/2019 Hemoglobin stable. HD today. Will attempt to determine whether patient receiving epo at dialysis center.     Physical Exam   Vitals:    02/02/19 1130   BP: (!) 158/74   Pulse: 66   Resp: 20   Temp: 98 °F (36.7 °C)   Constitutional: He appears well-developed. No distress.   Frail appearing elderly male   HENT:   Head: Normocephalic and atraumatic.   Eyes:   Strabismus R eye and lens opacification bilaterally   Cardiovascular: Normal rate, regular rhythm and normal heart sounds.   Pulmonary/Chest: No respiratory distress.   Decreased inspiratory effort, decreased air entry at lung based and crackles in lower lung regions bilaterally   Abdominal: Soft. He exhibits no distension.   Musculoskeletal:   L arm fistula with palpable thrill and bruit   Neurological: He is alert.   Oriented to person, place and year   Skin: Capillary refill takes 2 to 3 seconds. He is not diaphoretic.   Nursing note and vitals reviewed.        Consults:   Consults (From admission, onward)        Status Ordering Provider     Inpatient consult to Nephrology  Once     Provider:  (Not yet assigned)    Completed CECY QUINN     Inpatient consult to Vascular (Stroke) Neurology  Once     Provider:  (Not yet assigned)    Completed CECY QUINN          * Syncope    Pt with syncopal episode and Hb 6.7, no reported hematuria since last admission.  - Type and screen, administer 1U RBCs, repeat CBC with more than appropriate   - Initial Hb may have been inacccurate  - No evidence of bleeding  - Holding antihypertensives and aspirin  - Iron studies (consistent w/ anemia of chronic disease), LDH wnl, haptoglobin wnl, reticulocyte count inappropriate, B12 wnl, folate wnl  - Urinalysis positive for microscopic hematuria, however, also obtained w/ straight cath; unlikely to be cause of anemia  - MRI/MRA brain no acute findings         Anemia     See syncope       ESRD (end stage renal disease)    On dialysis MWF, last received dialysis 1/28  [facility] outpatient dialysis center  Inpatient MWF dialysis, per nephrology  Sevelamer 800mg TID       Chronic combined systolic and diastolic heart failure    EF 35% on last ECHO 1/7/2019  On carvedilol and amlodipine at home, initially held in setting of syncope & normotension  Norvasc resumed today due to elevated BP, holding coreg due to bradycardia     HTN (hypertension)    - patient on amlodipine and carvedilol at home  - re-evaluate for need for anti-hypertensives  Start norvasc at 10mg  Holding coreg 2/2 bradycardia       Final Active Diagnoses:    Diagnosis Date Noted POA    PRINCIPAL PROBLEM:  Syncope [R55] 01/30/2019 Yes    Anemia [D64.9] 01/04/2019 Yes    ESRD (end stage renal disease) [N18.6]  Yes    Chronic combined systolic and diastolic heart failure [I50.42] 11/04/2012 Yes     Chronic    HTN (hypertension) [I10]  Yes    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 Schedule an appointment as soon as possible for a visit in 1 week.    Specialties:  Internal Medicine, Geriatric Medicine  Contact information:  [address] [place] [address] [id number] [phone]                 Patient Instructions:   No discharge procedures on file.    Significant Diagnostic Studies: Labs:   CBC   Recent  Labs   Lab 02/01/19  0858 02/02/19  0540   WBC 5.53 5.12   HGB 8.5* 9.2*   HCT 26.3* 29.9*   * 144*       Pending Diagnostic Studies:     Procedure Component Value Units Date/Time    Erythropoietin [[id number]] Collected:  02/02/19 1044    Order Status:  Sent Lab Status:  In process Updated:  02/02/19 1047    Specimen:  Blood          Medications:  Reconciled Home Medications:      Medication List      START taking these medications    bicalutamide 50 MG Tab  Commonly known as:  CASODEX  Take 1 tablet (50 mg total) by mouth once daily.        CHANGE how you take these medications    amLODIPine 10 MG tablet  Commonly known as:  NORVASC  Take 1 tablet (10 mg total) by mouth once daily.  What changed:    · medication strength  · how much to take     atorvastatin 40 MG tablet  Commonly known as:  LIPITOR  Take 1 tablet (40 mg total) by mouth once daily.  What changed:  when to take this        CONTINUE taking these medications    aspirin 81 MG Chew  Take 1 tablet (81 mg total) by mouth once daily.     BLOOD GLUCOSE TEST Strp  Generic drug:  blood sugar diagnostic  1 strip by Misc.(Non-Drug; Combo Route) route daily as needed.     brinzolamide 1 % ophthalmic suspension  Commonly known as:  AZOPT  Place 1 drop into the left eye 3 (three) times daily.     calcium acetate 667 mg capsule  Commonly known as:  PHOSLO  Take 2 capsules by mouth every day in the morning, then take 1 capsule with lunch and 2 capsules in the evening with meals     COMBIGAN 0.2-0.5 % Drop  Generic drug:  brimonidine-timolol  Place 1 drop into the left eye 2 (two) times daily.     dutasteride 0.5 mg capsule  Commonly known as:  AVODART  Take 1 capsule (0.5 mg total) by mouth once daily.     levETIRAcetam 500 MG Tab  Commonly known as:  KEPPRA  Take 1 tablet (500 mg total) by mouth 2 (two) times daily.     MELATONIN ORAL  Take 1 tablet by mouth once daily.     NEPHRO-JESSICA 0.8 mg Tab  Generic drug:  B complex-vitamin C-folic acid  Take 1 tablet  by mouth every morning.     NovoLOG Flexpen U-100 Insulin 100 unit/mL Inpn pen  Generic drug:  insulin aspart U-100  Insulin Pen Subcutaneous As directed.  -200 take 1 unitBS 201-250 take 2 unitsBS 251-300 take 3 unitsBS 301-350 take 4 unitsBS > 351 take 5 units and call MD     polyethylene glycol 17 gram/dose powder  Commonly known as:  GLYCOLAX  Take 17 g by mouth daily as needed.     tamsulosin 0.4 mg Cap  Commonly known as:  FLOMAX  Take 1 capsule (0.4 mg total) by mouth once daily.     TRAVATAN Z OPHT  Place 1 drop into the left eye every evening. 1 Drops Left eye Every evening     VITAMIN D2 50,000 unit Cap  Generic drug:  ergocalciferol  Take 1 tablet by mouth 2 times per month        STOP taking these medications    carvedilol 25 MG tablet  Commonly known as:  COREG            Indwelling Lines/Drains at time of discharge:   Lines/Drains/Airways     Drain                 Hemodialysis AV Fistula 08/08/14 0800 Left upper arm 1639 days                Time spent on the discharge of patient: 50 minutes  Patient was seen and examined on the date of discharge and determined to be suitable for discharge.         Mathieu Cobian MD  Department of Hospital Medicine  [facility]

## 2019-02-04 NOTE — PLAN OF CARE
Problem: Physical Therapy Goal  Goal: Physical Therapy Goal  Goals to be met by: 02/15/2019     Patient will increase functional independence with mobility by performin. Supine to sit with Contact Guard Assistance  2. Sit to supine with Contact Guard Assistance  3. Sit to stand transfer with Contact Guard Assistance  4. Gait  x 25 feet with Contact Guard Assistance using Rolling Walker.   5. Lower extremity exercise program x15 reps per handout, with independence     Outcome: Unable to achieve outcome(s) by discharge  pt d/c prior to completing treatment adriane with no goals met. Eval only completed    Tacos Ravi, PT, DPT  2019

## 2019-02-04 NOTE — PT/OT/SLP PROGRESS
Physical Therapy Discharge      Ronald Campbell   MRN: [id number]     Pt discharged home with daughter, Therapy's last recommendation being SNF. Pt did not meet any goals due to evaluation only, see 02/01/2019 notes.     Tacos Ravi, PT, DPT  2/4/2019

## 2019-02-06 NOTE — TELEPHONE ENCOUNTER
Spoke with Mark Mccarthy MA @ [facility] and she stated that a Rx for Casodex 50 mg was filled at [facility] on 2/5/19.

## 2019-03-21 NOTE — TELEPHONE ENCOUNTER
Talked to his daughter.  Recommend to go to ER.  Also check with his nephrologist whether they can arrange blood transfusion with his dialysis.    I can do cysto evaluation but he is high risk pt to undergo surgery.  Continue lupron and casodex for his prostate cancer treatment.

## 2019-03-21 NOTE — TELEPHONE ENCOUNTER
----- Message from Rama Suero LPN sent at 3/21/2019  8:17 AM CDT -----  Contact: daughter - augusto sr -   Pt was cystoscoped in January-note states      Findings:   - prostate severely enlarged and very suspicious for malignancy  - left lateral lobe with growth into bladder floor  - diffuse oozing noted from prostate  - unable to identify either ureteral orifice  - no bladder tumors noted      ----- Message -----  From: Diane Rodriguez LPN  Sent: 3/20/2019   4:27 PM  To: Rama Suero LPN, Temo Banerjee MD    Pt has hematuria on and off for last 2 weeks.Pt with hematuria again. Daughter states it was heavy this am and again after dialysis. Pt daug states blood was drawn today and hemaglobin was down to approx  7.2 . Which was sent to [facility] and not visible yet in epic. Please advise  ----- Message -----  From: Mireille Riggs  Sent: 3/20/2019   1:51 PM  To: Chriss HECTOR Staff    Chriss    Needs Advice    Reason for call: wants to discuss his hematuria        Communication Preference:daughter - augusto sr -     Additional Information:

## 2019-03-21 NOTE — TELEPHONE ENCOUNTER
----- Message from Diane Rodriguez LPN sent at 3/20/2019  4:27 PM CDT -----  Contact: daughter - augusto sr -   Pt has hematuria on and off for last 2 weeks.Pt with hematuria again. Daughter states it was heavy this am and again after dialysis. Pt daug states blood was drawn today and hemaglobin was down to approx  7.2 . Which was sent to [facility] and not visible yet in epic. Please advise  ----- Message -----  From: Mireille Riggs  Sent: 3/20/2019   1:51 PM  To: Chriss HECTOR Staff    Chriss    Needs Advice    Reason for call: wants to discuss his hematuria        Communication Preference:daughter - augusto sr -     Additional Information:

## 2019-03-23 NOTE — ED NOTES
Pt had large soft brown stool prior to enema, resident Dr. Tinoco notified, continue with enema as ordered.

## 2019-03-23 NOTE — ED NOTES
Pt changed and repositioned in bed. Call bell within reach. Daughter at bedside. Will continue to monitor.

## 2019-03-23 NOTE — ED PROVIDER NOTES
Encounter Date: 3/23/2019       History     Chief Complaint   Patient presents with    Constipation     Pt arrives via EMS for constipation. Pt's family member reports pt hasn't had a BM in 2 weeks. Pt's family member also c/o increased confusion. Hx of dementia.     88 yo M with background of ESRD on HD MWF, HFrEF (EF 35%), IDDM2, dementia, bilateral blindness, seizure disorder, recent diagnosis of prostate cancer S/P TURP and fulguration of prostate on 1/8/19 was brought by daughter for constipation for 2 weeks.    Patient developed constipation since 2 weeks ago. He used to have 1 formed BM a day until 2 weeks ago when he started to have very small '' leakage '' per daughter that is brown in color, ~3 times a day. He eats at baseline a breakfast, nutrition shakes for lunch, mashed potato and chicken noodles for dinner. He does not have abdominal pain, N/V, fever or chills, blood in stool or black lisa stool or leakage..  He was instructed to get Murelax on Sunday by on call nurse per daughter with no relieve and then prescribed Suppository on Tuesday and Wednesday with improvement of his '' leakage ''. Daughter tried RI maneuver to disimpact stool and reports hard stool inside. He is not on opiates at home.    Per daughter, he usually does not complain often and has baseline dementia, however, last night, he was agitated and pulled his lines with worsening confusion. He usually answers yes/no or simple questions with difficulties holding conversation.    He has been having on/off hematuria for the past month and was given 1UPRBC in his last hospitalization in 1/30. Per his Urologist, Dr. Banerjee, who has been following him with his prostate Ca, he is high risk patient to undergo surgery. Daughter reports returning of his hematuria now. He is taking baby Asprin, not on anticuagulant per daughter.    Patient has HTN and ESRD on HD. BP meds include Amlodipine and Carvedilol has been held with Carvedilol held during  his hospitalization for bradycardia, and Amlodipine recently in his HD session for low BP.            Review of patient's allergies indicates:   Allergen Reactions    Lisinopril Swelling     Past Medical History:   Diagnosis Date    Anemia     Blind     bilaterally    CHF (congestive heart failure)     Chronic kidney disease     Dementia 01/2017    Diabetes mellitus type II     General anesthetics causing adverse effect in therapeutic use     april / may 2014     Glaucoma (increased eye pressure)     Hypertension     Hypothermia 12/6/2017    Prostate cancer 1/11/2019    Seizures      Past Surgical History:   Procedure Laterality Date    ANGIOPLASTY WITH STENT PLACEMENT Left 11/18/2016    [facility]formed [facility] at [facility] OR 2ND FLR    AV FISTULA PLACEMENT Left 4/2014    AV fistula to Left upper arm    CLOT EVACUATION FROM BLADDER  1/8/2019    Performed by [facility]o [facility] at [facility] OR [address] [place] [address]    OFUSCAGFHCME-DYJHCFI-OA left RC vs BC AVF Left 8/8/2014    Performed by [facility] at [facility] OR 2ND FLR    CYSTOSCOPY N/A 11/29/2018    [facility]    CYSTOSCOPY  WITH FULGURATION  1/8/2019    [facility]    CYSTOSCOPY, WITH BLADDER BIOPSY, WITH FULGURATION IF INDICATED N/A 1/8/2019    [facility]    EYE SURGERY      FISTULOGRAM Left 11/18/2016    Performed by [facility] at [facility] OR 2ND FLR    FISTULOGRAM Left 4/15/2016    Performed by [facility] at [facility] OR 2ND FLR    FISTULOGRAM Left 1/29/2016    [facility]formed by [facility] at [facility] CATH LAB    FISTULOGRAM Room 11 case Left 3/27/2015    Performed by [facility] at [facility] OR [facility] FLR    HYDROCELECTOMY Bilateral 11/22/2017    [facility]    INSERTION-CATHETER-DIALYSIS-PERITONEAL-LAPAROSCOPIC N/A 5/8/2014    Performed by Js Ladd [facility] at [facility] OR [facility]  FLR    INSERTION-CATHETER-DIALYSIS-PERITONEAL-LAPAROSCOPIC N/A 5/5/2014    [facility]formed by [facility]s [facility] at [facility] OR 2ND FLR    INSERTION-CATHETER-HEMODIALYSIS N/A 5/21/2014    Performed by [facility] at [facility] OR 2ND FLR    PERMCATH INSERTION-TUNNELED CVC N/A 5/13/2014    Performed by Lolis Jackson [facility] at [facility] CATH LAB    REMOVAL, BLOOD CLOT N/A 11/29/2018    [facility] at [facility] OR 1ST FLR    REMOVAL-CATHETER-PERITONEAL DIALYSIS N/A 7/30/2014    Performed by Js [facility]add [facility] at [facility] OR 2ND FLR    SCROTUM EXPLORATION Bilateral 11/2017    TURP (TRANSURETHRAL RESECTION OF PROSTATE) N/A 1/8/2019    [facility]formed [facility] at [facility] OR [facility]ST FLR    TURP (TRANSURETHRAL RESECTION OF PROSTATE)  1/8/2019    [facility]formed by [facility] at [facility] OR [facility]ST FLR     Family History   Problem Relation Age of Onset    Heart disease Sister         mi    Heart disease Brother         mi    Cancer Daughter         BREAST X 2     Social History     Tobacco Use    Smoking status: Never Smoker    Smokeless tobacco: Never Used   Substance Use Topics    Alcohol use: No    Drug use: No     Review of Systems   Constitutional: Positive for activity change. Negative for chills, diaphoresis and fever.   HENT: Negative for congestion and rhinorrhea.    Eyes: Negative for redness.        Bilaterally blind   Respiratory: Negative for chest tightness, shortness of breath and wheezing. Cough: on/off.    Cardiovascular: Positive for leg swelling (chronic, due to ESRD). Negative for chest pain.   Gastrointestinal: Positive for constipation. Negative for abdominal pain, blood in stool, diarrhea, nausea, rectal pain and vomiting.   Genitourinary: Positive for hematuria. Negative for flank pain.        ESRD on HD.  Makes urine with hematuria recently   Musculoskeletal: Positive for arthralgias and gait problem.        Uses hospital bed at home and wheelchair   Skin: Negative for color  change and rash.   Neurological: Negative for dizziness, syncope, light-headedness and headaches.   Psychiatric/Behavioral: Positive for agitation (last night), decreased concentration and sleep disturbance.        Baseline Dementia       Physical Exam     Initial Vitals [03/23/19 0637]   BP Pulse Resp Temp SpO2   130/78 78 16 -- 97 %      MAP       --         Physical Exam    Constitutional: He appears well-developed. He is not diaphoretic. No distress.   Lying comfortable in bed  Takes gawn off during encounter   HENT:   Head: Normocephalic and atraumatic.   Nose: Nose normal.   Eyes: Conjunctivae are normal. Right eye exhibits no discharge. Left eye exhibits no discharge.   Neck: No thyromegaly present. No JVD present.   Cardiovascular: Normal rate, regular rhythm, normal heart sounds and intact distal pulses.   No murmur heard.  Pulmonary/Chest: Breath sounds normal. No respiratory distress. He has no wheezes. He has no rales.   Abdominal: There is no tenderness. There is no guarding.   Tense abdomen  Hypoactive bowel sounds  No tenderness    Musculoskeletal: He exhibits edema (+2 LE pitting edema bilaterally). He exhibits no tenderness.   Neurological: He is alert. No sensory deficit.   Answers some yes/no questions  Follow commands  Generalized limbs weakness     Skin: Skin is warm and dry. No erythema.   Psychiatric:   Answers yes/no questions  Increase response time  Not able to hold conversations         ED Course   Procedures  Labs Reviewed   CBC W/ AUTO DIFFERENTIAL - Abnormal; Notable for the following components:       Result Value    RBC 2.90 (*)     Hemoglobin 7.3 (*)     Hematocrit 23.1 (*)     MCV 80 (*)     MCH 25.2 (*)     MCHC 31.6 (*)     RDW 23.9 (*)     Immature Granulocytes 1.1 (*)     Gran # (ANC) 8.5 (*)     Immature Grans (Abs) 0.12 (*)     nRBC 1 (*)     Gran% 79.5 (*)     Lymph% 11.2 (*)     All other components within normal limits   COMPREHENSIVE METABOLIC PANEL   URINALYSIS, REFLEX TO  URINE CULTURE          Imaging Results          X-Ray Abdomen AP 1 View (KUB) (In process)                  Medical Decision Making:   History:   Old Medical Records: I decided to obtain old medical records.  Old Records Summarized: other records.  Initial Assessment:   89 yo M with background of ESRD on HD MWF, HFrEF (EF 35%), IDDM2, dementia, bilateral blindness, seizure disorder, recent diagnosis of prostate cancer S/P TURP and fulguration of prostate on 1/8/19 was brought by daughter for constipation for 2 weeks.  - Daughter reports multiple small leaks during the day, received Murelax and Suppositories with partial improvement. No abdominal pain, N/V, fever, chills.   DDx includes but not limited to: Electrolytes disturbance including Calcium, mechanical intestinal obstruction, ileus.  Regarding Hematuria, DDx includes: baseline prostate Ca, renal stone, UTI    - ordered CBC , CMP, UA, abdominal X-ray, bladder scan  - place tejeda  - Abdominal X-ray: No dilated loops of bowel.  Prominent stool in the rectum.  - Disimpacted large soft stool  - 8:53 AM Hb 7.3, will give 1 UPRBC  - UA: >100 RBC, WBC >100, protein +3. Similar to UAs done in the past 3 months  - Bladder scan with 200cc and tejeda irrigation reveled small clot    - 12:10 PM: Discussed with Urology, they prefer to avoid instrumentation as patient is well known to them with extensive evaluation of his complain in the past. Recommended to transfuse him 1 UPRBC and out patient F/U.  - Patient had large BM prior to having the ordered Enema  - Hb improved to 8.8 after transfusion  - BMT stable  - Ubdated Dr. Banerjee, his urologist about his ED visit, will discharge him with instructions to F/U with PCP and urologist, OTC stool softener.                      Clinical Impression:   Final diagnoses:  [K59.00] Constipation (Primary)  [R31.0] Hematuria, gross                           Manny Nevarez MD  Resident  03/23/19 1284

## 2019-03-23 NOTE — ED NOTES
10 f catheter removed, no urine output, 16 F inserted, 10 ml balloon, sterile technique, hematuria returned, approx 15 ml. Dr. Ratliff notified, verbal order to irrigate catheter with sterile saline. Pt irrigated with 120 ml total, with light pink urine in return, small clot, catheter patent at this time, will continue to monitor. Pt tolerated well, daughter at bedside, call light within reach.

## 2019-03-23 NOTE — DISCHARGE INSTRUCTIONS
Keep rehydrated   Eat high-fiber diet  Follow up with Urology, Dr. Banerjee  Follow up with PCP in a week

## 2019-03-23 NOTE — ED NOTES
Pt sleeping in bed. Daughter at bedside. Cont cardiac, BP, and pulse oximetry in place. Call bell within reach. Will continue to monitor.

## 2019-03-23 NOTE — ED NOTES
"Patient requires transport for DC to home, and family states that she " must be carried up 20 steps."  Spoke to Cornel Jules @ [facility], who approved [facility], EMS for stretcher tranport. Notified charge nurse, Eliza Crenshaw.    "

## 2019-03-23 NOTE — ED NOTES
Pt passed 1 loose BM. Pt cleansed and repositioned in bed. Call bell within reach. Daughter at the bedside. Cont cardiac, pulse ox, and BP monitoring in place. Will continue to monitor.

## 2019-03-23 NOTE — ED NOTES
Patient identifiers verified and correct for Ovalsamuel Campbell.    LOC: The patient is awake, alert and aware of environment with an appropriate affect, the patient is oriented x 3 and speaking in slow sentences . Legally Blind     APPEARANCE: Patient resting comfortably and in no acute distress, patient is clean and well groomed, patient's clothing is properly fastened.    SKIN: The skin is warm and dry, color consistent with ethnicity, patient has normal skin turgor and moist mucus membranes, Patient has some sacral wounds     MUSCULOSKELETAL: generalized weakness     RESPIRATORY: Airway is open and patent, respirations are spontaneous, patient has a normal effort and rate, no accessory muscle use noted, bilateral breath sounds diminished     CARDIAC: Patient has a normal rate and regular rhythm, +2  Edema     ABDOMEN: Soft and non tender to palpation, no distention noted, hypoactive bowel sounds present in all four quadrants.    NEUROLOGIC:open spontaneously, behavior appropriate to situation, follows commands, facial expression symmetrical, bilateral hand grasp equal and even,

## 2019-03-23 NOTE — ED TRIAGE NOTES
Ronald Campbell, a 89 y.o. male presents to the ED w/ complaint of constipation for 2 weeks. Patient was given Miralax and a suppository with no relief.     Patient also with some noticeable Altered mental status per patients daughter ( care taker)     Triage note:  Chief Complaint   Patient presents with    Constipation     Pt arrives via EMS for constipation. Pt's family member reports pt hasn't had a BM in 2 weeks. Pt's family member also c/o increased confusion. Hx of dementia.     Review of patient's allergies indicates:   Allergen Reactions    Lisinopril Swelling     Past Medical History:   Diagnosis Date    Anemia     Blind     bilaterally    CHF (congestive heart failure)     Chronic kidney disease     Dementia 01/2017    Diabetes mellitus type II     General anesthetics causing adverse effect in therapeutic use     april / may 2014     Glaucoma (increased eye pressure)     Hypertension     Hypothermia 12/6/2017    Prostate cancer 1/11/2019    Seizures

## 2019-03-23 NOTE — ED NOTES
.  Patient identifiers for Ronald Campbell 89 y.o. male checked and correct.  Chief Complaint   Patient presents with    Constipation     Pt arrives via EMS for constipation. Pt's family member reports pt hasn't had a BM in 2 weeks. Pt's family member also c/o increased confusion. Hx of dementia.     Past Medical History:   Diagnosis Date    Anemia     Blind     bilaterally    CHF (congestive heart failure)     Chronic kidney disease     Dementia 01/2017    Diabetes mellitus type II     General anesthetics causing adverse effect in therapeutic use     april / may 2014     Glaucoma (increased eye pressure)     Hypertension     Hypothermia 12/6/2017    Prostate cancer 1/11/2019    Seizures      Allergies reported:   Review of patient's allergies indicates:   Allergen Reactions    Lisinopril Swelling         LOC: Patient is responsive to voice; will follow some commands and answers appropriately.  APPEARANCE: Patient resting comfortably and in no acute distress. Patient is clean and well groomed, patient's clothing is properly fastened.  HEENT: Pt is blind and [place]. Daughter reports that yesterday he complained of pain in his eyes.  SKIN: The skin is warm and dry. Patient has normal skin turgor and moist mucus membranes. Skin tear to left forearm from fall on 2/21 with dressings. Fistula on left arm for dialysis. Daughter reports sacral wounds, describes skin as red and raw.   MUSKULOSKELETAL: Patient is able to move extremities spontaneously and upon command. Pulses intact. Daughter reports pt able to get up with assist to bedside commode before fall on 2/21, but has not been up from bed since.   RESPIRATORY: Airway is open and patent. Pt respirations slightly labored at rest. Pulse oximetry 100%.  CARDIAC: Patient has a normal rate and rhythm. Non-pitting edema +1 to bilateral ankles and feet.  ABDOMEN: No distention noted. Soft and non-tender upon palpation. Daughter reports constipation x2 weeks with  ""leakage around stool stuck" in rectum. Treated with suppository without success. Denies blood in stool. Reports minimal urine output; dialysis pt. Report of scant hematuria when pt does urine.   NEUROLOGICAL: Facial expression is symmetrical. Hand grasps are equal bilaterally, moderate strength. Normal sensation in all extremities when touched with finger. Daughter reports some baseline dementia, but says confusion has been pronounced in past 12 hours.          "

## 2019-03-23 NOTE — ED NOTES
Daughter updated that transport orders have been put in. Waiting on transport.   Call bell within reach. Will continue to monitor pt.

## 2019-03-27 NOTE — PROGRESS NOTES
Aroused by verbal stimulation.  No effective communication noted.  Sitting in recliner w/ BLE elevated.  Paid caregiver @ bedside employed by [employer] which also provides a nurse for home visits,  HD MWF.  Caregiver reports difficulty swallowing pills at times.  Spoke w/ daughter via phone.  Education provided on goal of stroke mobile, s/s of stroke, diet, PROM exercises and medications.  Encouraged to utilize Stroke Team for any concern but no other visits will be made as client has nurse assigned  By  [employer].

## 2019-03-28 NOTE — PROGRESS NOTES
Ronald Campbell is a 89 y.o. male with ESRD on HD via left BC AVF. He has advanced dementia and is non-communicative.   Care giver with him does not know his medical history or of any acute problems.   Spoke with daughter, Gabriela, who also confirms no problems with HD.    ROS not taken due to dementia.    Right Arm BP - Sittin/65 (19 1048)  Pulse: 62  Temp: 98 °F (36.7 °C)  Body mass index is 21.99 kg/m².    Left BC AVF with pulsatile thrill.  No hand or arm swelling.    Duplex - no stenosis, flow vol 1.2L/min    Continue care as per [facility].  No follow up needed unless problems develop with HD.    Orin Sims MD FACS [facility]  Vascular & Endovascular Surgery

## 2019-03-30 PROBLEM — A41.9 SEPSIS DUE TO PNEUMONIA: Status: ACTIVE | Noted: 2019-01-01

## 2019-03-30 PROBLEM — Z71.89 GOALS OF CARE, COUNSELING/DISCUSSION: Status: ACTIVE | Noted: 2019-01-01

## 2019-03-30 PROBLEM — J18.9 SEPSIS DUE TO PNEUMONIA: Status: ACTIVE | Noted: 2019-01-01

## 2019-03-30 PROBLEM — I50.43 ACUTE ON CHRONIC COMBINED SYSTOLIC AND DIASTOLIC CHF (CONGESTIVE HEART FAILURE): Status: ACTIVE | Noted: 2019-01-01

## 2019-03-30 PROBLEM — R06.02 SOB (SHORTNESS OF BREATH): Status: ACTIVE | Noted: 2019-01-01

## 2019-03-30 PROBLEM — R13.10 DYSPHAGIA: Status: ACTIVE | Noted: 2019-01-01

## 2019-03-30 PROBLEM — J96.01 ACUTE RESPIRATORY FAILURE WITH HYPOXIA: Status: ACTIVE | Noted: 2019-01-01

## 2019-03-30 NOTE — ED NOTES
Pt resting quietly on stretcher; remains on continuous cardiac and pulse ox monitoring with non-invasive blood pressure to cycle every 30 minutes. Oxygen saturation noted to be in the upper 90's on 6 liters via nasal cannula.  Oxygen delivery decreased to 4 liters via nasal cannula. VS stable Pt appears comfortable; denies pain at this time; no acute distress or discomfort reported or observed. Condom catheter remains in place; pt remains clean and dry.  Pt remains in the semi-more's position. Bed locked in lowest position; side rails up and locked x 2; call light, bedside table, and personal belongings within reach. Room assessed for safety measures and cleanliness; no action needed at this time. Pt and daughter updated on wait for room assignment; instructed to alert nurse for assistance; verbalizes understanding. Pt denies needs or complaints at this time; will continue to monitor.

## 2019-03-30 NOTE — ASSESSMENT & PLAN NOTE
-- Previously on Carvedilol and Amlodipine at home  -- Coreg d/c'd during last admission 2/2 syncope and bradycardia    Plan  -- Holding anti-hypertensives in the setting of sepsis

## 2019-03-30 NOTE — ASSESSMENT & PLAN NOTE
-- Baseline cognitive function and overall health declining in recent hx  -- Pt is bed bound  -- Has high potential for decompensation in the setting of Sepsis/acute hypoxic respiratory failure  -- Spoke with daughter regarding goals of care    Plan  -- Pt is DNR

## 2019-03-30 NOTE — PT/OT/SLP EVAL
Speech Language Pathology Evaluation  Bedside Swallow    Patient Name:  Ronald Campbell   MRN:  [id number]  Admitting Diagnosis: Sepsis due to pneumonia    Recommendations:                 General Recommendations:  Dysphagia therapy  Diet recommendations:  NPO, NPO   Aspiration Precautions: Strict aspiration precautions   General Precautions: Standard, aspiration, respiratory  Communication strategies:  provide increased time to answer and go to room if call light pushed; pt  blind    History:     Past Medical History:   Diagnosis Date    Anemia     Blind     bilaterally    CHF (congestive heart failure)     Chronic kidney disease     Dementia 01/2017    Diabetes mellitus type II     General anesthetics causing adverse effect in therapeutic use     april / may 2014     Glaucoma (increased eye pressure)     Hypertension     Hypothermia 12/6/2017    Prostate cancer 1/11/2019    Seizures        Past Surgical History:   Procedure Laterality Date    ANGIOPLASTY WITH STENT PLACEMENT Left 11/18/2016    [facility]    AV FISTULA PLACEMENT Left 4/2014    AV fistula to Left upper arm    CLOT EVACUATION FROM BLADDER  1/8/2019    [facility]    [id number]-[id number]-MV left RC vs BC AVF Left 8/8/2014    [facility]    CYSTOSCOPY N/A 11/29/2018    [facility]    CYSTOSCOPY  WITH FULGURATION  1/8/2019    [facility]    CYSTOSCOPY, WITH BLADDER BIOPSY, WITH FULGURATION IF INDICATED N/A 1/8/2019    [facility]    EYE SURGERY      FISTULOGRAM Left 11/18/2016    [facility]    FISTULOGRAM Left 4/15/2016    [facility]    FISTULOGRAM Left 1/29/2016    [facility] CATH LAB     FISTULOGRAM Room 11 case Left 3/27/2015    [facility] OR 2ND [facility]LR    HYDROCELECTOMY Bilateral 11/22/2017    Performed by [facility] at [facility] OR 2ND [facility]LR    INSERTION-CATHETER-DIALYSIS-PERITONEAL-LAPAROSCOPIC N/A 5/8/2014    Performed by Js Ladd [facility] at [facility] OR 2ND FLR    INSERTION-CATHETER-DIALYSIS-PERITONEAL-LAPAROSCOPIC N/A 5/5/2014    Performed by Js Ladd [facility] at [facility] OR 2ND FLR    INSERTION-CATHETER-HEMODIALYSIS N/A 5/21/2014    Performed by [facility]s [facility] at [facility] OR 2ND FLR    PERMCATH INSERTION-TUNNELED CVC N/A 5/13/2014    Performed by Lolis Jackson MD at [facility] CATH LAB    REMOVAL, BLOOD CLOT N/A 11/29/2018    Performed by [facility] at [facility] OR 1ST [facility]LR    REMOVAL-CATHETER-PERITONEAL DIALYSIS N/A 7/30/2014    Performed by Js Ladd [facility] at [facility] OR 2ND FLR    SCROTUM EXPLORATION Bilateral 11/2017    TURP (TRANSURETHRAL RESECTION OF PROSTATE) N/A 1/8/2019    Performed by [facility] at [facility] OR 1ST FLR    TURP (TRANSURETHRAL RESECTION OF PROSTATE)  1/8/2019    Performed by Temo [facility]erjee MD at [facility] OR 1ST [place]       Chest X-Rays: see recent imaging report    Prior diet: Daughter reports tolerating food with reduced appetite prior to two weeks ago.  Pt with decline in swallow function in last two weeks with coughing/choking evident.   Of note: Pt seen for bedside swallow evaluation here on 2/1/19- recommendations were for a mechanical soft diet with thin liquids.     Subjective     Wet / gurgly breath sounds evident upon ST entry    Pain/Comfort:  · Pain Rating 1: 0/10  · Pain Rating Post-Intervention 2: 0/10    Objective:     Oral Musculature Evaluation  · Oral Musculature: unable to assess due to poor participation/comprehension, general weakness  · Secretion Management: problems swallowing secretions, coughing on secretions  · Mucosal Quality: dry, sticky  · Mandibular Strength and Mobility: impaired(mouth in  open position)  · Oral Labial Strength and Mobility: (impaired strength; minimal movement initiated by pt)  · Lingual Strength and Mobility: (impaired strength; minimal movement initated by pt)  · Volitional Cough: absent on command; reflexive cough weak and unproductive  · Volitional Swallow: absent  · Voice Prior to PO Intake: not elicited; minimal attempts were mouthed with poor intelligibility    Bedside Swallow Eval:   Consistencies Assessed:  · NO po trials presented given high risk for aspiration apparent; poor secretion management; inability to trigger swallow on command    Treatment: Upon entry, pt found with mouth open position and wet/congested breath sounds.  Pt unable to follow commands for thorough oral motor assessment.  Significant weakness suspected.  Pt occasionally appeared to mouth 1-2 words; however, intelligibility was poor.  No significant voicing was elicited.  Pt was unable to cough on command.  Reflexive cough on secretions was poor in strength and unproductive with suction attempted by ST in oral cavity.  PO boluses were held given high risk for aspiration apparent.  Education was provided to pt's daughter regarding NPO recs, aspiration risk, secretion management and ST plan of care.  Daughter emotional, but denied needs or questions.    Results/recs reviewed with nursing.     Assessment:     Ronald Campbell is a 89 y.o. male with an SLP diagnosis of Dysphagia.      Goals:   Multidisciplinary Problems     SLP Goals        Problem: SLP Goal    Goal Priority Disciplines Outcome   SLP Goal     SLP    Description:  Speech Language Pathology Goals  Goals expected to be met by 4/6  1. Ongoing swallow evaluation.                        Plan:     · Patient to be seen:  4 x/week   · Plan of Care expires:  04/28/19  · Plan of Care reviewed with:  patient, daughter   · SLP Follow-Up:  Yes       Discharge recommendations:  (tbd)     Time Tracking:     SLP Treatment Date:   03/30/19  Speech Start Time:   1335  Speech Stop Time:  1349     Speech Total Time (min):  14 min    Billable Minutes: Eval Swallow and Oral Function 14    FAM Reyes, CCC-SLP  Speech Language Pathologist  [phone]  3/30/2019

## 2019-03-30 NOTE — ED TRIAGE NOTES
"Ronald Campbell, a 89 y.o. male presents to the ED w/ complaint of shortness of breath that began at 7 pm the previous night and worsened throughout the night per patients daughter.  Patient then began to have a productive cough( white thin secretions) and sounded " congested" per patients daughter, who is also patients caretaker. Lying flat made the shortness of breath worse and sitting up made the shortness of breath better    No reported fevers at home      Patient is a hemodialysis patient            Triage note:  Chief Complaint   Patient presents with    Shortness of Breath     Pt reports SOB/congestion. O2 sats 96% on RA. Pt has shallow respirations, even and unlabored.      Review of patient's allergies indicates:   Allergen Reactions    Lisinopril Swelling     Past Medical History:   Diagnosis Date    Anemia     Blind     bilaterally    CHF (congestive heart failure)     Chronic kidney disease     Dementia 01/2017    Diabetes mellitus type II     General anesthetics causing adverse effect in therapeutic use     april / may 2014     Glaucoma (increased eye pressure)     Hypertension     Hypothermia 12/6/2017    Prostate cancer 1/11/2019    Seizures        "

## 2019-03-30 NOTE — PLAN OF CARE
Problem: SLP Goal  Goal: SLP Goal  Speech Language Pathology Goals  Goals expected to be met by 4/6  1. Ongoing swallow evaluation.        Swallow evaluation initiated. Recommend strict NPO with aspiration precautions.  ST will continue to follow.     FAM Reyes, CCC-SLP  Speech Language Pathologist  [phone]  3/30/2019

## 2019-03-30 NOTE — PROGRESS NOTES
Patient arrived to [facility] approx. 14:15 accompanied by daughter. . Blind O2 4l NC. Responds to voice.2 person  Transfer from stretcher to bed. Discovered soiled sheets when attempting to remove extra linen. Angelique care provided. Agonal breathing noted after provided care. Heart rate dropped to 40;s then to 30's. Core called.
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
## 2019-03-30 NOTE — CONSULTS
Ronald Campbell is a 89 y.o. male who our service has been consulted to evaluate and give opinion.     History of Present Illness:    89 yo M with a medical hx significant for ESRD on HD MWF, HFrEF (EF 35%), IDDM2, dementia, bilateral blindness, seizure disorder, recent diagnosis of prostate cancer S/P TURP 1/19. He was who in by daughter for fairly acute onset shortness of breath and mental status changes and increased phlegm production since 3/29. Nephology was consulted for possible dialysis.         Past Medical History:   Diagnosis Date    Anemia     Blind     bilaterally    CHF (congestive heart failure)     Chronic kidney disease     Dementia 01/2017    Diabetes mellitus type II     General anesthetics causing adverse effect in therapeutic use     april / may 2014     Glaucoma (increased eye pressure)     Hypertension     Hypothermia 12/6/2017    Prostate cancer 1/11/2019    Seizures        Past Surgical History:   Procedure Laterality Date    ANGIOPLASTY WITH STENT PLACEMENT Left 11/18/2016    Performed by [facility] at [facility] OR 2ND FLR    AV FISTULA PLACEMENT Left 4/2014    AV fistula to Left upper arm    CLOT EVACUATION FROM BLADDER  1/8/2019    Performed by Temo [facility]erjee [facility] at [facility] OR [facility]ST FLR    [id number] left RC vs BC AVF Left 8/8/2014    Performed by [facility] MD at [facility] OR 2ND FLR    CYSTOSCOPY N/A 11/29/2018    Performed by [facility] at [facility] OR [facility]ST [facility]LR    CYSTOSCOPY  WITH FULGURATION  1/8/2019    Performed by [facility] at [facility] OR 1ST FLR    CYSTOSCOPY, WITH BLADDER BIOPSY, WITH FULGURATION IF INDICATED N/A 1/8/2019    Performed by [facility] at [facility] OR [facility]ST FLR    EYE SURGERY      FISTULOGRAM Left 11/18/2016    Performed by [facility]in [facility] MD at [facility] OR 2ND FLR    FISTULOGRAM Left 4/15/2016    Performed by [facility] at [facility] OR [facility]ND [facility]LR    FISTULOGRAM Left 1/29/2016    Performed by [facility] MD at [facility] CATH LAB     FISTULOGRAM Room 11 case Left 3/27/2015    Performed by [facility] MD at [facility] OR 2ND FLR    HYDROCELECTOMY Bilateral 11/22/2017    Performed by Temo Banerjee MD at [facility] OR 2ND FLR    INSERTION-CATHETER-DIALYSIS-PERITONEAL-LAPAROSCOPIC N/A 5/8/2014    Performed by Js Ladd MD at [facility] OR 2ND FLR    INSERTION-CATHETER-DIALYSIS-PERITONEAL-LAPAROSCOPIC N/A 5/5/2014    Performed by Js Ladd MD at [facility] OR 2ND FLR    INSERTION-CATHETER-HEMODIALYSIS N/A 5/21/2014    Performed by Orin Sims MD at [facility] OR 2ND FLR    PERMCATH INSERTION-TUNNELED CVC N/A 5/13/2014    Performed by Lolis Jackson MD at [facility] CATH LAB    REMOVAL, BLOOD CLOT N/A 11/29/2018    Performed by Temo Banerjee MD at [facility] OR 1ST FLR    REMOVAL-CATHETER-PERITONEAL DIALYSIS N/A 7/30/2014    Performed by Js Ladd MD at [facility] OR 2ND FLR    SCROTUM EXPLORATION Bilateral 11/2017    TURP (TRANSURETHRAL RESECTION OF PROSTATE) N/A 1/8/2019    Performed by Temo Banerjee MD at [facility] OR 1ST FLR    TURP (TRANSURETHRAL RESECTION OF PROSTATE)  1/8/2019    Performed by Temo Banerjee MD at [facility] OR 1ST FLR       Family History   Problem Relation Age of Onset    Heart disease Sister         mi    Heart disease Brother         mi    Cancer Daughter         BREAST X 2       Review of patient's allergies indicates:   Allergen Reactions    Lisinopril Swelling       Current Facility-Administered Medications   Medication Dose Route Frequency Provider Last Rate Last Dose    0.9%  NaCl infusion   Intravenous Once Ivo MD Leti 100 mL/hr at 03/30/19 1301      acetaminophen suppository 650 mg  650 mg Rectal Q8H PRN Merirll Ann MD        acetaminophen suppository 650 mg  650 mg Rectal Q6H PRN Merrill Ann MD        albuterol-ipratropium 2.5 mg-0.5 mg/3 mL nebulizer solution 3 mL  3 mL Nebulization Q4H WAKE Merrill Ann MD   3 mL at 03/30/19 1213    dextrose 50% injection  12.5 g  12.5 g Intravenous PRN Merrill Ann MD        glucagon (human recombinant) injection 1 mg  1 mg Intramuscular PRN Merrill Ann MD        heparin (porcine) injection 5,000 Units  5,000 Units Subcutaneous Q8H Merrill Ann MD        insulin aspart U-100 pen 0-5 Units  0-5 Units Subcutaneous Q6H PRN Merrill Ann MD        leuprolide (6 month) SyKt 45 mg  45 mg Intramuscular Q6 Months Dara Germain PA-C   45 mg at 01/29/19 1159    levETIRAcetam in NaCl (iso-os) IVPB 500 mg  500 mg Intravenous Q12H Merrill Ann MD   Stopped at 03/30/19 1020    piperacillin-tazobactam 4.5 g in sodium chloride 0.9% 100 mL IVPB (ready to mix system)  4.5 g Intravenous Q12H Merrill Ann MD 25 mL/hr at 03/30/19 1032 4.5 g at 03/30/19 1032    sodium chloride 0.9% flush 10 mL  10 mL Intravenous PRN Merrill Ann MD         Current Outpatient Medications   Medication Sig Dispense Refill    aspirin 81 MG Chew Take 1 tablet (81 mg total) by mouth once daily. 30 tablet 3    atorvastatin (LIPITOR) 40 MG tablet Take 1 tablet (40 mg total) by mouth once daily. (Patient taking differently: Take 40 mg by mouth every evening. ) 30 tablet 9    B complex-vitamin C-folic acid (NEPHRO-JESSICA) 0.8 mg Tab Take 1 tablet by mouth every morning.       bicalutamide (CASODEX) 50 MG Tab Take 1 tablet (50 mg total) by mouth once daily. 30 tablet 11    blood sugar diagnostic (BLOOD GLUCOSE TEST) Strp 1 strip by Misc.(Non-Drug; Combo Route) route daily as needed.       brimonidine-timolol (COMBIGAN) 0.2-0.5 % Drop Place 1 drop into the left eye 2 (two) times daily.      brinzolamide (AZOPT) 1 % ophthalmic suspension Place 1 drop into the left eye 3 (three) times daily.       dutasteride (AVODART) 0.5 mg capsule Take 1 capsule (0.5 mg total) by mouth once daily. 30 capsule 1    ergocalciferol (VITAMIN D2) 50,000 unit Cap Take 1 tablet by mouth 2 times per month       insulin aspart (NOVOLOG FLEXPEN) 100 unit/mL InPn  Insulin Pen Subcutaneous As directed.  -200 take 1 unitBS 201-250 take 2 unitsBS 251-300 take 3 unitsBS 301-350 take 4 unitsBS > 351 take 5 units and call MD      levetiracetam (KEPPRA) 500 MG Tab Take 1 tablet (500 mg total) by mouth 2 (two) times daily. 60 tablet 11    melatonin 10 mg Tab Take 1 tablet by mouth once daily.      polyethylene glycol (GLYCOLAX) 17 gram/dose powder Take 17 g by mouth daily as needed.      tamsulosin (FLOMAX) 0.4 mg Cap Take 1 capsule (0.4 mg total) by mouth once daily. 30 capsule 11    amLODIPine (NORVASC) 10 MG tablet Take 1 tablet (10 mg total) by mouth once daily. 30 tablet 0       Review of Systems:    Patient has no fever rather hypothermic,  The rest of ROS was obtained by daughter as he was non-verbal.     Physical Exam:    Temp:  [93.7 °F (34.3 °C)-97.4 °F (36.3 °C)] 97.4 °F (36.3 °C)  Pulse:  [] 92  Resp:  [22-40] 37  SpO2:  [82 %-100 %] 94 %  BP: (124-178)/(59-84) 124/62    No intake or output data in the 24 hours ending 03/30/19 1459        Gen: WDWN male in no apparent distress  Psych: Not assessible  Skin: No rashes or ulcers noted  Eyes: Normal conjunctiva and lids, PERRLA  Neck: No JVD  Chest: Coarse BS rales, rhonchi, no wheezing with mildly increased respiratory effort  CV: Regular with no murmurs, gallops or rubs  Abd: Soft, nontender, no distension, positive bowel sounds  Ext: No cyanosis, trace edema    Recent Labs   Lab 03/30/19  0454 03/30/19  0915   *  --    K 3.4*  --      --    CO2 23  --    BUN 13  --    CREATININE 2.4*  --    CALCIUM 8.7  --    PHOS  --  2.5*         Recent Labs   Lab 03/30/19  0454   WBC 7.08   HGB 9.5*   HCT 29.8*   PLT 94*         Impression/Plan:      1. ESRD: Patient appears volume overloaded with mild respiratory distress and 94% sat on 6 liter nasal canula. Will plan to dialyze urgently to support his oxygenation.      Unfortunately the patient just before  he was started on dialysis suddenly developed bradycardia and became pulseless and because there was a DNR on his chart no chest compressions were performed and the patient .

## 2019-03-30 NOTE — ED NOTES
Return phone call received from Dr TORI Greene with [facility].  Dr Greene made aware of blood noted from pt's penis as well as drop in oxygen saturation.  Dr Greene states to continue to monitor pt for increased bleeding and to maintain oxygen saturation greater than 88% on the 6 liters; will continue to monitor pt.

## 2019-03-30 NOTE — ASSESSMENT & PLAN NOTE
-- S/P TURP and fulguration of prostate on 1/8/18. Suspected prostate as a source of bleeding, unlikely due to small renal mass.   -- Ongoing issue since TURP  -- Urology following outpatient and aware of this issue  -- RN notes intermittent blood dribbling from urethral meatus    Plan  -- CBC daily, and INR

## 2019-03-30 NOTE — HPI
Ronald Campbell is an 88 yo M with a medical hx significant for ESRD on HD MWF, HFrEF (EF 35%), IDDM2, dementia, bilateral blindness, seizure disorder, recent diagnosis of prostate cancer S/P TURP and fulguration of prostate on 1/8/19 who is brought in by daughter for fairly acute onset shortness of breath, grunting, and increased phlegm production since 3/29 evening at 7:30 pm. Pt also notably orthopneic per pt's daughter at bedside and she has noticed increasing pitting edema in the LE as well as pre-sacral edema. Of note, most recent admission in early February, 2019 for gross hematuria, including sidney blood from the urethral meatus, for which he was admitted and received blood transfusion. He was notably borderline hypotensive at that time and HD sessions were primarily focused on uremic toxin clearance rather than UF. It appears that pt may now be developing some increased fluid retention since that time. Additionally, the daughter reports that pt has been exhibiting signs of dysphagia, and appears to hold onto food bolus much longer than normal.

## 2019-03-30 NOTE — ED NOTES
Patient identifiers verified and correct for Ronald Campbell.    LOC: The patient is awake and confused. Patient is oriented to name and is grunting. Patient is legally blind      APPEARANCE: Patient with increased work of breathing, and accessory muscle use. The  patient is clean and well groomed, patient's clothing is properly fastened.     SKIN: The skin is warm and dry, color consistent with ethnicity, patient has normal skin turgor. BUE ecchymosis/brusing noted     MUSCULOSKELETAL: Patient moving all extremities spontaneously, RUE( +2) edema  LUE(+1 edema).  LUE- AV fistula + bruit and + thrill     BLE ( +1 edema)     RESPIRATORY: Airway is open and patent, respirations are spontaneous, patient has a normal effort and rate, no accessory muscle use noted, bilateral breath sounds coarse      CARDIAC: Patient has a normal rate and regular rhythm, cap refill > 3 seconds     ABDOMEN: Soft and non tender to palpation, no distention noted, normoactive bowel sounds present in all four quadrants. Last BM: 3/3019

## 2019-03-30 NOTE — ASSESSMENT & PLAN NOTE
-- Some baseline component  -- Worsening per pt's daughter (caregiver)  -- Now keeping food in mouth much longer prior to attempting to swallow    Plan  -- Strict NPO for now. No meds po  -- SLP consulted for full evaluation

## 2019-03-30 NOTE — ED NOTES
When patient was roomed oxygen saturation decreased to 86% on room air and oxygen 3 liter per NC was applied which increased the patients saturation to 94%

## 2019-03-30 NOTE — SIGNIFICANT EVENT
Hospital Medicine                                                                                       Death Note      Patient to have been transferred to [place].  Per nursing, patient was noted to have episode of apnea with eventual further bradycardia.  Code blue was called.  Patient placed on non-rebreather with no improvement.  Hospital Medicine team called to bedside.      Patient is not responding to verbal or tactile stimuli. Patient does not have a papillary or corneal reflex. His pupils are fixed and dilated. No heart or breath sounds on auscultation. No respirations. No palpable pulses.     Daughter called and notified of events that transpired.   already at bedside.      Time of death: 1502     Cause of Death:     PEA arrest from hypoxic respiratory failure    MD Nick [email or web address]  [phone]

## 2019-03-30 NOTE — ED PROVIDER NOTES
Encounter Date: 3/30/2019    SCRIBE #1 NOTE: I, Js Sims, am scribing for, and in the presence of,  Shawn Walls M.D.. I have scribed the following portions of the note - the Resident attestation.     I, Dr. Jarvis Walls, personally performed the services described in this documentation. All medical record entries made by the scribe were at my direction and in my presence.  I have reviewed the chart and agree that the record reflects my personal performance and is accurate and complete.  Jarvis Walls MD.  3:03 AM 04/02/2019      History     Chief Complaint   Patient presents with    Shortness of Breath     Pt reports SOB/congestion. O2 sats 96% on RA. Pt has shallow respirations, even and unlabored.      HPI   89M with h/o ESRD on HD MWF, HFrEF (EF 35%), IDDM2, demential, bilateral blindness, seizure disorder brought in by daughter for shortness of breath, grunting, splinting, and phlegm since 9pm. Sudden onset. Recent diagnosis of prostate cancer s/p TURP and fulguration of prostate on 1/8/19. Unable to obtain reliable history due to patient's dementia.     Review of patient's allergies indicates:   Allergen Reactions    Lisinopril Swelling     Past Medical History:   Diagnosis Date    Anemia     Blind     bilaterally    CHF (congestive heart failure)     Chronic kidney disease     Dementia 01/2017    Diabetes mellitus type II     General anesthetics causing adverse effect in therapeutic use     april / may 2014     Glaucoma (increased eye pressure)     Hypertension     Hypothermia 12/6/2017    Prostate cancer 1/11/2019    Seizures      Past Surgical History:   Procedure Laterality Date    ANGIOPLASTY WITH STENT PLACEMENT Left 11/18/2016    Performed by [facility] at [facility] OR 2ND FLR    AV FISTULA PLACEMENT Left 4/2014    AV fistula to Left upper arm    CLOT EVACUATION FROM BLADDER  1/8/2019    Performed by [facility]o [facility]erjee MD at [facility] OR 1ST FLR    [id number] left RC vs BC AVF  Left 8/8/2014    Performed by Orin [facility]s MD at [facility] OR 2ND FLR    CYSTOSCOPY N/A 11/29/2018    Performed by Temo Banerjee MD at [facility] OR [address] [place] [address]    CYSTOSCOPY  WITH FULGURATION  1/8/2019    Performed by Temo Banerjee MD at [facility] OR [address] [place] [address]    CYSTOSCOPY, WITH BLADDER BIOPSY, WITH FULGURATION IF INDICATED N/A 1/8/2019    Performed by Temo Banerjee MD at [facility] OR [address] [place] [address]    EYE SURGERY      FISTULOGRAM Left 11/18/2016    Performed by Orin Sims MD at [facility] OR 2ND FLR    FISTULOGRAM Left 4/15/2016    Performed by Orin Sims MD at [facility] OR 2ND FLR    FISTULOGRAM Left 1/29/2016    Performed by Orin Sims MD at [facility] CATH LAB    FISTULOGRAM Room 11 case Left 3/27/2015    Performed by Orin Sims MD at [facility] OR 2ND FLR    HYDROCELECTOMY Bilateral 11/22/2017    Performed by Temo Banerjee MD at [facility] OR 2ND FLR    INSERTION-CATHETER-DIALYSIS-PERITONEAL-LAPAROSCOPIC N/A 5/8/2014    Performed by Js Ladd MD at [facility] OR 2ND FLR    INSERTION-CATHETER-DIALYSIS-PERITONEAL-LAPAROSCOPIC N/A 5/5/2014    Performed by Js Ladd MD at [facility] OR 2ND FLR    INSERTION-CATHETER-HEMODIALYSIS N/A 5/21/2014    [facility]    PERMCATH INSERTION-TUNNELED CVC N/A 5/13/2014    Performed by Lolis Jackson MD at [facility] CATH LAB    REMOVAL, BLOOD CLOT N/A 11/29/2018    Performed by Temo [facility]erjee MD at [facility] OR 1ST FLR    REMOVAL-CATHETER-PERITONEAL DIALYSIS N/A 7/30/2014    Performed by Js Ladd MD at [facility] OR [address] [place] [address]    SCROTUM EXPLORATION Bilateral 11/2017    TURP (TRANSURETHRAL RESECTION OF PROSTATE) N/A 1/8/2019    Performed by Temo Banerjee MD at [facility] OR [address] [place] [address]    TURP (TRANSURETHRAL RESECTION OF PROSTATE)  1/8/2019    Performed by Temo Banerjee MD at [facility] OR [address] [place] [address]     Family History   Problem Relation Age of Onset    Heart disease Sister         mi    Heart disease Brother         mi    Cancer Daughter          BREAST X 2     Social History     Tobacco Use    Smoking status: Never Smoker    Smokeless tobacco: Never Used   Substance Use Topics    Alcohol use: No    Drug use: No     Review of Systems   Unable to perform ROS: Dementia       Physical Exam     Initial Vitals   BP Pulse Resp Temp SpO2   03/30/19 0337 03/30/19 0337 03/30/19 0337 03/30/19 0523 03/30/19 0337   (!) 144/81 77 (!) 24 (!) 93.7 °F (34.3 °C) 96 %      MAP       --                Physical Exam    Nursing note and vitals reviewed.  Constitutional: He appears well-developed and well-nourished. He is not diaphoretic. No distress.   -American male sitting up in bed with eyes shut, gurgling phlegm in mouth, appears unable to spit, grunt   HENT:   Head: Normocephalic and atraumatic.   Right Ear: External ear normal.   Left Ear: External ear normal.   Nose: Nose normal.   Mouth/Throat: Oropharynx is clear and moist. No oropharyngeal exudate.   Eyes: Conjunctivae and EOM are normal. Pupils are equal, round, and reactive to light. Right eye exhibits no discharge. Left eye exhibits no discharge. No scleral icterus.   Neck: Normal range of motion. Neck supple. No thyromegaly present. No tracheal deviation present. No JVD present.   Cardiovascular: Normal rate, regular rhythm, normal heart sounds and intact distal pulses. Exam reveals no gallop and no friction rub.    No murmur heard.  Pulmonary/Chest: Breath sounds normal. Accessory muscle usage present. No stridor. Tachypnea noted. He has no wheezes. He has no rhonchi. He has no rales. He exhibits no tenderness.   Abdominal: Soft. Bowel sounds are normal. He exhibits no distension and no mass. There is no tenderness. There is no rebound and no guarding.   Genitourinary: Prostate normal and penis normal. Rectal exam shows guaiac negative stool. Guaiac negative stool. No discharge found.   Musculoskeletal: Normal range of motion. He exhibits no edema or tenderness.   Lymphadenopathy:     He has no cervical  adenopathy.   Neurological: He is alert and oriented to person, place, and time. He has normal strength. No cranial nerve deficit or sensory deficit.   Skin: Skin is warm and dry. Capillary refill takes less than 2 seconds. No rash and no abscess noted. No erythema. No pallor.   Psychiatric: He has a normal mood and affect. Thought content normal.         ED Course   Procedures  Labs Reviewed   BASIC METABOLIC PANEL - Abnormal; Notable for the following components:       Result Value    Sodium 133 (*)     Potassium 3.4 (*)     Creatinine 2.4 (*)     eGFR if  26.5 (*)     eGFR if non  22.9 (*)     All other components within normal limits   CBC W/ AUTO DIFFERENTIAL - Abnormal; Notable for the following components:    RBC 3.66 (*)     Hemoglobin 9.5 (*)     Hematocrit 29.8 (*)     MCV 81 (*)     MCH 26.0 (*)     MCHC 31.9 (*)     RDW 22.5 (*)     Platelets 94 (*)     Immature Granulocytes 3.4 (*)     Immature Grans (Abs) 0.24 (*)     nRBC 2 (*)     Lymph% 17.8 (*)     All other components within normal limits   TROPONIN I - Abnormal; Notable for the following components:    Troponin I 0.032 (*)     All other components within normal limits   B-TYPE NATRIURETIC PEPTIDE - Abnormal; Notable for the following components:    BNP 1,937 (*)     All other components within normal limits   PHOSPHORUS - Abnormal; Notable for the following components:    Phosphorus 2.5 (*)     All other components within normal limits   CULTURE, BLOOD   CULTURE, BLOOD   PROTIME-INR   LACTIC ACID, PLASMA   MAGNESIUM   POCT INFLUENZA A/B MOLECULAR        ECG Results          EKG 12-lead (Final result)  Result time 03/31/19 21:18:08    Final result by Interface, Lab In [facility] (03/31/19 21:18:08)                 Narrative:    Test Reason : R06.02,    Vent. Rate : 096 BPM     Atrial Rate : 096 BPM     P-R Int : 166 ms          QRS Dur : 108 ms      QT Int : 370 ms       P-R-T Axes : 063 -15 125 degrees     QTc Int :  467 ms    Sinus rhythm with occasional Premature ventricular complexes and Premature  atrial complexes  Moderate voltage criteria for LVH, may be normal variant  Nonspecific T wave abnormality  Prolonged QT  Abnormal ECG  When compared with ECG of 30-JAN-2019 11:22,  Premature ventricular complexes are now Present  Premature atrial complexes are now Present  Vent. rate has increased BY  37 BPM  ST no longer elevated in Lateral leads  Confirmed by MARY VALLECILLO, HOMEYAR ([id number]) on 3/31/2019 9:18:05 PM    Referred By: [email or web address]   SELF           Confirmed By:EZEKIEL HERNANDEZ MD                            Imaging Results          US Upper Extremity Veins Right (Final result)  Result time 03/30/19 09:53:19    Final result by Liborio Reid MD (03/30/19 09:53:19)                 Impression:      No thrombus in central veins of the right upper extremity.    Electronically signed by resident: Heide Mandujano  Date:    03/30/2019  Time:    08:48    Electronically signed by: Liborio Reid MD  Date:    03/30/2019  Time:    09:53             Narrative:    EXAMINATION:  US UPPER EXTREMITY VEINS RIGHT    CLINICAL HISTORY:  r/o DVT    TECHNIQUE:  Duplex and color flow Doppler evaluation and dynamic compression was performed of the right upper extremity veins.    COMPARISON:  Right upper extremity venous ultrasound 05/20/2014.    FINDINGS:  Central veins: The internal jugular, subclavian, and axillary veins are patent and free of thrombus.    Arm veins: The brachial, basilic, and cephalic veins are patent and compressible.    Contralateral subclavian/internal jugular veins: The left subclavian and internal jugular veins are patent and free of thrombus.    Other findings: None.                               X-Ray Chest 1 View (Final result)  Result time 03/30/19 05:23:28    Final result by Sasha Goodwin MD (03/30/19 05:23:28)                 Impression:      Please see above.      Electronically signed by: Sasha Goodwin  MD  Date:    03/30/2019  Time:    05:23             Narrative:    EXAMINATION:  XR CHEST 1 VIEW    CLINICAL HISTORY:  Shortness of breath    TECHNIQUE:  Single frontal view of the chest was performed.    COMPARISON:  01/31/2019.    FINDINGS:  Cardiac monitoring leads overlie the chest.  The cardiomediastinal silhouette is enlarged.  There is pulmonary vascular congestion with increased interstitial and parenchymal attenuation which can be seen with underlying pulmonary edema.  There is increased attenuation in the right lower lung zone with blunting of the costophrenic angle likely reflecting pleural fluid with adjacent atelectatic/consolidative change.  Possible trace left pleural fluid.  No pneumothorax.  Visualized osseous structures are intact.                                            Scribe Attestation:   Scribe #1: I performed the above scribed service and the documentation accurately describes the services I performed. I attest to the accuracy of the note.    Attending Attestation:   Physician Attestation Statement for Resident:  As the supervising MD   Physician Attestation Statement: I have personally seen and examined this patient.   I agree with the above history. -:   As the supervising MD I agree with the above PE.    As the supervising MD I agree with the above treatment, course, plan, and disposition.                       Clinical Impression:       ICD-10-CM ICD-9-CM   1. SOB (shortness of breath) R06.02 786.05   2. Sepsis due to pneumonia J18.9 486    A41.9 995.91                                Shawn Walls MD  04/02/19 0304       Shawn Walls MD  05/02/19 0906

## 2019-03-30 NOTE — ASSESSMENT & PLAN NOTE
-- Acute onset of SOB yesterday evening around 7:30 pm with increased work of breathing and pt is groaning more than normal, per the daughter, as if he is uncomfortable  -- CXR with pulmonary vascular congestion with increased interstitial and parenchymal attenuation suggesting pulmonary edema. There is also increased attenuation in the right lower lung zone with blunting of the costophrenic angle likely reflecting pleural fluid with adjacent atelectatic/consolidative change.   -- Findings concerning for aspiration PNA in correlation with recent hx of dysphagia and location of consolidation.  -- SIRS and qSOFA positive. Pt is tachypneic and hypothermic (94.5F)   -- Lactic acid is negative  -- No leukocytosis    Plan  -- F/U Blood cultures x2  -- F/U respiratory/sputum cx  -- Given CFTX 1g in ED  -- Will start on broad spectrum antibiotics (Vanv/Zosyn) for treatment of presumed CAP  -- Supplemental oxygen. Currently on 6L NC which is maintaining O2 saturations at 93%  -- Midline consult placed for IV access for antibiotic administration  -- Duo-Nebs q4hrs while awake

## 2019-03-30 NOTE — ASSESSMENT & PLAN NOTE
On dialysis MWF, last received dialysis 1/28  [facility] outpatient dialysis center  Inpatient MWF dialysis, per nephrology  Sevelamer 800mg TID    Plan  -- Nephrology consulted for HD

## 2019-03-30 NOTE — ASSESSMENT & PLAN NOTE
-- EF 35% on last ECHO 1/7/2019  -- Previously on carvedilol and amlodipine at home. Coreg d/c'd during last admission 2/2 syncope and bradycardia  -- Will hold all anti-hypertensives in the setting of sepsis  -- EKG with NSR with PAC's and PVC's with prolonged QT. No ST/T wave changes  -- Troponin 0.032  -- Pt has 2+ b/l pitting edema to the LE as well as pre-sacral edema which daughter reports as worse than baseline  -- BNP is elevated at 1937 which appears elevated from prior admissions  -- On previous admission pt was having hematuria and some borderline BP's so HD at that time more focused on uremic toxin clearance more than UF.   -- Last HD session was yesterday per pt's daughter without known complication    Plan  -- Pt makes minimal urine not on a daily basis therefore Lasix would be of low yield  -- Nephrology Consulted for HD. Will see pt today  -- BP stable at this time

## 2019-03-30 NOTE — ASSESSMENT & PLAN NOTE
-- ESRD  -- Hgb 9.5 (stable, appears baseline)  -- ED RN reporting some blood drops from urethral meatus. Ongoing issue, and Urology following pt on an outpatient basis s/p recent TURP    Plan  -- Currently HDS  -- wctm CBC daily

## 2019-03-30 NOTE — ED NOTES
Pt resting in bed. He arouses to tactile stimulation and is oriented to person. Breath sounds course. Daughter at bedside and aware of plan of care. Coffee provided to family member. Warming blanket remains in place. Bed in low, locked position, call light within reach. Side rails up x 2. Will continue to monitor.

## 2019-03-30 NOTE — CARE UPDATE
Rapid Response Nurse Note     Rapid Response Metrics:     Admit Date: 3/30/2019  LOS: 0  Code Status: DNR   Date of Consult: 2019  : 3/7/1929  Age: 89 y.o.  Weight:   Wt Readings from Last 1 Encounters:   19 75.6 kg (166 lb 10.7 oz)     Sex: male  Race: Black or    Bed: AIDEE/AIDEE:   MRN: [id number]  Time Rapid Response Team page Received: 1444  Time Rapid Response Team at Bedside: 1445  Time Rapid Response Team left Bedside: 1600  Was the patient discharged from an ICU this admission?   no  Was the patient discharged from a PACU within last 24 hours?  no  Did the patient receive conscious sedation/general anesthesia within last 24 hours?  no  Was the patient in the ED within the past 24 hours?  yes  Was the patient started on NIPPV within the past 24 hours?  no  Did this progress into an ARC or CPA:  yes  Attending Physician: Adonay Bradford MD  Primary Service: [facility] MED 4  Consult Requested By: Adonay Bradford MD   Rapid Response Indication(s): Respiratory distress    SITUATION:     Reason for Call:   Called to evaluate the patient for Circulatory    BACKGROUND:     Why is the patient in the hospital?: Shortness of breath, sepsis  What changed?: Respiratory arrest followed by PEA arrest    ASSESSMENT:     What did you find: Per dialysis RN, pt arrived to dialysis unit from ED. Dialysis had not started been started. Upon arrival to dialysis unit from ED, pt was having labored breathing and appeared to be in respiratory distress. Dialysis RN placed pt on non re-breather. Pt did not improve. Rapid Response was subsequently called.    Pt became completely apneic shortly after. Code Blue was called.     Upon arrival to unit, pt unresponsive, pulseless, and w/o respirations. Heart tones absent. Pupils fixed and dilated. Notified by dialysis RN that pt is DNR. MICU, Nephrology, and IM-4 Resident to bedside. Pt pronounced at 1502. Pt's family notified.  notified. RUBEN notified,  denied 2/2 pt's age. [facility]  notified, body released by . Pt's family at bedside.      RECOMMENDATIONS:     We recommend: n/a    FOLLOW-UP/CONTINGENCY PLAN:     Patient needs a second visit at : n/a    Call the rapid response Nurse at x [phone] for additional questions or concerns.      PHYSICIAN ESCALATION:     Orders received and case discussed with Dr. Steinberg     Disposition: Remain in room [place]ialysis [place] [facility]

## 2019-03-30 NOTE — SUBJECTIVE & OBJECTIVE
Past Medical History:   Diagnosis Date    Anemia     Blind     bilaterally    CHF (congestive heart failure)     Chronic kidney disease     Dementia 01/2017    Diabetes mellitus type II     General anesthetics causing adverse effect in therapeutic use     april / may 2014     Glaucoma (increased eye pressure)     Hypertension     Hypothermia 12/6/2017    Prostate cancer 1/11/2019    Seizures        Past Surgical History:   Procedure Laterality Date    ANGIOPLASTY WITH STENT PLACEMENT Left 11/18/2016    Performed by [facility] at [facility] OR 2ND [facility]    AV FISTULA PLACEMENT Left 4/2014    AV fistula to Left upper arm    CLOT EVACUATION FROM BLADDER  1/8/2019    Performed [facility] at [facility]    [id number] left RC vs BC AVF Left 8/8/2014    Performed by [facility] Sism [facility] at [facility] OR 2ND [facility]    CYSTOSCOPY N/A 11/29/2018    Performed by [facility] at [facility]    CYSTOSCOPY  WITH FULGURATION  1/8/2019    Performed by [facility] at [facility] OR [address] [place] [address]    CYSTOSCOPY, WITH BLADDER BIOPSY, WITH FULGURATION IF INDICATED N/A 1/8/2019    Performed by [facility] at [facility] OR 1ST [facility]    EYE SURGERY      FISTULOGRAM Left 11/18/2016    Performed by [facility] at [facility] OR [facility]    FISTULOGRAM Left 4/15/2016    Performed by [facility] at [facility] OR [facility]    FISTULOGRAM Left 1/29/2016    Performed [facility] at [facility] CATH LAB    FISTULOGRAM Room 11 case Left 3/27/2015    Performed by [facility] at [facility] OR 2[facility]    HYDROCELECTOMY Bilateral 11/22/2017    Performed by [facility]jee [facility] at [facility] OR 2[facility]    INSERTION-CATHETER-DIALYSIS-PERITONEAL-LAPAROSCOPIC N/A 5/8/2014    Performed by Js Ladd MD at [facility] OR 2ND [facility]LR    INSERTION-CATHETER-DIALYSIS-PERITONEAL-LAPAROSCOPIC N/A 5/5/2014    Performed by Js [facility]add [facility] at [facility] OR 2ND [facility]    INSERTION-CATHETER-HEMODIALYSIS N/A 5/21/2014     Performed [facility] at [facility] OR 2ND [facility]LR    PERMCATH INSERTION-TUNNELED CVC N/A 5/13/2014    Performed by Lolis Jackson MD at [facility] CATH LAB    REMOVAL, BLOOD CLOT N/A 11/29/2018    Performed by [facility] at [facility] OR [facility]    REMOVAL-CATHETER-PERITONEAL DIALYSIS N/A 7/30/2014    Performed by Js Ladd [facility] at [facility] OR 2ND [facility]LR    SCROTUM EXPLORATION Bilateral 11/2017    TURP (TRANSURETHRAL RESECTION OF PROSTATE) N/A 1/8/2019    Performed by [facility] at [facility] OR [facility]    TURP (TRANSURETHRAL RESECTION OF PROSTATE)  1/8/2019    Performed by [facility]o [facility]jee [facility] at [facility] OR 1ST [facility]LR       Review of patient's allergies indicates:   Allergen Reactions    Lisinopril Swelling       Current Facility-Administered Medications on File Prior to Encounter   Medication    leuprolide (6 month) SyKt 45 mg     Current Outpatient Medications on File Prior to Encounter   Medication Sig    amLODIPine (NORVASC) 10 MG tablet Take 1 tablet (10 mg total) by mouth once daily.    aspirin 81 MG Chew Take 1 tablet (81 mg total) by mouth once daily.    atorvastatin (LIPITOR) 40 MG tablet Take 1 tablet (40 mg total) by mouth once daily. (Patient taking differently: Take 40 mg by mouth every evening. )    B complex-vitamin C-folic acid (NEPHRO-JESSICA) 0.8 mg Tab Take 1 tablet by mouth every morning.     bicalutamide (CASODEX) 50 MG Tab Take 1 tablet (50 mg total) by mouth once daily.    blood sugar diagnostic (BLOOD GLUCOSE TEST) Strp 1 strip by Misc.(Non-Drug; Combo Route) route daily as needed.     brimonidine-timolol (COMBIGAN) 0.2-0.5 % Drop Place 1 drop into the left eye 2 (two) times daily.    brinzolamide (AZOPT) 1 % ophthalmic suspension Place 1 drop into the left eye 3 (three) times daily.     calcium acetate (PHOSLO) 667 mg capsule Take 2 capsules by mouth every day in the morning, then take 1 capsule with lunch and 2 capsules in the evening with meals    dutasteride (AVODART) 0.5 mg  capsule Take 1 capsule (0.5 mg total) by mouth once daily.    ergocalciferol (VITAMIN D2) 50,000 unit Cap Take 1 tablet by mouth 2 times per month    insulin aspart (NOVOLOG FLEXPEN) 100 unit/mL InPn  Insulin Pen Subcutaneous As directed.  -200 take 1 unitBS 201-250 take 2 unitsBS 251-300 take 3 unitsBS 301-350 take 4 unitsBS > 351 take 5 units and call MD    levetiracetam (KEPPRA) 500 MG Tab Take 1 tablet (500 mg total) by mouth 2 (two) times daily.    MELATONIN ORAL Take 1 tablet by mouth once daily.    polyethylene glycol (GLYCOLAX) 17 gram/dose powder Take 17 g by mouth daily as needed.    tamsulosin (FLOMAX) 0.4 mg Cap Take 1 capsule (0.4 mg total) by mouth once daily.    TRAVOPROST (TRAVATAN Z OPHT) Place 1 drop into the left eye every evening. 1 Drops Left eye Every evening     Family History     Problem Relation (Age of Onset)    Cancer Daughter    Heart disease Sister, Brother        Tobacco Use    Smoking status: Never Smoker    Smokeless tobacco: Never Used   Substance and Sexual Activity    Alcohol use: No    Drug use: No    Sexual activity: Never     Review of Systems   Unable to perform ROS: Dementia     Objective:     Vital Signs (Most Recent):  Temp: (!) 94.5 °F (34.7 °C) (03/30/19 0718)  Pulse: 93 (03/30/19 1021)  Resp: (!) 37 (03/30/19 0853)  BP: (!) 151/68 (03/30/19 1021)  SpO2: (!) 91 % (03/30/19 1021) Vital Signs (24h Range):  Temp:  [93.7 °F (34.3 °C)-94.5 °F (34.7 °C)] 94.5 °F (34.7 °C)  Pulse:  [76-93] 93  Resp:  [24-37] 37  SpO2:  [86 %-100 %] 91 %  BP: (141-178)/(65-84) 151/68     Weight: 75.6 kg (166 lb 10.7 oz)  Body mass index is 21.99 kg/m².    Physical Exam   Neck: JVD present.   Cardiovascular: Normal rate and regular rhythm.   No murmur heard.  Pulmonary/Chest: Accessory muscle usage present. Tachypnea noted. He has decreased breath sounds in the right middle field and the right lower field. He has no wheezes. He has rhonchi in the right lower field and the left  lower field.   Abdominal: Soft. Bowel sounds are normal. He exhibits no distension. There is no tenderness.   Musculoskeletal: He exhibits edema (2+ pitting and pre-sacral). He exhibits no tenderness.   Neurological: He is alert.   Skin: Skin is warm and dry. Capillary refill takes less than 2 seconds. He is not diaphoretic.

## 2019-03-30 NOTE — ED NOTES
In to evaluate pt and reassess pt's temperature. Pt found to be soiled with blood; gross blood noted coming from the penis.  Daughter states this is normal for pt.  Pt cleaned; new linens provided.  Condom catheter placed to collect blood from penis.  Pt repositioned to semi-more's position; now found to have oxygen saturation in the low to mid 80's on 4 liters per nasal cannula.  Pt remains minimally responsive, though no change from previous status.  Oxygen delivery increased to 6 liters via nasal cannula; saturations improved into the lower 90's.  [facility] medicine 4 paged; awaiting return phone call.
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
## 2019-03-31 NOTE — DISCHARGE SUMMARY
[facility] Hospital Medicine  Discharge Summary      Patient Name: Ronald Campbell  MRN: [id number]  Admission Date: 3/30/2019  Hospital Length of Stay: 0 days  Discharge Date and Time: 3/30/2019     Attending Physician: Adonay Bradford MD   Discharging Provider: Merrill Ann MD  Primary Care Provider: Bart Shaw MD  Hospital Medicine Team: [place] [facility] [place] [facility]OSP MED 4 Merrill Ann MD    HPI:   Ronald Campbell is an 88 yo M with a medical hx significant for ESRD on HD MWF, HFrEF (EF 35%), IDDM2, dementia, bilateral blindness, seizure disorder, recent diagnosis of prostate cancer S/P TURP and fulguration of prostate on 1/8/19 who is brought in by daughter for fairly acute onset shortness of breath, grunting, and increased phlegm production since 3/29 evening at 7:30 pm. Pt also notably orthopneic per pt's daughter at bedside and she has noticed increasing pitting edema in the LE as well as pre-sacral edema. Of note, most recent admission in early February, 2019 for gross hematuria, including sidney blood from the urethral meatus, for which he was admitted and received blood transfusion. He was notably borderline hypotensive at that time and HD sessions were primarily focused on uremic toxin clearance rather than UF. It appears that pt may now be developing some increased fluid retention since that time. Additionally, the daughter reports that pt has been exhibiting signs of dysphagia, and appears to hold onto food bolus much longer than normal.      Hospital Course:   -- See H&P for further     Patient to have been transferred to [facility].  Per nursing, patient was noted to have episode of apnea with eventual further bradycardia.  Code blue was called.  Patient placed on non-rebreather with no improvement.  Hospital Medicine team called to bedside.       Patient is not responding to verbal or tactile stimuli. Patient does not have a papillary or corneal reflex. His pupils are fixed and dilated. No heart or  breath sounds on auscultation. No respirations. No palpable pulses.      Daughter called and notified of events that transpired.   already at bedside.       Time of death: 1502      Cause of Death:      PEA arrest from hypoxic respiratory failure        Consults:   Consults (From admission, onward)        Status Ordering Provider     Inpatient consult to Nephrology  Once     Provider:  (Not yet assigned)    MERRILL Medrano            Final Active Diagnoses:    Diagnosis Date Noted POA    PRINCIPAL PROBLEM:  Sepsis due to pneumonia [J18.9, A41.9] 2019 Unknown    Acute on chronic combined systolic and diastolic CHF (congestive heart failure) [I50.43] 2019 Unknown    Dysphagia [R13.10] 2019 Unknown    ESRD (end stage renal disease) [N18.6]  Yes    Goals of care, counseling/discussion [Z71.89] 2019 Not Applicable    SOB (shortness of breath) [R06.02] 2019 Yes    Acute respiratory failure with hypoxia [J96.01] 2019 Unknown    Anemia [D64.9] 2019 Yes    Hematuria [R31.9] 2018 Yes    HTN (hypertension) [I10]  Yes    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 [E11.22, N18.6]  Yes    Seizure disorder [G40.909] 2012 Yes     Chronic      Problems Resolved During this Admission:       Discharged Condition:     Disposition:       Pending Diagnostic Studies:     None             Time spent on the discharge of patient: 35 minutes  Patient was seen and examined on the date of discharge and determined to be suitable for discharge.          Merrill Ann MD  Department of Hospital Medicine  [facility]

## 2019-04-02 NOTE — PHYSICIAN QUERY
"PT Name: Ronald Campbell  MR #: [id number]    Physician Query Form - Pneumonia Clarification     CDS/: Cecy Denney RN             Contact information: [phone]  This form is a permanent document in the medical record.    Query Date:  April 2, 2019    By submitting this query, we are merely seeking further clarification of documentation. Please utilize your independent clinical judgment when addressing the question(s) below.    The Medical record contains the following:   Indicators   Supporting Clinical Findings Location in Medical Record   x "Pneumonia" documented Sepsis due to pneumonia   3/30 DC summary   x Chest X-Ray: FINDINGS:  Cardiac monitoring leads overlie the chest.  The cardiomediastinal silhouette is enlarged.  There is pulmonary vascular congestion with increased interstitial and parenchymal attenuation which can be seen with underlying pulmonary edema.  There is increased attenuation in the right lower lung zone with blunting of the costophrenic angle likely reflecting pleural fluid with adjacent atelectatic/consolidative change.  Possible trace left pleural fluid.  No pneumothorax.  Visualized osseous structures are intact.           3/30 CXR   x PaO2    PaCO2     O2 sat O2 sat 86% on 3L NC 3/30 Flowsheet        Cultures      x Treatment  Ceftriaxone IVPB  Vancomycin IVPB  Piperacillin IVPB 3/30 MAR    Supplemental O2     x Other Additionally, the daughter reports that pt has been exhibiting signs of dysphagia, and appears to hold onto food bolus much longer than normal    Dysphagia   3/30 DC summary       Provider, please specify type of pneumonia.      [   ] Bacterial Pneumonia (Specify organism):   [   ] Bacterial, Gram Negative organism Pneumonia   [   ] Aspiration Pneumonia   [   ] Other type of pneumonia (please specify):   [ x ] Clinically undetermined         Please document in your progress notes daily for the duration of treatment, until resolved, and include in your discharge " summary.    .

## 2019-04-02 NOTE — PHYSICIAN QUERY
PT Name: Ronald Campbell  MR #: [id number]     Physician Query Form - Documentation Clarification      CDS/: Cecy Denney RN             Contact information: [phone]    This form is a permanent document in the medical record.     Query Date: April 2, 2019    By submitting this query, we are merely seeking further clarification of documentation. Please utilize your independent clinical judgment when addressing the question(s) below.    The Medical record reflects the following:    Supporting Clinical Findings Location in Medical Record       NA= 133     3/30 Lab       NS at 100cc/hr       3/30 MAR                                                                            Doctor, Please specify diagnosis or diagnoses associated with above clinical findings.    Provider Use Only          [   x] Hyponatremia    [   ] Other______________________________                                                                                                                       [  ] Clinically Undetermined

## 2019-04-04 LAB
BACTERIA BLD CULT: NORMAL
BACTERIA BLD CULT: NORMAL

## 2019-04-04 NOTE — PHYSICIAN QUERY
"PT Name: Ronald Campbell  MR #: [id number]    Physician Query Form - Hematology Clarification      CDS/: Cecy Denney RN              Contact information: [phone]    This form is a permanent document in the medical record.      Query Date: April 4, 2019    By submitting this query, we are merely seeking further clarification of documentation. Please utilize your independent clinical judgment when addressing the question(s) below.    The Medical record contains the following:   Indicators  Supporting Clinical Findings Location in Medical Record   x "Anemia" documented Anemia   3/30 DC summary   x H & H =   H/H= 9.5/ 29.8   3/30 Lab   x BP =                     HR= HR= 77   /81 3/30 Flowsheet    "GI bleeding" documented       x Acute bleeding (Non GI site) Hematuria   -- S/P TURP and fulguration of prostate on 1/8/18.  Suspected prostate as a source of bleeding, unlikely due to small renal mass.    -- Ongoing issue since TURP     ED RN reporting some blood drops from urethral meatus. Ongoing issue, and Urology following pt on an outpatient basis s/p recent TURP  3/30 HP    Transfusion(s)     x Treatment: Daily CBC  Cardiac monitoring  Continuous oxygen  Pulse oximetry   3/30 NSG orders   x Other:  Of note, most recent admission in early February, 2019 for gross hematuria, including sidney blood from the urethral meatus,  for which he was admitted and received blood transfusion      ESRD on HD   3/30 DC summary          3/30 Renal consult     Provider, please specify diagnosis or diagnoses associated with above clinical findings.    * naren all that apply    [  ] Acute blood loss anemia   [x  ] Chronic blood loss anemia     [x  ] Anemia of chronic disease ( Specify chronic disease)       [  ] CKD (specify stage):   [  ] Other (Specify):   [ x ] Clinically Undetermined       [  ] Other Hematological Diagnosis (please specify):     [  ] Clinically Undetermined       Please document in your progress notes " daily for the duration of treatment, until resolved, and include in your discharge summary.

## 2023-03-27 NOTE — ASSESSMENT & PLAN NOTE
On dialysis MWF, last received dialysis 1/28  - nephrology referral for continued dialysis as inpatient     Cellcept Counseling:  I discussed with the patient the risks of mycophenolate mofetil including but not limited to infection/immunosuppression, GI upset, hypokalemia, hypercholesterolemia, bone marrow suppression, lymphoproliferative disorders, malignancy, GI ulceration/bleed/perforation, colitis, interstitial lung disease, kidney failure, progressive multifocal leukoencephalopathy, and birth defects.  The patient understands that monitoring is required including a baseline creatinine and regular CBC testing. In addition, patient must alert us immediately if symptoms of infection or other concerning signs are noted.

## 2023-05-04 NOTE — PLAN OF CARE
12/13/18 1435   Post-Acute Status   Post-Acute Authorization Home Health/Hospice   Home Health/Hospice Status Authorization Obtained    MAIRA sent [facility] the pt's outpatient Palliative Care order and the HH order.  They met with the pt's dtr today at [facility].  MAIRA met with the pt and the pt's dtr to discuss transportation.  Pt needs a  van ride home.  MAIRA spoke with MAIRA Ford at [facility].  She stated that the hospital arranges dc transportation.  MAIRA informed the pt of this and informed her that she would arrange for transport for the next hour.  MAIRA sent a transportation request for the [facility] and requested  for around 3:30pm. Pt's nurse is aware.  The PA put the dc order in.    Madeline Carrillo, [facility] x [phone]       Rhofade Counseling: Rhofade is a topical medication which can decrease superficial blood flow where applied. Side effects are uncommon and include stinging, redness and allergic reactions.

## 2023-05-08 NOTE — PLAN OF CARE
Problem: Adult Inpatient Plan of Care  Goal: Plan of Care Review  Outcome: Ongoing (interventions implemented as appropriate)  Patient resting in bed comfortably. IV intact and free of infection and irration,  fall precautions maintained no falls noted. CBI going, Call light in reach bed locked and in lowest position. Non skid socks on while out of bed. Patient instructed to call for assistance. Skin integrity maintained as patient is assisted with positioning, no C/o pain. No other complaints or concerns.Will continue to monitor and follow careplan of care.           2 (mild pain)

## 2025-05-16 NOTE — ED NOTES
Patient identifiers verified and correct for Ronald Campbell.   LOC: The patient is awake, alert and aware of environment with an appropriate affect, the patient is oriented x 3 and speaking appropriately.   APPEARANCE: Patient appears uncomfortable, but in no acute distress, patient is clean and well groomed.  SKIN: The skin is warm and dry, color consistent with ethnicity, patient has normal skin turgor and moist mucus membranes, skin intact, no breakdown or bruising noted.   MUSCULOSKELETAL: Patient moving all extremities spontaneously, no swelling noted.  RESPIRATORY: Airway is open and patent, respirations are spontaneous, patient has a normal effort and rate, no accessory muscle use noted, pt placed on continuous pulse ox with O2 sats noted at 98% on room air.  CARDIAC: Pt placed on cardiac monitor. Patient has a normal rate and regular rhythm, no edema noted, capillary refill < 3 seconds.   GASTRO: Soft and non tender to palpation, no distention noted, normoactive bowel sounds present in all four quadrants. Pt states bowel movements have been regular.  : Pt reports hematuria and constant penile discharge/bleeding since Monday. Previously d/c Dec 13th, 2018 for similar episode.  NEURO: Pt opens eyes spontaneously, behavior appropriate to situation, follows commands, facial expression symmetrical, bilateral hand grasp equal and even, purposeful motor response noted, normal sensation in all extremities when touched with a finger.     normal...

## (undated) DEVICE — ELECTRODE REM PLYHSV RETURN 9

## (undated) DEVICE — GUIDE WIRE MOTION .035 X 150CM

## (undated) DEVICE — SYR 50ML CATH TIP

## (undated) DEVICE — SUT VICRYL 3-0 27 SH

## (undated) DEVICE — SET SINGLE BASIN

## (undated) DEVICE — GLOVE BIOGEL 7.5

## (undated) DEVICE — SOL IRR NACL .9% 3000ML

## (undated) DEVICE — Device

## (undated) DEVICE — IRRIGATION SET Y-TYPE TUR/BLAD

## (undated) DEVICE — SEE MEDLINE ITEM 157148

## (undated) DEVICE — PACK CYSTO

## (undated) DEVICE — ELECTRODE NEEDLE 2.8IN

## (undated) DEVICE — GOWN SMARTGOWN LVL4 X-LONG XL

## (undated) DEVICE — ELECTORDE BIPOLAR VPR 22F

## (undated) DEVICE — SOL IRR WATER STRL 3000 ML

## (undated) DEVICE — SYR 10CC LUER LOCK

## (undated) DEVICE — TRAY MINOR GEN SURG

## (undated) DEVICE — GAUGE FLUFF X-SUPER 36X36 2PLY

## (undated) DEVICE — SEE MEDLINE ITEM 146417

## (undated) DEVICE — CLIPPER BLADE MOD 4406 (CAREF)

## (undated) DEVICE — ELECTRODE LOOP CUTTING BIPOLAR

## (undated) DEVICE — SOL NACL IRR 1000ML BTL

## (undated) DEVICE — DRAIN PENROSE XRAY 12 X 1/4 ST

## (undated) DEVICE — SET CYSTO IRR DRP CHMBR 84IN

## (undated) DEVICE — TRAY CYSTO BASIN

## (undated) DEVICE — GOWN SURGICAL X-LARGE

## (undated) DEVICE — SEE MEDLINE ITEM 152622

## (undated) DEVICE — BAG URINARY DRAINAGE 2000ML

## (undated) DEVICE — DEVICE STABILIZATION STAT LOCK